# Patient Record
Sex: MALE | Race: WHITE | NOT HISPANIC OR LATINO | Employment: OTHER | URBAN - METROPOLITAN AREA
[De-identification: names, ages, dates, MRNs, and addresses within clinical notes are randomized per-mention and may not be internally consistent; named-entity substitution may affect disease eponyms.]

---

## 2017-05-16 ENCOUNTER — TELEPHONE (OUTPATIENT)
Dept: OPHTHALMOLOGY | Facility: CLINIC | Age: 67
End: 2017-05-16

## 2017-05-16 NOTE — TELEPHONE ENCOUNTER
----- Message from Sánchez Sotelo sent at 5/15/2017  3:09 PM CDT -----  Contact: Sánchez   Pt scheduled transp/Phaco on 7/13/17 with Dr. Van.  He will need to be seen since last visit was 7/29/16.  Thanks,   Sánchez     [phone]

## 2017-06-06 ENCOUNTER — TELEPHONE (OUTPATIENT)
Dept: OPHTHALMOLOGY | Facility: CLINIC | Age: 67
End: 2017-06-06

## 2017-06-06 ENCOUNTER — OFFICE VISIT (OUTPATIENT)
Dept: OPHTHALMOLOGY | Facility: CLINIC | Age: 67
End: 2017-06-06
Attending: OPHTHALMOLOGY
Payer: COMMERCIAL

## 2017-06-06 DIAGNOSIS — H25.13 NUCLEAR SCLEROSIS, BILATERAL: ICD-10-CM

## 2017-06-06 DIAGNOSIS — H18.519 FUCHS' ENDOTHELIAL DYSTROPHY: Primary | ICD-10-CM

## 2017-06-06 PROCEDURE — 99999 PR PBB SHADOW E&M-EST. PATIENT-LVL II: CPT | Mod: PBBFAC,,, | Performed by: OPHTHALMOLOGY

## 2017-06-06 PROCEDURE — 92014 COMPRE OPH EXAM EST PT 1/>: CPT | Mod: S$GLB,,, | Performed by: OPHTHALMOLOGY

## 2017-06-06 RX ORDER — LIDOCAINE HYDROCHLORIDE 10 MG/ML
1 INJECTION, SOLUTION EPIDURAL; INFILTRATION; INTRACAUDAL; PERINEURAL ONCE
Status: CANCELLED | OUTPATIENT
Start: 2017-06-06 | End: 2017-06-06

## 2017-06-06 RX ORDER — TETRACAINE HYDROCHLORIDE 5 MG/ML
1 SOLUTION OPHTHALMIC
Status: CANCELLED | OUTPATIENT
Start: 2017-06-06

## 2017-06-06 RX ORDER — MOXIFLOXACIN 5 MG/ML
1 SOLUTION/ DROPS OPHTHALMIC
Status: CANCELLED | OUTPATIENT
Start: 2017-06-06

## 2017-06-06 RX ORDER — PHENYLEPHRINE HYDROCHLORIDE 25 MG/ML
1 SOLUTION/ DROPS OPHTHALMIC
Status: CANCELLED | OUTPATIENT
Start: 2017-06-06

## 2017-06-06 RX ORDER — TROPICAMIDE 10 MG/ML
1 SOLUTION/ DROPS OPHTHALMIC
Status: CANCELLED | OUTPATIENT
Start: 2017-06-06

## 2017-06-06 RX ORDER — HYDROCORTISONE 25 MG/G
CREAM TOPICAL
Refills: 1 | COMMUNITY
Start: 2017-05-08 | End: 2018-06-13

## 2017-06-06 RX ORDER — TIZANIDINE 4 MG/1
TABLET ORAL
Refills: 0 | COMMUNITY
Start: 2017-03-02 | End: 2017-08-01

## 2017-06-06 RX ORDER — PITAVASTATIN CALCIUM 2.09 MG/1
TABLET, FILM COATED ORAL
Refills: 5 | COMMUNITY
Start: 2017-04-14 | End: 2017-08-01
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
## 2017-06-08 ENCOUNTER — TELEPHONE (OUTPATIENT)
Dept: OPHTHALMOLOGY | Facility: CLINIC | Age: 67
End: 2017-06-08

## 2017-06-08 DIAGNOSIS — H18.519 FUCHS' ENDOTHELIAL DYSTROPHY: ICD-10-CM

## 2017-06-08 DIAGNOSIS — H25.11 NUCLEAR SCLEROTIC CATARACT OF RIGHT EYE: Primary | ICD-10-CM

## 2017-07-03 ENCOUNTER — TELEPHONE (OUTPATIENT)
Dept: OPHTHALMOLOGY | Facility: CLINIC | Age: 67
End: 2017-07-03

## 2017-07-03 NOTE — TELEPHONE ENCOUNTER
----- Message from Josy Baeza sent at 6/23/2017  4:51 PM CDT -----  Pt calling for recommendations on a neuro ophthalmologist from Dr. Van.  I informed him that we have Dr. Persaud but he wants him to recommend someone maybe outside of [facility].  He can be reached at [phone]

## 2017-07-11 ENCOUNTER — TELEPHONE (OUTPATIENT)
Dept: OPHTHALMOLOGY | Facility: CLINIC | Age: 67
End: 2017-07-11

## 2017-07-12 ENCOUNTER — TELEPHONE (OUTPATIENT)
Dept: OPHTHALMOLOGY | Facility: CLINIC | Age: 67
End: 2017-07-12
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
## 2017-07-13 ENCOUNTER — NURSE TRIAGE (OUTPATIENT)
Dept: ADMINISTRATIVE | Facility: CLINIC | Age: 67
End: 2017-07-13

## 2017-07-13 ENCOUNTER — ANESTHESIA EVENT (OUTPATIENT)
Dept: SURGERY | Facility: OTHER | Age: 67
End: 2017-07-13
Payer: COMMERCIAL

## 2017-07-13 ENCOUNTER — SURGERY (OUTPATIENT)
Age: 67
End: 2017-07-13

## 2017-07-13 ENCOUNTER — ANESTHESIA (OUTPATIENT)
Dept: SURGERY | Facility: OTHER | Age: 67
End: 2017-07-13
Payer: COMMERCIAL

## 2017-07-13 ENCOUNTER — HOSPITAL ENCOUNTER (OUTPATIENT)
Facility: OTHER | Age: 67
Discharge: HOME | End: 2017-07-13
Attending: OPHTHALMOLOGY | Admitting: OPHTHALMOLOGY
Payer: COMMERCIAL

## 2017-07-13 VITALS
DIASTOLIC BLOOD PRESSURE: 82 MMHG | HEART RATE: 63 BPM | HEIGHT: 70 IN | OXYGEN SATURATION: 95 % | TEMPERATURE: 98 F | SYSTOLIC BLOOD PRESSURE: 131 MMHG | BODY MASS INDEX: 27.2 KG/M2 | RESPIRATION RATE: 16 BRPM | WEIGHT: 190 LBS

## 2017-07-13 DIAGNOSIS — H18.519 FUCHS' ENDOTHELIAL DYSTROPHY: Primary | ICD-10-CM

## 2017-07-13 DIAGNOSIS — H25.13 NUCLEAR SCLEROSIS, BILATERAL: ICD-10-CM

## 2017-07-13 DIAGNOSIS — H25.11 NUCLEAR SCLEROTIC CATARACT OF RIGHT EYE: ICD-10-CM

## 2017-07-13 LAB — POCT GLUCOSE: 139 MG/DL (ref 70–110)

## 2017-07-13 PROCEDURE — 71000015 HC POSTOP RECOV 1ST HR: Performed by: OPHTHALMOLOGY

## 2017-07-13 PROCEDURE — 25000003 PHARM REV CODE 250: Performed by: SPECIALIST

## 2017-07-13 PROCEDURE — V2632 POST CHMBR INTRAOCULAR LENS: HCPCS | Performed by: OPHTHALMOLOGY

## 2017-07-13 PROCEDURE — 25000003 PHARM REV CODE 250: Performed by: OPHTHALMOLOGY

## 2017-07-13 PROCEDURE — 71000016 HC POSTOP RECOV ADDL HR: Performed by: OPHTHALMOLOGY

## 2017-07-13 PROCEDURE — 65757 PREP CORNEAL ENDO ALLOGRAFT: CPT | Mod: ,,, | Performed by: OPHTHALMOLOGY

## 2017-07-13 PROCEDURE — 82962 GLUCOSE BLOOD TEST: CPT | Performed by: OPHTHALMOLOGY

## 2017-07-13 PROCEDURE — 63600175 PHARM REV CODE 636 W HCPCS: Performed by: OPHTHALMOLOGY

## 2017-07-13 PROCEDURE — 27201423 OPTIME MED/SURG SUP & DEVICES STERILE SUPPLY: Performed by: OPHTHALMOLOGY

## 2017-07-13 PROCEDURE — S0020 INJECTION, BUPIVICAINE HYDRO: HCPCS | Performed by: OPHTHALMOLOGY

## 2017-07-13 PROCEDURE — 37000009 HC ANESTHESIA EA ADD 15 MINS: Performed by: OPHTHALMOLOGY

## 2017-07-13 PROCEDURE — 37000008 HC ANESTHESIA 1ST 15 MINUTES: Performed by: OPHTHALMOLOGY

## 2017-07-13 PROCEDURE — 36000707: Performed by: OPHTHALMOLOGY

## 2017-07-13 PROCEDURE — V2785 CORNEAL TISSUE PROCESSING: HCPCS | Performed by: OPHTHALMOLOGY

## 2017-07-13 PROCEDURE — 65756 CORNEAL TRNSPL ENDOTHELIAL: CPT | Mod: RT,,, | Performed by: OPHTHALMOLOGY

## 2017-07-13 PROCEDURE — 66984 XCAPSL CTRC RMVL W/O ECP: CPT | Mod: 51,RT,, | Performed by: OPHTHALMOLOGY

## 2017-07-13 PROCEDURE — 36000706: Performed by: OPHTHALMOLOGY

## 2017-07-13 PROCEDURE — 63600175 PHARM REV CODE 636 W HCPCS: Performed by: NURSE ANESTHETIST, CERTIFIED REGISTERED

## 2017-07-13 RX ORDER — PHENYLEPHRINE HYDROCHLORIDE 25 MG/ML
1 SOLUTION/ DROPS OPHTHALMIC
Status: COMPLETED | OUTPATIENT
Start: 2017-07-13 | End: 2017-07-13

## 2017-07-13 RX ORDER — MOXIFLOXACIN 5 MG/ML
1 SOLUTION/ DROPS OPHTHALMIC
Status: COMPLETED | OUTPATIENT
Start: 2017-07-13 | End: 2017-07-13

## 2017-07-13 RX ORDER — HYDROCODONE BITARTRATE AND ACETAMINOPHEN 5; 325 MG/1; MG/1
1 TABLET ORAL EVERY 4 HOURS PRN
Status: DISCONTINUED | OUTPATIENT
Start: 2017-07-13 | End: 2017-07-13 | Stop reason: HOSPADM

## 2017-07-13 RX ORDER — FENTANYL CITRATE 50 UG/ML
INJECTION, SOLUTION INTRAMUSCULAR; INTRAVENOUS
Status: DISCONTINUED | OUTPATIENT
Start: 2017-07-13 | End: 2017-07-13

## 2017-07-13 RX ORDER — DEXAMETHASONE SODIUM PHOSPHATE 4 MG/ML
INJECTION, SOLUTION INTRA-ARTICULAR; INTRALESIONAL; INTRAMUSCULAR; INTRAVENOUS; SOFT TISSUE
Status: DISCONTINUED | OUTPATIENT
Start: 2017-07-13 | End: 2017-07-13 | Stop reason: HOSPADM

## 2017-07-13 RX ORDER — LIDOCAINE HYDROCHLORIDE 20 MG/ML
INJECTION, SOLUTION INFILTRATION; PERINEURAL
Status: DISCONTINUED | OUTPATIENT
Start: 2017-07-13 | End: 2017-07-13 | Stop reason: HOSPADM

## 2017-07-13 RX ORDER — ACETAMINOPHEN 325 MG/1
650 TABLET ORAL EVERY 4 HOURS PRN
Status: DISCONTINUED | OUTPATIENT
Start: 2017-07-13 | End: 2017-07-13 | Stop reason: HOSPADM

## 2017-07-13 RX ORDER — MIDAZOLAM HYDROCHLORIDE 1 MG/ML
INJECTION INTRAMUSCULAR; INTRAVENOUS
Status: DISCONTINUED | OUTPATIENT
Start: 2017-07-13 | End: 2017-07-13

## 2017-07-13 RX ORDER — SODIUM CHLORIDE, SODIUM LACTATE, POTASSIUM CHLORIDE, CALCIUM CHLORIDE 600; 310; 30; 20 MG/100ML; MG/100ML; MG/100ML; MG/100ML
INJECTION, SOLUTION INTRAVENOUS CONTINUOUS PRN
Status: DISCONTINUED | OUTPATIENT
Start: 2017-07-13 | End: 2017-07-13

## 2017-07-13 RX ORDER — LIDOCAINE HYDROCHLORIDE 10 MG/ML
1 INJECTION, SOLUTION EPIDURAL; INFILTRATION; INTRACAUDAL; PERINEURAL ONCE
Status: DISCONTINUED | OUTPATIENT
Start: 2017-07-13 | End: 2017-07-13 | Stop reason: HOSPADM

## 2017-07-13 RX ORDER — TETRACAINE HYDROCHLORIDE 5 MG/ML
1 SOLUTION OPHTHALMIC
Status: COMPLETED | OUTPATIENT
Start: 2017-07-13 | End: 2017-07-13

## 2017-07-13 RX ORDER — BUPIVACAINE HYDROCHLORIDE 7.5 MG/ML
INJECTION, SOLUTION EPIDURAL; RETROBULBAR
Status: DISCONTINUED | OUTPATIENT
Start: 2017-07-13 | End: 2017-07-13 | Stop reason: HOSPADM

## 2017-07-13 RX ORDER — CEFAZOLIN SODIUM 1 G/3ML
INJECTION, POWDER, FOR SOLUTION INTRAMUSCULAR; INTRAVENOUS
Status: DISCONTINUED | OUTPATIENT
Start: 2017-07-13 | End: 2017-07-13 | Stop reason: HOSPADM

## 2017-07-13 RX ORDER — TROPICAMIDE 10 MG/ML
1 SOLUTION/ DROPS OPHTHALMIC
Status: COMPLETED | OUTPATIENT
Start: 2017-07-13 | End: 2017-07-13

## 2017-07-13 RX ADMIN — TETRACAINE HYDROCHLORIDE 1 DROP: 5 SOLUTION OPHTHALMIC at 09:07

## 2017-07-13 RX ADMIN — PHENYLEPHRINE HYDROCHLORIDE 1 DROP: 25 SOLUTION/ DROPS OPHTHALMIC at 09:07

## 2017-07-13 RX ADMIN — TROPICAMIDE 1 DROP: 10 SOLUTION/ DROPS OPHTHALMIC at 09:07

## 2017-07-13 RX ADMIN — MOXIFLOXACIN HYDROCHLORIDE 1 DROP: 5 SOLUTION/ DROPS OPHTHALMIC at 09:07

## 2017-07-13 RX ADMIN — MIDAZOLAM HYDROCHLORIDE 2 MG: 1 INJECTION, SOLUTION INTRAMUSCULAR; INTRAVENOUS at 10:07

## 2017-07-13 RX ADMIN — FENTANYL CITRATE 100 MCG: 50 INJECTION, SOLUTION INTRAMUSCULAR; INTRAVENOUS at 10:07

## 2017-07-13 RX ADMIN — CEFAZOLIN 25 MG: 330 INJECTION, POWDER, FOR SOLUTION INTRAMUSCULAR; INTRAVENOUS at 11:07

## 2017-07-13 RX ADMIN — DEXAMETHASONE SODIUM PHOSPHATE 2 MG: 4 INJECTION, SOLUTION INTRAMUSCULAR; INTRAVENOUS at 11:07

## 2017-07-13 RX ADMIN — ACETYLCHOLINE CHLORIDE 1 ML: KIT at 11:07

## 2017-07-13 RX ADMIN — SODIUM CHONDROITIN SULFATE / SODIUM HYALURONATE 2 ML: 0.55-0.5 INJECTION INTRAOCULAR at 11:07

## 2017-07-13 RX ADMIN — BALANCED SALT SOLUTION ENRICHED WITH BICARBONATE, DEXTROSE, AND GLUTATHIONE 515 ML: KIT at 11:07

## 2017-07-13 RX ADMIN — BUPIVACAINE HYDROCHLORIDE 3 ML: 7.5 INJECTION, SOLUTION EPIDURAL; RETROBULBAR at 11:07

## 2017-07-13 RX ADMIN — SODIUM CHLORIDE, SODIUM LACTATE, POTASSIUM CHLORIDE, AND CALCIUM CHLORIDE: 600; 310; 30; 20 INJECTION, SOLUTION INTRAVENOUS at 10:07

## 2017-07-13 RX ADMIN — LIDOCAINE HYDROCHLORIDE 3 ML: 20 INJECTION, SOLUTION INFILTRATION; PERINEURAL at 11:07

## 2017-07-13 NOTE — ANESTHESIA POSTPROCEDURE EVALUATION
"Anesthesia Post Evaluation    Patient: Sadiq Dhillon    Procedure(s) Performed: Procedure(s) (LRB):  PHACOEMULSIFICATION-ASPIRATION-CATARACT (Right)  INSERTION-INTRAOCULAR LENS (IOL) (Right)  TRANSPLANT-CORNEA/DMEK (Right)    [facility] Anesthesia Post Op Evaluation    Visit Vitals  /79 (BP Location: Left arm, Patient Position: Lying, BP Method: Automatic)   Pulse 71   Temp 36.7 °C (98.1 °F) (Oral)   Resp 16   Ht 5' 10" (1.778 m)   Wt 86.2 kg (190 lb)   SpO2 (!) 94%   BMI 27.26 kg/m²       Pain/Sylvie Score: Pain Assessment Performed: Yes (7/13/2017  9:00 AM)  Presence of Pain: denies (7/13/2017  9:00 AM)      "

## 2017-07-13 NOTE — TRANSFER OF CARE
"Anesthesia Transfer of Care Note    Patient: Sadiq Dhillon    Procedure(s) Performed: Procedure(s) (LRB):  PHACOEMULSIFICATION-ASPIRATION-CATARACT (Right)  INSERTION-INTRAOCULAR LENS (IOL) (Right)  TRANSPLANT-CORNEA/DMEK (Right)    Patient location: [facility]    Anesthesia Type: MAC    Transport from OR: Transported from OR on room air with adequate spontaneous ventilation    Post pain: adequate analgesia    Post assessment: no apparent anesthetic complications and tolerated procedure well    Post vital signs: stable    Level of consciousness: awake, alert and oriented    Nausea/Vomiting: no nausea/vomiting    Complications: none    Transfer of care protocol was followed      Last vitals:   Visit Vitals  /79 (BP Location: Left arm, Patient Position: Lying, BP Method: Automatic)   Pulse 71   Temp 36.7 °C (98.1 °F) (Oral)   Resp 16   Ht 5' 10" (1.778 m)   Wt 86.2 kg (190 lb)   SpO2 (!) 94%   BMI 27.26 kg/m²     "

## 2017-07-13 NOTE — ANESTHESIA POSTPROCEDURE EVALUATION
"Anesthesia Post Evaluation    Patient: Sadiq Dhillon    Procedure(s) Performed: Procedure(s) (LRB):  PHACOEMULSIFICATION-ASPIRATION-CATARACT (Right)  INSERTION-INTRAOCULAR LENS (IOL) (Right)  TRANSPLANT-CORNEA/DMEK (Right)    Final Anesthesia Type: MAC  Patient location during evaluation: [facility]  Patient participation: Yes- Able to Participate  Level of consciousness: awake and alert and oriented  Post-procedure vital signs: reviewed and stable  Pain management: adequate  Airway patency: patent  PONV status at discharge: No PONV  Anesthetic complications: no      Cardiovascular status: hemodynamically stable  Respiratory status: unassisted, spontaneous ventilation and room air  Hydration status: euvolemic  Follow-up not needed.        Visit Vitals  /79 (BP Location: Left arm, Patient Position: Lying, BP Method: Automatic)   Pulse 71   Temp 36.7 °C (98.1 °F) (Oral)   Resp 16   Ht 5' 10" (1.778 m)   Wt 86.2 kg (190 lb)   SpO2 (!) 94%   BMI 27.26 kg/m²       Pain/Sylvie Score: Pain Assessment Performed: Yes (7/13/2017  9:00 AM)  Presence of Pain: denies (7/13/2017  9:00 AM)      "

## 2017-07-13 NOTE — PLAN OF CARE
Sadiq Dhillon has met all discharge criteria from Phase II. Vital Signs are stable, ambulating  without difficulty. Discharge instructions given, patient verbalized understanding. Discharged from facility via wheelchair in stable condition.

## 2017-07-13 NOTE — ANESTHESIA POSTPROCEDURE EVALUATION
"Anesthesia Post Evaluation    Patient: Sadiq Dhillon    Procedure(s) Performed: Procedure(s) (LRB):  PHACOEMULSIFICATION-ASPIRATION-CATARACT (Right)  INSERTION-INTRAOCULAR LENS (IOL) (Right)  TRANSPLANT-CORNEA/DMEK (Right)    Final Anesthesia Type: MAC  Patient location during evaluation: [facility]  Patient participation: Yes- Able to Participate  Level of consciousness: awake and alert  Post-procedure vital signs: reviewed and stable  Pain management: adequate  Airway patency: patent  PONV status at discharge: No PONV  Anesthetic complications: no      Cardiovascular status: hemodynamically stable  Respiratory status: unassisted  Hydration status: euvolemic  Follow-up not needed.        Visit Vitals  /79 (BP Location: Left arm, Patient Position: Lying, BP Method: Automatic)   Pulse 71   Temp 36.7 °C (98.1 °F) (Oral)   Resp 16   Ht 5' 10" (1.778 m)   Wt 86.2 kg (190 lb)   SpO2 (!) 94%   BMI 27.26 kg/m²       Pain/Sylvie Score: Pain Assessment Performed: Yes (7/13/2017  9:00 AM)  Presence of Pain: denies (7/13/2017  9:00 AM)      "

## 2017-07-13 NOTE — PLAN OF CARE
Patient prefers to have Nadir friend present for discharge teaching. Please contact them @[phone]

## 2017-07-13 NOTE — DISCHARGE SUMMARY
Outcome: Successful outpatient ophthalmic surgical procedure  Preprinted Instructions given to patient.  Regular diet.  Activity: No restrictions  Meds: see Med Rec  Condition: stable  Follow up: 1 day with Dr Van  Disposition: Home  Diagnosis: s/p eye surgery

## 2017-07-13 NOTE — OP NOTE
SURGEON:  Ra Van M.D.    PREOPERATIVE DIAGNOSES:  Fuchs endothelial corneal dystrophy  Nuclear Sclerotic Cataract    POSTOPERATIVE DIAGNOSES:    Fuchs endothelial corneal dystrophy  Nuclear Sclerotic Cataract    PROCEDURES PERFORMED:  1) Descement's Membrane Endothelial Keratoplasty  right eye (DMEK) (68801)  2) Phaco/IOL right eye (38247)    Date of Procedure 07/13/2017    ANESTHESIA:  MAC with retrobulbar block.    GRAFT SIZE:  8.0 mm    IMPLANT: pcboo 17.0    COMPLICATIONS:  None.    INDICATIONS:    The patient has a history poor vision secondary to corneal edema.  After a thorough discussion of the risks, benefits and alternatives to corneal transplantation using the DMEK technique, the patient voices understanding of the risks and benefits and wishes to proceed with surgery.    PROCEDURE IN DETAIL:    The patient was brought to the operating room in the supine position, where the eye was prepped and draped in standard sterile fashion, after having received a retrobulbar block consisting of a 50/50 mixture of lidocaine and bupivacaine under conscious sedation.  The procedure was begun by the creation of a paracentesis incision, through which viscoelastic was used to fill the anterior chamber. Next, a 3mm temporal limbal tunnel incision was constructed and a cystotome and Utrata forceps used to fashion a continuous curvilinear capsulorrhexis.  Hydrodissection was carried out using the Garrison hydrodissection cannula and the nucleus was found to be mobile.  Phacoemulsification of the nucleus was carried out using a quick chop technique, and all remaining epinuclear and cortical material was removed.  The eye was then reformed with Viscoelastic and the  intraocular lens was implanted into the capsular bag.       An 8 mm maria del carmen was made in the center of the cornea and then reverse Krzysztof used to score and remove Descemet's membrane.  An inferior surgical PI was created with 30G needle and all remaining  viscoelastics were removed from the eye.    Attention was then directed to the side table, where the previously stripped donor tissue was trephined with a Hessburg-Bee trephine, and stained with trypan blue. A modified injector system was used to implant this descemet's membrane tissue into the anterior chamber, and a 10-0 nylon suture placed in the wound. The graft was unfurled with a tapping technique, and 20% SF6 gas was used to fill the AC to the diameter of graft. The anterior chamber was reformed with BSS to a normal pressure.  The patient will remain supine in the postoperative recovery area for one hour to allow better adhesion of the graft to the posterior aspect of the cornea.  The patient  will be seen tomorrow in the eye clinic.

## 2017-07-13 NOTE — ANESTHESIA PREPROCEDURE EVALUATION
07/13/2017  Sadiq Dhillon is a 67 y.o., male.    Anesthesia Evaluation    I have reviewed the Patient Summary Reports.    I have reviewed the Nursing Notes.   I have reviewed the Medications.     Review of Systems  Anesthesia Hx:  No problems with previous Anesthesia    Social:  Non-Smoker, Alcohol Use    Hematology/Oncology:  Hematology Normal   Oncology Normal     EENT/Dental:EENT/Dental Normal   Cardiovascular:   Exercise tolerance: good Hypertension, well controlled    Pulmonary:  Pulmonary Normal    Renal/:  Renal/ Normal     Hepatic/GI:  Hepatic/GI Normal    Musculoskeletal:  Musculoskeletal Normal    Neurological:  Neurology Normal    Endocrine:   Diabetes, well controlled, type 2    Dermatological:  Skin Normal    Psych:  Psychiatric Normal           Physical Exam  General:  Well nourished    Airway/Jaw/Neck:  Airway Findings: Mouth Opening: Normal Tongue: Normal  General Airway Assessment: Adult, Good  Mallampati: I  TM Distance: Normal, at least 6 cm  Jaw/Neck Findings:  Neck ROM: Normal ROM      Dental:  Dental Findings: In tact, molar caps        Mental Status:  Mental Status Findings:  Cooperative, Alert and Oriented         Anesthesia Plan  Type of Anesthesia, risks & benefits discussed:  Anesthesia Type:  MAC  Patient's Preference:   Intra-op Monitoring Plan:   Intra-op Monitoring Plan Comments:   Post Op Pain Control Plan:   Post Op Pain Control Plan Comments:   Induction:    Beta Blocker:         Informed Consent: Patient understands risks and agrees with Anesthesia plan.  Questions answered. Anesthesia consent signed with patient.  ASA Score: 2     Day of Surgery Review of History & Physical:    H&P update referred to the surgeon.         Ready For Surgery From Anesthesia Perspective.

## 2017-07-14 ENCOUNTER — OFFICE VISIT (OUTPATIENT)
Dept: OPHTHALMOLOGY | Facility: CLINIC | Age: 67
End: 2017-07-14
Payer: COMMERCIAL

## 2017-07-14 DIAGNOSIS — H25.11 NUCLEAR SCLEROTIC CATARACT OF RIGHT EYE: ICD-10-CM

## 2017-07-14 DIAGNOSIS — H18.519 FUCHS' ENDOTHELIAL DYSTROPHY: ICD-10-CM

## 2017-07-14 DIAGNOSIS — Z98.890 POST-OPERATIVE STATE: Primary | ICD-10-CM

## 2017-07-14 PROCEDURE — 99024 POSTOP FOLLOW-UP VISIT: CPT | Mod: S$GLB,,, | Performed by: OPHTHALMOLOGY

## 2017-07-14 PROCEDURE — 99999 PR PBB SHADOW E&M-EST. PATIENT-LVL II: CPT | Mod: PBBFAC,,, | Performed by: OPHTHALMOLOGY

## 2017-07-14 RX ORDER — OFLOXACIN 3 MG/ML
SOLUTION/ DROPS OPHTHALMIC
Qty: 5 ML | Refills: 0 | Status: SHIPPED | OUTPATIENT
Start: 2017-07-14 | End: 2017-08-01

## 2017-07-14 NOTE — PROGRESS NOTES
HPI     Post-op Evaluation    Additional comments: 1 day            Comments   POD 1 Phaco/DMEK OD    Pt states doing well.  No complaints.   Patch Removed.  Has Pred/VM at home ready to start qid regimen.          Last edited by Josy Baeza on 7/14/2017  8:29 AM. (History)            Assessment /Plan     For exam results, see Encounter Report.    Post-operative state    Nuclear sclerotic cataract of right eye    Fuchs' endothelial dystrophy      DMEK POD1: DMEK Graft attached. 2+ MCE. Wound stable. 75% bubble with good PI.  Supine 4-5hrs daily till Monday.

## 2017-07-18 ENCOUNTER — TELEPHONE (OUTPATIENT)
Dept: OPHTHALMOLOGY | Facility: CLINIC | Age: 67
End: 2017-07-18

## 2017-07-18 NOTE — TELEPHONE ENCOUNTER
----- Message from Nasima Ibanez sent at 7/18/2017  9:40 AM CDT -----  Contact: Sadiq Dhillon   Pt would like speak with Dr.Shan velazquez please pt has a question about the right eye ,pt can be reached at [phone] please thanks.

## 2017-07-18 NOTE — TELEPHONE ENCOUNTER
Patient had many questions that were answered to patient's satisfaction. Advised patient to call if they have any questions.

## 2017-07-27 ENCOUNTER — TELEPHONE (OUTPATIENT)
Dept: OPHTHALMOLOGY | Facility: CLINIC | Age: 67
End: 2017-07-27

## 2017-07-27 NOTE — TELEPHONE ENCOUNTER
----- Message from Lorrie Monet sent at 7/27/2017  2:26 PM CDT -----  Contact: Pt  Pt would like to speak with Dr. Van's nurse about the recent heart attack he experience since he's had his procedure with Dr. Van. He can be reached at [phone]

## 2017-07-27 NOTE — TELEPHONE ENCOUNTER
I spoke to patient.  He is currently inpatient post heart attack.  Using Pred qid OD.  Just wanted to let us know. Patient reassured to continue using drops and follow-up as planned 08/01/2017 at 10 am.

## 2017-08-01 ENCOUNTER — OFFICE VISIT (OUTPATIENT)
Dept: OPHTHALMOLOGY | Facility: CLINIC | Age: 67
End: 2017-08-01
Attending: OPHTHALMOLOGY
Payer: COMMERCIAL

## 2017-08-01 DIAGNOSIS — H18.519 FUCHS' ENDOTHELIAL DYSTROPHY: ICD-10-CM

## 2017-08-01 DIAGNOSIS — Z98.890 POST-OPERATIVE STATE: Primary | ICD-10-CM

## 2017-08-01 DIAGNOSIS — H25.11 NUCLEAR SCLEROTIC CATARACT OF RIGHT EYE: ICD-10-CM

## 2017-08-01 PROCEDURE — 99024 POSTOP FOLLOW-UP VISIT: CPT | Mod: S$GLB,,, | Performed by: OPHTHALMOLOGY

## 2017-08-01 PROCEDURE — 99999 PR PBB SHADOW E&M-EST. PATIENT-LVL II: CPT | Mod: PBBFAC,,, | Performed by: OPHTHALMOLOGY

## 2017-08-01 RX ORDER — LISINOPRIL 2.5 MG/1
2.5 TABLET ORAL EVERY MORNING
COMMUNITY

## 2017-08-01 RX ORDER — ROSUVASTATIN CALCIUM 40 MG/1
10 TABLET, COATED ORAL NIGHTLY
COMMUNITY
End: 2021-10-18

## 2017-08-01 RX ORDER — ASPIRIN 81 MG/1
81 TABLET ORAL EVERY MORNING
COMMUNITY

## 2017-08-01 RX ORDER — METOPROLOL SUCCINATE 25 MG/1
25 TABLET, EXTENDED RELEASE ORAL NIGHTLY
COMMUNITY
End: 2023-02-08 | Stop reason: SDUPTHER

## 2017-08-01 RX ORDER — PREDNISOLONE ACETATE 10 MG/ML
1 SUSPENSION/ DROPS OPHTHALMIC 2 TIMES DAILY
COMMUNITY
End: 2018-09-26 | Stop reason: SDUPTHER

## 2017-08-01 NOTE — PROGRESS NOTES
HPI     Post-op Evaluation    Additional comments: 3 wk check.            Comments   POW 3 DMEK/Phaco OD    Pt states doing well, vision is much improved OD.  Had a heart attack last   week.  Resulting stint placement.  No pain or discomfort OU.     Pred qid OD        Last edited by Josy Baeza on 8/1/2017  9:59 AM. (History)            Assessment /Plan     For exam results, see Encounter Report.    Post-operative state    Nuclear sclerotic cataract of right eye    Fuchs' endothelial dystrophy      DSEK graft attached and clear. Signs and symptoms of graft rejection reviewed.  2 weeks, po looks great.

## 2017-09-05 ENCOUNTER — OFFICE VISIT (OUTPATIENT)
Dept: OPHTHALMOLOGY | Facility: CLINIC | Age: 67
End: 2017-09-05
Attending: OPHTHALMOLOGY
Payer: COMMERCIAL

## 2017-09-05 DIAGNOSIS — Z94.7 STATUS POST CORNEAL TRANSPLANT: ICD-10-CM

## 2017-09-05 DIAGNOSIS — Z98.890 POST-OPERATIVE STATE: Primary | ICD-10-CM

## 2017-09-05 PROCEDURE — 99024 POSTOP FOLLOW-UP VISIT: CPT | Mod: S$GLB,,, | Performed by: OPHTHALMOLOGY

## 2017-09-05 PROCEDURE — 99999 PR PBB SHADOW E&M-EST. PATIENT-LVL II: CPT | Mod: PBBFAC,,, | Performed by: OPHTHALMOLOGY

## 2017-09-05 NOTE — PROGRESS NOTES
HPI     S/P Phaco/DMEK OD 07/13/2017    Patient states he is doing well. Denies pain. Patient states his near   vision is now compromised since the procedure.     Eye meds:  PF QID OD    Last edited by Dotty Guerrero on 9/5/2017 10:48 AM. (History)            Assessment /Plan     For exam results, see Encounter Report.    Post-operative state    Status post corneal transplant      DMEK OD graft attached and clear. Signs and symptoms of graft rejection reviewed.  2mos. Needs Mrx

## 2017-11-21 ENCOUNTER — TELEPHONE (OUTPATIENT)
Dept: OPHTHALMOLOGY | Facility: CLINIC | Age: 67
End: 2017-11-21

## 2017-11-21 NOTE — TELEPHONE ENCOUNTER
Patient advised to continue Prednisolone bid OD until follow-up appointment in March.  I then called in refills to his [facility] on file.

## 2017-11-21 NOTE — TELEPHONE ENCOUNTER
----- Message from Erick Bhatia sent at 11/21/2017  9:32 AM CST -----  Contact: Sadiq Dhillon  _X  1st Request  _  2nd Request  _  3rd Request        Who: Sadiq Dhillon    Why: Patient had surgery in July and wants to know if he should refill and continue use of eye drops    What Number to Call Back: [phone]    When to Expect a call back: (Within 24 hours)    Please return the call at earliest convenience. Thanks!

## 2017-12-04 ENCOUNTER — TELEPHONE (OUTPATIENT)
Dept: OPHTHALMOLOGY | Facility: CLINIC | Age: 67
End: 2017-12-04

## 2017-12-04 NOTE — TELEPHONE ENCOUNTER
----- Message from Katelynn Choi sent at 12/4/2017 11:09 AM CST -----  Contact: Sadiq Garcias had a Cornea transplant this summer and is concerned about his vision. Pt has been having more trouble seeing for the past couple of days. Pt can be reached [phone].

## 2017-12-04 NOTE — TELEPHONE ENCOUNTER
I spoke to patient. He has noticed the Right eye had a feeling of something in it.  Today that is resolved but today is not as clear as it was before. Advised that this may be normal.  Will moniter and if still bothered by it tomorrow will call return to clinic.

## 2018-03-18 ENCOUNTER — OFFICE VISIT (OUTPATIENT)
Dept: URGENT CARE | Facility: CLINIC | Age: 68
End: 2018-03-18
Payer: COMMERCIAL

## 2018-03-18 VITALS
DIASTOLIC BLOOD PRESSURE: 80 MMHG | TEMPERATURE: 98 F | WEIGHT: 198 LBS | HEART RATE: 89 BPM | OXYGEN SATURATION: 97 % | SYSTOLIC BLOOD PRESSURE: 130 MMHG | HEIGHT: 70 IN | RESPIRATION RATE: 16 BRPM | BODY MASS INDEX: 28.35 KG/M2

## 2018-03-18 DIAGNOSIS — R05.9 COUGH: ICD-10-CM

## 2018-03-18 DIAGNOSIS — J32.0 MAXILLARY SINUSITIS, UNSPECIFIED CHRONICITY: Primary | ICD-10-CM

## 2018-03-18 DIAGNOSIS — E11.8 TYPE 2 DIABETES MELLITUS WITH COMPLICATION, WITHOUT LONG-TERM CURRENT USE OF INSULIN: ICD-10-CM

## 2018-03-18 LAB
CTP QC/QA: YES
FLUAV AG NPH QL: NEGATIVE
FLUBV AG NPH QL: NEGATIVE
GLUCOSE SERPL-MCNC: 128 MG/DL (ref 70–110)

## 2018-03-18 PROCEDURE — 3079F DIAST BP 80-89 MM HG: CPT | Mod: CPTII,S$GLB,, | Performed by: NURSE PRACTITIONER

## 2018-03-18 PROCEDURE — 3075F SYST BP GE 130 - 139MM HG: CPT | Mod: CPTII,S$GLB,, | Performed by: NURSE PRACTITIONER

## 2018-03-18 PROCEDURE — 99203 OFFICE O/P NEW LOW 30 MIN: CPT | Mod: S$GLB,,, | Performed by: NURSE PRACTITIONER

## 2018-03-18 PROCEDURE — 87804 INFLUENZA ASSAY W/OPTIC: CPT | Mod: QW,S$GLB,, | Performed by: NURSE PRACTITIONER

## 2018-03-18 PROCEDURE — 82948 REAGENT STRIP/BLOOD GLUCOSE: CPT | Mod: S$GLB,,, | Performed by: NURSE PRACTITIONER

## 2018-03-18 RX ORDER — BENZONATATE 100 MG/1
100 CAPSULE ORAL EVERY 6 HOURS PRN
Qty: 30 CAPSULE | Refills: 1 | Status: SHIPPED | OUTPATIENT
Start: 2018-03-18 | End: 2018-06-13

## 2018-03-18 RX ORDER — AMOXICILLIN AND CLAVULANATE POTASSIUM 875; 125 MG/1; MG/1
1 TABLET, FILM COATED ORAL 2 TIMES DAILY
Qty: 20 TABLET | Refills: 0 | Status: SHIPPED | OUTPATIENT
Start: 2018-03-18 | End: 2018-03-28

## 2018-03-18 NOTE — PATIENT INSTRUCTIONS
"                                                          Sinusitis   If your condition worsens or fails to improve we recommend that you receive another evaluation at the ER immediately or contact your PCP to discuss your concerns or return here. You must understand that you've received an urgent care treatment only and that you may be released before all your medical problems are known or treated. You the patient will arrange for followup care as instructed.   If we discussed that I think your illness is viral it will not respond to antibiotics and it will last 10-14 days. However, if over the next few days the symptoms worsen start the antibiotics I have given you.   -  If we discussed that you require antibiotics start them now and take them to completion.   -  If you are female and on BCP and do take the antibiotics, use additional methods to prevent pregnancy while on the antibiotics and for one cycle after.   -  Flonase (fluticasone) is a nasal spray which is available over the counter and may help with your symptoms   -  Zyrtec D, Claritin D or allegra D can also help with symptoms of congestion and drainage.   -  If you have hypertension avoid using the "D" which is the decongestant. Instead, you can use Coricidin HBP for your cold and cough symptoms.     -  If you just have drainage you can take plain Zyrtec, Claritin or Allegra   -  If you just have a congested feeling you can take pseudoephedrine (unless you have high blood pressure) which you have to sign for behind the counter. Do not buy the phenylephrine which is on the shelf as it is not effective   -  Rest and fluids are also important.   -  Tylenol or ibuprofen can also be used as directed for pain unless you have an allergy to them or medical condition such as stomach ulcers, kidney or liver disease or blood thinners etc for which you should not be taking these type of medications.   -  If you are flying in the next few days Afrin nose drops for " the airplane flight upon take off and landing may help. Other than at those times refrain from using afrin.   -  If you were prescribed a narcotic do not drive or operate heavy machinery while taking these medications.     Sinusitis (Antibiotic Treatment)    The sinuses are air-filled spaces within the bones of the face. They connect to the inside of the nose. Sinusitis is an inflammation of the tissue lining the sinus cavity. Sinus inflammation can occur during a cold. It can also be due to allergies to pollens and other particles in the air. Sinusitis can cause symptoms of sinus congestion and fullness. A sinus infection causes fever, headache and facial pain. There is often green or yellow drainage from the nose or into the back of the throat (post-nasal drip). You have been given antibiotics to treat this condition.  Home care:  · Take the full course of antibiotics as instructed. Do not stop taking them, even if you feel better.  · Drink plenty of water, hot tea, and other liquids. This may help thin mucus. It also may promote sinus drainage.  · Heat may help soothe painful areas of the face. Use a towel soaked in hot water. Or,  the shower and direct the hot spray onto your face. Using a vaporizer along with a menthol rub at night may also help.   · An expectorant containing guaifenesin may help thin the mucus and promote drainage from the sinuses.  · Over-the-counter decongestants may be used unless a similar medicine was prescribed. Nasal sprays work the fastest. Use one that contains phenylephrine or oxymetazoline. First blow the nose gently. Then use the spray. Do not use these medicines more often than directed on the label or symptoms may get worse. You may also use tablets containing pseudoephedrine. Avoid products that combine ingredients, because side effects may be increased. Read labels. You can also ask the pharmacist for help. (NOTE: Persons with high blood pressure should not use  decongestants. They can raise blood pressure.)  · Over-the-counter antihistamines may help if allergies contributed to your sinusitis.    · Do not use nasal rinses or irrigation during an acute sinus infection, unless told to by your health care provider. Rinsing may spread the infection to other sinuses.  · Use acetaminophen or ibuprofen to control pain, unless another pain medicine was prescribed. (If you have chronic liver or kidney disease or ever had a stomach ulcer, talk with your doctor before using these medicines. Aspirin should never be used in anyone under 18 years of age who is ill with a fever. It may cause severe liver damage.)  · Don't smoke. This can worsen symptoms.  Follow-up care  Follow up with your healthcare provider or our staff if you are not improving within the next week.  When to seek medical advice  Call your healthcare provider if any of these occur:  · Facial pain or headache becoming more severe  · Stiff neck  · Unusual drowsiness or confusion  · Swelling of the forehead or eyelids  · Vision problems, including blurred or double vision  · Fever of 100.4ºF (38ºC) or higher, or as directed by your healthcare provider  · Seizure  · Breathing problems  · Symptoms not resolving within 10 days  Date Last Reviewed: 4/13/2015  © 1659-1752 [employer]. [address] [place] [address], [place] [address]. All rights reserved. This information is not intended as a substitute for professional medical care. Always follow your healthcare professional's instructions.

## 2018-03-18 NOTE — PROGRESS NOTES
"Subjective:       Patient ID: Sadiq Dhillon is a 68 y.o. male.    Vitals:  height is 5' 10" (1.778 m) and weight is 89.8 kg (198 lb). His temperature is 98.1 °F (36.7 °C). His blood pressure is 130/80 and his pulse is 89. His respiration is 16 and oxygen saturation is 97%.     Chief Complaint: Nasal Congestion    Pt states he has had cold like symtoms since Monday pt states he has chest and back soreness and severe congestion.      Review of Systems   Constitution: Negative for chills, fever and malaise/fatigue.   HENT: Positive for congestion. Negative for ear pain, hoarse voice and sore throat.    Eyes: Negative for discharge and redness.   Cardiovascular: Negative for chest pain, dyspnea on exertion and leg swelling.   Respiratory: Positive for cough and sputum production. Negative for shortness of breath and wheezing.    Musculoskeletal: Positive for back pain. Negative for myalgias.   Gastrointestinal: Negative for abdominal pain and nausea.   Neurological: Negative for headaches.       Objective:      Physical Exam   Constitutional: He is oriented to person, place, and time. Vital signs are normal. He appears well-developed and well-nourished. He is cooperative.  Non-toxic appearance. He does not appear ill. No distress.   HENT:   Head: Normocephalic and atraumatic.   Right Ear: Hearing, tympanic membrane, external ear and ear canal normal.   Left Ear: Hearing, tympanic membrane, external ear and ear canal normal.   Nose: Mucosal edema and rhinorrhea present. No nasal deformity. No epistaxis. Right sinus exhibits maxillary sinus tenderness. Right sinus exhibits no frontal sinus tenderness. Left sinus exhibits maxillary sinus tenderness. Left sinus exhibits no frontal sinus tenderness.   Mouth/Throat: Uvula is midline and mucous membranes are normal. No trismus in the jaw. Normal dentition. No uvula swelling. Posterior oropharyngeal erythema present. Tonsils are 1+ on the right. Tonsils are 1+ on the left. " No tonsillar exudate.   Eyes: Conjunctivae, EOM and lids are normal. Pupils are equal, round, and reactive to light. No scleral icterus.   Sclera clear bilat   Neck: Trachea normal, normal range of motion, full passive range of motion without pain and phonation normal. Neck supple.   Cardiovascular: Normal rate, regular rhythm, normal heart sounds, intact distal pulses and normal pulses.    Pulmonary/Chest: Effort normal and breath sounds normal. No respiratory distress.   Abdominal: Soft. Normal appearance and bowel sounds are normal. He exhibits no distension. There is no tenderness.   Musculoskeletal: Normal range of motion. He exhibits no edema or deformity.   Neurological: He is alert and oriented to person, place, and time. He exhibits normal muscle tone. Coordination normal.   Skin: Skin is warm, dry and intact. He is not diaphoretic. No pallor.   Psychiatric: He has a normal mood and affect. His speech is normal and behavior is normal. Judgment and thought content normal. Cognition and memory are normal.   Nursing note and vitals reviewed.      Office Visit on 03/18/2018   Component Date Value Ref Range Status    POC Glucose 03/18/2018 128* 70 - 110 mg/dL Final    Rapid Influenza A Ag 03/18/2018 Negative  Negative Final    Rapid Influenza B Ag 03/18/2018 Negative  Negative Final     Acceptable 03/18/2018 Yes   Final     Type of Interpretation: Radiology Verbal Report.  Radiology Procedure Done: AP & Lat CXR.  Interpretation: External Result Report  Narrative       EXAMINATION:  XR CHEST PA AND LATERAL  CLINICAL HISTORY:  Cough  TECHNIQUE:  PA and lateral views of the chest were performed.  COMPARISON:  None  FINDINGS:  Elevation LEFT hemidiaphragm.The lungs are clear, with normal appearance of pulmonary vasculature and no pleural effusion or pneumothorax.  The cardiac silhouette is normal in size. The hilar and mediastinal contours are unremarkable.  Bones are intact.  Impression       No  acute cardiopulmonary abnormality.  Elevation LEFT hemidiaphragm.            Assessment:       1. Maxillary sinusitis, unspecified chronicity    2. Cough    3. Type 2 diabetes mellitus with complication, without long-term current use of insulin        Plan:         Maxillary sinusitis, unspecified chronicity    Cough  -     POCT INFLUENZA A/B  -     X-Ray Chest PA And Lateral; Future; Expected date: 03/18/2018  -     amoxicillin-clavulanate 875-125mg (AUGMENTIN) 875-125 mg per tablet; Take 1 tablet by mouth 2 (two) times daily.  Dispense: 20 tablet; Refill: 0  -     benzonatate (TESSALON PERLES) 100 MG capsule; Take 1 capsule (100 mg total) by mouth every 6 (six) hours as needed for Cough.  Dispense: 30 capsule; Refill: 1    Type 2 diabetes mellitus with complication, without long-term current use of insulin  -     POCT glucose      Patient Instructions                                                             Sinusitis   If your condition worsens or fails to improve we recommend that you receive another evaluation at the ER immediately or contact your PCP to discuss your concerns or return here. You must understand that you've received an urgent care treatment only and that you may be released before all your medical problems are known or treated. You the patient will arrange for followup care as instructed.   If we discussed that I think your illness is viral it will not respond to antibiotics and it will last 10-14 days. However, if over the next few days the symptoms worsen start the antibiotics I have given you.   -  If we discussed that you require antibiotics start them now and take them to completion.   -  If you are female and on BCP and do take the antibiotics, use additional methods to prevent pregnancy while on the antibiotics and for one cycle after.   -  Flonase (fluticasone) is a nasal spray which is available over the counter and may help with your symptoms   -  Zyrtec D, Claritin D or allegra D  "can also help with symptoms of congestion and drainage.   -  If you have hypertension avoid using the "D" which is the decongestant. Instead, you can use Coricidin HBP for your cold and cough symptoms.     -  If you just have drainage you can take plain Zyrtec, Claritin or Allegra   -  If you just have a congested feeling you can take pseudoephedrine (unless you have high blood pressure) which you have to sign for behind the counter. Do not buy the phenylephrine which is on the shelf as it is not effective   -  Rest and fluids are also important.   -  Tylenol or ibuprofen can also be used as directed for pain unless you have an allergy to them or medical condition such as stomach ulcers, kidney or liver disease or blood thinners etc for which you should not be taking these type of medications.   -  If you are flying in the next few days Afrin nose drops for the airplane flight upon take off and landing may help. Other than at those times refrain from using afrin.   -  If you were prescribed a narcotic do not drive or operate heavy machinery while taking these medications.     Sinusitis (Antibiotic Treatment)    The sinuses are air-filled spaces within the bones of the face. They connect to the inside of the nose. Sinusitis is an inflammation of the tissue lining the sinus cavity. Sinus inflammation can occur during a cold. It can also be due to allergies to pollens and other particles in the air. Sinusitis can cause symptoms of sinus congestion and fullness. A sinus infection causes fever, headache and facial pain. There is often green or yellow drainage from the nose or into the back of the throat (post-nasal drip). You have been given antibiotics to treat this condition.  Home care:  · Take the full course of antibiotics as instructed. Do not stop taking them, even if you feel better.  · Drink plenty of water, hot tea, and other liquids. This may help thin mucus. It also may promote sinus drainage.  · Heat may " help soothe painful areas of the face. Use a towel soaked in hot water. Or,  the shower and direct the hot spray onto your face. Using a vaporizer along with a menthol rub at night may also help.   · An expectorant containing guaifenesin may help thin the mucus and promote drainage from the sinuses.  · Over-the-counter decongestants may be used unless a similar medicine was prescribed. Nasal sprays work the fastest. Use one that contains phenylephrine or oxymetazoline. First blow the nose gently. Then use the spray. Do not use these medicines more often than directed on the label or symptoms may get worse. You may also use tablets containing pseudoephedrine. Avoid products that combine ingredients, because side effects may be increased. Read labels. You can also ask the pharmacist for help. (NOTE: Persons with high blood pressure should not use decongestants. They can raise blood pressure.)  · Over-the-counter antihistamines may help if allergies contributed to your sinusitis.    · Do not use nasal rinses or irrigation during an acute sinus infection, unless told to by your health care provider. Rinsing may spread the infection to other sinuses.  · Use acetaminophen or ibuprofen to control pain, unless another pain medicine was prescribed. (If you have chronic liver or kidney disease or ever had a stomach ulcer, talk with your doctor before using these medicines. Aspirin should never be used in anyone under 18 years of age who is ill with a fever. It may cause severe liver damage.)  · Don't smoke. This can worsen symptoms.  Follow-up care  Follow up with your healthcare provider or our staff if you are not improving within the next week.  When to seek medical advice  Call your healthcare provider if any of these occur:  · Facial pain or headache becoming more severe  · Stiff neck  · Unusual drowsiness or confusion  · Swelling of the forehead or eyelids  · Vision problems, including blurred or double  vision  · Fever of 100.4ºF (38ºC) or higher, or as directed by your healthcare provider  · Seizure  · Breathing problems  · Symptoms not resolving within 10 days  Date Last Reviewed: 4/13/2015  © 4824-7095 [employer]. [address] [place] [address], [place] [address]. All rights reserved. This information is not intended as a substitute for professional medical care. Always follow your healthcare professional's instructions.

## 2018-03-18 NOTE — PROGRESS NOTES
Denies acute CP SOB, Palpitations, Visual Disturbances or HA  Reports his back is sore and noticed with coughing.

## 2018-04-26 ENCOUNTER — TELEPHONE (OUTPATIENT)
Dept: OPHTHALMOLOGY | Facility: CLINIC | Age: 68
End: 2018-04-26

## 2018-04-26 NOTE — TELEPHONE ENCOUNTER
----- Message from Katelynn Choi sent at 4/26/2018  1:07 PM CDT -----  Contact: Sadiq Garcias called to f/u on his Rx for prednisoLONE acetate (PRED FORTE) 1 % DrpS. Pt can be reached at [phone].

## 2018-04-26 NOTE — TELEPHONE ENCOUNTER
Patient wanted to know if should d/c his pred. Informed patient to continue his drops until otherwise told by Dr. Van. Made patient a follow up appointment with Dr. Van

## 2018-06-13 ENCOUNTER — TELEPHONE (OUTPATIENT)
Dept: OPHTHALMOLOGY | Facility: CLINIC | Age: 68
End: 2018-06-13

## 2018-06-13 ENCOUNTER — OFFICE VISIT (OUTPATIENT)
Dept: OPHTHALMOLOGY | Facility: CLINIC | Age: 68
End: 2018-06-13
Payer: COMMERCIAL

## 2018-06-13 DIAGNOSIS — H53.15 DISTORTION OF VISUAL IMAGE: ICD-10-CM

## 2018-06-13 DIAGNOSIS — H35.343 MACULAR HOLE OF BOTH EYES: ICD-10-CM

## 2018-06-13 DIAGNOSIS — H18.519 FUCHS' ENDOTHELIAL DYSTROPHY: Primary | ICD-10-CM

## 2018-06-13 PROCEDURE — 99999 PR PBB SHADOW E&M-EST. PATIENT-LVL II: CPT | Mod: PBBFAC,,, | Performed by: OPHTHALMOLOGY

## 2018-06-13 PROCEDURE — 92134 CPTRZ OPH DX IMG PST SGM RTA: CPT | Mod: S$GLB,,, | Performed by: OPHTHALMOLOGY

## 2018-06-13 PROCEDURE — 92014 COMPRE OPH EXAM EST PT 1/>: CPT | Mod: S$GLB,,, | Performed by: OPHTHALMOLOGY

## 2018-06-13 RX ORDER — METFORMIN HYDROCHLORIDE 500 MG/1
TABLET, EXTENDED RELEASE ORAL
Refills: 6 | COMMUNITY
Start: 2018-06-08 | End: 2021-01-28

## 2018-06-13 RX ORDER — VANCOMYCIN HYDROCHLORIDE 125 MG/1
CAPSULE ORAL
Refills: 0 | COMMUNITY
Start: 2018-06-12 | End: 2018-06-26

## 2018-06-13 RX ORDER — CABERGOLINE 0.5 MG/1
TABLET ORAL
COMMUNITY
Start: 2017-03-10 | End: 2022-02-01

## 2018-06-13 RX ORDER — GABAPENTIN 100 MG/1
CAPSULE ORAL
Refills: 3 | COMMUNITY
Start: 2018-05-25

## 2018-06-13 NOTE — PROGRESS NOTES
HPI     S/P Phaco/DMEK OD 07/13/2017    Pt states that he noticed the central vision OD seems to be obstructed,   and not clear. He was in the hospital about 2 weeks ago for pancreatitis   and his blood sugar levels were high until he went home and they are now   leveled out and he is not sure if that is the problem. There was about 5   days when he was in the hospital when he forgot to use his drops as well.     PF BID OD    Last edited by Janice Montague, PCT on 6/13/2018  2:22 PM. (History)            Assessment /Plan     For exam results, see Encounter Report.    Fuchs' endothelial dystrophy    Distortion of visual image  -     Posterior Segment OCT Retina-Both eyes    Macular hole of both eyes      New onset macular hole symptoms OD for one day. OCT show stage 2 mac hole OU.  Retina referral.      OD DMEK graft attached and clear. Signs and symptoms of graft rejection reviewed.  10 mos post op    When ready: Clinically significant corneal edema is present, which affects vision and activities of daily living. Concurrent visually significant nuclear sclerosis also present. Risks, benefits, and alternatives to surgery were discussed and patient voices understanding.    Phaco/DSEK left eye, IOL: 17.0 (s/p PPV/MP)

## 2018-06-13 NOTE — TELEPHONE ENCOUNTER
----- Message from Lorrie Santana sent at 6/13/2018 10:08 AM CDT -----  Contact: Pt  Patient Requesting Sooner Appointment.     Reason for sooner appt.: Decrease in vision    When is the first available appointment? 07/10    Communication Preference: Mr. Dhillon can be reached at [phone]    Additional Information: Mr. Dhillon would like to be seen as soon as possible at either one of the locations

## 2018-06-13 NOTE — TELEPHONE ENCOUNTER
called and spoke with patient. Pt com planing of decrease vision while reading and is concerned. Gave pt 6/20/18 11:15 am appt. SDF

## 2018-06-20 ENCOUNTER — CLINICAL SUPPORT (OUTPATIENT)
Dept: OPHTHALMOLOGY | Facility: CLINIC | Age: 68
End: 2018-06-20
Payer: COMMERCIAL

## 2018-06-20 ENCOUNTER — INITIAL CONSULT (OUTPATIENT)
Dept: OPHTHALMOLOGY | Facility: CLINIC | Age: 68
End: 2018-06-20
Payer: COMMERCIAL

## 2018-06-20 DIAGNOSIS — H18.519 FUCHS' ENDOTHELIAL DYSTROPHY: ICD-10-CM

## 2018-06-20 DIAGNOSIS — D35.2 PITUITARY ADENOMA: Primary | ICD-10-CM

## 2018-06-20 PROCEDURE — 99999 PR PBB SHADOW E&M-EST. PATIENT-LVL II: CPT | Mod: PBBFAC,,, | Performed by: OPHTHALMOLOGY

## 2018-06-20 PROCEDURE — 92014 COMPRE OPH EXAM EST PT 1/>: CPT | Mod: S$GLB,,, | Performed by: OPHTHALMOLOGY

## 2018-06-20 PROCEDURE — 92083 EXTENDED VISUAL FIELD XM: CPT | Mod: S$GLB,,, | Performed by: OPHTHALMOLOGY

## 2018-06-20 NOTE — PROGRESS NOTES
HPI     Concerns About Ocular Health    Additional comments: Pituitary tumor           Comments   Referred by   Hx of pituitary tumor since 2003 which was treated w/Disonex(drug at   [facility]).  S/P Phaco/DMEK OD 07/13/2017   Patient states recent macula hole OU.  No eye pain.  Pt of .    Eye drops:PF BID OD   Notes bought in by patient    I have personally interviewed the patient, reviewed the history and   examined the patient and agree with the technician's exam.         Last edited by Demetrius Persaud MD on 6/20/2018  2:17 PM. (History)            Assessment /Plan     For exam results, see Encounter Report.    Pituitary adenoma  -     Kwong Visual Field - OU - Extended - Both Eyes    Fuchs' endothelial dystrophy      I found no evidence of chiasmal compression or damage to cranial nerves involved in extraocular motility related to the pituitary adenoma. He will be seeing Dr. Patel regarding the possibility of macular hole formation in both eyes. I will repeat his exam and visual field testing in one year.

## 2018-06-26 ENCOUNTER — TELEPHONE (OUTPATIENT)
Dept: OPHTHALMOLOGY | Facility: CLINIC | Age: 68
End: 2018-06-26

## 2018-06-26 ENCOUNTER — INITIAL CONSULT (OUTPATIENT)
Dept: OPHTHALMOLOGY | Facility: CLINIC | Age: 68
End: 2018-06-26
Payer: COMMERCIAL

## 2018-06-26 DIAGNOSIS — H35.343 FULL THICKNESS MACULAR HOLE, BILATERAL: Primary | ICD-10-CM

## 2018-06-26 DIAGNOSIS — H35.343 LAMELLAR MACULAR HOLE OF BOTH EYES: Primary | ICD-10-CM

## 2018-06-26 DIAGNOSIS — H43.823 VITREOMACULAR ADHESION, BILATERAL: ICD-10-CM

## 2018-06-26 PROCEDURE — 92014 COMPRE OPH EXAM EST PT 1/>: CPT | Mod: S$GLB,,, | Performed by: OPHTHALMOLOGY

## 2018-06-26 PROCEDURE — 92225 PR SPECIAL EYE EXAM, INITIAL: CPT | Mod: LT,S$GLB,, | Performed by: OPHTHALMOLOGY

## 2018-06-26 PROCEDURE — 99999 PR PBB SHADOW E&M-EST. PATIENT-LVL III: CPT | Mod: PBBFAC,,, | Performed by: OPHTHALMOLOGY

## 2018-06-26 NOTE — PROGRESS NOTES
HPI     Referred by Dr Van  Hx of pituitary tumor since 2003 which was treated w/Disonex(drug at   [facility]).   S/P Phaco/DMEK OD 07/13/2017     PF BID OD    Pt referred by Dr Van for eval bilateral mac hole.  Pt states norton has poor   vision centrally OD.  Pt denies eye pain, flashes or floaters.     Last edited by Shania Muñoz MA on 6/26/2018  1:06 PM.   (History)        OCT - VMT with FTMH OU    A/P    1. FTMH with VMT OU  OS>OD, likely more longstanding OS  Should do well with PPV OU - OD first - OS 3 weeks later  With 2-3 days face down OD, 4-5 OS    PLan 25g PPV/ILM peel/SF6 OD for FTMH    Local MAC  LOC 40 min    Risks, benefits, and alternatives to treatment discussed in detail with the patient.  The patient voiced understanding and wished to proceed with the procedure    2. NS OS  CE after PPV    3. Fuch's  S/p DSEK OD  Will have DSEK OS following PPV    4. Pituitary adenoma as above      To OR

## 2018-06-26 NOTE — LETTER
June 26, 2018      Ra Van MD  [address]  [address]  [facility] [address]           [facility] - Ophthalmology  [address]  [place] [address]  Phone: [phone]  Fax: [phone]          Patient: Sadiq Dhillon   MR Number: [id number]   YOB: 1950   Date of Visit: 6/26/2018       Dear Dr. Ra Van:    Thank you for referring Sadiq Dhillon to me for evaluation. Attached you will find relevant portions of my assessment and plan of care.    If you have questions, please do not hesitate to call me. I look forward to following Sadiq Dhillon along with you.    Sincerely,    SHUBHAM Patel MD    Enclosure  CC:  No Recipients    If you would like to receive this communication electronically, please contact [email or web address] or [phone] to request more information on [employer] access.    For providers and/or their staff who would like to refer a patient to [facility], please contact us through our one-stop-shop provider referral line, [facility], at [phone].    If you feel you have received this communication in error or would no longer like to receive these types of communications, please e-mail [email or web address]

## 2018-07-17 ENCOUNTER — TELEPHONE (OUTPATIENT)
Dept: OPHTHALMOLOGY | Facility: CLINIC | Age: 68
End: 2018-07-17

## 2018-07-17 NOTE — H&P
Pre-Operative History & Physical  Ophthalmology      SUBJECTIVE:     History of Present Illness:  Patient is a 68 y.o. 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    MEDICATIONS:   No prescriptions prior to admission.       ALLERGIES:   Review of patient's allergies indicates:   Allergen Reactions    No known drug allergies        PAST MEDICAL HISTORY:   Past Medical History:   Diagnosis Date    Diabetes mellitus     Fuchs' corneal dystrophy     Heart attack     Hypertension     Tumor cells, benign      PAST SURGICAL HISTORY:   Past Surgical History:   Procedure Laterality Date    CATARACT EXTRACTION W/  INTRAOCULAR LENS IMPLANT Right 0    Dr Van     CORNEAL TRANSPLANT Right     Dr Van     RHINOPLASTY TIP  1975    THYROIDECTOMY  2007     PAST FAMILY HISTORY:   Family History   Problem Relation Age of Onset    Hypertension Mother     Heart disease Mother     Heart disease Father     Cataracts Father     Stroke Father     Diabetes Father     Diabetes Paternal Uncle     Stroke Maternal Grandmother     Blindness Neg Hx     Glaucoma Neg Hx     Macular degeneration Neg Hx     Retinal detachment Neg Hx     Strabismus Neg Hx     Thyroid disease Neg Hx     Cancer Neg Hx      SOCIAL HISTORY:   Social History   Substance Use Topics    Smoking status: Never Smoker    Smokeless tobacco: Never Used    Alcohol use Yes      Comment: social        MENTAL STATUS: Alert    REVIEW OF SYSTEMS: Negative    OBJECTIVE:     Vital Signs (Most Recent)       Physical Exam:  General: NAD  HEENT: Atraumatic  Lungs: Adequate respirations, LCTAB  Heart: RRR, No murmur  Abdomen: Soft NT    ASSESSMENT/PLAN:     Patient is a 68 y.o. 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     - Plan for surgical correction PLan 25g PPV/ILM peel/SF6 OD for FTMH     Local MAC  LOC 40 min   - Risks/benefits/alternatives of the procedure including, but not limited to scarring, bleeding, infection, loss or  decreased vision, and/or need for possible repeat surgery discussed with the patient and family.   - Informed consent obtained prior to surgery and the patient/family voiced good understanding.    Gurmeet Jose  7/17/2018  9:37 AM

## 2018-07-18 ENCOUNTER — ANESTHESIA EVENT (OUTPATIENT)
Dept: SURGERY | Facility: HOSPITAL | Age: 68
End: 2018-07-18
Payer: COMMERCIAL

## 2018-07-18 ENCOUNTER — SURGERY (OUTPATIENT)
Age: 68
End: 2018-07-18

## 2018-07-18 ENCOUNTER — HOSPITAL ENCOUNTER (OUTPATIENT)
Facility: HOSPITAL | Age: 68
Discharge: HOME | End: 2018-07-18
Attending: OPHTHALMOLOGY | Admitting: OPHTHALMOLOGY
Payer: COMMERCIAL

## 2018-07-18 ENCOUNTER — ANESTHESIA (OUTPATIENT)
Dept: SURGERY | Facility: HOSPITAL | Age: 68
End: 2018-07-18
Payer: COMMERCIAL

## 2018-07-18 VITALS
OXYGEN SATURATION: 95 % | HEIGHT: 70 IN | TEMPERATURE: 98 F | BODY MASS INDEX: 25.34 KG/M2 | DIASTOLIC BLOOD PRESSURE: 58 MMHG | WEIGHT: 177 LBS | HEART RATE: 73 BPM | RESPIRATION RATE: 16 BRPM | SYSTOLIC BLOOD PRESSURE: 112 MMHG

## 2018-07-18 DIAGNOSIS — H35.343 FULL THICKNESS MACULAR HOLE, BILATERAL: ICD-10-CM

## 2018-07-18 DIAGNOSIS — H35.341 FULL THICKNESS MACULAR HOLE, RIGHT: Primary | ICD-10-CM

## 2018-07-18 LAB
POCT GLUCOSE: 106 MG/DL (ref 70–110)
POCT GLUCOSE: 135 MG/DL (ref 70–110)

## 2018-07-18 PROCEDURE — 71000044 HC DOSC ROUTINE RECOVERY FIRST HOUR: Performed by: OPHTHALMOLOGY

## 2018-07-18 PROCEDURE — 82962 GLUCOSE BLOOD TEST: CPT | Performed by: OPHTHALMOLOGY

## 2018-07-18 PROCEDURE — D9220A PRA ANESTHESIA: Mod: CRNA,,, | Performed by: NURSE ANESTHETIST, CERTIFIED REGISTERED

## 2018-07-18 PROCEDURE — 36000707: Performed by: OPHTHALMOLOGY

## 2018-07-18 PROCEDURE — 67042 VIT FOR MACULAR HOLE: CPT | Mod: LT,,, | Performed by: OPHTHALMOLOGY

## 2018-07-18 PROCEDURE — 25000003 PHARM REV CODE 250: Performed by: OPHTHALMOLOGY

## 2018-07-18 PROCEDURE — 37000008 HC ANESTHESIA 1ST 15 MINUTES: Performed by: OPHTHALMOLOGY

## 2018-07-18 PROCEDURE — 71000015 HC POSTOP RECOV 1ST HR: Performed by: OPHTHALMOLOGY

## 2018-07-18 PROCEDURE — 37000009 HC ANESTHESIA EA ADD 15 MINS: Performed by: OPHTHALMOLOGY

## 2018-07-18 PROCEDURE — 27201423 OPTIME MED/SURG SUP & DEVICES STERILE SUPPLY: Performed by: OPHTHALMOLOGY

## 2018-07-18 PROCEDURE — 63600175 PHARM REV CODE 636 W HCPCS: Performed by: NURSE ANESTHETIST, CERTIFIED REGISTERED

## 2018-07-18 PROCEDURE — S0020 INJECTION, BUPIVICAINE HYDRO: HCPCS | Performed by: OPHTHALMOLOGY

## 2018-07-18 PROCEDURE — 36000706: Performed by: OPHTHALMOLOGY

## 2018-07-18 PROCEDURE — 27600004 OPTIME MED/SURG SUP & DEVICES INTRAOCULAR LENS: Performed by: OPHTHALMOLOGY

## 2018-07-18 PROCEDURE — D9220A PRA ANESTHESIA: Mod: ANES,,, | Performed by: ANESTHESIOLOGY

## 2018-07-18 PROCEDURE — C1784 OCULAR DEV, INTRAOP, DET RET: HCPCS | Performed by: OPHTHALMOLOGY

## 2018-07-18 PROCEDURE — 63600175 PHARM REV CODE 636 W HCPCS: Performed by: OPHTHALMOLOGY

## 2018-07-18 PROCEDURE — 25000003 PHARM REV CODE 250

## 2018-07-18 RX ORDER — LIDOCAINE HYDROCHLORIDE 10 MG/ML
1 INJECTION, SOLUTION EPIDURAL; INFILTRATION; INTRACAUDAL; PERINEURAL ONCE
Status: COMPLETED | OUTPATIENT
Start: 2018-07-18 | End: 2018-07-18

## 2018-07-18 RX ORDER — PREDNISOLONE ACETATE 10 MG/ML
1 SUSPENSION/ DROPS OPHTHALMIC
Status: DISCONTINUED | OUTPATIENT
Start: 2018-07-18 | End: 2018-07-18 | Stop reason: HOSPADM

## 2018-07-18 RX ORDER — LIDOCAINE HCL/PF 100 MG/5ML
SYRINGE (ML) INTRAVENOUS
Status: DISCONTINUED | OUTPATIENT
Start: 2018-07-18 | End: 2018-07-18

## 2018-07-18 RX ORDER — VANCOMYCIN HYDROCHLORIDE 500 MG/10ML
INJECTION, POWDER, LYOPHILIZED, FOR SOLUTION INTRAVENOUS
Status: DISCONTINUED | OUTPATIENT
Start: 2018-07-18 | End: 2018-07-18 | Stop reason: HOSPADM

## 2018-07-18 RX ORDER — FENTANYL CITRATE 50 UG/ML
INJECTION, SOLUTION INTRAMUSCULAR; INTRAVENOUS
Status: DISCONTINUED | OUTPATIENT
Start: 2018-07-18 | End: 2018-07-18

## 2018-07-18 RX ORDER — SODIUM CHLORIDE 9 MG/ML
INJECTION, SOLUTION INTRAVENOUS CONTINUOUS
Status: DISCONTINUED | OUTPATIENT
Start: 2018-07-18 | End: 2018-07-18 | Stop reason: HOSPADM

## 2018-07-18 RX ORDER — NEOMYCIN SULFATE, POLYMYXIN B SULFATE, AND DEXAMETHASONE 3.5; 10000; 1 MG/G; [USP'U]/G; MG/G
OINTMENT OPHTHALMIC
Status: DISCONTINUED
Start: 2018-07-18 | End: 2018-07-18 | Stop reason: HOSPADM

## 2018-07-18 RX ORDER — OXYCODONE AND ACETAMINOPHEN 5; 325 MG/1; MG/1
1 TABLET ORAL EVERY 6 HOURS PRN
Qty: 12 TABLET | Refills: 0 | Status: SHIPPED | OUTPATIENT
Start: 2018-07-18 | End: 2018-07-24

## 2018-07-18 RX ORDER — DEXAMETHASONE SODIUM PHOSPHATE 4 MG/ML
INJECTION, SOLUTION INTRA-ARTICULAR; INTRALESIONAL; INTRAMUSCULAR; INTRAVENOUS; SOFT TISSUE
Status: DISCONTINUED
Start: 2018-07-18 | End: 2018-07-18 | Stop reason: HOSPADM

## 2018-07-18 RX ORDER — MOXIFLOXACIN 5 MG/ML
1 SOLUTION/ DROPS OPHTHALMIC
Status: DISCONTINUED | OUTPATIENT
Start: 2018-07-18 | End: 2018-07-18 | Stop reason: HOSPADM

## 2018-07-18 RX ORDER — FENTANYL CITRATE 50 UG/ML
25 INJECTION, SOLUTION INTRAMUSCULAR; INTRAVENOUS EVERY 5 MIN PRN
Status: DISCONTINUED | OUTPATIENT
Start: 2018-07-18 | End: 2018-07-18 | Stop reason: HOSPADM

## 2018-07-18 RX ORDER — TROPICAMIDE 10 MG/ML
1 SOLUTION/ DROPS OPHTHALMIC
Status: DISCONTINUED | OUTPATIENT
Start: 2018-07-18 | End: 2018-07-18 | Stop reason: HOSPADM

## 2018-07-18 RX ORDER — LIDOCAINE HYDROCHLORIDE 20 MG/ML
INJECTION, SOLUTION EPIDURAL; INFILTRATION; INTRACAUDAL; PERINEURAL
Status: DISCONTINUED
Start: 2018-07-18 | End: 2018-07-18 | Stop reason: HOSPADM

## 2018-07-18 RX ORDER — DEXAMETHASONE SODIUM PHOSPHATE 4 MG/ML
INJECTION, SOLUTION INTRA-ARTICULAR; INTRALESIONAL; INTRAMUSCULAR; INTRAVENOUS; SOFT TISSUE
Status: DISCONTINUED | OUTPATIENT
Start: 2018-07-18 | End: 2018-07-18 | Stop reason: HOSPADM

## 2018-07-18 RX ORDER — ONDANSETRON 4 MG/1
4 TABLET, FILM COATED ORAL EVERY 8 HOURS PRN
Qty: 12 TABLET | Refills: 0 | Status: SHIPPED | OUTPATIENT
Start: 2018-07-18 | End: 2018-07-24

## 2018-07-18 RX ORDER — MOXIFLOXACIN 5 MG/ML
SOLUTION/ DROPS OPHTHALMIC
Status: COMPLETED
Start: 2018-07-18 | End: 2018-07-18

## 2018-07-18 RX ORDER — TETRACAINE HYDROCHLORIDE 5 MG/ML
SOLUTION OPHTHALMIC
Status: DISCONTINUED
Start: 2018-07-18 | End: 2018-07-18 | Stop reason: HOSPADM

## 2018-07-18 RX ORDER — ONDANSETRON 2 MG/ML
4 INJECTION INTRAMUSCULAR; INTRAVENOUS DAILY PRN
Status: DISCONTINUED | OUTPATIENT
Start: 2018-07-18 | End: 2018-07-18 | Stop reason: HOSPADM

## 2018-07-18 RX ORDER — HYDROCODONE BITARTRATE AND ACETAMINOPHEN 5; 325 MG/1; MG/1
1 TABLET ORAL EVERY 4 HOURS PRN
Status: DISCONTINUED | OUTPATIENT
Start: 2018-07-18 | End: 2018-07-18 | Stop reason: HOSPADM

## 2018-07-18 RX ORDER — INDOCYANINE GREEN AND WATER 25 MG
KIT INJECTION
Status: DISCONTINUED | OUTPATIENT
Start: 2018-07-18 | End: 2018-07-18 | Stop reason: HOSPADM

## 2018-07-18 RX ORDER — CYCLOPENTOLATE HYDROCHLORIDE 10 MG/ML
SOLUTION/ DROPS OPHTHALMIC
Status: COMPLETED
Start: 2018-07-18 | End: 2018-07-18

## 2018-07-18 RX ORDER — ONDANSETRON 8 MG/1
8 TABLET, ORALLY DISINTEGRATING ORAL EVERY 8 HOURS PRN
Status: DISCONTINUED | OUTPATIENT
Start: 2018-07-18 | End: 2018-07-18 | Stop reason: HOSPADM

## 2018-07-18 RX ORDER — SODIUM CHLORIDE 0.9 % (FLUSH) 0.9 %
3 SYRINGE (ML) INJECTION
Status: DISCONTINUED | OUTPATIENT
Start: 2018-07-18 | End: 2018-07-18 | Stop reason: HOSPADM

## 2018-07-18 RX ORDER — BUPIVACAINE HYDROCHLORIDE 7.5 MG/ML
INJECTION, SOLUTION EPIDURAL; RETROBULBAR
Status: DISCONTINUED | OUTPATIENT
Start: 2018-07-18 | End: 2018-07-18 | Stop reason: HOSPADM

## 2018-07-18 RX ORDER — TETRACAINE HYDROCHLORIDE 5 MG/ML
1 SOLUTION OPHTHALMIC
Status: DISCONTINUED | OUTPATIENT
Start: 2018-07-18 | End: 2018-07-18 | Stop reason: HOSPADM

## 2018-07-18 RX ORDER — EPINEPHRINE 1 MG/ML
INJECTION, SOLUTION INTRACARDIAC; INTRAMUSCULAR; INTRAVENOUS; SUBCUTANEOUS
Status: DISCONTINUED
Start: 2018-07-18 | End: 2018-07-18 | Stop reason: HOSPADM

## 2018-07-18 RX ORDER — PREDNISOLONE ACETATE 10 MG/ML
SUSPENSION/ DROPS OPHTHALMIC
Status: COMPLETED
Start: 2018-07-18 | End: 2018-07-18

## 2018-07-18 RX ORDER — LIDOCAINE HYDROCHLORIDE 10 MG/ML
1 INJECTION, SOLUTION EPIDURAL; INFILTRATION; INTRACAUDAL; PERINEURAL ONCE
Status: DISCONTINUED | OUTPATIENT
Start: 2018-07-18 | End: 2018-07-18 | Stop reason: HOSPADM

## 2018-07-18 RX ORDER — PHENYLEPHRINE HYDROCHLORIDE 25 MG/ML
1 SOLUTION/ DROPS OPHTHALMIC
Status: DISCONTINUED | OUTPATIENT
Start: 2018-07-18 | End: 2018-07-18 | Stop reason: HOSPADM

## 2018-07-18 RX ORDER — PROPOFOL 10 MG/ML
VIAL (ML) INTRAVENOUS
Status: DISCONTINUED | OUTPATIENT
Start: 2018-07-18 | End: 2018-07-18

## 2018-07-18 RX ORDER — VANCOMYCIN HYDROCHLORIDE 500 MG/10ML
INJECTION, POWDER, LYOPHILIZED, FOR SOLUTION INTRAVENOUS
Status: DISCONTINUED
Start: 2018-07-18 | End: 2018-07-18 | Stop reason: HOSPADM

## 2018-07-18 RX ORDER — PHENYLEPHRINE HYDROCHLORIDE 25 MG/ML
SOLUTION/ DROPS OPHTHALMIC
Status: COMPLETED
Start: 2018-07-18 | End: 2018-07-18

## 2018-07-18 RX ORDER — LIDOCAINE HYDROCHLORIDE 20 MG/ML
INJECTION, SOLUTION EPIDURAL; INFILTRATION; INTRACAUDAL; PERINEURAL
Status: DISCONTINUED | OUTPATIENT
Start: 2018-07-18 | End: 2018-07-18 | Stop reason: HOSPADM

## 2018-07-18 RX ORDER — ACETAMINOPHEN 325 MG/1
650 TABLET ORAL EVERY 4 HOURS PRN
Status: DISCONTINUED | OUTPATIENT
Start: 2018-07-18 | End: 2018-07-18 | Stop reason: HOSPADM

## 2018-07-18 RX ORDER — NEOMYCIN SULFATE, POLYMYXIN B SULFATE, AND DEXAMETHASONE 3.5; 10000; 1 MG/G; [USP'U]/G; MG/G
OINTMENT OPHTHALMIC
Status: DISCONTINUED | OUTPATIENT
Start: 2018-07-18 | End: 2018-07-18 | Stop reason: HOSPADM

## 2018-07-18 RX ORDER — TROPICAMIDE 10 MG/ML
SOLUTION/ DROPS OPHTHALMIC
Status: COMPLETED
Start: 2018-07-18 | End: 2018-07-18

## 2018-07-18 RX ORDER — MIDAZOLAM HYDROCHLORIDE 1 MG/ML
INJECTION, SOLUTION INTRAMUSCULAR; INTRAVENOUS
Status: DISCONTINUED | OUTPATIENT
Start: 2018-07-18 | End: 2018-07-18

## 2018-07-18 RX ORDER — CYCLOPENTOLATE HYDROCHLORIDE 10 MG/ML
1 SOLUTION/ DROPS OPHTHALMIC
Status: DISCONTINUED | OUTPATIENT
Start: 2018-07-18 | End: 2018-07-18 | Stop reason: HOSPADM

## 2018-07-18 RX ORDER — BUPIVACAINE HYDROCHLORIDE 7.5 MG/ML
INJECTION, SOLUTION EPIDURAL; RETROBULBAR
Status: DISCONTINUED
Start: 2018-07-18 | End: 2018-07-18 | Stop reason: HOSPADM

## 2018-07-18 RX ORDER — EPINEPHRINE 1 MG/ML
INJECTION, SOLUTION INTRACARDIAC; INTRAMUSCULAR; INTRAVENOUS; SUBCUTANEOUS
Status: DISCONTINUED | OUTPATIENT
Start: 2018-07-18 | End: 2018-07-18 | Stop reason: HOSPADM

## 2018-07-18 RX ADMIN — HOMATROPINE HYDROBROMIDE 1 DROP: 50 SOLUTION OPHTHALMIC at 01:07

## 2018-07-18 RX ADMIN — CYCLOPENTOLATE HYDROCHLORIDE 1 DROP: 10 SOLUTION/ DROPS OPHTHALMIC at 01:07

## 2018-07-18 RX ADMIN — MOXIFLOXACIN HYDROCHLORIDE 1 DROP: 5 SOLUTION/ DROPS OPHTHALMIC at 01:07

## 2018-07-18 RX ADMIN — DEXAMETHASONE SODIUM PHOSPHATE 2 MG: 4 INJECTION, SOLUTION INTRAMUSCULAR; INTRAVENOUS at 02:07

## 2018-07-18 RX ADMIN — SODIUM CHLORIDE: 0.9 INJECTION, SOLUTION INTRAVENOUS at 01:07

## 2018-07-18 RX ADMIN — PHENYLEPHRINE HYDROCHLORIDE 1 DROP: 25 SOLUTION/ DROPS OPHTHALMIC at 01:07

## 2018-07-18 RX ADMIN — TROPICAMIDE 1 DROP: 10 SOLUTION/ DROPS OPHTHALMIC at 01:07

## 2018-07-18 RX ADMIN — INDOCYANINE GREEN 5 MG: KIT INTRAVENOUS at 02:07

## 2018-07-18 RX ADMIN — LIDOCAINE HYDROCHLORIDE 5 ML: 20 INJECTION, SOLUTION EPIDURAL; INFILTRATION; INTRACAUDAL; PERINEURAL at 02:07

## 2018-07-18 RX ADMIN — PREDNISOLONE ACETATE 1 DROP: 10 SUSPENSION/ DROPS OPHTHALMIC at 01:07

## 2018-07-18 RX ADMIN — HYDROCODONE BITARTRATE AND ACETAMINOPHEN 1 TABLET: 5; 325 TABLET ORAL at 03:07

## 2018-07-18 RX ADMIN — LIDOCAINE HYDROCHLORIDE 40 MG: 20 INJECTION, SOLUTION INTRAVENOUS at 02:07

## 2018-07-18 RX ADMIN — PROPOFOL 60 MG: 10 INJECTION, EMULSION INTRAVENOUS at 02:07

## 2018-07-18 RX ADMIN — BUPIVACAINE HYDROCHLORIDE 5 ML: 7.5 INJECTION, SOLUTION EPIDURAL; RETROBULBAR at 02:07

## 2018-07-18 RX ADMIN — EPINEPHRINE 0.3 MG: 1 INJECTION, SOLUTION INTRAMUSCULAR; SUBCUTANEOUS at 02:07

## 2018-07-18 RX ADMIN — TETRACAINE HYDROCHLORIDE 1 DROP: 5 SOLUTION OPHTHALMIC at 01:07

## 2018-07-18 RX ADMIN — VANCOMYCIN HYDROCHLORIDE 25 MG: 500 INJECTION, POWDER, LYOPHILIZED, FOR SOLUTION INTRAVENOUS at 02:07

## 2018-07-18 RX ADMIN — NEOMYCIN SULFATE, POLYMYXIN B SULFATE, AND DEXAMETHASONE 1 APPLICATION: 3.5; 10000; 1 OINTMENT OPHTHALMIC at 02:07

## 2018-07-18 RX ADMIN — LIDOCAINE HYDROCHLORIDE 10 MG: 10 INJECTION, SOLUTION EPIDURAL; INFILTRATION; INTRACAUDAL; PERINEURAL at 01:07

## 2018-07-18 RX ADMIN — FENTANYL CITRATE 50 MCG: 50 INJECTION, SOLUTION INTRAMUSCULAR; INTRAVENOUS at 02:07

## 2018-07-18 RX ADMIN — MIDAZOLAM HYDROCHLORIDE 2 MG: 1 INJECTION, SOLUTION INTRAMUSCULAR; INTRAVENOUS at 01:07

## 2018-07-18 NOTE — BRIEF OP NOTE
Pre-Op Dx: FTMH OD    Post Op Dx: same    Procedure Performed: 25g PPV/ILM peel/AFx/SF6 20%    Attending Surgeon: Amanda    Assistant Surgeon: Damon    Anesthesia: Local/Mac, retrobulbar injection of 4.0cc mixture 0.75%Marcaine, 2% Xylocaine    Estimated blood loss: Minimal    Complication: None    Specimen: None    Disposition: Stable to recovery    Findings/Outcome: FTMH with taught ILM    Date of Discharge: 7/18/18    Discharge Disposition: stable to recovery then home    F/U: tomorrow

## 2018-07-18 NOTE — ANESTHESIA PREPROCEDURE EVALUATION
07/18/2018  Sadiq Dhillon is a 68 y.o., male.    Anesthesia Evaluation         Review of Systems  Anesthesia Hx:  No problems with previous Anesthesia   Social:  Social Alcohol Use    Cardiovascular:   Exercise tolerance: good Hypertension, well controlled Past MI CABG/stent   Denies Angina.                Pulmonary:  Pulmonary Normal    Endocrine:   Diabetes, well controlled, type 2        Physical Exam  General:  Well nourished    Airway/Jaw/Neck:  Airway Findings: Mouth Opening: Normal Tongue: Normal  General Airway Assessment: Adult  Mallampati: II  TM Distance: Normal, at least 6 cm  Jaw/Neck Findings:     Neck ROM: Normal ROM      Dental:  Dental Findings: In tact   Chest/Lungs:  Chest/Lungs Findings: Clear to auscultation, Normal Respiratory Rate     Heart/Vascular:  Heart Findings: Rate: Normal  Rhythm: Regular Rhythm  Sounds: Normal        Mental Status:  Mental Status Findings:  Cooperative, Alert and Oriented         Anesthesia Plan  Type of Anesthesia, risks & benefits discussed:  Anesthesia Type:  general, MAC  Patient's Preference:   Intra-op Monitoring Plan: standard ASA monitors  Intra-op Monitoring Plan Comments:   Post Op Pain Control Plan: IV/PO Opioids PRN, per primary service following discharge from PACU and multimodal analgesia  Post Op Pain Control Plan Comments:   Induction:   IV  Beta Blocker:         Informed Consent: Patient understands risks and agrees with Anesthesia plan.  Questions answered. Anesthesia consent signed with patient.  ASA Score: 3     Day of Surgery Review of History & Physical:            Ready For Surgery From Anesthesia Perspective.

## 2018-07-18 NOTE — OP NOTE
DATE OF PROCEDURE:  07/18/2018    PREOPERATIVE DIAGNOSIS:  Full-thickness macular hole to the right eye.    POSTOPERATIVE DIAGNOSIS:  Full-thickness macular hole to the right eye.    PROCEDURES PERFORMED:  A 25-gauge pars plana vitrectomy with internal limiting   membrane peel, air-fluid exchange and injection of SF6 gas 20% to the right eye.    ATTENDING SURGEON:  SHUBHAM Patel M.D.    ASSISTANT SURGEON:  Fellow, Nick Jose.    ANESTHESIA:  Monitored anesthesia care with a retrobulbar injection of 4 mL   mixture of 0.75% Marcaine and 2% Xylocaine.    ESTIMATED BLOOD LOSS:  Minimal.    COMPLICATIONS:  None.    DISPOSITION:  Stable to Recovery.    INDICATIONS FOR SURGERY:  This is a 68-year-old male with a history of Fuchs'   dystrophy, status post cataract extraction and ____ in the right eye, was   referred to me by Dr. Van for a central blind spot.  The patient was found to   have a full thickness macular hole on that side as well as a slightly more   chronic hole on the left side.  Decision was made to take the patient back to   surgery, right eye first, to repair the macular hole, improve vision and improve   quality of life.  Risks, benefits and alternatives of surgery were discussed in   detail with risks including loss of vision, loss of eye, retinal detachment,   infection, hemorrhage, lens dislocation, glaucoma, hypotony, ptosis and   diplopia.  The patient voiced understanding and wished to proceed with the   procedure.    DESCRIPTION OF PROCEDURE:  After proper informed consent was obtained, the   patient was brought back to the Operating Suite at [facility] where   MAC anesthesia was induced.  A retrobulbar injection was provided as above   without complication.  The patient was prepped and draped in a normal sterile   fashion for ophthalmic surgery.  A lid speculum was placed in the right eye.  A   standard 3-port 25-gauge pars plana vitrectomy was set up with the infusion    cannula inserted 4 mm posterior to the limbus.  The infusion cannula was turned   on only after observed to be free and clear of all underlying retinal tissue.    Supranasal and supratemporal trocars were also placed 4 mm posterior to the   limbus.  The vitrector and light pipe were introduced in the vitreous cavity and   a core vitrectomy was performed.  Posterior hyaloid was elevated and propagated   out to the level of the vitreous base.  ICG was used to stain the internal   limiting membrane.  There was a mild epiretinal membrane process as well.  Both   were peeled off under direct contact lens visualization with the ILM forceps.    Scleral depression was performed 360 degrees to help with removal of the   cortical vitreous and no identifiable retinal breaks were found.  Air-fluid   exchange was then performed.  A 20% SF6 gas mixture was created and   approximately 40 mL were cycled through the eye.  Then, the supratemporal and   infusion trocars were removed not leaking after gentle massage.  The eye was   normal pressure via palpation.  Subconjunctival injections of vancomycin and   Decadron were given to the patient.  The drapes were removed from the patient.    He was washed free of Betadine prep solution.  Maxitrol ointment was placed in   the right eye.  The eye was patch shielded.  MAC anesthesia was reversed.  He   was brought to Recovery Room in stable condition tolerating the procedure well.    Dr. Patel was present for the entire case.      DAM/IN  dd: 07/18/2018 15:03:05 (CDT)  td: 07/18/2018 15:40:06 (CDT)  Doc ID   #[id number]  Job ID #[id number]    CC:

## 2018-07-18 NOTE — TRANSFER OF CARE
"Anesthesia Transfer of Care Note    Patient: Sadiq Dhillon    Procedure(s) Performed: Procedure(s) (LRB):  REPAIR, RETINAL DETACHMENT, WITH VITRECTOMY (Right)    Patient location: PACU    Anesthesia Type: general    Transport from OR: Transported from OR on room air with adequate spontaneous ventilation. Transported from OR on 6-10 L/min O2 by face mask with adequate spontaneous ventilation    Post pain: adequate analgesia    Post assessment: no apparent anesthetic complications and tolerated procedure well    Post vital signs: stable    Level of consciousness: alert and awake    Nausea/Vomiting: no nausea/vomiting    Complications: none    Transfer of care protocol was followed      Last vitals:   Visit Vitals  /79 (BP Location: Left arm, Patient Position: Lying)   Pulse 74   Temp 36.7 °C (98 °F) (Oral)   Resp 20   Ht 5' 10" (1.778 m)   Wt 80.3 kg (177 lb)   SpO2 96%   BMI 25.40 kg/m²     "

## 2018-07-18 NOTE — ANESTHESIA POSTPROCEDURE EVALUATION
"Anesthesia Post Evaluation    Patient: Sadiq Dhillon    Procedure(s) Performed: Procedure(s) (LRB):  REPAIR, RETINAL DETACHMENT, WITH VITRECTOMY (Right)    Final Anesthesia Type: general  Patient location during evaluation: PACU  Patient participation: Yes- Able to Participate  Level of consciousness: awake and alert  Post-procedure vital signs: reviewed and stable  Pain management: adequate  Airway patency: patent  PONV status at discharge: No PONV  Anesthetic complications: no      Cardiovascular status: hemodynamically stable and blood pressure returned to baseline  Respiratory status: unassisted and spontaneous ventilation  Hydration status: euvolemic  Follow-up not needed.        Visit Vitals  /79 (BP Location: Left arm, Patient Position: Lying)   Pulse 74   Temp 36.7 °C (98 °F) (Oral)   Resp 20   Ht 5' 10" (1.778 m)   Wt 80.3 kg (177 lb)   SpO2 96%   BMI 25.40 kg/m²       Pain/Sylvie Score: Pain Assessment Performed: Yes (7/18/2018  1:01 PM)  Presence of Pain: denies (7/18/2018  1:01 PM)  Sylvie Score: 10 (7/18/2018  3:00 PM)      "

## 2018-07-18 NOTE — INTERVAL H&P NOTE
Patient seen and examined today, H&P reviewed.  There are no changes to the patient's H&P.  There is still an ongoing indication for the procedure.  Will proceed with Plan 25g PPV/ILM peel/SF6 OD for FTMH. All questions answered.    ZAMZAM Strickland MD  PGY2, Ophthalmology Resident  07/18/2018  1:36 PM

## 2018-07-19 ENCOUNTER — OFFICE VISIT (OUTPATIENT)
Dept: OPHTHALMOLOGY | Facility: CLINIC | Age: 68
End: 2018-07-19
Payer: COMMERCIAL

## 2018-07-19 VITALS — DIASTOLIC BLOOD PRESSURE: 65 MMHG | SYSTOLIC BLOOD PRESSURE: 108 MMHG | HEART RATE: 78 BPM

## 2018-07-19 DIAGNOSIS — H35.341 FULL THICKNESS MACULAR HOLE, RIGHT: Primary | ICD-10-CM

## 2018-07-19 PROCEDURE — 99024 POSTOP FOLLOW-UP VISIT: CPT | Mod: S$GLB,,, | Performed by: OPHTHALMOLOGY

## 2018-07-19 PROCEDURE — 99999 PR PBB SHADOW E&M-EST. PATIENT-LVL II: CPT | Mod: PBBFAC,,, | Performed by: OPHTHALMOLOGY

## 2018-07-19 NOTE — PROGRESS NOTES
HPI     Post-op Evaluation    Additional comments: 1 day check           Comments   1 day post op- Early this morning he became very nauseated and is still   nauseated at present time    HPI     Referred by Dr Van  Hx of pituitary tumor since 2003 which was treated w/Disonex(drug at   [facility]).   S/P Phaco/DMEK OD 07/13/2017     PF BID OD    Pt referred by Dr Van for eval bilateral mac hole.  Pt states norton has poor   vision centrally OD.  Pt denies eye pain, flashes or floaters.     Last edited by Shania Muñoz MA on 6/26/2018  1:06 PM.   (History)        OCT - VMT with FTMH OU    A/P    1. FTMH with VMT OU  OS>OD, likely more longstanding OS  Should do well with PPV OU - OD first - OS 3 weeks later  With 2-3 days face down OD, 4-5 OS    S/p 25g PPV/ILM peel/SF6 OD for FTMH OD    Start gtts QID, ointment/shield QHD  FACE x 2 more days    2. NS OS  CE after PPV    3. Fuch's  S/p DSEK OD  Will have DSEK OS following PPV    4. Pituitary adenoma as above

## 2018-07-24 ENCOUNTER — OFFICE VISIT (OUTPATIENT)
Dept: OPHTHALMOLOGY | Facility: CLINIC | Age: 68
End: 2018-07-24
Payer: COMMERCIAL

## 2018-07-24 DIAGNOSIS — H35.343 FULL THICKNESS MACULAR HOLE, BILATERAL: Primary | ICD-10-CM

## 2018-07-24 PROCEDURE — 99024 POSTOP FOLLOW-UP VISIT: CPT | Mod: S$GLB,,, | Performed by: OPHTHALMOLOGY

## 2018-07-24 PROCEDURE — 99999 PR PBB SHADOW E&M-EST. PATIENT-LVL III: CPT | Mod: PBBFAC,,, | Performed by: OPHTHALMOLOGY

## 2018-07-24 NOTE — PROGRESS NOTES
HPI     5 day post op  DLS- 07/19/2018 Dr. Patel    A couple dark spots appeared in vision in OD yesterday morning has been in same spot since yesterday, not moving like floaters and not opaque. Still soreness in OD       gtts QID  Oint QHS     HPI     Post-op Evaluation    Additional comments: 1 day check           Comments   1 day post op- Early this morning he became very nauseated and is still   nauseated at present time    HPI     Referred by Dr Van  Hx of pituitary tumor since 2003 which was treated w/Disonex(drug at   [place]).   S/P Phaco/DMEK OD 07/13/2017     PF BID OD    Pt referred by Dr Van for eval bilateral mac hole.  Pt states norton has poor   vision centrally OD.  Pt denies eye pain, flashes or floaters.     Last edited by Shania Muñoz MA on 6/26/2018  1:06 PM.   (History)        OCT - VMT with FTMH OU    A/P    1. FTMH with VMT OU  OS>OD, likely more longstanding OS  Should do well with PPV OU - OD first - OS 3 weeks later  With 2-3 days face down OD, 4-5 OS    S/p 25g PPV/ILM peel/SF6 OD for FTMH OD    Keep gtts QID, ointment/shield QHD x 2 days  Then HA BID until bubble gone  Taper PF 3/2/1/0    Plan 25g PPV/ILM peel/SF6 OS for FTMH OS in 2 weeks    Local MAC  LOC 40 min    4 days face down because more long standing      2. NS OS  CE after PPV    3. Fuch's  S/p DSEK OD  Will have DSEK OS following PPV    4. Pituitary adenoma as above

## 2018-08-07 RX ORDER — LOPERAMIDE HYDROCHLORIDE 2 MG/1
2 CAPSULE ORAL 4 TIMES DAILY PRN
COMMUNITY

## 2018-08-07 RX ORDER — NEOMYCIN SULFATE, POLYMYXIN B SULFATE, AND DEXAMETHASONE 3.5; 10000; 1 MG/G; [USP'U]/G; MG/G
OINTMENT OPHTHALMIC NIGHTLY
COMMUNITY
End: 2018-10-23

## 2018-08-07 NOTE — PRE-PROCEDURE INSTRUCTIONS
"Spoke with Patient.  NPO, medication, and pre-op instructions reviewed.  Stated that he had PONV the day after his last eye surgery 7-18-18.  Has a history of motion sickness.  Has been having "night sweats."  Stated that his morning glucose readings have been "excellent."  Denies symptoms of Hypoglycemia.   Verbalized understanding of instructions.    "

## 2018-08-08 ENCOUNTER — ANESTHESIA (OUTPATIENT)
Dept: SURGERY | Facility: HOSPITAL | Age: 68
End: 2018-08-08
Payer: COMMERCIAL

## 2018-08-08 ENCOUNTER — ANESTHESIA EVENT (OUTPATIENT)
Dept: SURGERY | Facility: HOSPITAL | Age: 68
End: 2018-08-08
Payer: COMMERCIAL

## 2018-08-08 ENCOUNTER — SURGERY (OUTPATIENT)
Age: 68
End: 2018-08-08

## 2018-08-08 ENCOUNTER — HOSPITAL ENCOUNTER (OUTPATIENT)
Facility: HOSPITAL | Age: 68
Discharge: HOME | End: 2018-08-08
Attending: OPHTHALMOLOGY | Admitting: OPHTHALMOLOGY
Payer: COMMERCIAL

## 2018-08-08 VITALS
BODY MASS INDEX: 26.22 KG/M2 | OXYGEN SATURATION: 99 % | TEMPERATURE: 98 F | SYSTOLIC BLOOD PRESSURE: 110 MMHG | WEIGHT: 177 LBS | HEIGHT: 69 IN | HEART RATE: 67 BPM | DIASTOLIC BLOOD PRESSURE: 75 MMHG | RESPIRATION RATE: 18 BRPM

## 2018-08-08 DIAGNOSIS — H35.342 MACULAR HOLE, LEFT: Primary | ICD-10-CM

## 2018-08-08 DIAGNOSIS — H35.341 FULL THICKNESS MACULAR HOLE, RIGHT: ICD-10-CM

## 2018-08-08 LAB — POCT GLUCOSE: 146 MG/DL (ref 70–110)

## 2018-08-08 PROCEDURE — 27600004 OPTIME MED/SURG SUP & DEVICES INTRAOCULAR LENS: Performed by: OPHTHALMOLOGY

## 2018-08-08 PROCEDURE — 63600175 PHARM REV CODE 636 W HCPCS: Performed by: OPHTHALMOLOGY

## 2018-08-08 PROCEDURE — C1784 OCULAR DEV, INTRAOP, DET RET: HCPCS | Performed by: OPHTHALMOLOGY

## 2018-08-08 PROCEDURE — 63600175 PHARM REV CODE 636 W HCPCS: Performed by: NURSE ANESTHETIST, CERTIFIED REGISTERED

## 2018-08-08 PROCEDURE — 67042 VIT FOR MACULAR HOLE: CPT | Mod: 79,LT,, | Performed by: OPHTHALMOLOGY

## 2018-08-08 PROCEDURE — 25000003 PHARM REV CODE 250: Performed by: OPHTHALMOLOGY

## 2018-08-08 PROCEDURE — 71000015 HC POSTOP RECOV 1ST HR: Performed by: OPHTHALMOLOGY

## 2018-08-08 PROCEDURE — S0020 INJECTION, BUPIVICAINE HYDRO: HCPCS | Performed by: OPHTHALMOLOGY

## 2018-08-08 PROCEDURE — D9220A PRA ANESTHESIA: Mod: ANES,,, | Performed by: ANESTHESIOLOGY

## 2018-08-08 PROCEDURE — 37000008 HC ANESTHESIA 1ST 15 MINUTES: Performed by: OPHTHALMOLOGY

## 2018-08-08 PROCEDURE — 25000003 PHARM REV CODE 250: Performed by: ANESTHESIOLOGY

## 2018-08-08 PROCEDURE — D9220A PRA ANESTHESIA: Mod: CRNA,,, | Performed by: NURSE ANESTHETIST, CERTIFIED REGISTERED

## 2018-08-08 PROCEDURE — 36000706: Performed by: OPHTHALMOLOGY

## 2018-08-08 PROCEDURE — 36000707: Performed by: OPHTHALMOLOGY

## 2018-08-08 PROCEDURE — 82962 GLUCOSE BLOOD TEST: CPT | Performed by: OPHTHALMOLOGY

## 2018-08-08 PROCEDURE — 27201423 OPTIME MED/SURG SUP & DEVICES STERILE SUPPLY: Performed by: OPHTHALMOLOGY

## 2018-08-08 PROCEDURE — 37000009 HC ANESTHESIA EA ADD 15 MINS: Performed by: OPHTHALMOLOGY

## 2018-08-08 RX ORDER — LIDOCAINE HYDROCHLORIDE 20 MG/ML
INJECTION, SOLUTION EPIDURAL; INFILTRATION; INTRACAUDAL; PERINEURAL
Status: DISCONTINUED | OUTPATIENT
Start: 2018-08-08 | End: 2018-08-08 | Stop reason: HOSPADM

## 2018-08-08 RX ORDER — VANCOMYCIN HYDROCHLORIDE 500 MG/10ML
INJECTION, POWDER, LYOPHILIZED, FOR SOLUTION INTRAVENOUS
Status: DISCONTINUED
Start: 2018-08-08 | End: 2018-08-08 | Stop reason: HOSPADM

## 2018-08-08 RX ORDER — LIDOCAINE HYDROCHLORIDE 20 MG/ML
INJECTION, SOLUTION EPIDURAL; INFILTRATION; INTRACAUDAL; PERINEURAL
Status: DISCONTINUED
Start: 2018-08-08 | End: 2018-08-08 | Stop reason: HOSPADM

## 2018-08-08 RX ORDER — MOXIFLOXACIN 5 MG/ML
1 SOLUTION/ DROPS OPHTHALMIC
Status: DISCONTINUED | OUTPATIENT
Start: 2018-08-08 | End: 2018-08-08 | Stop reason: HOSPADM

## 2018-08-08 RX ORDER — NEOMYCIN SULFATE, POLYMYXIN B SULFATE, AND DEXAMETHASONE 3.5; 10000; 1 MG/G; [USP'U]/G; MG/G
OINTMENT OPHTHALMIC
Status: DISCONTINUED | OUTPATIENT
Start: 2018-08-08 | End: 2018-08-08 | Stop reason: HOSPADM

## 2018-08-08 RX ORDER — NEOMYCIN SULFATE, POLYMYXIN B SULFATE, AND DEXAMETHASONE 3.5; 10000; 1 MG/G; [USP'U]/G; MG/G
OINTMENT OPHTHALMIC
Status: DISCONTINUED
Start: 2018-08-08 | End: 2018-08-08 | Stop reason: HOSPADM

## 2018-08-08 RX ORDER — ONDANSETRON 2 MG/ML
INJECTION INTRAMUSCULAR; INTRAVENOUS
Status: DISCONTINUED | OUTPATIENT
Start: 2018-08-08 | End: 2018-08-08

## 2018-08-08 RX ORDER — PREDNISOLONE ACETATE 10 MG/ML
1 SUSPENSION/ DROPS OPHTHALMIC
Status: DISCONTINUED | OUTPATIENT
Start: 2018-08-08 | End: 2018-08-08 | Stop reason: HOSPADM

## 2018-08-08 RX ORDER — TROPICAMIDE 10 MG/ML
1 SOLUTION/ DROPS OPHTHALMIC
Status: DISCONTINUED | OUTPATIENT
Start: 2018-08-08 | End: 2018-08-08 | Stop reason: HOSPADM

## 2018-08-08 RX ORDER — INDOCYANINE GREEN AND WATER 25 MG
KIT INJECTION
Status: DISCONTINUED
Start: 2018-08-08 | End: 2018-08-08 | Stop reason: HOSPADM

## 2018-08-08 RX ORDER — LIDOCAINE HYDROCHLORIDE 10 MG/ML
1 INJECTION, SOLUTION EPIDURAL; INFILTRATION; INTRACAUDAL; PERINEURAL ONCE
Status: CANCELLED | OUTPATIENT
Start: 2018-08-08 | End: 2018-08-08

## 2018-08-08 RX ORDER — EPINEPHRINE 1 MG/ML
INJECTION, SOLUTION INTRACARDIAC; INTRAMUSCULAR; INTRAVENOUS; SUBCUTANEOUS
Status: DISCONTINUED
Start: 2018-08-08 | End: 2018-08-08 | Stop reason: HOSPADM

## 2018-08-08 RX ORDER — DEXAMETHASONE SODIUM PHOSPHATE 4 MG/ML
INJECTION, SOLUTION INTRA-ARTICULAR; INTRALESIONAL; INTRAMUSCULAR; INTRAVENOUS; SOFT TISSUE
Status: DISCONTINUED | OUTPATIENT
Start: 2018-08-08 | End: 2018-08-08 | Stop reason: HOSPADM

## 2018-08-08 RX ORDER — LIDOCAINE HCL/PF 100 MG/5ML
SYRINGE (ML) INTRAVENOUS
Status: DISCONTINUED | OUTPATIENT
Start: 2018-08-08 | End: 2018-08-08

## 2018-08-08 RX ORDER — PROPOFOL 10 MG/ML
VIAL (ML) INTRAVENOUS
Status: DISCONTINUED | OUTPATIENT
Start: 2018-08-08 | End: 2018-08-08

## 2018-08-08 RX ORDER — SODIUM CHLORIDE 9 MG/ML
INJECTION, SOLUTION INTRAVENOUS CONTINUOUS
Status: CANCELLED | OUTPATIENT
Start: 2018-08-08

## 2018-08-08 RX ORDER — HYDROCODONE BITARTRATE AND ACETAMINOPHEN 5; 325 MG/1; MG/1
1 TABLET ORAL EVERY 4 HOURS PRN
Status: DISCONTINUED | OUTPATIENT
Start: 2018-08-08 | End: 2018-08-08 | Stop reason: HOSPADM

## 2018-08-08 RX ORDER — ONDANSETRON 4 MG/1
4 TABLET, FILM COATED ORAL EVERY 8 HOURS PRN
Qty: 12 TABLET | Refills: 0 | Status: SHIPPED | OUTPATIENT
Start: 2018-08-08 | End: 2019-01-29

## 2018-08-08 RX ORDER — ONDANSETRON 8 MG/1
8 TABLET, ORALLY DISINTEGRATING ORAL EVERY 8 HOURS PRN
Status: DISCONTINUED | OUTPATIENT
Start: 2018-08-08 | End: 2018-08-08 | Stop reason: HOSPADM

## 2018-08-08 RX ORDER — VANCOMYCIN HYDROCHLORIDE 500 MG/10ML
INJECTION, POWDER, LYOPHILIZED, FOR SOLUTION INTRAVENOUS
Status: DISCONTINUED | OUTPATIENT
Start: 2018-08-08 | End: 2018-08-08 | Stop reason: HOSPADM

## 2018-08-08 RX ORDER — TETRACAINE HYDROCHLORIDE 5 MG/ML
1 SOLUTION OPHTHALMIC
Status: DISCONTINUED | OUTPATIENT
Start: 2018-08-08 | End: 2018-08-08 | Stop reason: HOSPADM

## 2018-08-08 RX ORDER — FENTANYL CITRATE 50 UG/ML
25 INJECTION, SOLUTION INTRAMUSCULAR; INTRAVENOUS EVERY 5 MIN PRN
Status: DISCONTINUED | OUTPATIENT
Start: 2018-08-08 | End: 2018-08-08 | Stop reason: HOSPADM

## 2018-08-08 RX ORDER — BUPIVACAINE HYDROCHLORIDE 7.5 MG/ML
INJECTION, SOLUTION EPIDURAL; RETROBULBAR
Status: DISCONTINUED | OUTPATIENT
Start: 2018-08-08 | End: 2018-08-08 | Stop reason: HOSPADM

## 2018-08-08 RX ORDER — SODIUM CHLORIDE 9 MG/ML
INJECTION, SOLUTION INTRAVENOUS CONTINUOUS
Status: DISCONTINUED | OUTPATIENT
Start: 2018-08-08 | End: 2018-08-08 | Stop reason: HOSPADM

## 2018-08-08 RX ORDER — FENTANYL CITRATE 50 UG/ML
INJECTION, SOLUTION INTRAMUSCULAR; INTRAVENOUS
Status: DISCONTINUED | OUTPATIENT
Start: 2018-08-08 | End: 2018-08-08

## 2018-08-08 RX ORDER — ACETAMINOPHEN 325 MG/1
650 TABLET ORAL EVERY 4 HOURS PRN
Status: DISCONTINUED | OUTPATIENT
Start: 2018-08-08 | End: 2018-08-08 | Stop reason: HOSPADM

## 2018-08-08 RX ORDER — MIDAZOLAM HYDROCHLORIDE 1 MG/ML
INJECTION, SOLUTION INTRAMUSCULAR; INTRAVENOUS
Status: DISCONTINUED | OUTPATIENT
Start: 2018-08-08 | End: 2018-08-08

## 2018-08-08 RX ORDER — CYCLOPENTOLATE HYDROCHLORIDE 10 MG/ML
1 SOLUTION/ DROPS OPHTHALMIC
Status: DISCONTINUED | OUTPATIENT
Start: 2018-08-08 | End: 2018-08-08 | Stop reason: HOSPADM

## 2018-08-08 RX ORDER — PHENYLEPHRINE HYDROCHLORIDE 25 MG/ML
1 SOLUTION/ DROPS OPHTHALMIC
Status: DISCONTINUED | OUTPATIENT
Start: 2018-08-08 | End: 2018-08-08 | Stop reason: HOSPADM

## 2018-08-08 RX ORDER — OXYCODONE AND ACETAMINOPHEN 5; 325 MG/1; MG/1
1 TABLET ORAL EVERY 6 HOURS PRN
Qty: 12 TABLET | Refills: 0 | Status: SHIPPED | OUTPATIENT
Start: 2018-08-08 | End: 2019-03-20 | Stop reason: CLARIF

## 2018-08-08 RX ORDER — MEPERIDINE HYDROCHLORIDE 25 MG/ML
12.5 INJECTION INTRAMUSCULAR; INTRAVENOUS; SUBCUTANEOUS ONCE AS NEEDED
Status: DISCONTINUED | OUTPATIENT
Start: 2018-08-08 | End: 2018-08-08 | Stop reason: HOSPADM

## 2018-08-08 RX ORDER — LIDOCAINE HYDROCHLORIDE 10 MG/ML
1 INJECTION, SOLUTION EPIDURAL; INFILTRATION; INTRACAUDAL; PERINEURAL ONCE
Status: COMPLETED | OUTPATIENT
Start: 2018-08-08 | End: 2018-08-08

## 2018-08-08 RX ORDER — EPINEPHRINE 1 MG/ML
INJECTION, SOLUTION INTRACARDIAC; INTRAMUSCULAR; INTRAVENOUS; SUBCUTANEOUS
Status: DISCONTINUED | OUTPATIENT
Start: 2018-08-08 | End: 2018-08-08 | Stop reason: HOSPADM

## 2018-08-08 RX ORDER — INDOCYANINE GREEN AND WATER 25 MG
KIT INJECTION
Status: DISCONTINUED | OUTPATIENT
Start: 2018-08-08 | End: 2018-08-08 | Stop reason: HOSPADM

## 2018-08-08 RX ORDER — DEXAMETHASONE SODIUM PHOSPHATE 4 MG/ML
INJECTION, SOLUTION INTRA-ARTICULAR; INTRALESIONAL; INTRAMUSCULAR; INTRAVENOUS; SOFT TISSUE
Status: DISCONTINUED
Start: 2018-08-08 | End: 2018-08-08 | Stop reason: HOSPADM

## 2018-08-08 RX ORDER — BUPIVACAINE HYDROCHLORIDE 7.5 MG/ML
INJECTION, SOLUTION EPIDURAL; RETROBULBAR
Status: DISCONTINUED
Start: 2018-08-08 | End: 2018-08-08 | Stop reason: HOSPADM

## 2018-08-08 RX ADMIN — MOXIFLOXACIN HYDROCHLORIDE 1 DROP: 5 SOLUTION/ DROPS OPHTHALMIC at 08:08

## 2018-08-08 RX ADMIN — EPINEPHRINE 0.3 MG: 1 INJECTION, SOLUTION INTRAMUSCULAR; SUBCUTANEOUS at 09:08

## 2018-08-08 RX ADMIN — ONDANSETRON 4 MG: 2 INJECTION INTRAMUSCULAR; INTRAVENOUS at 09:08

## 2018-08-08 RX ADMIN — INDOCYANINE GREEN 5 MG: KIT INTRAVENOUS at 09:08

## 2018-08-08 RX ADMIN — LIDOCAINE HYDROCHLORIDE 2 ML: 20 INJECTION, SOLUTION EPIDURAL; INFILTRATION; INTRACAUDAL; PERINEURAL at 09:08

## 2018-08-08 RX ADMIN — LIDOCAINE HYDROCHLORIDE 40 MG: 20 INJECTION, SOLUTION INTRAVENOUS at 09:08

## 2018-08-08 RX ADMIN — PREDNISOLONE ACETATE 1 DROP: 10 SUSPENSION/ DROPS OPHTHALMIC at 07:08

## 2018-08-08 RX ADMIN — LIDOCAINE HYDROCHLORIDE 0.1 MG: 10 INJECTION, SOLUTION EPIDURAL; INFILTRATION; INTRACAUDAL; PERINEURAL at 07:08

## 2018-08-08 RX ADMIN — VANCOMYCIN HYDROCHLORIDE 25 MG: 500 INJECTION, POWDER, LYOPHILIZED, FOR SOLUTION INTRAVENOUS at 09:08

## 2018-08-08 RX ADMIN — TROPICAMIDE 1 DROP: 10 SOLUTION/ DROPS OPHTHALMIC at 08:08

## 2018-08-08 RX ADMIN — PHENYLEPHRINE HYDROCHLORIDE 1 DROP: 25 SOLUTION/ DROPS OPHTHALMIC at 07:08

## 2018-08-08 RX ADMIN — TETRACAINE HYDROCHLORIDE 1 DROP: 5 SOLUTION OPHTHALMIC at 07:08

## 2018-08-08 RX ADMIN — MIDAZOLAM HYDROCHLORIDE 2 MG: 1 INJECTION, SOLUTION INTRAMUSCULAR; INTRAVENOUS at 09:08

## 2018-08-08 RX ADMIN — PREDNISOLONE ACETATE 1 DROP: 10 SUSPENSION/ DROPS OPHTHALMIC at 08:08

## 2018-08-08 RX ADMIN — NEOMYCIN SULFATE, POLYMYXIN B SULFATE, AND DEXAMETHASONE 1 APPLICATION: 3.5; 10000; 1 OINTMENT OPHTHALMIC at 09:08

## 2018-08-08 RX ADMIN — FENTANYL CITRATE 50 MCG: 50 INJECTION, SOLUTION INTRAMUSCULAR; INTRAVENOUS at 09:08

## 2018-08-08 RX ADMIN — DEXAMETHASONE SODIUM PHOSPHATE 2 MG: 4 INJECTION, SOLUTION INTRAMUSCULAR; INTRAVENOUS at 09:08

## 2018-08-08 RX ADMIN — BUPIVACAINE HYDROCHLORIDE 2 ML: 7.5 INJECTION, SOLUTION EPIDURAL; RETROBULBAR at 09:08

## 2018-08-08 RX ADMIN — MOXIFLOXACIN HYDROCHLORIDE 1 DROP: 5 SOLUTION/ DROPS OPHTHALMIC at 07:08

## 2018-08-08 RX ADMIN — TROPICAMIDE 1 DROP: 10 SOLUTION/ DROPS OPHTHALMIC at 07:08

## 2018-08-08 RX ADMIN — PHENYLEPHRINE HYDROCHLORIDE 1 DROP: 25 SOLUTION/ DROPS OPHTHALMIC at 08:08

## 2018-08-08 RX ADMIN — SODIUM CHLORIDE: 0.9 INJECTION, SOLUTION INTRAVENOUS at 07:08

## 2018-08-08 RX ADMIN — FENTANYL CITRATE 50 MCG: 50 INJECTION, SOLUTION INTRAMUSCULAR; INTRAVENOUS at 10:08

## 2018-08-08 RX ADMIN — PROPOFOL 50 MG: 10 INJECTION, EMULSION INTRAVENOUS at 09:08

## 2018-08-08 NOTE — TRANSFER OF CARE
"Anesthesia Transfer of Care Note    Patient: Sadiq Dhillon    Procedure(s) Performed: Procedure(s) (LRB):  REPAIR, RETINAL DETACHMENT, WITH VITRECTOMY (Left)  REMOVAL,EPIRETINAL MEMBRANE (Left)    Patient location: PACU    Anesthesia Type: general    Transport from OR: Transported from OR on room air with adequate spontaneous ventilation    Post pain: adequate analgesia    Post assessment: no apparent anesthetic complications and tolerated procedure well    Post vital signs: stable    Level of consciousness: awake, alert and oriented    Nausea/Vomiting: no nausea/vomiting    Complications: none    Transfer of care protocol was followed      Last vitals:   Visit Vitals  /63 (BP Location: Left arm, Patient Position: Lying)   Pulse 68   Temp 37 °C (98.6 °F) (Temporal)   Resp 16   Ht 5' 9" (1.753 m)   Wt 80.3 kg (177 lb)   SpO2 96%   BMI 26.14 kg/m²     "

## 2018-08-08 NOTE — INTERVAL H&P NOTE
Patient seen and examined today, H&P reviewed.  There are no changes to the patient's H&P.  There is still an ongoing indication for the procedure.  Will proceed with Plan 25g PPV/ILM peel/SF6 OS for FTMH OS. All questions answered.    ZAMZAM Strickland MD  PGY2, Ophthalmology Resident  08/08/2018  8:17 AM

## 2018-08-08 NOTE — BRIEF OP NOTE
Pre-Op Dx: FT OS    Post Op Dx: same    Procedure Performed: 25g PPV/ILM peel/AFx/SF6 20% OS    Attending Surgeon: Amanda    Assistant Surgeon: Damon    Anesthesia: Local/Mac, retrobulbar injection of 4.0cc mixture 0.75%Marcaine, 2% Xylocaine    Estimated blood loss: Minimal    Complication: None    Specimen: None    Disposition: Stable to recovery    Findings/Outcome: FT OS    Date of Discharge: 8/18/18    Discharge Disposition: stable to recovery then home    F/U: tomorrow

## 2018-08-08 NOTE — OP NOTE
DATE OF PROCEDURE:  08/08/2018.    PREOPERATIVE DIAGNOSIS:  Full-thickness macular hole to the left eye.    POSTOPERATIVE DIAGNOSIS:  Full-thickness macular hole to the left eye.    PROCEDURES PERFORMED:  A 25-gauge pars plana vitrectomy with internal-limiting   membrane peel and air-fluid exchange with SF6 gas 20% to the left eye.    ATTENDING SURGEON:  SHUBHAM Patel M.D.    ASSISTANT SURGEON:  Fellow, Nick Jose.    ANESTHESIA:  Local monitored anesthesia care with a retrobulbar injection of 4.0   mL mixture of 0.75% Marcaine and 2% Xylocaine.    ESTIMATED BLOOD LOSS:  Minimal.    COMPLICATIONS:  None.    DISPOSITION:  Stable to Recovery.    INDICATIONS FOR SURGERY:  This is a 68-year-old male who presented with   bilateral macular holes.  Right eye was repaired three weeks prior to surgery.    Decision was made to take the patient and prepare the left eye to improve vision   and improve quality of life.  Risks, benefits, and alternatives of surgery were   discussed in detail with risks including loss of vision, loss of eye, retinal   detachment, infection, hemorrhage, cataract formation, lens dislocation,   glaucoma, hypotony, ptosis, and diplopia.  The patient voiced understanding and   wished to proceed with the procedure.    DESCRIPTION OF PROCEDURE:  After proper informed consent was obtained, the   patient was brought back to be Operating Suite at [facility] where   MAC anesthesia was induced.  Retrobulbar injection was provided as above without   complication.  The patient was then prepped and draped in the normal sterile   fashion for ophthalmic surgery and lid speculum was placed in the left eye.  A   standard three-port 25-gauge pars plana vitrectomy set up with the infusion   cannula was inserted 4 mm posterior to the limbus.  The infusion cannula was   turned on only after it was observed to be free and clear of all underlying   retinal tissue.  Supranasal and supratemporal trocars  were also placed 4 mm   posterior to the limbus.  The vitrector and light pipe were introduced in the   vitreous cavity and a core vitrectomy was performed.  Posterior hyaloid was   already elevated and it was propagated out to the level of the vitreous base.    ICG was used to stain the internal-limiting membrane, which was peeled off under   direct contact lens visualization with the ILM forceps.  Scleral depression was   performed 360 degrees to help with the removal of the cortical vitreous.  No   identifiable retinal breaks were found.  An air-fluid exchange was performed.    The supranasal trocar was removed and not leaking after gentle massage.  A 20%   SF6 gas mixture was created and approximately 40 mL were cycled through the eye,   and the superotemporal and infusion trocars were removed not leaking after   gentle massage and the eye was normal pressure via palpation.  Subconjunctival   injection of vancomycin and Decadron were given to the patient.  The drapes were   removed from the patient.  He was washed free of Betadine prep solution and   Maxitrol ointment was placed in the left eye.  The eye was patch shielded.  MAC   anesthesia was reversed.  He was brought to Recovery Room in stable condition,   tolerating the procedure well.  Dr. Patel was present for entire case.      HALIE  dd: 08/08/2018 10:25:19 (CDT)  td: 08/08/2018 10:39:44 (CDT)  Doc ID   #[id number]  Job ID #[id number]    CC:

## 2018-08-08 NOTE — PLAN OF CARE
PT is AAOx4. VSS. NAD. Denies pain or nausea. IV discontinued. Discharge instructions given. Verbalized understanding. DIscharged home with family.

## 2018-08-08 NOTE — ANESTHESIA POSTPROCEDURE EVALUATION
"Anesthesia Post Evaluation    Patient: Sadiq Dhillon    Procedure(s) Performed: Procedure(s) (LRB):  REPAIR, RETINAL DETACHMENT, WITH VITRECTOMY (Left)  REMOVAL,EPIRETINAL MEMBRANE (Left)    Final Anesthesia Type: general  Patient location during evaluation: PACU  Patient participation: Yes- Able to Participate  Level of consciousness: awake and alert  Post-procedure vital signs: reviewed and stable  Pain management: adequate  Airway patency: patent  PONV status at discharge: No PONV  Anesthetic complications: no      Cardiovascular status: blood pressure returned to baseline  Respiratory status: unassisted  Hydration status: euvolemic  Follow-up not needed.        Visit Vitals  /75   Pulse 67   Temp 36.6 °C (97.9 °F) (Temporal)   Resp 18   Ht 5' 9" (1.753 m)   Wt 80.3 kg (177 lb)   SpO2 99%   BMI 26.14 kg/m²       Pain/Sylvie Score: Pain Assessment Performed: Yes (8/8/2018 10:55 AM)  Presence of Pain: denies (8/8/2018 10:55 AM)  Sylvie Score: 10 (8/8/2018 10:55 AM)  Modified Sylvie Score: 19 (8/8/2018 10:55 AM)      "

## 2018-08-08 NOTE — DISCHARGE INSTRUCTIONS
Post Op Instructions:  Patient should Maintain Eye shield & Dressing until seen tomorrow in eye clinic  Tylenol as needed for general discomfort  Use Prescription for pain medication if pain is severe  Use Prescription for Nausea (Zofran) if nausea or vomiting  No excessive exercise   No Bending, Lifting or Straining  Call MD if significant pain or nausea / vomiting uncontrolled by medications  Call MD if temperature in excess of 101' F  Maintain Head in a Face-Down Position  Return to eye clinic for Post Op Examination tomorrow Morning.  Bring Medicine bag to tomorrow's appointment.      PATIENT INSTRUCTIONS  POST-ANESTHESIA    IMMEDIATELY FOLLOWING SURGERY:  Do not drive or operate machinery for the first twenty four hours after surgery.  Do not make any important decisions for twenty four hours after surgery or while taking narcotic pain medications or sedatives.  If you develop intractable nausea and vomiting or a severe headache please notify your doctor immediately.    FOLLOW-UP:  Please make an appointment with your surgeon as instructed. You do not need to follow up with anesthesia unless specifically instructed to do so.    WOUND CARE INSTRUCTIONS (if applicable):  Keep a dry clean dressing on the anesthesia/puncture wound site if there is drainage.  Once the wound has quit draining you may leave it open to air.  Generally you should leave the bandage intact for twenty four hours unless there is drainage.  If the epidural site drains for more than 36-48 hours please call the anesthesia department.    QUESTIONS?:  Please feel free to call your physician or the hospital  if you have any questions, and they will be happy to assist you.       [facility] Anesthesia Department  [address] [facility]  [phone]

## 2018-08-08 NOTE — ANESTHESIA PREPROCEDURE EVALUATION
08/08/2018  Sadiq Dhillon is a 68 y.o., male   Pre-operative evaluation for Procedure(s) (LRB):  REPAIR, RETINAL DETACHMENT, WITH VITRECTOMY (Left)    Sadiq Dhillon is a 68 y.o. male       Active problems:  Patient Active Problem List    Diagnosis Date Noted    Full thickness macular hole, right 07/18/2018    Full thickness macular hole, bilateral 06/26/2018    Vitreomacular adhesion, bilateral 06/26/2018    Fuchs' endothelial dystrophy 07/13/2017    Nuclear sclerotic cataract of right eye 07/13/2017    Fuch's endothelial dystrophy 02/28/2014    Pituitary adenoma 02/28/2014    Unspecified essential hypertension 09/10/2012    Type II or unspecified type diabetes mellitus without mention of complication, not stated as uncontrolled 09/10/2012    Chest pain, unspecified 09/10/2012    Reflux esophagitis 09/10/2012         Prev airway:       Review of patient's allergies indicates:   Allergen Reactions    No known drug allergies         No current facility-administered medications on file prior to encounter.      Current Outpatient Prescriptions on File Prior to Encounter   Medication Sig Dispense Refill    aspirin (ECOTRIN) 81 MG EC tablet Take 81 mg by mouth every morning.       cabergoline (DOSTINEX) 0.5 mg tablet TAKE 1 TABLET BY MOUTH Twice a week, Takes on Mondays and Fridays      fexofenadine (ALLEGRA) 180 MG tablet Take 180 mg by mouth daily as needed. PRN      gabapentin (NEURONTIN) 100 MG capsule Take 1 capsule three times a day,  Takes one capsule every morning  3    lisinopril (PRINIVIL,ZESTRIL) 2.5 MG tablet Take 2.5 mg by mouth every morning.       metFORMIN (GLUCOPHAGE-XR) 500 MG 24 hr tablet TK 2 TS (1000 mg) PO BID  6    metoprolol succinate (TOPROL-XL) 25 MG 24 hr tablet Take 25 mg by mouth nightly.       prednisoLONE acetate (PRED FORTE) 1 % DrpS Place 1 drop into  the right eye 2 (two) times daily.       rosuvastatin (CRESTOR) 40 MG Tab Take 10 mg by mouth nightly.       tamsulosin (FLOMAX) 0.4 mg Cp24 TK 1 C PO QHS  3    testosterone 50 mg/5 gram (1 %) Gel RONALD 1 PACKET Topical ONCE D, morning  5    ticagrelor (BRILINTA) 90 mg tablet Take 90 mg by mouth 2 (two) times daily.         Past Surgical History:   Procedure Laterality Date    CATARACT EXTRACTION W/  INTRAOCULAR LENS IMPLANT Right 0    Dr Van     CORNEAL TRANSPLANT Right     Dr Van     REPAIR OF RETINAL DETACHMENT WITH VITRECTOMY Right 7/18/2018    Procedure: REPAIR, RETINAL DETACHMENT, WITH VITRECTOMY;  Surgeon: SHUBHAM Patel MD;  Location: [facility] OR [address] [place] [address];  Service: Ophthalmology;  Laterality: Right;  40 min    RHINOPLASTY TIP  1975    THYROIDECTOMY  2007    UPPER ENDOSCOPIC ULTRASOUND W/ FNA      with resultant pancreatitis       Social History     Social History    Marital status: Single     Spouse name: N/A    Number of children: N/A    Years of education: N/A     Occupational History    Not on file.     Social History Main Topics    Smoking status: Never Smoker    Smokeless tobacco: Never Used    Alcohol use Yes      Comment: social    Drug use: No    Sexual activity: Not on file     Other Topics Concern    Not on file     Social History Narrative    No narrative on file         Vital Signs Range (Last 24H):  Wt Readings from Last 3 Encounters:   07/18/18 80.3 kg (177 lb)   03/18/18 89.8 kg (198 lb)   07/11/17 86.2 kg (190 lb)     Temp Readings from Last 3 Encounters:   07/18/18 36.8 °C (98.2 °F) (Oral)   03/18/18 36.7 °C (98.1 °F)   07/13/17 36.5 °C (97.7 °F) (Oral)     BP Readings from Last 3 Encounters:   07/19/18 108/65   07/18/18 (!) 112/58   03/18/18 130/80     Pulse Readings from Last 3 Encounters:   07/19/18 78   07/18/18 73   03/18/18 89         CBC:   Lab Results   Component Value Date    WBC 7.22 02/07/2013    HGB 14.8 02/07/2013    HCT 43.6 02/07/2013    MCV 87.7  02/07/2013     02/07/2013       CMP: CMP  Sodium   Date Value Ref Range Status   02/07/2013 142 136 - 145 mmol/L Final     Potassium   Date Value Ref Range Status   02/07/2013 3.7 3.5 - 5.1 mmol/L Final     Chloride   Date Value Ref Range Status   02/07/2013 106 95 - 110 mmol/L Final     CO2   Date Value Ref Range Status   02/07/2013 25 23 - 29 mmol/L Final     Glucose   Date Value Ref Range Status   02/07/2013 102 70 - 110 mg/dl Final     BUN, Bld   Date Value Ref Range Status   02/07/2013 17 8 - 23 mg/dl Final     Creatinine   Date Value Ref Range Status   02/07/2013 0.9 0.5 - 1.4 mg/dl Final     Calcium   Date Value Ref Range Status   02/07/2013 9.8 8.7 - 10.5 mg/dl Final     Total Protein   Date Value Ref Range Status   06/04/2014 7.2 6.0 - 8.4 g/dL Final     Albumin   Date Value Ref Range Status   06/04/2014 4.3 3.5 - 5.2 g/dL Final     Total Bilirubin   Date Value Ref Range Status   06/04/2014 0.4 0.1 - 1.0 mg/dL Final     Comment:     For infants and newborns, interpretation of results should be based  on gestational age, weight and in agreement with clinical  observations.  Premature Infant recommended reference ranges:  Up to 24 hours.............<8.0 mg/dL  Up to 48 hours............<12.0 mg/dL  3-5 days..................<15.0 mg/dL  6-29 days.................<15.0 mg/dL       Alkaline Phosphatase   Date Value Ref Range Status   06/04/2014 72 55 - 135 U/L Final     AST   Date Value Ref Range Status   06/04/2014 22 10 - 40 U/L Final     ALT   Date Value Ref Range Status   06/04/2014 26 10 - 44 U/L Final     Anion Gap   Date Value Ref Range Status   02/07/2013 11.0 8 - 16 mmol/L Final     eGFR if    Date Value Ref Range Status   02/07/2013 >60 >60 mL/min Final     Comment:     Estimated glomerular filtration rate (eGFR) is normalized to an  average body surface area of 1.73 square meters.  The calculation  used to obtain the eGFR is the adjusted MDRD equation, which factors  patient  sex, age, race, and creatinine result.  Since race is unknown  in our information system, the eGFR values for -American  and Non--American patients are given for each creatinine  result.     eGFR if non    Date Value Ref Range Status   2013 >60 >60 mL/min Final       INR  Lab Results   Component Value Date    INR 1.1 2012           Diagnostic Studies:      EKD Echo:        .    Anesthesia Evaluation         Review of Systems  Anesthesia Hx:  No problems with previous Anesthesia History of prior surgery of interest to airway management or planning: Denies Family Hx of Anesthesia complications.   Denies Personal Hx of Anesthesia complications.   Cardiovascular:   Hypertension, well controlled Past MI CABG/stent   Denies Angina.             MI one year ago; followed at [facility] ; received a stent ; denies any cardiac sx presently; went hiking in [place] about a month ago   Pulmonary:  Pulmonary Normal    Renal/:  Renal/ Normal     Musculoskeletal:  Musculoskeletal Normal    Neurological:   Hx pituitary adenoma   Endocrine:   Diabetes, well controlled, type 2        Physical Exam  General:  Well nourished    Airway/Jaw/Neck:  Airway Findings: Mouth Opening: Normal Tongue: Normal  General Airway Assessment: Adult  Mallampati: I  Jaw/Neck Findings:  Neck ROM: Normal ROM      Dental:  Dental Findings: In tact   Chest/Lungs:  Chest/Lungs Findings: Clear to auscultation     Heart/Vascular:  Heart Findings: Rate: Normal  Rhythm: Regular Rhythm  Sounds: Normal        Mental Status:  Mental Status Findings:  Cooperative         Anesthesia Plan  Type of Anesthesia, risks & benefits discussed:  Anesthesia Type:  general, MAC  Patient's Preference:   Intra-op Monitoring Plan:   Intra-op Monitoring Plan Comments:   Post Op Pain Control Plan:   Post Op Pain Control Plan Comments:   Induction:   IV  Beta Blocker:  Patient is on a Beta-Blocker and has received one dose within the  past 24 hours (No further documentation required).       Informed Consent: Patient understands risks and agrees with Anesthesia plan.  Questions answered. Anesthesia consent signed with patient.  ASA Score: 3     Day of Surgery Review of History & Physical:    H&P update referred to the surgeon.         Ready For Surgery From Anesthesia Perspective.

## 2018-08-08 NOTE — H&P
Pre-Operative History & Physical  Ophthalmology      SUBJECTIVE:     History of Present Illness:  Patient is a 68 y.o. 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    MEDICATIONS:   No prescriptions prior to admission.       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, treated with dostenex(cabergoline)/ resolved    Reflux esophagitis     Tumor cells, benign      PAST SURGICAL HISTORY:   Past Surgical History:   Procedure Laterality Date    CATARACT EXTRACTION W/  INTRAOCULAR LENS IMPLANT Right 0    Dr Van     CORNEAL TRANSPLANT Right     Dr Van     REPAIR OF RETINAL DETACHMENT WITH VITRECTOMY Right 7/18/2018    Procedure: REPAIR, RETINAL DETACHMENT, WITH VITRECTOMY;  Surgeon: SHUBHAM Patel MD;  Location: [facility] OR [address] [place] [address];  Service: Ophthalmology;  Laterality: Right;  40 min    RHINOPLASTY TIP  1975    THYROIDECTOMY  2007    UPPER ENDOSCOPIC ULTRASOUND W/ FNA      with resultant pancreatitis     PAST FAMILY HISTORY:   Family History   Problem Relation Age of Onset    Hypertension Mother     Heart disease Mother     Heart disease Father     Cataracts Father     Stroke Father     Diabetes Father     Diabetes Paternal Uncle     Stroke Maternal Grandmother     Blindness Neg Hx     Glaucoma Neg Hx     Macular degeneration Neg Hx     Retinal detachment Neg Hx     Strabismus Neg Hx     Thyroid disease Neg Hx     Cancer Neg Hx      SOCIAL HISTORY:   Social History   Substance Use Topics    Smoking status: Never Smoker    Smokeless tobacco: Never Used    Alcohol  use Yes      Comment: social        MENTAL STATUS: Alert    REVIEW OF SYSTEMS: Negative    OBJECTIVE:     Vital Signs (Most Recent)       Physical Exam:  General: NAD  HEENT: Atraumatic  Lungs: Adequate respirations, LCTAB  Heart: RRR, No murmur  Abdomen: Soft NT    ASSESSMENT/PLAN:     Patient is a 68 y.o. 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     - Plan for surgical correction Plan 25g PPV/ILM peel/SF6 OS for FTMH OS     Local MAC  LOC 40 min   - Risks/benefits/alternatives of the procedure including, but not limited to scarring, bleeding, infection, loss or decreased vision, and/or need for possible repeat surgery discussed with the patient and family.   - Informed consent obtained prior to surgery and the patient/family voiced good understanding.    Gurmeet Jose  8/7/2018  8:35 PM

## 2018-08-08 NOTE — PROGRESS NOTES
Lidocaine injected at site on left wrist for unsuccesful IV start. Patient c/o of numbness on that side of wrist into hand toward thumb.

## 2018-08-09 ENCOUNTER — OFFICE VISIT (OUTPATIENT)
Dept: OPHTHALMOLOGY | Facility: CLINIC | Age: 68
End: 2018-08-09
Payer: COMMERCIAL

## 2018-08-09 ENCOUNTER — TELEPHONE (OUTPATIENT)
Dept: OPTOMETRY | Facility: CLINIC | Age: 68
End: 2018-08-09

## 2018-08-09 VITALS — SYSTOLIC BLOOD PRESSURE: 98 MMHG | DIASTOLIC BLOOD PRESSURE: 60 MMHG | HEART RATE: 70 BPM

## 2018-08-09 DIAGNOSIS — H35.343 FULL THICKNESS MACULAR HOLE, BILATERAL: Primary | ICD-10-CM

## 2018-08-09 PROCEDURE — 99999 PR PBB SHADOW E&M-EST. PATIENT-LVL III: CPT | Mod: PBBFAC,,, | Performed by: OPHTHALMOLOGY

## 2018-08-09 PROCEDURE — 99024 POSTOP FOLLOW-UP VISIT: CPT | Mod: S$GLB,,, | Performed by: OPHTHALMOLOGY

## 2018-08-09 NOTE — PROGRESS NOTES
HPI     1 day post op      A 25-G PPV with internal-limiting   membrane peel and air-fluid exchange with SF6 gas 20% OS      Pt sts no pain doing okay OD vision seems to be worse than before sx.  Has a trip to go out of town on the 1st would like to know If he can fly   or what to do for traveling.      PF BID OD per Dr. Van       HPI     5 day post op  DLS- 07/19/2018 Dr. Patel    A couple dark spots appeared in vision in OD yesterday morning has been in same spot since yesterday, not moving like floaters and not opaque. Still soreness in OD       gtts QID  Oint QHS     HPI     Post-op Evaluation    Additional comments: 1 day check           Comments   1 day post op- Early this morning he became very nauseated and is still   nauseated at present time    HPI     Referred by Dr Van  Hx of pituitary tumor since 2003 which was treated w/Disonex(drug at   [place]).   S/P Phaco/DMEK OD 07/13/2017     PF BID OD    Pt referred by Dr Van for eval bilateral mac hole.  Pt states norton has poor   vision centrally OD.  Pt denies eye pain, flashes or floaters.     Last edited by Shania Muñoz MA on 6/26/2018  1:06 PM.   (History)        OCT - VMT with FTMH OU    A/P    1. FTMH with VMT OU  OS>OD, likely more longstanding OS  Should do well with PPV OU - OD first - OS 3 weeks later  With 2-3 days face down OD, 4-5 OS    S/p 25g PPV/ILM peel/SF6 OD for FTMH OD  PF BID - post DSEK    Plan 25g PPV/ILM peel/SF6 OS for FTMH OS in 2 weeks    Start gtts QID, ointment/shield QHS    4 days face down because more long standing      2. NS OS  CE after PPV    3. Fuch's  S/p DSEK OD  Will have DSEK OS following PPV    4. Pituitary adenoma as above      F/U 1 week

## 2018-08-09 NOTE — TELEPHONE ENCOUNTER
----- Message from Nasima Ibanez sent at 8/9/2018  3:02 PM CDT -----  Contact: Sadiq Ortega   Pt would like to speak with  nurse about the med ,pt can be reached at [phone] please thank you.

## 2018-08-14 ENCOUNTER — OFFICE VISIT (OUTPATIENT)
Dept: OPHTHALMOLOGY | Facility: CLINIC | Age: 68
End: 2018-08-14
Payer: COMMERCIAL

## 2018-08-14 DIAGNOSIS — H35.343 FULL THICKNESS MACULAR HOLE, BILATERAL: Primary | ICD-10-CM

## 2018-08-14 PROCEDURE — 99999 PR PBB SHADOW E&M-EST. PATIENT-LVL II: CPT | Mod: PBBFAC,,, | Performed by: OPHTHALMOLOGY

## 2018-08-14 PROCEDURE — 99024 POSTOP FOLLOW-UP VISIT: CPT | Mod: S$GLB,,, | Performed by: OPHTHALMOLOGY

## 2018-08-14 NOTE — PROGRESS NOTES
HPI     5 day post op    A 25-G PPV with internal-limiting   membrane peel and air-fluid exchange with SF6 gas 20% OS      Pt sts doing well no pain slight improvement bubble slightly smaller     PF HA VIG QID        HPI     1 day post op      A 25-G PPV with internal-limiting   membrane peel and air-fluid exchange with SF6 gas 20% OS      Pt sts no pain doing okay OD vision seems to be worse than before sx.  Has a trip to go out of town on the 1st would like to know If he can fly   or what to do for traveling.      PF BID OD per Dr. Van       HPI     5 day post op  DLS- 07/19/2018 Dr. Patel    A couple dark spots appeared in vision in OD yesterday morning has been in same spot since yesterday, not moving like floaters and not opaque. Still soreness in OD       gtts QID  Oint QHS     HPI     Post-op Evaluation    Additional comments: 1 day check           Comments   1 day post op- Early this morning he became very nauseated and is still   nauseated at present time    HPI     Referred by Dr Van  Hx of pituitary tumor since 2003 which was treated w/Disonex(drug at   [place]).   S/P Phaco/DMEK OD 07/13/2017     PF BID OD    Pt referred by Dr Van for eval bilateral mac hole.  Pt states farias has poor   vision centrally OD.  Pt denies eye pain, flashes or floaters.     Last edited by Shania Muñoz MA on 6/26/2018  1:06 PM.   (History)        OCT - VMT with FTMH OU    A/P    1. FTMH with VMT OU  OS>OD, likely more longstanding OS  Should do well with PPV OU - OD first - OS 3 weeks later  With 2-3 days face down OD, 4-5 OS    S/p 25g PPV/ILM peel/SF6 OD for FTMH OD  PF BID - post DSEK    S/p  25g PPV/ILM peel/SF6 OS for FTMH OS in 2 weeks 8/8/18    continue gtts QID, ointment/shield QHS until Thursday  Then FARIAS BID until bubble and Taper PF 3/2/1/0        2. NS OS  CE after PPV    3. Fuch's  S/p DSEK OD  Will have DSEK OS following PPV    4. Pituitary adenoma as above      F/U 1 month OCT

## 2018-08-16 ENCOUNTER — TELEPHONE (OUTPATIENT)
Dept: OPHTHALMOLOGY | Facility: CLINIC | Age: 68
End: 2018-08-16

## 2018-08-16 NOTE — TELEPHONE ENCOUNTER
I spoke to patient.  He is looking to have surgery November 2018.  Put note in book to call him once November surgery book is released.

## 2018-08-16 NOTE — TELEPHONE ENCOUNTER
----- Message from Dotty Guerrero sent at 8/16/2018 11:02 AM CDT -----  Contact: Sadiq Dhillon   This patient states he is trying to make some long term travel plans with work and wants to know about possible sx dates for the month of November. He states Dr. Patel may be releasing him pretty soon to have a transplant with Dr. Van. Can you call the patient about this. Thank you.   ----- Message -----  From: Nasima Ibanez  Sent: 8/16/2018  10:07 AM  To: Carlota ALMAGUER Staff    Pt would like to speak with  nurse ,pt has questions,pt can be reached at [phone] please thank you.

## 2018-09-25 ENCOUNTER — OFFICE VISIT (OUTPATIENT)
Dept: OPHTHALMOLOGY | Facility: CLINIC | Age: 68
End: 2018-09-25
Payer: COMMERCIAL

## 2018-09-25 ENCOUNTER — RX ONLY (OUTPATIENT)
Age: 68
Setting detail: RX ONLY
End: 2018-09-25

## 2018-09-25 DIAGNOSIS — H35.343 FULL THICKNESS MACULAR HOLE, BILATERAL: Primary | ICD-10-CM

## 2018-09-25 DIAGNOSIS — H35.341 FULL THICKNESS MACULAR HOLE, RIGHT: ICD-10-CM

## 2018-09-25 PROCEDURE — 99024 POSTOP FOLLOW-UP VISIT: CPT | Mod: S$GLB,,, | Performed by: OPHTHALMOLOGY

## 2018-09-25 PROCEDURE — 99999 PR PBB SHADOW E&M-EST. PATIENT-LVL III: CPT | Mod: PBBFAC,,, | Performed by: OPHTHALMOLOGY

## 2018-09-25 PROCEDURE — 92134 CPTRZ OPH DX IMG PST SGM RTA: CPT | Mod: S$GLB,,, | Performed by: OPHTHALMOLOGY

## 2018-09-25 RX ORDER — KETOCONAZOLE 20 MG/G
CREAM TOPICAL
Qty: 1 | Refills: 3 | Status: ERX

## 2018-09-25 NOTE — PROGRESS NOTES
HPI     Post-op Evaluation      Additional comments: 1 month check              Comments     DLS 8/14/18- He is still seeing a black spot in vision OD      HPI     5 day post op    A 25-G PPV with internal-limiting   membrane peel and air-fluid exchange with SF6 gas 20% OS      Pt sts doing well no pain slight improvement bubble slightly smaller     PF HA VIG QID        HPI     1 day post op      A 25-G PPV with internal-limiting   membrane peel and air-fluid exchange with SF6 gas 20% OS      Pt sts no pain doing okay OD vision seems to be worse than before sx.  Has a trip to go out of town on the 1st would like to know If he can fly   or what to do for traveling.      PF BID OD per Dr. Van       HPI     5 day post op  DLS- 07/19/2018 Dr. Patel    A couple dark spots appeared in vision in OD yesterday morning has been in same spot since yesterday, not moving like floaters and not opaque. Still soreness in OD       gtts QID  Oint QHS     HPI     Post-op Evaluation    Additional comments: 1 day check           Comments   1 day post op- Early this morning he became very nauseated and is still   nauseated at present time    HPI     Referred by Dr Van  Hx of pituitary tumor since 2003 which was treated w/Disonex(drug at   [place]).   S/P Phaco/DMEK OD 07/13/2017     PF BID OD    Pt referred by Dr Van for eval bilateral mac hole.  Pt states norton has poor   vision centrally OD.  Pt denies eye pain, flashes or floaters.     Last edited by Shania Muñoz MA on 6/26/2018  1:06 PM.   (History)        OCT - [facility] closed OU    A/P    1. FTMH with VMT OU  OS>OD, likely more longstanding OS  Should do well with PPV OU - OD first - OS 3 weeks later  With 2-3 days face down OD, 4-5 OS    S/p 25g PPV/ILM peel/SF6 OD for FTMH OD  PF BID - post DSEK    S/p  25g PPV/ILM peel/SF6 OS for FTMH OS in 2 weeks 8/8/18    [facility] closed OU, should do well    2. NS OS  CE after PPV  Ok for CE    3. Fuch's  S/p DSEK OD  Will  have DSEK OS following PPV    4. Pituitary adenoma as above        Retina PRN

## 2018-10-01 ENCOUNTER — TELEPHONE (OUTPATIENT)
Dept: OPHTHALMOLOGY | Facility: CLINIC | Age: 68
End: 2018-10-01

## 2018-10-19 ENCOUNTER — IMMUNIZATION (OUTPATIENT)
Dept: URGENT CARE | Facility: CLINIC | Age: 68
End: 2018-10-19
Payer: COMMERCIAL

## 2018-10-19 ENCOUNTER — HOSPITAL ENCOUNTER (OUTPATIENT)
Dept: RADIOLOGY | Facility: OTHER | Age: 68
Discharge: HOME | End: 2018-10-19
Attending: NURSE PRACTITIONER
Payer: COMMERCIAL

## 2018-10-19 DIAGNOSIS — M54.6 PAIN IN THORACIC SPINE: ICD-10-CM

## 2018-10-19 DIAGNOSIS — M40.295 OTHER KYPHOSIS OF THORACOLUMBAR REGION: ICD-10-CM

## 2018-10-19 DIAGNOSIS — Z23 IMMUNIZATION DUE: Primary | ICD-10-CM

## 2018-10-19 DIAGNOSIS — M51.34 ANNULAR TEAR OF THORACIC DISC: ICD-10-CM

## 2018-10-19 PROCEDURE — 90662 IIV NO PRSV INCREASED AG IM: CPT | Mod: S$GLB,,, | Performed by: EMERGENCY MEDICINE

## 2018-10-19 PROCEDURE — 90471 IMMUNIZATION ADMIN: CPT | Mod: S$GLB,,, | Performed by: EMERGENCY MEDICINE

## 2018-10-20 ENCOUNTER — HOSPITAL ENCOUNTER (OUTPATIENT)
Dept: RADIOLOGY | Facility: HOSPITAL | Age: 68
Discharge: HOME | End: 2018-10-20
Attending: NURSE PRACTITIONER
Payer: COMMERCIAL

## 2018-10-20 PROCEDURE — 72146 MRI CHEST SPINE W/O DYE: CPT | Mod: 26,,, | Performed by: RADIOLOGY

## 2018-10-20 PROCEDURE — 72146 MRI CHEST SPINE W/O DYE: CPT | Mod: TC

## 2018-10-22 RX ORDER — PREDNISOLONE ACETATE 10 MG/ML
SUSPENSION/ DROPS OPHTHALMIC
Qty: 10 ML | Refills: 0 | Status: SHIPPED | OUTPATIENT
Start: 2018-10-22 | End: 2019-01-29

## 2018-10-23 DIAGNOSIS — H18.519 FUCHS' CORNEAL DYSTROPHY: Primary | ICD-10-CM

## 2018-10-23 DIAGNOSIS — H25.12 NUCLEAR SCLEROTIC CATARACT OF LEFT EYE: ICD-10-CM

## 2018-10-23 RX ORDER — PREDNISOLONE ACETATE-GATIFLOXACIN 5; 10 MG/ML; MG/ML
1 SUSPENSION/ DROPS OPHTHALMIC 4 TIMES DAILY
Qty: 3.5 ML | Refills: 1 | Status: SHIPPED | OUTPATIENT
Start: 2018-10-23 | End: 2019-04-02

## 2018-10-23 NOTE — TELEPHONE ENCOUNTER
----- Message from Teodora Kline sent at 10/23/2018  9:21 AM CDT -----  Contact: Sadiq Garcias calling to schedule for corneal surgery with Dr. Van.  He can be reached at [phone].

## 2018-10-23 NOTE — TELEPHONE ENCOUNTER
Phaco/DMEK Scheduled for 01/10 at patient's request.  Paperwork placed in the mail to address on file.

## 2018-12-28 ENCOUNTER — TELEPHONE (OUTPATIENT)
Dept: OPHTHALMOLOGY | Facility: CLINIC | Age: 68
End: 2018-12-28

## 2019-01-02 ENCOUNTER — TELEPHONE (OUTPATIENT)
Dept: OPHTHALMOLOGY | Facility: CLINIC | Age: 69
End: 2019-01-02

## 2019-01-02 NOTE — TELEPHONE ENCOUNTER
----- Message from Abbe Paredes sent at 1/2/2019  1:40 PM CST -----  Contact: Sadiq  Mr. Dhillon would like to know when is he suppose to start using his surgery drops. He can be reached at [phone]

## 2019-01-03 ENCOUNTER — TELEPHONE (OUTPATIENT)
Dept: OPHTHALMOLOGY | Facility: CLINIC | Age: 69
End: 2019-01-03

## 2019-01-03 NOTE — TELEPHONE ENCOUNTER
----- Message from Katelynn Choi sent at 1/3/2019 12:47 PM CST -----  Contact: Sadiq  Needs Advice    Reason for call:Pt called to reschedule surgery. Transportation conflict.        Communication Preference:[phone]    Additional Information:

## 2019-01-04 ENCOUNTER — TELEPHONE (OUTPATIENT)
Dept: OPHTHALMOLOGY | Facility: CLINIC | Age: 69
End: 2019-01-04

## 2019-01-04 DIAGNOSIS — H18.519 FUCHS' CORNEAL DYSTROPHY: Primary | ICD-10-CM

## 2019-01-04 DIAGNOSIS — H25.12 NUCLEAR SCLEROTIC CATARACT OF LEFT EYE: ICD-10-CM

## 2019-01-29 ENCOUNTER — OFFICE VISIT (OUTPATIENT)
Dept: URGENT CARE | Facility: CLINIC | Age: 69
End: 2019-01-29
Payer: COMMERCIAL

## 2019-01-29 VITALS
TEMPERATURE: 98 F | SYSTOLIC BLOOD PRESSURE: 97 MMHG | WEIGHT: 175 LBS | HEIGHT: 70 IN | BODY MASS INDEX: 25.05 KG/M2 | DIASTOLIC BLOOD PRESSURE: 60 MMHG | RESPIRATION RATE: 12 BRPM | HEART RATE: 79 BPM | OXYGEN SATURATION: 97 %

## 2019-01-29 DIAGNOSIS — J02.9 SORE THROAT: ICD-10-CM

## 2019-01-29 DIAGNOSIS — J06.9 VIRAL URI: Primary | ICD-10-CM

## 2019-01-29 LAB
CTP QC/QA: YES
S PYO RRNA THROAT QL PROBE: NEGATIVE

## 2019-01-29 PROCEDURE — 1101F PT FALLS ASSESS-DOCD LE1/YR: CPT | Mod: CPTII,S$GLB,, | Performed by: NURSE PRACTITIONER

## 2019-01-29 PROCEDURE — 3074F SYST BP LT 130 MM HG: CPT | Mod: CPTII,S$GLB,, | Performed by: NURSE PRACTITIONER

## 2019-01-29 PROCEDURE — 1101F PR PT FALLS ASSESS DOC 0-1 FALLS W/OUT INJ PAST YR: ICD-10-PCS | Mod: CPTII,S$GLB,, | Performed by: NURSE PRACTITIONER

## 2019-01-29 PROCEDURE — 3074F PR MOST RECENT SYSTOLIC BLOOD PRESSURE < 130 MM HG: ICD-10-PCS | Mod: CPTII,S$GLB,, | Performed by: NURSE PRACTITIONER

## 2019-01-29 PROCEDURE — 99214 PR OFFICE/OUTPT VISIT, EST, LEVL IV, 30-39 MIN: ICD-10-PCS | Mod: S$GLB,,, | Performed by: NURSE PRACTITIONER

## 2019-01-29 PROCEDURE — 3078F PR MOST RECENT DIASTOLIC BLOOD PRESSURE < 80 MM HG: ICD-10-PCS | Mod: CPTII,S$GLB,, | Performed by: NURSE PRACTITIONER

## 2019-01-29 PROCEDURE — 99214 OFFICE O/P EST MOD 30 MIN: CPT | Mod: S$GLB,,, | Performed by: NURSE PRACTITIONER

## 2019-01-29 PROCEDURE — 87880 POCT RAPID STREP A: ICD-10-PCS | Mod: QW,S$GLB,, | Performed by: NURSE PRACTITIONER

## 2019-01-29 PROCEDURE — 3078F DIAST BP <80 MM HG: CPT | Mod: CPTII,S$GLB,, | Performed by: NURSE PRACTITIONER

## 2019-01-29 PROCEDURE — 87880 STREP A ASSAY W/OPTIC: CPT | Mod: QW,S$GLB,, | Performed by: NURSE PRACTITIONER

## 2019-01-29 RX ORDER — FLUTICASONE PROPIONATE 50 MCG
1 SPRAY, SUSPENSION (ML) NASAL 2 TIMES DAILY
Qty: 1 BOTTLE | Refills: 0 | Status: SHIPPED | OUTPATIENT
Start: 2019-01-29 | End: 2019-02-12

## 2019-01-29 RX ORDER — TRAMADOL HYDROCHLORIDE 50 MG/1
TABLET ORAL
Refills: 0 | COMMUNITY
Start: 2018-11-12 | End: 2019-03-20 | Stop reason: CLARIF

## 2019-01-29 NOTE — PATIENT INSTRUCTIONS
You may continue taking Tylenol (acetaminophen) or ibuprofen for pain and fever.      Viral Upper Respiratory Illness (Adult)   You have a viral upper respiratory illness (URI), which is another term for the common cold. This illness is contagious during the first few days. It is spread through the air by coughing and sneezing. It may also be spread by direct contact (touching the sick person and then touching your own eyes, nose, or mouth). Frequent handwashing will decrease risk of spread. Most viral illnesses go away within 7 to 10 days with rest and simple home remedies. Sometimes the illness may last for several weeks. Antibiotics will not kill a virus, and they are generally not prescribed for this condition.    Home care  · If symptoms are severe, rest at home for the first 2 to 3 days. When you resume activity, don't let yourself get too tired.  · Avoid being exposed to cigarette smoke (yours or others).  · You may use acetaminophen or ibuprofen to control pain and fever, unless another medicine was prescribed. (Note: If you have chronic liver or kidney disease, have ever had a stomach ulcer or gastrointestinal bleeding, or are taking blood-thinning medicines, talk with your healthcare provider before using these medicines.) Aspirin should never be given to anyone under 18 years of age who is ill with a viral infection or fever. It may cause severe liver or brain damage.  · Your appetite may be poor, so a light diet is fine. Avoid dehydration by drinking 6 to 8 glasses of fluids per day (water, soft drinks, juices, tea, or soup). Extra fluids will help loosen secretions in the nose and lungs.  · Over-the-counter cold medicines will not shorten the length of time youre sick, but they may be helpful for the following symptoms: cough, sore throat, and nasal and sinus congestion. (Note: Do not use decongestants if you have high blood pressure.)  Follow-up care  Follow up with your healthcare provider, or as  advised.  When to seek medical advice  Call your healthcare provider right away if any of these occur:  · Cough with lots of colored sputum (mucus)  · Severe headache; face, neck, or ear pain  · Difficulty swallowing due to throat pain  · Fever of 100.4°F (38°C)  Call 911, or get immediate medical care  Call emergency services right away if any of these occur:  · Chest pain, shortness of breath, wheezing, or difficulty breathing  · Coughing up blood  · Inability to swallow due to throat pain  Date Last Reviewed: 9/13/2015  © 6425-3358 [employer]. [address] [place] [address]. All rights reserved. This information is not intended as a substitute for professional medical care. Always follow your healthcare professional's instructions.

## 2019-01-29 NOTE — PROGRESS NOTES
"Subjective:       Patient ID: Sadiq Dhillon is a 69 y.o. male.    Vitals:  height is 5' 10" (1.778 m) and weight is 79.4 kg (175 lb). His oral temperature is 97.5 °F (36.4 °C). His blood pressure is 97/60 and his pulse is 79. His respiration is 12 and oxygen saturation is 97%.     Chief Complaint: Sore Throat    Symptoms started yesterday with sore throat, sinus drip, and headache.  Has tried salt gargle and states mild relief.    No fever or chills. Was at a wedding with sick contacts. No N/V/D.  Nasal congestion for 1 week prior, and sore throat just started as well as headache.      Sore Throat    This is a new problem. The current episode started in the past 7 days. The problem has been gradually worsening. Neither side of throat is experiencing more pain than the other. There has been no fever. The pain is at a severity of 3/10. The pain is mild. Associated symptoms include congestion, headaches, a hoarse voice, swollen glands and trouble swallowing. Pertinent negatives include no abdominal pain, coughing, diarrhea, drooling, ear discharge, ear pain, plugged ear sensation, neck pain, shortness of breath, stridor or vomiting. He has had no exposure to strep or mono. He has tried gargles for the symptoms. The treatment provided mild relief.       Constitution: Negative for chills, sweating, fatigue and fever.   HENT: Positive for congestion, postnasal drip, sore throat and trouble swallowing. Negative for ear pain, ear discharge, drooling, sinus pain, sinus pressure and voice change.    Neck: Negative for neck pain and painful lymph nodes.   Eyes: Negative for eye redness.   Respiratory: Positive for sputum production. Negative for chest tightness, cough, bloody sputum, COPD, shortness of breath, stridor, wheezing and asthma.    Gastrointestinal: Negative for abdominal pain, nausea, vomiting and diarrhea.   Musculoskeletal: Negative for muscle ache.   Skin: Negative for rash.   Allergic/Immunologic: Negative " for seasonal allergies and asthma.   Neurological: Positive for headaches.   Hematologic/Lymphatic: Negative for swollen lymph nodes.       Objective:      Physical Exam   Constitutional: He is oriented to person, place, and time. He appears well-developed and well-nourished. He is cooperative.  Non-toxic appearance. He does not appear ill. No distress.   HENT:   Head: Normocephalic and atraumatic.   Right Ear: Hearing, tympanic membrane, external ear and ear canal normal.   Left Ear: Hearing, tympanic membrane, external ear and ear canal normal.   Nose: Rhinorrhea present. No mucosal edema or nasal deformity. No epistaxis. Right sinus exhibits no maxillary sinus tenderness and no frontal sinus tenderness. Left sinus exhibits no maxillary sinus tenderness and no frontal sinus tenderness.   Mouth/Throat: Uvula is midline and mucous membranes are normal. No trismus in the jaw. Normal dentition. No uvula swelling. Posterior oropharyngeal edema and posterior oropharyngeal erythema present.   Eyes: Conjunctivae and lids are normal. No scleral icterus.   Sclera clear bilat   Neck: Trachea normal, full passive range of motion without pain and phonation normal. Neck supple.   Cardiovascular: Normal rate, regular rhythm, normal heart sounds, intact distal pulses and normal pulses.   Pulmonary/Chest: Effort normal and breath sounds normal. No stridor. No respiratory distress. He has no decreased breath sounds. He has no wheezes.   Abdominal: Soft. Normal appearance and bowel sounds are normal. He exhibits no distension. There is no tenderness.   Musculoskeletal: Normal range of motion. He exhibits no edema or deformity.   Neurological: He is alert and oriented to person, place, and time. He exhibits normal muscle tone. Coordination normal.   Skin: Skin is warm, dry and intact. He is not diaphoretic. No pallor.   Psychiatric: He has a normal mood and affect. His speech is normal and behavior is normal. Judgment and thought  content normal. Cognition and memory are normal.   Nursing note and vitals reviewed.      POCT rapid strep A   Order: [id number]   Status:  Final result   Visible to patient:  No (Not Released)   Next appt:  None   Dx:  Sore throat    Ref Range & Units 14:29 10mo ago   Rapid Strep A Screen Negative Negative      Acceptable  Yes  Yes    Resulting Agency  [facility]             Assessment:       1. Viral URI    2. Sore throat        Plan:         Viral URI  -     fluticasone (FLONASE) 50 mcg/actuation nasal spray; 1 spray (50 mcg total) by Each Nare route 2 (two) times daily. for 14 days  Dispense: 1 Bottle; Refill: 0    Sore throat  -     POCT rapid strep A  -     (Magic mouthwash) 1:1:1 Benadryl 12.5mg/5ml liq, aluminum & magnesium hydroxide-simehticone (Maalox), lidocaine viscous 2%; Swish and spit 5 mLs every 4 (four) hours as needed. for mouth sores  Dispense: 360 mL; Refill: 0      Patient Instructions     You may continue taking Tylenol (acetaminophen) or ibuprofen for pain and fever.      Viral Upper Respiratory Illness (Adult)   You have a viral upper respiratory illness (URI), which is another term for the common cold. This illness is contagious during the first few days. It is spread through the air by coughing and sneezing. It may also be spread by direct contact (touching the sick person and then touching your own eyes, nose, or mouth). Frequent handwashing will decrease risk of spread. Most viral illnesses go away within 7 to 10 days with rest and simple home remedies. Sometimes the illness may last for several weeks. Antibiotics will not kill a virus, and they are generally not prescribed for this condition.    Home care  · If symptoms are severe, rest at home for the first 2 to 3 days. When you resume activity, don't let yourself get too tired.  · Avoid being exposed to cigarette smoke (yours or others).  · You may use acetaminophen or ibuprofen to control pain and fever, unless another  medicine was prescribed. (Note: If you have chronic liver or kidney disease, have ever had a stomach ulcer or gastrointestinal bleeding, or are taking blood-thinning medicines, talk with your healthcare provider before using these medicines.) Aspirin should never be given to anyone under 18 years of age who is ill with a viral infection or fever. It may cause severe liver or brain damage.  · Your appetite may be poor, so a light diet is fine. Avoid dehydration by drinking 6 to 8 glasses of fluids per day (water, soft drinks, juices, tea, or soup). Extra fluids will help loosen secretions in the nose and lungs.  · Over-the-counter cold medicines will not shorten the length of time youre sick, but they may be helpful for the following symptoms: cough, sore throat, and nasal and sinus congestion. (Note: Do not use decongestants if you have high blood pressure.)  Follow-up care  Follow up with your healthcare provider, or as advised.  When to seek medical advice  Call your healthcare provider right away if any of these occur:  · Cough with lots of colored sputum (mucus)  · Severe headache; face, neck, or ear pain  · Difficulty swallowing due to throat pain  · Fever of 100.4°F (38°C)  Call 911, or get immediate medical care  Call emergency services right away if any of these occur:  · Chest pain, shortness of breath, wheezing, or difficulty breathing  · Coughing up blood  · Inability to swallow due to throat pain  Date Last Reviewed: 9/13/2015  © 7577-2542 [employer]. [address] [place] [address], [place] [address]. All rights reserved. This information is not intended as a substitute for professional medical care. Always follow your healthcare professional's instructions.

## 2019-02-01 ENCOUNTER — TELEPHONE (OUTPATIENT)
Dept: OPHTHALMOLOGY | Facility: CLINIC | Age: 69
End: 2019-02-01

## 2019-02-01 ENCOUNTER — TELEPHONE (OUTPATIENT)
Dept: URGENT CARE | Facility: CLINIC | Age: 69
End: 2019-02-01

## 2019-02-01 NOTE — TELEPHONE ENCOUNTER
----- Message from Teodora Kline sent at 2/1/2019 10:35 AM CST -----  Contact: Sadiq Garcias calling to find out when he should start his drops for his corneal surgery.  He can be reached at [phone]

## 2019-02-01 NOTE — TELEPHONE ENCOUNTER
Called to follow up with patient. Was feeling better but having drainage. Wanted to know if he could take mucinex. Spoke to provider and informed him he could and to also use flonase.

## 2019-02-01 NOTE — TELEPHONE ENCOUNTER
Spoke with patient and told him to start gtts on Tuesday 2/5/19. Pt was concerned about having a sinus issue and dripping. Told patient that if he could not lay flat by Tuesday/Wednesday we would have to reschedule. SDF

## 2019-02-04 ENCOUNTER — TELEPHONE (OUTPATIENT)
Dept: OPHTHALMOLOGY | Facility: CLINIC | Age: 69
End: 2019-02-04

## 2019-02-04 ENCOUNTER — APPOINTMENT (RX ONLY)
Dept: URBAN - METROPOLITAN AREA CLINIC 98 | Facility: CLINIC | Age: 69
Setting detail: DERMATOLOGY
End: 2019-02-04

## 2019-02-04 DIAGNOSIS — H25.12 NUCLEAR SCLEROTIC CATARACT OF LEFT EYE: ICD-10-CM

## 2019-02-04 DIAGNOSIS — H18.519 FUCHS' CORNEAL DYSTROPHY: Primary | ICD-10-CM

## 2019-02-04 DIAGNOSIS — L81.4 OTHER MELANIN HYPERPIGMENTATION: ICD-10-CM

## 2019-02-04 DIAGNOSIS — B35.6 TINEA CRURIS: ICD-10-CM

## 2019-02-04 DIAGNOSIS — L57.8 OTHER SKIN CHANGES DUE TO CHRONIC EXPOSURE TO NONIONIZING RADIATION: ICD-10-CM

## 2019-02-04 DIAGNOSIS — D18.0 HEMANGIOMA: ICD-10-CM

## 2019-02-04 DIAGNOSIS — L57.0 ACTINIC KERATOSIS: ICD-10-CM | Status: WELL CONTROLLED

## 2019-02-04 DIAGNOSIS — D485 NEOPLASM OF UNCERTAIN BEHAVIOR OF SKIN: ICD-10-CM

## 2019-02-04 PROBLEM — D18.01 HEMANGIOMA OF SKIN AND SUBCUTANEOUS TISSUE: Status: ACTIVE | Noted: 2019-02-04

## 2019-02-04 PROBLEM — D48.5 NEOPLASM OF UNCERTAIN BEHAVIOR OF SKIN: Status: ACTIVE | Noted: 2019-02-04

## 2019-02-04 PROBLEM — E78.5 HYPERLIPIDEMIA, UNSPECIFIED: Status: ACTIVE | Noted: 2019-02-04

## 2019-02-04 PROBLEM — E13.9 OTHER SPECIFIED DIABETES MELLITUS WITHOUT COMPLICATIONS: Status: ACTIVE | Noted: 2019-02-04

## 2019-02-04 PROCEDURE — ? SUNSCREEN RECOMMENDATIONS

## 2019-02-04 PROCEDURE — ? KOH PREP

## 2019-02-04 PROCEDURE — ? TREATMENT REGIMEN

## 2019-02-04 PROCEDURE — ? COUNSELING

## 2019-02-04 PROCEDURE — 11102 TANGNTL BX SKIN SINGLE LES: CPT

## 2019-02-04 PROCEDURE — 87220 TISSUE EXAM FOR FUNGI: CPT

## 2019-02-04 PROCEDURE — 99214 OFFICE O/P EST MOD 30 MIN: CPT | Mod: 25

## 2019-02-04 PROCEDURE — ? BIOPSY BY SHAVE METHOD

## 2019-02-04 ASSESSMENT — LOCATION SIMPLE DESCRIPTION DERM
LOCATION SIMPLE: RIGHT ANTERIOR NECK
LOCATION SIMPLE: LEFT ANTERIOR NECK
LOCATION SIMPLE: CHEST
LOCATION SIMPLE: LEFT HAND
LOCATION SIMPLE: ABDOMEN
LOCATION SIMPLE: LEFT CLAVICULAR SKIN
LOCATION SIMPLE: RIGHT HAND
LOCATION SIMPLE: LEFT THIGH
LOCATION SIMPLE: LEFT CHEEK
LOCATION SIMPLE: RIGHT CHEEK

## 2019-02-04 ASSESSMENT — LOCATION ZONE DERM
LOCATION ZONE: HAND
LOCATION ZONE: LEG
LOCATION ZONE: NECK
LOCATION ZONE: FACE
LOCATION ZONE: TRUNK

## 2019-02-04 ASSESSMENT — LOCATION DETAILED DESCRIPTION DERM
LOCATION DETAILED: RIGHT SUPERIOR ANTERIOR NECK
LOCATION DETAILED: LEFT INFERIOR LATERAL NECK
LOCATION DETAILED: LEFT ANTERIOR DISTAL THIGH
LOCATION DETAILED: RIGHT INFERIOR LATERAL MALAR CHEEK
LOCATION DETAILED: EPIGASTRIC SKIN
LOCATION DETAILED: LEFT RADIAL DORSAL HAND
LOCATION DETAILED: LEFT LATERAL ABDOMEN
LOCATION DETAILED: RIGHT LATERAL ABDOMEN
LOCATION DETAILED: RIGHT CLAVICULAR NECK
LOCATION DETAILED: RIGHT MEDIAL SUPERIOR CHEST
LOCATION DETAILED: LEFT MEDIAL INFERIOR CHEST
LOCATION DETAILED: RIGHT RADIAL DORSAL HAND
LOCATION DETAILED: LEFT INFERIOR CENTRAL MALAR CHEEK
LOCATION DETAILED: LEFT CLAVICULAR SKIN

## 2019-02-04 NOTE — PROCEDURE: TREATMENT REGIMEN
Detail Level: Zone
Otc Regimen: Lamisil AT crm
Discontinue Regimen: Ketoconazole 2% cream
Initiate Treatment: Apply Lamisil AT cream BID for 2 weeks

## 2019-02-04 NOTE — PROCEDURE: BIOPSY BY SHAVE METHOD
Destruction After The Procedure: No
Lab: [id number]
Consent: Verbal consent was obtained and risks were reviewed including but not limited to scarring, infection, bleeding, scabbing, incomplete removal, nerve damage and allergy to anesthesia.
Silver Nitrate Text: The wound bed was treated with silver nitrate after the biopsy was performed.
Biopsy Method: Dermablade
Detail Level: Detailed
Hemostasis: Wendi's
Curettage Text: The wound bed was treated with curettage after the biopsy was performed.
Notification Instructions: Patient will be notified of biopsy results. However, patient instructed to call the office if not contacted within 2 weeks.
Depth Of Biopsy: dermis
Wound Care: Petrolatum
Lab Facility: [id number]
Additional Anesthesia Volume In Cc (Will Not Render If 0): 0
Render Post-Care Instructions In Note?: yes
Cryotherapy Text: The wound bed was treated with cryotherapy after the biopsy was performed.
Anesthesia Type: 1% lidocaine with epinephrine
Type Of Destruction Used: Curettage
Billing Type: Third-Party Bill
Electrodesiccation Text: The wound bed was treated with electrodesiccation after the biopsy was performed.
Biopsy Type: H and E
Electrodesiccation And Curettage Text: The wound bed was treated with electrodesiccation and curettage after the biopsy was performed.
Anesthesia Volume In Cc (Will Not Render If 0): 1.5
Dressing: Band-Aid
Post-Care Instructions: 1) Gently wash the biopsy site with regular soap and water daily. If a scab develops, you can dilute hydrogen peroxide 1:1 with tap water and apply it to dissolve the crust.\\n2) Keep a small amount of white petrolatum (Aquaphor) and a non­stick bandage on the dressing at all times. Antibiotic ointments (Neosporin, etc) have not shown any superiority to simple petrolatum, cost more, and run the risk of a contact allergy. Keeping the wound covered has been shown to be superior to \"airing it out.\" If the bandage is irritating you, let us know and we can find a less­irritating alternative dressing together.\\n3) Notify the office if significant, persistent bleeding occurs from the biopsy site.\\n4) Do not expose the wound to fresh, salty, or brackish water until it has completely healed (after about 2 weeks). Tap or chlorinated water should be fine.\\n5) A small rim of redness and a small amount of yellow debris on the wound bed are normal. If you encounter increasing warmth, redness, pain, or liquid pus after the first day, these might indicate a localized infection and you should notify our office via phone or email.\\n6) If regular bandaids are uncomfortable or irritating, then oval hydrocolloid dressings (often sold as \"blister pads\") can be cut to fit and placed on the wound after the first 3­4 days, and changed out every 3­4 days. It is ok to wear these dressings in the shower or pool.\\n7) If stitches have been placed, you will need to return to the clinic in 1 or 2 weeks to have them professionally removed. Cutting the knots yourself may leave irritating portions of suture material under the skin.\\n8) Do not assume that \"no news is good news.\" If you have not received a call from our office within 7 days, please let us know so that we can investigate what might be holding up the biopsy report.\\n9) Wound care should continue until the biopsy site is pink, smooth, and dry with new skin, usually by 2 weeks.

## 2019-02-05 ENCOUNTER — TELEPHONE (OUTPATIENT)
Dept: OPHTHALMOLOGY | Facility: CLINIC | Age: 69
End: 2019-02-05

## 2019-02-05 NOTE — TELEPHONE ENCOUNTER
Patient sick.  Unable to lay flay. Due to nasal congestion, drip.  Surgery rescheduled to 03/21 at his request.

## 2019-02-17 ENCOUNTER — OFFICE VISIT (OUTPATIENT)
Dept: URGENT CARE | Facility: CLINIC | Age: 69
End: 2019-02-17
Payer: COMMERCIAL

## 2019-02-17 VITALS
SYSTOLIC BLOOD PRESSURE: 129 MMHG | HEIGHT: 70 IN | DIASTOLIC BLOOD PRESSURE: 69 MMHG | HEART RATE: 108 BPM | TEMPERATURE: 97 F | OXYGEN SATURATION: 97 % | BODY MASS INDEX: 25.05 KG/M2 | WEIGHT: 175 LBS | RESPIRATION RATE: 18 BRPM

## 2019-02-17 DIAGNOSIS — R39.12 WEAK URINARY STREAM: ICD-10-CM

## 2019-02-17 DIAGNOSIS — R39.15 URGENCY OF MICTURITION: ICD-10-CM

## 2019-02-17 DIAGNOSIS — R30.0 DYSURIA: Primary | ICD-10-CM

## 2019-02-17 LAB
BILIRUB UR QL STRIP: NEGATIVE
GLUCOSE UR QL STRIP: NEGATIVE
KETONES UR QL STRIP: NEGATIVE
LEUKOCYTE ESTERASE UR QL STRIP: NEGATIVE
PH, POC UA: 7 (ref 5–8)
POC BLOOD, URINE: NEGATIVE
POC NITRATES, URINE: NEGATIVE
PROT UR QL STRIP: NEGATIVE
SP GR UR STRIP: 1.01 (ref 1–1.03)
UROBILINOGEN UR STRIP-ACNC: NORMAL (ref 0.3–2.2)

## 2019-02-17 PROCEDURE — 99000 PR SPECIMEN HANDLING,DR OFF->LAB: ICD-10-PCS | Mod: S$GLB,,, | Performed by: EMERGENCY MEDICINE

## 2019-02-17 PROCEDURE — 1101F PT FALLS ASSESS-DOCD LE1/YR: CPT | Mod: CPTII,S$GLB,, | Performed by: NURSE PRACTITIONER

## 2019-02-17 PROCEDURE — 81003 URINALYSIS AUTO W/O SCOPE: CPT | Mod: QW,S$GLB,, | Performed by: NURSE PRACTITIONER

## 2019-02-17 PROCEDURE — 87077 CULTURE AEROBIC IDENTIFY: CPT

## 2019-02-17 PROCEDURE — 3074F PR MOST RECENT SYSTOLIC BLOOD PRESSURE < 130 MM HG: ICD-10-PCS | Mod: CPTII,S$GLB,, | Performed by: NURSE PRACTITIONER

## 2019-02-17 PROCEDURE — 99214 PR OFFICE/OUTPT VISIT, EST, LEVL IV, 30-39 MIN: ICD-10-PCS | Mod: 25,S$GLB,, | Performed by: NURSE PRACTITIONER

## 2019-02-17 PROCEDURE — 3078F DIAST BP <80 MM HG: CPT | Mod: CPTII,S$GLB,, | Performed by: NURSE PRACTITIONER

## 2019-02-17 PROCEDURE — 87186 SC STD MICRODIL/AGAR DIL: CPT

## 2019-02-17 PROCEDURE — 99000 SPECIMEN HANDLING OFFICE-LAB: CPT | Mod: S$GLB,,, | Performed by: EMERGENCY MEDICINE

## 2019-02-17 PROCEDURE — 99214 OFFICE O/P EST MOD 30 MIN: CPT | Mod: 25,S$GLB,, | Performed by: NURSE PRACTITIONER

## 2019-02-17 PROCEDURE — 81003 POCT URINALYSIS, DIPSTICK, AUTOMATED, W/O SCOPE: ICD-10-PCS | Mod: QW,S$GLB,, | Performed by: NURSE PRACTITIONER

## 2019-02-17 PROCEDURE — 1101F PR PT FALLS ASSESS DOC 0-1 FALLS W/OUT INJ PAST YR: ICD-10-PCS | Mod: CPTII,S$GLB,, | Performed by: NURSE PRACTITIONER

## 2019-02-17 PROCEDURE — 3078F PR MOST RECENT DIASTOLIC BLOOD PRESSURE < 80 MM HG: ICD-10-PCS | Mod: CPTII,S$GLB,, | Performed by: NURSE PRACTITIONER

## 2019-02-17 PROCEDURE — 87086 URINE CULTURE/COLONY COUNT: CPT

## 2019-02-17 PROCEDURE — 87088 URINE BACTERIA CULTURE: CPT

## 2019-02-17 PROCEDURE — 3074F SYST BP LT 130 MM HG: CPT | Mod: CPTII,S$GLB,, | Performed by: NURSE PRACTITIONER

## 2019-02-17 NOTE — PATIENT INSTRUCTIONS
We will call you with results of your urinary culture as soon as they have resulted.    Please contact your Urologist as soon as possible to discuss our current symptoms and to let them known about the upcoming culture results.    Prostate Anatomy   The prostate gland is part of the male reproductive system. The prostate is located below the bladder. It surrounds the urethra, the tube that carries urine and semen out of the body. The function of the prostate is to produce fluid. This fluid mixes with fluid from the seminal vesicles and sperm from the testicles to form semen. During ejaculation, semen travels through the urethra and out of the penis. Prostate health is closely linked to hormones, chemicals that carry messages throughout the body. Normal levels of hormones, such as testosterone, keep the prostate working correctly.    Date Last Reviewed: 2/1/2017  © 0248-3040 [employer]. [address] [place] [address], [place] [address]. All rights reserved. This information is not intended as a substitute for professional medical care. Always follow your healthcare professional's instructions.

## 2019-02-17 NOTE — PROGRESS NOTES
"Subjective:       Patient ID: Sadiq Dhillon is a 69 y.o. male.    Vitals:  height is 5' 10" (1.778 m) and weight is 79.4 kg (175 lb). His temperature is 97.4 °F (36.3 °C). His blood pressure is 129/69 and his pulse is 108. His respiration is 18 and oxygen saturation is 97%.     Chief Complaint: Urinary Tract Infection    Patient lives local, he began the week with some burning while urinating. Since then the burning feeling has cleared up but now has lower back pain and lower right side abdomen pain. Patient has taken Tylenol for this problem, with minimal relief.    No recent hospitalization or catheterization. No painful defecation. Felt febrile 2 days ago, but not before or since.  Has Hx of BPH and on Flomax and is followed by Urology.      Urinary Tract Infection    This is a new problem. The current episode started in the past 7 days. The quality of the pain is described as burning. There has been no fever. Associated symptoms include frequency. Pertinent negatives include no chills, nausea, urgency, vomiting or rash. He has tried nothing for the symptoms.       Constitution: Negative for chills, fatigue and fever.   HENT: Negative for congestion and sore throat.    Neck: Negative for painful lymph nodes.   Cardiovascular: Negative for chest pain and leg swelling.   Eyes: Negative for double vision and blurred vision.   Respiratory: Negative for cough and shortness of breath.    Gastrointestinal: Negative for nausea, vomiting and diarrhea.   Genitourinary: Positive for frequency. Negative for dysuria and urgency.   Musculoskeletal: Positive for back pain. Negative for pain, joint pain, joint swelling, muscle cramps and muscle ache.   Skin: Negative for color change, pale and rash.   Allergic/Immunologic: Negative for seasonal allergies.   Neurological: Negative for dizziness, history of vertigo, light-headedness, passing out and headaches.   Hematologic/Lymphatic: Negative for swollen lymph nodes, easy " bruising/bleeding and history of blood clots. Does not bruise/bleed easily.   Psychiatric/Behavioral: Negative for nervous/anxious, sleep disturbance and depression. The patient is not nervous/anxious.        Objective:      Physical Exam   Constitutional: He is oriented to person, place, and time. He appears well-developed and well-nourished. No distress.   HENT:   Head: Normocephalic and atraumatic.   Right Ear: External ear normal.   Left Ear: External ear normal.   Nose: Nose normal. No nasal deformity. No epistaxis.   Mouth/Throat: Oropharynx is clear and moist and mucous membranes are normal.   Eyes: Conjunctivae and lids are normal.   Neck: Trachea normal, normal range of motion and phonation normal. Neck supple.   Cardiovascular: Normal rate, regular rhythm, normal heart sounds and intact distal pulses.   Pulmonary/Chest: Effort normal and breath sounds normal.   Abdominal: Soft. Normal appearance and bowel sounds are normal. He exhibits no distension. There is no tenderness. There is no CVA tenderness.   Genitourinary:   Genitourinary Comments:  deferred   Musculoskeletal: Normal range of motion.   Neurological: He is alert and oriented to person, place, and time. He has normal reflexes.   Skin: Skin is warm, dry and intact. He is not diaphoretic.   Psychiatric: He has a normal mood and affect. His speech is normal and behavior is normal. Judgment and thought content normal. Cognition and memory are normal.   Nursing note and vitals reviewed.        POCT Urinalysis, Dipstick, Automated, W/O Scope   Order: [id number]   Status:  Final result   Visible to patient:  No (Not Released) Next appt:  None Dx:  Dysuria    Ref Range & Units 15:08   POC Blood, Urine Negative Negative    POC Bilirubin, Urine Negative Negative    POC Urobilinogen, Urine 0.3 - 2.2 Normal    POC Ketones, Urine Negative Negative    POC Protein, Urine Negative Negative    POC Nitrates, Urine Negative Negative    POC Glucose, Urine Negative  Negative    pH, UA 5 - 8 7.0    POC Specific Gravity, Urine 1.003 - 1.029 1.015    POC Leukocytes, Urine Negative Negative    [place]  [facility] [place]essment:       1. Dysuria    2. Urgency of micturition    3. Weak urinary stream        Plan:         Dysuria  -     POCT Urinalysis, Dipstick, Automated, W/O Scope  -     Culture, Urine    Urgency of micturition  -     Culture, Urine    Weak urinary stream  -     Culture, Urine      Patient Instructions     We will call you with results of your urinary culture as soon as they have resulted.    Please contact your Urologist as soon as possible to discuss our current symptoms and to let them known about the upcoming culture results.    Prostate Anatomy   The prostate gland is part of the male reproductive system. The prostate is located below the bladder. It surrounds the urethra, the tube that carries urine and semen out of the body. The function of the prostate is to produce fluid. This fluid mixes with fluid from the seminal vesicles and sperm from the testicles to form semen. During ejaculation, semen travels through the urethra and out of the penis. Prostate health is closely linked to hormones, chemicals that carry messages throughout the body. Normal levels of hormones, such as testosterone, keep the prostate working correctly.    Date Last Reviewed: 2/1/2017  © 6228-3556 [employer]. [address] [place] [address], [place] [address]. All rights reserved. This information is not intended as a substitute for professional medical care. Always follow your healthcare professional's instructions.

## 2019-02-21 LAB — BACTERIA UR CULT: NORMAL

## 2019-02-25 ENCOUNTER — TELEPHONE (OUTPATIENT)
Dept: URGENT CARE | Facility: CLINIC | Age: 69
End: 2019-02-25

## 2019-02-25 DIAGNOSIS — N39.0 URINARY TRACT INFECTION WITHOUT HEMATURIA, SITE UNSPECIFIED: Primary | ICD-10-CM

## 2019-02-25 RX ORDER — NITROFURANTOIN 25; 75 MG/1; MG/1
100 CAPSULE ORAL 2 TIMES DAILY
Qty: 6 CAPSULE | Refills: 0 | Status: SHIPPED | OUTPATIENT
Start: 2019-02-25 | End: 2019-02-28

## 2019-02-26 NOTE — TELEPHONE ENCOUNTER
Contacted Mr. Babcock in reference to urine culture.  He was notified and verbally understood that his results were positive of E-FAECALIS.  He states that his symptoms have improved.  Spoke to Donaldo & still would like to treat.  Would like medication sent to [facility] on [address].

## 2019-03-19 ENCOUNTER — TELEPHONE (OUTPATIENT)
Dept: OPHTHALMOLOGY | Facility: CLINIC | Age: 69
End: 2019-03-19

## 2019-03-19 NOTE — TELEPHONE ENCOUNTER
Patient given arrival time for surgery as 9 am .  Nothing to eat or drink after 9 pm night before.  May have water/gatorade/powerade from 9 pm until leaves house morning of surgery.

## 2019-03-21 ENCOUNTER — ANESTHESIA (OUTPATIENT)
Dept: SURGERY | Facility: OTHER | Age: 69
End: 2019-03-21
Payer: COMMERCIAL

## 2019-03-21 ENCOUNTER — HOSPITAL ENCOUNTER (OUTPATIENT)
Facility: OTHER | Age: 69
Discharge: HOME | End: 2019-03-21
Attending: OPHTHALMOLOGY | Admitting: OPHTHALMOLOGY
Payer: COMMERCIAL

## 2019-03-21 ENCOUNTER — ANESTHESIA EVENT (OUTPATIENT)
Dept: SURGERY | Facility: OTHER | Age: 69
End: 2019-03-21
Payer: COMMERCIAL

## 2019-03-21 VITALS
OXYGEN SATURATION: 94 % | BODY MASS INDEX: 25.77 KG/M2 | SYSTOLIC BLOOD PRESSURE: 93 MMHG | RESPIRATION RATE: 18 BRPM | HEART RATE: 72 BPM | DIASTOLIC BLOOD PRESSURE: 59 MMHG | WEIGHT: 180 LBS | HEIGHT: 70 IN

## 2019-03-21 DIAGNOSIS — H25.12 NUCLEAR SCLEROTIC CATARACT OF LEFT EYE: Primary | ICD-10-CM

## 2019-03-21 DIAGNOSIS — H18.519 FUCHS' CORNEAL DYSTROPHY: ICD-10-CM

## 2019-03-21 LAB — POCT GLUCOSE: 122 MG/DL (ref 70–110)

## 2019-03-21 PROCEDURE — 65756 PR CORNEAL TRANSPLANT,ENDOTHELIAL: ICD-10-PCS | Mod: LT,,, | Performed by: OPHTHALMOLOGY

## 2019-03-21 PROCEDURE — 65756 CORNEAL TRNSPL ENDOTHELIAL: CPT | Mod: LT,,, | Performed by: OPHTHALMOLOGY

## 2019-03-21 PROCEDURE — 36000707: Performed by: OPHTHALMOLOGY

## 2019-03-21 PROCEDURE — 65757 PREP CORNEAL ENDO ALLOGRAFT: CPT | Mod: ,,, | Performed by: OPHTHALMOLOGY

## 2019-03-21 PROCEDURE — 71000015 HC POSTOP RECOV 1ST HR: Performed by: OPHTHALMOLOGY

## 2019-03-21 PROCEDURE — S0020 INJECTION, BUPIVICAINE HYDRO: HCPCS | Performed by: OPHTHALMOLOGY

## 2019-03-21 PROCEDURE — V2632 POST CHMBR INTRAOCULAR LENS: HCPCS | Performed by: OPHTHALMOLOGY

## 2019-03-21 PROCEDURE — 66984 XCAPSL CTRC RMVL W/O ECP: CPT | Mod: 51,LT,, | Performed by: OPHTHALMOLOGY

## 2019-03-21 PROCEDURE — 37000009 HC ANESTHESIA EA ADD 15 MINS: Performed by: OPHTHALMOLOGY

## 2019-03-21 PROCEDURE — 36000706: Performed by: OPHTHALMOLOGY

## 2019-03-21 PROCEDURE — 71000016 HC POSTOP RECOV ADDL HR: Performed by: OPHTHALMOLOGY

## 2019-03-21 PROCEDURE — V2785 CORNEAL TISSUE PROCESSING: HCPCS | Performed by: OPHTHALMOLOGY

## 2019-03-21 PROCEDURE — 65757 PR PREP CORNEAL ENDOTHEL ALLOGRAFT: ICD-10-PCS | Mod: ,,, | Performed by: OPHTHALMOLOGY

## 2019-03-21 PROCEDURE — 63600175 PHARM REV CODE 636 W HCPCS: Performed by: OPHTHALMOLOGY

## 2019-03-21 PROCEDURE — 63600175 PHARM REV CODE 636 W HCPCS: Performed by: NURSE ANESTHETIST, CERTIFIED REGISTERED

## 2019-03-21 PROCEDURE — 27201423 OPTIME MED/SURG SUP & DEVICES STERILE SUPPLY: Performed by: OPHTHALMOLOGY

## 2019-03-21 PROCEDURE — 66984 PR REMOVAL, CATARACT, W/INSRT INTRAOC LENS, W/O ENDO CYCLO: ICD-10-PCS | Mod: 51,LT,, | Performed by: OPHTHALMOLOGY

## 2019-03-21 PROCEDURE — 37000008 HC ANESTHESIA 1ST 15 MINUTES: Performed by: OPHTHALMOLOGY

## 2019-03-21 PROCEDURE — 25000003 PHARM REV CODE 250: Performed by: OPHTHALMOLOGY

## 2019-03-21 DEVICE — LENS IOL ITEC PRELOAD 17.0D: Type: IMPLANTABLE DEVICE | Site: EYE | Status: FUNCTIONAL

## 2019-03-21 RX ORDER — ACETAMINOPHEN 325 MG/1
650 TABLET ORAL EVERY 4 HOURS PRN
Status: DISCONTINUED | OUTPATIENT
Start: 2019-03-21 | End: 2019-03-21 | Stop reason: HOSPADM

## 2019-03-21 RX ORDER — FENTANYL CITRATE 50 UG/ML
INJECTION, SOLUTION INTRAMUSCULAR; INTRAVENOUS
Status: DISCONTINUED | OUTPATIENT
Start: 2019-03-21 | End: 2019-03-21

## 2019-03-21 RX ORDER — MOXIFLOXACIN 5 MG/ML
1 SOLUTION/ DROPS OPHTHALMIC
Status: COMPLETED | OUTPATIENT
Start: 2019-03-21 | End: 2019-03-21

## 2019-03-21 RX ORDER — DEXAMETHASONE SODIUM PHOSPHATE 4 MG/ML
INJECTION, SOLUTION INTRA-ARTICULAR; INTRALESIONAL; INTRAMUSCULAR; INTRAVENOUS; SOFT TISSUE
Status: DISCONTINUED | OUTPATIENT
Start: 2019-03-21 | End: 2019-03-21 | Stop reason: HOSPADM

## 2019-03-21 RX ORDER — TROPICAMIDE 10 MG/ML
1 SOLUTION/ DROPS OPHTHALMIC
Status: COMPLETED | OUTPATIENT
Start: 2019-03-21 | End: 2019-03-21

## 2019-03-21 RX ORDER — BUPIVACAINE HYDROCHLORIDE 7.5 MG/ML
INJECTION, SOLUTION EPIDURAL; RETROBULBAR
Status: DISCONTINUED | OUTPATIENT
Start: 2019-03-21 | End: 2019-03-21 | Stop reason: HOSPADM

## 2019-03-21 RX ORDER — MIDAZOLAM HYDROCHLORIDE 1 MG/ML
INJECTION INTRAMUSCULAR; INTRAVENOUS
Status: DISCONTINUED | OUTPATIENT
Start: 2019-03-21 | End: 2019-03-21

## 2019-03-21 RX ORDER — TETRACAINE HYDROCHLORIDE 5 MG/ML
1 SOLUTION OPHTHALMIC
Status: COMPLETED | OUTPATIENT
Start: 2019-03-21 | End: 2019-03-21

## 2019-03-21 RX ORDER — HYDROCODONE BITARTRATE AND ACETAMINOPHEN 5; 325 MG/1; MG/1
1 TABLET ORAL EVERY 4 HOURS PRN
Status: DISCONTINUED | OUTPATIENT
Start: 2019-03-21 | End: 2019-03-21 | Stop reason: HOSPADM

## 2019-03-21 RX ORDER — CEFAZOLIN SODIUM 1 G/3ML
INJECTION, POWDER, FOR SOLUTION INTRAMUSCULAR; INTRAVENOUS
Status: DISCONTINUED | OUTPATIENT
Start: 2019-03-21 | End: 2019-03-21 | Stop reason: HOSPADM

## 2019-03-21 RX ORDER — LIDOCAINE HYDROCHLORIDE 20 MG/ML
INJECTION, SOLUTION INFILTRATION; PERINEURAL
Status: DISCONTINUED | OUTPATIENT
Start: 2019-03-21 | End: 2019-03-21 | Stop reason: HOSPADM

## 2019-03-21 RX ORDER — ATROPINE SULFATE 10 MG/ML
SOLUTION/ DROPS OPHTHALMIC
Status: DISCONTINUED | OUTPATIENT
Start: 2019-03-21 | End: 2019-03-21 | Stop reason: HOSPADM

## 2019-03-21 RX ORDER — PHENYLEPHRINE HYDROCHLORIDE 25 MG/ML
1 SOLUTION/ DROPS OPHTHALMIC
Status: COMPLETED | OUTPATIENT
Start: 2019-03-21 | End: 2019-03-21

## 2019-03-21 RX ADMIN — TROPICAMIDE 1 DROP: 10 SOLUTION/ DROPS OPHTHALMIC at 09:03

## 2019-03-21 RX ADMIN — TETRACAINE HYDROCHLORIDE 1 DROP: 5 SOLUTION OPHTHALMIC at 09:03

## 2019-03-21 RX ADMIN — PHENYLEPHRINE HYDROCHLORIDE 1 DROP: 25 SOLUTION/ DROPS OPHTHALMIC at 09:03

## 2019-03-21 RX ADMIN — MIDAZOLAM HYDROCHLORIDE 2 MG: 1 INJECTION, SOLUTION INTRAMUSCULAR; INTRAVENOUS at 10:03

## 2019-03-21 RX ADMIN — MOXIFLOXACIN 1 DROP: 5 SOLUTION/ DROPS OPHTHALMIC at 09:03

## 2019-03-21 RX ADMIN — FENTANYL CITRATE 100 MCG: 50 INJECTION, SOLUTION INTRAMUSCULAR; INTRAVENOUS at 10:03

## 2019-03-21 NOTE — ANESTHESIA POSTPROCEDURE EVALUATION
"Anesthesia Post Evaluation    Patient: Sadiq Dhillon    Procedure(s) Performed: Procedure(s) (LRB):  TRANSPLANT, PARTIAL-THICKNESS, CORNEA, USING DSAEK TECHNIQUE (Left)  EXTRACTION, CATARACT, WITH IOL INSERTION (Left)    Final Anesthesia Type: MAC  Patient location during evaluation: [facility]  Patient participation: Yes- Able to Participate  Level of consciousness: awake and alert and oriented  Post-procedure vital signs: reviewed and stable  Pain management: adequate  Airway patency: patent  PONV status at discharge: No PONV  Anesthetic complications: no      Cardiovascular status: hemodynamically stable  Respiratory status: unassisted, spontaneous ventilation and room air  Hydration status: euvolemic  Follow-up not needed.        Visit Vitals  Ht 5' 10" (1.778 m)   Wt 81.6 kg (180 lb)   BMI 25.83 kg/m²       Pain/Sylvie Score: No Data Recorded      "

## 2019-03-21 NOTE — H&P
.Pre-op Eye Surgery H&P     CC: Painless, progressive loss of vision  Present Illness :Corneal edema/opacity  Allergies/Current Meds: see meds  Mental Status: A&O x3  Pertinent Medical History: n/a     Physical Exam  General: NAD  HEENT: Eye white/quiet  Lungs: Adequate respirations  Heart: + pulses  Abdomen: soft  Rectal/GI/: deferred     Impression: Corneal Edema/opacity  Plan:Corneal Transplant

## 2019-03-21 NOTE — OP NOTE
SURGEON:  Ra Van M.D.    PREOPERATIVE DIAGNOSES:  Visually Significant Cataract  Fuch's Corneal Dystrophy  Corneal edema    POSTOPERATIVE DIAGNOSES:    Visually Significant Cataract  Fuch's Corneal Dystrophy  Corneal edema    PROCEDURES PERFORMED:  1) Descement's Stripping Endothelial Keratoplasty (DSEK) (25927)  2) and Phacoemulsification with Intraocular Lens Implant (47351),   both procedures in the  left eye     DATE OF SURGERY: 03/21/2019    ANESTHESIA:  MAC with retrobulbar block.    GRAFT SIZE:  8.0 mm    LENS IMPLANT: pcboo 17.0    COMPLICATIONS:  None.    INDICATIONS:  The patient has a history of Fuchs' endothelial dystrophy with corneal edema.  There is also  a history of a visually significant cataract, both of which have diminished the patients vision.  After a thorough discussion of the risks, benefits and alternatives to proceeding with a combined cataract surgery, as well as a corneal transplantation, the patient understands the risks and benefits and agrees to proceed with surgery.    PROCEDURE IN DETAIL:  The patient was brought to the operating room in the supine position, where the eye was prepped and draped in standard sterile fashion, after having received a retrobulbar block consisting of a 50/50 mixture of lidocaine and bupivacaine under conscious sedation.  The procedure was begun by the creation of a paracentesisincision, through which viscoelastic was used to fill the anterior chamber.    Next, a 4mm temporal limbal tunnel incision was constructed and an internal opening of 2.4 mm was created with the keratome.  Continuous curvilinear capsulorrhexis was fashioned and hydrodissection was done with BSS.  The cataract was removed by phacoemulsification without  complication and the lens implanted into the capsular bag.  With the eye still maintained with viscoelastic, an 8 mm maria del carmen was made in the center of the cornea and then reverse Ana Luisaey used to score Descemet's membrane.  A scraper  was then used to remove Descemet's in its entirety and all remaining viscoelastics were removed from the eye.    Attention was then directed to the side table, where the previously microkeratome cut donor tissue was trephined with an 8.25 mm [place]-Bee trephine.  Attention was then redirected to the patient's eye and this corneal transplant tissue was   inserted into the anterior chamber using an injector, after enlarging incision to 4 mm.  The anterior chamber was reformed with BSS and 10-0 nylon sutures were placed in a wound.  The DSEK button was repositioned centrally and air used to fill the anterior chamber.  Ten minutes were allowed to elapse for adherence, and then the air was replaced with BSS except for a small air bubble.  The patient will remain supine in the postoperative recovery area for one hour to allow better adhesion of the graft to  the posterior aspect of the cornea.  The patient will be seen tomorrow in the eye clinic.

## 2019-03-21 NOTE — DISCHARGE INSTRUCTIONS
Anesthesia: Monitored Anesthesia Care (MAC)    Anesthesia Safety  · Have an adult family member or friend drive you home after the procedure.  · For the first 24 hours after your surgery:  ¨ Do not drive or use heavy equipment.  ¨ Do not make important decisions or sign documents.  ¨ Avoid alcohol.  ¨ Have someone stay with you, if possible. They can watch for problems and help keep you safe.      Ra Van MD  [facility]  Department of Ophthalmology    DSEK CORNEAL TRANSPLANT Post-Operative Instructions:  Remain lying on your back , facing the ceiling for the first 48 hours after surgery, except for necessary breaks such as eating meals and restroom breaks.  AVOID watching TV or any sudden jerking movements of your head  If you experience pain or severe headache behind the eye with nausea, call Dr. Van or the on-call doctor IMMEDIATELY @ [phone].    Plan to see Dr. Van tomorrow at the eye clinic:   [address] [place] [address],10th floor     STARTING Tomorrow following your post-operative appointment with Dr. Van:   Place 1 (one) drop of each of the medications into the operative eye as directed, wait 2 (two) minutes between drops.   Remove the shield covering from your eye temporarily to instill the drops.     SHAKE BOTTLES WELL BEFORE USE:    OCUFLOX/ VIGAMOX:       4 (four) times a day  FOR 1 (one) WEEK.                PRED ACETATE:        4 (four) times a day for 1 (one)  month                  Precautions:  DO NOT rub your eye.  Remain on your back for the next 24 hours.   Do NOT exert yourself ( no heavy lifting, running, or swimming)  for 1 (One) Month.  You may shower, but do not allow water into your eye for 2 (two) weeks.  Wear protective sunglasses during the day and a shield at night for 1(one) week.

## 2019-03-21 NOTE — ANESTHESIA PREPROCEDURE EVALUATION
03/21/2019  Sadiq Dhillon is a 69 y.o., male.    Pre-op Assessment    I have reviewed the Patient Summary Reports.     I have reviewed the Nursing Notes.   I have reviewed the Medications.     Review of Systems  Anesthesia Hx:  No problems with previous Anesthesia    Social:  Non-Smoker, Alcohol Use    Hematology/Oncology:  Hematology Normal   Oncology Normal     EENT/Dental:EENT/Dental Normal   Cardiovascular:   Exercise tolerance: good Hypertension, well controlled    Pulmonary:  Pulmonary Normal    Renal/:  Renal/ Normal     Hepatic/GI:  Hepatic/GI Normal    Musculoskeletal:  Musculoskeletal Normal    Neurological:  Neurology Normal    Endocrine:   Diabetes, well controlled, type 2    Dermatological:  Skin Normal    Psych:  Psychiatric Normal           Physical Exam  General:  Well nourished    Airway/Jaw/Neck:  Airway Findings: Mouth Opening: Normal Tongue: Normal  General Airway Assessment: Adult, Good  Mallampati: I  TM Distance: Normal, at least 6 cm  Jaw/Neck Findings:  Neck ROM: Normal ROM      Dental:  Dental Findings: In tact, molar caps        Mental Status:  Mental Status Findings:  Cooperative, Alert and Oriented         Anesthesia Plan  Type of Anesthesia, risks & benefits discussed:  Anesthesia Type:  MAC  Patient's Preference:   Intra-op Monitoring Plan:   Intra-op Monitoring Plan Comments:   Post Op Pain Control Plan:   Post Op Pain Control Plan Comments:   Induction:    Beta Blocker:         Informed Consent: Patient understands risks and agrees with Anesthesia plan.  Questions answered. Anesthesia consent signed with patient.  ASA Score: 2     Day of Surgery Review of History & Physical:    H&P update referred to the surgeon.         Ready For Surgery From Anesthesia Perspective.

## 2019-03-22 ENCOUNTER — OFFICE VISIT (OUTPATIENT)
Dept: OPHTHALMOLOGY | Facility: CLINIC | Age: 69
End: 2019-03-22
Payer: COMMERCIAL

## 2019-03-22 DIAGNOSIS — Z98.890 POST-OPERATIVE STATE: Primary | ICD-10-CM

## 2019-03-22 DIAGNOSIS — H18.519 FUCHS' CORNEAL DYSTROPHY: ICD-10-CM

## 2019-03-22 PROCEDURE — 99999 PR PBB SHADOW E&M-EST. PATIENT-LVL III: CPT | Mod: PBBFAC,,, | Performed by: OPHTHALMOLOGY

## 2019-03-22 PROCEDURE — 99999 PR PBB SHADOW E&M-EST. PATIENT-LVL III: ICD-10-PCS | Mod: PBBFAC,,, | Performed by: OPHTHALMOLOGY

## 2019-03-22 PROCEDURE — 99024 POSTOP FOLLOW-UP VISIT: CPT | Mod: S$GLB,,, | Performed by: OPHTHALMOLOGY

## 2019-03-22 PROCEDURE — 99024 PR POST-OP FOLLOW-UP VISIT: ICD-10-PCS | Mod: S$GLB,,, | Performed by: OPHTHALMOLOGY

## 2019-03-22 RX ORDER — ATORVASTATIN CALCIUM 20 MG/1
20 TABLET, FILM COATED ORAL
COMMUNITY
End: 2022-06-27

## 2019-03-22 RX ORDER — FAMOTIDINE 20 MG/1
TABLET, FILM COATED ORAL
COMMUNITY
Start: 2013-02-18

## 2019-03-22 RX ORDER — TESTOSTERONE GEL, 1% 10 MG/G
GEL TRANSDERMAL
COMMUNITY
Start: 2018-11-30 | End: 2022-06-06 | Stop reason: SDUPTHER

## 2019-03-22 RX ORDER — FEXOFENADINE HCL AND PSEUDOEPHEDRINE HCI 60; 120 MG/1; MG/1
1 TABLET, EXTENDED RELEASE ORAL
COMMUNITY

## 2019-03-22 NOTE — PROGRESS NOTES
HPI     1 day PO Phaco/ DSEK - OS 03/21/19    Pt states that on last night had a little discomfort but other than that   ok.   Using drops as instructed.    Eye Med(s)- Pred/Gati QID - OS     Last edited by Deepti Shin MA on 3/22/2019  8:35 AM. (History)            Assessment /Plan     For exam results, see Encounter Report.    Post-operative state    Fuchs' corneal dystrophy      DSEK POD1: DSEK Graft attached. 2+ MCE. Wound stable.

## 2019-03-29 ENCOUNTER — OFFICE VISIT (OUTPATIENT)
Dept: OPHTHALMOLOGY | Facility: CLINIC | Age: 69
End: 2019-03-29
Payer: COMMERCIAL

## 2019-03-29 DIAGNOSIS — Z94.7 STATUS POST CORNEAL TRANSPLANT: Primary | ICD-10-CM

## 2019-03-29 PROCEDURE — 99999 PR PBB SHADOW E&M-EST. PATIENT-LVL II: ICD-10-PCS | Mod: PBBFAC,,, | Performed by: OPHTHALMOLOGY

## 2019-03-29 PROCEDURE — 99999 PR PBB SHADOW E&M-EST. PATIENT-LVL II: CPT | Mod: PBBFAC,,, | Performed by: OPHTHALMOLOGY

## 2019-03-29 PROCEDURE — 99024 PR POST-OP FOLLOW-UP VISIT: ICD-10-PCS | Mod: S$GLB,,, | Performed by: OPHTHALMOLOGY

## 2019-03-29 PROCEDURE — 99024 POSTOP FOLLOW-UP VISIT: CPT | Mod: S$GLB,,, | Performed by: OPHTHALMOLOGY

## 2019-03-29 NOTE — PROGRESS NOTES
HPI     1 week PO Phaco/ DSEK - OS 03/21/19    Pt states has some fbs OS    Eye Med(s)- Pred/Gati QID - OS     Last edited by Dotty Guerrero on 3/29/2019  9:00 AM. (History)            Assessment /Plan     For exam results, see Encounter Report.    Status post corneal transplant      DSEK graft attached and clear. Signs and symptoms of graft rejection reviewed.  1 week po

## 2019-04-02 RX ORDER — PREDNISOLONE ACETATE 10 MG/ML
1 SUSPENSION/ DROPS OPHTHALMIC 4 TIMES DAILY
Qty: 10 ML | Refills: 3 | Status: SHIPPED | OUTPATIENT
Start: 2019-04-02 | End: 2020-04-01

## 2019-04-02 NOTE — TELEPHONE ENCOUNTER
Patient advised to continue Pred/Gati until gone.  Then change over to Prednisolone Acetate 1% qid OS.  New Rx sent to [facility] on file.

## 2019-04-03 ENCOUNTER — TELEPHONE (OUTPATIENT)
Dept: ENDOSCOPY | Facility: HOSPITAL | Age: 69
End: 2019-04-03

## 2019-04-18 ENCOUNTER — OFFICE VISIT (OUTPATIENT)
Dept: GASTROENTEROLOGY | Facility: CLINIC | Age: 69
End: 2019-04-18
Payer: COMMERCIAL

## 2019-04-18 VITALS
HEART RATE: 76 BPM | WEIGHT: 177.94 LBS | RESPIRATION RATE: 16 BRPM | DIASTOLIC BLOOD PRESSURE: 76 MMHG | BODY MASS INDEX: 25.47 KG/M2 | HEIGHT: 70 IN | SYSTOLIC BLOOD PRESSURE: 120 MMHG | TEMPERATURE: 98 F

## 2019-04-18 DIAGNOSIS — K85.90 ACUTE PANCREATITIS WITHOUT INFECTION OR NECROSIS, UNSPECIFIED PANCREATITIS TYPE: ICD-10-CM

## 2019-04-18 DIAGNOSIS — K86.89 PANCREATIC MASS: ICD-10-CM

## 2019-04-18 PROCEDURE — 1101F PT FALLS ASSESS-DOCD LE1/YR: CPT | Mod: CPTII,S$GLB,, | Performed by: INTERNAL MEDICINE

## 2019-04-18 PROCEDURE — 99999 PR PBB SHADOW E&M-EST. PATIENT-LVL III: CPT | Mod: PBBFAC,,, | Performed by: INTERNAL MEDICINE

## 2019-04-18 PROCEDURE — 3074F SYST BP LT 130 MM HG: CPT | Mod: CPTII,S$GLB,, | Performed by: INTERNAL MEDICINE

## 2019-04-18 PROCEDURE — 99204 PR OFFICE/OUTPT VISIT, NEW, LEVL IV, 45-59 MIN: ICD-10-PCS | Mod: S$GLB,,, | Performed by: INTERNAL MEDICINE

## 2019-04-18 PROCEDURE — 99999 PR PBB SHADOW E&M-EST. PATIENT-LVL III: ICD-10-PCS | Mod: PBBFAC,,, | Performed by: INTERNAL MEDICINE

## 2019-04-18 PROCEDURE — 99204 OFFICE O/P NEW MOD 45 MIN: CPT | Mod: S$GLB,,, | Performed by: INTERNAL MEDICINE

## 2019-04-18 PROCEDURE — 1101F PR PT FALLS ASSESS DOC 0-1 FALLS W/OUT INJ PAST YR: ICD-10-PCS | Mod: CPTII,S$GLB,, | Performed by: INTERNAL MEDICINE

## 2019-04-18 PROCEDURE — 3078F DIAST BP <80 MM HG: CPT | Mod: CPTII,S$GLB,, | Performed by: INTERNAL MEDICINE

## 2019-04-18 PROCEDURE — 3078F PR MOST RECENT DIASTOLIC BLOOD PRESSURE < 80 MM HG: ICD-10-PCS | Mod: CPTII,S$GLB,, | Performed by: INTERNAL MEDICINE

## 2019-04-18 PROCEDURE — 3074F PR MOST RECENT SYSTOLIC BLOOD PRESSURE < 130 MM HG: ICD-10-PCS | Mod: CPTII,S$GLB,, | Performed by: INTERNAL MEDICINE

## 2019-05-01 ENCOUNTER — OFFICE VISIT (OUTPATIENT)
Dept: OPHTHALMOLOGY | Facility: CLINIC | Age: 69
End: 2019-05-01
Payer: COMMERCIAL

## 2019-05-01 DIAGNOSIS — Z94.7 STATUS POST CORNEAL TRANSPLANT: Primary | ICD-10-CM

## 2019-05-01 PROCEDURE — 99024 PR POST-OP FOLLOW-UP VISIT: ICD-10-PCS | Mod: S$GLB,,, | Performed by: OPHTHALMOLOGY

## 2019-05-01 PROCEDURE — 99024 POSTOP FOLLOW-UP VISIT: CPT | Mod: S$GLB,,, | Performed by: OPHTHALMOLOGY

## 2019-05-01 PROCEDURE — 99999 PR PBB SHADOW E&M-EST. PATIENT-LVL II: ICD-10-PCS | Mod: PBBFAC,,, | Performed by: OPHTHALMOLOGY

## 2019-05-01 PROCEDURE — 99999 PR PBB SHADOW E&M-EST. PATIENT-LVL II: CPT | Mod: PBBFAC,,, | Performed by: OPHTHALMOLOGY

## 2019-05-01 RX ORDER — BLOOD SUGAR DIAGNOSTIC
STRIP MISCELLANEOUS
Refills: 3 | COMMUNITY
Start: 2019-03-22

## 2019-05-01 RX ORDER — PREDNISOLONE ACETATE 10 MG/ML
1 SUSPENSION/ DROPS OPHTHALMIC 4 TIMES DAILY
Qty: 10 ML | Refills: 4 | Status: SHIPPED | OUTPATIENT
Start: 2019-05-01 | End: 2019-12-30

## 2019-05-01 NOTE — PROGRESS NOTES
HPI      PO Phaco/ DSEK - OS 03/21/19    Eye Med(s)- Pred/Gati QID - OS     Pt states VA cloudy OS since sx. No other complaints.    Last edited by Abbey Degroot on 5/1/2019  2:00 PM. (History)            Assessment /Plan     For exam results, see Encounter Report.    Status post corneal transplant    Other orders  -     prednisoLONE acetate (PRED FORTE) 1 % DrpS; Place 1 drop into the left eye 4 (four) times daily.  Dispense: 10 mL; Refill: 4      DMEK OD looks perfect  DSEK OS looks great also  Hx mac hole surgery OS.  Visually significant posterior capsular opacity present.  Discussed risks, benefits, and alternatives to laser surgery.  YAG OS soon

## 2019-05-24 ENCOUNTER — OFFICE VISIT (OUTPATIENT)
Dept: URGENT CARE | Facility: CLINIC | Age: 69
End: 2019-05-24
Payer: COMMERCIAL

## 2019-05-24 VITALS
HEART RATE: 85 BPM | TEMPERATURE: 98 F | WEIGHT: 177 LBS | SYSTOLIC BLOOD PRESSURE: 123 MMHG | DIASTOLIC BLOOD PRESSURE: 73 MMHG | RESPIRATION RATE: 16 BRPM | BODY MASS INDEX: 25.34 KG/M2 | OXYGEN SATURATION: 97 % | HEIGHT: 70 IN

## 2019-05-24 DIAGNOSIS — R10.9 ABDOMINAL PAIN, UNSPECIFIED ABDOMINAL LOCATION: Primary | ICD-10-CM

## 2019-05-24 DIAGNOSIS — K59.00 CONSTIPATION, UNSPECIFIED CONSTIPATION TYPE: ICD-10-CM

## 2019-05-24 DIAGNOSIS — Z76.89 ENCOUNTER TO ESTABLISH CARE: ICD-10-CM

## 2019-05-24 LAB
BILIRUB UR QL STRIP: POSITIVE
GLUCOSE SERPL-MCNC: 181 MG/DL (ref 70–110)
GLUCOSE UR QL STRIP: NEGATIVE
KETONES UR QL STRIP: NEGATIVE
LEUKOCYTE ESTERASE UR QL STRIP: NEGATIVE
PH, POC UA: 6 (ref 5–8)
POC ANION GAP: 16 MMOL/L (ref 10–20)
POC BLOOD, URINE: POSITIVE
POC BUN: 30 MMOL/L (ref 8–26)
POC CHLORIDE: 98 MMOL/L (ref 98–109)
POC CREATININE: 1.2 MG/DL (ref 0.6–1.3)
POC HEMATOCRIT: 44 %PCV (ref 42–52)
POC HEMOGLOBIN: 15 G/DL (ref 13.5–18)
POC ICA: 1.14 MMOL/L (ref 1.12–1.32)
POC NITRATES, URINE: NEGATIVE
POC POTASSIUM: 5.7 MMOL/L (ref 3.5–4.9)
POC SODIUM: 135 MMOL/L (ref 138–146)
POC TCO2: 27 MMOL/L (ref 24–29)
PROT UR QL STRIP: NEGATIVE
SP GR UR STRIP: 1.02 (ref 1–1.03)
UROBILINOGEN UR STRIP-ACNC: ABNORMAL (ref 0.3–2.2)

## 2019-05-24 PROCEDURE — 3074F SYST BP LT 130 MM HG: CPT | Mod: CPTII,S$GLB,, | Performed by: FAMILY MEDICINE

## 2019-05-24 PROCEDURE — 96372 PR INJECTION,THERAP/PROPH/DIAG2ST, IM OR SUBCUT: ICD-10-PCS | Mod: S$GLB,,, | Performed by: FAMILY MEDICINE

## 2019-05-24 PROCEDURE — 3078F PR MOST RECENT DIASTOLIC BLOOD PRESSURE < 80 MM HG: ICD-10-PCS | Mod: CPTII,S$GLB,, | Performed by: FAMILY MEDICINE

## 2019-05-24 PROCEDURE — 96372 THER/PROPH/DIAG INJ SC/IM: CPT | Mod: S$GLB,,, | Performed by: FAMILY MEDICINE

## 2019-05-24 PROCEDURE — 1101F PT FALLS ASSESS-DOCD LE1/YR: CPT | Mod: CPTII,S$GLB,, | Performed by: FAMILY MEDICINE

## 2019-05-24 PROCEDURE — 99214 PR OFFICE/OUTPT VISIT, EST, LEVL IV, 30-39 MIN: ICD-10-PCS | Mod: 25,S$GLB,, | Performed by: FAMILY MEDICINE

## 2019-05-24 PROCEDURE — 81003 URINALYSIS AUTO W/O SCOPE: CPT | Mod: QW,S$GLB,, | Performed by: FAMILY MEDICINE

## 2019-05-24 PROCEDURE — 74018 XR KUB: ICD-10-PCS | Mod: S$GLB,,, | Performed by: RADIOLOGY

## 2019-05-24 PROCEDURE — 99214 OFFICE O/P EST MOD 30 MIN: CPT | Mod: 25,S$GLB,, | Performed by: FAMILY MEDICINE

## 2019-05-24 PROCEDURE — 74018 RADEX ABDOMEN 1 VIEW: CPT | Mod: S$GLB,,, | Performed by: RADIOLOGY

## 2019-05-24 PROCEDURE — 81003 POCT URINALYSIS, DIPSTICK, AUTOMATED, W/O SCOPE: ICD-10-PCS | Mod: QW,S$GLB,, | Performed by: FAMILY MEDICINE

## 2019-05-24 PROCEDURE — 80047 POCT CHEMISTRY PANEL: ICD-10-PCS | Mod: QW,S$GLB,, | Performed by: FAMILY MEDICINE

## 2019-05-24 PROCEDURE — 3074F PR MOST RECENT SYSTOLIC BLOOD PRESSURE < 130 MM HG: ICD-10-PCS | Mod: CPTII,S$GLB,, | Performed by: FAMILY MEDICINE

## 2019-05-24 PROCEDURE — 1101F PR PT FALLS ASSESS DOC 0-1 FALLS W/OUT INJ PAST YR: ICD-10-PCS | Mod: CPTII,S$GLB,, | Performed by: FAMILY MEDICINE

## 2019-05-24 PROCEDURE — 80047 BASIC METABLC PNL IONIZED CA: CPT | Mod: QW,S$GLB,, | Performed by: FAMILY MEDICINE

## 2019-05-24 PROCEDURE — 3078F DIAST BP <80 MM HG: CPT | Mod: CPTII,S$GLB,, | Performed by: FAMILY MEDICINE

## 2019-05-24 RX ORDER — ONDANSETRON 4 MG/1
4 TABLET, ORALLY DISINTEGRATING ORAL EVERY 8 HOURS PRN
Qty: 15 TABLET | Refills: 0 | OUTPATIENT
Start: 2019-05-24 | End: 2020-11-18

## 2019-05-24 RX ORDER — KETOROLAC TROMETHAMINE 30 MG/ML
30 INJECTION, SOLUTION INTRAMUSCULAR; INTRAVENOUS
Status: COMPLETED | OUTPATIENT
Start: 2019-05-24 | End: 2019-05-24

## 2019-05-24 RX ADMIN — KETOROLAC TROMETHAMINE 30 MG: 30 INJECTION, SOLUTION INTRAMUSCULAR; INTRAVENOUS at 10:05

## 2019-05-24 NOTE — PATIENT INSTRUCTIONS
PLEASE READ YOUR DISCHARGE INSTRUCTIONS ENTIRELY AS IT CONTAINS IMPORTANT INFORMATION.    PLENTY OF FLUIDS    Try an over the counter laxative like miralax and glycerin suppositories.     High fiber meals    DO NOT TAKE ANY MORE NAPROXEN OR IBUPROFEN TODAY AS YOU RECEIVED A LARGE DOSE OF IT VIA INJECTION    Strain your urine and if you catch a stone put it in the urine cup.     A referral to primary care has been placed for you.  Please call [phone] to make an appt    Please go to the ER if you experience worsening of symptoms, inability to pass gas or stool, blood in your vomit or stool or dark black color, inability to urinate, passing out, severely worsening pain.     Please see your primary care doctor if symptoms do not improve    You must understand that you have received an Urgent Care treatment only and that you may be released before all of your medical problems are known or treated.  WE CANNOT RULE OUT ALL POSSIBLE CAUSES OF YOUR SYMPTOMS IN THE URGENT CARE SETTING PLEASE GO TO THE ER IF YOU FEELS YOUR CONDITION IS WORSENING OR YOU WOULD LIKE EMERGENT EVALUATION.        Constipation (Adult)  Constipation means that you have bowel movements that are less frequent than usual. Stools often become very hard and difficult to pass.  Constipation is very common. At some point in life it affects almost everyone. Since everyone's bowel habits are different, what is constipation to one person may not be to another. Your healthcare provider may do tests to diagnose constipation. It depends on what he or she finds when evaluating you.    Symptoms of constipation include:  · Abdominal pain  · Bloating  · Vomiting  · Painful bowel movements  · Itching, swelling, bleeding, or pain around the anus  Causes  Constipation can have many causes. These include:  · Diet low in fiber  · Too much dairy  · Not drinking enough liquids  · Lack of exercise or physical activity. This is especially true for older  adults.  · Changes in lifestyle or daily routine, including pregnancy, aging, work, and travel  · Frequent use or misuse of laxatives  · Ignoring the urge to have a bowel movement or delaying it until later  · Medicines, such as certain prescription pain medicines, iron supplements, antacids, certain antidepressants, and calcium supplements  · Diseases like irritable bowel syndrome, bowel obstructions, stroke, diabetes, thyroid disease, Parkinson disease, hemorrhoids, and colon cancer  Complications  Potential complications of constipation can include:  · Hemorrhoids  · Rectal bleeding from hemorrhoids or anal fissures (skin tears)  · Hernias  · Dependency on laxatives  · Chronic constipation  · Fecal impaction  · Bowel obstruction or perforation  Home care  All treatment should be done after talking with your healthcare provider. This is especially true if you have another medical problems, are taking prescription medicines, or are an older adult. Treatment most often involves lifestyle changes. You may also need medicines. Your healthcare provider will tell you which will work best for you. Follow the advice below to help avoid this problem in the future.  Lifestyle changes  These lifestyle changes can help prevent constipation:  · Diet. Eat a high-fiber diet, with fresh fruit and vegetables, and reduce dairy intake, meats, and processed foods  · Fluids. It's important to get enough fluids each day. Drink plenty of water when you eat more fiber. If you are on diet that limits the amount of fluid you can have, talk about this with your healthcare provider.  · Regular exercise. Check with your healthcare provider first.  Medications  Take any medicines as directed. Some laxatives are safe to use only every now and then. Others can be taken on a regular basis. Talk with your doctor or pharmacist if you have questions.  Prescription pain medicines can cause constipation. If you are taking this kind of medicine, ask  your healthcare provider if you should also take a stool softener.  Medicines you may take to treat constipation include:  · Fiber supplements  · Stool softeners  · Laxatives  · Enemas  · Rectal suppositories  Follow-up care  Follow up with your healthcare provider if symptoms don't get better in the next few days. You may need to have more tests or see a specialist.  Call 911  Call 911 if any of these occur:  · Trouble breathing  · Stiff, rigid abdomen that is severely painful to touch  · Confusion  · Fainting or loss of consciousness  · Rapid heart rate  · Chest pain  When to seek medical advice  Call your healthcare provider right away if any of these occur:  · Fever over 100.4°F (38°C)  · Failure to resume normal bowel movements  · Pain in your abdomen or back gets worse  · Nausea or vomiting  · Swelling in your abdomen  · Blood in the stool  · Black, tarry stool  · Involuntary weight loss  · Weakness  Date Last Reviewed: 12/30/2015  © 5117-1406 [employer]. [address] [place] [address], [place] [address]. All rights reserved. This information is not intended as a substitute for professional medical care. Always follow your healthcare professional's instructions.        Flank Pain, Uncertain Cause  The flank is the area between your upper abdomen and your back. Pain there is often caused by a problem with your kidneys. It might be a kidney infection or a kidney stone. Other causes of flank pain include spinal arthritis, a pinched nerve from a back injury, or a back muscle strain or spasm.  The cause of your flank pain is not certain. You may need other tests.  Home care  Follow these tips when caring for yourself at home:  · You may use acetaminophen or ibuprofen to control pain, unless your health care provider prescribed another medicine. If you have chronic liver or kidney disease, talk with your provider before taking these medicines. Also talk with your provider first if youve ever had a stomach  ulcer or GI bleeding.  · If the pain is coming from your muscles, you may get relief with ice or heat. During the first 2 days after the injury, put an ice pack on the painful area for 20 minutes every 2 to 4 hours. This will reduce swelling and pain. A hot shower, hot bath, or heating pad works well for a muscle spasm. You can start with ice, then switch to heat after 2 days. You might find that alternating ice and heat works well. Use the method that feels the best to you.  Follow-up care  Follow up with your healthcare provider if your symptoms dont get better over the next few days.  When to seek medical advice  Call your healthcare provider right away if any of these happen:  · Repeated vomiting  · Fever of 100.4ºF (38ºC) or higher, or as directed by your health care provider  · Flank pain that gets worse  · Pain that spreads to the front of your belly (abdomen)  · Dizziness, weakness, or fainting  · Blood in your urine  · Burning feeling when you urinate or the need to urinate often  · Pain in one of your legs that gets worse  · Numbness or weakness in a leg  Date Last Reviewed: 10/1/2016  © 3791-0952 [employer]. [address] [place] [address], [place] [address]. All rights reserved. This information is not intended as a substitute for professional medical care. Always follow your healthcare professional's instructions.

## 2019-05-24 NOTE — PROGRESS NOTES
"Subjective:       Patient ID: Sadiq Dhillon is a 69 y.o. male.    Vitals:  height is 5' 10" (1.778 m) and weight is 80.3 kg (177 lb). His temperature is 98.2 °F (36.8 °C). His blood pressure is 123/73 and his pulse is 85. His respiration is 16 and oxygen saturation is 97%.     Chief Complaint: Abdominal Pain (possible uti)    Patient states he began to feel like he was getting a UTI on Monday (dysuria). He states he had a CT scan with contrast on Thursday checking for pancreatic lesion - he was asx at this time, done by FREDIS ANDRADE. He states he did not drink much water after the scan. Last night right flank pain, now moved to lower abdomen feels like cramping. Flank pain better, but still cramping. Intermittent nausea still. No fever. No gross hematuria. Has hx of kidney stones and lithotripsy. He is urinating more frequently but less.      Abdominal Pain   This is a new problem. The current episode started in the past 7 days. The onset quality is gradual. The problem occurs constantly. The problem has been unchanged. The pain is located in the RLQ and right flank. The pain is at a severity of 8/10 (Last night was an 8, today a 5). The pain is severe. The quality of the pain is cramping. The abdominal pain does not radiate. Associated symptoms include dysuria, frequency and nausea. Pertinent negatives include no fever, hematuria or vomiting. Nothing aggravates the pain. The pain is relieved by being still (oxycodone helped last night. ). Treatments tried: pt had oxycodone at home and took 2 yesterday. The treatment provided moderate relief. Patient's medical history includes kidney stones.       Constitution: Positive for chills. Negative for fever.   Neck: Negative for painful lymph nodes.   Gastrointestinal: Positive for abdominal pain and nausea. Negative for vomiting.        Flank pain, hx of kidney stones.    Genitourinary: Positive for dysuria, frequency, urgency, urine decreased, flank pain and history of " kidney stones. Negative for hematuria, genital trauma, painful intercourse, genital sore, penile discharge, painful ejaculation, penile pain, penile swelling, scrotal swelling and testicular pain.   Musculoskeletal: Negative for back pain.   Skin: Negative for rash and lesion.   Hematologic/Lymphatic: Negative for swollen lymph nodes.       Objective:      Physical Exam   Constitutional: He is oriented to person, place, and time. He appears well-developed and well-nourished. No distress.   HENT:   Head: Normocephalic and atraumatic.   Right Ear: External ear normal.   Left Ear: External ear normal.   Nose: Nose normal. No nasal deformity. No epistaxis.   Mouth/Throat: Oropharynx is clear and moist and mucous membranes are normal.   Eyes: Conjunctivae and lids are normal.   Neck: Trachea normal, normal range of motion, full passive range of motion without pain and phonation normal. Neck supple.   Cardiovascular: Normal rate, regular rhythm, normal heart sounds and intact distal pulses.   Pulmonary/Chest: Effort normal and breath sounds normal. No respiratory distress.   Abdominal: Soft. Normal appearance. He exhibits no distension, no abdominal bruit, no pulsatile midline mass and no mass. Bowel sounds are increased. There is no tenderness. There is no rigidity, no rebound, no guarding, no CVA tenderness, no tenderness at McBurney's point and negative Navarro's sign.       Abdomen soft, no rebound tenderness   Musculoskeletal: Normal range of motion. He exhibits no edema.   Neurological: He is alert and oriented to person, place, and time. He has normal strength and normal reflexes.   Skin: Skin is warm, dry and intact. He is not diaphoretic. No pallor.   Psychiatric: He has a normal mood and affect. His speech is normal and behavior is normal. Judgment and thought content normal. Cognition and memory are normal.   Nursing note and vitals reviewed.      Results for orders placed or performed in visit on 05/24/19    POCT Urinalysis, Dipstick, Automated, W/O Scope   Result Value Ref Range    POC Blood, Urine Positive (A) Negative    POC Bilirubin, Urine Positive (A) Negative    POC Urobilinogen, Urine norm 0.3 - 2.2    POC Ketones, Urine Negative Negative    POC Protein, Urine Negative Negative    POC Nitrates, Urine Negative Negative    POC Glucose, Urine Negative Negative    pH, UA 6.0 5 - 8    POC Specific Gravity, Urine 1.025 1.003 - 1.029    POC Leukocytes, Urine Negative Negative   POCT Chemistry Panel   Result Value Ref Range    POC Sodium 135 (A) 138 - 146 MMOL/L    POC Potassium 5.7 (A) 3.5 - 4.9 MMOL/L    POC Chloride 98 98 - 109 MMOL/L    POC BUN 30 (A) 8 - 26 MMOL/L    POC Glucose 181 (A) 70 - 110 MG/DL    POC Creatinine 1.2 0.6 - 1.3 mg/dL    POC iCA 1.14 1.12 - 1.32 MMOL/L    POC TCO2 27 24 - 29 MMOL/L    POC Hematocrit 44 42 - 52 %PCV    POC Hemoglobin 15.0 13.5 - 18 g/dL    POC Anion Gap 16 10.0 - 20 MMOL/L     Xr Kub    Result Date: 5/24/2019  EXAMINATION: XR KUB CLINICAL HISTORY: right flank pain, stone suspected;Unspecified abdominal pain TECHNIQUE: Single AP supine view of the abdomen (KUB) was performed COMPARISON: None FINDINGS: Slightly dilated right colon and retained bowel content.  No significant the localized small bowel dilatation.  Small pelvic calcification probably phleboliths.  Evaluation of the kidney is difficult due to the overlying bowel content and bowel gas.  CT renal stone study would be helpful for a definite evaluation     See above Electronically signed by: Nomi Romano MD Date:    05/24/2019 Time:    10:02    Assessment:       1. Abdominal pain, unspecified abdominal location    2. Constipation, unspecified constipation type    3. Encounter to establish care        Plan:         Abdominal pain, unspecified abdominal location  Comments:  right flank with hematuria on UA, hx of kidney stone  Orders:  -     POCT Urinalysis, Dipstick, Automated, W/O Scope  -     POCT Chemistry Panel  -      XR KUB; Future; Expected date: 05/24/2019  -     ketorolac injection 30 mg  -     ondansetron (ZOFRAN-ODT) 4 MG TbDL; Take 1 tablet (4 mg total) by mouth every 8 (eight) hours as needed.  Dispense: 15 tablet; Refill: 0    Constipation, unspecified constipation type    Encounter to establish care  -     Ambulatory referral to Internal Medicine    xray showing moderate constipation on the right side irene, this may be exacerbating sx. I am still suspicious for nephrolithiasis given urinary sx, hx, blood on UA, flank pain, nausea. KUB difficult to see past stool and gas. Pt just had a ct scan a week ago hesitant to do another. Will treat presumptively for stone and constipation. Advised miralax, gave toradol in clinic. BUN elevation likely related to dehydration as he has not been drinking much water, discussed K with dr logan who advised likely hemolyzed. Gave pt strainer and urine cup if he catches a stone, will not do narcotic meds at this time that will likely worsen constipation. He has enough flomax at home. Discussed ER precautions. Advised f/u with pcp next week ([facility]) he would like referral for [facility] also.    Patient Instructions   PLEASE READ YOUR DISCHARGE INSTRUCTIONS ENTIRELY AS IT CONTAINS IMPORTANT INFORMATION.    PLENTY OF FLUIDS    Try an over the counter laxative like miralax and glycerin suppositories.     High fiber meals    DO NOT TAKE ANY MORE NAPROXEN OR IBUPROFEN TODAY AS YOU RECEIVED A LARGE DOSE OF IT VIA INJECTION    Strain your urine and if you catch a stone put it in the urine cup.     A referral to primary care has been placed for you.  Please call [phone] to make an appt    Please go to the ER if you experience worsening of symptoms, inability to pass gas or stool, blood in your vomit or stool or dark black color, inability to urinate, passing out, severely worsening pain.     Please see your primary care doctor if symptoms do not improve    You must understand that you  have received an Urgent Care treatment only and that you may be released before all of your medical problems are known or treated.  WE CANNOT RULE OUT ALL POSSIBLE CAUSES OF YOUR SYMPTOMS IN THE URGENT CARE SETTING PLEASE GO TO THE ER IF YOU FEELS YOUR CONDITION IS WORSENING OR YOU WOULD LIKE EMERGENT EVALUATION.        Constipation (Adult)  Constipation means that you have bowel movements that are less frequent than usual. Stools often become very hard and difficult to pass.  Constipation is very common. At some point in life it affects almost everyone. Since everyone's bowel habits are different, what is constipation to one person may not be to another. Your healthcare provider may do tests to diagnose constipation. It depends on what he or she finds when evaluating you.    Symptoms of constipation include:  · Abdominal pain  · Bloating  · Vomiting  · Painful bowel movements  · Itching, swelling, bleeding, or pain around the anus  Causes  Constipation can have many causes. These include:  · Diet low in fiber  · Too much dairy  · Not drinking enough liquids  · Lack of exercise or physical activity. This is especially true for older adults.  · Changes in lifestyle or daily routine, including pregnancy, aging, work, and travel  · Frequent use or misuse of laxatives  · Ignoring the urge to have a bowel movement or delaying it until later  · Medicines, such as certain prescription pain medicines, iron supplements, antacids, certain antidepressants, and calcium supplements  · Diseases like irritable bowel syndrome, bowel obstructions, stroke, diabetes, thyroid disease, Parkinson disease, hemorrhoids, and colon cancer  Complications  Potential complications of constipation can include:  · Hemorrhoids  · Rectal bleeding from hemorrhoids or anal fissures (skin tears)  · Hernias  · Dependency on laxatives  · Chronic constipation  · Fecal impaction  · Bowel obstruction or perforation  Home care  All treatment should be done  after talking with your healthcare provider. This is especially true if you have another medical problems, are taking prescription medicines, or are an older adult. Treatment most often involves lifestyle changes. You may also need medicines. Your healthcare provider will tell you which will work best for you. Follow the advice below to help avoid this problem in the future.  Lifestyle changes  These lifestyle changes can help prevent constipation:  · Diet. Eat a high-fiber diet, with fresh fruit and vegetables, and reduce dairy intake, meats, and processed foods  · Fluids. It's important to get enough fluids each day. Drink plenty of water when you eat more fiber. If you are on diet that limits the amount of fluid you can have, talk about this with your healthcare provider.  · Regular exercise. Check with your healthcare provider first.  Medications  Take any medicines as directed. Some laxatives are safe to use only every now and then. Others can be taken on a regular basis. Talk with your doctor or pharmacist if you have questions.  Prescription pain medicines can cause constipation. If you are taking this kind of medicine, ask your healthcare provider if you should also take a stool softener.  Medicines you may take to treat constipation include:  · Fiber supplements  · Stool softeners  · Laxatives  · Enemas  · Rectal suppositories  Follow-up care  Follow up with your healthcare provider if symptoms don't get better in the next few days. You may need to have more tests or see a specialist.  Call 911  Call 911 if any of these occur:  · Trouble breathing  · Stiff, rigid abdomen that is severely painful to touch  · Confusion  · Fainting or loss of consciousness  · Rapid heart rate  · Chest pain  When to seek medical advice  Call your healthcare provider right away if any of these occur:  · Fever over 100.4°F (38°C)  · Failure to resume normal bowel movements  · Pain in your abdomen or back gets worse  · Nausea or  vomiting  · Swelling in your abdomen  · Blood in the stool  · Black, tarry stool  · Involuntary weight loss  · Weakness  Date Last Reviewed: 12/30/2015 © 2000-2017 [employer]. [address] [place] [address], [place] [address]. All rights reserved. This information is not intended as a substitute for professional medical care. Always follow your healthcare professional's instructions.        Flank Pain, Uncertain Cause  The flank is the area between your upper abdomen and your back. Pain there is often caused by a problem with your kidneys. It might be a kidney infection or a kidney stone. Other causes of flank pain include spinal arthritis, a pinched nerve from a back injury, or a back muscle strain or spasm.  The cause of your flank pain is not certain. You may need other tests.  Home care  Follow these tips when caring for yourself at home:  · You may use acetaminophen or ibuprofen to control pain, unless your health care provider prescribed another medicine. If you have chronic liver or kidney disease, talk with your provider before taking these medicines. Also talk with your provider first if youve ever had a stomach ulcer or GI bleeding.  · If the pain is coming from your muscles, you may get relief with ice or heat. During the first 2 days after the injury, put an ice pack on the painful area for 20 minutes every 2 to 4 hours. This will reduce swelling and pain. A hot shower, hot bath, or heating pad works well for a muscle spasm. You can start with ice, then switch to heat after 2 days. You might find that alternating ice and heat works well. Use the method that feels the best to you.  Follow-up care  Follow up with your healthcare provider if your symptoms dont get better over the next few days.  When to seek medical advice  Call your healthcare provider right away if any of these happen:  · Repeated vomiting  · Fever of 100.4ºF (38ºC) or higher, or as directed by your health care provider  · Flank  pain that gets worse  · Pain that spreads to the front of your belly (abdomen)  · Dizziness, weakness, or fainting  · Blood in your urine  · Burning feeling when you urinate or the need to urinate often  · Pain in one of your legs that gets worse  · Numbness or weakness in a leg  Date Last Reviewed: 10/1/2016  © 5820-0607 [employer]. [address] [place] [address], [place] [address]. All rights reserved. This information is not intended as a substitute for professional medical care. Always follow your healthcare professional's instructions.

## 2019-06-03 ENCOUNTER — OFFICE VISIT (OUTPATIENT)
Dept: URGENT CARE | Facility: CLINIC | Age: 69
End: 2019-06-03
Payer: COMMERCIAL

## 2019-06-03 VITALS
DIASTOLIC BLOOD PRESSURE: 65 MMHG | SYSTOLIC BLOOD PRESSURE: 110 MMHG | WEIGHT: 175 LBS | HEIGHT: 69 IN | HEART RATE: 71 BPM | TEMPERATURE: 97 F | RESPIRATION RATE: 14 BRPM | OXYGEN SATURATION: 98 % | BODY MASS INDEX: 25.92 KG/M2

## 2019-06-03 DIAGNOSIS — S90.511A ABRASION OF RIGHT ANKLE, INITIAL ENCOUNTER: ICD-10-CM

## 2019-06-03 DIAGNOSIS — L03.317 CELLULITIS OF RIGHT BUTTOCK: Primary | ICD-10-CM

## 2019-06-03 DIAGNOSIS — S90.01XA CONTUSION OF RIGHT ANKLE, INITIAL ENCOUNTER: ICD-10-CM

## 2019-06-03 PROCEDURE — 3074F PR MOST RECENT SYSTOLIC BLOOD PRESSURE < 130 MM HG: ICD-10-PCS | Mod: CPTII,S$GLB,, | Performed by: EMERGENCY MEDICINE

## 2019-06-03 PROCEDURE — 73610 X-RAY EXAM OF ANKLE: CPT | Mod: FY,RT,S$GLB, | Performed by: RADIOLOGY

## 2019-06-03 PROCEDURE — 3078F DIAST BP <80 MM HG: CPT | Mod: CPTII,S$GLB,, | Performed by: EMERGENCY MEDICINE

## 2019-06-03 PROCEDURE — 3074F SYST BP LT 130 MM HG: CPT | Mod: CPTII,S$GLB,, | Performed by: EMERGENCY MEDICINE

## 2019-06-03 PROCEDURE — 99214 PR OFFICE/OUTPT VISIT, EST, LEVL IV, 30-39 MIN: ICD-10-PCS | Mod: S$GLB,,, | Performed by: EMERGENCY MEDICINE

## 2019-06-03 PROCEDURE — 3078F PR MOST RECENT DIASTOLIC BLOOD PRESSURE < 80 MM HG: ICD-10-PCS | Mod: CPTII,S$GLB,, | Performed by: EMERGENCY MEDICINE

## 2019-06-03 PROCEDURE — 1101F PT FALLS ASSESS-DOCD LE1/YR: CPT | Mod: CPTII,S$GLB,, | Performed by: EMERGENCY MEDICINE

## 2019-06-03 PROCEDURE — 99214 OFFICE O/P EST MOD 30 MIN: CPT | Mod: S$GLB,,, | Performed by: EMERGENCY MEDICINE

## 2019-06-03 PROCEDURE — 73610 XR ANKLE COMPLETE 3 VIEW RIGHT: ICD-10-PCS | Mod: FY,RT,S$GLB, | Performed by: RADIOLOGY

## 2019-06-03 PROCEDURE — 1101F PR PT FALLS ASSESS DOC 0-1 FALLS W/OUT INJ PAST YR: ICD-10-PCS | Mod: CPTII,S$GLB,, | Performed by: EMERGENCY MEDICINE

## 2019-06-03 RX ORDER — MUPIROCIN 20 MG/G
OINTMENT TOPICAL
Qty: 22 G | Refills: 1 | Status: SHIPPED | OUTPATIENT
Start: 2019-06-03 | End: 2021-03-11

## 2019-06-03 NOTE — PROGRESS NOTES
"Subjective:       Patient ID: Sadiq Dhillon is a 69 y.o. male.    Vitals:  height is 5' 9" (1.753 m) and weight is 79.4 kg (175 lb). His tympanic temperature is 97.4 °F (36.3 °C). His blood pressure is 110/65 and his pulse is 71. His respiration is 14 and oxygen saturation is 98%.     Chief Complaint: Rash and Ankle Pain    Patient presents rash located on right buttocks that he noticed a few d ago, did yard work prior, no pain/drainage/fever.  The rash occurred 4 days ago.  Pt. Also states hit front of right ankle 6 d ago,  with a small abrasion, no drainage, NOC.    Rash   This is a new problem. The current episode started in the past 7 days. The problem has been gradually worsening since onset. The affected locations include the right buttock. The rash is characterized by scaling, redness, swelling and pain. It is unknown if there was an exposure to a precipitant. Pertinent negatives include no anorexia, congestion, cough, diarrhea, eye pain, facial edema, fatigue, fever, joint pain, nail changes, rhinorrhea, shortness of breath, sore throat or vomiting. Past treatments include nothing. There is no history of allergies, asthma, eczema or varicella.   Ankle Pain    The incident occurred 5 to 7 days ago. The incident occurred at home. The injury mechanism was a direct blow. The pain is present in the right ankle. The pain is at a severity of 4/10. The pain is mild. The pain has been worsening since onset. Pertinent negatives include no inability to bear weight, loss of motion, loss of sensation, muscle weakness, numbness or tingling. He reports no foreign bodies present. The symptoms are aggravated by movement, palpation and weight bearing. He has tried nothing for the symptoms.       Constitution: Negative for chills, fatigue and fever.   HENT: Negative for facial swelling, facial trauma, congestion and sore throat.    Neck: Negative for neck stiffness and painful lymph nodes.   Cardiovascular: " Negative for chest trauma.   Eyes: Negative for eye trauma, eye itching, eye pain, double vision, blurred vision and eyelid swelling.   Respiratory: Negative for cough and shortness of breath.    Gastrointestinal: Negative for abdominal trauma, abdominal pain, vomiting, diarrhea and rectal bleeding.   Genitourinary: Negative for hematuria, genital trauma and pelvic pain.   Musculoskeletal: Positive for pain, trauma, joint pain and joint swelling. Negative for abnormal ROM of joint and pain with walking.   Skin: Positive for rash and abrasion. Negative for color change, pale, wound, laceration, lesion, skin thickening/induration, puncture wound, erythema, abscess, avulsion and hives.   Allergic/Immunologic: Negative for environmental allergies, immunocompromised state and hives.   Neurological: Negative for dizziness, history of vertigo, light-headedness, coordination disturbances, altered mental status, loss of consciousness and numbness.   Hematologic/Lymphatic: Negative for swollen lymph nodes and history of bleeding disorder.   Psychiatric/Behavioral: Negative for altered mental status.       Objective:      Physical Exam   Constitutional: He is oriented to person, place, and time. He appears well-developed and well-nourished.   HENT:   Head: Normocephalic and atraumatic.   Neck: Normal range of motion. Neck supple.   Cardiovascular: Normal rate, regular rhythm, normal heart sounds and intact distal pulses.   Pulmonary/Chest: Breath sounds normal.   Musculoskeletal:   Lateral right ankle swelling/tender to palp and FROM, neg laxity, small oval abrasion anterior right ankle, no drainage, remainder RLE non tender.   Neurological: He is alert and oriented to person, place, and time.   Skin: No erythema.        Right buttock area red erythema with a few brown macular lesions present, no vesicles/pustules/drainage/pain.   Psychiatric: He has a normal mood and affect. His behavior is normal.       Assessment:       1.  Cellulitis of right buttock    2. Abrasion of right ankle, initial encounter    3. Contusion of right ankle, initial encounter        Plan:         Cellulitis of right buttock  -     mupirocin (BACTROBAN) 2 % ointment; Apply to affected area 2 times daily until healed.  Dispense: 22 g; Refill: 1  -     Ambulatory referral to Dermatology    Abrasion of right ankle, initial encounter  -     mupirocin (BACTROBAN) 2 % ointment; Apply to affected area 2 times daily until healed.  Dispense: 22 g; Refill: 1  -     Ambulatory referral to Dermatology    Contusion of right ankle, initial encounter  -     X-Ray Ankle Complete Right; Future; Expected date: 06/03/2019        Laurent Guzmán MD  Go to the Emergency Department for any problems  Call your PCP for follow up next available.

## 2019-06-03 NOTE — PATIENT INSTRUCTIONS
Laurent Guzmán MD  Go to the Emergency Department for any problems  Call your PCP for follow up next available.    Abrasions  Abrasions are skin scrapes. Their treatment depends on how large and deep the abrasion is.  Home care  You may be prescribed an antibiotic cream or ointment to apply to the wound. This helps prevent infection. Follow instructions when using this medicine.  General care  · To care for the abrasion, do the following each day for as long as directed by your healthcare provider.  ¨ If you were given a bandage, change it once a day. If your bandage sticks to the wound, soak it in warm water until it loosens.  ¨ Wash the area with soap and warm water. You may do this in a sink or under a tub faucet or shower. Rinse off the soap. Then pat the area dry with a clean towel.  ¨ If antibiotic ointment or cream was prescribed, reapply it to the wound as directed. Cover the wound with a fresh nonstick bandage. If the bandage becomes wet or dirty, change it as soon as possible.  ¨ Some antibiotic ointments or cream can cause an allergic reaction or dermatitis. This may cause redness, itching and or hives. If this occurs, stop using the ointment immediately and wash off any remaining ointment. You may need to take some allergy medicine to relieve symptoms.  · You may use acetaminophen or ibuprofen to control pain unless another pain medicine was prescribed. Talk with your healthcare provider before using these medicines if you have chronic liver or kidney disease or ever had a stomach ulcer or GI bleeding. Dont use ibuprofen in children younger than six months old.  · Most skin wounds heal within 10 days. But an infection may occur even with treatment. So its important to watch the wound for signs of infection as listed below.  Follow-up care  Follow up with your healthcare provider, or as advised.  When to seek medical advice  Call your healthcare provider right away if any of these occur:  · Fever of  100.4ºF (38ºC) or higher, or as directed by your healthcare provider  · Increasing pain, redness, swelling, or drainage from the wound  · Bleeding from the wound that does not stop after a few minutes of steady, firm pressure  · Decreased ability to move any body part near the wound  Date Last Reviewed: 3/3/2017  © 3035-4210 [employer]. [address] [place] [address], [place] [address]. All rights reserved. This information is not intended as a substitute for professional medical care. Always follow your healthcare professional's instructions.        Lower Extremity Contusion  You have a contusion (bruise) of a lower extremity (leg, knee, ankle, foot, or toe). Symptoms include pain, swelling, and skin discoloration. No bones are broken. This injury may take from a few days to a few weeks to heal.  During that time, the bruise may change from reddish in color, to purple-blue, to green-yellow, to yellow-brown.  Home care  · Unless another medicine was prescribed, you can take acetaminophen, ibuprofen, or naproxen to control pain. (If you have chronic liver or kidney disease or ever had a stomach ulcer or gastrointestinal bleeding, talk with your doctor before using these medicines.)  · Elevate the injured area to reduce pain and swelling. As much as possible, sit or lie down with the injured area raised about the level of your heart. This is especially important during the first 48 hours.  · Ice the injured area to help reduce pain and swelling. Wrap a cold source (ice pack or ice cubes in a plastic bag) in a thin towel. Apply to the bruised area for 20 minutes every 1 to 2 hours the first day. Continue this 3 to 4 times a day until the pain and swelling goes away.  · If crutches have been advised, do not bear full weight on the injured leg until you can do so without pain. You may return to sports when you are able to put full weight and impact on the injured leg without pain.  Follow up  Follow up with your  healthcare provider or our staff as advised. Call if you are not improving within the next 1 to 2 weeks.  When to seek medical advice   Call your healthcare provider right away if any of these occur:  · Increased pain or swelling  · Foot or toes become cold, blue, numb or tingly  · Signs of infection: Warmth, drainage, or increased redness or pain around the injury  · Inability to move the injured area   · Frequent bruising for unknown reasons  Date Last Reviewed: 2/1/2017 © 2000-2017 [employer]. [address] [place] [address]. All rights reserved. This information is not intended as a substitute for professional medical care. Always follow your healthcare professional's instructions.

## 2019-06-06 ENCOUNTER — TELEPHONE (OUTPATIENT)
Dept: URGENT CARE | Facility: CLINIC | Age: 69
End: 2019-06-06

## 2019-06-06 ENCOUNTER — PATIENT MESSAGE (OUTPATIENT)
Dept: GASTROENTEROLOGY | Facility: CLINIC | Age: 69
End: 2019-06-06

## 2019-06-18 ENCOUNTER — TELEPHONE (OUTPATIENT)
Dept: ENDOSCOPY | Facility: HOSPITAL | Age: 69
End: 2019-06-18

## 2019-06-18 ENCOUNTER — PATIENT MESSAGE (OUTPATIENT)
Dept: ENDOSCOPY | Facility: HOSPITAL | Age: 69
End: 2019-06-18

## 2019-06-18 DIAGNOSIS — K86.2 PANCREAS CYST: Primary | ICD-10-CM

## 2019-06-18 NOTE — TELEPHONE ENCOUNTER
----- Message from Nomi Post MD sent at 6/18/2019  9:48 AM CDT -----  Lets schedule an EUS in 3 months

## 2019-07-08 ENCOUNTER — PATIENT MESSAGE (OUTPATIENT)
Dept: ENDOSCOPY | Facility: HOSPITAL | Age: 69
End: 2019-07-08

## 2019-07-08 ENCOUNTER — TELEPHONE (OUTPATIENT)
Dept: ENDOSCOPY | Facility: HOSPITAL | Age: 69
End: 2019-07-08

## 2019-07-08 ENCOUNTER — PATIENT MESSAGE (OUTPATIENT)
Dept: GASTROENTEROLOGY | Facility: CLINIC | Age: 69
End: 2019-07-08

## 2019-07-08 NOTE — TELEPHONE ENCOUNTER
----- Message from Natalie Thao sent at 7/8/2019  9:33 AM CDT -----  Contact: self  Pt is calling in regards to scheduling a f/u appt. Books trevor't currently open to schedule this appt. He would like a call back to do so.    He can be reached at [phone].    Thank you

## 2019-07-08 NOTE — TELEPHONE ENCOUNTER
----- Message from Natalie Thao sent at 7/8/2019  9:33 AM CDT -----  Contact: self  Pt is calling in regards to scheduling a f/u appt. Books trevor't currently open to schedule this appt. He would like a call back to do so.    He can be reached at [phone].    Thank you

## 2019-07-26 ENCOUNTER — OFFICE VISIT (OUTPATIENT)
Dept: OPHTHALMOLOGY | Facility: CLINIC | Age: 69
End: 2019-07-26
Payer: COMMERCIAL

## 2019-07-26 DIAGNOSIS — H26.492 PCO (POSTERIOR CAPSULAR OPACIFICATION), LEFT: Primary | ICD-10-CM

## 2019-07-26 PROCEDURE — 99999 PR PBB SHADOW E&M-EST. PATIENT-LVL II: CPT | Mod: PBBFAC,,, | Performed by: OPHTHALMOLOGY

## 2019-07-26 PROCEDURE — 66821 PR DISCISSION,2ND CATARACT,LASER: ICD-10-PCS | Mod: LT,S$GLB,, | Performed by: OPHTHALMOLOGY

## 2019-07-26 PROCEDURE — 92012 PR EYE EXAM, EST PATIENT,INTERMED: ICD-10-PCS | Mod: 57,S$GLB,, | Performed by: OPHTHALMOLOGY

## 2019-07-26 PROCEDURE — 92012 INTRM OPH EXAM EST PATIENT: CPT | Mod: 57,S$GLB,, | Performed by: OPHTHALMOLOGY

## 2019-07-26 PROCEDURE — 99999 PR PBB SHADOW E&M-EST. PATIENT-LVL II: ICD-10-PCS | Mod: PBBFAC,,, | Performed by: OPHTHALMOLOGY

## 2019-07-26 PROCEDURE — 66821 AFTER CATARACT LASER SURGERY: CPT | Mod: LT,S$GLB,, | Performed by: OPHTHALMOLOGY

## 2019-07-26 RX ORDER — NAPROXEN 250 MG/1
TABLET ORAL
Refills: 0 | COMMUNITY
Start: 2019-06-05 | End: 2021-03-11

## 2019-07-26 RX ORDER — VALACYCLOVIR HYDROCHLORIDE 1 G/1
TABLET, FILM COATED ORAL
Refills: 0 | COMMUNITY
Start: 2019-06-05 | End: 2021-05-02

## 2019-07-26 NOTE — PROGRESS NOTES
HPI     Blurred Vision      Additional comments: Here for Yag Cap OS              Comments      PO Phaco/ DSEK - OS 03/21/19    Eye Med(s)- Pred QID - OS QD-OD              Last edited by Nomi Howard on 7/26/2019  3:17 PM. (History)            Assessment /Plan     For exam results, see Encounter Report.    PCO (posterior capsular opacification), left      Visually significant posterior capsular opacity present.  Discussed risks, benefits, and alternatives to laser surgery.  YAG laser capsulotomy Procedure Note:   Informed consent obtained and correct eye(s) verified with patient.  1 drop of topical Proparacaine and Iopidine instilled, and eye(s) dilated with 1% Tropicamide 2.5% Phenylephrine.  YAG laser applied to posterior capsule in cruciate pattern OS  Patient tolerated procedure well. No complications. Follow up in 1 month/PRN.

## 2019-08-15 ENCOUNTER — LAB VISIT (OUTPATIENT)
Dept: LAB | Facility: HOSPITAL | Age: 69
End: 2019-08-15
Attending: INTERNAL MEDICINE
Payer: COMMERCIAL

## 2019-08-15 ENCOUNTER — OFFICE VISIT (OUTPATIENT)
Dept: GASTROENTEROLOGY | Facility: CLINIC | Age: 69
End: 2019-08-15
Payer: COMMERCIAL

## 2019-08-15 VITALS
RESPIRATION RATE: 16 BRPM | DIASTOLIC BLOOD PRESSURE: 71 MMHG | HEIGHT: 69 IN | BODY MASS INDEX: 27.33 KG/M2 | SYSTOLIC BLOOD PRESSURE: 110 MMHG | WEIGHT: 184.5 LBS | HEART RATE: 72 BPM

## 2019-08-15 DIAGNOSIS — K86.2 PANCREAS CYST: ICD-10-CM

## 2019-08-15 PROBLEM — K85.90 PANCREATITIS: Status: RESOLVED | Noted: 2019-04-18 | Resolved: 2019-08-15

## 2019-08-15 PROBLEM — K86.89 PANCREATIC MASS: Status: RESOLVED | Noted: 2019-04-18 | Resolved: 2019-08-15

## 2019-08-15 LAB
CREAT SERPL-MCNC: 0.9 MG/DL (ref 0.5–1.4)
EST. GFR  (AFRICAN AMERICAN): >60 ML/MIN/1.73 M^2
EST. GFR  (NON AFRICAN AMERICAN): >60 ML/MIN/1.73 M^2

## 2019-08-15 PROCEDURE — 99214 PR OFFICE/OUTPT VISIT, EST, LEVL IV, 30-39 MIN: ICD-10-PCS | Mod: S$GLB,,, | Performed by: INTERNAL MEDICINE

## 2019-08-15 PROCEDURE — 36415 COLL VENOUS BLD VENIPUNCTURE: CPT

## 2019-08-15 PROCEDURE — 3074F SYST BP LT 130 MM HG: CPT | Mod: CPTII,S$GLB,, | Performed by: INTERNAL MEDICINE

## 2019-08-15 PROCEDURE — 99999 PR PBB SHADOW E&M-EST. PATIENT-LVL III: ICD-10-PCS | Mod: PBBFAC,,, | Performed by: INTERNAL MEDICINE

## 2019-08-15 PROCEDURE — 3288F PR FALLS RISK ASSESSMENT DOCUMENTED: ICD-10-PCS | Mod: CPTII,S$GLB,, | Performed by: INTERNAL MEDICINE

## 2019-08-15 PROCEDURE — 99214 OFFICE O/P EST MOD 30 MIN: CPT | Mod: S$GLB,,, | Performed by: INTERNAL MEDICINE

## 2019-08-15 PROCEDURE — 3074F PR MOST RECENT SYSTOLIC BLOOD PRESSURE < 130 MM HG: ICD-10-PCS | Mod: CPTII,S$GLB,, | Performed by: INTERNAL MEDICINE

## 2019-08-15 PROCEDURE — 99999 PR PBB SHADOW E&M-EST. PATIENT-LVL III: CPT | Mod: PBBFAC,,, | Performed by: INTERNAL MEDICINE

## 2019-08-15 PROCEDURE — 3288F FALL RISK ASSESSMENT DOCD: CPT | Mod: CPTII,S$GLB,, | Performed by: INTERNAL MEDICINE

## 2019-08-15 PROCEDURE — 3078F DIAST BP <80 MM HG: CPT | Mod: CPTII,S$GLB,, | Performed by: INTERNAL MEDICINE

## 2019-08-15 PROCEDURE — 1100F PR PT FALLS ASSESS DOC 2+ FALLS/FALL W/INJURY/YR: ICD-10-PCS | Mod: CPTII,S$GLB,, | Performed by: INTERNAL MEDICINE

## 2019-08-15 PROCEDURE — 1100F PTFALLS ASSESS-DOCD GE2>/YR: CPT | Mod: CPTII,S$GLB,, | Performed by: INTERNAL MEDICINE

## 2019-08-15 PROCEDURE — 82565 ASSAY OF CREATININE: CPT

## 2019-08-15 PROCEDURE — 3078F PR MOST RECENT DIASTOLIC BLOOD PRESSURE < 80 MM HG: ICD-10-PCS | Mod: CPTII,S$GLB,, | Performed by: INTERNAL MEDICINE

## 2019-08-15 NOTE — PROGRESS NOTES
Gastroenterology: [facility]      SUBJECTIVE:         Chief Complaint: Here for evaluation of a pancreatic cyst     History of Present Illness:  Patient is a 69 y.o. male presents with a pancreatic cyst. Please see the prior not for full details.  He had a repeat CT to see what's happened to his pancreas  After the FNA induced pancreatitis.  I personally reviewed the images and see a long cyst that expands the length of his pancreas.  I suggested an EUS but he is adament about not having one given the concern for pancreatitis again.   been done    He's been well with no abdominal pain or weight loss.  No steatorhea or early satiety      (Not in a hospital admission)    Review of patient's allergies indicates:   Allergen Reactions    No known drug allergies         Past Medical History:   Diagnosis Date    Diabetes mellitus     Fuchs' corneal dystrophy     Heart attack     Hypertension     Pancreatic mass     Pancreatitis     after EUS . 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, treated with dostenex(cabergoline)/ resolved    Reflux esophagitis     Tumor cells, benign      Past Surgical History:   Procedure Laterality Date    CATARACT EXTRACTION W/  INTRAOCULAR LENS IMPLANT Right 0    Dr Van     CORNEAL TRANSPLANT Right     Dr Van     EXTRACTION, CATARACT, WITH IOL INSERTION Left 3/21/2019    [facility]    INSERTION-INTRAOCULAR LENS (IOL) Right 7/13/2017    [facility] at [facility]    PHACOEMULSIFICATION-ASPIRATION-CATARACT Right 7/13/2017    [facility] at [facility]    REMOVAL,EPIRETINAL MEMBRANE Left 8/8/2018    [facility]    REPAIR, RETINAL DETACHMENT, WITH VITRECTOMY Left 8/8/2018    Per[facility] by SHUBHAM Strange  [facility] F[facility]    REPAIR, RETINAL DETACHMENT, WITH VITRECTOMY Right 7/18/2018    [facility] at [facility]ST [facility]    RHINOPLASTY TIP  1975    THYROIDECTOMY  2007    TRANSPLANT, PARTIAL-THICKNESS, CORNEA, USING DSAEK TECHNIQUE Left 3/21/2019    [facility]    TRANSPLANT-CORNEA/DMEK Right 7/13/2017    [facility]    UPPER ENDOSCOPIC ULTRASOUND W/ FNA      with resultant pancreatitis     Family History   Problem Relation Age of Onset    Hypertension Mother     Heart disease Mother     Heart disease Father     Cataracts Father     Stroke Father     Diabetes Father     Diabetes Paternal Uncle     Stroke Maternal Grandmother     Blindness Neg Hx     Glaucoma Neg Hx     Macular degeneration Neg Hx     Retinal detachment Neg Hx     Strabismus Neg Hx     Thyroid disease Neg Hx     Cancer Neg Hx      Social History     Tobacco Use    Smoking status: Never Smoker    Smokeless tobacco: Never Used   Substance Use Topics    Alcohol use: Yes     Frequency: 2-4 times a month     Drinks per session: 1 or 2     Comment: social    Drug use: No       Review of Systems:  A complete review of systems was performed and other than described in the HPI and below is negative       OBJECTIVE:     Vital Signs (Most Recent)  Pulse: 72 (08/15/19 1012)  Resp: 16 (08/15/19 1012)  BP: 110/71 (08/15/19 1012)    Abd soft Nontenfer no rebound or guarding    Diagnostic Results:  CT: Reviewed  EUS    ASSESSMENT/PLAN:     Pancreatic cyst in the the body.    Most likely etiology at this point is a pseudocyst related to the FNA of his pancreas.       Plan:   We negotiated a CT scan of the abdomen to be performed to see if the cyst is getting better and improving in size.  It's encouraging that he remains asyptomatic    We spent 25 minute in today's clinic with greater than 50% of the time dedicated to counseling and arranging care.

## 2019-09-19 ENCOUNTER — PATIENT MESSAGE (OUTPATIENT)
Dept: GASTROENTEROLOGY | Facility: CLINIC | Age: 69
End: 2019-09-19

## 2019-09-25 ENCOUNTER — HOSPITAL ENCOUNTER (OUTPATIENT)
Dept: RADIOLOGY | Facility: HOSPITAL | Age: 69
Discharge: HOME | End: 2019-09-25
Attending: INTERNAL MEDICINE
Payer: COMMERCIAL

## 2019-09-25 DIAGNOSIS — K86.2 PANCREAS CYST: ICD-10-CM

## 2019-09-25 PROCEDURE — 74177 CT ABDOMEN PELVIS WITH CONTRAST: ICD-10-PCS | Mod: 26,,, | Performed by: RADIOLOGY

## 2019-09-25 PROCEDURE — 25500020 PHARM REV CODE 255: Performed by: INTERNAL MEDICINE

## 2019-09-25 PROCEDURE — 74177 CT ABD & PELVIS W/CONTRAST: CPT | Mod: 26,,, | Performed by: RADIOLOGY

## 2019-09-25 PROCEDURE — 74177 CT ABD & PELVIS W/CONTRAST: CPT | Mod: TC

## 2019-09-25 RX ADMIN — IOHEXOL 75 ML: 350 INJECTION, SOLUTION INTRAVENOUS at 02:09

## 2019-09-26 LAB
CREAT SERPL-MCNC: 0.7 MG/DL (ref 0.5–1.4)
SAMPLE: NORMAL

## 2019-09-27 ENCOUNTER — PATIENT MESSAGE (OUTPATIENT)
Dept: GASTROENTEROLOGY | Facility: CLINIC | Age: 69
End: 2019-09-27

## 2019-11-10 ENCOUNTER — CLINICAL SUPPORT (OUTPATIENT)
Dept: URGENT CARE | Facility: CLINIC | Age: 69
End: 2019-11-10
Payer: COMMERCIAL

## 2019-11-10 VITALS — TEMPERATURE: 98 F

## 2019-11-10 PROCEDURE — 90471 IMMUNIZATION ADMIN: CPT | Mod: S$GLB,,, | Performed by: EMERGENCY MEDICINE

## 2019-11-10 PROCEDURE — 90686 FLU VACCINE (QUAD) GREATER THAN OR EQUAL TO 3YO PRESERVATIVE FREE IM: ICD-10-PCS | Mod: S$GLB,,, | Performed by: EMERGENCY MEDICINE

## 2019-11-10 PROCEDURE — 90471 FLU VACCINE (QUAD) GREATER THAN OR EQUAL TO 3YO PRESERVATIVE FREE IM: ICD-10-PCS | Mod: S$GLB,,, | Performed by: EMERGENCY MEDICINE

## 2019-11-10 PROCEDURE — 90686 IIV4 VACC NO PRSV 0.5 ML IM: CPT | Mod: S$GLB,,, | Performed by: EMERGENCY MEDICINE

## 2019-11-30 ENCOUNTER — OFFICE VISIT (OUTPATIENT)
Dept: URGENT CARE | Facility: CLINIC | Age: 69
End: 2019-11-30
Payer: COMMERCIAL

## 2019-11-30 VITALS
SYSTOLIC BLOOD PRESSURE: 92 MMHG | HEART RATE: 73 BPM | HEIGHT: 70 IN | DIASTOLIC BLOOD PRESSURE: 55 MMHG | WEIGHT: 188 LBS | OXYGEN SATURATION: 97 % | RESPIRATION RATE: 15 BRPM | BODY MASS INDEX: 26.92 KG/M2 | TEMPERATURE: 99 F

## 2019-11-30 DIAGNOSIS — J06.9 VIRAL URI WITH COUGH: Primary | ICD-10-CM

## 2019-11-30 PROCEDURE — 99214 OFFICE O/P EST MOD 30 MIN: CPT | Mod: S$GLB,,, | Performed by: NURSE PRACTITIONER

## 2019-11-30 PROCEDURE — 99214 PR OFFICE/OUTPT VISIT, EST, LEVL IV, 30-39 MIN: ICD-10-PCS | Mod: S$GLB,,, | Performed by: NURSE PRACTITIONER

## 2019-11-30 RX ORDER — BENZONATATE 100 MG/1
100 CAPSULE ORAL EVERY 6 HOURS PRN
Qty: 30 CAPSULE | Refills: 0 | Status: SHIPPED | OUTPATIENT
Start: 2019-11-30 | End: 2020-02-12

## 2019-11-30 RX ORDER — IPRATROPIUM BROMIDE 21 UG/1
2 SPRAY, METERED NASAL 2 TIMES DAILY
Qty: 30 ML | Refills: 0 | Status: SHIPPED | OUTPATIENT
Start: 2019-11-30 | End: 2019-12-07

## 2019-11-30 NOTE — PATIENT INSTRUCTIONS
Viral Upper Respiratory Illness (Adult)  You have a viral upper respiratory illness (URI), which is another term for the common cold. This illness is contagious during the first few days. It is spread through the air by coughing and sneezing. It may also be spread by direct contact (touching the sick person and then touching your own eyes, nose, or mouth). Frequent handwashing will decrease risk of spread. Most viral illnesses go away within 7 to 14 days with rest and simple home remedies. Sometimes the illness may last for several weeks. Antibiotics will not kill a virus, and they are generally not prescribed for this condition.    Home care  · If symptoms are severe, rest at home for the first 2 to 3 days. When you resume activity, don't let yourself get too tired.  · Avoid being exposed to cigarette smoke (yours or others).  · You may use acetaminophen or ibuprofen to control pain and fever, unless another medicine was prescribed. (Note: If you have chronic liver or kidney disease, have ever had a stomach ulcer or gastrointestinal bleeding, or are taking blood-thinning medicines, talk with your healthcare provider before using these medicines.) Aspirin should never be given to anyone under 18 years of age who is ill with a viral infection or fever. It may cause severe liver or brain damage.  · Your appetite may be poor, so a light diet is fine. Avoid dehydration by drinking 6 to 8 glasses of fluids per day (water, soft drinks, juices, tea, or soup). Extra fluids will help loosen secretions in the nose and lungs.  · Over-the-counter cold medicines will not shorten the length of time youre sick, but they may be helpful for the following symptoms: cough, sore throat, and nasal and sinus congestion. (Note: Do not use decongestants if you have high blood pressure.)  Follow-up care  Follow up with your healthcare provider, or as advised.  When to seek medical advice  Call your healthcare provider right away if any  of these occur:  · Cough with lots of colored sputum (mucus)  · Severe headache; face, neck, or ear pain  · Difficulty swallowing due to throat pain  · Fever of 100.4°F (38°C)  Call 911, or get immediate medical care  Call emergency services right away if any of these occur:  · Chest pain, shortness of breath, wheezing, or difficulty breathing  · Coughing up blood  · Inability to swallow due to throat pain  Date Last Reviewed: 9/13/2015  © 2030-8711 [employer]. [address] [place] [address]. All rights reserved. This information is not intended as a substitute for professional medical care. Always follow your healthcare professional's instructions.

## 2019-12-03 ENCOUNTER — TELEPHONE (OUTPATIENT)
Dept: OPHTHALMOLOGY | Facility: CLINIC | Age: 69
End: 2019-12-03

## 2019-12-03 ENCOUNTER — TELEPHONE (OUTPATIENT)
Dept: URGENT CARE | Facility: CLINIC | Age: 69
End: 2019-12-03

## 2019-12-03 NOTE — TELEPHONE ENCOUNTER
"----- Message from Shikha Power sent at 12/3/2019  8:50 AM CST -----  Contact: ASHWINI Dhillon   Consult/Advisory:    Name Of Caller: Sadiq   Provider Name: Ra Van MD  Does patient feel the need to be seen today? No   Relationship to the Pt?: self   Contact Preference?: [phone]  What is the nature of the call?:    -- pt called to get clarity about rather or not he should have a eye exam and prescription for glasses. Also, he is still using prescribed eye drops, would like to know if could stop.     Additional Notes:  "Thank you for all that you do for our patients'"      "

## 2019-12-29 DIAGNOSIS — J06.9 VIRAL URI WITH COUGH: ICD-10-CM

## 2019-12-29 RX ORDER — IPRATROPIUM BROMIDE 21 UG/1
SPRAY, METERED NASAL
Qty: 30 ML | Refills: 0 | OUTPATIENT
Start: 2019-12-29

## 2019-12-30 RX ORDER — PREDNISOLONE ACETATE 10 MG/ML
SUSPENSION/ DROPS OPHTHALMIC
Qty: 10 ML | Refills: 4 | Status: SHIPPED | OUTPATIENT
Start: 2019-12-30 | End: 2021-05-02

## 2020-01-03 ENCOUNTER — OFFICE VISIT (OUTPATIENT)
Dept: OPHTHALMOLOGY | Facility: CLINIC | Age: 70
End: 2020-01-03
Payer: COMMERCIAL

## 2020-01-03 DIAGNOSIS — S00.12XA TRAUMATIC ECCHYMOSIS OF LEFT EYELID, INITIAL ENCOUNTER: Primary | ICD-10-CM

## 2020-01-03 PROCEDURE — 99999 PR PBB SHADOW E&M-EST. PATIENT-LVL II: CPT | Mod: PBBFAC,,, | Performed by: OPHTHALMOLOGY

## 2020-01-03 PROCEDURE — 92014 PR EYE EXAM, EST PATIENT,COMPREHESV: ICD-10-PCS | Mod: S$GLB,,, | Performed by: OPHTHALMOLOGY

## 2020-01-03 PROCEDURE — 99999 PR PBB SHADOW E&M-EST. PATIENT-LVL II: ICD-10-PCS | Mod: PBBFAC,,, | Performed by: OPHTHALMOLOGY

## 2020-01-03 PROCEDURE — 92014 COMPRE OPH EXAM EST PT 1/>: CPT | Mod: S$GLB,,, | Performed by: OPHTHALMOLOGY

## 2020-01-03 NOTE — PROGRESS NOTES
HPI     Triage pt  --s/p 25g PPV/ILM peel/SF6 OD (7/18/18)   --s/p 25g PPV/ILM peel/SF6 OS (8/8/18)    PO Phaco/ DSEK - OS 03/21/19   Patient states hit OS x last pm on the corner of the dresser.  OS vision seem to be a little blurry.  1 on pain scale(eyebrow area)    Eye drops:Pred forte OS- Qid                                     OD-QD    I have personally interviewed the patient, reviewed the history and   examined the patient and agree with the technician's exam.    Last edited by Demetrius Persaud MD on 1/3/2020  9:54 AM. (History)            Assessment /Plan     For exam results, see Encounter Report.    Traumatic ecchymosis of left eyelid, initial encounter      I found no evidence of damage to his corneal graft or retina. He will return as needed.

## 2020-02-07 ENCOUNTER — PATIENT MESSAGE (OUTPATIENT)
Dept: GASTROENTEROLOGY | Facility: CLINIC | Age: 70
End: 2020-02-07

## 2020-02-10 ENCOUNTER — TELEPHONE (OUTPATIENT)
Dept: ENDOSCOPY | Facility: HOSPITAL | Age: 70
End: 2020-02-10

## 2020-02-10 DIAGNOSIS — K86.2 PANCREATIC CYST: ICD-10-CM

## 2020-02-12 ENCOUNTER — OFFICE VISIT (OUTPATIENT)
Dept: OPHTHALMOLOGY | Facility: CLINIC | Age: 70
End: 2020-02-12
Payer: COMMERCIAL

## 2020-02-12 DIAGNOSIS — Z94.7 STATUS POST CORNEAL TRANSPLANT: ICD-10-CM

## 2020-02-12 DIAGNOSIS — H18.519 FUCHS' CORNEAL DYSTROPHY: Primary | ICD-10-CM

## 2020-02-12 DIAGNOSIS — H35.343 FULL THICKNESS MACULAR HOLE, BILATERAL: ICD-10-CM

## 2020-02-12 PROCEDURE — 92014 COMPRE OPH EXAM EST PT 1/>: CPT | Mod: S$GLB,,, | Performed by: OPHTHALMOLOGY

## 2020-02-12 PROCEDURE — 99999 PR PBB SHADOW E&M-EST. PATIENT-LVL III: ICD-10-PCS | Mod: PBBFAC,,, | Performed by: OPHTHALMOLOGY

## 2020-02-12 PROCEDURE — 92014 PR EYE EXAM, EST PATIENT,COMPREHESV: ICD-10-PCS | Mod: S$GLB,,, | Performed by: OPHTHALMOLOGY

## 2020-02-12 PROCEDURE — 99999 PR PBB SHADOW E&M-EST. PATIENT-LVL III: CPT | Mod: PBBFAC,,, | Performed by: OPHTHALMOLOGY

## 2020-02-12 NOTE — Clinical Note
Can you take a quick peek at this patient's OCT post Mac hole surgery OU by you in 2018. He had a small head trauma and notes a dark spot centrally OS, but I dont' see anything remarkable. FRITZ

## 2020-02-12 NOTE — PROGRESS NOTES
HPI     69 YO male presents today for a DSEK/DMEK F/U. He notes that his vision   seems to be diminishing OS>OD.     Pred OD QD  Pred OS QID    Last edited by Janice Montague, PCT on 2/12/2020  1:10 PM. (History)            Assessment /Plan     For exam results, see Encounter Report.    Fuchs' corneal dystrophy    Full thickness macular hole, bilateral    Status post corneal transplant        DMEK OD looks perfect 7/2017  DSEK OS looks great also 3/2019 PF bid  Hx mac hole surgery OU, recheck OCT OU  Post op changes on OCT, but nothing striking.

## 2020-02-13 ENCOUNTER — TELEPHONE (OUTPATIENT)
Dept: ENDOSCOPY | Facility: HOSPITAL | Age: 70
End: 2020-02-13

## 2020-04-02 ENCOUNTER — PATIENT MESSAGE (OUTPATIENT)
Dept: GASTROENTEROLOGY | Facility: CLINIC | Age: 70
End: 2020-04-02

## 2020-04-04 ENCOUNTER — TELEPHONE (OUTPATIENT)
Dept: ENDOSCOPY | Facility: HOSPITAL | Age: 70
End: 2020-04-04

## 2020-04-04 DIAGNOSIS — E23.7 PITUITARY ABNORMALITY: Primary | ICD-10-CM

## 2020-09-20 ENCOUNTER — CLINICAL SUPPORT (OUTPATIENT)
Dept: URGENT CARE | Facility: CLINIC | Age: 70
End: 2020-09-20
Payer: COMMERCIAL

## 2020-09-20 VITALS — TEMPERATURE: 97 F

## 2020-09-20 DIAGNOSIS — Z23 NEED FOR INFLUENZA VACCINATION: Primary | ICD-10-CM

## 2020-09-20 PROCEDURE — 90471 IMMUNIZATION ADMIN: CPT | Mod: S$GLB,,, | Performed by: NURSE PRACTITIONER

## 2020-09-20 PROCEDURE — 90471 PR IMMUNIZ ADMIN,1 SINGLE/COMB VAC/TOXOID: ICD-10-PCS | Mod: S$GLB,,, | Performed by: NURSE PRACTITIONER

## 2020-09-20 PROCEDURE — 90694 FLU VACCINE - QUADRIVALENT - ADJUVANTED: ICD-10-PCS | Mod: S$GLB,,, | Performed by: NURSE PRACTITIONER

## 2020-09-20 PROCEDURE — 90694 VACC AIIV4 NO PRSRV 0.5ML IM: CPT | Mod: S$GLB,,, | Performed by: NURSE PRACTITIONER

## 2020-10-19 ENCOUNTER — OFFICE VISIT (OUTPATIENT)
Dept: ENDOCRINOLOGY | Facility: CLINIC | Age: 70
End: 2020-10-19
Payer: COMMERCIAL

## 2020-10-19 ENCOUNTER — LAB VISIT (OUTPATIENT)
Dept: LAB | Facility: HOSPITAL | Age: 70
End: 2020-10-19
Attending: INTERNAL MEDICINE
Payer: COMMERCIAL

## 2020-10-19 VITALS
DIASTOLIC BLOOD PRESSURE: 78 MMHG | HEIGHT: 70 IN | WEIGHT: 181.88 LBS | TEMPERATURE: 98 F | HEART RATE: 74 BPM | SYSTOLIC BLOOD PRESSURE: 126 MMHG | BODY MASS INDEX: 26.04 KG/M2

## 2020-10-19 DIAGNOSIS — D35.2 PROLACTINOMA: ICD-10-CM

## 2020-10-19 DIAGNOSIS — I10 ESSENTIAL HYPERTENSION: ICD-10-CM

## 2020-10-19 DIAGNOSIS — K86.2 PANCREATIC CYST: ICD-10-CM

## 2020-10-19 DIAGNOSIS — N40.0 BENIGN PROSTATIC HYPERPLASIA, UNSPECIFIED WHETHER LOWER URINARY TRACT SYMPTOMS PRESENT: ICD-10-CM

## 2020-10-19 DIAGNOSIS — E78.5 HYPERLIPIDEMIA, UNSPECIFIED HYPERLIPIDEMIA TYPE: ICD-10-CM

## 2020-10-19 DIAGNOSIS — D35.2 PITUITARY ADENOMA: ICD-10-CM

## 2020-10-19 DIAGNOSIS — K86.89 PANCREATIC MASS: ICD-10-CM

## 2020-10-19 DIAGNOSIS — Z87.19 HISTORY OF PANCREATITIS: ICD-10-CM

## 2020-10-19 DIAGNOSIS — E21.0 PRIMARY HYPERPARATHYROIDISM: ICD-10-CM

## 2020-10-19 DIAGNOSIS — E55.9 HYPOVITAMINOSIS D: ICD-10-CM

## 2020-10-19 DIAGNOSIS — D35.2 PITUITARY ADENOMA: Primary | ICD-10-CM

## 2020-10-19 DIAGNOSIS — E78.00 HYPERCHOLESTEROLEMIA: ICD-10-CM

## 2020-10-19 DIAGNOSIS — E29.1 HYPOGONADISM IN MALE: ICD-10-CM

## 2020-10-19 DIAGNOSIS — E11.65 TYPE 2 DIABETES MELLITUS WITH HYPERGLYCEMIA, WITHOUT LONG-TERM CURRENT USE OF INSULIN: ICD-10-CM

## 2020-10-19 DIAGNOSIS — H18.519 FUCHS' CORNEAL DYSTROPHY: ICD-10-CM

## 2020-10-19 DIAGNOSIS — D35.2 PITUITARY MACROADENOMA: ICD-10-CM

## 2020-10-19 DIAGNOSIS — N20.0 NEPHROLITHIASIS: ICD-10-CM

## 2020-10-19 LAB
CA-I BLDV-SCNC: 1.3 MMOL/L (ref 1.06–1.42)
OSMOLALITY SERPL: 295 MOSM/KG (ref 280–300)
PTH-INTACT SERPL-MCNC: 38 PG/ML (ref 9–77)

## 2020-10-19 PROCEDURE — 99205 OFFICE O/P NEW HI 60 MIN: CPT | Mod: S$GLB,,, | Performed by: INTERNAL MEDICINE

## 2020-10-19 PROCEDURE — 3078F DIAST BP <80 MM HG: CPT | Mod: CPTII,S$GLB,, | Performed by: INTERNAL MEDICINE

## 2020-10-19 PROCEDURE — 82384 ASSAY THREE CATECHOLAMINES: CPT

## 2020-10-19 PROCEDURE — 81404 MOPATH PROCEDURE LEVEL 5: CPT

## 2020-10-19 PROCEDURE — 86301 IMMUNOASSAY TUMOR CA 19-9: CPT

## 2020-10-19 PROCEDURE — 82672 ASSAY OF ESTROGEN: CPT

## 2020-10-19 PROCEDURE — 3008F BODY MASS INDEX DOCD: CPT | Mod: CPTII,S$GLB,, | Performed by: INTERNAL MEDICINE

## 2020-10-19 PROCEDURE — 84588 ASSAY OF VASOPRESSIN: CPT

## 2020-10-19 PROCEDURE — 84154 ASSAY OF PSA FREE: CPT

## 2020-10-19 PROCEDURE — 83003 ASSAY GROWTH HORMONE (HGH): CPT

## 2020-10-19 PROCEDURE — 99999 PR PBB SHADOW E&M-EST. PATIENT-LVL V: ICD-10-PCS | Mod: PBBFAC,,, | Performed by: INTERNAL MEDICINE

## 2020-10-19 PROCEDURE — 3074F PR MOST RECENT SYSTOLIC BLOOD PRESSURE < 130 MM HG: ICD-10-PCS | Mod: CPTII,S$GLB,, | Performed by: INTERNAL MEDICINE

## 2020-10-19 PROCEDURE — 86316 IMMUNOASSAY TUMOR OTHER: CPT | Mod: 59

## 2020-10-19 PROCEDURE — 99999 PR PBB SHADOW E&M-EST. PATIENT-LVL V: CPT | Mod: PBBFAC,,, | Performed by: INTERNAL MEDICINE

## 2020-10-19 PROCEDURE — 86304 IMMUNOASSAY TUMOR CA 125: CPT

## 2020-10-19 PROCEDURE — 86300 IMMUNOASSAY TUMOR CA 15-3: CPT

## 2020-10-19 PROCEDURE — 86300 IMMUNOASSAY TUMOR CA 15-3: CPT | Mod: 91

## 2020-10-19 PROCEDURE — 3008F PR BODY MASS INDEX (BMI) DOCUMENTED: ICD-10-PCS | Mod: CPTII,S$GLB,, | Performed by: INTERNAL MEDICINE

## 2020-10-19 PROCEDURE — 83520 IMMUNOASSAY QUANT NOS NONAB: CPT

## 2020-10-19 PROCEDURE — 3074F SYST BP LT 130 MM HG: CPT | Mod: CPTII,S$GLB,, | Performed by: INTERNAL MEDICINE

## 2020-10-19 PROCEDURE — 1101F PR PT FALLS ASSESS DOC 0-1 FALLS W/OUT INJ PAST YR: ICD-10-PCS | Mod: CPTII,S$GLB,, | Performed by: INTERNAL MEDICINE

## 2020-10-19 PROCEDURE — 83002 ASSAY OF GONADOTROPIN (LH): CPT

## 2020-10-19 PROCEDURE — 1159F MED LIST DOCD IN RCRD: CPT | Mod: S$GLB,,, | Performed by: INTERNAL MEDICINE

## 2020-10-19 PROCEDURE — 83930 ASSAY OF BLOOD OSMOLALITY: CPT

## 2020-10-19 PROCEDURE — 82330 ASSAY OF CALCIUM: CPT

## 2020-10-19 PROCEDURE — 99205 PR OFFICE/OUTPT VISIT, NEW, LEVL V, 60-74 MIN: ICD-10-PCS | Mod: S$GLB,,, | Performed by: INTERNAL MEDICINE

## 2020-10-19 PROCEDURE — 82670 ASSAY OF TOTAL ESTRADIOL: CPT

## 2020-10-19 PROCEDURE — 82378 CARCINOEMBRYONIC ANTIGEN: CPT

## 2020-10-19 PROCEDURE — 82040 ASSAY OF SERUM ALBUMIN: CPT

## 2020-10-19 PROCEDURE — 1126F PR PAIN SEVERITY QUANTIFIED, NO PAIN PRESENT: ICD-10-PCS | Mod: S$GLB,,, | Performed by: INTERNAL MEDICINE

## 2020-10-19 PROCEDURE — 1126F AMNT PAIN NOTED NONE PRSNT: CPT | Mod: S$GLB,,, | Performed by: INTERNAL MEDICINE

## 2020-10-19 PROCEDURE — 83970 ASSAY OF PARATHORMONE: CPT

## 2020-10-19 PROCEDURE — 36415 COLL VENOUS BLD VENIPUNCTURE: CPT | Mod: PO

## 2020-10-19 PROCEDURE — 1159F PR MEDICATION LIST DOCUMENTED IN MEDICAL RECORD: ICD-10-PCS | Mod: S$GLB,,, | Performed by: INTERNAL MEDICINE

## 2020-10-19 PROCEDURE — 3078F PR MOST RECENT DIASTOLIC BLOOD PRESSURE < 80 MM HG: ICD-10-PCS | Mod: CPTII,S$GLB,, | Performed by: INTERNAL MEDICINE

## 2020-10-19 PROCEDURE — 82308 ASSAY OF CALCITONIN: CPT

## 2020-10-19 PROCEDURE — 1101F PT FALLS ASSESS-DOCD LE1/YR: CPT | Mod: CPTII,S$GLB,, | Performed by: INTERNAL MEDICINE

## 2020-10-19 PROCEDURE — 83001 ASSAY OF GONADOTROPIN (FSH): CPT

## 2020-10-19 PROCEDURE — 83880 ASSAY OF NATRIURETIC PEPTIDE: CPT

## 2020-10-19 NOTE — PROGRESS NOTES
Subjective:      Patient ID: Sadiq Dhillon is a 70 y.o. male.    Chief Complaint:  pituitary and pancrease cyst    Patient is a 70 ur old gentleman with prior history of prolactinoma seen for initial care visit.    History of Present Illness  The patient is a 70 yr old gentleman seen for initial care visit on account of prolactinoma and presumed pancreatic cyst.  He had previously been seen at the [facility] neuroendocrine center. 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 He also had a background history of primary parathyroidectomy for which he had a prior parathyroidectomy and a pancreatic neoplasm. He in addition has secondary hypogonadism, type 2 diabetes, hyperlipidemia, CAD and Fuch's corneal dystrophy. There is a lingering concern that he may have MEN-1 but this has not been diagnostically confirmed.  He had apparently been previously managed and ffed here in [place] with Dr Dr Bree Camacho prior to her move to [place].  He remains on Dostinex for the prolactinoma;  0.5mg 2 x weekly.  His background comorbidities are detailed below;    1. Pituitary adenoma     2. Prolactinoma     3. Primary hyperparathyroidism     4. Fuchs' corneal dystrophy     5. Type 2 diabetes mellitus with hyperglycemia, without long-term current use of insulin     6. Hypogonadism in male     7. Nephrolithiasis     8. Unspecified essential hypertension     9. Pituitary macroadenoma     10. Hyperlipidemia, unspecified hyperlipidemia type     11. Hypercholesterolemia       Patients baseline [place] score is 7.  Patient is being seen once every 2 years. Patients is being ffed by a gastroenterologist; Dr Post.  There is no family history of pituitary disease.  Patient sister does have nephrolithiasis.   There is no family history of any adrenal, pancreatic or renal masses.  No family history of pancreatic cancer.  There is no family or personal history of severe dyspepsia.  Patient does not have persistent headaches. He does not  And visual  "blurring not lateral vision deficits.    Patient does not smoke.  Patient drinks ~ 7  Weekly; mainly wine and cock tails.    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          Review of Systems   Constitutional: Negative for activity change, appetite change, chills, diaphoresis, fatigue, fever and unexpected weight change.   HENT: Negative for facial swelling, hearing loss, mouth sores, sore throat, tinnitus, trouble swallowing and voice change.    Eyes: Negative for photophobia and visual disturbance.   Respiratory: Negative for cough and shortness of breath.    Cardiovascular: Negative for chest pain, palpitations and leg swelling.   Gastrointestinal: Positive for diarrhea (occasional; apparently temporally related to metformin use.). Negative for abdominal distention, abdominal pain, constipation, nausea and vomiting.   Endocrine: Negative for cold intolerance, heat intolerance, polydipsia, polyphagia and polyuria.   Genitourinary: Negative for dysuria, flank pain, frequency, hematuria and urgency.   Musculoskeletal: Negative for arthralgias, back pain, gait problem, joint swelling, myalgias and neck pain.   Skin: Negative for color change, pallor and rash.   Neurological: Negative for dizziness, tremors, seizures, syncope, light-headedness, numbness and headaches.   Hematological: Does not bruise/bleed easily.   Psychiatric/Behavioral: Negative for agitation, confusion, dysphoric mood and sleep disturbance. The patient is not nervous/anxious and is not hyperactive.        Objective: /78 (BP Location: Right arm, Patient Position: Sitting, BP Method: Medium (Manual))   Pulse 74   Temp 98.3 °F (36.8 °C) (Temporal)   Ht 5' 10" (1.778 m)   Wt 82.5 kg (181 lb 14.1 oz)   BMI 26.10 kg/m²        Physical Exam  Vitals signs reviewed.   Constitutional:       General: He is not in acute distress.     Appearance: Normal appearance. He is well-developed. He is not toxic-appearing or diaphoretic. "      Comments: Pleasant elderly gentleman who looks substantively younger than chronologic age. Not pale, anicteric, afebrile. Well hydrated. Clinically comfortable. Not in any acute or chronic distress.   HENT:      Head: Normocephalic and atraumatic. No right periorbital erythema or left periorbital erythema. Hair is normal.      Comments: Nil nuchal AN. Mallampati grade 2 fauces.     Mouth/Throat:      Mouth: Mucous membranes are moist.      Pharynx: No oropharyngeal exudate.   Eyes:      General: Lids are normal. No scleral icterus.     Extraocular Movements: Extraocular movements intact.      Conjunctiva/sclera: Conjunctivae normal.      Pupils: Pupils are equal, round, and reactive to light.   Neck:      Musculoskeletal: Normal range of motion and neck supple.      Thyroid: Thyromegaly (Mild diffuse thyromegaly. no nodules felt.) present. No thyroid mass.      Vascular: No carotid bruit or JVD.      Trachea: Trachea normal. No tracheal tenderness or tracheal deviation.   Cardiovascular:      Rate and Rhythm: Normal rate and regular rhythm.      Pulses: Normal pulses.      Heart sounds: Normal heart sounds. No murmur. No gallop.    Pulmonary:      Effort: Pulmonary effort is normal. No respiratory distress.      Breath sounds: Normal breath sounds. No decreased breath sounds, wheezing or rales.   Abdominal:      General: Bowel sounds are normal. There is no distension.      Palpations: Abdomen is soft. There is no mass.      Tenderness: There is no abdominal tenderness.   Musculoskeletal: Normal range of motion.         General: No swelling or tenderness.      Comments: No pedal edema nor calf swelling or tenderness.   Lymphadenopathy:      Cervical: No cervical adenopathy.   Skin:     General: Skin is warm and dry.      Findings: No bruising, ecchymosis, erythema, petechiae or rash.      Nails: There is no clubbing.     Neurological:      General: No focal deficit present.      Mental Status: He is alert and  oriented to person, place, and time.      Cranial Nerves: Cranial nerves are intact. No cranial nerve deficit.      Motor: Motor function is intact. No tremor or abnormal muscle tone.      Coordination: Coordination is intact.      Gait: Gait is intact. Gait normal.      Deep Tendon Reflexes: Reflexes are normal and symmetric. Reflexes normal.   Psychiatric:         Attention and Perception: Attention normal.         Mood and Affect: Mood normal. Mood is not anxious or depressed.         Speech: Speech normal.         Behavior: Behavior normal. Behavior is cooperative.         Thought Content: Thought content normal.         Cognition and Memory: Cognition normal.         Judgment: Judgment normal.         Lab Review:     Results for TOMA RG (MRN [id number]) as of 10/19/2020 13:28   Ref. Range 8/15/2019 10:47 9/25/2019 14:04   Creatinine Latest Ref Range: 0.5 - 1.4 mg/dL 0.9    eGFR if non African American Latest Ref Range: >60 mL/min/1.73 m^2 >60.0    eGFR if African American Latest Ref Range: >60 mL/min/1.73 m^2 >60.0    Sample Unknown  VENOUS   POC Creatinine Latest Ref Range: 0.5 - 1.4 mg/dL  0.7       Assessment:     1. Pituitary adenoma  Prolactin    Macroprolactin, Serum    Alpha Sub Unit    Cortisol    ACTH    Insulin-like growth factor    Luteinizing Hormone    IGF Binding Protein 3    Chromogranin A    Growth Hormone    Osmolality, Serum    Osmolality, Urine    Arginine vasopressin hormone    BNP    Catecholamines, Fractionated, Plasma   2. Prolactinoma  Prolactin    Macroprolactin, Serum    Alpha Sub Unit    Cortisol    ACTH    Insulin-like growth factor    Luteinizing Hormone    IGF Binding Protein 3    Chromogranin A    Growth Hormone    Osmolality, Serum    Osmolality, Urine    Arginine vasopressin hormone    BNP    Catecholamines, Fractionated, Plasma   3. Primary hyperparathyroidism  DXA Bone Density Spine And Hip    DXA Bone Density Appendicular Skeleton    Calcium, Ionized    PTH, Intact    4. Fuchs' corneal dystrophy     5. Type 2 diabetes mellitus with hyperglycemia, without long-term current use of insulin  Hemoglobin A1C    GlycoMark (TM)    Fructosamine    Comprehensive Metabolic Panel    CBC auto differential    Lipid Panel    Uric Acid    Microalbumin/Creatinine Ratio, Urine    Urinalysis    Calcitonin   6. Hypogonadism in male  CBC auto differential    Testosterone Panel    Estradiol    Estrogens, Total    Follicle Stimulating Hormone   7. Nephrolithiasis  DXA Bone Density Spine And Hip    DXA Bone Density Appendicular Skeleton   8. Unspecified essential hypertension  Lipid Panel    Uric Acid    Microalbumin/Creatinine Ratio, Urine    Urinalysis    Catecholamines, Fractionated, Plasma   9. Pituitary macroadenoma     10. Hyperlipidemia, unspecified hyperlipidemia type  Comprehensive Metabolic Panel    Lipid Panel   11. Hypercholesterolemia  Comprehensive Metabolic Panel    Lipid Panel   12. Pancreatic cyst  Misc Sendout Test, Blood Serum Trypsin    Amylase    Lipase   13. History of pancreatitis  Misc Sendout Test, Blood Serum Trypsin    Amylase    Lipase   14. Hypovitaminosis D  Vitamin D    Calcium, Ionized    PTH, Intact   15. Pancreatic mass      Cancer Antigen 19-9    Cancer Antigen 15-3    Cancer Antigen 27-29    CEA   16. Benign prostatic hyperplasia, unspecified whether lower urinary tract symptoms present  PSA, Total and Free      Patient with pituitary macroadenoma; to check prolactin levels currently and obtain the rest of the lab testing for current functional status as detailed above.  Regarding hyperparathyroidism; this required prior parathyroidectomy the details of which are unclear. To obtain  mineral panel screening as detailed above and obtain ffup DEXA. In view of his history of pituitary and parathyroid disease as well as pancreatic cystic mass disease to obtain MEN-1 genetic testing.  Regarding hypogonadism; to continue topical androgen repletion and will check  current androgen and other relevant repro hormone profile. To also check current PSA and HCT.  Regarding pancreatic  Cystic mass; to obtain ffup abdomen CT, obtain basic tumor marker screening which will be ffed serially as required.  Regarding type 2 diabetes; to continue low dose metformin as before and check current HBA1c. For now no management changes.  Regarding essential hypertension; BP presently well controlled. To continue present antihypertesnive regimen and to continue serial ambulatory BP and pulse rate tracking.  Regarding hyperlipidemia; to continue present antilipidemic therapy and to check current lipid profile as well as low dose asa for primary ASCVD prophylaxis.  Regarding possible hypovitaminosis D; to check 25 OH Vit D levels and replete as needed.  I spent >45 minutes in review and direct face to face discussion with the patient regarding the intended investigative and management plan.      Plan:       FFup in ~ 4mths.

## 2020-10-20 LAB
BNP SERPL-MCNC: 41 PG/ML (ref 0–99)
CANCER AG125 SERPL-ACNC: 10 U/ML (ref 0–30)
CANCER AG19-9 SERPL-ACNC: 5 U/ML (ref 2–40)
CEA SERPL-MCNC: <1 NG/ML (ref 0–5)
ESTRADIOL SERPL-MCNC: 23 PG/ML (ref 11–44)
FSH SERPL-ACNC: 0.1 MIU/ML (ref 0.95–11.95)
LH SERPL-ACNC: <0.2 MIU/ML (ref 0.6–12.1)
PROSTATE SPECIFIC ANTIGEN, TOTAL: 1.3 NG/ML (ref 0–4)
PSA FREE MFR SERPL: 40 %
PSA FREE SERPL-MCNC: 0.52 NG/ML (ref 0.01–1.5)

## 2020-10-21 ENCOUNTER — PATIENT MESSAGE (OUTPATIENT)
Dept: ENDOCRINOLOGY | Facility: CLINIC | Age: 70
End: 2020-10-21

## 2020-10-21 ENCOUNTER — LAB VISIT (OUTPATIENT)
Dept: LAB | Facility: HOSPITAL | Age: 70
End: 2020-10-21
Attending: INTERNAL MEDICINE
Payer: COMMERCIAL

## 2020-10-21 DIAGNOSIS — E78.00 HYPERCHOLESTEROLEMIA: ICD-10-CM

## 2020-10-21 DIAGNOSIS — Z87.19 HISTORY OF PANCREATITIS: ICD-10-CM

## 2020-10-21 DIAGNOSIS — D35.2 PITUITARY ADENOMA: ICD-10-CM

## 2020-10-21 DIAGNOSIS — D35.2 PROLACTINOMA: ICD-10-CM

## 2020-10-21 DIAGNOSIS — I10 ESSENTIAL HYPERTENSION: ICD-10-CM

## 2020-10-21 DIAGNOSIS — E55.9 HYPOVITAMINOSIS D: ICD-10-CM

## 2020-10-21 DIAGNOSIS — K86.2 PANCREATIC CYST: ICD-10-CM

## 2020-10-21 DIAGNOSIS — E11.65 TYPE 2 DIABETES MELLITUS WITH HYPERGLYCEMIA, WITHOUT LONG-TERM CURRENT USE OF INSULIN: ICD-10-CM

## 2020-10-21 DIAGNOSIS — E78.5 HYPERLIPIDEMIA, UNSPECIFIED HYPERLIPIDEMIA TYPE: ICD-10-CM

## 2020-10-21 DIAGNOSIS — E29.1 HYPOGONADISM IN MALE: ICD-10-CM

## 2020-10-21 LAB
25(OH)D3+25(OH)D2 SERPL-MCNC: 34 NG/ML (ref 30–96)
ALBUMIN SERPL BCP-MCNC: 4.5 G/DL (ref 3.5–5.2)
ALP SERPL-CCNC: 82 U/L (ref 55–135)
ALT SERPL W/O P-5'-P-CCNC: 21 U/L (ref 10–44)
AMYLASE SERPL-CCNC: 123 U/L (ref 20–110)
ANION GAP SERPL CALC-SCNC: 8 MMOL/L (ref 8–16)
AST SERPL-CCNC: 20 U/L (ref 10–40)
BASOPHILS # BLD AUTO: 0.03 K/UL (ref 0–0.2)
BASOPHILS NFR BLD: 0.5 % (ref 0–1.9)
BILIRUB SERPL-MCNC: 0.5 MG/DL (ref 0.1–1)
BUN SERPL-MCNC: 12 MG/DL (ref 8–23)
CALCIUM SERPL-MCNC: 9.7 MG/DL (ref 8.7–10.5)
CHLORIDE SERPL-SCNC: 104 MMOL/L (ref 95–110)
CHOLEST SERPL-MCNC: 93 MG/DL (ref 120–199)
CHOLEST/HDLC SERPL: 2.3 {RATIO} (ref 2–5)
CO2 SERPL-SCNC: 28 MMOL/L (ref 23–29)
CORTIS SERPL-MCNC: 10.7 UG/DL
CREAT SERPL-MCNC: 0.9 MG/DL (ref 0.5–1.4)
DIFFERENTIAL METHOD: ABNORMAL
EOSINOPHIL # BLD AUTO: 0.2 K/UL (ref 0–0.5)
EOSINOPHIL NFR BLD: 3.7 % (ref 0–8)
ERYTHROCYTE [DISTWIDTH] IN BLOOD BY AUTOMATED COUNT: 12.9 % (ref 11.5–14.5)
EST. GFR  (AFRICAN AMERICAN): >60 ML/MIN/1.73 M^2
EST. GFR  (NON AFRICAN AMERICAN): >60 ML/MIN/1.73 M^2
ESTIMATED AVG GLUCOSE: 151 MG/DL (ref 68–131)
GLUCOSE SERPL-MCNC: 106 MG/DL (ref 70–110)
HBA1C MFR BLD HPLC: 6.9 % (ref 4–5.6)
HCT VFR BLD AUTO: 43.7 % (ref 40–54)
HDLC SERPL-MCNC: 40 MG/DL (ref 40–75)
HDLC SERPL: 43 % (ref 20–50)
HGB BLD-MCNC: 14.4 G/DL (ref 14–18)
IMM GRANULOCYTES # BLD AUTO: 0.01 K/UL (ref 0–0.04)
IMM GRANULOCYTES NFR BLD AUTO: 0.2 % (ref 0–0.5)
LDLC SERPL CALC-MCNC: 17.2 MG/DL (ref 63–159)
LIPASE SERPL-CCNC: 78 U/L (ref 4–60)
LYMPHOCYTES # BLD AUTO: 2.3 K/UL (ref 1–4.8)
LYMPHOCYTES NFR BLD: 35.1 % (ref 18–48)
MCH RBC QN AUTO: 28.7 PG (ref 27–31)
MCHC RBC AUTO-ENTMCNC: 33 G/DL (ref 32–36)
MCV RBC AUTO: 87 FL (ref 82–98)
MONOCYTES # BLD AUTO: 0.7 K/UL (ref 0.3–1)
MONOCYTES NFR BLD: 10 % (ref 4–15)
NEUTROPHILS # BLD AUTO: 3.3 K/UL (ref 1.8–7.7)
NEUTROPHILS NFR BLD: 50.5 % (ref 38–73)
NONHDLC SERPL-MCNC: 53 MG/DL
NRBC BLD-RTO: 0 /100 WBC
PLATELET # BLD AUTO: 205 K/UL (ref 150–350)
PMV BLD AUTO: 8.7 FL (ref 9.2–12.9)
POTASSIUM SERPL-SCNC: 4.3 MMOL/L (ref 3.5–5.1)
PROLACTIN SERPL IA-MCNC: 4.2 NG/ML (ref 3.5–19.4)
PROT SERPL-MCNC: 7.1 G/DL (ref 6–8.4)
RBC # BLD AUTO: 5.02 M/UL (ref 4.6–6.2)
SODIUM SERPL-SCNC: 140 MMOL/L (ref 136–145)
TRIGL SERPL-MCNC: 179 MG/DL (ref 30–150)
URATE SERPL-MCNC: 5 MG/DL (ref 3.4–7)
WBC # BLD AUTO: 6.52 K/UL (ref 3.9–12.7)

## 2020-10-21 PROCEDURE — 84550 ASSAY OF BLOOD/URIC ACID: CPT

## 2020-10-21 PROCEDURE — 30000890 ARUP MISCELLANEOUS TEST 1

## 2020-10-21 PROCEDURE — 85025 COMPLETE CBC W/AUTO DIFF WBC: CPT

## 2020-10-21 PROCEDURE — 84378 SUGARS SINGLE QUANT: CPT

## 2020-10-21 PROCEDURE — 82985 ASSAY OF GLYCATED PROTEIN: CPT

## 2020-10-21 PROCEDURE — 84305 ASSAY OF SOMATOMEDIN: CPT

## 2020-10-21 PROCEDURE — 82150 ASSAY OF AMYLASE: CPT

## 2020-10-21 PROCEDURE — 82533 TOTAL CORTISOL: CPT

## 2020-10-21 PROCEDURE — 80053 COMPREHEN METABOLIC PANEL: CPT

## 2020-10-21 PROCEDURE — 83519 RIA NONANTIBODY: CPT

## 2020-10-21 PROCEDURE — 84146 ASSAY OF PROLACTIN: CPT | Mod: 91

## 2020-10-21 PROCEDURE — 83690 ASSAY OF LIPASE: CPT

## 2020-10-21 PROCEDURE — 82397 CHEMILUMINESCENT ASSAY: CPT

## 2020-10-21 PROCEDURE — 82024 ASSAY OF ACTH: CPT

## 2020-10-21 PROCEDURE — 80061 LIPID PANEL: CPT

## 2020-10-21 PROCEDURE — 84146 ASSAY OF PROLACTIN: CPT

## 2020-10-21 PROCEDURE — 83036 HEMOGLOBIN GLYCOSYLATED A1C: CPT

## 2020-10-21 PROCEDURE — 82306 VITAMIN D 25 HYDROXY: CPT

## 2020-10-22 LAB
ANNOTATION COMMENT IMP: NORMAL
CALCIT SERPL-MCNC: <5 PG/ML
CANCER AG15-3 SERPL-ACNC: 22 U/ML (ref 0–31)
IGF BP3 SERPL-MCNC: 2.4 MCG/ML (ref 3–6.2)
MACROPROLACTIN SERPL-MCNC: 4.6 NG/ML (ref 2.7–13.1)
MACROPROLACTIN/PROLACTIN MFR SERPL: 2 %
PROLACTIN SERPL IA-MCNC: 4.7 NG/ML (ref 4–15.2)

## 2020-10-23 LAB
ACTH PLAS-MCNC: 69 PG/ML (ref 0–46)
CANCER AG27-29 SERPL-ACNC: 24.7 U/ML
CGA SERPL-MCNC: 65 NG/ML (ref 0–103)
GH SERPL-MCNC: <0.1 NG/ML (ref 0–3)

## 2020-10-24 LAB — GLYCOMARK (TM): 11.3 UG/ML

## 2020-10-26 LAB
ALBUMIN SERPL-MCNC: 4.6 G/DL (ref 3.6–5.1)
ESTROGEN SERPL-MCNC: 146 PG/ML
FRUCTOSAMINE SERPL-SCNC: 314 UMOL /L
IGF-I SERPL-MCNC: 63 NG/ML (ref 32–209)
IGF-I Z-SCORE SERPL: -1.28 SD
SHBG SERPL-SCNC: 22 NMOL/L (ref 22–77)
TESTOST FREE SERPL-MCNC: 171.8 PG/ML (ref 46–224)
TESTOST SERPL-MCNC: 835 NG/DL (ref 250–1100)
TESTOSTERONE.FREE+WB SERPL-MCNC: 360.8 NG/DL (ref 110–575)

## 2020-10-27 LAB — ARUP MISCELLANEOUS TEST 1: NORMAL

## 2020-10-28 LAB
A-PGH SER-MCNC: <0.1 NG/ML
VASOPRESSIN SERPL-MCNC: <0.5 PG/ML (ref 0–6.9)

## 2020-10-30 ENCOUNTER — OFFICE VISIT (OUTPATIENT)
Dept: URGENT CARE | Facility: CLINIC | Age: 70
End: 2020-10-30
Payer: COMMERCIAL

## 2020-10-30 VITALS
HEIGHT: 69 IN | DIASTOLIC BLOOD PRESSURE: 63 MMHG | HEART RATE: 64 BPM | BODY MASS INDEX: 26.66 KG/M2 | RESPIRATION RATE: 17 BRPM | OXYGEN SATURATION: 98 % | TEMPERATURE: 98 F | WEIGHT: 180 LBS | SYSTOLIC BLOOD PRESSURE: 99 MMHG

## 2020-10-30 DIAGNOSIS — S91.332A PUNCTURE WOUND OF LEFT FOOT, INITIAL ENCOUNTER: Primary | ICD-10-CM

## 2020-10-30 PROCEDURE — 99214 PR OFFICE/OUTPT VISIT, EST, LEVL IV, 30-39 MIN: ICD-10-PCS | Mod: 25,S$GLB,, | Performed by: PHYSICIAN ASSISTANT

## 2020-10-30 PROCEDURE — 90471 IMMUNIZATION ADMIN: CPT | Mod: S$GLB,,, | Performed by: PHYSICIAN ASSISTANT

## 2020-10-30 PROCEDURE — 90715 TDAP VACCINE 7 YRS/> IM: CPT | Mod: S$GLB,,, | Performed by: PHYSICIAN ASSISTANT

## 2020-10-30 PROCEDURE — 90471 TDAP VACCINE GREATER THAN OR EQUAL TO 7YO IM: ICD-10-PCS | Mod: S$GLB,,, | Performed by: PHYSICIAN ASSISTANT

## 2020-10-30 PROCEDURE — 99214 OFFICE O/P EST MOD 30 MIN: CPT | Mod: 25,S$GLB,, | Performed by: PHYSICIAN ASSISTANT

## 2020-10-30 PROCEDURE — 90715 TDAP VACCINE GREATER THAN OR EQUAL TO 7YO IM: ICD-10-PCS | Mod: S$GLB,,, | Performed by: PHYSICIAN ASSISTANT

## 2020-10-30 RX ORDER — CIPROFLOXACIN 500 MG/1
500 TABLET ORAL 2 TIMES DAILY
Qty: 14 TABLET | Refills: 0 | Status: SHIPPED | OUTPATIENT
Start: 2020-10-30 | End: 2020-11-06

## 2020-10-30 NOTE — PROGRESS NOTES
"Subjective:       Patient ID: Sadiq Dhillon is a 70 y.o. male.    Vitals:  height is 5' 9" (1.753 m) and weight is 81.6 kg (180 lb). His temporal temperature is 98 °F (36.7 °C). His blood pressure is 99/63 and his pulse is 64. His respiration is 17 and oxygen saturation is 98%.     Chief Complaint: Foot Pain    Patient stepped on something left foot when walking on carpet a few days ago. Now red and swollen with mild pain. Concerned because he is diabetic.     Foot Pain  This is a new problem. The current episode started in the past 7 days. The problem occurs constantly. The problem has been unchanged. Pertinent negatives include no abdominal pain, anorexia, arthralgias, change in bowel habit, chest pain, chills, congestion, coughing, diaphoresis, fatigue, fever, headaches, joint swelling, myalgias, nausea, neck pain, numbness, rash, sore throat, swollen glands, urinary symptoms, vertigo, visual change, vomiting or weakness. The symptoms are aggravated by walking. He has tried acetaminophen for the symptoms. The treatment provided mild relief.       Constitution: Negative for chills, sweating, fatigue and fever.   HENT: Negative for facial swelling, facial trauma, congestion and sore throat.    Neck: Negative for neck pain and neck stiffness.   Cardiovascular: Negative for chest trauma and chest pain.   Eyes: Negative for eye trauma, double vision and blurred vision.   Respiratory: Negative for cough.    Gastrointestinal: Negative for abdominal trauma, abdominal pain, nausea, vomiting and rectal bleeding.   Genitourinary: Negative for hematuria, genital trauma and pelvic pain.   Musculoskeletal: Negative for pain, trauma, joint pain, joint swelling, abnormal ROM of joint, pain with walking and muscle ache.   Skin: Negative for color change, rash, wound, abrasion, laceration and erythema.   Neurological: Negative for dizziness, history of vertigo, light-headedness, coordination disturbances, headaches, altered " mental status, loss of consciousness and numbness.   Hematologic/Lymphatic: Negative for history of bleeding disorder.   Psychiatric/Behavioral: Negative for altered mental status.       Objective:      Physical Exam   Constitutional: He is oriented to person, place, and time. He appears well-developed.   HENT:   Head: Normocephalic and atraumatic. Head is without abrasion, without contusion and without laceration.   Ears:   Right Ear: External ear normal.   Left Ear: External ear normal.   Nose: Nose normal.   Mouth/Throat: Oropharynx is clear and moist and mucous membranes are normal.   Eyes: Pupils are equal, round, and reactive to light. Conjunctivae, EOM and lids are normal.   Neck: Trachea normal, full passive range of motion without pain and phonation normal. Neck supple.   Cardiovascular: Normal rate, regular rhythm and normal heart sounds.   Pulmonary/Chest: Effort normal and breath sounds normal. No stridor. No respiratory distress.   Musculoskeletal: Normal range of motion.      Left ankle: He exhibits normal range of motion, no swelling, no ecchymosis, no deformity, no laceration and normal pulse. No tenderness. Achilles tendon normal.      Left foot: Normal range of motion and normal capillary refill. Tenderness and swelling present. No bony tenderness, crepitus, deformity or laceration.        Feet:    Neurological: He is alert and oriented to person, place, and time.   Skin: Skin is warm, dry, intact and no rash. Capillary refill takes less than 2 seconds. not left foot and not left ankleabrasion, burn, bruising, erythema and ecchymosisPsychiatric: His speech is normal and behavior is normal. Judgment and thought content normal.   Nursing note and vitals reviewed.        Assessment:       1. Puncture wound of left foot, initial encounter        Plan:         Puncture wound of left foot, initial encounter  -     ciprofloxacin HCl (CIPRO) 500 MG tablet; Take 1 tablet (500 mg total) by mouth 2 (two)  times daily. for 7 days  Dispense: 14 tablet; Refill: 0  -     (In Office Administered) Tdap Vaccine    Discussed diagnosis with patient as well as treatment and home care. Discussed return to clinic precautions vs ER precautions. All patients questions answered. Patient verbalized understanding. Patient agreed with plan of care.         Puncture Wound: Foot       A puncture wound occurs when a pointed object (such as a nail) pushes into the skin. It may go into the tissues below the skin of the foot, including fat and muscle. This type of wound is narrow and deep. They can be hard to clean. Puncture wounds are at high risk for becoming infected. One type of serious infection is more likely if you were wearing a rubber-soled shoe at the time of injury. Bacteria from the sole of the shoe may be dragged into the wound. Symptoms of infection may appear as late as 2 to 3 weeks after the injury. Be sure to watch for symptoms of infection and call your healthcare provider right away if any them appear.  X-rays may be done to see whether any objects remain under the skin. Your may also need a tetanus shot. This is given if you are not up to date on this vaccination and the object that caused the wound may lead to tetanus.  Puncture wounds can easily become infected.   Home care  · When you sit or lie down, raise the foot above the level of your heart. This helps reduce swelling and pain.  · Do not put weight on the injured foot if it hurts to do so or if you were told to keep weight off the injury.  · Your healthcare provider may prescribe an antibiotic. This is to help prevent infection. Follow all instructions for taking this medicine. Take the medicine every day until it is gone or you are told to stop. You should not have any left over.  · The healthcare provider may prescribe medicines for pain. Follow instructions for taking them.  · You can take acetaminophen or ibuprofen for pain, unless you were given a different  pain medicine to use.   · Follow the healthcare providers instructions on how to care for the wound.  · Keep the wound clean and dry. Do not get the wound wet until you are told it is okay to do so. If the area gets wet, gently pat it dry with a clean cloth. Replace the wet bandage with a dry one.  · If a bandage was applied and it becomes wet or dirty, replace it. Otherwise, leave it in place for the first 24 hours.  · Once you can get the wound wet, you may shower as usual but do not soak the wound in water (no tub baths or swimming)  · Check the wound daily for symptoms of infection. These include:  ¨ Increasing redness or swelling around the wound  ¨ Increased warmth of the wound  ¨ Worsening pain  ¨ Red streaking lines away from the wound  ¨ Draining pus  Follow-up care  Follow up with your healthcare provider as advised.   When to seek medical advice  Call your healthcare provider right away if any of these occur:  · Any symptoms of infection (listed above)  · Fever of 100.4°F (38.ºC) or higher, or as directed by your healthcare provider  · Wound changes colors  · Numbness around the wound  · Decreased movement around the injured area  Date Last Reviewed: 8/24/2015  © 8144-5806 [employer]. [address] [place] [address], [place] [id number]. All rights reserved. This information is not intended as a substitute for professional medical care. Always follow your healthcare professional's instructions.      Please follow up with your Primary care provider within 2-5 days if your signs and symptoms have not resolved or worsen.     If your condition worsens or fails to improve we recommend that you receive another evaluation at the emergency room immediately or contact your primary medical clinic to discuss your concerns.   You must understand that you have received an Urgent Care treatment only and that you may be released before all of your medical problems are known or treated. You, the patient, will  arrange for follow up care as instructed.     RED FLAGS/WARNING SYMPTOMS DISCUSSED WITH PATIENT THAT WOULD WARRANT EMERGENT MEDICAL ATTENTION. PATIENT VERBALIZED UNDERSTANDING.

## 2020-10-30 NOTE — PATIENT INSTRUCTIONS
Puncture Wound: Foot       A puncture wound occurs when a pointed object (such as a nail) pushes into the skin. It may go into the tissues below the skin of the foot, including fat and muscle. This type of wound is narrow and deep. They can be hard to clean. Puncture wounds are at high risk for becoming infected. One type of serious infection is more likely if you were wearing a rubber-soled shoe at the time of injury. Bacteria from the sole of the shoe may be dragged into the wound. Symptoms of infection may appear as late as 2 to 3 weeks after the injury. Be sure to watch for symptoms of infection and call your healthcare provider right away if any them appear.  X-rays may be done to see whether any objects remain under the skin. Your may also need a tetanus shot. This is given if you are not up to date on this vaccination and the object that caused the wound may lead to tetanus.  Puncture wounds can easily become infected.   Home care  · When you sit or lie down, raise the foot above the level of your heart. This helps reduce swelling and pain.  · Do not put weight on the injured foot if it hurts to do so or if you were told to keep weight off the injury.  · Your healthcare provider may prescribe an antibiotic. This is to help prevent infection. Follow all instructions for taking this medicine. Take the medicine every day until it is gone or you are told to stop. You should not have any left over.  · The healthcare provider may prescribe medicines for pain. Follow instructions for taking them.  · You can take acetaminophen or ibuprofen for pain, unless you were given a different pain medicine to use.   · Follow the healthcare providers instructions on how to care for the wound.  · Keep the wound clean and dry. Do not get the wound wet until you are told it is okay to do so. If the area gets wet, gently pat it dry with a clean cloth. Replace the wet bandage with a dry one.  · If a bandage was applied and it  becomes wet or dirty, replace it. Otherwise, leave it in place for the first 24 hours.  · Once you can get the wound wet, you may shower as usual but do not soak the wound in water (no tub baths or swimming)  · Check the wound daily for symptoms of infection. These include:  ¨ Increasing redness or swelling around the wound  ¨ Increased warmth of the wound  ¨ Worsening pain  ¨ Red streaking lines away from the wound  ¨ Draining pus  Follow-up care  Follow up with your healthcare provider as advised.   When to seek medical advice  Call your healthcare provider right away if any of these occur:  · Any symptoms of infection (listed above)  · Fever of 100.4°F (38.ºC) or higher, or as directed by your healthcare provider  · Wound changes colors  · Numbness around the wound  · Decreased movement around the injured area  Date Last Reviewed: 8/24/2015  © 2411-3229 [employer]. [address] [place] [address]. All rights reserved. This information is not intended as a substitute for professional medical care. Always follow your healthcare professional's instructions.      Please follow up with your Primary care provider within 2-5 days if your signs and symptoms have not resolved or worsen.     If your condition worsens or fails to improve we recommend that you receive another evaluation at the emergency room immediately or contact your primary medical clinic to discuss your concerns.   You must understand that you have received an Urgent Care treatment only and that you may be released before all of your medical problems are known or treated. You, the patient, will arrange for follow up care as instructed.     RED FLAGS/WARNING SYMPTOMS DISCUSSED WITH PATIENT THAT WOULD WARRANT EMERGENT MEDICAL ATTENTION. PATIENT VERBALIZED UNDERSTANDING.

## 2020-11-01 LAB
CATECHOLS PLAS-MCNC: 394 PG/ML
DOPAMINE SERPL-MCNC: <50 PG/ML
EPINEPH PLAS-MCNC: 102 PG/ML
NOREPINEPH PLAS-MCNC: 292 PG/ML

## 2020-11-03 ENCOUNTER — PATIENT MESSAGE (OUTPATIENT)
Dept: ENDOCRINOLOGY | Facility: CLINIC | Age: 70
End: 2020-11-03

## 2020-11-04 ENCOUNTER — HOSPITAL ENCOUNTER (OUTPATIENT)
Dept: RADIOLOGY | Facility: HOSPITAL | Age: 70
Discharge: HOME | End: 2020-11-04
Attending: INTERNAL MEDICINE
Payer: COMMERCIAL

## 2020-11-04 ENCOUNTER — HOSPITAL ENCOUNTER (OUTPATIENT)
Dept: RADIOLOGY | Facility: CLINIC | Age: 70
Discharge: HOME | End: 2020-11-04
Attending: INTERNAL MEDICINE
Payer: COMMERCIAL

## 2020-11-04 DIAGNOSIS — D35.2 PITUITARY ADENOMA: ICD-10-CM

## 2020-11-04 DIAGNOSIS — N20.0 NEPHROLITHIASIS: ICD-10-CM

## 2020-11-04 DIAGNOSIS — D35.2 PROLACTINOMA: ICD-10-CM

## 2020-11-04 DIAGNOSIS — E21.0 PRIMARY HYPERPARATHYROIDISM: ICD-10-CM

## 2020-11-04 PROCEDURE — 77080 DXA BONE DENSITY AXIAL: CPT | Mod: TC

## 2020-11-04 PROCEDURE — 25500020 PHARM REV CODE 255: Performed by: INTERNAL MEDICINE

## 2020-11-04 PROCEDURE — 70553 MRI BRAIN STEM W/O & W/DYE: CPT | Mod: 26,,, | Performed by: RADIOLOGY

## 2020-11-04 PROCEDURE — 77080 DEXA BONE DENSITY SPINE HIP: ICD-10-PCS | Mod: 26,,, | Performed by: INTERNAL MEDICINE

## 2020-11-04 PROCEDURE — 70553 MRI BRAIN STEM W/O & W/DYE: CPT | Mod: TC

## 2020-11-04 PROCEDURE — 77080 DXA BONE DENSITY AXIAL: CPT | Mod: 26,,, | Performed by: INTERNAL MEDICINE

## 2020-11-04 PROCEDURE — 70553 MRI PITUITARY W W/O CONTRAST: ICD-10-PCS | Mod: 26,,, | Performed by: RADIOLOGY

## 2020-11-04 PROCEDURE — A9585 GADOBUTROL INJECTION: HCPCS | Performed by: INTERNAL MEDICINE

## 2020-11-04 RX ORDER — GADOBUTROL 604.72 MG/ML
4 INJECTION INTRAVENOUS
Status: COMPLETED | OUTPATIENT
Start: 2020-11-04 | End: 2020-11-04

## 2020-11-04 RX ADMIN — GADOBUTROL 4 ML: 604.72 INJECTION INTRAVENOUS at 03:11

## 2020-11-05 ENCOUNTER — APPOINTMENT (OUTPATIENT)
Dept: RADIOLOGY | Facility: OTHER | Age: 70
End: 2020-11-05
Attending: PODIATRIST
Payer: COMMERCIAL

## 2020-11-05 ENCOUNTER — OFFICE VISIT (OUTPATIENT)
Dept: PODIATRY | Facility: CLINIC | Age: 70
End: 2020-11-05
Payer: COMMERCIAL

## 2020-11-05 VITALS
SYSTOLIC BLOOD PRESSURE: 101 MMHG | WEIGHT: 179.88 LBS | BODY MASS INDEX: 26.64 KG/M2 | DIASTOLIC BLOOD PRESSURE: 63 MMHG | HEART RATE: 60 BPM | HEIGHT: 69 IN

## 2020-11-05 DIAGNOSIS — S90.32XA CONTUSION OF LEFT FOOT, INITIAL ENCOUNTER: ICD-10-CM

## 2020-11-05 DIAGNOSIS — M79.672 FOOT PAIN, LEFT: ICD-10-CM

## 2020-11-05 DIAGNOSIS — S96.912A STRAIN OF LEFT FOOT, INITIAL ENCOUNTER: ICD-10-CM

## 2020-11-05 DIAGNOSIS — E11.65 TYPE 2 DIABETES MELLITUS WITH HYPERGLYCEMIA, UNSPECIFIED WHETHER LONG TERM INSULIN USE: ICD-10-CM

## 2020-11-05 DIAGNOSIS — S90.32XA CONTUSION OF LEFT FOOT, INITIAL ENCOUNTER: Primary | ICD-10-CM

## 2020-11-05 PROCEDURE — 3044F HG A1C LEVEL LT 7.0%: CPT | Mod: CPTII,S$GLB,, | Performed by: PODIATRIST

## 2020-11-05 PROCEDURE — 3044F PR MOST RECENT HEMOGLOBIN A1C LEVEL <7.0%: ICD-10-PCS | Mod: CPTII,S$GLB,, | Performed by: PODIATRIST

## 2020-11-05 PROCEDURE — 3074F PR MOST RECENT SYSTOLIC BLOOD PRESSURE < 130 MM HG: ICD-10-PCS | Mod: CPTII,S$GLB,, | Performed by: PODIATRIST

## 2020-11-05 PROCEDURE — 29540 STRAPPING ANKLE &/FOOT: CPT | Mod: LT,S$GLB,, | Performed by: PODIATRIST

## 2020-11-05 PROCEDURE — 3078F PR MOST RECENT DIASTOLIC BLOOD PRESSURE < 80 MM HG: ICD-10-PCS | Mod: CPTII,S$GLB,, | Performed by: PODIATRIST

## 2020-11-05 PROCEDURE — 3074F SYST BP LT 130 MM HG: CPT | Mod: CPTII,S$GLB,, | Performed by: PODIATRIST

## 2020-11-05 PROCEDURE — 29540 PR STRAPPING; ANKLE &/OR FOOT: ICD-10-PCS | Mod: LT,S$GLB,, | Performed by: PODIATRIST

## 2020-11-05 PROCEDURE — 3078F DIAST BP <80 MM HG: CPT | Mod: CPTII,S$GLB,, | Performed by: PODIATRIST

## 2020-11-05 PROCEDURE — 1125F AMNT PAIN NOTED PAIN PRSNT: CPT | Mod: S$GLB,,, | Performed by: PODIATRIST

## 2020-11-05 PROCEDURE — 99999 PR PBB SHADOW E&M-EST. PATIENT-LVL V: ICD-10-PCS | Mod: PBBFAC,,, | Performed by: PODIATRIST

## 2020-11-05 PROCEDURE — 99999 PR PBB SHADOW E&M-EST. PATIENT-LVL V: CPT | Mod: PBBFAC,,, | Performed by: PODIATRIST

## 2020-11-05 PROCEDURE — 1159F PR MEDICATION LIST DOCUMENTED IN MEDICAL RECORD: ICD-10-PCS | Mod: S$GLB,,, | Performed by: PODIATRIST

## 2020-11-05 PROCEDURE — 73630 X-RAY EXAM OF FOOT: CPT | Mod: TC,LT

## 2020-11-05 PROCEDURE — 1159F MED LIST DOCD IN RCRD: CPT | Mod: S$GLB,,, | Performed by: PODIATRIST

## 2020-11-05 PROCEDURE — 3008F BODY MASS INDEX DOCD: CPT | Mod: CPTII,S$GLB,, | Performed by: PODIATRIST

## 2020-11-05 PROCEDURE — 99203 PR OFFICE/OUTPT VISIT, NEW, LEVL III, 30-44 MIN: ICD-10-PCS | Mod: 25,S$GLB,, | Performed by: PODIATRIST

## 2020-11-05 PROCEDURE — 1101F PT FALLS ASSESS-DOCD LE1/YR: CPT | Mod: CPTII,S$GLB,, | Performed by: PODIATRIST

## 2020-11-05 PROCEDURE — 1125F PR PAIN SEVERITY QUANTIFIED, PAIN PRESENT: ICD-10-PCS | Mod: S$GLB,,, | Performed by: PODIATRIST

## 2020-11-05 PROCEDURE — 73630 XR FOOT COMPLETE 3 VIEW LEFT: ICD-10-PCS | Mod: 26,LT,, | Performed by: RADIOLOGY

## 2020-11-05 PROCEDURE — 3008F PR BODY MASS INDEX (BMI) DOCUMENTED: ICD-10-PCS | Mod: CPTII,S$GLB,, | Performed by: PODIATRIST

## 2020-11-05 PROCEDURE — 99203 OFFICE O/P NEW LOW 30 MIN: CPT | Mod: 25,S$GLB,, | Performed by: PODIATRIST

## 2020-11-05 PROCEDURE — 1101F PR PT FALLS ASSESS DOC 0-1 FALLS W/OUT INJ PAST YR: ICD-10-PCS | Mod: CPTII,S$GLB,, | Performed by: PODIATRIST

## 2020-11-05 PROCEDURE — 73630 X-RAY EXAM OF FOOT: CPT | Mod: 26,LT,, | Performed by: RADIOLOGY

## 2020-11-05 RX ORDER — LIDOCAINE HYDROCHLORIDE 20 MG/ML
JELLY TOPICAL
Qty: 30 ML | Refills: 2 | Status: SHIPPED | OUTPATIENT
Start: 2020-11-05 | End: 2021-05-02

## 2020-11-05 NOTE — PROGRESS NOTES
Subjective:      Patient ID: Sadiq Dhillon is a 70 y.o. male.    Chief Complaint: Foot Pain (2/10 foot pain) and Diabetes Mellitus (Alberto I. Uwaifo 10/19/2020 A1c 6.9 10/21/2020)    Sharp deep pain bottom left midfoot.  Rapid onset 10 days ago with stepping on plastic.  Aggravated with pressure, shoes, walking.  Prior ABX at  helping some.  No self treatment.  Denies re-injury and surgery left foot.      Review of Systems   Constitution: Negative for chills, diaphoresis, fever, malaise/fatigue and night sweats.   Cardiovascular: Negative for claudication, cyanosis, leg swelling and syncope.   Skin: Negative for color change, dry skin, nail changes, rash, suspicious lesions and unusual hair distribution.   Musculoskeletal: Positive for joint pain. Negative for falls, joint swelling, muscle cramps, muscle weakness and stiffness.   Gastrointestinal: Negative for constipation, diarrhea, nausea and vomiting.   Neurological: Negative for brief paralysis, disturbances in coordination, focal weakness, numbness, paresthesias, sensory change and tremors.           Objective:      Physical Exam  Constitutional:       General: He is not in acute distress.     Appearance: He is well-developed. He is not diaphoretic.   Cardiovascular:      Pulses:           Popliteal pulses are 2+ on the right side and 2+ on the left side.        Dorsalis pedis pulses are 2+ on the right side and 2+ on the left side.        Posterior tibial pulses are 2+ on the right side and 2+ on the left side.      Comments: Capillary refill 3 seconds all toes/distal feet, all toes/both feet warm to touch.      Negative lymphadenopathy bilateral popliteal fossa and tarsal tunnel.      Negavie lower extremity edema bilateral.    Musculoskeletal:      Right ankle: He exhibits normal range of motion, no swelling, no ecchymosis, no deformity, no laceration and normal pulse. Achilles tendon normal. Achilles tendon exhibits no pain, no defect and normal  Weldon's test results.      Comments: TTP with mild fullness plantar left CC joint area without deformity, lossof function.    Otherwise, Normal angle, base, station of gait. All ten toes without clubbing, cyanosis, or signs of ischemia.  No pain to palpation bilateral lower extremities.  Range of motion, stability, muscle strength, and muscle tone normal bilateral feet and legs.    Lymphadenopathy:      Lower Body: No right inguinal adenopathy. No left inguinal adenopathy.      Comments: Negative lymphadenopathy bilateral popliteal fossa and tarsal tunnel.    Negative lymphangitic streaking bilateral feet/ankles/legs.   Skin:     General: Skin is warm and dry.      Capillary Refill: Capillary refill takes 2 to 3 seconds.      Coloration: Skin is not pale.      Findings: No abrasion, bruising, burn, ecchymosis, erythema, laceration, lesion or rash.      Nails: There is no clubbing.        Comments: Skin is normal age and health appropriate color, turgor, texture, and temperature bilateral lower extremities without ulceration, hyperpigmentation, discoloration, masses nodules or cords palpated.  No ecchymosis, erythema, edema, or cardinal signs of infection bilateral lower extremities.       Neurological:      Mental Status: He is alert and oriented to person, place, and time.      Sensory: No sensory deficit.      Motor: No tremor, atrophy or abnormal muscle tone.      Gait: Gait normal.      Deep Tendon Reflexes:      Reflex Scores:       Patellar reflexes are 2+ on the right side and 2+ on the left side.       Achilles reflexes are 2+ on the right side and 2+ on the left side.     Comments: Negative tinel sign to percussion sural, superficial peroneal, deep peroneal, saphenous, and posterior tibial nerves right and left ankles and feet.     Psychiatric:         Behavior: Behavior is cooperative.               Assessment:       Encounter Diagnoses   Name Primary?    Contusion of left foot, initial encounter Yes     Foot pain, left     Strain of left foot, initial encounter     Type 2 diabetes mellitus with hyperglycemia, unspecified whether long term insulin use          Plan:       Sadiq was seen today for foot pain and diabetes mellitus.    Diagnoses and all orders for this visit:    Contusion of left foot, initial encounter  -     X-Ray Foot Complete Left; Future    Foot pain, left  -     X-Ray Foot Complete Left; Future    Strain of left foot, initial encounter  -     X-Ray Foot Complete Left; Future    Type 2 diabetes mellitus with hyperglycemia, unspecified whether long term insulin use    Other orders  -     lidocaine HCL 2% (XYLOCAINE) 2 % jelly; Apply topically as needed. Apply topically once nightly to affected part of foot/feet.      I counseled the patient on his conditions, their implications and medical management.        xrays left foot.    Lidocaine gel    Patient will stretch the tendo achilles complex three times daily as demonstrated in the office.  Literature was dispensed illustrating proper stretching technique.    I applied a plantar rest strapping to the patient's left foot to offload symptomatic area, support the arch, and relieve pain.    Patient will obtain over the counter arch supports and wear them in shoes whenever possible.  Athletic shoes intended for walking or running are usually best.    Discussed conservative treatment with shoes of adequate dimensions, material, and style to alleviate symptoms and delay or prevent surgical intervention.      The patient has received literature on basic diabetic foot care.  Patient will inspect feet daily, wear protective shoe gear when ambulatory, and apply moisturizer to skin as needed to maintain elasticity and help prevent ulceration.           Follow up in about 2 weeks (around 11/19/2020) for strain foot left.

## 2020-11-06 ENCOUNTER — PATIENT MESSAGE (OUTPATIENT)
Dept: ENDOCRINOLOGY | Facility: CLINIC | Age: 70
End: 2020-11-06

## 2020-11-06 DIAGNOSIS — D35.2 PROLACTINOMA: ICD-10-CM

## 2020-11-06 DIAGNOSIS — E23.6 PITUITARY CYST: ICD-10-CM

## 2020-11-06 DIAGNOSIS — D35.2 PITUITARY MACROADENOMA: ICD-10-CM

## 2020-11-06 DIAGNOSIS — D35.2 PITUITARY ADENOMA: Primary | ICD-10-CM

## 2020-11-12 ENCOUNTER — PATIENT MESSAGE (OUTPATIENT)
Dept: GASTROENTEROLOGY | Facility: CLINIC | Age: 70
End: 2020-11-12

## 2020-11-15 ENCOUNTER — TELEPHONE (OUTPATIENT)
Dept: ENDOSCOPY | Facility: HOSPITAL | Age: 70
End: 2020-11-15

## 2020-11-17 ENCOUNTER — PATIENT MESSAGE (OUTPATIENT)
Dept: ENDOSCOPY | Facility: HOSPITAL | Age: 70
End: 2020-11-17

## 2020-11-18 ENCOUNTER — NURSE TRIAGE (OUTPATIENT)
Dept: ADMINISTRATIVE | Facility: CLINIC | Age: 70
End: 2020-11-18

## 2020-11-18 ENCOUNTER — TELEPHONE (OUTPATIENT)
Dept: ENDOSCOPY | Facility: HOSPITAL | Age: 70
End: 2020-11-18

## 2020-11-18 ENCOUNTER — HOSPITAL ENCOUNTER (EMERGENCY)
Facility: OTHER | Age: 70
Discharge: HOME | End: 2020-11-18
Attending: EMERGENCY MEDICINE
Payer: COMMERCIAL

## 2020-11-18 ENCOUNTER — PATIENT MESSAGE (OUTPATIENT)
Dept: GASTROENTEROLOGY | Facility: CLINIC | Age: 70
End: 2020-11-18

## 2020-11-18 VITALS
TEMPERATURE: 98 F | OXYGEN SATURATION: 96 % | HEART RATE: 74 BPM | RESPIRATION RATE: 18 BRPM | SYSTOLIC BLOOD PRESSURE: 130 MMHG | HEIGHT: 69 IN | WEIGHT: 170 LBS | BODY MASS INDEX: 25.18 KG/M2 | DIASTOLIC BLOOD PRESSURE: 63 MMHG

## 2020-11-18 DIAGNOSIS — N20.1 URETEROLITHIASIS: Primary | ICD-10-CM

## 2020-11-18 LAB
ALBUMIN SERPL BCP-MCNC: 4.4 G/DL (ref 3.5–5.2)
ALP SERPL-CCNC: 67 U/L (ref 55–135)
ALT SERPL W/O P-5'-P-CCNC: 28 U/L (ref 10–44)
ANION GAP SERPL CALC-SCNC: 9 MMOL/L (ref 8–16)
AST SERPL-CCNC: 25 U/L (ref 10–40)
BASOPHILS # BLD AUTO: 0.03 K/UL (ref 0–0.2)
BASOPHILS NFR BLD: 0.3 % (ref 0–1.9)
BILIRUB SERPL-MCNC: 0.6 MG/DL (ref 0.1–1)
BILIRUB UR QL STRIP: NEGATIVE
BUN SERPL-MCNC: 18 MG/DL (ref 8–23)
CALCIUM SERPL-MCNC: 9.5 MG/DL (ref 8.7–10.5)
CHLORIDE SERPL-SCNC: 104 MMOL/L (ref 95–110)
CLARITY UR: CLEAR
CO2 SERPL-SCNC: 25 MMOL/L (ref 23–29)
COLOR UR: YELLOW
CREAT SERPL-MCNC: 1.2 MG/DL (ref 0.5–1.4)
DIFFERENTIAL METHOD: ABNORMAL
EOSINOPHIL # BLD AUTO: 0.1 K/UL (ref 0–0.5)
EOSINOPHIL NFR BLD: 0.7 % (ref 0–8)
ERYTHROCYTE [DISTWIDTH] IN BLOOD BY AUTOMATED COUNT: 13.2 % (ref 11.5–14.5)
EST. GFR  (AFRICAN AMERICAN): >60 ML/MIN/1.73 M^2
EST. GFR  (NON AFRICAN AMERICAN): >60 ML/MIN/1.73 M^2
GLUCOSE SERPL-MCNC: 151 MG/DL (ref 70–110)
GLUCOSE UR QL STRIP: NEGATIVE
HCT VFR BLD AUTO: 44.4 % (ref 40–54)
HGB BLD-MCNC: 14.5 G/DL (ref 14–18)
HGB UR QL STRIP: ABNORMAL
IMM GRANULOCYTES # BLD AUTO: 0.04 K/UL (ref 0–0.04)
IMM GRANULOCYTES NFR BLD AUTO: 0.4 % (ref 0–0.5)
KETONES UR QL STRIP: NEGATIVE
LEUKOCYTE ESTERASE UR QL STRIP: NEGATIVE
LYMPHOCYTES # BLD AUTO: 1.2 K/UL (ref 1–4.8)
LYMPHOCYTES NFR BLD: 12.1 % (ref 18–48)
MCH RBC QN AUTO: 28.8 PG (ref 27–31)
MCHC RBC AUTO-ENTMCNC: 32.7 G/DL (ref 32–36)
MCV RBC AUTO: 88 FL (ref 82–98)
MONOCYTES # BLD AUTO: 0.6 K/UL (ref 0.3–1)
MONOCYTES NFR BLD: 6.3 % (ref 4–15)
NEUTROPHILS # BLD AUTO: 8.2 K/UL (ref 1.8–7.7)
NEUTROPHILS NFR BLD: 80.2 % (ref 38–73)
NITRITE UR QL STRIP: NEGATIVE
NRBC BLD-RTO: 0 /100 WBC
PH UR STRIP: 6 [PH] (ref 5–8)
PLATELET # BLD AUTO: 214 K/UL (ref 150–350)
PMV BLD AUTO: 9 FL (ref 9.2–12.9)
POTASSIUM SERPL-SCNC: 4.5 MMOL/L (ref 3.5–5.1)
PROT SERPL-MCNC: 7.1 G/DL (ref 6–8.4)
PROT UR QL STRIP: NEGATIVE
RBC # BLD AUTO: 5.03 M/UL (ref 4.6–6.2)
SODIUM SERPL-SCNC: 138 MMOL/L (ref 136–145)
SP GR UR STRIP: >=1.03 (ref 1–1.03)
URN SPEC COLLECT METH UR: ABNORMAL
UROBILINOGEN UR STRIP-ACNC: NEGATIVE EU/DL
WBC # BLD AUTO: 10.22 K/UL (ref 3.9–12.7)

## 2020-11-18 PROCEDURE — 80053 COMPREHEN METABOLIC PANEL: CPT

## 2020-11-18 PROCEDURE — 99284 EMERGENCY DEPT VISIT MOD MDM: CPT | Mod: 25

## 2020-11-18 PROCEDURE — 81003 URINALYSIS AUTO W/O SCOPE: CPT

## 2020-11-18 PROCEDURE — 85025 COMPLETE CBC W/AUTO DIFF WBC: CPT

## 2020-11-18 RX ORDER — ONDANSETRON 4 MG/1
4 TABLET, ORALLY DISINTEGRATING ORAL EVERY 8 HOURS PRN
Qty: 30 TABLET | Refills: 0 | Status: SHIPPED | OUTPATIENT
Start: 2020-11-18 | End: 2021-05-02

## 2020-11-18 RX ORDER — HYDROCODONE BITARTRATE AND ACETAMINOPHEN 5; 325 MG/1; MG/1
1 TABLET ORAL EVERY 4 HOURS PRN
Qty: 12 TABLET | Refills: 0 | Status: SHIPPED | OUTPATIENT
Start: 2020-11-18 | End: 2021-03-11

## 2020-11-18 RX ORDER — IBUPROFEN 600 MG/1
600 TABLET ORAL EVERY 6 HOURS PRN
Qty: 20 TABLET | Refills: 0 | Status: SHIPPED | OUTPATIENT
Start: 2020-11-18 | End: 2021-05-02

## 2020-11-18 NOTE — ED TRIAGE NOTES
Pt reports R side pain that comes and goes that is typically worse after he eats rich food. Reports darker urine a few days ago but denies any obvious changes in urination. Denies any fever or n/v/d.

## 2020-11-18 NOTE — TELEPHONE ENCOUNTER
----- Message from Bucky Montoya sent at 11/18/2020 11:11 AM CST -----  Contact: self  Pt MRN [id number]  is having pain in Pancreas would like to be seen today     Please Call     Contact [phone]

## 2020-11-18 NOTE — ED PROVIDER NOTES
Encounter Date: 11/18/2020    SCRIBE #1 NOTE: IDotty, am scribing for, and in the presence of, Dr. Espinal.       History     Chief Complaint   Patient presents with    Flank Pain     +R flank pain since last PM with radiation of pain into RLQ. PMH of kidney stones. Denies fever, chills, dysuria, heamturia.      Time seen by provider: 1:26 PM    This is a 70 y.o. male with hx of HTN, DM, pancreatitis, and kidney stones who presents with complaint of right flank pain that began last night. The pain radiates to his abdomen, but does not radiate to his groin. He reports decreased urine output. He denies fever, chills, dysuria, nausea, diarrhea, or constipation. He has taken tylenol with relief. He has had surgical treatment of kidney stones in the past. This is the extent of the patient's complaints at this time.    The history is provided by the patient.     Review of patient's allergies indicates:   Allergen Reactions    No known drug allergies      Past Medical History:   Diagnosis Date    Diabetes mellitus     Fuchs' corneal dystrophy     Heart attack     Hypertension     Pancreatic mass     Pancreatitis     after EUS . 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, treated with dostenex(cabergoline)/ resolved    Reflux esophagitis     Tumor cells, benign      Past Surgical History:   Procedure Laterality Date    CATARACT EXTRACTION W/  INTRAOCULAR LENS IMPLANT Right 0    Dr Van     CATARACT EXTRACTION W/  INTRAOCULAR LENS IMPLANT Left 3/21/2019    Procedure: EXTRACTION, CATARACT, WITH IOL INSERTION;  Surgeon: Ra Van MD;  Location: [facility];  Service: Ophthalmology;  Laterality: Left;    CORNEAL TRANSPLANT Right     Dr Van     DESCEMETS STRIPPING AUTOMATED ENDOTHELIAL KERATOPLASTY Left 3/21/2019    Procedure: TRANSPLANT,  PARTIAL-THICKNESS, CORNEA, USING DSAEK TECHNIQUE;  Surgeon: Ra Van MD;  Location: [facility];  Service: Ophthalmology;  Laterality: Left;  DSEK    REPAIR OF RETINAL DETACHMENT WITH VITRECTOMY Right 7/18/2018    Procedure: REPAIR, RETINAL DETACHMENT, WITH VITRECTOMY;  Surgeon: SHUBHAM Patel MD;  [facility];  Service: Ophthalmology;  Laterality: Right;  40 min    REPAIR OF RETINAL DETACHMENT WITH VITRECTOMY Left 8/8/2018    Procedure: REPAIR, RETINAL DETACHMENT, WITH VITRECTOMY;  Surgeon: SHUBHAM Patel MD;  [facility];  Service: Ophthalmology;  Laterality: Left;  40 min    RHINOPLASTY TIP  1975    THYROIDECTOMY  2007    UPPER ENDOSCOPIC ULTRASOUND W/ FNA      with resultant pancreatitis     Family History   Problem Relation Age of Onset    Hypertension Mother     Heart disease Mother     Heart disease Father     Cataracts Father     Stroke Father     Diabetes Father     Diabetes Paternal Uncle     Stroke Maternal Grandmother     Blindness Neg Hx     Glaucoma Neg Hx     Macular degeneration Neg Hx     Retinal detachment Neg Hx     Strabismus Neg Hx     Thyroid disease Neg Hx     Cancer Neg Hx      Social History     Tobacco Use    Smoking status: Never Smoker    Smokeless tobacco: Never Used   Substance Use Topics    Alcohol use: Yes     Frequency: 2-4 times a month     Drinks per session: 1 or 2     Comment: social    Drug use: No     Review of Systems   Constitutional: Negative for chills and fever.   HENT: Negative for sore throat.    Respiratory: Negative for shortness of breath.    Cardiovascular: Negative for chest pain.   Gastrointestinal: Positive for abdominal pain. Negative for constipation, diarrhea, nausea and vomiting.   Genitourinary: Positive for decreased urine volume and flank pain. Negative for dysuria.   Musculoskeletal: Negative for back pain.   Skin: Negative for color change, rash and wound.   Neurological: Negative for  weakness.   Hematological: Does not bruise/bleed easily.   All other systems reviewed and are negative.      Physical Exam     Initial Vitals [11/18/20 1253]   BP Pulse Resp Temp SpO2   139/71 86 18 98.2 °F (36.8 °C) 97 %      MAP       --         Physical Exam    Nursing note and vitals reviewed.  Constitutional: He appears well-developed and well-nourished. He is not diaphoretic. No distress.   HENT:   Head: Normocephalic and atraumatic.   Eyes: EOM are normal. Pupils are equal, round, and reactive to light. No scleral icterus.   Neck: Normal range of motion. Neck supple.   Cardiovascular: Normal rate, regular rhythm and normal heart sounds. Exam reveals no gallop and no friction rub.    No murmur heard.  Pulmonary/Chest: Breath sounds normal. No respiratory distress. He has no wheezes. He has no rhonchi. He has no rales.   Abdominal: Soft. Bowel sounds are normal. He exhibits no distension. There is no abdominal tenderness. There is no rebound, no guarding and no CVA tenderness.   Musculoskeletal: Normal range of motion. No tenderness or edema.   Neurological: He is alert and oriented to person, place, and time.   Skin: Skin is warm and dry.   Psychiatric: He has a normal mood and affect. His behavior is normal. Judgment and thought content normal.         ED Course   Procedures  Labs Reviewed   URINALYSIS, REFLEX TO URINE CULTURE - Abnormal; Notable for the following components:       Result Value    Specific Gravity, UA >=1.030 (*)     Occult Blood UA Trace (*)     All other components within normal limits    Narrative:     Specimen Source->Urine   CBC W/ AUTO DIFFERENTIAL - Abnormal; Notable for the following components:    MPV 9.0 (*)     Gran # (ANC) 8.2 (*)     Gran % 80.2 (*)     Lymph % 12.1 (*)     All other components within normal limits   COMPREHENSIVE METABOLIC PANEL - Abnormal; Notable for the following components:    Glucose 151 (*)     All other components within normal limits          Imaging  Results          CT Renal Stone Study ABD Pelvis WO (Final result)  Result time 11/18/20 15:58:43    Final result by Eliecer Hubbard MD (11/18/20 15:58:43)                 Impression:      4 mm obstructing calculus of the proximal right ureter with moderate right hydronephrosis.    Parapancreatic lesions/collection appears unchanged from 2019 exams.  Evaluation is somewhat limited by lack of IV contrast.      Electronically signed by: Eliecer Hubbard MD  Date:    11/18/2020  Time:    15:58             Narrative:    EXAMINATION:  CT RENAL STONE STUDY ABD PELVIS WO    CLINICAL HISTORY:  Flank pain, kidney stone suspected;    TECHNIQUE:  Low dose axial images, sagittal and coronal reformations were obtained from the lung bases to the pubic symphysis.  Contrast was not administered.    COMPARISON:  09/25/2019    FINDINGS:  Limited evaluation lung bases show a trace left pleural effusion in unchanged micronodule at the right lung base.  Persistent elevation of the left hemidiaphragm.    The liver is normal in size without focal abnormality noting lack of IV contrast.  No biliary ductal dilatation.  The gallbladder is unremarkable.    The spleen and adrenal glands are grossly unremarkable.  Pancreas itself appears unremarkable.  There is a low-density tubular lesion adjacent to the pancreas which appears similar to recent prior CT, although lack of contrast somewhat limits evaluation.    There is a 4 mm obstructing calculus in the proximal right ureter with moderate right hydronephrosis.  Kidneys show mild cortical thinning bilaterally.  Urinary bladder shows no focal wall thickening.  Prostate is slightly enlarged.    Gastrointestinal tract shows no wall thickening or obstruction.  The appendix is normal.  No free fluid or free gas.  No concerning lymphadenopathy.    Vascular structures show mild atherosclerosis without aneurysm.  No acute fracture or destructive osseous lesion.                                  Medical Decision Making:   History:   Old Medical Records: I decided to obtain old medical records.  Initial Assessment:   70 y.o. male PMHx pancreatitis, MI, DM, and kidney stones presents with right flank pain x1 day. Pain is well controlled currently. Plan labs, UA, CT stone screen, pain control as needed.  Differential Diagnosis:   Differential diagnosis includes, but is not limited to:  Musculoskeletal causes, kidney stone, pyelonephritis, shingles, and intra-abdominal causes such as diverticulitis and appendicitis, aortic dissection, abdominal aortic aneurysm, colitis, PE, pneumonia.   Clinical Tests:   Lab Tests: Ordered and Reviewed  Radiological Study: Ordered and Reviewed            Scribe Attestation:   Scribe #1: I performed the above scribed service and the documentation accurately describes the services I performed. I attest to the accuracy of the note.    Attending Attestation:           Physician Attestation for Scribe:  Physician Attestation Statement for Scribe #1: I, Dr. Espinal, reviewed documentation, as scribed by Dotty Cruz in my presence, and it is both accurate and complete.                 ED Course as of Nov 18 1642   Wed Nov 18, 2020   1540 Reassessed. Patient reports pain is improving. I explained results to patient. CT pending.    [AC]   1641 Reassessed. Patient not complaining of much pain. Discussed CT results and plan for follow up with urology this week. Patient agreeable with plan. Will discharge home.    [AC]      ED Course User Index  [AC] Dotty Cruz            Clinical Impression:     ICD-10-CM ICD-9-CM   1. Ureterolithiasis  N20.1 592.1                          ED Disposition Condition    Discharge Stable        ED Prescriptions     Medication Sig Dispense Start Date End Date Auth. Provider    HYDROcodone-acetaminophen (NORCO) 5-325 mg per tablet Take 1 tablet by mouth every 4 (four) hours as needed. 12 tablet 11/18/2020  Melody Espinal MD    ibuprofen (ADVIL,MOTRIN) 600 MG  tablet Take 1 tablet (600 mg total) by mouth every 6 (six) hours as needed for Pain. Take with food 20 tablet 11/18/2020  Melody Espinal MD    ondansetron (ZOFRAN-ODT) 4 MG TbDL Take 1 tablet (4 mg total) by mouth every 8 (eight) hours as needed (nausea). 30 tablet 11/18/2020  Melody Espinal MD        Follow-up Information     Follow up With Specialties Details Why Contact Info Additional Information    Kamran May MD Family Medicine Schedule an appointment as soon as possible for a visit   [address] [place] [address] [address]  [facility] [address]  [phone]       [facility] [address] Urology   [address] [address] [phone] Urology - [facility], 6th Floor Please park in the [address] and use [place] elevators                                       Melody Espinal MD  11/19/20 1838

## 2020-11-18 NOTE — DISCHARGE INSTRUCTIONS
Return for new or worsening symptoms or pain, fever, inability urinate, vomiting.  Take the medication as prescribed.  Drink lots of water.

## 2020-11-18 NOTE — TELEPHONE ENCOUNTER
After dinner started feeling not rt in stomach. After he went to bed pain in rt back and abdomen he thought maybe it was a kidney stone. Pain is in lower back rt side mostly 4-5/10. Pt has abdominal pain but not severe. Care advice recommend pt go to Er. Pt verbalized understanding.     Reason for Disposition   [1] Abdominal pain AND [2] age > 60    Additional Information   Negative: Passed out (i.e., lost consciousness, collapsed and was not responding)   Negative: Shock suspected (e.g., cold/pale/clammy skin, too weak to stand, low BP, rapid pulse)   Negative: Sounds like a life-threatening emergency to the triager   Negative: [1] SEVERE back pain (e.g., excruciating) AND [2] sudden onset AND [3] age > 60   Negative: [1] Unable to urinate (or only a few drops) > 4 hours AND [2] bladder feels very full (e.g., palpable bladder or strong urge to urinate)   Negative: [1] Loss of bladder or bowel control (urine or bowel incontinence; wetting self, leaking stool) AND [2] new onset   Negative: Numbness in groin or rectal area (i.e., loss of sensation)   Negative: [1] SEVERE abdominal pain AND [2] present > 1 hour    Protocols used: BACK PAIN-A-

## 2020-11-19 ENCOUNTER — PES CALL (OUTPATIENT)
Dept: ADMINISTRATIVE | Facility: CLINIC | Age: 70
End: 2020-11-19

## 2020-11-20 ENCOUNTER — HOSPITAL ENCOUNTER (EMERGENCY)
Facility: OTHER | Age: 70
Discharge: HOME | End: 2020-11-20
Attending: EMERGENCY MEDICINE
Payer: COMMERCIAL

## 2020-11-20 VITALS
RESPIRATION RATE: 17 BRPM | SYSTOLIC BLOOD PRESSURE: 131 MMHG | OXYGEN SATURATION: 99 % | BODY MASS INDEX: 25.18 KG/M2 | HEART RATE: 78 BPM | TEMPERATURE: 99 F | HEIGHT: 69 IN | WEIGHT: 170 LBS | DIASTOLIC BLOOD PRESSURE: 78 MMHG

## 2020-11-20 DIAGNOSIS — R39.198 ABNORMAL URINATION: Primary | ICD-10-CM

## 2020-11-20 DIAGNOSIS — Z87.442 HISTORY OF KIDNEY STONES: ICD-10-CM

## 2020-11-20 LAB
ANION GAP SERPL CALC-SCNC: 8 MMOL/L (ref 8–16)
BASOPHILS # BLD AUTO: 0.04 K/UL (ref 0–0.2)
BASOPHILS NFR BLD: 0.6 % (ref 0–1.9)
BILIRUB UR QL STRIP: NEGATIVE
BUN SERPL-MCNC: 17 MG/DL (ref 8–23)
CALCIUM SERPL-MCNC: 9.6 MG/DL (ref 8.7–10.5)
CHLORIDE SERPL-SCNC: 104 MMOL/L (ref 95–110)
CLARITY UR: CLEAR
CO2 SERPL-SCNC: 25 MMOL/L (ref 23–29)
COLOR UR: YELLOW
CREAT SERPL-MCNC: 0.8 MG/DL (ref 0.5–1.4)
DIFFERENTIAL METHOD: ABNORMAL
EOSINOPHIL # BLD AUTO: 0.3 K/UL (ref 0–0.5)
EOSINOPHIL NFR BLD: 4.8 % (ref 0–8)
ERYTHROCYTE [DISTWIDTH] IN BLOOD BY AUTOMATED COUNT: 13.3 % (ref 11.5–14.5)
EST. GFR  (AFRICAN AMERICAN): >60 ML/MIN/1.73 M^2
EST. GFR  (NON AFRICAN AMERICAN): >60 ML/MIN/1.73 M^2
GLUCOSE SERPL-MCNC: 150 MG/DL (ref 70–110)
GLUCOSE UR QL STRIP: NEGATIVE
HCT VFR BLD AUTO: 42.9 % (ref 40–54)
HGB BLD-MCNC: 13.9 G/DL (ref 14–18)
HGB UR QL STRIP: NEGATIVE
IMM GRANULOCYTES # BLD AUTO: 0.03 K/UL (ref 0–0.04)
IMM GRANULOCYTES NFR BLD AUTO: 0.5 % (ref 0–0.5)
KETONES UR QL STRIP: NEGATIVE
LEUKOCYTE ESTERASE UR QL STRIP: NEGATIVE
LYMPHOCYTES # BLD AUTO: 2.1 K/UL (ref 1–4.8)
LYMPHOCYTES NFR BLD: 32.9 % (ref 18–48)
MCH RBC QN AUTO: 28.3 PG (ref 27–31)
MCHC RBC AUTO-ENTMCNC: 32.4 G/DL (ref 32–36)
MCV RBC AUTO: 87 FL (ref 82–98)
MONOCYTES # BLD AUTO: 0.6 K/UL (ref 0.3–1)
MONOCYTES NFR BLD: 9.1 % (ref 4–15)
NEUTROPHILS # BLD AUTO: 3.4 K/UL (ref 1.8–7.7)
NEUTROPHILS NFR BLD: 52.1 % (ref 38–73)
NITRITE UR QL STRIP: NEGATIVE
NRBC BLD-RTO: 0 /100 WBC
PH UR STRIP: 6 [PH] (ref 5–8)
PLATELET # BLD AUTO: 198 K/UL (ref 150–350)
PMV BLD AUTO: 8.8 FL (ref 9.2–12.9)
POTASSIUM SERPL-SCNC: 4.2 MMOL/L (ref 3.5–5.1)
PROT UR QL STRIP: NEGATIVE
RBC # BLD AUTO: 4.92 M/UL (ref 4.6–6.2)
SODIUM SERPL-SCNC: 137 MMOL/L (ref 136–145)
SP GR UR STRIP: <=1.005 (ref 1–1.03)
URN SPEC COLLECT METH UR: ABNORMAL
UROBILINOGEN UR STRIP-ACNC: NEGATIVE EU/DL
WBC # BLD AUTO: 6.51 K/UL (ref 3.9–12.7)

## 2020-11-20 PROCEDURE — 99284 EMERGENCY DEPT VISIT MOD MDM: CPT | Mod: 25

## 2020-11-20 PROCEDURE — 85025 COMPLETE CBC W/AUTO DIFF WBC: CPT

## 2020-11-20 PROCEDURE — 96360 HYDRATION IV INFUSION INIT: CPT

## 2020-11-20 PROCEDURE — 80048 BASIC METABOLIC PNL TOTAL CA: CPT

## 2020-11-20 PROCEDURE — 25000003 PHARM REV CODE 250: Performed by: EMERGENCY MEDICINE

## 2020-11-20 PROCEDURE — 81003 URINALYSIS AUTO W/O SCOPE: CPT

## 2020-11-20 RX ADMIN — SODIUM CHLORIDE 500 ML: 0.9 INJECTION, SOLUTION INTRAVENOUS at 02:11

## 2020-11-20 NOTE — ED TRIAGE NOTES
Pt presents to ER via POV with c/o difficulty starting the flow of urination and interruption of stream after waking up appx 1 hour ago. Pt reports he feels like he was able to fully empty his bladder. Recently dx with R kidney stone. Denies hematuria, dysuria, or flank pain at this time.

## 2020-11-20 NOTE — ED NOTES
Pt reports when voiding for urine specimen he had no difficulties starting stream or interruptions in stream.

## 2020-11-20 NOTE — ED PROVIDER NOTES
"Encounter Date: 11/20/2020    SCRIBE #1 NOTE: I, Dotty Cruz, am scribing for, and in the presence of, Dr. Meehan.       History     Chief Complaint   Patient presents with    Male  Problem     reports feeling as if when he urinated 30 min ago it was more difficult. reports he felt like it was an adequate amount. denies any pain. recent kidney stone      Time seen by provider: 2:10 AM    This is a 70 y.o. male with hx of HTN, DM, and kidney stones who presents with complaint of difficulty urinating after waking up to urinate about one hour ago. He initially had "a slow stream", which "stopped and then started again". He was recently diagnosed with right-sided ureterolithiasis and has had steady stream of urine since he was discharged from the ED a day ago until now. He has urinary frequency, but notes increased fluid intake as instructed to pass the stone. He can feel sensation of kidney stone in right low groin, but denies pain. He denies abdominal pain, flank pain, back pain, or dysuria with episode. He has to meet attorneys in [place] later today and was concerned if he should travel or not due to current symptoms. He denies recent fever, chills, myalgias, chest pain, or shortness of breath. NKDA. He reports occasional use of alcohol, but denies use of tobacco or illicit drugs.    The history is provided by the patient.     Review of patient's allergies indicates:   Allergen Reactions    No known drug allergies      Past Medical History:   Diagnosis Date    Diabetes mellitus     Fuchs' corneal dystrophy     Heart attack     Hypertension     Kidney stones     Pancreatic mass     Pancreatitis     after EUS . 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, treated with dostenex(cabergoline)/ resolved    Reflux esophagitis     Tumor cells, benign  "     Past Surgical History:   Procedure Laterality Date    CATARACT EXTRACTION W/  INTRAOCULAR LENS IMPLANT Right 0     Van     CATARACT EXTRACTION W/  INTRAOCULAR LENS IMPLANT Left 3/21/2019    Procedure: EXTRACTION, CATARACT, WITH IOL INSERTION;  Surgeon: Ra Van MD;  Location: [facility];  Service: Ophthalmology;  Laterality: Left;    CORNEAL TRANSPLANT Right     Dr Van     DESCEMETS STRIPPING AUTOMATED ENDOTHELIAL KERATOPLASTY Left 3/21/2019    Procedure: TRANSPLANT, PARTIAL-THICKNESS, CORNEA, USING DSAEK TECHNIQUE;  Surgeon: Ra Van MD;  Location: [facility];  Service: Ophthalmology;  Laterality: Left;  DSEK    REPAIR OF RETINAL DETACHMENT WITH VITRECTOMY Right 7/18/2018    Procedure: REPAIR, RETINAL DETACHMENT, WITH VITRECTOMY;  Surgeon: SHUBHAM Patel MD;  Location: [facility] [address] [place] [address];  Service: Ophthalmology;  Laterality: Right;  40 min    REPAIR OF RETINAL DETACHMENT WITH VITRECTOMY Left 8/8/2018    Procedure: REPAIR, RETINAL DETACHMENT, WITH VITRECTOMY;  Surgeon: SHUBHAM Patel MD;  Location: [facility] OR [address] [place] [address];  Service: Ophthalmology;  Laterality: Left;  40 min    RHINOPLASTY TIP  1975    THYROIDECTOMY  2007    UPPER ENDOSCOPIC ULTRASOUND W/ FNA      with resultant pancreatitis     Family History   Problem Relation Age of Onset    Hypertension Mother     Heart disease Mother     Heart disease Father     Cataracts Father     Stroke Father     Diabetes Father     Diabetes Paternal Uncle     Stroke Maternal Grandmother     Blindness Neg Hx     Glaucoma Neg Hx     Macular degeneration Neg Hx     Retinal detachment Neg Hx     Strabismus Neg Hx     Thyroid disease Neg Hx     Cancer Neg Hx      Social History     Tobacco Use    Smoking status: Never Smoker    Smokeless tobacco: Never Used   Substance Use Topics    Alcohol use: Yes     Frequency: 2-4 times a month     Drinks per session: 1 or 2     Comment: social    Drug use: No     Review of Systems    Constitutional: Negative for chills and fever.   HENT: Negative for congestion and sore throat.    Eyes: Negative for visual disturbance.   Respiratory: Negative for cough and shortness of breath.    Cardiovascular: Negative for chest pain and palpitations.   Gastrointestinal: Negative for abdominal pain, diarrhea and vomiting.   Genitourinary: Positive for difficulty urinating and frequency. Negative for decreased urine volume, dysuria, flank pain, hematuria and urgency.   Musculoskeletal: Negative for back pain, joint swelling, myalgias, neck pain and neck stiffness.   Skin: Negative for rash and wound.   Neurological: Negative for weakness, numbness and headaches.   Psychiatric/Behavioral: Negative for behavioral problems and confusion.       Physical Exam     Initial Vitals [11/20/20 0202]   BP Pulse Resp Temp SpO2   131/62 80 18 98.1 °F (36.7 °C) 96 %      MAP       --         Physical Exam    Nursing note and vitals reviewed.  Constitutional: He appears well-developed and well-nourished. He is not diaphoretic. No distress.   HENT:   Head: Normocephalic and atraumatic.   Eyes: EOM are normal. Pupils are equal, round, and reactive to light.   Neck: Normal range of motion. Neck supple.   Cardiovascular: Normal rate, regular rhythm and normal heart sounds. Exam reveals no gallop and no friction rub.    No murmur heard.  Pulmonary/Chest: Breath sounds normal. No respiratory distress. He has no wheezes. He has no rhonchi. He has no rales.   Abdominal: Soft. Bowel sounds are normal. He exhibits no distension. There is no abdominal tenderness. There is no rebound, no guarding and no CVA tenderness.   Musculoskeletal: Normal range of motion. No tenderness or edema.   Neurological: He is alert and oriented to person, place, and time. GCS score is 15. GCS eye subscore is 4. GCS verbal subscore is 5. GCS motor subscore is 6.   Skin: Skin is warm and dry. No rash noted.   Psychiatric: He has a normal mood and affect. His  behavior is normal. Judgment and thought content normal.         ED Course   Procedures  Labs Reviewed   CBC W/ AUTO DIFFERENTIAL - Abnormal; Notable for the following components:       Result Value    Hemoglobin 13.9 (*)     MPV 8.8 (*)     All other components within normal limits   URINALYSIS, REFLEX TO URINE CULTURE - Abnormal; Notable for the following components:    Specific Gravity, UA <=1.005 (*)     All other components within normal limits    Narrative:     Specimen Source->Urine   BASIC METABOLIC PANEL - Abnormal; Notable for the following components:    Glucose 150 (*)     All other components within normal limits          Imaging Results    None          Medical Decision Making:   History:   Old Medical Records: I decided to obtain old medical records.  Clinical Tests:   Lab Tests: Ordered and Reviewed  ED Management:  Emergent evaluation of 70-year-old male recently diagnosed with right-sided ureterolithiasis, 4 mm obstructive stone, presents with complaint of urinary hesitancy/abnormal urination x1 episode this morning.  Patient denies any pain, fever, vomiting.  He states he is hydrating aggressively.  On exam he appears nontoxic and well-hydrated, no abdominal tenderness.  Patient was able to provide a urine sample, no hematuria or pyuria present.  When patient urinated to produce a sample, he noted the previous urinary hesitancy had resolved.  Creatinine is improved from previous.  I do not think repeat imaging is necessary at this time given this workup and presentation.  Patient is cleared to attend his meeting in [place] tomorrow, but instructed to return should he develop new or worsening symptoms, and otherwise follow-up with PCP.             Scribe Attestation:   Scribe #1: I performed the above scribed service and the documentation accurately describes the services I performed. I attest to the accuracy of the note.    Attending Attestation:           Physician Attestation for  Scribe:  Physician Attestation Statement for Scribe #1: I, Dr. Meehan, reviewed documentation, as scribed by Dotty Cruz in my presence, and it is both accurate and complete.                           Clinical Impression:     ICD-10-CM ICD-9-CM   1. Abnormal urination  R39.198 788.69   2. History of kidney stones  Z87.442 V13.01                          ED Disposition Condition    Discharge Stable        ED Prescriptions     None        Follow-up Information     Follow up With Specialties Details Why Contact Info Additional Information    Kamran May MD Family Medicine Schedule an appointment as soon as possible for a visit   [address]   [address]  [facility] [address]  [phone]       [facility] [address] Urology   [address]  [facility] [address] [phone] Urology - [facility], 6th Floor Please park in the [address] and use [address] elevators    [facility]  As needed, If symptoms worsen [address] [facility] [address] [phone]                                        Evita Meehan MD  11/20/20 1797

## 2020-11-23 ENCOUNTER — PATIENT MESSAGE (OUTPATIENT)
Dept: ENDOCRINOLOGY | Facility: CLINIC | Age: 70
End: 2020-11-23

## 2020-12-03 ENCOUNTER — OFFICE VISIT (OUTPATIENT)
Dept: UROLOGY | Facility: CLINIC | Age: 70
End: 2020-12-03
Attending: UROLOGY
Payer: COMMERCIAL

## 2020-12-03 VITALS
HEART RATE: 73 BPM | BODY MASS INDEX: 25.18 KG/M2 | HEIGHT: 69 IN | SYSTOLIC BLOOD PRESSURE: 122 MMHG | WEIGHT: 170 LBS | DIASTOLIC BLOOD PRESSURE: 72 MMHG

## 2020-12-03 DIAGNOSIS — N20.0 NEPHROLITHIASIS: Primary | ICD-10-CM

## 2020-12-03 PROCEDURE — 1157F ADVNC CARE PLAN IN RCRD: CPT | Mod: S$GLB,,, | Performed by: UROLOGY

## 2020-12-03 PROCEDURE — 99203 PR OFFICE/OUTPT VISIT, NEW, LEVL III, 30-44 MIN: ICD-10-PCS | Mod: 25,S$GLB,, | Performed by: UROLOGY

## 2020-12-03 PROCEDURE — 1159F PR MEDICATION LIST DOCUMENTED IN MEDICAL RECORD: ICD-10-PCS | Mod: S$GLB,,, | Performed by: UROLOGY

## 2020-12-03 PROCEDURE — 1101F PT FALLS ASSESS-DOCD LE1/YR: CPT | Mod: CPTII,S$GLB,, | Performed by: UROLOGY

## 2020-12-03 PROCEDURE — 3008F PR BODY MASS INDEX (BMI) DOCUMENTED: ICD-10-PCS | Mod: CPTII,S$GLB,, | Performed by: UROLOGY

## 2020-12-03 PROCEDURE — 3074F SYST BP LT 130 MM HG: CPT | Mod: CPTII,S$GLB,, | Performed by: UROLOGY

## 2020-12-03 PROCEDURE — 1126F AMNT PAIN NOTED NONE PRSNT: CPT | Mod: S$GLB,,, | Performed by: UROLOGY

## 2020-12-03 PROCEDURE — 3078F DIAST BP <80 MM HG: CPT | Mod: CPTII,S$GLB,, | Performed by: UROLOGY

## 2020-12-03 PROCEDURE — 3074F PR MOST RECENT SYSTOLIC BLOOD PRESSURE < 130 MM HG: ICD-10-PCS | Mod: CPTII,S$GLB,, | Performed by: UROLOGY

## 2020-12-03 PROCEDURE — 3288F PR FALLS RISK ASSESSMENT DOCUMENTED: ICD-10-PCS | Mod: CPTII,S$GLB,, | Performed by: UROLOGY

## 2020-12-03 PROCEDURE — 3288F FALL RISK ASSESSMENT DOCD: CPT | Mod: CPTII,S$GLB,, | Performed by: UROLOGY

## 2020-12-03 PROCEDURE — 81002 URINALYSIS NONAUTO W/O SCOPE: CPT | Mod: S$GLB,,, | Performed by: UROLOGY

## 2020-12-03 PROCEDURE — 1101F PR PT FALLS ASSESS DOC 0-1 FALLS W/OUT INJ PAST YR: ICD-10-PCS | Mod: CPTII,S$GLB,, | Performed by: UROLOGY

## 2020-12-03 PROCEDURE — 81002 POCT URINE DIPSTICK WITHOUT MICROSCOPE: ICD-10-PCS | Mod: S$GLB,,, | Performed by: UROLOGY

## 2020-12-03 PROCEDURE — 99203 OFFICE O/P NEW LOW 30 MIN: CPT | Mod: 25,S$GLB,, | Performed by: UROLOGY

## 2020-12-03 PROCEDURE — 1126F PR PAIN SEVERITY QUANTIFIED, NO PAIN PRESENT: ICD-10-PCS | Mod: S$GLB,,, | Performed by: UROLOGY

## 2020-12-03 PROCEDURE — 1157F PR ADVANCE CARE PLAN OR EQUIV PRESENT IN MEDICAL RECORD: ICD-10-PCS | Mod: S$GLB,,, | Performed by: UROLOGY

## 2020-12-03 PROCEDURE — 1159F MED LIST DOCD IN RCRD: CPT | Mod: S$GLB,,, | Performed by: UROLOGY

## 2020-12-03 PROCEDURE — 3008F BODY MASS INDEX DOCD: CPT | Mod: CPTII,S$GLB,, | Performed by: UROLOGY

## 2020-12-03 PROCEDURE — 3078F PR MOST RECENT DIASTOLIC BLOOD PRESSURE < 80 MM HG: ICD-10-PCS | Mod: CPTII,S$GLB,, | Performed by: UROLOGY

## 2020-12-03 NOTE — PROGRESS NOTES
"Subjective:      Sadiq Dhillon is a 70 y.o. male who was self-referred for evaluation of nephrolithiasis.      Urolithiasis  Patient complains of right flank pain with radiation to the abdomen. Onset of symptoms was abrupt starting 10 days ago with gradually improving course since that time. Patient describes the pain as dull, intermittent and rated as moderate. The patient has had no nausea/no vomiting and no diaphoresis. There has been no fever or chills. The patient is not complaining of dysuria or frequency. Risk factors for urolithiasis: history of stone disease.    He has required URS in the past, about 5 years ago, for a ureteral stone.    The following portions of the patient's history were reviewed and updated as appropriate: allergies, current medications, past family history, past medical history, past social history, past surgical history and problem list.    Review of Systems  Constitutional: no fever or chills  ENT: no nasal congestion or sore throat  Respiratory: no cough or shortness of breath  Cardiovascular: no chest pain or palpitations  Gastrointestinal: no nausea or vomiting, tolerating diet  Genitourinary: as per HPI  Hematologic/Lymphatic: no easy bruising or lymphadenopathy  Musculoskeletal: no arthralgias or myalgias  Neurological: no seizures or tremors  Behavioral/Psych: no auditory or visual hallucinations     Objective:   Vitals: /72 (BP Location: Right arm, Patient Position: Sitting, BP Method: Large (Automatic))   Pulse 73   Ht 5' 9" (1.753 m)   Wt 77.1 kg (170 lb)   BMI 25.10 kg/m²     Physical Exam   General: alert and oriented, no acute distress  Head: normocephalic, atraumatic  Neck: supple, no lymphadenopathy, normal ROM, no masses  Respiratory: Symmetric expansion, non-labored breathing  Cardiovascular: regular rate and rhythm, nomal pulses, no peripheral edema  Skin: normal coloration and turgor, no rashes, no suspicious skin lesions noted  Neuro: alert and " oriented x3, no gross deficits  Psych: normal judgment and insight, normal mood/affect and non-anxious    Lab Review   Urinalysis demonstrates negative for all components  Lab Results   Component Value Date    WBC 6.51 11/20/2020    HGB 13.9 (L) 11/20/2020    HCT 42.9 11/20/2020    MCV 87 11/20/2020     11/20/2020     Lab Results   Component Value Date    CREATININE 0.8 11/20/2020    BUN 17 11/20/2020     Lab Results   Component Value Date    PSA 0.93 02/07/2013     Imaging (all images personally reviewed; agree with report below)  Results for orders placed during the hospital encounter of 11/18/20   CT Renal Stone Study ABD Pelvis WO    Narrative EXAMINATION:  CT RENAL STONE STUDY ABD PELVIS WO    CLINICAL HISTORY:  Flank pain, kidney stone suspected;    TECHNIQUE:  Low dose axial images, sagittal and coronal reformations were obtained from the lung bases to the pubic symphysis.  Contrast was not administered.    COMPARISON:  09/25/2019    FINDINGS:  Limited evaluation lung bases show a trace left pleural effusion in unchanged micronodule at the right lung base.  Persistent elevation of the left hemidiaphragm.    The liver is normal in size without focal abnormality noting lack of IV contrast.  No biliary ductal dilatation.  The gallbladder is unremarkable.    The spleen and adrenal glands are grossly unremarkable.  Pancreas itself appears unremarkable.  There is a low-density tubular lesion adjacent to the pancreas which appears similar to recent prior CT, although lack of contrast somewhat limits evaluation.    There is a 4 mm obstructing calculus in the proximal right ureter with moderate right hydronephrosis.  Kidneys show mild cortical thinning bilaterally.  Urinary bladder shows no focal wall thickening.  Prostate is slightly enlarged.    Gastrointestinal tract shows no wall thickening or obstruction.  The appendix is normal.  No free fluid or free gas.  No concerning lymphadenopathy.    Vascular  structures show mild atherosclerosis without aneurysm.  No acute fracture or destructive osseous lesion.      Impression 4 mm obstructing calculus of the proximal right ureter with moderate right hydronephrosis.    Parapancreatic lesions/collection appears unchanged from 2019 exams.  Evaluation is somewhat limited by lack of IV contrast.      Electronically signed by: Eliecer Hubbard MD  Date:    11/18/2020  Time:    15:58          Assessment:     1. Nephrolithiasis        Plan:   1. Suspect stone has passed - US and KUB to confirm  2. FU pending above

## 2020-12-04 LAB
BILIRUB SERPL-MCNC: NEGATIVE MG/DL
BLOOD URINE, POC: NEGATIVE
CLARITY, POC UA: CLEAR
COLOR, POC UA: YELLOW
GLUCOSE UR QL STRIP: NORMAL
KETONES UR QL STRIP: NEGATIVE
LEUKOCYTE ESTERASE URINE, POC: NEGATIVE
NITRITE, POC UA: NEGATIVE
PH, POC UA: 7
PROTEIN, POC: NEGATIVE
SPECIFIC GRAVITY, POC UA: 1.01
UROBILINOGEN, POC UA: NORMAL

## 2020-12-07 ENCOUNTER — PATIENT MESSAGE (OUTPATIENT)
Dept: ENDOCRINOLOGY | Facility: CLINIC | Age: 70
End: 2020-12-07

## 2020-12-07 LAB — MEN 1 GENE SEQUENCING AND DEL/DUP: NORMAL

## 2020-12-09 ENCOUNTER — HOSPITAL ENCOUNTER (OUTPATIENT)
Dept: RADIOLOGY | Facility: OTHER | Age: 70
Discharge: HOME | End: 2020-12-09
Attending: UROLOGY
Payer: COMMERCIAL

## 2020-12-09 DIAGNOSIS — N20.0 NEPHROLITHIASIS: ICD-10-CM

## 2020-12-09 PROCEDURE — 76770 US EXAM ABDO BACK WALL COMP: CPT | Mod: 26,,, | Performed by: RADIOLOGY

## 2020-12-09 PROCEDURE — 74018 XR KUB: ICD-10-PCS | Mod: 26,,, | Performed by: RADIOLOGY

## 2020-12-09 PROCEDURE — 74018 RADEX ABDOMEN 1 VIEW: CPT | Mod: TC,FY

## 2020-12-09 PROCEDURE — 74018 RADEX ABDOMEN 1 VIEW: CPT | Mod: 26,,, | Performed by: RADIOLOGY

## 2020-12-09 PROCEDURE — 76770 US EXAM ABDO BACK WALL COMP: CPT | Mod: TC

## 2020-12-09 PROCEDURE — 76770 US RETROPERITONEAL COMPLETE: ICD-10-PCS | Mod: 26,,, | Performed by: RADIOLOGY

## 2020-12-11 ENCOUNTER — PATIENT MESSAGE (OUTPATIENT)
Dept: UROLOGY | Facility: CLINIC | Age: 70
End: 2020-12-11

## 2020-12-11 ENCOUNTER — HOSPITAL ENCOUNTER (OUTPATIENT)
Dept: RADIOLOGY | Facility: HOSPITAL | Age: 70
Discharge: HOME | End: 2020-12-11
Attending: INTERNAL MEDICINE
Payer: COMMERCIAL

## 2020-12-11 ENCOUNTER — PATIENT MESSAGE (OUTPATIENT)
Dept: ENDOCRINOLOGY | Facility: CLINIC | Age: 70
End: 2020-12-11

## 2020-12-11 DIAGNOSIS — K86.2 PANCREATIC CYST: ICD-10-CM

## 2020-12-11 PROCEDURE — 25500020 PHARM REV CODE 255: Performed by: INTERNAL MEDICINE

## 2020-12-11 PROCEDURE — 74160 CT ABDOMEN W/CONTRAST: CPT | Mod: 26,,, | Performed by: RADIOLOGY

## 2020-12-11 PROCEDURE — 74160 CT ABDOMEN W/CONTRAST: CPT | Mod: TC

## 2020-12-11 PROCEDURE — 74160 CT ABDOMEN WITH CONTRAST: ICD-10-PCS | Mod: 26,,, | Performed by: RADIOLOGY

## 2020-12-11 RX ADMIN — IOHEXOL 75 ML: 350 INJECTION, SOLUTION INTRAVENOUS at 01:12

## 2020-12-14 ENCOUNTER — PATIENT MESSAGE (OUTPATIENT)
Dept: ENDOCRINOLOGY | Facility: CLINIC | Age: 70
End: 2020-12-14

## 2020-12-16 ENCOUNTER — PATIENT MESSAGE (OUTPATIENT)
Dept: ENDOCRINOLOGY | Facility: CLINIC | Age: 70
End: 2020-12-16

## 2021-01-14 ENCOUNTER — TELEPHONE (OUTPATIENT)
Dept: ENDOCRINOLOGY | Facility: CLINIC | Age: 71
End: 2021-01-14

## 2021-01-21 ENCOUNTER — PATIENT MESSAGE (OUTPATIENT)
Dept: OPHTHALMOLOGY | Facility: CLINIC | Age: 71
End: 2021-01-21

## 2021-02-19 ENCOUNTER — PATIENT MESSAGE (OUTPATIENT)
Dept: OPHTHALMOLOGY | Facility: CLINIC | Age: 71
End: 2021-02-19

## 2021-02-23 ENCOUNTER — OFFICE VISIT (OUTPATIENT)
Dept: NEUROSURGERY | Facility: CLINIC | Age: 71
End: 2021-02-23
Payer: COMMERCIAL

## 2021-02-23 DIAGNOSIS — D35.2 PROLACTINOMA: ICD-10-CM

## 2021-02-23 DIAGNOSIS — D35.2 PITUITARY MACROADENOMA: Primary | ICD-10-CM

## 2021-02-23 PROCEDURE — 99205 OFFICE O/P NEW HI 60 MIN: CPT | Mod: S$GLB,,, | Performed by: NEUROLOGICAL SURGERY

## 2021-02-23 PROCEDURE — 1159F MED LIST DOCD IN RCRD: CPT | Mod: S$GLB,,, | Performed by: NEUROLOGICAL SURGERY

## 2021-02-23 PROCEDURE — 1157F PR ADVANCE CARE PLAN OR EQUIV PRESENT IN MEDICAL RECORD: ICD-10-PCS | Mod: S$GLB,,, | Performed by: NEUROLOGICAL SURGERY

## 2021-02-23 PROCEDURE — 99999 PR PBB SHADOW E&M-EST. PATIENT-LVL II: CPT | Mod: PBBFAC,,, | Performed by: NEUROLOGICAL SURGERY

## 2021-02-23 PROCEDURE — 99205 PR OFFICE/OUTPT VISIT, NEW, LEVL V, 60-74 MIN: ICD-10-PCS | Mod: S$GLB,,, | Performed by: NEUROLOGICAL SURGERY

## 2021-02-23 PROCEDURE — 1157F ADVNC CARE PLAN IN RCRD: CPT | Mod: S$GLB,,, | Performed by: NEUROLOGICAL SURGERY

## 2021-02-23 PROCEDURE — 1159F PR MEDICATION LIST DOCUMENTED IN MEDICAL RECORD: ICD-10-PCS | Mod: S$GLB,,, | Performed by: NEUROLOGICAL SURGERY

## 2021-02-23 PROCEDURE — 99999 PR PBB SHADOW E&M-EST. PATIENT-LVL II: ICD-10-PCS | Mod: PBBFAC,,, | Performed by: NEUROLOGICAL SURGERY

## 2021-02-25 ENCOUNTER — PATIENT MESSAGE (OUTPATIENT)
Dept: ENDOCRINOLOGY | Facility: CLINIC | Age: 71
End: 2021-02-25

## 2021-03-09 ENCOUNTER — OFFICE VISIT (OUTPATIENT)
Dept: OPHTHALMOLOGY | Facility: CLINIC | Age: 71
End: 2021-03-09
Attending: OPHTHALMOLOGY
Payer: COMMERCIAL

## 2021-03-09 DIAGNOSIS — Z94.7 STATUS POST CORNEAL TRANSPLANT: ICD-10-CM

## 2021-03-09 DIAGNOSIS — H35.343 FULL THICKNESS MACULAR HOLE, BILATERAL: ICD-10-CM

## 2021-03-09 DIAGNOSIS — H18.519 FUCHS' CORNEAL DYSTROPHY: Primary | ICD-10-CM

## 2021-03-09 PROCEDURE — 92014 COMPRE OPH EXAM EST PT 1/>: CPT | Mod: S$GLB,,, | Performed by: OPHTHALMOLOGY

## 2021-03-09 PROCEDURE — 1126F PR PAIN SEVERITY QUANTIFIED, NO PAIN PRESENT: ICD-10-PCS | Mod: S$GLB,,, | Performed by: OPHTHALMOLOGY

## 2021-03-09 PROCEDURE — 1101F PR PT FALLS ASSESS DOC 0-1 FALLS W/OUT INJ PAST YR: ICD-10-PCS | Mod: CPTII,S$GLB,, | Performed by: OPHTHALMOLOGY

## 2021-03-09 PROCEDURE — 99999 PR PBB SHADOW E&M-EST. PATIENT-LVL II: ICD-10-PCS | Mod: PBBFAC,,, | Performed by: OPHTHALMOLOGY

## 2021-03-09 PROCEDURE — 99999 PR PBB SHADOW E&M-EST. PATIENT-LVL II: CPT | Mod: PBBFAC,,, | Performed by: OPHTHALMOLOGY

## 2021-03-09 PROCEDURE — 1101F PT FALLS ASSESS-DOCD LE1/YR: CPT | Mod: CPTII,S$GLB,, | Performed by: OPHTHALMOLOGY

## 2021-03-09 PROCEDURE — 3288F FALL RISK ASSESSMENT DOCD: CPT | Mod: CPTII,S$GLB,, | Performed by: OPHTHALMOLOGY

## 2021-03-09 PROCEDURE — 1157F ADVNC CARE PLAN IN RCRD: CPT | Mod: S$GLB,,, | Performed by: OPHTHALMOLOGY

## 2021-03-09 PROCEDURE — 1126F AMNT PAIN NOTED NONE PRSNT: CPT | Mod: S$GLB,,, | Performed by: OPHTHALMOLOGY

## 2021-03-09 PROCEDURE — 3288F PR FALLS RISK ASSESSMENT DOCUMENTED: ICD-10-PCS | Mod: CPTII,S$GLB,, | Performed by: OPHTHALMOLOGY

## 2021-03-09 PROCEDURE — 92014 PR EYE EXAM, EST PATIENT,COMPREHESV: ICD-10-PCS | Mod: S$GLB,,, | Performed by: OPHTHALMOLOGY

## 2021-03-09 PROCEDURE — 1157F PR ADVANCE CARE PLAN OR EQUIV PRESENT IN MEDICAL RECORD: ICD-10-PCS | Mod: S$GLB,,, | Performed by: OPHTHALMOLOGY

## 2021-03-10 ENCOUNTER — PATIENT MESSAGE (OUTPATIENT)
Dept: ENDOCRINOLOGY | Facility: CLINIC | Age: 71
End: 2021-03-10

## 2021-03-11 ENCOUNTER — HOSPITAL ENCOUNTER (OUTPATIENT)
Dept: RADIOLOGY | Facility: OTHER | Age: 71
Discharge: HOME | End: 2021-03-11
Attending: STUDENT IN AN ORGANIZED HEALTH CARE EDUCATION/TRAINING PROGRAM
Payer: COMMERCIAL

## 2021-03-11 ENCOUNTER — TELEPHONE (OUTPATIENT)
Dept: URGENT CARE | Facility: CLINIC | Age: 71
End: 2021-03-11

## 2021-03-11 ENCOUNTER — PATIENT MESSAGE (OUTPATIENT)
Dept: GASTROENTEROLOGY | Facility: CLINIC | Age: 71
End: 2021-03-11

## 2021-03-11 ENCOUNTER — OFFICE VISIT (OUTPATIENT)
Dept: URGENT CARE | Facility: CLINIC | Age: 71
End: 2021-03-11
Payer: COMMERCIAL

## 2021-03-11 VITALS
OXYGEN SATURATION: 98 % | DIASTOLIC BLOOD PRESSURE: 69 MMHG | WEIGHT: 170 LBS | SYSTOLIC BLOOD PRESSURE: 111 MMHG | RESPIRATION RATE: 18 BRPM | TEMPERATURE: 98 F | HEIGHT: 69 IN | HEART RATE: 77 BPM | BODY MASS INDEX: 25.18 KG/M2

## 2021-03-11 DIAGNOSIS — S22.41XA CLOSED FRACTURE OF MULTIPLE RIBS OF RIGHT SIDE, INITIAL ENCOUNTER: Primary | ICD-10-CM
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
## 2021-03-11 DIAGNOSIS — S30.1XXA CONTUSION OF ABDOMINAL WALL, INITIAL ENCOUNTER: ICD-10-CM

## 2021-03-11 DIAGNOSIS — R10.11 RIGHT UPPER QUADRANT PAIN: ICD-10-CM

## 2021-03-11 DIAGNOSIS — S22.31XA CLOSED FRACTURE OF ONE RIB OF RIGHT SIDE, INITIAL ENCOUNTER: ICD-10-CM
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
## 2021-03-11 LAB
BILIRUB UR QL STRIP: NEGATIVE
GLUCOSE UR QL STRIP: NEGATIVE
KETONES UR QL STRIP: NEGATIVE
LEUKOCYTE ESTERASE UR QL STRIP: NEGATIVE
PH, POC UA: 5 (ref 5–8)
POC BLOOD, URINE: NEGATIVE
POC NITRATES, URINE: NEGATIVE
PROT UR QL STRIP: NEGATIVE
SP GR UR STRIP: 1.02 (ref 1–1.03)
UROBILINOGEN UR STRIP-ACNC: NORMAL (ref 0.3–2.2)

## 2021-03-11 PROCEDURE — 99214 PR OFFICE/OUTPT VISIT, EST, LEVL IV, 30-39 MIN: ICD-10-PCS | Mod: 25,S$GLB,, | Performed by: STUDENT IN AN ORGANIZED HEALTH CARE EDUCATION/TRAINING PROGRAM

## 2021-03-11 PROCEDURE — 3008F PR BODY MASS INDEX (BMI) DOCUMENTED: ICD-10-PCS | Mod: CPTII,S$GLB,, | Performed by: STUDENT IN AN ORGANIZED HEALTH CARE EDUCATION/TRAINING PROGRAM

## 2021-03-11 PROCEDURE — 74160 CT ABDOMEN WITH CONTRAST: ICD-10-PCS | Mod: 26,,, | Performed by: RADIOLOGY

## 2021-03-11 PROCEDURE — 71101 X-RAY EXAM UNILAT RIBS/CHEST: CPT | Mod: RT,S$GLB,, | Performed by: RADIOLOGY

## 2021-03-11 PROCEDURE — 81003 URINALYSIS AUTO W/O SCOPE: CPT | Mod: QW,S$GLB,, | Performed by: STUDENT IN AN ORGANIZED HEALTH CARE EDUCATION/TRAINING PROGRAM

## 2021-03-11 PROCEDURE — 1157F PR ADVANCE CARE PLAN OR EQUIV PRESENT IN MEDICAL RECORD: ICD-10-PCS | Mod: S$GLB,,, | Performed by: STUDENT IN AN ORGANIZED HEALTH CARE EDUCATION/TRAINING PROGRAM

## 2021-03-11 PROCEDURE — 74160 CT ABDOMEN W/CONTRAST: CPT | Mod: TC

## 2021-03-11 PROCEDURE — 1157F ADVNC CARE PLAN IN RCRD: CPT | Mod: S$GLB,,, | Performed by: STUDENT IN AN ORGANIZED HEALTH CARE EDUCATION/TRAINING PROGRAM

## 2021-03-11 PROCEDURE — 99214 OFFICE O/P EST MOD 30 MIN: CPT | Mod: 25,S$GLB,, | Performed by: STUDENT IN AN ORGANIZED HEALTH CARE EDUCATION/TRAINING PROGRAM

## 2021-03-11 PROCEDURE — 71101 XR RIBS MIN 3 VIEWS W/ PA CHEST RIGHT: ICD-10-PCS | Mod: RT,S$GLB,, | Performed by: RADIOLOGY

## 2021-03-11 PROCEDURE — 25500020 PHARM REV CODE 255: Performed by: STUDENT IN AN ORGANIZED HEALTH CARE EDUCATION/TRAINING PROGRAM

## 2021-03-11 PROCEDURE — 3008F BODY MASS INDEX DOCD: CPT | Mod: CPTII,S$GLB,, | Performed by: STUDENT IN AN ORGANIZED HEALTH CARE EDUCATION/TRAINING PROGRAM

## 2021-03-11 PROCEDURE — 74160 CT ABDOMEN W/CONTRAST: CPT | Mod: 26,,, | Performed by: RADIOLOGY

## 2021-03-11 PROCEDURE — 81003 POCT URINALYSIS, DIPSTICK, AUTOMATED, W/O SCOPE: ICD-10-PCS | Mod: QW,S$GLB,, | Performed by: STUDENT IN AN ORGANIZED HEALTH CARE EDUCATION/TRAINING PROGRAM

## 2021-03-11 RX ORDER — HYDROCODONE BITARTRATE AND ACETAMINOPHEN 5; 325 MG/1; MG/1
1 TABLET ORAL EVERY 8 HOURS PRN
Qty: 5 TABLET | Refills: 0 | Status: SHIPPED | OUTPATIENT
Start: 2021-03-11 | End: 2021-03-14

## 2021-03-11 RX ADMIN — IOHEXOL 75 ML: 350 INJECTION, SOLUTION INTRAVENOUS at 03:03

## 2021-03-30 ENCOUNTER — OFFICE VISIT (OUTPATIENT)
Dept: ENDOCRINOLOGY | Facility: CLINIC | Age: 71
End: 2021-03-30
Payer: COMMERCIAL

## 2021-03-30 ENCOUNTER — LAB VISIT (OUTPATIENT)
Dept: LAB | Facility: HOSPITAL | Age: 71
End: 2021-03-30
Attending: INTERNAL MEDICINE
Payer: COMMERCIAL

## 2021-03-30 VITALS
DIASTOLIC BLOOD PRESSURE: 66 MMHG | TEMPERATURE: 98 F | BODY MASS INDEX: 27.41 KG/M2 | HEIGHT: 69 IN | SYSTOLIC BLOOD PRESSURE: 110 MMHG | WEIGHT: 185.06 LBS | OXYGEN SATURATION: 97 % | HEART RATE: 72 BPM

## 2021-03-30 DIAGNOSIS — E29.1 HYPOGONADISM IN MALE: ICD-10-CM

## 2021-03-30 DIAGNOSIS — H18.519 FUCHS' CORNEAL DYSTROPHY: ICD-10-CM

## 2021-03-30 DIAGNOSIS — D35.2 PROLACTINOMA: ICD-10-CM

## 2021-03-30 DIAGNOSIS — D35.2 PITUITARY MACROADENOMA: ICD-10-CM

## 2021-03-30 DIAGNOSIS — E78.5 HYPERLIPIDEMIA, UNSPECIFIED HYPERLIPIDEMIA TYPE: ICD-10-CM

## 2021-03-30 DIAGNOSIS — E11.65 TYPE 2 DIABETES MELLITUS WITH HYPERGLYCEMIA, WITHOUT LONG-TERM CURRENT USE OF INSULIN: ICD-10-CM

## 2021-03-30 DIAGNOSIS — Z87.19 HISTORY OF PANCREATITIS: ICD-10-CM

## 2021-03-30 DIAGNOSIS — N20.0 NEPHROLITHIASIS: ICD-10-CM

## 2021-03-30 DIAGNOSIS — K86.2 PANCREATIC CYST: ICD-10-CM

## 2021-03-30 DIAGNOSIS — E55.9 HYPOVITAMINOSIS D: ICD-10-CM

## 2021-03-30 DIAGNOSIS — I10 ESSENTIAL HYPERTENSION: ICD-10-CM

## 2021-03-30 DIAGNOSIS — E21.0 PRIMARY HYPERPARATHYROIDISM: ICD-10-CM

## 2021-03-30 DIAGNOSIS — E78.00 HYPERCHOLESTEROLEMIA: ICD-10-CM

## 2021-03-30 DIAGNOSIS — E11.65 TYPE 2 DIABETES MELLITUS WITH HYPERGLYCEMIA, WITHOUT LONG-TERM CURRENT USE OF INSULIN: Primary | ICD-10-CM

## 2021-03-30 LAB
BASOPHILS # BLD AUTO: 0.05 K/UL (ref 0–0.2)
BASOPHILS NFR BLD: 0.8 % (ref 0–1.9)
DIFFERENTIAL METHOD: ABNORMAL
EOSINOPHIL # BLD AUTO: 0.2 K/UL (ref 0–0.5)
EOSINOPHIL NFR BLD: 3.9 % (ref 0–8)
ERYTHROCYTE [DISTWIDTH] IN BLOOD BY AUTOMATED COUNT: 13.5 % (ref 11.5–14.5)
HCT VFR BLD AUTO: 41.9 % (ref 40–54)
HGB BLD-MCNC: 14 G/DL (ref 14–18)
IMM GRANULOCYTES # BLD AUTO: 0.02 K/UL (ref 0–0.04)
IMM GRANULOCYTES NFR BLD AUTO: 0.3 % (ref 0–0.5)
LYMPHOCYTES # BLD AUTO: 1.8 K/UL (ref 1–4.8)
LYMPHOCYTES NFR BLD: 29.4 % (ref 18–48)
MCH RBC QN AUTO: 29.3 PG (ref 27–31)
MCHC RBC AUTO-ENTMCNC: 33.4 G/DL (ref 32–36)
MCV RBC AUTO: 88 FL (ref 82–98)
MONOCYTES # BLD AUTO: 0.6 K/UL (ref 0.3–1)
MONOCYTES NFR BLD: 10.3 % (ref 4–15)
NEUTROPHILS # BLD AUTO: 3.4 K/UL (ref 1.8–7.7)
NEUTROPHILS NFR BLD: 55.3 % (ref 38–73)
NRBC BLD-RTO: 0 /100 WBC
PLATELET # BLD AUTO: 206 K/UL (ref 150–450)
PMV BLD AUTO: 9 FL (ref 9.2–12.9)
RBC # BLD AUTO: 4.78 M/UL (ref 4.6–6.2)
WBC # BLD AUTO: 6.19 K/UL (ref 3.9–12.7)

## 2021-03-30 PROCEDURE — 36415 COLL VENOUS BLD VENIPUNCTURE: CPT | Mod: PO | Performed by: INTERNAL MEDICINE

## 2021-03-30 PROCEDURE — 3008F PR BODY MASS INDEX (BMI) DOCUMENTED: ICD-10-PCS | Mod: CPTII,S$GLB,, | Performed by: INTERNAL MEDICINE

## 2021-03-30 PROCEDURE — 99999 PR PBB SHADOW E&M-EST. PATIENT-LVL V: CPT | Mod: PBBFAC,,, | Performed by: INTERNAL MEDICINE

## 2021-03-30 PROCEDURE — 3044F HG A1C LEVEL LT 7.0%: CPT | Mod: CPTII,S$GLB,, | Performed by: INTERNAL MEDICINE

## 2021-03-30 PROCEDURE — 1126F PR PAIN SEVERITY QUANTIFIED, NO PAIN PRESENT: ICD-10-PCS | Mod: S$GLB,,, | Performed by: INTERNAL MEDICINE

## 2021-03-30 PROCEDURE — 1101F PR PT FALLS ASSESS DOC 0-1 FALLS W/OUT INJ PAST YR: ICD-10-PCS | Mod: CPTII,S$GLB,, | Performed by: INTERNAL MEDICINE

## 2021-03-30 PROCEDURE — 82985 ASSAY OF GLYCATED PROTEIN: CPT | Performed by: INTERNAL MEDICINE

## 2021-03-30 PROCEDURE — 1101F PT FALLS ASSESS-DOCD LE1/YR: CPT | Mod: CPTII,S$GLB,, | Performed by: INTERNAL MEDICINE

## 2021-03-30 PROCEDURE — 3078F PR MOST RECENT DIASTOLIC BLOOD PRESSURE < 80 MM HG: ICD-10-PCS | Mod: CPTII,S$GLB,, | Performed by: INTERNAL MEDICINE

## 2021-03-30 PROCEDURE — 99999 PR PBB SHADOW E&M-EST. PATIENT-LVL V: ICD-10-PCS | Mod: PBBFAC,,, | Performed by: INTERNAL MEDICINE

## 2021-03-30 PROCEDURE — 82306 VITAMIN D 25 HYDROXY: CPT | Performed by: INTERNAL MEDICINE

## 2021-03-30 PROCEDURE — 82330 ASSAY OF CALCIUM: CPT | Performed by: INTERNAL MEDICINE

## 2021-03-30 PROCEDURE — 3288F FALL RISK ASSESSMENT DOCD: CPT | Mod: CPTII,S$GLB,, | Performed by: INTERNAL MEDICINE

## 2021-03-30 PROCEDURE — 1159F MED LIST DOCD IN RCRD: CPT | Mod: S$GLB,,, | Performed by: INTERNAL MEDICINE

## 2021-03-30 PROCEDURE — 80061 LIPID PANEL: CPT | Performed by: INTERNAL MEDICINE

## 2021-03-30 PROCEDURE — 84550 ASSAY OF BLOOD/URIC ACID: CPT | Performed by: INTERNAL MEDICINE

## 2021-03-30 PROCEDURE — 84378 SUGARS SINGLE QUANT: CPT | Performed by: INTERNAL MEDICINE

## 2021-03-30 PROCEDURE — 99214 PR OFFICE/OUTPT VISIT, EST, LEVL IV, 30-39 MIN: ICD-10-PCS | Mod: S$GLB,,, | Performed by: INTERNAL MEDICINE

## 2021-03-30 PROCEDURE — 83690 ASSAY OF LIPASE: CPT | Performed by: INTERNAL MEDICINE

## 2021-03-30 PROCEDURE — 85025 COMPLETE CBC W/AUTO DIFF WBC: CPT | Mod: PO | Performed by: INTERNAL MEDICINE

## 2021-03-30 PROCEDURE — 83036 HEMOGLOBIN GLYCOSYLATED A1C: CPT | Performed by: INTERNAL MEDICINE

## 2021-03-30 PROCEDURE — 84702 CHORIONIC GONADOTROPIN TEST: CPT | Performed by: INTERNAL MEDICINE

## 2021-03-30 PROCEDURE — 3078F DIAST BP <80 MM HG: CPT | Mod: CPTII,S$GLB,, | Performed by: INTERNAL MEDICINE

## 2021-03-30 PROCEDURE — 1126F AMNT PAIN NOTED NONE PRSNT: CPT | Mod: S$GLB,,, | Performed by: INTERNAL MEDICINE

## 2021-03-30 PROCEDURE — 3044F PR MOST RECENT HEMOGLOBIN A1C LEVEL <7.0%: ICD-10-PCS | Mod: CPTII,S$GLB,, | Performed by: INTERNAL MEDICINE

## 2021-03-30 PROCEDURE — 1157F PR ADVANCE CARE PLAN OR EQUIV PRESENT IN MEDICAL RECORD: ICD-10-PCS | Mod: S$GLB,,, | Performed by: INTERNAL MEDICINE

## 2021-03-30 PROCEDURE — 3074F SYST BP LT 130 MM HG: CPT | Mod: CPTII,S$GLB,, | Performed by: INTERNAL MEDICINE

## 2021-03-30 PROCEDURE — 1159F PR MEDICATION LIST DOCUMENTED IN MEDICAL RECORD: ICD-10-PCS | Mod: S$GLB,,, | Performed by: INTERNAL MEDICINE

## 2021-03-30 PROCEDURE — 3074F PR MOST RECENT SYSTOLIC BLOOD PRESSURE < 130 MM HG: ICD-10-PCS | Mod: CPTII,S$GLB,, | Performed by: INTERNAL MEDICINE

## 2021-03-30 PROCEDURE — 3008F BODY MASS INDEX DOCD: CPT | Mod: CPTII,S$GLB,, | Performed by: INTERNAL MEDICINE

## 2021-03-30 PROCEDURE — 80053 COMPREHEN METABOLIC PANEL: CPT | Performed by: INTERNAL MEDICINE

## 2021-03-30 PROCEDURE — 83970 ASSAY OF PARATHORMONE: CPT | Performed by: INTERNAL MEDICINE

## 2021-03-30 PROCEDURE — 99214 OFFICE O/P EST MOD 30 MIN: CPT | Mod: S$GLB,,, | Performed by: INTERNAL MEDICINE

## 2021-03-30 PROCEDURE — 1157F ADVNC CARE PLAN IN RCRD: CPT | Mod: S$GLB,,, | Performed by: INTERNAL MEDICINE

## 2021-03-30 PROCEDURE — 3288F PR FALLS RISK ASSESSMENT DOCUMENTED: ICD-10-PCS | Mod: CPTII,S$GLB,, | Performed by: INTERNAL MEDICINE

## 2021-03-31 ENCOUNTER — TELEPHONE (OUTPATIENT)
Dept: ENDOCRINOLOGY | Facility: CLINIC | Age: 71
End: 2021-03-31

## 2021-03-31 ENCOUNTER — PATIENT MESSAGE (OUTPATIENT)
Dept: ENDOCRINOLOGY | Facility: CLINIC | Age: 71
End: 2021-03-31

## 2021-03-31 PROBLEM — E55.9 HYPOVITAMINOSIS D: Status: ACTIVE | Noted: 2021-03-31

## 2021-03-31 LAB
25(OH)D3+25(OH)D2 SERPL-MCNC: 29 NG/ML (ref 30–96)
ALBUMIN SERPL BCP-MCNC: 4.3 G/DL (ref 3.5–5.2)
ALBUMIN SERPL BCP-MCNC: 4.3 G/DL (ref 3.5–5.2)
ALP SERPL-CCNC: 95 U/L (ref 55–135)
ALP SERPL-CCNC: 95 U/L (ref 55–135)
ALT SERPL W/O P-5'-P-CCNC: 14 U/L (ref 10–44)
ALT SERPL W/O P-5'-P-CCNC: 14 U/L (ref 10–44)
ANION GAP SERPL CALC-SCNC: 10 MMOL/L (ref 8–16)
ANION GAP SERPL CALC-SCNC: 10 MMOL/L (ref 8–16)
AST SERPL-CCNC: 18 U/L (ref 10–40)
AST SERPL-CCNC: 18 U/L (ref 10–40)
BILIRUB SERPL-MCNC: 0.4 MG/DL (ref 0.1–1)
BILIRUB SERPL-MCNC: 0.4 MG/DL (ref 0.1–1)
BUN SERPL-MCNC: 22 MG/DL (ref 8–23)
BUN SERPL-MCNC: 22 MG/DL (ref 8–23)
CA-I BLDV-SCNC: 1.33 MMOL/L (ref 1.06–1.42)
CALCIUM SERPL-MCNC: 9.6 MG/DL (ref 8.7–10.5)
CALCIUM SERPL-MCNC: 9.6 MG/DL (ref 8.7–10.5)
CHLORIDE SERPL-SCNC: 104 MMOL/L (ref 95–110)
CHLORIDE SERPL-SCNC: 104 MMOL/L (ref 95–110)
CHOLEST SERPL-MCNC: 206 MG/DL (ref 120–199)
CHOLEST/HDLC SERPL: 5.4 {RATIO} (ref 2–5)
CO2 SERPL-SCNC: 24 MMOL/L (ref 23–29)
CO2 SERPL-SCNC: 24 MMOL/L (ref 23–29)
CREAT SERPL-MCNC: 0.9 MG/DL (ref 0.5–1.4)
CREAT SERPL-MCNC: 0.9 MG/DL (ref 0.5–1.4)
EST. GFR  (AFRICAN AMERICAN): >60 ML/MIN/1.73 M^2
EST. GFR  (AFRICAN AMERICAN): >60 ML/MIN/1.73 M^2
EST. GFR  (NON AFRICAN AMERICAN): >60 ML/MIN/1.73 M^2
EST. GFR  (NON AFRICAN AMERICAN): >60 ML/MIN/1.73 M^2
ESTIMATED AVG GLUCOSE: 143 MG/DL (ref 68–131)
GLUCOSE SERPL-MCNC: 98 MG/DL (ref 70–110)
GLUCOSE SERPL-MCNC: 98 MG/DL (ref 70–110)
HBA1C MFR BLD: 6.6 % (ref 4–5.6)
HDLC SERPL-MCNC: 38 MG/DL (ref 40–75)
HDLC SERPL: 18.4 % (ref 20–50)
LDLC SERPL CALC-MCNC: 119 MG/DL (ref 63–159)
LIPASE SERPL-CCNC: 81 U/L (ref 4–60)
NONHDLC SERPL-MCNC: 168 MG/DL
POTASSIUM SERPL-SCNC: 4.3 MMOL/L (ref 3.5–5.1)
POTASSIUM SERPL-SCNC: 4.3 MMOL/L (ref 3.5–5.1)
PROT SERPL-MCNC: 7.2 G/DL (ref 6–8.4)
PROT SERPL-MCNC: 7.2 G/DL (ref 6–8.4)
PTH-INTACT SERPL-MCNC: 29 PG/ML (ref 9–77)
SODIUM SERPL-SCNC: 138 MMOL/L (ref 136–145)
SODIUM SERPL-SCNC: 138 MMOL/L (ref 136–145)
TRIGL SERPL-MCNC: 245 MG/DL (ref 30–150)
URATE SERPL-MCNC: 6.5 MG/DL (ref 3.4–7)

## 2021-04-01 LAB — B-HCG SERPL-ACNC: <0.6 IU/L

## 2021-04-02 LAB — FRUCTOSAMINE SERPL-SCNC: 327 UMOL /L

## 2021-04-06 LAB — GLYCOMARK (TM): 13 UG/ML

## 2021-04-08 DIAGNOSIS — E11.65 TYPE 2 DIABETES MELLITUS WITH HYPERGLYCEMIA, WITHOUT LONG-TERM CURRENT USE OF INSULIN: Primary | ICD-10-CM

## 2021-04-08 DIAGNOSIS — E78.00 HYPERCHOLESTEROLEMIA: ICD-10-CM

## 2021-04-08 DIAGNOSIS — E78.5 HYPERLIPIDEMIA, UNSPECIFIED HYPERLIPIDEMIA TYPE: ICD-10-CM

## 2021-04-08 DIAGNOSIS — E21.0 PRIMARY HYPERPARATHYROIDISM: ICD-10-CM

## 2021-04-08 DIAGNOSIS — D35.2 PROLACTINOMA: ICD-10-CM

## 2021-04-09 ENCOUNTER — DOCUMENTATION ONLY (OUTPATIENT)
Dept: ENDOCRINOLOGY | Facility: CLINIC | Age: 71
End: 2021-04-09

## 2021-04-09 ENCOUNTER — LAB VISIT (OUTPATIENT)
Dept: LAB | Facility: HOSPITAL | Age: 71
End: 2021-04-09
Payer: COMMERCIAL

## 2021-04-09 DIAGNOSIS — D35.2 PROLACTINOMA: ICD-10-CM

## 2021-04-09 DIAGNOSIS — E78.5 HYPERLIPIDEMIA, UNSPECIFIED HYPERLIPIDEMIA TYPE: ICD-10-CM

## 2021-04-09 DIAGNOSIS — E21.0 PRIMARY HYPERPARATHYROIDISM: ICD-10-CM

## 2021-04-09 DIAGNOSIS — E11.65 TYPE 2 DIABETES MELLITUS WITH HYPERGLYCEMIA, WITHOUT LONG-TERM CURRENT USE OF INSULIN: ICD-10-CM

## 2021-04-09 DIAGNOSIS — E78.00 HYPERCHOLESTEROLEMIA: ICD-10-CM

## 2021-04-09 LAB
AMYLASE SERPL-CCNC: 167 U/L (ref 20–110)
CORTIS SERPL-MCNC: 9 UG/DL
FSH SERPL-ACNC: 2.5 MIU/ML (ref 0.95–11.95)
LH SERPL-ACNC: 1.2 MIU/ML (ref 0.6–12.1)
OSMOLALITY SERPL: 297 MOSM/KG (ref 280–300)
PROGEST SERPL-MCNC: 0.1 NG/ML
PROLACTIN SERPL IA-MCNC: 2.7 NG/ML (ref 3.5–19.4)

## 2021-04-09 PROCEDURE — 83003 ASSAY GROWTH HORMONE (HGH): CPT | Performed by: INTERNAL MEDICINE

## 2021-04-09 PROCEDURE — 84305 ASSAY OF SOMATOMEDIN: CPT | Performed by: INTERNAL MEDICINE

## 2021-04-09 PROCEDURE — 84588 ASSAY OF VASOPRESSIN: CPT | Performed by: INTERNAL MEDICINE

## 2021-04-09 PROCEDURE — 83930 ASSAY OF BLOOD OSMOLALITY: CPT | Performed by: INTERNAL MEDICINE

## 2021-04-09 PROCEDURE — 84146 ASSAY OF PROLACTIN: CPT | Mod: 91 | Performed by: INTERNAL MEDICINE

## 2021-04-09 PROCEDURE — 82384 ASSAY THREE CATECHOLAMINES: CPT | Performed by: INTERNAL MEDICINE

## 2021-04-09 PROCEDURE — 83520 IMMUNOASSAY QUANT NOS NONAB: CPT | Performed by: INTERNAL MEDICINE

## 2021-04-09 PROCEDURE — 82533 TOTAL CORTISOL: CPT | Performed by: INTERNAL MEDICINE

## 2021-04-09 PROCEDURE — 86255 FLUORESCENT ANTIBODY SCREEN: CPT | Performed by: INTERNAL MEDICINE

## 2021-04-09 PROCEDURE — 82024 ASSAY OF ACTH: CPT | Performed by: INTERNAL MEDICINE

## 2021-04-09 PROCEDURE — 86316 IMMUNOASSAY TUMOR OTHER: CPT | Performed by: INTERNAL MEDICINE

## 2021-04-09 PROCEDURE — 83001 ASSAY OF GONADOTROPIN (FSH): CPT | Performed by: INTERNAL MEDICINE

## 2021-04-09 PROCEDURE — 84307 ASSAY OF SOMATOSTATIN: CPT | Performed by: INTERNAL MEDICINE

## 2021-04-09 PROCEDURE — 84403 ASSAY OF TOTAL TESTOSTERONE: CPT | Performed by: INTERNAL MEDICINE

## 2021-04-09 PROCEDURE — 83520 IMMUNOASSAY QUANT NOS NONAB: CPT | Mod: 59 | Performed by: INTERNAL MEDICINE

## 2021-04-09 PROCEDURE — 82150 ASSAY OF AMYLASE: CPT | Performed by: INTERNAL MEDICINE

## 2021-04-09 PROCEDURE — 82397 CHEMILUMINESCENT ASSAY: CPT | Performed by: INTERNAL MEDICINE

## 2021-04-09 PROCEDURE — 36415 COLL VENOUS BLD VENIPUNCTURE: CPT | Performed by: INTERNAL MEDICINE

## 2021-04-09 PROCEDURE — 84146 ASSAY OF PROLACTIN: CPT | Performed by: INTERNAL MEDICINE

## 2021-04-09 PROCEDURE — 83002 ASSAY OF GONADOTROPIN (LH): CPT | Performed by: INTERNAL MEDICINE

## 2021-04-09 PROCEDURE — 83880 ASSAY OF NATRIURETIC PEPTIDE: CPT | Performed by: INTERNAL MEDICINE

## 2021-04-09 PROCEDURE — 84144 ASSAY OF PROGESTERONE: CPT | Performed by: INTERNAL MEDICINE

## 2021-04-10 DIAGNOSIS — R31.21 ASYMPTOMATIC MICROSCOPIC HEMATURIA: Primary | ICD-10-CM

## 2021-04-12 ENCOUNTER — PATIENT MESSAGE (OUTPATIENT)
Dept: ENDOCRINOLOGY | Facility: CLINIC | Age: 71
End: 2021-04-12

## 2021-04-13 ENCOUNTER — PATIENT MESSAGE (OUTPATIENT)
Dept: ENDOCRINOLOGY | Facility: CLINIC | Age: 71
End: 2021-04-13

## 2021-04-13 ENCOUNTER — OFFICE VISIT (OUTPATIENT)
Dept: OPHTHALMOLOGY | Facility: CLINIC | Age: 71
End: 2021-04-13
Payer: COMMERCIAL

## 2021-04-13 DIAGNOSIS — H35.341 FULL THICKNESS MACULAR HOLE, RIGHT: ICD-10-CM

## 2021-04-13 DIAGNOSIS — H35.343 FULL THICKNESS MACULAR HOLE, BILATERAL: Primary | ICD-10-CM

## 2021-04-13 DIAGNOSIS — H43.823 VITREOMACULAR ADHESION, BILATERAL: ICD-10-CM

## 2021-04-13 LAB
ACTH PLAS-MCNC: 50 PG/ML (ref 0–46)
ANNOTATION COMMENT IMP: ABNORMAL
CGA SERPL-MCNC: 61 NG/ML (ref 0–103)
GH SERPL-MCNC: <0.1 NG/ML (ref 0–3)
IGF BP3 SERPL-MCNC: 2.8 MCG/ML (ref 3–6.2)
MACROPROLACTIN SERPL-MCNC: 3 NG/ML (ref 2.7–13.1)
MACROPROLACTIN/PROLACTIN MFR SERPL: 9 %
NT-PROBNP SERPL-MCNC: 62 PG/ML
PROLACTIN SERPL IA-MCNC: 3.3 NG/ML (ref 4–15.2)

## 2021-04-13 PROCEDURE — 1101F PT FALLS ASSESS-DOCD LE1/YR: CPT | Mod: CPTII,S$GLB,, | Performed by: OPHTHALMOLOGY

## 2021-04-13 PROCEDURE — 1126F AMNT PAIN NOTED NONE PRSNT: CPT | Mod: S$GLB,,, | Performed by: OPHTHALMOLOGY

## 2021-04-13 PROCEDURE — 92014 PR EYE EXAM, EST PATIENT,COMPREHESV: ICD-10-PCS | Mod: S$GLB,,, | Performed by: OPHTHALMOLOGY

## 2021-04-13 PROCEDURE — 1157F ADVNC CARE PLAN IN RCRD: CPT | Mod: S$GLB,,, | Performed by: OPHTHALMOLOGY

## 2021-04-13 PROCEDURE — 3288F PR FALLS RISK ASSESSMENT DOCUMENTED: ICD-10-PCS | Mod: CPTII,S$GLB,, | Performed by: OPHTHALMOLOGY

## 2021-04-13 PROCEDURE — 92014 COMPRE OPH EXAM EST PT 1/>: CPT | Mod: S$GLB,,, | Performed by: OPHTHALMOLOGY

## 2021-04-13 PROCEDURE — 99999 PR PBB SHADOW E&M-EST. PATIENT-LVL IV: CPT | Mod: PBBFAC,,, | Performed by: OPHTHALMOLOGY

## 2021-04-13 PROCEDURE — 3288F FALL RISK ASSESSMENT DOCD: CPT | Mod: CPTII,S$GLB,, | Performed by: OPHTHALMOLOGY

## 2021-04-13 PROCEDURE — 92134 CPTRZ OPH DX IMG PST SGM RTA: CPT | Mod: S$GLB,,, | Performed by: OPHTHALMOLOGY

## 2021-04-13 PROCEDURE — 92201 PR OPHTHALMOSCOPY, EXT, W/RET DRAW/SCLERAL DEPR, I&R, UNI/BI: ICD-10-PCS | Mod: S$GLB,,, | Performed by: OPHTHALMOLOGY

## 2021-04-13 PROCEDURE — 92201 OPSCPY EXTND RTA DRAW UNI/BI: CPT | Mod: S$GLB,,, | Performed by: OPHTHALMOLOGY

## 2021-04-13 PROCEDURE — 1126F PR PAIN SEVERITY QUANTIFIED, NO PAIN PRESENT: ICD-10-PCS | Mod: S$GLB,,, | Performed by: OPHTHALMOLOGY

## 2021-04-13 PROCEDURE — 1101F PR PT FALLS ASSESS DOC 0-1 FALLS W/OUT INJ PAST YR: ICD-10-PCS | Mod: CPTII,S$GLB,, | Performed by: OPHTHALMOLOGY

## 2021-04-13 PROCEDURE — 99999 PR PBB SHADOW E&M-EST. PATIENT-LVL IV: ICD-10-PCS | Mod: PBBFAC,,, | Performed by: OPHTHALMOLOGY

## 2021-04-13 PROCEDURE — 1157F PR ADVANCE CARE PLAN OR EQUIV PRESENT IN MEDICAL RECORD: ICD-10-PCS | Mod: S$GLB,,, | Performed by: OPHTHALMOLOGY

## 2021-04-13 PROCEDURE — 92134 POSTERIOR SEGMENT OCT RETINA (OCULAR COHERENCE TOMOGRAPHY)-BOTH EYES: ICD-10-PCS | Mod: S$GLB,,, | Performed by: OPHTHALMOLOGY

## 2021-04-14 LAB — A-PGH SER-MCNC: <0.1 NG/ML

## 2021-04-15 LAB
ALBUMIN SERPL-MCNC: 4.6 G/DL (ref 3.6–5.1)
IGF-I SERPL-MCNC: 69 NG/ML (ref 32–209)
IGF-I Z-SCORE SERPL: -1.04 SD
SHBG SERPL-SCNC: 22 NMOL/L (ref 22–77)
TESTOST FREE SERPL-MCNC: 52.3 PG/ML (ref 46–224)
TESTOST SERPL-MCNC: 304 NG/DL (ref 250–1100)
TESTOSTERONE.FREE+WB SERPL-MCNC: 109.8 NG/DL (ref 110–575)

## 2021-04-21 LAB — GHRELIN, TOTAL (PLASMA): 397 PG/ML

## 2021-04-22 ENCOUNTER — PATIENT MESSAGE (OUTPATIENT)
Dept: ENDOCRINOLOGY | Facility: CLINIC | Age: 71
End: 2021-04-22

## 2021-04-23 ENCOUNTER — PATIENT MESSAGE (OUTPATIENT)
Dept: ENDOCRINOLOGY | Facility: CLINIC | Age: 71
End: 2021-04-23

## 2021-04-23 LAB — VASOPRESSIN SERPL-MCNC: <0.5 PG/ML (ref 0–6.9)

## 2021-04-24 LAB — SOMATOSTAT PLAS-MCNC: <21 PG/ML

## 2021-04-25 LAB
CATECHOLS PLAS-MCNC: 409 PG/ML
DOPAMINE SERPL-MCNC: <10 PG/ML
EPINEPH PLAS-MCNC: 67 PG/ML
NOREPINEPH PLAS-MCNC: 342 PG/ML

## 2021-04-27 ENCOUNTER — OFFICE VISIT (OUTPATIENT)
Dept: ENDOCRINOLOGY | Facility: CLINIC | Age: 71
End: 2021-04-27
Payer: COMMERCIAL

## 2021-04-27 VITALS
WEIGHT: 184.75 LBS | DIASTOLIC BLOOD PRESSURE: 78 MMHG | HEART RATE: 74 BPM | BODY MASS INDEX: 27.28 KG/M2 | RESPIRATION RATE: 16 BRPM | SYSTOLIC BLOOD PRESSURE: 124 MMHG | OXYGEN SATURATION: 99 %

## 2021-04-27 DIAGNOSIS — D35.2 PROLACTINOMA: ICD-10-CM

## 2021-04-27 DIAGNOSIS — D35.2 PITUITARY MACROADENOMA: ICD-10-CM

## 2021-04-27 DIAGNOSIS — K86.2 PANCREAS CYST: ICD-10-CM

## 2021-04-27 DIAGNOSIS — E29.1 HYPOGONADISM IN MALE: ICD-10-CM

## 2021-04-27 LAB — NMO/AQP4 FACS,S: NEGATIVE

## 2021-04-27 PROCEDURE — 1157F PR ADVANCE CARE PLAN OR EQUIV PRESENT IN MEDICAL RECORD: ICD-10-PCS | Mod: S$GLB,,, | Performed by: INTERNAL MEDICINE

## 2021-04-27 PROCEDURE — 1126F PR PAIN SEVERITY QUANTIFIED, NO PAIN PRESENT: ICD-10-PCS | Mod: S$GLB,,, | Performed by: INTERNAL MEDICINE

## 2021-04-27 PROCEDURE — 99999 PR PBB SHADOW E&M-EST. PATIENT-LVL V: ICD-10-PCS | Mod: PBBFAC,,, | Performed by: INTERNAL MEDICINE

## 2021-04-27 PROCEDURE — 1126F AMNT PAIN NOTED NONE PRSNT: CPT | Mod: S$GLB,,, | Performed by: INTERNAL MEDICINE

## 2021-04-27 PROCEDURE — 99214 OFFICE O/P EST MOD 30 MIN: CPT | Mod: S$GLB,,, | Performed by: INTERNAL MEDICINE

## 2021-04-27 PROCEDURE — 1157F ADVNC CARE PLAN IN RCRD: CPT | Mod: S$GLB,,, | Performed by: INTERNAL MEDICINE

## 2021-04-27 PROCEDURE — 1159F MED LIST DOCD IN RCRD: CPT | Mod: S$GLB,,, | Performed by: INTERNAL MEDICINE

## 2021-04-27 PROCEDURE — 99214 PR OFFICE/OUTPT VISIT, EST, LEVL IV, 30-39 MIN: ICD-10-PCS | Mod: S$GLB,,, | Performed by: INTERNAL MEDICINE

## 2021-04-27 PROCEDURE — 99999 PR PBB SHADOW E&M-EST. PATIENT-LVL V: CPT | Mod: PBBFAC,,, | Performed by: INTERNAL MEDICINE

## 2021-04-27 PROCEDURE — 1159F PR MEDICATION LIST DOCUMENTED IN MEDICAL RECORD: ICD-10-PCS | Mod: S$GLB,,, | Performed by: INTERNAL MEDICINE

## 2021-04-27 PROCEDURE — 3008F BODY MASS INDEX DOCD: CPT | Mod: CPTII,S$GLB,, | Performed by: INTERNAL MEDICINE

## 2021-04-27 PROCEDURE — 3008F PR BODY MASS INDEX (BMI) DOCUMENTED: ICD-10-PCS | Mod: CPTII,S$GLB,, | Performed by: INTERNAL MEDICINE

## 2021-05-04 ENCOUNTER — PATIENT MESSAGE (OUTPATIENT)
Dept: OPHTHALMOLOGY | Facility: CLINIC | Age: 71
End: 2021-05-04

## 2021-05-14 ENCOUNTER — PATIENT MESSAGE (OUTPATIENT)
Dept: ENDOCRINOLOGY | Facility: CLINIC | Age: 71
End: 2021-05-14

## 2021-06-23 ENCOUNTER — OFFICE VISIT (OUTPATIENT)
Dept: OPHTHALMOLOGY | Facility: CLINIC | Age: 71
End: 2021-06-23
Payer: COMMERCIAL

## 2021-06-23 DIAGNOSIS — Q07.8 ANOMALOUS OPTIC NERVE: ICD-10-CM

## 2021-06-23 DIAGNOSIS — H53.40 VISUAL FIELD DEFECT: ICD-10-CM

## 2021-06-23 DIAGNOSIS — D35.2 PITUITARY MACROADENOMA: Primary | ICD-10-CM

## 2021-06-23 PROCEDURE — 92083 HUMPHREY VISUAL FIELD - OU - BOTH EYES: ICD-10-PCS | Mod: S$GLB,,, | Performed by: STUDENT IN AN ORGANIZED HEALTH CARE EDUCATION/TRAINING PROGRAM

## 2021-06-23 PROCEDURE — 1101F PR PT FALLS ASSESS DOC 0-1 FALLS W/OUT INJ PAST YR: ICD-10-PCS | Mod: CPTII,S$GLB,, | Performed by: STUDENT IN AN ORGANIZED HEALTH CARE EDUCATION/TRAINING PROGRAM

## 2021-06-23 PROCEDURE — 92083 EXTENDED VISUAL FIELD XM: CPT | Mod: S$GLB,,, | Performed by: STUDENT IN AN ORGANIZED HEALTH CARE EDUCATION/TRAINING PROGRAM

## 2021-06-23 PROCEDURE — 1157F PR ADVANCE CARE PLAN OR EQUIV PRESENT IN MEDICAL RECORD: ICD-10-PCS | Mod: S$GLB,,, | Performed by: STUDENT IN AN ORGANIZED HEALTH CARE EDUCATION/TRAINING PROGRAM

## 2021-06-23 PROCEDURE — 1159F PR MEDICATION LIST DOCUMENTED IN MEDICAL RECORD: ICD-10-PCS | Mod: S$GLB,,, | Performed by: STUDENT IN AN ORGANIZED HEALTH CARE EDUCATION/TRAINING PROGRAM

## 2021-06-23 PROCEDURE — 1126F PR PAIN SEVERITY QUANTIFIED, NO PAIN PRESENT: ICD-10-PCS | Mod: S$GLB,,, | Performed by: STUDENT IN AN ORGANIZED HEALTH CARE EDUCATION/TRAINING PROGRAM

## 2021-06-23 PROCEDURE — 1126F AMNT PAIN NOTED NONE PRSNT: CPT | Mod: S$GLB,,, | Performed by: STUDENT IN AN ORGANIZED HEALTH CARE EDUCATION/TRAINING PROGRAM

## 2021-06-23 PROCEDURE — 99215 OFFICE O/P EST HI 40 MIN: CPT | Mod: S$GLB,,, | Performed by: STUDENT IN AN ORGANIZED HEALTH CARE EDUCATION/TRAINING PROGRAM

## 2021-06-23 PROCEDURE — 99215 PR OFFICE/OUTPT VISIT, EST, LEVL V, 40-54 MIN: ICD-10-PCS | Mod: S$GLB,,, | Performed by: STUDENT IN AN ORGANIZED HEALTH CARE EDUCATION/TRAINING PROGRAM

## 2021-06-23 PROCEDURE — 99999 PR PBB SHADOW E&M-EST. PATIENT-LVL III: CPT | Mod: PBBFAC,,, | Performed by: STUDENT IN AN ORGANIZED HEALTH CARE EDUCATION/TRAINING PROGRAM

## 2021-06-23 PROCEDURE — 3288F FALL RISK ASSESSMENT DOCD: CPT | Mod: CPTII,S$GLB,, | Performed by: STUDENT IN AN ORGANIZED HEALTH CARE EDUCATION/TRAINING PROGRAM

## 2021-06-23 PROCEDURE — 3288F PR FALLS RISK ASSESSMENT DOCUMENTED: ICD-10-PCS | Mod: CPTII,S$GLB,, | Performed by: STUDENT IN AN ORGANIZED HEALTH CARE EDUCATION/TRAINING PROGRAM

## 2021-06-23 PROCEDURE — 1159F MED LIST DOCD IN RCRD: CPT | Mod: S$GLB,,, | Performed by: STUDENT IN AN ORGANIZED HEALTH CARE EDUCATION/TRAINING PROGRAM

## 2021-06-23 PROCEDURE — 92133 OCT, OPTIC NERVE - OU - BOTH EYES: ICD-10-PCS | Mod: S$GLB,,, | Performed by: STUDENT IN AN ORGANIZED HEALTH CARE EDUCATION/TRAINING PROGRAM

## 2021-06-23 PROCEDURE — 1101F PT FALLS ASSESS-DOCD LE1/YR: CPT | Mod: CPTII,S$GLB,, | Performed by: STUDENT IN AN ORGANIZED HEALTH CARE EDUCATION/TRAINING PROGRAM

## 2021-06-23 PROCEDURE — 92133 CPTRZD OPH DX IMG PST SGM ON: CPT | Mod: S$GLB,,, | Performed by: STUDENT IN AN ORGANIZED HEALTH CARE EDUCATION/TRAINING PROGRAM

## 2021-06-23 PROCEDURE — 99999 PR PBB SHADOW E&M-EST. PATIENT-LVL III: ICD-10-PCS | Mod: PBBFAC,,, | Performed by: STUDENT IN AN ORGANIZED HEALTH CARE EDUCATION/TRAINING PROGRAM

## 2021-06-23 PROCEDURE — 1157F ADVNC CARE PLAN IN RCRD: CPT | Mod: S$GLB,,, | Performed by: STUDENT IN AN ORGANIZED HEALTH CARE EDUCATION/TRAINING PROGRAM

## 2021-08-17 ENCOUNTER — OFFICE VISIT (OUTPATIENT)
Dept: URGENT CARE | Facility: CLINIC | Age: 71
End: 2021-08-17
Payer: COMMERCIAL

## 2021-08-17 VITALS
RESPIRATION RATE: 18 BRPM | WEIGHT: 184 LBS | BODY MASS INDEX: 27.25 KG/M2 | HEIGHT: 69 IN | OXYGEN SATURATION: 96 % | TEMPERATURE: 98 F | SYSTOLIC BLOOD PRESSURE: 112 MMHG | DIASTOLIC BLOOD PRESSURE: 67 MMHG | HEART RATE: 63 BPM

## 2021-08-17 DIAGNOSIS — Z20.822 ENCOUNTER FOR LABORATORY TESTING FOR COVID-19 VIRUS: ICD-10-CM

## 2021-08-17 DIAGNOSIS — B34.9 VIRAL SYNDROME: Primary | ICD-10-CM

## 2021-08-17 LAB
CTP QC/QA: YES
SARS-COV-2 RDRP RESP QL NAA+PROBE: NEGATIVE

## 2021-08-17 PROCEDURE — 3078F PR MOST RECENT DIASTOLIC BLOOD PRESSURE < 80 MM HG: ICD-10-PCS | Mod: CPTII,S$GLB,, | Performed by: STUDENT IN AN ORGANIZED HEALTH CARE EDUCATION/TRAINING PROGRAM

## 2021-08-17 PROCEDURE — 3074F PR MOST RECENT SYSTOLIC BLOOD PRESSURE < 130 MM HG: ICD-10-PCS | Mod: CPTII,S$GLB,, | Performed by: STUDENT IN AN ORGANIZED HEALTH CARE EDUCATION/TRAINING PROGRAM

## 2021-08-17 PROCEDURE — U0002: ICD-10-PCS | Mod: QW,S$GLB,, | Performed by: STUDENT IN AN ORGANIZED HEALTH CARE EDUCATION/TRAINING PROGRAM

## 2021-08-17 PROCEDURE — 99213 OFFICE O/P EST LOW 20 MIN: CPT | Mod: S$GLB,,, | Performed by: STUDENT IN AN ORGANIZED HEALTH CARE EDUCATION/TRAINING PROGRAM

## 2021-08-17 PROCEDURE — 3008F BODY MASS INDEX DOCD: CPT | Mod: CPTII,S$GLB,, | Performed by: STUDENT IN AN ORGANIZED HEALTH CARE EDUCATION/TRAINING PROGRAM

## 2021-08-17 PROCEDURE — 3008F PR BODY MASS INDEX (BMI) DOCUMENTED: ICD-10-PCS | Mod: CPTII,S$GLB,, | Performed by: STUDENT IN AN ORGANIZED HEALTH CARE EDUCATION/TRAINING PROGRAM

## 2021-08-17 PROCEDURE — 1160F PR REVIEW ALL MEDS BY PRESCRIBER/CLIN PHARMACIST DOCUMENTED: ICD-10-PCS | Mod: CPTII,S$GLB,, | Performed by: STUDENT IN AN ORGANIZED HEALTH CARE EDUCATION/TRAINING PROGRAM

## 2021-08-17 PROCEDURE — U0002 COVID-19 LAB TEST NON-CDC: HCPCS | Mod: QW,S$GLB,, | Performed by: STUDENT IN AN ORGANIZED HEALTH CARE EDUCATION/TRAINING PROGRAM

## 2021-08-17 PROCEDURE — 1159F MED LIST DOCD IN RCRD: CPT | Mod: CPTII,S$GLB,, | Performed by: STUDENT IN AN ORGANIZED HEALTH CARE EDUCATION/TRAINING PROGRAM

## 2021-08-17 PROCEDURE — 1159F PR MEDICATION LIST DOCUMENTED IN MEDICAL RECORD: ICD-10-PCS | Mod: CPTII,S$GLB,, | Performed by: STUDENT IN AN ORGANIZED HEALTH CARE EDUCATION/TRAINING PROGRAM

## 2021-08-17 PROCEDURE — 3074F SYST BP LT 130 MM HG: CPT | Mod: CPTII,S$GLB,, | Performed by: STUDENT IN AN ORGANIZED HEALTH CARE EDUCATION/TRAINING PROGRAM

## 2021-08-17 PROCEDURE — 1157F ADVNC CARE PLAN IN RCRD: CPT | Mod: CPTII,S$GLB,, | Performed by: STUDENT IN AN ORGANIZED HEALTH CARE EDUCATION/TRAINING PROGRAM

## 2021-08-17 PROCEDURE — 99213 PR OFFICE/OUTPT VISIT, EST, LEVL III, 20-29 MIN: ICD-10-PCS | Mod: S$GLB,,, | Performed by: STUDENT IN AN ORGANIZED HEALTH CARE EDUCATION/TRAINING PROGRAM

## 2021-08-17 PROCEDURE — 3044F PR MOST RECENT HEMOGLOBIN A1C LEVEL <7.0%: ICD-10-PCS | Mod: CPTII,S$GLB,, | Performed by: STUDENT IN AN ORGANIZED HEALTH CARE EDUCATION/TRAINING PROGRAM

## 2021-08-17 PROCEDURE — 1160F RVW MEDS BY RX/DR IN RCRD: CPT | Mod: CPTII,S$GLB,, | Performed by: STUDENT IN AN ORGANIZED HEALTH CARE EDUCATION/TRAINING PROGRAM

## 2021-08-17 PROCEDURE — 3078F DIAST BP <80 MM HG: CPT | Mod: CPTII,S$GLB,, | Performed by: STUDENT IN AN ORGANIZED HEALTH CARE EDUCATION/TRAINING PROGRAM

## 2021-08-17 PROCEDURE — 1157F PR ADVANCE CARE PLAN OR EQUIV PRESENT IN MEDICAL RECORD: ICD-10-PCS | Mod: CPTII,S$GLB,, | Performed by: STUDENT IN AN ORGANIZED HEALTH CARE EDUCATION/TRAINING PROGRAM

## 2021-08-17 PROCEDURE — 3044F HG A1C LEVEL LT 7.0%: CPT | Mod: CPTII,S$GLB,, | Performed by: STUDENT IN AN ORGANIZED HEALTH CARE EDUCATION/TRAINING PROGRAM

## 2021-09-03 ENCOUNTER — PATIENT MESSAGE (OUTPATIENT)
Dept: NEUROSURGERY | Facility: CLINIC | Age: 71
End: 2021-09-03

## 2021-09-04 ENCOUNTER — PATIENT MESSAGE (OUTPATIENT)
Dept: UROLOGY | Facility: CLINIC | Age: 71
End: 2021-09-04

## 2021-10-06 ENCOUNTER — PATIENT MESSAGE (OUTPATIENT)
Dept: UROLOGY | Facility: CLINIC | Age: 71
End: 2021-10-06

## 2021-10-08 ENCOUNTER — OFFICE VISIT (OUTPATIENT)
Dept: URGENT CARE | Facility: CLINIC | Age: 71
End: 2021-10-08
Payer: COMMERCIAL

## 2021-10-08 VITALS
TEMPERATURE: 98 F | SYSTOLIC BLOOD PRESSURE: 138 MMHG | HEART RATE: 87 BPM | BODY MASS INDEX: 27.25 KG/M2 | HEIGHT: 69 IN | RESPIRATION RATE: 18 BRPM | DIASTOLIC BLOOD PRESSURE: 80 MMHG | WEIGHT: 184 LBS | OXYGEN SATURATION: 98 %

## 2021-10-08 DIAGNOSIS — R30.0 DYSURIA: Primary | ICD-10-CM

## 2021-10-08 DIAGNOSIS — Z23 NEED FOR INFLUENZA VACCINATION: ICD-10-CM

## 2021-10-08 LAB
BILIRUB UR QL STRIP: NEGATIVE
GLUCOSE UR QL STRIP: NEGATIVE
KETONES UR QL STRIP: NEGATIVE
LEUKOCYTE ESTERASE UR QL STRIP: NEGATIVE
PH, POC UA: 5.5
POC BLOOD, URINE: POSITIVE
POC NITRATES, URINE: NEGATIVE
PROT UR QL STRIP: NEGATIVE
SP GR UR STRIP: 1.02 (ref 1–1.03)
UROBILINOGEN UR STRIP-ACNC: NORMAL (ref 0.3–2.2)

## 2021-10-08 PROCEDURE — 4010F ACE/ARB THERAPY RXD/TAKEN: CPT | Mod: CPTII,S$GLB,, | Performed by: NURSE PRACTITIONER

## 2021-10-08 PROCEDURE — 3044F PR MOST RECENT HEMOGLOBIN A1C LEVEL <7.0%: ICD-10-PCS | Mod: CPTII,S$GLB,, | Performed by: NURSE PRACTITIONER

## 2021-10-08 PROCEDURE — 3066F PR DOCUMENTATION OF TREATMENT FOR NEPHROPATHY: ICD-10-PCS | Mod: CPTII,S$GLB,, | Performed by: NURSE PRACTITIONER

## 2021-10-08 PROCEDURE — 3079F DIAST BP 80-89 MM HG: CPT | Mod: CPTII,S$GLB,, | Performed by: NURSE PRACTITIONER

## 2021-10-08 PROCEDURE — 3044F HG A1C LEVEL LT 7.0%: CPT | Mod: CPTII,S$GLB,, | Performed by: NURSE PRACTITIONER

## 2021-10-08 PROCEDURE — 3061F NEG MICROALBUMINURIA REV: CPT | Mod: CPTII,S$GLB,, | Performed by: NURSE PRACTITIONER

## 2021-10-08 PROCEDURE — 99214 OFFICE O/P EST MOD 30 MIN: CPT | Mod: 25,S$GLB,, | Performed by: NURSE PRACTITIONER

## 2021-10-08 PROCEDURE — 4010F PR ACE/ARB THEARPY RXD/TAKEN: ICD-10-PCS | Mod: CPTII,S$GLB,, | Performed by: NURSE PRACTITIONER

## 2021-10-08 PROCEDURE — 3061F PR NEG MICROALBUMINURIA RESULT DOCUMENTED/REVIEW: ICD-10-PCS | Mod: CPTII,S$GLB,, | Performed by: NURSE PRACTITIONER

## 2021-10-08 PROCEDURE — 3075F PR MOST RECENT SYSTOLIC BLOOD PRESS GE 130-139MM HG: ICD-10-PCS | Mod: CPTII,S$GLB,, | Performed by: NURSE PRACTITIONER

## 2021-10-08 PROCEDURE — 90471 FLU VACCINE - QUADRIVALENT - ADJUVANTED: ICD-10-PCS | Mod: S$GLB,,, | Performed by: NURSE PRACTITIONER

## 2021-10-08 PROCEDURE — 3008F BODY MASS INDEX DOCD: CPT | Mod: CPTII,S$GLB,, | Performed by: NURSE PRACTITIONER

## 2021-10-08 PROCEDURE — 1160F RVW MEDS BY RX/DR IN RCRD: CPT | Mod: CPTII,S$GLB,, | Performed by: NURSE PRACTITIONER

## 2021-10-08 PROCEDURE — 3079F PR MOST RECENT DIASTOLIC BLOOD PRESSURE 80-89 MM HG: ICD-10-PCS | Mod: CPTII,S$GLB,, | Performed by: NURSE PRACTITIONER

## 2021-10-08 PROCEDURE — 3075F SYST BP GE 130 - 139MM HG: CPT | Mod: CPTII,S$GLB,, | Performed by: NURSE PRACTITIONER

## 2021-10-08 PROCEDURE — 99214 PR OFFICE/OUTPT VISIT, EST, LEVL IV, 30-39 MIN: ICD-10-PCS | Mod: 25,S$GLB,, | Performed by: NURSE PRACTITIONER

## 2021-10-08 PROCEDURE — 1159F PR MEDICATION LIST DOCUMENTED IN MEDICAL RECORD: ICD-10-PCS | Mod: CPTII,S$GLB,, | Performed by: NURSE PRACTITIONER

## 2021-10-08 PROCEDURE — 1160F PR REVIEW ALL MEDS BY PRESCRIBER/CLIN PHARMACIST DOCUMENTED: ICD-10-PCS | Mod: CPTII,S$GLB,, | Performed by: NURSE PRACTITIONER

## 2021-10-08 PROCEDURE — 87086 URINE CULTURE/COLONY COUNT: CPT | Performed by: NURSE PRACTITIONER

## 2021-10-08 PROCEDURE — 81003 URINALYSIS AUTO W/O SCOPE: CPT | Mod: QW,S$GLB,, | Performed by: NURSE PRACTITIONER

## 2021-10-08 PROCEDURE — 1157F ADVNC CARE PLAN IN RCRD: CPT | Mod: CPTII,S$GLB,, | Performed by: NURSE PRACTITIONER

## 2021-10-08 PROCEDURE — 81003 POCT URINALYSIS, DIPSTICK, AUTOMATED, W/O SCOPE: ICD-10-PCS | Mod: QW,S$GLB,, | Performed by: NURSE PRACTITIONER

## 2021-10-08 PROCEDURE — 3008F PR BODY MASS INDEX (BMI) DOCUMENTED: ICD-10-PCS | Mod: CPTII,S$GLB,, | Performed by: NURSE PRACTITIONER

## 2021-10-08 PROCEDURE — 90471 IMMUNIZATION ADMIN: CPT | Mod: S$GLB,,, | Performed by: NURSE PRACTITIONER

## 2021-10-08 PROCEDURE — 3066F NEPHROPATHY DOC TX: CPT | Mod: CPTII,S$GLB,, | Performed by: NURSE PRACTITIONER

## 2021-10-08 PROCEDURE — 90694 VACC AIIV4 NO PRSRV 0.5ML IM: CPT | Mod: S$GLB,,, | Performed by: NURSE PRACTITIONER

## 2021-10-08 PROCEDURE — 90694 FLU VACCINE - QUADRIVALENT - ADJUVANTED: ICD-10-PCS | Mod: S$GLB,,, | Performed by: NURSE PRACTITIONER

## 2021-10-08 PROCEDURE — 1157F PR ADVANCE CARE PLAN OR EQUIV PRESENT IN MEDICAL RECORD: ICD-10-PCS | Mod: CPTII,S$GLB,, | Performed by: NURSE PRACTITIONER

## 2021-10-08 PROCEDURE — 1159F MED LIST DOCD IN RCRD: CPT | Mod: CPTII,S$GLB,, | Performed by: NURSE PRACTITIONER

## 2021-10-10 ENCOUNTER — TELEPHONE (OUTPATIENT)
Dept: URGENT CARE | Facility: CLINIC | Age: 71
End: 2021-10-10

## 2021-10-10 LAB — BACTERIA UR CULT: NO GROWTH

## 2021-10-18 ENCOUNTER — OFFICE VISIT (OUTPATIENT)
Dept: UROLOGY | Facility: CLINIC | Age: 71
End: 2021-10-18
Attending: UROLOGY
Payer: COMMERCIAL

## 2021-10-18 VITALS
HEART RATE: 74 BPM | DIASTOLIC BLOOD PRESSURE: 74 MMHG | WEIGHT: 184.06 LBS | HEIGHT: 69 IN | SYSTOLIC BLOOD PRESSURE: 128 MMHG | BODY MASS INDEX: 27.26 KG/M2

## 2021-10-18 DIAGNOSIS — R10.31 RIGHT LOWER QUADRANT ABDOMINAL PAIN: ICD-10-CM

## 2021-10-18 PROCEDURE — 3288F FALL RISK ASSESSMENT DOCD: CPT | Mod: CPTII,S$GLB,, | Performed by: UROLOGY

## 2021-10-18 PROCEDURE — 3288F PR FALLS RISK ASSESSMENT DOCUMENTED: ICD-10-PCS | Mod: CPTII,S$GLB,, | Performed by: UROLOGY

## 2021-10-18 PROCEDURE — 1160F RVW MEDS BY RX/DR IN RCRD: CPT | Mod: CPTII,S$GLB,, | Performed by: UROLOGY

## 2021-10-18 PROCEDURE — 4010F ACE/ARB THERAPY RXD/TAKEN: CPT | Mod: CPTII,S$GLB,, | Performed by: UROLOGY

## 2021-10-18 PROCEDURE — 3061F PR NEG MICROALBUMINURIA RESULT DOCUMENTED/REVIEW: ICD-10-PCS | Mod: CPTII,S$GLB,, | Performed by: UROLOGY

## 2021-10-18 PROCEDURE — 4010F PR ACE/ARB THEARPY RXD/TAKEN: ICD-10-PCS | Mod: CPTII,S$GLB,, | Performed by: UROLOGY

## 2021-10-18 PROCEDURE — 3078F PR MOST RECENT DIASTOLIC BLOOD PRESSURE < 80 MM HG: ICD-10-PCS | Mod: CPTII,S$GLB,, | Performed by: UROLOGY

## 2021-10-18 PROCEDURE — 99213 PR OFFICE/OUTPT VISIT, EST, LEVL III, 20-29 MIN: ICD-10-PCS | Mod: S$GLB,,, | Performed by: UROLOGY

## 2021-10-18 PROCEDURE — 3061F NEG MICROALBUMINURIA REV: CPT | Mod: CPTII,S$GLB,, | Performed by: UROLOGY

## 2021-10-18 PROCEDURE — 1160F PR REVIEW ALL MEDS BY PRESCRIBER/CLIN PHARMACIST DOCUMENTED: ICD-10-PCS | Mod: CPTII,S$GLB,, | Performed by: UROLOGY

## 2021-10-18 PROCEDURE — 1101F PR PT FALLS ASSESS DOC 0-1 FALLS W/OUT INJ PAST YR: ICD-10-PCS | Mod: CPTII,S$GLB,, | Performed by: UROLOGY

## 2021-10-18 PROCEDURE — 1126F PR PAIN SEVERITY QUANTIFIED, NO PAIN PRESENT: ICD-10-PCS | Mod: CPTII,S$GLB,, | Performed by: UROLOGY

## 2021-10-18 PROCEDURE — 1157F ADVNC CARE PLAN IN RCRD: CPT | Mod: CPTII,S$GLB,, | Performed by: UROLOGY

## 2021-10-18 PROCEDURE — 3078F DIAST BP <80 MM HG: CPT | Mod: CPTII,S$GLB,, | Performed by: UROLOGY

## 2021-10-18 PROCEDURE — 1126F AMNT PAIN NOTED NONE PRSNT: CPT | Mod: CPTII,S$GLB,, | Performed by: UROLOGY

## 2021-10-18 PROCEDURE — 1101F PT FALLS ASSESS-DOCD LE1/YR: CPT | Mod: CPTII,S$GLB,, | Performed by: UROLOGY

## 2021-10-18 PROCEDURE — 3044F HG A1C LEVEL LT 7.0%: CPT | Mod: CPTII,S$GLB,, | Performed by: UROLOGY

## 2021-10-18 PROCEDURE — 1157F PR ADVANCE CARE PLAN OR EQUIV PRESENT IN MEDICAL RECORD: ICD-10-PCS | Mod: CPTII,S$GLB,, | Performed by: UROLOGY

## 2021-10-18 PROCEDURE — 3008F PR BODY MASS INDEX (BMI) DOCUMENTED: ICD-10-PCS | Mod: CPTII,S$GLB,, | Performed by: UROLOGY

## 2021-10-18 PROCEDURE — 3074F PR MOST RECENT SYSTOLIC BLOOD PRESSURE < 130 MM HG: ICD-10-PCS | Mod: CPTII,S$GLB,, | Performed by: UROLOGY

## 2021-10-18 PROCEDURE — 99213 OFFICE O/P EST LOW 20 MIN: CPT | Mod: S$GLB,,, | Performed by: UROLOGY

## 2021-10-18 PROCEDURE — 3044F PR MOST RECENT HEMOGLOBIN A1C LEVEL <7.0%: ICD-10-PCS | Mod: CPTII,S$GLB,, | Performed by: UROLOGY

## 2021-10-18 PROCEDURE — 3066F NEPHROPATHY DOC TX: CPT | Mod: CPTII,S$GLB,, | Performed by: UROLOGY

## 2021-10-18 PROCEDURE — 1159F MED LIST DOCD IN RCRD: CPT | Mod: CPTII,S$GLB,, | Performed by: UROLOGY

## 2021-10-18 PROCEDURE — 3074F SYST BP LT 130 MM HG: CPT | Mod: CPTII,S$GLB,, | Performed by: UROLOGY

## 2021-10-18 PROCEDURE — 3066F PR DOCUMENTATION OF TREATMENT FOR NEPHROPATHY: ICD-10-PCS | Mod: CPTII,S$GLB,, | Performed by: UROLOGY

## 2021-10-18 PROCEDURE — 3008F BODY MASS INDEX DOCD: CPT | Mod: CPTII,S$GLB,, | Performed by: UROLOGY

## 2021-10-18 PROCEDURE — 1159F PR MEDICATION LIST DOCUMENTED IN MEDICAL RECORD: ICD-10-PCS | Mod: CPTII,S$GLB,, | Performed by: UROLOGY

## 2021-10-18 RX ORDER — ROSUVASTATIN CALCIUM 20 MG/1
20 TABLET, COATED ORAL DAILY
COMMUNITY
Start: 2021-09-13 | End: 2022-06-27 | Stop reason: ALTCHOICE

## 2021-10-22 ENCOUNTER — TELEPHONE (OUTPATIENT)
Dept: UROLOGY | Facility: CLINIC | Age: 71
End: 2021-10-22

## 2021-10-22 ENCOUNTER — PATIENT MESSAGE (OUTPATIENT)
Dept: UROLOGY | Facility: CLINIC | Age: 71
End: 2021-10-22

## 2021-10-22 ENCOUNTER — PATIENT MESSAGE (OUTPATIENT)
Dept: UROLOGY | Facility: CLINIC | Age: 71
End: 2021-10-22
Payer: COMMERCIAL

## 2021-10-22 ENCOUNTER — PATIENT MESSAGE (OUTPATIENT)
Dept: GASTROENTEROLOGY | Facility: CLINIC | Age: 71
End: 2021-10-22
Payer: COMMERCIAL

## 2021-10-27 ENCOUNTER — NURSE TRIAGE (OUTPATIENT)
Dept: ADMINISTRATIVE | Facility: CLINIC | Age: 71
End: 2021-10-27
Payer: COMMERCIAL

## 2021-10-27 ENCOUNTER — HOSPITAL ENCOUNTER (EMERGENCY)
Facility: OTHER | Age: 71
Discharge: HOME | End: 2021-10-27
Attending: EMERGENCY MEDICINE
Payer: COMMERCIAL

## 2021-10-27 VITALS
RESPIRATION RATE: 14 BRPM | TEMPERATURE: 98 F | BODY MASS INDEX: 26.91 KG/M2 | OXYGEN SATURATION: 97 % | WEIGHT: 181.69 LBS | DIASTOLIC BLOOD PRESSURE: 80 MMHG | SYSTOLIC BLOOD PRESSURE: 142 MMHG | HEIGHT: 69 IN | HEART RATE: 73 BPM

## 2021-10-27 DIAGNOSIS — N13.30 HYDRONEPHROSIS, UNSPECIFIED HYDRONEPHROSIS TYPE: Primary | ICD-10-CM

## 2021-10-27 DIAGNOSIS — R10.9 FLANK PAIN: ICD-10-CM

## 2021-10-27 LAB
BACTERIA #/AREA URNS HPF: ABNORMAL /HPF
BILIRUB UR QL STRIP: NEGATIVE
CLARITY UR: CLEAR
COLOR UR: YELLOW
CREAT SERPL-MCNC: 0.9 MG/DL (ref 0.5–1.4)
EXTRA GREEN TOP RAINBOW: NORMAL
EXTRA LAVENDER TOP RAINBOW: NORMAL
GLUCOSE UR QL STRIP: NEGATIVE
HCV AB SERPL QL IA: NEGATIVE
HGB UR QL STRIP: ABNORMAL
HIV 1+2 AB+HIV1 P24 AG SERPL QL IA: NEGATIVE
HYALINE CASTS #/AREA URNS LPF: 1 /LPF
KETONES UR QL STRIP: NEGATIVE
LEUKOCYTE ESTERASE UR QL STRIP: NEGATIVE
MICROSCOPIC COMMENT: ABNORMAL
NITRITE UR QL STRIP: NEGATIVE
PH UR STRIP: 6 [PH] (ref 5–8)
PROT UR QL STRIP: NEGATIVE
RBC #/AREA URNS HPF: 10 /HPF (ref 0–4)
SAMPLE: NORMAL
SP GR UR STRIP: 1.02 (ref 1–1.03)
URN SPEC COLLECT METH UR: ABNORMAL
UROBILINOGEN UR STRIP-ACNC: NEGATIVE EU/DL

## 2021-10-27 PROCEDURE — 82565 ASSAY OF CREATININE: CPT

## 2021-10-27 PROCEDURE — 82803 BLOOD GASES ANY COMBINATION: CPT

## 2021-10-27 PROCEDURE — 87389 HIV-1 AG W/HIV-1&-2 AB AG IA: CPT | Performed by: EMERGENCY MEDICINE

## 2021-10-27 PROCEDURE — 81000 URINALYSIS NONAUTO W/SCOPE: CPT | Performed by: EMERGENCY MEDICINE

## 2021-10-27 PROCEDURE — 86803 HEPATITIS C AB TEST: CPT | Performed by: EMERGENCY MEDICINE

## 2021-10-27 PROCEDURE — 99284 EMERGENCY DEPT VISIT MOD MDM: CPT | Mod: 25

## 2021-11-04 ENCOUNTER — PATIENT MESSAGE (OUTPATIENT)
Dept: UROLOGY | Facility: CLINIC | Age: 71
End: 2021-11-04
Payer: COMMERCIAL

## 2022-01-14 ENCOUNTER — PATIENT MESSAGE (OUTPATIENT)
Dept: UROLOGY | Facility: CLINIC | Age: 72
End: 2022-01-14
Payer: COMMERCIAL

## 2022-03-02 ENCOUNTER — OFFICE VISIT (OUTPATIENT)
Dept: URGENT CARE | Facility: CLINIC | Age: 72
End: 2022-03-02
Payer: COMMERCIAL

## 2022-03-02 VITALS
OXYGEN SATURATION: 98 % | DIASTOLIC BLOOD PRESSURE: 65 MMHG | SYSTOLIC BLOOD PRESSURE: 104 MMHG | BODY MASS INDEX: 26.81 KG/M2 | HEART RATE: 71 BPM | TEMPERATURE: 99 F | HEIGHT: 69 IN | WEIGHT: 181 LBS | RESPIRATION RATE: 18 BRPM

## 2022-03-02 DIAGNOSIS — J30.9 ALLERGIC SINUSITIS: Primary | ICD-10-CM

## 2022-03-02 DIAGNOSIS — R05.9 COUGH: ICD-10-CM

## 2022-03-02 LAB
CTP QC/QA: YES
SARS-COV-2 RDRP RESP QL NAA+PROBE: NEGATIVE

## 2022-03-02 PROCEDURE — 1159F PR MEDICATION LIST DOCUMENTED IN MEDICAL RECORD: ICD-10-PCS | Mod: CPTII,S$GLB,, | Performed by: NURSE PRACTITIONER

## 2022-03-02 PROCEDURE — 3008F PR BODY MASS INDEX (BMI) DOCUMENTED: ICD-10-PCS | Mod: CPTII,S$GLB,, | Performed by: NURSE PRACTITIONER

## 2022-03-02 PROCEDURE — 99213 PR OFFICE/OUTPT VISIT, EST, LEVL III, 20-29 MIN: ICD-10-PCS | Mod: S$GLB,,, | Performed by: NURSE PRACTITIONER

## 2022-03-02 PROCEDURE — U0002: ICD-10-PCS | Mod: QW,S$GLB,, | Performed by: NURSE PRACTITIONER

## 2022-03-02 PROCEDURE — 3074F PR MOST RECENT SYSTOLIC BLOOD PRESSURE < 130 MM HG: ICD-10-PCS | Mod: CPTII,S$GLB,, | Performed by: NURSE PRACTITIONER

## 2022-03-02 PROCEDURE — 1160F PR REVIEW ALL MEDS BY PRESCRIBER/CLIN PHARMACIST DOCUMENTED: ICD-10-PCS | Mod: CPTII,S$GLB,, | Performed by: NURSE PRACTITIONER

## 2022-03-02 PROCEDURE — 1157F ADVNC CARE PLAN IN RCRD: CPT | Mod: CPTII,S$GLB,, | Performed by: NURSE PRACTITIONER

## 2022-03-02 PROCEDURE — 3008F BODY MASS INDEX DOCD: CPT | Mod: CPTII,S$GLB,, | Performed by: NURSE PRACTITIONER

## 2022-03-02 PROCEDURE — 99213 OFFICE O/P EST LOW 20 MIN: CPT | Mod: S$GLB,,, | Performed by: NURSE PRACTITIONER

## 2022-03-02 PROCEDURE — 4010F PR ACE/ARB THEARPY RXD/TAKEN: ICD-10-PCS | Mod: CPTII,S$GLB,, | Performed by: NURSE PRACTITIONER

## 2022-03-02 PROCEDURE — U0002 COVID-19 LAB TEST NON-CDC: HCPCS | Mod: QW,S$GLB,, | Performed by: NURSE PRACTITIONER

## 2022-03-02 PROCEDURE — 3074F SYST BP LT 130 MM HG: CPT | Mod: CPTII,S$GLB,, | Performed by: NURSE PRACTITIONER

## 2022-03-02 PROCEDURE — 3078F PR MOST RECENT DIASTOLIC BLOOD PRESSURE < 80 MM HG: ICD-10-PCS | Mod: CPTII,S$GLB,, | Performed by: NURSE PRACTITIONER

## 2022-03-02 PROCEDURE — 1157F PR ADVANCE CARE PLAN OR EQUIV PRESENT IN MEDICAL RECORD: ICD-10-PCS | Mod: CPTII,S$GLB,, | Performed by: NURSE PRACTITIONER

## 2022-03-02 PROCEDURE — 1159F MED LIST DOCD IN RCRD: CPT | Mod: CPTII,S$GLB,, | Performed by: NURSE PRACTITIONER

## 2022-03-02 PROCEDURE — 3078F DIAST BP <80 MM HG: CPT | Mod: CPTII,S$GLB,, | Performed by: NURSE PRACTITIONER

## 2022-03-02 PROCEDURE — 1160F RVW MEDS BY RX/DR IN RCRD: CPT | Mod: CPTII,S$GLB,, | Performed by: NURSE PRACTITIONER

## 2022-03-02 PROCEDURE — 4010F ACE/ARB THERAPY RXD/TAKEN: CPT | Mod: CPTII,S$GLB,, | Performed by: NURSE PRACTITIONER

## 2022-03-02 RX ORDER — FLUTICASONE PROPIONATE 50 MCG
1-2 SPRAY, SUSPENSION (ML) NASAL DAILY
Qty: 18.2 ML | Refills: 0 | Status: SHIPPED | OUTPATIENT
Start: 2022-03-02 | End: 2022-03-02

## 2022-03-02 RX ORDER — BENZONATATE 100 MG/1
200 CAPSULE ORAL 3 TIMES DAILY PRN
Qty: 30 CAPSULE | Refills: 0 | Status: SHIPPED | OUTPATIENT
Start: 2022-03-02 | End: 2022-04-01

## 2022-03-02 NOTE — PROGRESS NOTES
"Subjective:       Patient ID: Sadiq Dhillon is a 72 y.o. male.    Vitals:  height is 5' 9" (1.753 m) and weight is 82.1 kg (181 lb). His temperature is 98.7 °F (37.1 °C). His blood pressure is 104/65 and his pulse is 71. His respiration is 18 and oxygen saturation is 98%.     Chief Complaint: Cough and Sinus Problem    Cough  This is a new problem. The current episode started in the past 7 days. The problem has been unchanged. The problem occurs every few minutes. The cough is productive of sputum. Associated symptoms include postnasal drip and a sore throat. Pertinent negatives include no ear congestion, fever or weight loss. Nothing aggravates the symptoms. Treatments tried: otc allergy meds. The treatment provided mild relief. There is no history of asthma, bronchitis, COPD or pneumonia.   Sinus Problem  Associated symptoms include coughing and a sore throat.       Constitution: Negative for fever.   HENT: Positive for postnasal drip and sore throat.    Respiratory: Positive for cough.        Objective:      Physical Exam   Constitutional: He is oriented to person, place, and time. He appears well-developed. He is cooperative.  Non-toxic appearance. He does not appear ill. No distress.      Comments:Appears well   afebrile   HENT:   Head: Normocephalic and atraumatic.   Ears:   Right Ear: Hearing and external ear normal.   Left Ear: Hearing and external ear normal.   Nose: Nose normal. No mucosal edema, rhinorrhea or nasal deformity. No epistaxis. Right sinus exhibits no maxillary sinus tenderness and no frontal sinus tenderness. Left sinus exhibits no maxillary sinus tenderness and no frontal sinus tenderness.   Mouth/Throat: Uvula is midline, oropharynx is clear and moist and mucous membranes are normal. No trismus in the jaw. Normal dentition. No uvula swelling. No posterior oropharyngeal erythema.   PND      Comments: PND  Eyes: Conjunctivae and lids are normal. Right eye exhibits no discharge. Left eye " exhibits no discharge. No scleral icterus.   Neck: Trachea normal and phonation normal. Neck supple.   Cardiovascular: Normal rate, regular rhythm, normal heart sounds and normal pulses.   Pulmonary/Chest: Effort normal and breath sounds normal. No respiratory distress.   Lungs clear bilaterally  O2 98%    Comments: Lungs clear bilaterally  O2 98%    Abdominal: Normal appearance and bowel sounds are normal. He exhibits no distension and no mass. Soft. There is no abdominal tenderness.   Musculoskeletal: Normal range of motion.         General: No deformity. Normal range of motion.   Neurological: He is alert and oriented to person, place, and time. He exhibits normal muscle tone. Coordination normal.   Skin: Skin is warm, dry, intact, not diaphoretic and not pale.   Psychiatric: His speech is normal and behavior is normal. Judgment and thought content normal.   Nursing note and vitals reviewed.        Results for orders placed or performed in visit on 03/02/22   POCT COVID-19 Rapid Screening   Result Value Ref Range    POC Rapid COVID Negative Negative     Acceptable Yes      Assessment:       1. Allergic sinusitis    2. Cough          Plan:         Allergic sinusitis  -     fluticasone propionate (FLONASE) 50 mcg/actuation nasal spray; 1-2 sprays ( mcg total) by Each Nostril route once daily.  Dispense: 18.2 mL; Refill: 0  -     benzonatate (TESSALON PERLES) 100 MG capsule; Take 2 capsules (200 mg total) by mouth 3 (three) times daily as needed for Cough.  Dispense: 30 capsule; Refill: 0    Cough  -     POCT COVID-19 Rapid Screening      Patient Instructions   Use an antihistamine such as Claritin, Zyrtec or Allegra to dry you out.     Use mucinex (guaifenisin) to break up mucous up to 2400mg/day to loosen any mucous.     Use Flonase 1-2 sprays/nostril per day. It is a local acting steroid nasal spray, if you develop a bloody nose, stop using the medication immediately.    Use warm salt water  gargles to ease your throat pain. Hot tea with honey.     Take Tessalon Perles every 8 hours as needed for cough suppression     A cool mist humidifier in bedroom may help with cough and relieve stuffy nose.     Over the counter you can use Tylenol (acetominophen) or Ibuprofen for your minor aches and pains as long as you have no contraindications.    Good nutrition. Lots of rest. Plenty of fluids     You must understand that you've received an Urgent Care treatment only and that you may be released before all your medical problems are known or treated. You, the patient, will arrange for follow up care as instructed.  Follow up with your PCP or specialty clinic as directed in the next 1-2 weeks if not improved or as needed.  You can call [phone] to schedule an appointment with the appropriate provider.  If your condition worsens we recommend that you receive another evaluation at the emergency room immediately or contact your primary medical clinics after hours call service to discuss your concerns.  Please return here or go to the Emergency Department for any concerns or worsening of condition.

## 2022-03-02 NOTE — PATIENT INSTRUCTIONS
Use an antihistamine such as Claritin, Zyrtec or Allegra to dry you out.     Use mucinex (guaifenisin) to break up mucous up to 2400mg/day to loosen any mucous.     Use Flonase 1-2 sprays/nostril per day. It is a local acting steroid nasal spray, if you develop a bloody nose, stop using the medication immediately.    Use warm salt water gargles to ease your throat pain. Hot tea with honey.     Take Tessalon Perles every 8 hours as needed for cough suppression     A cool mist humidifier in bedroom may help with cough and relieve stuffy nose.     Over the counter you can use Tylenol (acetominophen) or Ibuprofen for your minor aches and pains as long as you have no contraindications.    Good nutrition. Lots of rest. Plenty of fluids     You must understand that you've received an Urgent Care treatment only and that you may be released before all your medical problems are known or treated. You, the patient, will arrange for follow up care as instructed.  Follow up with your PCP or specialty clinic as directed in the next 1-2 weeks if not improved or as needed.  You can call [phone] to schedule an appointment with the appropriate provider.  If your condition worsens we recommend that you receive another evaluation at the emergency room immediately or contact your primary medical clinics after hours call service to discuss your concerns.  Please return here or go to the Emergency Department for any concerns or worsening of condition.

## 2022-03-08 ENCOUNTER — LAB VISIT (OUTPATIENT)
Dept: LAB | Facility: HOSPITAL | Age: 72
End: 2022-03-08
Attending: INTERNAL MEDICINE
Payer: COMMERCIAL

## 2022-03-08 ENCOUNTER — OFFICE VISIT (OUTPATIENT)
Dept: ENDOCRINOLOGY | Facility: CLINIC | Age: 72
End: 2022-03-08
Payer: COMMERCIAL

## 2022-03-08 VITALS
WEIGHT: 177.25 LBS | SYSTOLIC BLOOD PRESSURE: 100 MMHG | OXYGEN SATURATION: 95 % | HEART RATE: 75 BPM | BODY MASS INDEX: 26.25 KG/M2 | HEIGHT: 69 IN | TEMPERATURE: 98 F | DIASTOLIC BLOOD PRESSURE: 64 MMHG

## 2022-03-08 DIAGNOSIS — E21.0 PRIMARY HYPERPARATHYROIDISM: ICD-10-CM

## 2022-03-08 DIAGNOSIS — D53.9 NUTRITIONAL ANEMIA: ICD-10-CM

## 2022-03-08 DIAGNOSIS — N40.0 BENIGN PROSTATIC HYPERPLASIA, UNSPECIFIED WHETHER LOWER URINARY TRACT SYMPTOMS PRESENT: ICD-10-CM

## 2022-03-08 DIAGNOSIS — D35.2 PROLACTINOMA: ICD-10-CM

## 2022-03-08 DIAGNOSIS — H18.519 FUCHS' CORNEAL DYSTROPHY, UNSPECIFIED LATERALITY: ICD-10-CM

## 2022-03-08 DIAGNOSIS — E78.00 HYPERCHOLESTEROLEMIA: ICD-10-CM

## 2022-03-08 DIAGNOSIS — K21.00 GASTROESOPHAGEAL REFLUX DISEASE WITH ESOPHAGITIS WITHOUT HEMORRHAGE: ICD-10-CM

## 2022-03-08 DIAGNOSIS — E78.5 HYPERLIPIDEMIA, UNSPECIFIED HYPERLIPIDEMIA TYPE: ICD-10-CM

## 2022-03-08 DIAGNOSIS — Z87.19 HISTORY OF PANCREATITIS: ICD-10-CM

## 2022-03-08 DIAGNOSIS — E29.1 HYPOGONADISM IN MALE: ICD-10-CM

## 2022-03-08 DIAGNOSIS — N20.0 NEPHROLITHIASIS: ICD-10-CM

## 2022-03-08 DIAGNOSIS — E11.65 TYPE 2 DIABETES MELLITUS WITH HYPERGLYCEMIA, WITHOUT LONG-TERM CURRENT USE OF INSULIN: ICD-10-CM

## 2022-03-08 DIAGNOSIS — D35.2 PITUITARY MACROADENOMA: Primary | ICD-10-CM

## 2022-03-08 DIAGNOSIS — E55.9 HYPOVITAMINOSIS D: ICD-10-CM

## 2022-03-08 DIAGNOSIS — D35.2 PITUITARY MACROADENOMA: ICD-10-CM

## 2022-03-08 LAB
ALBUMIN SERPL BCP-MCNC: 4.4 G/DL (ref 3.5–5.2)
ALP SERPL-CCNC: 68 U/L (ref 55–135)
ALT SERPL W/O P-5'-P-CCNC: 17 U/L (ref 10–44)
AMYLASE SERPL-CCNC: 243 U/L (ref 20–110)
ANION GAP SERPL CALC-SCNC: 14 MMOL/L (ref 8–16)
AST SERPL-CCNC: 18 U/L (ref 10–40)
BILIRUB SERPL-MCNC: 0.9 MG/DL (ref 0.1–1)
BUN SERPL-MCNC: 19 MG/DL (ref 8–23)
CALCIUM SERPL-MCNC: 10.2 MG/DL (ref 8.7–10.5)
CHLORIDE SERPL-SCNC: 100 MMOL/L (ref 95–110)
CO2 SERPL-SCNC: 24 MMOL/L (ref 23–29)
CREAT SERPL-MCNC: 0.8 MG/DL (ref 0.5–1.4)
EST. GFR  (AFRICAN AMERICAN): >60 ML/MIN/1.73 M^2
EST. GFR  (NON AFRICAN AMERICAN): >60 ML/MIN/1.73 M^2
ESTIMATED AVG GLUCOSE: 143 MG/DL (ref 68–131)
ESTRADIOL SERPL-MCNC: 14 PG/ML (ref 11–44)
GLUCOSE SERPL-MCNC: 94 MG/DL (ref 70–110)
HBA1C MFR BLD: 6.6 % (ref 4–5.6)
LIPASE SERPL-CCNC: 223 U/L (ref 4–60)
POTASSIUM SERPL-SCNC: 4.6 MMOL/L (ref 3.5–5.1)
PROLACTIN SERPL IA-MCNC: 3.2 NG/ML (ref 3.5–19.4)
PROT SERPL-MCNC: 7.3 G/DL (ref 6–8.4)
SODIUM SERPL-SCNC: 138 MMOL/L (ref 136–145)
TSH SERPL DL<=0.005 MIU/L-ACNC: 2.28 UIU/ML (ref 0.4–4)
URATE SERPL-MCNC: 6.4 MG/DL (ref 3.4–7)

## 2022-03-08 PROCEDURE — 4010F PR ACE/ARB THEARPY RXD/TAKEN: ICD-10-PCS | Mod: CPTII,S$GLB,, | Performed by: INTERNAL MEDICINE

## 2022-03-08 PROCEDURE — 84146 ASSAY OF PROLACTIN: CPT | Mod: 91 | Performed by: INTERNAL MEDICINE

## 2022-03-08 PROCEDURE — 1101F PR PT FALLS ASSESS DOC 0-1 FALLS W/OUT INJ PAST YR: ICD-10-PCS | Mod: CPTII,S$GLB,, | Performed by: INTERNAL MEDICINE

## 2022-03-08 PROCEDURE — 1160F PR REVIEW ALL MEDS BY PRESCRIBER/CLIN PHARMACIST DOCUMENTED: ICD-10-PCS | Mod: CPTII,S$GLB,, | Performed by: INTERNAL MEDICINE

## 2022-03-08 PROCEDURE — 3008F PR BODY MASS INDEX (BMI) DOCUMENTED: ICD-10-PCS | Mod: CPTII,S$GLB,, | Performed by: INTERNAL MEDICINE

## 2022-03-08 PROCEDURE — 3078F DIAST BP <80 MM HG: CPT | Mod: CPTII,S$GLB,, | Performed by: INTERNAL MEDICINE

## 2022-03-08 PROCEDURE — 1157F ADVNC CARE PLAN IN RCRD: CPT | Mod: CPTII,S$GLB,, | Performed by: INTERNAL MEDICINE

## 2022-03-08 PROCEDURE — 4010F ACE/ARB THERAPY RXD/TAKEN: CPT | Mod: CPTII,S$GLB,, | Performed by: INTERNAL MEDICINE

## 2022-03-08 PROCEDURE — 1126F PR PAIN SEVERITY QUANTIFIED, NO PAIN PRESENT: ICD-10-PCS | Mod: CPTII,S$GLB,, | Performed by: INTERNAL MEDICINE

## 2022-03-08 PROCEDURE — 82040 ASSAY OF SERUM ALBUMIN: CPT | Performed by: INTERNAL MEDICINE

## 2022-03-08 PROCEDURE — 1126F AMNT PAIN NOTED NONE PRSNT: CPT | Mod: CPTII,S$GLB,, | Performed by: INTERNAL MEDICINE

## 2022-03-08 PROCEDURE — 99214 OFFICE O/P EST MOD 30 MIN: CPT | Mod: S$GLB,,, | Performed by: INTERNAL MEDICINE

## 2022-03-08 PROCEDURE — 99999 PR PBB SHADOW E&M-EST. PATIENT-LVL V: ICD-10-PCS | Mod: PBBFAC,,, | Performed by: INTERNAL MEDICINE

## 2022-03-08 PROCEDURE — 83036 HEMOGLOBIN GLYCOSYLATED A1C: CPT | Performed by: INTERNAL MEDICINE

## 2022-03-08 PROCEDURE — 1101F PT FALLS ASSESS-DOCD LE1/YR: CPT | Mod: CPTII,S$GLB,, | Performed by: INTERNAL MEDICINE

## 2022-03-08 PROCEDURE — 99999 PR PBB SHADOW E&M-EST. PATIENT-LVL V: CPT | Mod: PBBFAC,,, | Performed by: INTERNAL MEDICINE

## 2022-03-08 PROCEDURE — 1159F MED LIST DOCD IN RCRD: CPT | Mod: CPTII,S$GLB,, | Performed by: INTERNAL MEDICINE

## 2022-03-08 PROCEDURE — 3078F PR MOST RECENT DIASTOLIC BLOOD PRESSURE < 80 MM HG: ICD-10-PCS | Mod: CPTII,S$GLB,, | Performed by: INTERNAL MEDICINE

## 2022-03-08 PROCEDURE — 84153 ASSAY OF PSA TOTAL: CPT | Performed by: INTERNAL MEDICINE

## 2022-03-08 PROCEDURE — 82306 VITAMIN D 25 HYDROXY: CPT | Performed by: INTERNAL MEDICINE

## 2022-03-08 PROCEDURE — 82330 ASSAY OF CALCIUM: CPT | Performed by: INTERNAL MEDICINE

## 2022-03-08 PROCEDURE — 83921 ORGANIC ACID SINGLE QUANT: CPT | Performed by: INTERNAL MEDICINE

## 2022-03-08 PROCEDURE — 83690 ASSAY OF LIPASE: CPT | Performed by: INTERNAL MEDICINE

## 2022-03-08 PROCEDURE — 3008F BODY MASS INDEX DOCD: CPT | Mod: CPTII,S$GLB,, | Performed by: INTERNAL MEDICINE

## 2022-03-08 PROCEDURE — 82607 VITAMIN B-12: CPT | Performed by: INTERNAL MEDICINE

## 2022-03-08 PROCEDURE — 82088 ASSAY OF ALDOSTERONE: CPT | Performed by: INTERNAL MEDICINE

## 2022-03-08 PROCEDURE — 1160F RVW MEDS BY RX/DR IN RCRD: CPT | Mod: CPTII,S$GLB,, | Performed by: INTERNAL MEDICINE

## 2022-03-08 PROCEDURE — 85025 COMPLETE CBC W/AUTO DIFF WBC: CPT | Performed by: INTERNAL MEDICINE

## 2022-03-08 PROCEDURE — 82670 ASSAY OF TOTAL ESTRADIOL: CPT | Performed by: INTERNAL MEDICINE

## 2022-03-08 PROCEDURE — 82150 ASSAY OF AMYLASE: CPT | Performed by: INTERNAL MEDICINE

## 2022-03-08 PROCEDURE — 86316 IMMUNOASSAY TUMOR OTHER: CPT | Performed by: INTERNAL MEDICINE

## 2022-03-08 PROCEDURE — 84146 ASSAY OF PROLACTIN: CPT | Performed by: INTERNAL MEDICINE

## 2022-03-08 PROCEDURE — 82024 ASSAY OF ACTH: CPT | Performed by: INTERNAL MEDICINE

## 2022-03-08 PROCEDURE — 36415 COLL VENOUS BLD VENIPUNCTURE: CPT | Mod: PO | Performed by: INTERNAL MEDICINE

## 2022-03-08 PROCEDURE — 82746 ASSAY OF FOLIC ACID SERUM: CPT | Performed by: INTERNAL MEDICINE

## 2022-03-08 PROCEDURE — 1159F PR MEDICATION LIST DOCUMENTED IN MEDICAL RECORD: ICD-10-PCS | Mod: CPTII,S$GLB,, | Performed by: INTERNAL MEDICINE

## 2022-03-08 PROCEDURE — 1157F PR ADVANCE CARE PLAN OR EQUIV PRESENT IN MEDICAL RECORD: ICD-10-PCS | Mod: CPTII,S$GLB,, | Performed by: INTERNAL MEDICINE

## 2022-03-08 PROCEDURE — 3074F SYST BP LT 130 MM HG: CPT | Mod: CPTII,S$GLB,, | Performed by: INTERNAL MEDICINE

## 2022-03-08 PROCEDURE — 3288F PR FALLS RISK ASSESSMENT DOCUMENTED: ICD-10-PCS | Mod: CPTII,S$GLB,, | Performed by: INTERNAL MEDICINE

## 2022-03-08 PROCEDURE — 83970 ASSAY OF PARATHORMONE: CPT | Performed by: INTERNAL MEDICINE

## 2022-03-08 PROCEDURE — 84244 ASSAY OF RENIN: CPT | Performed by: INTERNAL MEDICINE

## 2022-03-08 PROCEDURE — 82533 TOTAL CORTISOL: CPT | Performed by: INTERNAL MEDICINE

## 2022-03-08 PROCEDURE — 80053 COMPREHEN METABOLIC PANEL: CPT | Performed by: INTERNAL MEDICINE

## 2022-03-08 PROCEDURE — 82607 VITAMIN B-12: CPT | Mod: 91 | Performed by: INTERNAL MEDICINE

## 2022-03-08 PROCEDURE — 84443 ASSAY THYROID STIM HORMONE: CPT | Performed by: INTERNAL MEDICINE

## 2022-03-08 PROCEDURE — 84550 ASSAY OF BLOOD/URIC ACID: CPT | Performed by: INTERNAL MEDICINE

## 2022-03-08 PROCEDURE — 84154 ASSAY OF PSA FREE: CPT | Performed by: INTERNAL MEDICINE

## 2022-03-08 PROCEDURE — 3074F PR MOST RECENT SYSTOLIC BLOOD PRESSURE < 130 MM HG: ICD-10-PCS | Mod: CPTII,S$GLB,, | Performed by: INTERNAL MEDICINE

## 2022-03-08 PROCEDURE — 3288F FALL RISK ASSESSMENT DOCD: CPT | Mod: CPTII,S$GLB,, | Performed by: INTERNAL MEDICINE

## 2022-03-08 PROCEDURE — 99214 PR OFFICE/OUTPT VISIT, EST, LEVL IV, 30-39 MIN: ICD-10-PCS | Mod: S$GLB,,, | Performed by: INTERNAL MEDICINE

## 2022-03-08 NOTE — PROGRESS NOTES
Subjective:      Patient ID: Sadiq Dhillon is a 72 y.o. male.    Chief Complaint:  Diabetes       Patient is a 72 yr old gentleman with prior history of prolactinoma seen in [facility] today.    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  He had previously been seen at the [facility] neuroendocrine center. 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 He also had a background history of primary parathyroidectomy for which he had a prior parathyroidectomy and a pancreatic neoplasm. He in addition has secondary hypogonadism, type 2 diabetes, hyperlipidemia, CAD and Fuch's corneal dystrophy. There was a lingering concern that he may have MEN-1 but the screening tests obtained for that were -ve.  He had apparently been previously managed and ffed here in [place] with Dr Dr Bree Camacho prior to her move to [place].  He remains on Dostinex for the prolactinoma;  0.5mg 2 x weekly.  His background comorbidities are detailed below;     1. Pituitary adenoma      2. Prolactinoma      3. Primary hyperparathyroidism      4. Fuchs' corneal dystrophy      5. Type 2 diabetes mellitus with hyperglycemia, without long-term current use of insulin      6. Hypogonadism in male      7. Nephrolithiasis      8. Unspecified essential hypertension      9. Pituitary macroadenoma      10. Hyperlipidemia, unspecified hyperlipidemia type      11. Hypercholesterolemia         Patients baseline Sparta score is 7.  Patient is being seen once every 2 years. Patients is being ffed by a gastroenterologist; Dr Post.  There is no family history of pituitary disease.  Patient sister does have nephrolithiasis.   There is no family history of any adrenal, pancreatic or renal masses.  No family history of pancreatic cancer.  There is no family or personal history of severe dyspepsia.  Patient does not have persistent headaches. He does not  And visual blurring not  lateral vision deficits.     Patient does not smoke.  Patient drinks ~ 7  Weekly; mainly wine and cock tails.     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  Patients screening DEXA from 10/20 was normal.  Patient had a recent fall ~ 3 weeks ago and had right side fractures.   Patients most recent pituitary MRI from 11/2020 showed macroadenoma with extension into suprasellar cistern, posteriorly into the retroclival location and with infindibulin deviation and optic chiasm tethering.  He is being ffed by ophthalmology on this account. His MEN-1 genetic testing was unremarkable.  Patient denies any headaches, no polyuria and no  Seizures.  He has no fresh complaints today.         Review of Systems   Constitutional: Negative for activity change, appetite change, diaphoresis, fatigue and unexpected weight change.   HENT: Negative for facial swelling, hearing loss, sore throat, trouble swallowing and voice change.    Eyes: Negative for photophobia and visual disturbance.   Respiratory: Negative for cough and shortness of breath.    Cardiovascular: Negative for chest pain, palpitations and leg swelling.   Gastrointestinal: Positive for diarrhea (occasional; apparently temporally related to metformin use.). Negative for abdominal distention, abdominal pain, constipation, nausea and vomiting.   Endocrine: Negative for polyphagia and polyuria.   Genitourinary: Negative for dysuria, flank pain, frequency, hematuria and urgency.   Musculoskeletal: Negative for arthralgias, back pain, gait problem, joint swelling, myalgias and neck pain.   Skin: Negative for color change, pallor and rash.   Neurological: Negative for dizziness, tremors, seizures, syncope, light-headedness, numbness and headaches.   Hematological: Does not bruise/bleed easily.   Psychiatric/Behavioral: Negative for agitation, confusion, dysphoric mood and sleep disturbance. The patient is not nervous/anxious and is not hyperactive.   "      Objective: /64 (BP Location: Left arm, Patient Position: Sitting, BP Method: Medium (Manual))   Pulse 75   Temp 97.8 °F (36.6 °C) (Oral)   Ht 5' 9" (1.753 m)   Wt 80.4 kg (177 lb 4 oz)   SpO2 95%   BMI 26.18 kg/m²  Body surface area is 1.98 meters squared. /64 (BP Location: Left arm, Patient Position: Sitting, BP Method: Medium (Manual))   Pulse 75   Temp 97.8 °F (36.6 °C) (Oral)   Ht 5' 9" (1.753 m)   Wt 80.4 kg (177 lb 4 oz)   SpO2 95%   BMI 26.18 kg/m²  BP; 110/66             Physical Exam  Vitals reviewed.   Constitutional:       Appearance: Normal appearance.      Comments: Pleasant gentleman who looks significantly younger than his stated chronologic age. Not pale, anicteric and afebrile. Well hydrated.   HENT:      Head: Normocephalic and atraumatic.   Eyes:      General: No scleral icterus.     Conjunctiva/sclera: Conjunctivae normal.      Pupils: Pupils are equal, round, and reactive to light.   Neck:      Vascular: No carotid bruit.   Cardiovascular:      Rate and Rhythm: Normal rate and regular rhythm.      Pulses: Normal pulses.      Heart sounds: No murmur heard.  Pulmonary:      Effort: No respiratory distress.      Breath sounds: Normal breath sounds. No stridor. No wheezing or rhonchi.   Abdominal:      General: Abdomen is flat. There is no distension.      Tenderness: There is no abdominal tenderness.   Musculoskeletal:         General: No swelling. Normal range of motion.      Cervical back: Neck supple. No rigidity.   Lymphadenopathy:      Cervical: No cervical adenopathy.   Skin:     General: Skin is warm and dry.      Coloration: Skin is not jaundiced or pale.      Findings: No bruising.   Neurological:      General: No focal deficit present.      Mental Status: He is alert.      Cranial Nerves: No cranial nerve deficit.      Sensory: No sensory deficit.      Gait: Gait normal.   Psychiatric:         Mood and Affect: Mood normal.         Behavior: Behavior normal.   "       Thought Content: Thought content normal.         Judgment: Judgment normal.         Lab Review:     Results for TOMA RG (MRN [id number]) as of 3/30/2021 15:02   Ref. Range 3/11/2021 13:13 3/11/2021 14:15 3/11/2021 14:15   Creatinine Latest Ref Range: 0.5 - 1.4 mg/dL  0.9 0.9   eGFR if non African American Latest Ref Range: >60 mL/min/1.73 m^2  >60 >60   eGFR if  Latest Ref Range: >60 mL/min/1.73 m^2  >60 >60   pH, UA Latest Ref Range: 5 - 8  5     POC Blood, Urine Latest Ref Range: Negative  Negative     POC Bilirubin, Urine Latest Ref Range: Negative  Negative     POC Urobilinogen, Urine Latest Ref Range: 0.3 - 2.2  Normal     POC Ketones, Urine Latest Ref Range: Negative  Negative     POC Protein, Urine Latest Ref Range: Negative  Negative     POC Nitrates, Urine Latest Ref Range: Negative  Negative     POC Glucose, Urine Latest Ref Range: Negative  Negative     POC Specific Gravity, Urine Latest Ref Range: 1.003 - 1.029  1.025     POC Leukocytes, Urine Latest Ref Range: Negative  Negative         Assessment:     1. Pituitary macroadenoma  Prolactin    Chromogranin A    Macroprolactin, Serum    TSH    ACTH    Cortisol   2. Prolactinoma  Prolactin    Chromogranin A    Macroprolactin, Serum    TSH    ACTH   3. Primary hyperparathyroidism  Comprehensive Metabolic Panel    Vitamin D    Calcium, Ionized    PTH, Intact    Aldosterone    Renin   4. Type 2 diabetes mellitus with hyperglycemia, without long-term current use of insulin  Hemoglobin A1C    Comprehensive Metabolic Panel    CBC Auto Differential    Urinalysis    Uric Acid    Microalbumin/Creatinine Ratio, Urine    Amylase    Lipase   5. Hypogonadism in male  Testosterone Panel    Estradiol   6. Hypovitaminosis D  Vitamin D    Calcium, Ionized    PTH, Intact   7. History of pancreatitis     8. Gastroesophageal reflux disease with esophagitis without hemorrhage  CBC Auto Differential   9. Hyperlipidemia, unspecified hyperlipidemia  type  Comprehensive Metabolic Panel   10. Hypercholesterolemia  Comprehensive Metabolic Panel   11. Nephrolithiasis     12. Fuchs' corneal dystrophy, unspecified laterality     13. Benign prostatic hyperplasia, unspecified whether lower urinary tract symptoms present  PSA, Total and Free   14. Nutritional anemia  Vitamin B12    Vitamin B12 Deficiency Panel    Folate        Patient with pituitary macroadenoma; to check prolactin levels currently and obtain ffup adeno and neurohypophyseal screening labs testing for current functional status as detailed above. For ffup  Pituitary MRI.  Regarding prolactinoma; to continue dostinex as before.  Regarding hyperparathyroidism; this required prior parathyroidectomy the details of which are unclear. . In view of his history of pituitary and parathyroid disease as well as pancreatic cystic mass disease we obtained MEN-1 genetic testing which was however unrevealing.  Regarding hypogonadism; to continue topical androgen repletion and will check current androgen and other relevant repro hormone profile. To also check current PSA and HCT.  Regarding pancreatic  Cystic mass; to obtain ffup abdomen CT, obtain basic tumor marker screening which will be ffed serially as required.  Regarding type 2 diabetes; to continue low dose metformin as before and check current HBA1c. For now no management changes.  Regarding essential hypertension; BP presently well controlled. To continue present antihypertesnive regimen and to continue serial ambulatory BP and pulse rate tracking.  Regarding hyperlipidemia; to continue present antilipidemic therapy and to check current lipid profile as well as low dose asa for primary ASCVD prophylaxis.  Regarding possible hypovitaminosis D; to check 25 OH Vit D levels and replete as needed.    Due to the current nation wide acute severe supply chain deficit in blood, urine and other lab sample tubes and collection containers, typical labs will not be obtained  in the usual profile and  frequency they were obtained in time past for clinical surveillance, investigation, monitoring and/or management.      Plan:       FFup in ~ 6mths.

## 2022-03-09 ENCOUNTER — LAB VISIT (OUTPATIENT)
Dept: LAB | Facility: HOSPITAL | Age: 72
End: 2022-03-09
Attending: INTERNAL MEDICINE
Payer: COMMERCIAL

## 2022-03-09 ENCOUNTER — PATIENT MESSAGE (OUTPATIENT)
Dept: ENDOCRINOLOGY | Facility: CLINIC | Age: 72
End: 2022-03-09
Payer: COMMERCIAL

## 2022-03-09 DIAGNOSIS — R74.8 ELEVATED AMYLASE AND LIPASE: ICD-10-CM

## 2022-03-09 DIAGNOSIS — K86.2 PANCREAS CYST: Primary | ICD-10-CM

## 2022-03-09 DIAGNOSIS — Z87.19 HISTORY OF PANCREATITIS: ICD-10-CM

## 2022-03-09 DIAGNOSIS — E11.65 TYPE 2 DIABETES MELLITUS WITH HYPERGLYCEMIA, WITHOUT LONG-TERM CURRENT USE OF INSULIN: ICD-10-CM

## 2022-03-09 DIAGNOSIS — R74.8 HYPERAMYLASEMIA: ICD-10-CM

## 2022-03-09 LAB
25(OH)D3+25(OH)D2 SERPL-MCNC: 52 NG/ML (ref 30–96)
ALBUMIN/CREAT UR: 15.7 UG/MG (ref 0–30)
BACTERIA #/AREA URNS AUTO: ABNORMAL /HPF
BASOPHILS # BLD AUTO: 0.02 K/UL (ref 0–0.2)
BASOPHILS NFR BLD: 0.3 % (ref 0–1.9)
BILIRUB UR QL STRIP: NEGATIVE
CA-I BLDV-SCNC: 1.29 MMOL/L (ref 1.06–1.42)
CLARITY UR REFRACT.AUTO: CLEAR
COLOR UR AUTO: YELLOW
CORTIS SERPL-MCNC: 5.9 UG/DL
CREAT UR-MCNC: 140 MG/DL (ref 23–375)
DIFFERENTIAL METHOD: ABNORMAL
EOSINOPHIL # BLD AUTO: 0.2 K/UL (ref 0–0.5)
EOSINOPHIL NFR BLD: 2.3 % (ref 0–8)
ERYTHROCYTE [DISTWIDTH] IN BLOOD BY AUTOMATED COUNT: 13 % (ref 11.5–14.5)
FOLATE SERPL-MCNC: 9.8 NG/ML (ref 4–24)
GLUCOSE UR QL STRIP: NEGATIVE
HCT VFR BLD AUTO: 46.5 % (ref 40–54)
HGB BLD-MCNC: 15 G/DL (ref 14–18)
HGB UR QL STRIP: ABNORMAL
HYALINE CASTS UR QL AUTO: 1 /LPF
IMM GRANULOCYTES # BLD AUTO: 0.04 K/UL (ref 0–0.04)
IMM GRANULOCYTES NFR BLD AUTO: 0.6 % (ref 0–0.5)
KETONES UR QL STRIP: NEGATIVE
LEUKOCYTE ESTERASE UR QL STRIP: NEGATIVE
LYMPHOCYTES # BLD AUTO: 2 K/UL (ref 1–4.8)
LYMPHOCYTES NFR BLD: 28.4 % (ref 18–48)
MCH RBC QN AUTO: 29.1 PG (ref 27–31)
MCHC RBC AUTO-ENTMCNC: 32.3 G/DL (ref 32–36)
MCV RBC AUTO: 90 FL (ref 82–98)
MICROALBUMIN UR DL<=1MG/L-MCNC: 22 UG/ML
MICROSCOPIC COMMENT: ABNORMAL
MONOCYTES # BLD AUTO: 0.6 K/UL (ref 0.3–1)
MONOCYTES NFR BLD: 8.2 % (ref 4–15)
NEUTROPHILS # BLD AUTO: 4.3 K/UL (ref 1.8–7.7)
NEUTROPHILS NFR BLD: 60.2 % (ref 38–73)
NITRITE UR QL STRIP: NEGATIVE
NRBC BLD-RTO: 0 /100 WBC
PH UR STRIP: 6 [PH] (ref 5–8)
PLATELET # BLD AUTO: 192 K/UL (ref 150–450)
PMV BLD AUTO: 9.1 FL (ref 9.2–12.9)
PROSTATE SPECIFIC ANTIGEN, TOTAL: 1.9 NG/ML (ref 0–4)
PROT UR QL STRIP: NEGATIVE
PSA FREE MFR SERPL: 28.42 %
PSA FREE SERPL-MCNC: 0.54 NG/ML (ref 0–1.5)
PTH-INTACT SERPL-MCNC: 36.3 PG/ML (ref 9–77)
RBC # BLD AUTO: 5.15 M/UL (ref 4.6–6.2)
RBC #/AREA URNS AUTO: 16 /HPF (ref 0–4)
SP GR UR STRIP: 1.02 (ref 1–1.03)
SQUAMOUS #/AREA URNS AUTO: 6 /HPF
URN SPEC COLLECT METH UR: ABNORMAL
VIT B12 SERPL-MCNC: 544 PG/ML (ref 210–950)
WBC # BLD AUTO: 7.08 K/UL (ref 3.9–12.7)
WBC #/AREA URNS AUTO: 1 /HPF (ref 0–5)

## 2022-03-09 PROCEDURE — 81001 URINALYSIS AUTO W/SCOPE: CPT | Performed by: INTERNAL MEDICINE

## 2022-03-09 PROCEDURE — 82570 ASSAY OF URINE CREATININE: CPT | Performed by: INTERNAL MEDICINE

## 2022-03-09 PROCEDURE — 82043 UR ALBUMIN QUANTITATIVE: CPT | Performed by: INTERNAL MEDICINE

## 2022-03-10 LAB
CGA SERPL-MCNC: 66 NG/ML
VIT B12 SERPL-MCNC: 364 NG/L (ref 180–914)

## 2022-03-11 LAB
ACTH PLAS-MCNC: 47 PG/ML (ref 0–46)
ALDOST SERPL-MCNC: 7.7 NG/DL
ANNOTATION COMMENT IMP: ABNORMAL
MACROPROLACTIN SERPL-MCNC: 3.6 NG/ML (ref 2.7–13.1)
MACROPROLACTIN/PROLACTIN MFR SERPL: 8 %
PROLACTIN SERPL IA-MCNC: 3.9 NG/ML (ref 4–15.2)

## 2022-03-13 LAB
ALBUMIN SERPL-MCNC: 4.8 G/DL (ref 3.6–5.1)
RENIN PLAS-CCNC: 13 NG/ML/H
SHBG SERPL-SCNC: 18 NMOL/L (ref 22–77)
TESTOST FREE SERPL-MCNC: 47.4 PG/ML (ref 6–73)
TESTOST SERPL-MCNC: 250 NG/DL (ref 250–1100)
TESTOSTERONE.FREE+WB SERPL-MCNC: 103.6 NG/DL (ref 15–150)

## 2022-03-14 LAB — METHYLMALONATE SERPL-SCNC: 0.14 NMOL/ML

## 2022-03-30 ENCOUNTER — OFFICE VISIT (OUTPATIENT)
Dept: URGENT CARE | Facility: CLINIC | Age: 72
End: 2022-03-30
Payer: COMMERCIAL

## 2022-03-30 VITALS
SYSTOLIC BLOOD PRESSURE: 133 MMHG | DIASTOLIC BLOOD PRESSURE: 70 MMHG | WEIGHT: 177 LBS | OXYGEN SATURATION: 97 % | RESPIRATION RATE: 18 BRPM | HEART RATE: 82 BPM | BODY MASS INDEX: 26.22 KG/M2 | TEMPERATURE: 98 F | HEIGHT: 69 IN

## 2022-03-30 DIAGNOSIS — R30.0 DYSURIA: Primary | ICD-10-CM

## 2022-03-30 LAB
BILIRUB UR QL STRIP: NEGATIVE
GLUCOSE UR QL STRIP: NEGATIVE
KETONES UR QL STRIP: NEGATIVE
LEUKOCYTE ESTERASE UR QL STRIP: NEGATIVE
PH, POC UA: 6 (ref 5–8)
POC BLOOD, URINE: POSITIVE
POC NITRATES, URINE: NEGATIVE
PROT UR QL STRIP: NEGATIVE
SP GR UR STRIP: 1.02 (ref 1–1.03)
UROBILINOGEN UR STRIP-ACNC: ABNORMAL (ref 0.3–2.2)

## 2022-03-30 PROCEDURE — 3008F PR BODY MASS INDEX (BMI) DOCUMENTED: ICD-10-PCS | Mod: CPTII,S$GLB,, | Performed by: NURSE PRACTITIONER

## 2022-03-30 PROCEDURE — 81003 POCT URINALYSIS, DIPSTICK, AUTOMATED, W/O SCOPE: ICD-10-PCS | Mod: QW,S$GLB,, | Performed by: NURSE PRACTITIONER

## 2022-03-30 PROCEDURE — 1125F AMNT PAIN NOTED PAIN PRSNT: CPT | Mod: CPTII,S$GLB,, | Performed by: NURSE PRACTITIONER

## 2022-03-30 PROCEDURE — 3008F BODY MASS INDEX DOCD: CPT | Mod: CPTII,S$GLB,, | Performed by: NURSE PRACTITIONER

## 2022-03-30 PROCEDURE — 1159F MED LIST DOCD IN RCRD: CPT | Mod: CPTII,S$GLB,, | Performed by: NURSE PRACTITIONER

## 2022-03-30 PROCEDURE — 4010F PR ACE/ARB THEARPY RXD/TAKEN: ICD-10-PCS | Mod: CPTII,S$GLB,, | Performed by: NURSE PRACTITIONER

## 2022-03-30 PROCEDURE — 1159F PR MEDICATION LIST DOCUMENTED IN MEDICAL RECORD: ICD-10-PCS | Mod: CPTII,S$GLB,, | Performed by: NURSE PRACTITIONER

## 2022-03-30 PROCEDURE — 3044F HG A1C LEVEL LT 7.0%: CPT | Mod: CPTII,S$GLB,, | Performed by: NURSE PRACTITIONER

## 2022-03-30 PROCEDURE — 1157F ADVNC CARE PLAN IN RCRD: CPT | Mod: CPTII,S$GLB,, | Performed by: NURSE PRACTITIONER

## 2022-03-30 PROCEDURE — 3075F PR MOST RECENT SYSTOLIC BLOOD PRESS GE 130-139MM HG: ICD-10-PCS | Mod: CPTII,S$GLB,, | Performed by: NURSE PRACTITIONER

## 2022-03-30 PROCEDURE — 3061F PR NEG MICROALBUMINURIA RESULT DOCUMENTED/REVIEW: ICD-10-PCS | Mod: CPTII,S$GLB,, | Performed by: NURSE PRACTITIONER

## 2022-03-30 PROCEDURE — 3075F SYST BP GE 130 - 139MM HG: CPT | Mod: CPTII,S$GLB,, | Performed by: NURSE PRACTITIONER

## 2022-03-30 PROCEDURE — 3078F DIAST BP <80 MM HG: CPT | Mod: CPTII,S$GLB,, | Performed by: NURSE PRACTITIONER

## 2022-03-30 PROCEDURE — 81003 URINALYSIS AUTO W/O SCOPE: CPT | Mod: QW,S$GLB,, | Performed by: NURSE PRACTITIONER

## 2022-03-30 PROCEDURE — 4010F ACE/ARB THERAPY RXD/TAKEN: CPT | Mod: CPTII,S$GLB,, | Performed by: NURSE PRACTITIONER

## 2022-03-30 PROCEDURE — 1125F PR PAIN SEVERITY QUANTIFIED, PAIN PRESENT: ICD-10-PCS | Mod: CPTII,S$GLB,, | Performed by: NURSE PRACTITIONER

## 2022-03-30 PROCEDURE — 99213 OFFICE O/P EST LOW 20 MIN: CPT | Mod: S$GLB,,, | Performed by: NURSE PRACTITIONER

## 2022-03-30 PROCEDURE — 99213 PR OFFICE/OUTPT VISIT, EST, LEVL III, 20-29 MIN: ICD-10-PCS | Mod: S$GLB,,, | Performed by: NURSE PRACTITIONER

## 2022-03-30 PROCEDURE — 3078F PR MOST RECENT DIASTOLIC BLOOD PRESSURE < 80 MM HG: ICD-10-PCS | Mod: CPTII,S$GLB,, | Performed by: NURSE PRACTITIONER

## 2022-03-30 PROCEDURE — 3061F NEG MICROALBUMINURIA REV: CPT | Mod: CPTII,S$GLB,, | Performed by: NURSE PRACTITIONER

## 2022-03-30 PROCEDURE — 87086 URINE CULTURE/COLONY COUNT: CPT | Performed by: NURSE PRACTITIONER

## 2022-03-30 PROCEDURE — 3044F PR MOST RECENT HEMOGLOBIN A1C LEVEL <7.0%: ICD-10-PCS | Mod: CPTII,S$GLB,, | Performed by: NURSE PRACTITIONER

## 2022-03-30 PROCEDURE — 3066F PR DOCUMENTATION OF TREATMENT FOR NEPHROPATHY: ICD-10-PCS | Mod: CPTII,S$GLB,, | Performed by: NURSE PRACTITIONER

## 2022-03-30 PROCEDURE — 1160F PR REVIEW ALL MEDS BY PRESCRIBER/CLIN PHARMACIST DOCUMENTED: ICD-10-PCS | Mod: CPTII,S$GLB,, | Performed by: NURSE PRACTITIONER

## 2022-03-30 PROCEDURE — 1160F RVW MEDS BY RX/DR IN RCRD: CPT | Mod: CPTII,S$GLB,, | Performed by: NURSE PRACTITIONER

## 2022-03-30 PROCEDURE — 1157F PR ADVANCE CARE PLAN OR EQUIV PRESENT IN MEDICAL RECORD: ICD-10-PCS | Mod: CPTII,S$GLB,, | Performed by: NURSE PRACTITIONER

## 2022-03-30 PROCEDURE — 3066F NEPHROPATHY DOC TX: CPT | Mod: CPTII,S$GLB,, | Performed by: NURSE PRACTITIONER

## 2022-03-30 RX ORDER — SULFAMETHOXAZOLE AND TRIMETHOPRIM 800; 160 MG/1; MG/1
1 TABLET ORAL 2 TIMES DAILY
Qty: 14 TABLET | Refills: 0 | Status: SHIPPED | OUTPATIENT
Start: 2022-03-30 | End: 2022-04-06

## 2022-03-30 NOTE — PROGRESS NOTES
"Subjective:       Patient ID: Sadiq Dhillon is a 72 y.o. male.    Vitals:  height is 5' 9" (1.753 m) and weight is 80.3 kg (177 lb). His temperature is 97.9 °F (36.6 °C). His blood pressure is 133/70 and his pulse is 82. His respiration is 18 and oxygen saturation is 97%.     Chief Complaint: Dysuria    C/o urinary burning starting last week , now resolved but subsequent  c/o pelvic pressure. Denies fever or chills  No report of abdominal pain, N/V/D, hematemesis, hematochezia, melena, frequency, urgency, hematuria, flank paindischarge, foul odor, lesions, rash      Dysuria   This is a new problem. The current episode started in the past 7 days. The problem occurs every urination. The problem has been unchanged. The quality of the pain is described as burning. The pain is at a severity of 2/10. The pain is mild. There has been no fever. He is not sexually active. There is no history of pyelonephritis. Pertinent negatives include no chills, flank pain, frequency, hematuria, nausea, urgency, vomiting, constipation or rash. He has tried nothing for the symptoms. The treatment provided no relief.       Constitution: Negative for appetite change, chills, fatigue, fever and unexpected weight change.   Neck: Negative for painful lymph nodes.   Cardiovascular: Negative for chest pain, palpitations and sob on exertion.   Respiratory: Negative for chest tightness, cough and shortness of breath.    Gastrointestinal: Negative for abdominal pain, history of abdominal surgery, nausea, vomiting, constipation, diarrhea, bright red blood in stool, dark colored stools, rectal bleeding, rectal pain and hemorrhoids.   Genitourinary: Positive for pelvic pain. Negative for dysuria, frequency, urgency, flank pain, hematuria, genital sore, penile discharge, penile pain, penile swelling and scrotal swelling.   Musculoskeletal: Negative for back pain.   Skin: Negative for color change, pale and rash.   Hematologic/Lymphatic: Negative " for swollen lymph nodes.       Objective:      Physical Exam   Constitutional: He is oriented to person, place, and time. He appears well-developed.  Non-toxic appearance. He does not appear ill. No distress.   HENT:   Head: Normocephalic and atraumatic.   Nose: Nose normal.   Mouth/Throat: Mucous membranes are normal.   Eyes: Conjunctivae and lids are normal.   Neck: Trachea normal. Neck supple.   Cardiovascular: Normal rate.   Pulmonary/Chest: Effort normal. No respiratory distress.   Abdominal: Normal appearance. He exhibits no distension, no abdominal bruit, no pulsatile midline mass and no mass. Soft. flat abdomenThere is no abdominal tenderness. There is no rebound, no guarding, no left CVA tenderness and no right CVA tenderness.   Musculoskeletal: Normal range of motion.         General: Normal range of motion.   Neurological: He is alert and oriented to person, place, and time. He has normal strength.   Skin: Skin is warm, dry, intact, not diaphoretic and not pale.   Psychiatric: His speech is normal and behavior is normal. Judgment and thought content normal.   Nursing note and vitals reviewed.        Results for orders placed or performed in visit on 03/30/22   POCT Urinalysis, Dipstick, Automated, W/O Scope   Result Value Ref Range    POC Blood, Urine Positive (A) Negative    POC Bilirubin, Urine Negative Negative    POC Urobilinogen, Urine norm 0.3 - 2.2    POC Ketones, Urine Negative Negative    POC Protein, Urine Negative Negative    POC Nitrates, Urine Negative Negative    POC Glucose, Urine Negative Negative    pH, UA 6.0 5 - 8    POC Specific Gravity, Urine 1.020 1.003 - 1.029    POC Leukocytes, Urine Negative Negative       Assessment:       1. Dysuria          Plan:         Dysuria - possible differentials UTI, nephrolithiasis, STI  -instructed to start Ax, f/u with PCP for no improvement in 24-48 hours, sooner for worsening especially if develop fever/chills.     -     POCT Urinalysis, Dipstick,  Automated, W/O Scope  -     Culture, Urine  -     sulfamethoxazole-trimethoprim 800-160mg (BACTRIM DS) 800-160 mg Tab; Take 1 tablet by mouth 2 (two) times daily. for 7 days  Dispense: 14 tablet; Refill: 0                 See additional patient Instructions provided    Advised on return/follow-up precautions. Advised on ER precautions. Answered all patient questions. Patient verbalized understanding and voiced agreement with current treatment plan.

## 2022-03-31 LAB — BACTERIA UR CULT: NO GROWTH

## 2022-04-01 ENCOUNTER — TELEPHONE (OUTPATIENT)
Dept: URGENT CARE | Facility: CLINIC | Age: 72
End: 2022-04-01
Payer: COMMERCIAL

## 2022-04-01 NOTE — TELEPHONE ENCOUNTER
Gave patient negative urine culture results.  Patient states that his symptoms have improved with antibiotics.  Informed patient that he should follow up with primary care for the blood found in his UA.  Patient had no further questions or concerns.

## 2022-04-04 ENCOUNTER — TELEPHONE (OUTPATIENT)
Dept: ENDOCRINOLOGY | Facility: CLINIC | Age: 72
End: 2022-04-04
Payer: COMMERCIAL

## 2022-04-04 NOTE — TELEPHONE ENCOUNTER
----- Message from Mae Sargentgham sent at 4/4/2022  9:32 AM CDT -----  Regarding: Need Medical Advice  Type:  Needs Medical Advice    Who Called: Patient need to speak with nurse   Patient states have questions regarding Cat Scan with contrast   Patient states had Cat scan in the past experience problems with stomach .   Patient states takes Metformin drug for Diabetes.   Patient want to know if he can have Cat Scan without contrast.      Symptoms (please be specific):   How long has patient had these symptoms:      Would the patient rather a call back or a response via [facility]? call  Best Call Back Number: [phone]  Additional Information:

## 2022-04-04 NOTE — TELEPHONE ENCOUNTER
Report she has exp[erienced GI distress with CT contrast to include gas & diarrhea & wants to know can he have the CT Abdominal Scan done without contrast? If so, please place a new order.

## 2022-04-05 ENCOUNTER — TELEPHONE (OUTPATIENT)
Dept: ENDOCRINOLOGY | Facility: CLINIC | Age: 72
End: 2022-04-05
Payer: COMMERCIAL

## 2022-04-05 ENCOUNTER — PATIENT MESSAGE (OUTPATIENT)
Dept: ENDOCRINOLOGY | Facility: CLINIC | Age: 72
End: 2022-04-05
Payer: COMMERCIAL

## 2022-04-05 NOTE — TELEPHONE ENCOUNTER
Please see pt request below:  ----- Message from Jeanine Kee sent at 4/5/2022 10:46 AM CDT -----  Who Called: Patient    What is the reqeust in detail: Requesting update on his earlier inquiry about completing a CT with out having to take the contrast solution due to expected gastro difficulties. Please advise.     Can the clinic reply by [facility]? No    Best Call Back Number: [phone]    Additional Information: Please advise.

## 2022-04-06 ENCOUNTER — PATIENT MESSAGE (OUTPATIENT)
Dept: ENDOCRINOLOGY | Facility: CLINIC | Age: 72
End: 2022-04-06
Payer: COMMERCIAL

## 2022-04-06 DIAGNOSIS — Z87.19 HISTORY OF PANCREATITIS: Primary | ICD-10-CM

## 2022-04-06 DIAGNOSIS — K86.2 PANCREATIC CYST: ICD-10-CM

## 2022-04-06 DIAGNOSIS — R74.8 ELEVATED AMYLASE AND LIPASE: ICD-10-CM

## 2022-04-06 NOTE — TELEPHONE ENCOUNTER
Informed pt. of message from Dr. Leon & rescheduled his CT scan WITHOUT contrast on 4/8/22 @ 4:00 PM. Pt verbalized an understanding.

## 2022-04-09 ENCOUNTER — HOSPITAL ENCOUNTER (OUTPATIENT)
Dept: RADIOLOGY | Facility: OTHER | Age: 72
Discharge: HOME | End: 2022-04-09
Attending: INTERNAL MEDICINE
Payer: COMMERCIAL

## 2022-04-09 DIAGNOSIS — R74.8 ELEVATED AMYLASE AND LIPASE: ICD-10-CM

## 2022-04-09 DIAGNOSIS — Z87.19 HISTORY OF PANCREATITIS: ICD-10-CM

## 2022-04-09 DIAGNOSIS — K86.2 PANCREATIC CYST: ICD-10-CM

## 2022-04-09 PROCEDURE — 74150 CT ABDOMEN W/O CONTRAST: CPT | Mod: TC

## 2022-04-09 PROCEDURE — 74150 CT ABDOMEN WITHOUT CONTRAST: ICD-10-PCS | Mod: 26,,, | Performed by: RADIOLOGY

## 2022-04-09 PROCEDURE — 74150 CT ABDOMEN W/O CONTRAST: CPT | Mod: 26,,, | Performed by: RADIOLOGY

## 2022-04-11 ENCOUNTER — PATIENT MESSAGE (OUTPATIENT)
Dept: ENDOCRINOLOGY | Facility: CLINIC | Age: 72
End: 2022-04-11
Payer: COMMERCIAL

## 2022-04-12 ENCOUNTER — PATIENT MESSAGE (OUTPATIENT)
Dept: UROLOGY | Facility: CLINIC | Age: 72
End: 2022-04-12
Payer: COMMERCIAL

## 2022-04-13 ENCOUNTER — TELEPHONE (OUTPATIENT)
Dept: UROLOGY | Facility: CLINIC | Age: 72
End: 2022-04-13
Payer: COMMERCIAL

## 2022-04-13 NOTE — TELEPHONE ENCOUNTER
----- Message from Andra Castilloe sent at 4/13/2022  9:21 AM CDT -----  Regarding: Kidney Stones  Contact: TOMA RG [[id number]]  Name of Who is Calling:TOMA RG [[id number]]          What is the request in detail:Patient called in regards to being scheduled due to kidney stones being found. Patient was offered 4/25 appointment with  and 4/14 appointment with NP, but patient refused. Please advise           Can the clinic reply by [facility]:No          What Number to Call Back if not [facility]:[phone]

## 2022-04-13 NOTE — TELEPHONE ENCOUNTER
I spoke to the patient and N.P had no openings, I schedule the patient with the doctor 04/25/2022, informed the patient to drink a lot of fluids and if it starts to pass go to the ed . Message forward to the doctor.

## 2022-04-25 ENCOUNTER — OFFICE VISIT (OUTPATIENT)
Dept: UROLOGY | Facility: CLINIC | Age: 72
End: 2022-04-25
Attending: UROLOGY
Payer: COMMERCIAL

## 2022-04-25 VITALS
HEART RATE: 83 BPM | WEIGHT: 172.63 LBS | BODY MASS INDEX: 25.49 KG/M2 | SYSTOLIC BLOOD PRESSURE: 141 MMHG | DIASTOLIC BLOOD PRESSURE: 65 MMHG

## 2022-04-25 DIAGNOSIS — N20.0 NEPHROLITHIASIS: Primary | ICD-10-CM

## 2022-04-25 LAB
BILIRUB SERPL-MCNC: ABNORMAL MG/DL
BLOOD URINE, POC: ABNORMAL
CLARITY, POC UA: CLEAR
COLOR, POC UA: YELLOW
GLUCOSE UR QL STRIP: ABNORMAL
KETONES UR QL STRIP: ABNORMAL
LEUKOCYTE ESTERASE URINE, POC: ABNORMAL
NITRITE, POC UA: ABNORMAL
PH, POC UA: 5
PROTEIN, POC: ABNORMAL
SPECIFIC GRAVITY, POC UA: 1.02
UROBILINOGEN, POC UA: ABNORMAL

## 2022-04-25 PROCEDURE — 1101F PT FALLS ASSESS-DOCD LE1/YR: CPT | Mod: CPTII,S$GLB,, | Performed by: UROLOGY

## 2022-04-25 PROCEDURE — 99214 OFFICE O/P EST MOD 30 MIN: CPT | Mod: S$GLB,,, | Performed by: UROLOGY

## 2022-04-25 PROCEDURE — 3288F PR FALLS RISK ASSESSMENT DOCUMENTED: ICD-10-PCS | Mod: CPTII,S$GLB,, | Performed by: UROLOGY

## 2022-04-25 PROCEDURE — 3061F PR NEG MICROALBUMINURIA RESULT DOCUMENTED/REVIEW: ICD-10-PCS | Mod: CPTII,S$GLB,, | Performed by: UROLOGY

## 2022-04-25 PROCEDURE — 3066F PR DOCUMENTATION OF TREATMENT FOR NEPHROPATHY: ICD-10-PCS | Mod: CPTII,S$GLB,, | Performed by: UROLOGY

## 2022-04-25 PROCEDURE — 3078F PR MOST RECENT DIASTOLIC BLOOD PRESSURE < 80 MM HG: ICD-10-PCS | Mod: CPTII,S$GLB,, | Performed by: UROLOGY

## 2022-04-25 PROCEDURE — 81002 URINALYSIS NONAUTO W/O SCOPE: CPT | Mod: S$GLB,,, | Performed by: UROLOGY

## 2022-04-25 PROCEDURE — 1160F PR REVIEW ALL MEDS BY PRESCRIBER/CLIN PHARMACIST DOCUMENTED: ICD-10-PCS | Mod: CPTII,S$GLB,, | Performed by: UROLOGY

## 2022-04-25 PROCEDURE — 1157F PR ADVANCE CARE PLAN OR EQUIV PRESENT IN MEDICAL RECORD: ICD-10-PCS | Mod: CPTII,S$GLB,, | Performed by: UROLOGY

## 2022-04-25 PROCEDURE — 1157F ADVNC CARE PLAN IN RCRD: CPT | Mod: CPTII,S$GLB,, | Performed by: UROLOGY

## 2022-04-25 PROCEDURE — 1159F MED LIST DOCD IN RCRD: CPT | Mod: CPTII,S$GLB,, | Performed by: UROLOGY

## 2022-04-25 PROCEDURE — 81002 POCT URINE DIPSTICK WITHOUT MICROSCOPE: ICD-10-PCS | Mod: S$GLB,,, | Performed by: UROLOGY

## 2022-04-25 PROCEDURE — 4010F PR ACE/ARB THEARPY RXD/TAKEN: ICD-10-PCS | Mod: CPTII,S$GLB,, | Performed by: UROLOGY

## 2022-04-25 PROCEDURE — 3044F HG A1C LEVEL LT 7.0%: CPT | Mod: CPTII,S$GLB,, | Performed by: UROLOGY

## 2022-04-25 PROCEDURE — 1126F PR PAIN SEVERITY QUANTIFIED, NO PAIN PRESENT: ICD-10-PCS | Mod: CPTII,S$GLB,, | Performed by: UROLOGY

## 2022-04-25 PROCEDURE — 1126F AMNT PAIN NOTED NONE PRSNT: CPT | Mod: CPTII,S$GLB,, | Performed by: UROLOGY

## 2022-04-25 PROCEDURE — 3288F FALL RISK ASSESSMENT DOCD: CPT | Mod: CPTII,S$GLB,, | Performed by: UROLOGY

## 2022-04-25 PROCEDURE — 1159F PR MEDICATION LIST DOCUMENTED IN MEDICAL RECORD: ICD-10-PCS | Mod: CPTII,S$GLB,, | Performed by: UROLOGY

## 2022-04-25 PROCEDURE — 4010F ACE/ARB THERAPY RXD/TAKEN: CPT | Mod: CPTII,S$GLB,, | Performed by: UROLOGY

## 2022-04-25 PROCEDURE — 3061F NEG MICROALBUMINURIA REV: CPT | Mod: CPTII,S$GLB,, | Performed by: UROLOGY

## 2022-04-25 PROCEDURE — 3077F PR MOST RECENT SYSTOLIC BLOOD PRESSURE >= 140 MM HG: ICD-10-PCS | Mod: CPTII,S$GLB,, | Performed by: UROLOGY

## 2022-04-25 PROCEDURE — 3008F PR BODY MASS INDEX (BMI) DOCUMENTED: ICD-10-PCS | Mod: CPTII,S$GLB,, | Performed by: UROLOGY

## 2022-04-25 PROCEDURE — 3066F NEPHROPATHY DOC TX: CPT | Mod: CPTII,S$GLB,, | Performed by: UROLOGY

## 2022-04-25 PROCEDURE — 99214 PR OFFICE/OUTPT VISIT, EST, LEVL IV, 30-39 MIN: ICD-10-PCS | Mod: S$GLB,,, | Performed by: UROLOGY

## 2022-04-25 PROCEDURE — 3008F BODY MASS INDEX DOCD: CPT | Mod: CPTII,S$GLB,, | Performed by: UROLOGY

## 2022-04-25 PROCEDURE — 3077F SYST BP >= 140 MM HG: CPT | Mod: CPTII,S$GLB,, | Performed by: UROLOGY

## 2022-04-25 PROCEDURE — 3044F PR MOST RECENT HEMOGLOBIN A1C LEVEL <7.0%: ICD-10-PCS | Mod: CPTII,S$GLB,, | Performed by: UROLOGY

## 2022-04-25 PROCEDURE — 1101F PR PT FALLS ASSESS DOC 0-1 FALLS W/OUT INJ PAST YR: ICD-10-PCS | Mod: CPTII,S$GLB,, | Performed by: UROLOGY

## 2022-04-25 PROCEDURE — 3078F DIAST BP <80 MM HG: CPT | Mod: CPTII,S$GLB,, | Performed by: UROLOGY

## 2022-04-25 PROCEDURE — 1160F RVW MEDS BY RX/DR IN RCRD: CPT | Mod: CPTII,S$GLB,, | Performed by: UROLOGY

## 2022-04-25 RX ORDER — TAMSULOSIN HYDROCHLORIDE 0.4 MG/1
CAPSULE ORAL
Qty: 90 CAPSULE | Refills: 3 | Status: SHIPPED | OUTPATIENT
Start: 2022-04-25 | End: 2023-03-08

## 2022-04-25 NOTE — PROGRESS NOTES
Subjective:      Sadiq Dhillon is a 72 y.o. male who returns today regarding his nephrolithiasis.    Patient with a prior history of nephrolithiasis requiring URS. Incidental finding of right ureteral stone on recent CT scan. Denies any fevers, chills, nausea, vomiting. Denies any dysuria, hematuria. No flank pain whatsoever. Not straining urine.     The following portions of the patient's history were reviewed and updated as appropriate: allergies, current medications, past family history, past medical history, past social history, past surgical history and problem list.    Review of Systems  A comprehensive multipoint review of systems was negative except as otherwise stated in the HPI.     Objective:   Vitals: BP (!) 141/65 (BP Location: Right arm, Patient Position: Sitting, BP Method: Large (Automatic))   Pulse 83   Wt 78.3 kg (172 lb 9.9 oz)   BMI 25.49 kg/m²     Physical Exam   General: alert and oriented, no acute distress  Respiratory: Symmetric expansion, non-labored breathing  Cardiovascular: regular rate and rhythm, no peripheral edema  Abdomen: soft, non distended  Genitourinary: Patient declined  Rectal: Patient declined  Skin: normal coloration and turgor, no rashes, no suspicious skin lesions noted  Neuro: no gross deficits  Psych: normal judgment and insight, normal mood/affect and non-anxious    Lab Review   Urinalysis demonstrates positive for blood. Negative for all other tested components.  Lab Results   Component Value Date    WBC 7.08 03/08/2022    HGB 15.0 03/08/2022    HCT 46.5 03/08/2022    MCV 90 03/08/2022     03/08/2022     Lab Results   Component Value Date    CREATININE 0.8 03/08/2022    BUN 19 03/08/2022     Lab Results   Component Value Date    PSA 0.93 02/07/2013       Imaging   EXAMINATION:  CT ABDOMEN WITHOUT CONTRAST     CLINICAL HISTORY:  Patient with multiple endocrine neoplasms and history of pancreatic cysts now presentiong with asymptomatic hyperamylasemia and  hyper lipasemia.;  Personal history of other diseases of the digestive system     TECHNIQUE:  Noncontrast CT of the abdomen was obtained.     COMPARISON:  Prior dated 10/27/2021 and prior CT with contrast dated 03/11/2021     FINDINGS:  The lung bases are clear.  There is elevation of the left hemidiaphragm.     The liver is normal in size and contour.  Gallbladder is nondistended.  The pancreas is not optimally evaluated without contrast.  Ill-defined fullness and hypodensity about the pancreatic body appears similar to the 03/11/2021 exam, with possible small peripancreatic fluid collection, stable.  No definite pancreatic ductal dilatation is seen.     Spleen is not enlarged.  The adrenal glands have a normal appearance.     Kidneys demonstrate mild bilateral renal cortical thinning.  There is an obstructing calculus at the right ureteropelvic junction measuring approximately 4 mm with mild hydronephrosis, which is actually improved when compared with the 10/27/2021 exam.  No left-sided hydronephrosis is seen.     Visualized bowel loops demonstrate no evidence of obstruction.     Impression:     1. 4 mm calculus at the right ureteropelvic junction associated with mild hydronephrosis, noting that hydronephrosis is actually improved when compared with the 10/27/2021 exam, but the calculus is new and is likely resulting in some degree of obstruction.  2. Ill-defined fullness and low-attenuation about the pancreatic body likely reflecting peripancreatic fluid is not well visualized the contrast but appears similar to prior exams dating back to at least March 2021.  3. Stable elevation of left hemidiaphragm.  This report was flagged in Epic as abnormal.    Assessment and Plan:   1. Nephrolithiasis  - PSA reviewed, up to date  - will plan for MET, continue flomax, push fluids. Given strict ED return precautions  - will obtain renal u/s, KUB in two weeks

## 2022-04-26 ENCOUNTER — PATIENT MESSAGE (OUTPATIENT)
Dept: UROLOGY | Facility: CLINIC | Age: 72
End: 2022-04-26
Payer: COMMERCIAL

## 2022-05-06 ENCOUNTER — HOSPITAL ENCOUNTER (OUTPATIENT)
Dept: RADIOLOGY | Facility: HOSPITAL | Age: 72
Discharge: HOME | End: 2022-05-06
Attending: INTERNAL MEDICINE
Payer: COMMERCIAL

## 2022-05-06 DIAGNOSIS — D35.2 PITUITARY MACROADENOMA: ICD-10-CM

## 2022-05-06 DIAGNOSIS — E29.1 HYPOGONADISM IN MALE: ICD-10-CM

## 2022-05-06 PROCEDURE — 70553 MRI BRAIN STEM W/O & W/DYE: CPT | Mod: 26,,, | Performed by: RADIOLOGY

## 2022-05-06 PROCEDURE — 25500020 PHARM REV CODE 255: Performed by: INTERNAL MEDICINE

## 2022-05-06 PROCEDURE — 70553 MRI PITUITARY W W/O CONTRAST: ICD-10-PCS | Mod: 26,,, | Performed by: RADIOLOGY

## 2022-05-06 PROCEDURE — A9585 GADOBUTROL INJECTION: HCPCS | Performed by: INTERNAL MEDICINE

## 2022-05-06 PROCEDURE — 70553 MRI BRAIN STEM W/O & W/DYE: CPT | Mod: TC

## 2022-05-06 RX ORDER — GADOBUTROL 604.72 MG/ML
4 INJECTION INTRAVENOUS
Status: COMPLETED | OUTPATIENT
Start: 2022-05-06 | End: 2022-05-06

## 2022-05-06 RX ADMIN — GADOBUTROL 4 ML: 604.72 INJECTION INTRAVENOUS at 01:05

## 2022-05-07 ENCOUNTER — LAB VISIT (OUTPATIENT)
Dept: LAB | Facility: HOSPITAL | Age: 72
End: 2022-05-07
Attending: INTERNAL MEDICINE
Payer: COMMERCIAL

## 2022-05-07 DIAGNOSIS — Z87.19 HISTORY OF PANCREATITIS: ICD-10-CM

## 2022-05-07 PROCEDURE — 82274 ASSAY TEST FOR BLOOD FECAL: CPT | Performed by: INTERNAL MEDICINE

## 2022-05-09 ENCOUNTER — HOSPITAL ENCOUNTER (OUTPATIENT)
Dept: RADIOLOGY | Facility: OTHER | Age: 72
Discharge: HOME | End: 2022-05-09
Attending: UROLOGY
Payer: COMMERCIAL

## 2022-05-09 DIAGNOSIS — N20.0 NEPHROLITHIASIS: ICD-10-CM

## 2022-05-09 PROCEDURE — 74018 RADEX ABDOMEN 1 VIEW: CPT | Mod: TC,FY

## 2022-05-09 PROCEDURE — 76770 US EXAM ABDO BACK WALL COMP: CPT | Mod: 26,,, | Performed by: RADIOLOGY

## 2022-05-09 PROCEDURE — 76770 US KIDNEY: ICD-10-PCS | Mod: 26,,, | Performed by: RADIOLOGY

## 2022-05-09 PROCEDURE — 74018 XR KUB: ICD-10-PCS | Mod: 26,,, | Performed by: INTERNAL MEDICINE

## 2022-05-09 PROCEDURE — 74018 RADEX ABDOMEN 1 VIEW: CPT | Mod: 26,,, | Performed by: INTERNAL MEDICINE

## 2022-05-09 PROCEDURE — 76770 US EXAM ABDO BACK WALL COMP: CPT | Mod: TC

## 2022-05-10 ENCOUNTER — PATIENT MESSAGE (OUTPATIENT)
Dept: INTERNAL MEDICINE | Facility: CLINIC | Age: 72
End: 2022-05-10
Payer: COMMERCIAL

## 2022-05-10 LAB — HEMOCCULT STL QL IA: NEGATIVE

## 2022-05-11 ENCOUNTER — OFFICE VISIT (OUTPATIENT)
Dept: UROLOGY | Facility: CLINIC | Age: 72
End: 2022-05-11
Payer: COMMERCIAL

## 2022-05-11 VITALS
DIASTOLIC BLOOD PRESSURE: 65 MMHG | BODY MASS INDEX: 25.67 KG/M2 | SYSTOLIC BLOOD PRESSURE: 125 MMHG | WEIGHT: 173.31 LBS | HEART RATE: 82 BPM | HEIGHT: 69 IN

## 2022-05-11 DIAGNOSIS — R10.9 ABDOMINAL PAIN, UNSPECIFIED ABDOMINAL LOCATION: ICD-10-CM

## 2022-05-11 DIAGNOSIS — N20.1 URETERAL CALCULUS: Primary | ICD-10-CM

## 2022-05-11 PROCEDURE — 3074F SYST BP LT 130 MM HG: CPT | Mod: CPTII,S$GLB,, | Performed by: UROLOGY

## 2022-05-11 PROCEDURE — 99999 PR PBB SHADOW E&M-EST. PATIENT-LVL IV: CPT | Mod: PBBFAC,,, | Performed by: UROLOGY

## 2022-05-11 PROCEDURE — 3074F PR MOST RECENT SYSTOLIC BLOOD PRESSURE < 130 MM HG: ICD-10-PCS | Mod: CPTII,S$GLB,, | Performed by: UROLOGY

## 2022-05-11 PROCEDURE — 3044F PR MOST RECENT HEMOGLOBIN A1C LEVEL <7.0%: ICD-10-PCS | Mod: CPTII,S$GLB,, | Performed by: UROLOGY

## 2022-05-11 PROCEDURE — 3008F PR BODY MASS INDEX (BMI) DOCUMENTED: ICD-10-PCS | Mod: CPTII,S$GLB,, | Performed by: UROLOGY

## 2022-05-11 PROCEDURE — 3288F PR FALLS RISK ASSESSMENT DOCUMENTED: ICD-10-PCS | Mod: CPTII,S$GLB,, | Performed by: UROLOGY

## 2022-05-11 PROCEDURE — 99999 PR PBB SHADOW E&M-EST. PATIENT-LVL IV: ICD-10-PCS | Mod: PBBFAC,,, | Performed by: UROLOGY

## 2022-05-11 PROCEDURE — 1101F PR PT FALLS ASSESS DOC 0-1 FALLS W/OUT INJ PAST YR: ICD-10-PCS | Mod: CPTII,S$GLB,, | Performed by: UROLOGY

## 2022-05-11 PROCEDURE — 1157F ADVNC CARE PLAN IN RCRD: CPT | Mod: CPTII,S$GLB,, | Performed by: UROLOGY

## 2022-05-11 PROCEDURE — 3078F DIAST BP <80 MM HG: CPT | Mod: CPTII,S$GLB,, | Performed by: UROLOGY

## 2022-05-11 PROCEDURE — 1157F PR ADVANCE CARE PLAN OR EQUIV PRESENT IN MEDICAL RECORD: ICD-10-PCS | Mod: CPTII,S$GLB,, | Performed by: UROLOGY

## 2022-05-11 PROCEDURE — 3008F BODY MASS INDEX DOCD: CPT | Mod: CPTII,S$GLB,, | Performed by: UROLOGY

## 2022-05-11 PROCEDURE — 3061F NEG MICROALBUMINURIA REV: CPT | Mod: CPTII,S$GLB,, | Performed by: UROLOGY

## 2022-05-11 PROCEDURE — 1126F AMNT PAIN NOTED NONE PRSNT: CPT | Mod: CPTII,S$GLB,, | Performed by: UROLOGY

## 2022-05-11 PROCEDURE — 1159F PR MEDICATION LIST DOCUMENTED IN MEDICAL RECORD: ICD-10-PCS | Mod: CPTII,S$GLB,, | Performed by: UROLOGY

## 2022-05-11 PROCEDURE — 1159F MED LIST DOCD IN RCRD: CPT | Mod: CPTII,S$GLB,, | Performed by: UROLOGY

## 2022-05-11 PROCEDURE — 1126F PR PAIN SEVERITY QUANTIFIED, NO PAIN PRESENT: ICD-10-PCS | Mod: CPTII,S$GLB,, | Performed by: UROLOGY

## 2022-05-11 PROCEDURE — 99214 OFFICE O/P EST MOD 30 MIN: CPT | Mod: S$GLB,,, | Performed by: UROLOGY

## 2022-05-11 PROCEDURE — 3044F HG A1C LEVEL LT 7.0%: CPT | Mod: CPTII,S$GLB,, | Performed by: UROLOGY

## 2022-05-11 PROCEDURE — 3066F PR DOCUMENTATION OF TREATMENT FOR NEPHROPATHY: ICD-10-PCS | Mod: CPTII,S$GLB,, | Performed by: UROLOGY

## 2022-05-11 PROCEDURE — 1101F PT FALLS ASSESS-DOCD LE1/YR: CPT | Mod: CPTII,S$GLB,, | Performed by: UROLOGY

## 2022-05-11 PROCEDURE — 99214 PR OFFICE/OUTPT VISIT, EST, LEVL IV, 30-39 MIN: ICD-10-PCS | Mod: S$GLB,,, | Performed by: UROLOGY

## 2022-05-11 PROCEDURE — 4010F ACE/ARB THERAPY RXD/TAKEN: CPT | Mod: CPTII,S$GLB,, | Performed by: UROLOGY

## 2022-05-11 PROCEDURE — 3066F NEPHROPATHY DOC TX: CPT | Mod: CPTII,S$GLB,, | Performed by: UROLOGY

## 2022-05-11 PROCEDURE — 4010F PR ACE/ARB THEARPY RXD/TAKEN: ICD-10-PCS | Mod: CPTII,S$GLB,, | Performed by: UROLOGY

## 2022-05-11 PROCEDURE — 3288F FALL RISK ASSESSMENT DOCD: CPT | Mod: CPTII,S$GLB,, | Performed by: UROLOGY

## 2022-05-11 PROCEDURE — 3061F PR NEG MICROALBUMINURIA RESULT DOCUMENTED/REVIEW: ICD-10-PCS | Mod: CPTII,S$GLB,, | Performed by: UROLOGY

## 2022-05-11 PROCEDURE — 3078F PR MOST RECENT DIASTOLIC BLOOD PRESSURE < 80 MM HG: ICD-10-PCS | Mod: CPTII,S$GLB,, | Performed by: UROLOGY

## 2022-05-11 NOTE — PROGRESS NOTES
Subjective:       Patient ID: Sadiq Dhillon is a 72 y.o. male.    Chief Complaint: Follow-up (Kidney stones)  Patient is here and originally had a right UPJ stone.  His urine shows an occasional red cell.  He occasionally has some twinges of pain.  I reviewed his renal ultrasound which was negative and on KUB they are calling a mid 3rd ureteral small stone which I find difficult to see.  Patient does have occasional twinges of pain.  He has had stones in the past  Past Medical History:   Diagnosis Date    Diabetes mellitus     Fuchs' corneal dystrophy     Heart attack     Hypertension     Kidney stones     Pancreatic mass     Pancreatitis     after EUS . 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, treated with dostenex(cabergoline)/ resolved    Reflux esophagitis     Tumor cells, benign        Past Surgical History:   Procedure Laterality Date    CATARACT EXTRACTION W/  INTRAOCULAR LENS IMPLANT Right 0    Dr Van     CATARACT EXTRACTION W/  INTRAOCULAR LENS IMPLANT Left 3/21/2019    Procedure: EXTRACTION, CATARACT, WITH IOL INSERTION;  Surgeon: Ra Van MD;  Location: [facility];  Service: Ophthalmology;  Laterality: Left;    CORNEAL TRANSPLANT Right     Dr Van     DESCEMETS STRIPPING AUTOMATED ENDOTHELIAL KERATOPLASTY Left 3/21/2019    Procedure: TRANSPLANT, PARTIAL-THICKNESS, CORNEA, USING DSAEK TECHNIQUE;  Surgeon: Ra Van MD;  Location: [facility];  Service: Ophthalmology;  Laterality: Left;  DSEK    REPAIR OF RETINAL DETACHMENT WITH VITRECTOMY Right 7/18/2018    Procedure: REPAIR, RETINAL DETACHMENT, WITH VITRECTOMY;  Surgeon: SHUBHAM Patel MD;  Location: [address];  Service: Ophthalmology;  Laterality: Right;  40 min    REPAIR OF RETINAL DETACHMENT WITH VITRECTOMY Left 8/8/2018    Procedure: REPAIR, RETINAL  DETACHMENT, WITH VITRECTOMY;  Surgeon: SHUBHAM Patel MD;  Location: [facility] OR [address] [place] [address];  Service: Ophthalmology;  Laterality: Left;  40 min    RHINOPLASTY TIP  1975    THYROIDECTOMY  2007    UPPER ENDOSCOPIC ULTRASOUND W/ FNA      with resultant pancreatitis       Family History   Problem Relation Age of Onset    Hypertension Mother     Heart disease Mother     Heart disease Father     Cataracts Father     Stroke Father     Diabetes Father     Diabetes Paternal Uncle     Stroke Maternal Grandmother     Blindness Neg Hx     Glaucoma Neg Hx     Macular degeneration Neg Hx     Retinal detachment Neg Hx     Strabismus Neg Hx     Thyroid disease Neg Hx     Cancer Neg Hx        Social History     Socioeconomic History    Marital status: Single   Tobacco Use    Smoking status: Never Smoker    Smokeless tobacco: Never Used   Substance and Sexual Activity    Alcohol use: Yes     Comment: social    Drug use: No    Sexual activity: Not Currently       Allergies:  Grass pollen-june grass standard and House dust    Medications:    Current Outpatient Medications:     aspirin (ECOTRIN) 81 MG EC tablet, Take 81 mg by mouth every morning. , Disp: , Rfl:     atorvastatin (LIPITOR) 20 MG tablet, Take 20 mg by mouth., Disp: , Rfl:     cabergoline (DOSTINEX) 0.5 mg tablet, TAKE 1 TABLET(0.5 MG) BY MOUTH 2 TIMES A WEEK, Disp: 24 tablet, Rfl: 3    famotidine (PEPCID) 20 MG tablet, Take by mouth., Disp: , Rfl:     fexofenadine-pseudoephedrine  mg (ALLEGRA-D)  mg per tablet, Take 1 tablet by mouth., Disp: , Rfl:     gabapentin (NEURONTIN) 100 MG capsule, Take 1 capsule three times a day,  Takes one capsule every morning, Disp: , Rfl: 3    lisinopril (PRINIVIL,ZESTRIL) 2.5 MG tablet, Take 2.5 mg by mouth every morning. , Disp: , Rfl:     loperamide (IMODIUM) 2 mg capsule, Take 2 mg by mouth 4 (four) times daily as needed for Diarrhea., Disp: , Rfl:     metFORMIN (GLUCOPHAGE-XR) 500 MG ER 24hr  tablet, TAKE 2 TABLETS BY MOUTH TWICE DAILY, Disp: 360 tablet, Rfl: 3    metoprolol succinate (TOPROL-XL) 25 MG 24 hr tablet, Take 25 mg by mouth nightly. , Disp: , Rfl:     rosuvastatin (CRESTOR) 20 MG tablet, Take 20 mg by mouth once daily., Disp: , Rfl:     tamsulosin (FLOMAX) 0.4 mg Cap, TK 1 C PO QHS, Disp: 90 capsule, Rfl: 3    testosterone (ANDROGEL) 1 % (50 mg/5 gram) GlPk, Apply 2 packet topically per day., Disp: , Rfl:     ticagrelor (BRILINTA) 90 mg tablet, Take 90 mg by mouth 2 (two) times daily., Disp: , Rfl:     CONTOUR NEXT TEST STRIPS Strp, UTD QID IN VITRO, Disp: , Rfl: 3    fluticasone propionate (FLONASE) 50 mcg/actuation nasal spray, SHAKE LIQUID AND USE 1 TO 2 SPRAYS(50  MCG) IN EACH NOSTRIL EVERY DAY (Patient not taking: Reported on 5/11/2022), Disp: 48 g, Rfl: 0    Review of Systems   Constitutional: Negative for activity change, appetite change, chills, diaphoresis, fatigue, fever and unexpected weight change.   HENT: Negative for congestion, dental problem, hearing loss, mouth sores, postnasal drip, rhinorrhea, sinus pressure and trouble swallowing.    Eyes: Negative for pain, discharge and itching.   Respiratory: Negative for apnea, cough, choking, chest tightness, shortness of breath and wheezing.    Cardiovascular: Negative for chest pain, palpitations and leg swelling.   Gastrointestinal: Negative for abdominal distention, abdominal pain, anal bleeding, blood in stool, constipation, diarrhea, nausea, rectal pain and vomiting.   Endocrine: Negative for polydipsia and polyuria.   Genitourinary: Negative for decreased urine volume, difficulty urinating, dysuria, enuresis, flank pain, frequency, genital sores, hematuria, penile discharge, penile pain, penile swelling, scrotal swelling, testicular pain and urgency.   Musculoskeletal: Negative for arthralgias, back pain and myalgias.   Skin: Negative for color change, rash and wound.   Neurological: Negative for dizziness,  syncope, speech difficulty, light-headedness and headaches.   Hematological: Negative for adenopathy. Does not bruise/bleed easily.   Psychiatric/Behavioral: Negative for behavioral problems, confusion, hallucinations and sleep disturbance.       Objective:      Physical Exam  Constitutional:       Appearance: He is well-developed.   HENT:      Head: Normocephalic.   Cardiovascular:      Rate and Rhythm: Normal rate.   Pulmonary:      Effort: Pulmonary effort is normal.   Abdominal:      Palpations: Abdomen is soft.   Skin:     General: Skin is warm.   Neurological:      Mental Status: He is alert.         Assessment:       1. Ureteral calculus    2. Abdominal pain, unspecified abdominal location        Plan:       Sadiq was seen today for follow-up.    Diagnoses and all orders for this visit:    Ureteral calculus    Abdominal pain, unspecified abdominal location        patient is doing well at this time and having minimal pain.  Will schedule a renal stone study in 1 month and unless he has some sort of complication see him back with that.  If he passes the stone he will call me sooner and if he has fever or chills he will call me also.  He will also continue Flomax and pain medicine

## 2022-05-12 ENCOUNTER — PATIENT MESSAGE (OUTPATIENT)
Dept: INTERNAL MEDICINE | Facility: CLINIC | Age: 72
End: 2022-05-12
Payer: COMMERCIAL

## 2022-06-06 ENCOUNTER — PATIENT MESSAGE (OUTPATIENT)
Dept: ENDOCRINOLOGY | Facility: CLINIC | Age: 72
End: 2022-06-06
Payer: COMMERCIAL

## 2022-06-06 DIAGNOSIS — E29.1 HYPOGONADISM IN MALE: Primary | ICD-10-CM

## 2022-06-06 RX ORDER — TESTOSTERONE GEL, 1% 10 MG/G
GEL TRANSDERMAL
Qty: 180 PACKET | Refills: 1 | Status: SHIPPED | OUTPATIENT
Start: 2022-06-06 | End: 2022-06-15 | Stop reason: SDUPTHER

## 2022-06-07 ENCOUNTER — HOSPITAL ENCOUNTER (OUTPATIENT)
Dept: RADIOLOGY | Facility: HOSPITAL | Age: 72
Discharge: HOME | End: 2022-06-07
Attending: UROLOGY
Payer: COMMERCIAL

## 2022-06-07 ENCOUNTER — TELEPHONE (OUTPATIENT)
Dept: ENDOCRINOLOGY | Facility: CLINIC | Age: 72
End: 2022-06-07
Payer: COMMERCIAL

## 2022-06-07 DIAGNOSIS — R10.9 ABDOMINAL PAIN, UNSPECIFIED ABDOMINAL LOCATION: ICD-10-CM

## 2022-06-07 PROCEDURE — 74176 CT ABD & PELVIS W/O CONTRAST: CPT | Mod: 26,,, | Performed by: RADIOLOGY

## 2022-06-07 PROCEDURE — 74176 CT ABD & PELVIS W/O CONTRAST: CPT | Mod: TC

## 2022-06-07 PROCEDURE — 74176 CT RENAL STONE STUDY ABD PELVIS WO: ICD-10-PCS | Mod: 26,,, | Performed by: RADIOLOGY

## 2022-06-07 NOTE — TELEPHONE ENCOUNTER
Testosterone 50 MG/5GM(1%) gel    Approvedtoday  CaseId:[id number];Status:Approved;Review Type:Prior Auth;Coverage Start Date:06/07/2022;Coverage End Date:06/07/2023

## 2022-06-08 ENCOUNTER — HOSPITAL ENCOUNTER (OUTPATIENT)
Dept: RADIOLOGY | Facility: HOSPITAL | Age: 72
Discharge: HOME | End: 2022-06-08
Attending: UROLOGY
Payer: COMMERCIAL

## 2022-06-08 ENCOUNTER — TELEPHONE (OUTPATIENT)
Dept: UROLOGY | Facility: CLINIC | Age: 72
End: 2022-06-08
Payer: COMMERCIAL

## 2022-06-08 DIAGNOSIS — N20.1 OBSTRUCTION OF RIGHT URETEROPELVIC JUNCTION (UPJ) DUE TO STONE: ICD-10-CM

## 2022-06-08 DIAGNOSIS — N20.1 OBSTRUCTION OF RIGHT URETEROPELVIC JUNCTION (UPJ) DUE TO STONE: Primary | ICD-10-CM

## 2022-06-08 PROCEDURE — 74018 RADEX ABDOMEN 1 VIEW: CPT | Mod: TC

## 2022-06-08 PROCEDURE — 74018 RADEX ABDOMEN 1 VIEW: CPT | Mod: 26,,, | Performed by: RADIOLOGY

## 2022-06-08 PROCEDURE — 74018 XR ABDOMEN AP 1 VIEW: ICD-10-PCS | Mod: 26,,, | Performed by: RADIOLOGY

## 2022-06-08 NOTE — TELEPHONE ENCOUNTER
----- Message from Sampson Lawson Jr., MD sent at 6/8/2022  7:37 AM CDT -----  4 mm stone rt upper third ureter  Needs kub and possible urs

## 2022-06-13 ENCOUNTER — OFFICE VISIT (OUTPATIENT)
Dept: UROLOGY | Facility: CLINIC | Age: 72
End: 2022-06-13
Payer: COMMERCIAL

## 2022-06-13 ENCOUNTER — PATIENT MESSAGE (OUTPATIENT)
Dept: UROLOGY | Facility: CLINIC | Age: 72
End: 2022-06-13

## 2022-06-13 VITALS
BODY MASS INDEX: 25.91 KG/M2 | DIASTOLIC BLOOD PRESSURE: 72 MMHG | HEART RATE: 88 BPM | SYSTOLIC BLOOD PRESSURE: 118 MMHG | WEIGHT: 175.5 LBS

## 2022-06-13 DIAGNOSIS — N20.1 URETERAL CALCULUS: Primary | ICD-10-CM

## 2022-06-13 PROCEDURE — 3044F PR MOST RECENT HEMOGLOBIN A1C LEVEL <7.0%: ICD-10-PCS | Mod: CPTII,S$GLB,, | Performed by: UROLOGY

## 2022-06-13 PROCEDURE — 3008F PR BODY MASS INDEX (BMI) DOCUMENTED: ICD-10-PCS | Mod: CPTII,S$GLB,, | Performed by: UROLOGY

## 2022-06-13 PROCEDURE — 1126F AMNT PAIN NOTED NONE PRSNT: CPT | Mod: CPTII,S$GLB,, | Performed by: UROLOGY

## 2022-06-13 PROCEDURE — 3066F NEPHROPATHY DOC TX: CPT | Mod: CPTII,S$GLB,, | Performed by: UROLOGY

## 2022-06-13 PROCEDURE — 3288F PR FALLS RISK ASSESSMENT DOCUMENTED: ICD-10-PCS | Mod: CPTII,S$GLB,, | Performed by: UROLOGY

## 2022-06-13 PROCEDURE — 1160F RVW MEDS BY RX/DR IN RCRD: CPT | Mod: CPTII,S$GLB,, | Performed by: UROLOGY

## 2022-06-13 PROCEDURE — 3044F HG A1C LEVEL LT 7.0%: CPT | Mod: CPTII,S$GLB,, | Performed by: UROLOGY

## 2022-06-13 PROCEDURE — 99214 OFFICE O/P EST MOD 30 MIN: CPT | Mod: S$GLB,,, | Performed by: UROLOGY

## 2022-06-13 PROCEDURE — 4010F ACE/ARB THERAPY RXD/TAKEN: CPT | Mod: CPTII,S$GLB,, | Performed by: UROLOGY

## 2022-06-13 PROCEDURE — 99999 PR PBB SHADOW E&M-EST. PATIENT-LVL IV: ICD-10-PCS | Mod: PBBFAC,,, | Performed by: UROLOGY

## 2022-06-13 PROCEDURE — 3074F SYST BP LT 130 MM HG: CPT | Mod: CPTII,S$GLB,, | Performed by: UROLOGY

## 2022-06-13 PROCEDURE — 3066F PR DOCUMENTATION OF TREATMENT FOR NEPHROPATHY: ICD-10-PCS | Mod: CPTII,S$GLB,, | Performed by: UROLOGY

## 2022-06-13 PROCEDURE — 1126F PR PAIN SEVERITY QUANTIFIED, NO PAIN PRESENT: ICD-10-PCS | Mod: CPTII,S$GLB,, | Performed by: UROLOGY

## 2022-06-13 PROCEDURE — 3061F NEG MICROALBUMINURIA REV: CPT | Mod: CPTII,S$GLB,, | Performed by: UROLOGY

## 2022-06-13 PROCEDURE — 1157F ADVNC CARE PLAN IN RCRD: CPT | Mod: CPTII,S$GLB,, | Performed by: UROLOGY

## 2022-06-13 PROCEDURE — 3078F PR MOST RECENT DIASTOLIC BLOOD PRESSURE < 80 MM HG: ICD-10-PCS | Mod: CPTII,S$GLB,, | Performed by: UROLOGY

## 2022-06-13 PROCEDURE — 1159F MED LIST DOCD IN RCRD: CPT | Mod: CPTII,S$GLB,, | Performed by: UROLOGY

## 2022-06-13 PROCEDURE — 1101F PT FALLS ASSESS-DOCD LE1/YR: CPT | Mod: CPTII,S$GLB,, | Performed by: UROLOGY

## 2022-06-13 PROCEDURE — 3288F FALL RISK ASSESSMENT DOCD: CPT | Mod: CPTII,S$GLB,, | Performed by: UROLOGY

## 2022-06-13 PROCEDURE — 1160F PR REVIEW ALL MEDS BY PRESCRIBER/CLIN PHARMACIST DOCUMENTED: ICD-10-PCS | Mod: CPTII,S$GLB,, | Performed by: UROLOGY

## 2022-06-13 PROCEDURE — 4010F PR ACE/ARB THEARPY RXD/TAKEN: ICD-10-PCS | Mod: CPTII,S$GLB,, | Performed by: UROLOGY

## 2022-06-13 PROCEDURE — 99214 PR OFFICE/OUTPT VISIT, EST, LEVL IV, 30-39 MIN: ICD-10-PCS | Mod: S$GLB,,, | Performed by: UROLOGY

## 2022-06-13 PROCEDURE — 1101F PR PT FALLS ASSESS DOC 0-1 FALLS W/OUT INJ PAST YR: ICD-10-PCS | Mod: CPTII,S$GLB,, | Performed by: UROLOGY

## 2022-06-13 PROCEDURE — 99999 PR PBB SHADOW E&M-EST. PATIENT-LVL IV: CPT | Mod: PBBFAC,,, | Performed by: UROLOGY

## 2022-06-13 PROCEDURE — 3008F BODY MASS INDEX DOCD: CPT | Mod: CPTII,S$GLB,, | Performed by: UROLOGY

## 2022-06-13 PROCEDURE — 3061F PR NEG MICROALBUMINURIA RESULT DOCUMENTED/REVIEW: ICD-10-PCS | Mod: CPTII,S$GLB,, | Performed by: UROLOGY

## 2022-06-13 PROCEDURE — 1159F PR MEDICATION LIST DOCUMENTED IN MEDICAL RECORD: ICD-10-PCS | Mod: CPTII,S$GLB,, | Performed by: UROLOGY

## 2022-06-13 PROCEDURE — 3074F PR MOST RECENT SYSTOLIC BLOOD PRESSURE < 130 MM HG: ICD-10-PCS | Mod: CPTII,S$GLB,, | Performed by: UROLOGY

## 2022-06-13 PROCEDURE — 3078F DIAST BP <80 MM HG: CPT | Mod: CPTII,S$GLB,, | Performed by: UROLOGY

## 2022-06-13 PROCEDURE — 1157F PR ADVANCE CARE PLAN OR EQUIV PRESENT IN MEDICAL RECORD: ICD-10-PCS | Mod: CPTII,S$GLB,, | Performed by: UROLOGY

## 2022-06-13 RX ORDER — NAPROXEN SODIUM 550 MG/1
TABLET ORAL
COMMUNITY
Start: 2022-06-08 | End: 2023-01-13

## 2022-06-13 NOTE — PROGRESS NOTES
Subjective:       Patient ID: Sadiq Dhillon is a 72 y.o. male.    Chief Complaint: Nephrolithiasis (No pain at this time. Follow up post x-ray)    HPI patient has a 4 mm right UPJ are upper 3rd ureteral stone.  There is mild hydronephrosis on the CT scan.  He is not having any pain fever chills etc..  Urinalysis is negative.  On KUB I think I see the stone but I am not certain and I have recommended that he have a repeat KUB after taking some Dulcolax and magnesium citrate.  Patient takes Brilinta and would have to come off of that before having and ESWL.  If we did ureteroscopy he could stay on the Brilinta.  He is looking for a new cardiologist here since his old cardiologist at [facility] retired    Past Medical History:   Diagnosis Date    Diabetes mellitus     Fuchs' corneal dystrophy     Heart attack     Hypertension     Kidney stones     Pancreatic mass     Pancreatitis     after EUS . 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, treated with dostenex(cabergoline)/ resolved    Reflux esophagitis     Tumor cells, benign        Past Surgical History:   Procedure Laterality Date    CATARACT EXTRACTION W/  INTRAOCULAR LENS IMPLANT Right 0    Dr Van     CATARACT EXTRACTION W/  INTRAOCULAR LENS IMPLANT Left 3/21/2019    Procedure: EXTRACTION, CATARACT, WITH IOL INSERTION;  Surgeon: Ra Van MD;  Location: [facility];  Service: Ophthalmology;  Laterality: Left;    CORNEAL TRANSPLANT Right     Dr Van     DESCEMETS STRIPPING AUTOMATED ENDOTHELIAL KERATOPLASTY Left 3/21/2019    Procedure: TRANSPLANT, PARTIAL-THICKNESS, CORNEA, USING DSAEK TECHNIQUE;  Surgeon: Ra aVn MD;  Location: [facility];  Service: Ophthalmology;  Laterality: Left;  DSEK    REPAIR OF RETINAL DETACHMENT WITH VITRECTOMY Right 7/18/2018    Procedure: REPAIR, RETINAL  DETACHMENT, WITH VITRECTOMY;  Surgeon: SHUBHAM Ptael MD;  Location: [facility] OR [address] [place] [address];  Service: Ophthalmology;  Laterality: Right;  40 min    REPAIR OF RETINAL DETACHMENT WITH VITRECTOMY Left 8/8/2018    Procedure: REPAIR, RETINAL DETACHMENT, WITH VITRECTOMY;  Surgeon: SHUBHAM Patel MD;  Location: [facility] OR [address] [place] [address];  Service: Ophthalmology;  Laterality: Left;  40 min    RHINOPLASTY TIP  1975    THYROIDECTOMY  2007    UPPER ENDOSCOPIC ULTRASOUND W/ FNA      with resultant pancreatitis       Family History   Problem Relation Age of Onset    Hypertension Mother     Heart disease Mother     Heart disease Father     Cataracts Father     Stroke Father     Diabetes Father     Diabetes Paternal Uncle     Stroke Maternal Grandmother     Blindness Neg Hx     Glaucoma Neg Hx     Macular degeneration Neg Hx     Retinal detachment Neg Hx     Strabismus Neg Hx     Thyroid disease Neg Hx     Cancer Neg Hx        Social History     Socioeconomic History    Marital status: Single   Tobacco Use    Smoking status: Never Smoker    Smokeless tobacco: Never Used   Substance and Sexual Activity    Alcohol use: Yes     Comment: social    Drug use: No    Sexual activity: Not Currently       Allergies:  Grass pollen-june grass standard and House dust    Medications:    Current Outpatient Medications:     aspirin (ECOTRIN) 81 MG EC tablet, Take 81 mg by mouth every morning. , Disp: , Rfl:     atorvastatin (LIPITOR) 20 MG tablet, Take 20 mg by mouth., Disp: , Rfl:     cabergoline (DOSTINEX) 0.5 mg tablet, TAKE 1 TABLET(0.5 MG) BY MOUTH 2 TIMES A WEEK, Disp: 24 tablet, Rfl: 3    CONTOUR NEXT TEST STRIPS Strp, UTD QID IN VITRO, Disp: , Rfl: 3    famotidine (PEPCID) 20 MG tablet, Take by mouth., Disp: , Rfl:     fexofenadine-pseudoephedrine  mg (ALLEGRA-D)  mg per tablet, Take 1 tablet by mouth., Disp: , Rfl:     gabapentin (NEURONTIN) 100 MG capsule, Take 1 capsule three times a day,  Takes  one capsule every morning, Disp: , Rfl: 3    lisinopril (PRINIVIL,ZESTRIL) 2.5 MG tablet, Take 2.5 mg by mouth every morning. , Disp: , Rfl:     loperamide (IMODIUM) 2 mg capsule, Take 2 mg by mouth 4 (four) times daily as needed for Diarrhea., Disp: , Rfl:     metoprolol succinate (TOPROL-XL) 25 MG 24 hr tablet, Take 25 mg by mouth nightly. , Disp: , Rfl:     rosuvastatin (CRESTOR) 20 MG tablet, Take 20 mg by mouth once daily., Disp: , Rfl:     tamsulosin (FLOMAX) 0.4 mg Cap, TK 1 C PO QHS, Disp: 90 capsule, Rfl: 3    testosterone (ANDROGEL) 1 % (50 mg/5 gram) GlPk, Apply 2 packet topically once daily., Disp: 180 packet, Rfl: 1    ticagrelor (BRILINTA) 90 mg tablet, Take 90 mg by mouth 2 (two) times daily., Disp: , Rfl:     fluticasone propionate (FLONASE) 50 mcg/actuation nasal spray, SHAKE LIQUID AND USE 1 TO 2 SPRAYS(50  MCG) IN EACH NOSTRIL EVERY DAY (Patient not taking: No sig reported), Disp: 48 g, Rfl: 0    metFORMIN (GLUCOPHAGE-XR) 500 MG ER 24hr tablet, TAKE 2 TABLETS BY MOUTH TWICE DAILY, Disp: 360 tablet, Rfl: 3    naproxen sodium (ANAPROX) 550 MG tablet, SMARTSI Tablet(s) By Mouth Every 12 Hours PRN, Disp: , Rfl:     Review of Systems   Constitutional: Negative for activity change, appetite change, chills, diaphoresis, fatigue, fever and unexpected weight change.   HENT: Negative for congestion, dental problem, hearing loss, mouth sores, postnasal drip, rhinorrhea, sinus pressure and trouble swallowing.    Eyes: Negative for pain, discharge and itching.   Respiratory: Negative for apnea, cough, choking, chest tightness, shortness of breath and wheezing.    Cardiovascular: Negative for chest pain, palpitations and leg swelling.   Gastrointestinal: Negative for abdominal distention, abdominal pain, anal bleeding, blood in stool, constipation, diarrhea, nausea, rectal pain and vomiting.   Endocrine: Negative for polydipsia and polyuria.   Genitourinary: Negative for decreased urine  volume, difficulty urinating, dysuria, enuresis, flank pain, frequency, genital sores, hematuria, penile discharge, penile pain, penile swelling, scrotal swelling, testicular pain and urgency.   Musculoskeletal: Negative for arthralgias, back pain and myalgias.   Skin: Negative for color change, rash and wound.   Neurological: Negative for dizziness, syncope, speech difficulty, light-headedness and headaches.   Hematological: Negative for adenopathy. Does not bruise/bleed easily.   Psychiatric/Behavioral: Negative for behavioral problems, confusion, hallucinations and sleep disturbance.       Objective:      Physical Exam  Constitutional:       Appearance: He is well-developed.   HENT:      Head: Normocephalic.   Cardiovascular:      Rate and Rhythm: Normal rate.   Pulmonary:      Effort: Pulmonary effort is normal.   Abdominal:      Palpations: Abdomen is soft.   Genitourinary:     Prostate: Normal.   Skin:     General: Skin is warm.   Neurological:      Mental Status: He is alert.         Assessment:       1. Ureteral calculus        Plan:       Sadiq was seen today for nephrolithiasis.    Diagnoses and all orders for this visit:    Ureteral calculus  -     X-Ray Abdomen AP 1 View; Future  -     X-Ray Abdomen AP 1 View; Future    Take Dulcolax and magnesium citrate and have KUB the next day and I will phone review it.  Future ESWL versus right-sided ureteroscopy depending on KUB.  He will make an appointment with 1 of our cardiologists about his Brilinta.  He has 1 stent in place since 2017

## 2022-06-14 ENCOUNTER — PATIENT MESSAGE (OUTPATIENT)
Dept: UROLOGY | Facility: CLINIC | Age: 72
End: 2022-06-14
Payer: COMMERCIAL

## 2022-06-14 ENCOUNTER — TELEPHONE (OUTPATIENT)
Dept: CARDIOLOGY | Facility: CLINIC | Age: 72
End: 2022-06-14
Payer: COMMERCIAL

## 2022-06-14 DIAGNOSIS — Z01.818 PRE-OP EVALUATION: Primary | ICD-10-CM

## 2022-06-14 NOTE — TELEPHONE ENCOUNTER
----- Message from Melita Jane sent at 6/14/2022  6:06 AM CDT -----  Regarding: Pt reached out via [facility] to schedule a new pt appt for a clearance for surgery  Appointment Request From: Sadiq Dhillon    With Provider: shy brandon    Preferred Date Range: 6/14/2022 - 8/14/2022    Preferred Times: Any Time    Reason for visit: surgery clearance/new patient    Comments:  surgery clearance/new patient

## 2022-06-14 NOTE — TELEPHONE ENCOUNTER
Returned pt's call and gave him an appt to see Dr Taveras for preop clearance (Dr Renny Taveras or Gina) on 6/27 at 2:30 PM.      Melita BYRD Staff  Caller: Unspecified (Today,  6:06 AM)  Appointment Request From: Sadiq Dhillon     With Provider: shy brandon     Preferred Date Range: 6/14/2022 - 8/14/2022     Preferred Times: Any Time     Reason for visit: surgery clearance/new patient     Comments:   surgery clearance/new patient

## 2022-06-15 DIAGNOSIS — E29.1 HYPOGONADISM IN MALE: ICD-10-CM

## 2022-06-15 RX ORDER — TESTOSTERONE GEL, 1% 10 MG/G
GEL TRANSDERMAL
Qty: 180 PACKET | Refills: 1 | Status: CANCELLED | OUTPATIENT
Start: 2022-06-15

## 2022-06-15 RX ORDER — TESTOSTERONE GEL, 1% 10 MG/G
GEL TRANSDERMAL
Qty: 180 PACKET | Refills: 3 | Status: SHIPPED | OUTPATIENT
Start: 2022-06-15 | End: 2023-04-25

## 2022-06-15 NOTE — TELEPHONE ENCOUNTER
PA for testosterone (ANDROGEL) 1 % (50 mg/5 gram) GlPk has been approved.    Sadiq Da Silvaonzo Call: [id number] - PA Case ID: [id number] - Rx #: [id number]     Outcome  Approved on June 7  CaseId:[id number]; Status:Approved; Review Type:Prior Auth;  Coverage Start Date:06/07/2022;Coverage End Date:06/07/2023;    Drug  Testosterone 50 MG/5GM(1%) gel    Form  [employer] Electronic PA Form (2017 NCPDP)

## 2022-06-20 ENCOUNTER — TELEPHONE (OUTPATIENT)
Dept: PHARMACY | Facility: CLINIC | Age: 72
End: 2022-06-20
Payer: COMMERCIAL

## 2022-06-20 NOTE — TELEPHONE ENCOUNTER
DOCUMENTATION ONLY  Testosterone (Androgel) 1% (50mg/5gram) [facility]   Approval date: 6/16/2022 to 6/16/2023   Case ID# [id number]

## 2022-06-27 ENCOUNTER — OFFICE VISIT (OUTPATIENT)
Dept: CARDIOLOGY | Facility: CLINIC | Age: 72
End: 2022-06-27
Payer: COMMERCIAL

## 2022-06-27 ENCOUNTER — LAB VISIT (OUTPATIENT)
Dept: LAB | Facility: HOSPITAL | Age: 72
End: 2022-06-27
Attending: INTERNAL MEDICINE
Payer: COMMERCIAL

## 2022-06-27 VITALS
BODY MASS INDEX: 26.25 KG/M2 | HEIGHT: 69 IN | HEART RATE: 78 BPM | OXYGEN SATURATION: 94 % | SYSTOLIC BLOOD PRESSURE: 103 MMHG | DIASTOLIC BLOOD PRESSURE: 59 MMHG | WEIGHT: 177.25 LBS

## 2022-06-27 DIAGNOSIS — E21.0 PRIMARY HYPERPARATHYROIDISM: ICD-10-CM

## 2022-06-27 DIAGNOSIS — I25.10 CORONARY ARTERY DISEASE INVOLVING NATIVE CORONARY ARTERY OF NATIVE HEART WITHOUT ANGINA PECTORIS: Primary | ICD-10-CM

## 2022-06-27 DIAGNOSIS — E78.00 PURE HYPERCHOLESTEROLEMIA: ICD-10-CM

## 2022-06-27 DIAGNOSIS — N20.0 NEPHROLITHIASIS: ICD-10-CM

## 2022-06-27 DIAGNOSIS — D35.2 PITUITARY MACROADENOMA: ICD-10-CM

## 2022-06-27 DIAGNOSIS — Z98.61 HISTORY OF PTCA: ICD-10-CM

## 2022-06-27 DIAGNOSIS — I10 HYPERTENSION, UNSPECIFIED TYPE: ICD-10-CM

## 2022-06-27 DIAGNOSIS — E11.65 TYPE 2 DIABETES MELLITUS WITH HYPERGLYCEMIA, WITHOUT LONG-TERM CURRENT USE OF INSULIN: ICD-10-CM

## 2022-06-27 DIAGNOSIS — Z01.818 PRE-OP EVALUATION: ICD-10-CM

## 2022-06-27 LAB
ALBUMIN SERPL BCP-MCNC: 4.5 G/DL (ref 3.5–5.2)
ALP SERPL-CCNC: 54 U/L (ref 55–135)
ALT SERPL W/O P-5'-P-CCNC: 15 U/L (ref 10–44)
ANION GAP SERPL CALC-SCNC: 11 MMOL/L (ref 8–16)
AST SERPL-CCNC: 20 U/L (ref 10–40)
BILIRUB SERPL-MCNC: 0.7 MG/DL (ref 0.1–1)
BUN SERPL-MCNC: 14 MG/DL (ref 8–23)
CALCIUM SERPL-MCNC: 10 MG/DL (ref 8.7–10.5)
CHLORIDE SERPL-SCNC: 102 MMOL/L (ref 95–110)
CO2 SERPL-SCNC: 26 MMOL/L (ref 23–29)
CREAT SERPL-MCNC: 0.8 MG/DL (ref 0.5–1.4)
EST. GFR  (AFRICAN AMERICAN): >60 ML/MIN/1.73 M^2
EST. GFR  (NON AFRICAN AMERICAN): >60 ML/MIN/1.73 M^2
GLUCOSE SERPL-MCNC: 95 MG/DL (ref 70–110)
POTASSIUM SERPL-SCNC: 4.4 MMOL/L (ref 3.5–5.1)
PROT SERPL-MCNC: 6.7 G/DL (ref 6–8.4)
SODIUM SERPL-SCNC: 139 MMOL/L (ref 136–145)

## 2022-06-27 PROCEDURE — 93000 ELECTROCARDIOGRAM COMPLETE: CPT | Mod: S$GLB,,, | Performed by: INTERNAL MEDICINE

## 2022-06-27 PROCEDURE — 80053 COMPREHEN METABOLIC PANEL: CPT | Performed by: INTERNAL MEDICINE

## 2022-06-27 PROCEDURE — 36415 COLL VENOUS BLD VENIPUNCTURE: CPT | Performed by: INTERNAL MEDICINE

## 2022-06-27 PROCEDURE — 1101F PR PT FALLS ASSESS DOC 0-1 FALLS W/OUT INJ PAST YR: ICD-10-PCS | Mod: CPTII,S$GLB,, | Performed by: INTERNAL MEDICINE

## 2022-06-27 PROCEDURE — 3078F DIAST BP <80 MM HG: CPT | Mod: CPTII,S$GLB,, | Performed by: INTERNAL MEDICINE

## 2022-06-27 PROCEDURE — 3078F PR MOST RECENT DIASTOLIC BLOOD PRESSURE < 80 MM HG: ICD-10-PCS | Mod: CPTII,S$GLB,, | Performed by: INTERNAL MEDICINE

## 2022-06-27 PROCEDURE — 99204 OFFICE O/P NEW MOD 45 MIN: CPT | Mod: S$GLB,,, | Performed by: INTERNAL MEDICINE

## 2022-06-27 PROCEDURE — 3074F PR MOST RECENT SYSTOLIC BLOOD PRESSURE < 130 MM HG: ICD-10-PCS | Mod: CPTII,S$GLB,, | Performed by: INTERNAL MEDICINE

## 2022-06-27 PROCEDURE — 1157F PR ADVANCE CARE PLAN OR EQUIV PRESENT IN MEDICAL RECORD: ICD-10-PCS | Mod: CPTII,S$GLB,, | Performed by: INTERNAL MEDICINE

## 2022-06-27 PROCEDURE — 3061F NEG MICROALBUMINURIA REV: CPT | Mod: CPTII,S$GLB,, | Performed by: INTERNAL MEDICINE

## 2022-06-27 PROCEDURE — 99999 PR PBB SHADOW E&M-EST. PATIENT-LVL V: CPT | Mod: PBBFAC,,, | Performed by: INTERNAL MEDICINE

## 2022-06-27 PROCEDURE — 4010F PR ACE/ARB THEARPY RXD/TAKEN: ICD-10-PCS | Mod: CPTII,S$GLB,, | Performed by: INTERNAL MEDICINE

## 2022-06-27 PROCEDURE — 3008F PR BODY MASS INDEX (BMI) DOCUMENTED: ICD-10-PCS | Mod: CPTII,S$GLB,, | Performed by: INTERNAL MEDICINE

## 2022-06-27 PROCEDURE — 3066F NEPHROPATHY DOC TX: CPT | Mod: CPTII,S$GLB,, | Performed by: INTERNAL MEDICINE

## 2022-06-27 PROCEDURE — 1157F ADVNC CARE PLAN IN RCRD: CPT | Mod: CPTII,S$GLB,, | Performed by: INTERNAL MEDICINE

## 2022-06-27 PROCEDURE — 3074F SYST BP LT 130 MM HG: CPT | Mod: CPTII,S$GLB,, | Performed by: INTERNAL MEDICINE

## 2022-06-27 PROCEDURE — 93000 EKG 12-LEAD: ICD-10-PCS | Mod: S$GLB,,, | Performed by: INTERNAL MEDICINE

## 2022-06-27 PROCEDURE — 3066F PR DOCUMENTATION OF TREATMENT FOR NEPHROPATHY: ICD-10-PCS | Mod: CPTII,S$GLB,, | Performed by: INTERNAL MEDICINE

## 2022-06-27 PROCEDURE — 1126F PR PAIN SEVERITY QUANTIFIED, NO PAIN PRESENT: ICD-10-PCS | Mod: CPTII,S$GLB,, | Performed by: INTERNAL MEDICINE

## 2022-06-27 PROCEDURE — 99999 PR PBB SHADOW E&M-EST. PATIENT-LVL V: ICD-10-PCS | Mod: PBBFAC,,, | Performed by: INTERNAL MEDICINE

## 2022-06-27 PROCEDURE — 3044F HG A1C LEVEL LT 7.0%: CPT | Mod: CPTII,S$GLB,, | Performed by: INTERNAL MEDICINE

## 2022-06-27 PROCEDURE — 3288F FALL RISK ASSESSMENT DOCD: CPT | Mod: CPTII,S$GLB,, | Performed by: INTERNAL MEDICINE

## 2022-06-27 PROCEDURE — 3008F BODY MASS INDEX DOCD: CPT | Mod: CPTII,S$GLB,, | Performed by: INTERNAL MEDICINE

## 2022-06-27 PROCEDURE — 3044F PR MOST RECENT HEMOGLOBIN A1C LEVEL <7.0%: ICD-10-PCS | Mod: CPTII,S$GLB,, | Performed by: INTERNAL MEDICINE

## 2022-06-27 PROCEDURE — 1101F PT FALLS ASSESS-DOCD LE1/YR: CPT | Mod: CPTII,S$GLB,, | Performed by: INTERNAL MEDICINE

## 2022-06-27 PROCEDURE — 99204 PR OFFICE/OUTPT VISIT, NEW, LEVL IV, 45-59 MIN: ICD-10-PCS | Mod: S$GLB,,, | Performed by: INTERNAL MEDICINE

## 2022-06-27 PROCEDURE — 3288F PR FALLS RISK ASSESSMENT DOCUMENTED: ICD-10-PCS | Mod: CPTII,S$GLB,, | Performed by: INTERNAL MEDICINE

## 2022-06-27 PROCEDURE — 3061F PR NEG MICROALBUMINURIA RESULT DOCUMENTED/REVIEW: ICD-10-PCS | Mod: CPTII,S$GLB,, | Performed by: INTERNAL MEDICINE

## 2022-06-27 PROCEDURE — 1159F PR MEDICATION LIST DOCUMENTED IN MEDICAL RECORD: ICD-10-PCS | Mod: CPTII,S$GLB,, | Performed by: INTERNAL MEDICINE

## 2022-06-27 PROCEDURE — 1126F AMNT PAIN NOTED NONE PRSNT: CPT | Mod: CPTII,S$GLB,, | Performed by: INTERNAL MEDICINE

## 2022-06-27 PROCEDURE — 4010F ACE/ARB THERAPY RXD/TAKEN: CPT | Mod: CPTII,S$GLB,, | Performed by: INTERNAL MEDICINE

## 2022-06-27 PROCEDURE — 1159F MED LIST DOCD IN RCRD: CPT | Mod: CPTII,S$GLB,, | Performed by: INTERNAL MEDICINE

## 2022-06-27 RX ORDER — ATORVASTATIN CALCIUM 80 MG/1
80 TABLET, FILM COATED ORAL DAILY
Qty: 90 TABLET | Refills: 3 | Status: SHIPPED | OUTPATIENT
Start: 2022-06-27 | End: 2023-07-18 | Stop reason: SDUPTHER

## 2022-06-27 NOTE — PROGRESS NOTES
Subjective:    Patient ID:  Sadiq Dhillon is a 72 y.o. male who presents for evaluation of Establish Care, Follow-up, and Pre-op Exam      HPI   The patient is a semi retired male presents for re-op evaluatuon of a lithotripsy per Dr Crane. He was admitted to [facility] foe a STEMI and had a PCI to his mid LAD. He has done well since and reports not chest pain or RAMIREZ. While very active he doesn't  exercise regularly. He has had a nuclear stress about 2 years ago that was reportedly negative. He has hypertension, diabetes and a pituitary adenoma. He is a non smoker ant his father had a CABG in his 60s.and a brother who has a ICD.  Lab Results   Component Value Date     03/08/2022    K 4.6 03/08/2022     03/08/2022    CO2 24 03/08/2022    BUN 19 03/08/2022    CREATININE 0.8 03/08/2022    GLU 94 03/08/2022    HGBA1C 6.6 (H) 03/08/2022    AST 18 03/08/2022    ALT 17 03/08/2022    ALBUMIN 4.4 03/08/2022    ALBUMIN 4.8 03/08/2022    PROT 7.3 03/08/2022    BILITOT 0.9 03/08/2022    WBC 7.08 03/08/2022    HGB 15.0 03/08/2022    HCT 46.5 03/08/2022    MCV 90 03/08/2022     03/08/2022    INR 1.1 09/02/2012    PSA 0.93 02/07/2013    TSH 2.281 03/08/2022         Lab Results   Component Value Date    CHOL 206 (H) 03/30/2021    HDL 38 (L) 03/30/2021    TRIG 245 (H) 03/30/2021       Lab Results   Component Value Date    LDLCALC 119.0 03/30/2021       Past Medical History:   Diagnosis Date    Diabetes mellitus     Fuchs' corneal dystrophy     Heart attack     Hypertension     Kidney stones     Pancreatic mass     Pancreatitis     after EUS . 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 , Disp: , Rfl:     cabergoline (DOSTINEX) 0.5 mg tablet, TAKE 1 TABLET(0.5 MG) BY MOUTH 2 TIMES A WEEK, Disp: 24 tablet, Rfl: 3    CONTOUR NEXT TEST STRIPS Strp, UTD QID IN VITRO, Disp: , Rfl: 3    famotidine (PEPCID) 20 MG tablet, Take by mouth., Disp: , Rfl:     fexofenadine-pseudoephedrine  mg (ALLEGRA-D)  mg per tablet, Take 1 tablet by mouth., Disp: , Rfl:     fluticasone propionate (FLONASE) 50 mcg/actuation nasal spray, SHAKE LIQUID AND USE 1 TO 2 SPRAYS(50  MCG) IN EACH NOSTRIL EVERY DAY, Disp: 48 g, Rfl: 0    gabapentin (NEURONTIN) 100 MG capsule, Take 1 capsule three times a day,  Takes one capsule every morning, Disp: , Rfl: 3    lisinopril (PRINIVIL,ZESTRIL) 2.5 MG tablet, Take 2.5 mg by mouth every morning. , Disp: , Rfl:     loperamide (IMODIUM) 2 mg capsule, Take 2 mg by mouth 4 (four) times daily as needed for Diarrhea., Disp: , Rfl:     metoprolol succinate (TOPROL-XL) 25 MG 24 hr tablet, Take 25 mg by mouth nightly. , Disp: , Rfl:     naproxen sodium (ANAPROX) 550 MG tablet, SMARTSI Tablet(s) By Mouth Every 12 Hours PRN, Disp: , Rfl:     tamsulosin (FLOMAX) 0.4 mg Cap, TK 1 C PO QHS, Disp: 90 capsule, Rfl: 3    testosterone (ANDROGEL) 1 % (50 mg/5 gram) GlPk, Apply 2 packets topically once daily., Disp: 180 packet, Rfl: 3    atorvastatin (LIPITOR) 80 MG tablet, Take 1 tablet (80 mg total) by mouth once daily., Disp: 90 tablet, Rfl: 3    metFORMIN (GLUCOPHAGE-XR) 500 MG ER 24hr tablet, TAKE 2 TABLETS BY MOUTH TWICE DAILY, Disp: 360 tablet, Rfl: 3          Review of Systems   Constitutional: Negative for decreased appetite, diaphoresis, fever, malaise/fatigue, weight gain and weight loss.   HENT: Negative for congestion, ear discharge, ear pain and nosebleeds.    Eyes: Negative for blurred vision, double vision and visual disturbance.   Cardiovascular: Negative for chest pain, claudication, cyanosis, dyspnea on exertion, irregular  "heartbeat, leg swelling, near-syncope, orthopnea, palpitations, paroxysmal nocturnal dyspnea and syncope.   Respiratory: Negative for cough, hemoptysis, shortness of breath, sleep disturbances due to breathing, snoring, sputum production and wheezing.    Endocrine: Negative for polydipsia, polyphagia and polyuria.   Hematologic/Lymphatic: Negative for adenopathy and bleeding problem. Does not bruise/bleed easily.   Skin: Negative for color change, nail changes, poor wound healing and rash.   Musculoskeletal: Positive for joint pain (right shoulder). Negative for muscle cramps and muscle weakness.   Gastrointestinal: Negative for abdominal pain, anorexia, change in bowel habit, hematochezia, nausea and vomiting.   Genitourinary: Negative for dysuria, frequency and hematuria.   Neurological: Negative for brief paralysis, difficulty with concentration, excessive daytime sleepiness, dizziness, focal weakness, headaches, light-headedness, seizures, vertigo and weakness.   Psychiatric/Behavioral: Negative for altered mental status and depression.   Allergic/Immunologic: Negative for persistent infections.        Objective:BP (!) 103/59 (BP Location: Left arm, Patient Position: Sitting, BP Method: Medium (Automatic))   Pulse 78   Ht 5' 9" (1.753 m)   Wt 80.4 kg (177 lb 4 oz)   SpO2 (!) 94%   BMI 26.18 kg/m²             Physical Exam  Constitutional:       Appearance: Normal appearance. He is well-developed.   HENT:      Head: Normocephalic.      Right Ear: External ear normal.      Left Ear: External ear normal.      Nose: Nose normal.   Eyes:      General: No scleral icterus.     Pupils: Pupils are equal, round, and reactive to light.   Neck:      Thyroid: No thyromegaly.      Vascular: No JVD.      Trachea: No tracheal deviation.   Cardiovascular:      Rate and Rhythm: Normal rate and regular rhythm.      Pulses: Intact distal pulses.           Carotid pulses are 2+ on the right side and 2+ on the left side.       " Dorsalis pedis pulses are 2+ on the right side and 2+ on the left side.        Posterior tibial pulses are 2+ on the right side and 2+ on the left side.      Heart sounds: No murmur heard.    No friction rub. No gallop.   Pulmonary:      Effort: Pulmonary effort is normal.      Breath sounds: Normal breath sounds.   Abdominal:      General: Bowel sounds are normal. There is no distension.      Tenderness: There is no abdominal tenderness. There is no guarding.   Musculoskeletal:         General: No tenderness. Normal range of motion.      Cervical back: Normal range of motion and neck supple.   Lymphadenopathy:      Comments: Palpation of neck and groin lymph nodes normal   Skin:     General: Skin is dry.      Comments: Palpation of skin normal   Neurological:      Mental Status: He is alert and oriented to person, place, and time.      Cranial Nerves: No cranial nerve deficit.      Motor: No abnormal muscle tone.      Coordination: Coordination normal.   Psychiatric:         Behavior: Behavior normal.         Thought Content: Thought content normal.         Judgment: Judgment normal.           Assessment:       1. Coronary artery disease involving native coronary artery of native heart without angina pectoris    2. Type 2 diabetes mellitus with hyperglycemia, without long-term current use of insulin    3. Primary hyperparathyroidism    4. Pituitary macroadenoma    5. Nephrolithiasis    6. Pre-op evaluation    7. History of PTCA    8. Pure hypercholesterolemia    9. Hypertension, unspecified type         Plan:       Sadiq was seen today for establish care, follow-up and pre-op exam.    Diagnoses and all orders for this visit:    Coronary artery disease involving native coronary artery of native heart without angina pectoris  -     atorvastatin (LIPITOR) 80 MG tablet; Take 1 tablet (80 mg total) by mouth once daily.    Type 2 diabetes mellitus with hyperglycemia, without long-term current use of insulin  -      Hemoglobin A1C; Future; Expected date: 06/27/2023  -     Comprehensive Metabolic Panel; Future; Expected date: 06/28/2022    Primary hyperparathyroidism    Pituitary macroadenoma    Nephrolithiasis    Pre-op evaluation    History of PTCA    Pure hypercholesterolemia  -     atorvastatin (LIPITOR) 80 MG tablet; Take 1 tablet (80 mg total) by mouth once daily.  -     Lipid Panel; Future; Expected date: 08/08/2022  -     Lipid Panel; Future; Expected date: 06/27/2023    Hypertension, unspecified type    Low CV risk for planned procedure

## 2022-06-29 ENCOUNTER — PATIENT MESSAGE (OUTPATIENT)
Dept: UROLOGY | Facility: CLINIC | Age: 72
End: 2022-06-29
Payer: COMMERCIAL

## 2022-06-29 ENCOUNTER — HOSPITAL ENCOUNTER (OUTPATIENT)
Dept: RADIOLOGY | Facility: OTHER | Age: 72
Discharge: HOME | End: 2022-06-29
Attending: UROLOGY
Payer: COMMERCIAL

## 2022-06-29 DIAGNOSIS — N20.1 URETERAL CALCULUS: ICD-10-CM

## 2022-06-29 PROCEDURE — 74018 RADEX ABDOMEN 1 VIEW: CPT | Mod: TC,FY

## 2022-06-29 PROCEDURE — 74018 RADEX ABDOMEN 1 VIEW: CPT | Mod: 26,,, | Performed by: RADIOLOGY

## 2022-06-29 PROCEDURE — 74018 XR ABDOMEN AP 1 VIEW: ICD-10-PCS | Mod: 26,,, | Performed by: RADIOLOGY

## 2022-06-30 ENCOUNTER — TELEPHONE (OUTPATIENT)
Dept: UROLOGY | Facility: CLINIC | Age: 72
End: 2022-06-30
Payer: COMMERCIAL

## 2022-06-30 DIAGNOSIS — N22 CALCULUS OF URINARY TRACT IN DISEASES CLASSIFIED ELSEWHERE: ICD-10-CM

## 2022-07-01 ENCOUNTER — HOSPITAL ENCOUNTER (OUTPATIENT)
Dept: RADIOLOGY | Facility: HOSPITAL | Age: 72
Discharge: HOME | End: 2022-07-01
Attending: UROLOGY
Payer: COMMERCIAL

## 2022-07-01 DIAGNOSIS — N22 CALCULUS OF URINARY TRACT IN DISEASES CLASSIFIED ELSEWHERE: ICD-10-CM

## 2022-07-01 PROCEDURE — 74176 CT ABD & PELVIS W/O CONTRAST: CPT | Mod: 26,,, | Performed by: RADIOLOGY

## 2022-07-01 PROCEDURE — 74176 CT ABD & PELVIS W/O CONTRAST: CPT | Mod: TC

## 2022-07-01 PROCEDURE — 74176 CT RENAL STONE STUDY ABD PELVIS WO: ICD-10-PCS | Mod: 26,,, | Performed by: RADIOLOGY

## 2022-07-05 ENCOUNTER — TELEPHONE (OUTPATIENT)
Dept: UROLOGY | Facility: CLINIC | Age: 72
End: 2022-07-05
Payer: COMMERCIAL

## 2022-07-08 ENCOUNTER — PATIENT MESSAGE (OUTPATIENT)
Dept: UROLOGY | Facility: CLINIC | Age: 72
End: 2022-07-08
Payer: COMMERCIAL

## 2022-07-08 ENCOUNTER — PATIENT MESSAGE (OUTPATIENT)
Dept: OPHTHALMOLOGY | Facility: CLINIC | Age: 72
End: 2022-07-08
Payer: COMMERCIAL

## 2022-07-11 ENCOUNTER — OFFICE VISIT (OUTPATIENT)
Dept: INTERNAL MEDICINE | Facility: CLINIC | Age: 72
End: 2022-07-11
Payer: COMMERCIAL

## 2022-07-11 VITALS
OXYGEN SATURATION: 96 % | HEART RATE: 96 BPM | TEMPERATURE: 99 F | WEIGHT: 174.25 LBS | DIASTOLIC BLOOD PRESSURE: 60 MMHG | HEIGHT: 69 IN | SYSTOLIC BLOOD PRESSURE: 128 MMHG | BODY MASS INDEX: 25.81 KG/M2

## 2022-07-11 DIAGNOSIS — I11.9 HYPERTENSIVE HEART DISEASE WITHOUT HEART FAILURE: ICD-10-CM

## 2022-07-11 DIAGNOSIS — D35.2 PITUITARY MACROADENOMA: ICD-10-CM

## 2022-07-11 DIAGNOSIS — D35.2 PROLACTINOMA: ICD-10-CM

## 2022-07-11 DIAGNOSIS — K86.2 PANCREATIC CYST: ICD-10-CM

## 2022-07-11 DIAGNOSIS — M75.100 ROTATOR CUFF SYNDROME, UNSPECIFIED LATERALITY: Primary | ICD-10-CM

## 2022-07-11 DIAGNOSIS — E29.1 HYPOGONADISM IN MALE: ICD-10-CM

## 2022-07-11 DIAGNOSIS — E78.2 HYPERLIPIDEMIA, MIXED: ICD-10-CM

## 2022-07-11 DIAGNOSIS — E21.0 PRIMARY HYPERPARATHYROIDISM: ICD-10-CM

## 2022-07-11 DIAGNOSIS — I25.10 CORONARY ARTERY DISEASE INVOLVING NATIVE CORONARY ARTERY OF NATIVE HEART WITHOUT ANGINA PECTORIS: ICD-10-CM

## 2022-07-11 DIAGNOSIS — E11.65 TYPE 2 DIABETES MELLITUS WITH HYPERGLYCEMIA, WITHOUT LONG-TERM CURRENT USE OF INSULIN: ICD-10-CM

## 2022-07-11 DIAGNOSIS — N20.0 NEPHROLITHIASIS: ICD-10-CM

## 2022-07-11 PROBLEM — S90.511A ABRASION OF RIGHT ANKLE: Status: RESOLVED | Noted: 2019-06-03 | Resolved: 2022-07-11

## 2022-07-11 PROBLEM — S90.01XA CONTUSION OF RIGHT ANKLE: Status: RESOLVED | Noted: 2019-06-03 | Resolved: 2022-07-11

## 2022-07-11 PROBLEM — L03.317 CELLULITIS OF RIGHT BUTTOCK: Status: RESOLVED | Noted: 2019-06-03 | Resolved: 2022-07-11

## 2022-07-11 PROCEDURE — 3061F PR NEG MICROALBUMINURIA RESULT DOCUMENTED/REVIEW: ICD-10-PCS | Mod: CPTII,S$GLB,, | Performed by: INTERNAL MEDICINE

## 2022-07-11 PROCEDURE — 99999 PR PBB SHADOW E&M-EST. PATIENT-LVL V: CPT | Mod: PBBFAC,,, | Performed by: INTERNAL MEDICINE

## 2022-07-11 PROCEDURE — 3074F PR MOST RECENT SYSTOLIC BLOOD PRESSURE < 130 MM HG: ICD-10-PCS | Mod: CPTII,S$GLB,, | Performed by: INTERNAL MEDICINE

## 2022-07-11 PROCEDURE — 3008F BODY MASS INDEX DOCD: CPT | Mod: CPTII,S$GLB,, | Performed by: INTERNAL MEDICINE

## 2022-07-11 PROCEDURE — 3061F NEG MICROALBUMINURIA REV: CPT | Mod: CPTII,S$GLB,, | Performed by: INTERNAL MEDICINE

## 2022-07-11 PROCEDURE — 99397 PR PREVENTIVE VISIT,EST,65 & OVER: ICD-10-PCS | Mod: S$GLB,,, | Performed by: INTERNAL MEDICINE

## 2022-07-11 PROCEDURE — 1159F MED LIST DOCD IN RCRD: CPT | Mod: CPTII,S$GLB,, | Performed by: INTERNAL MEDICINE

## 2022-07-11 PROCEDURE — 99397 PER PM REEVAL EST PAT 65+ YR: CPT | Mod: S$GLB,,, | Performed by: INTERNAL MEDICINE

## 2022-07-11 PROCEDURE — 99999 PR PBB SHADOW E&M-EST. PATIENT-LVL V: ICD-10-PCS | Mod: PBBFAC,,, | Performed by: INTERNAL MEDICINE

## 2022-07-11 PROCEDURE — 3074F SYST BP LT 130 MM HG: CPT | Mod: CPTII,S$GLB,, | Performed by: INTERNAL MEDICINE

## 2022-07-11 PROCEDURE — 3008F PR BODY MASS INDEX (BMI) DOCUMENTED: ICD-10-PCS | Mod: CPTII,S$GLB,, | Performed by: INTERNAL MEDICINE

## 2022-07-11 PROCEDURE — 1157F PR ADVANCE CARE PLAN OR EQUIV PRESENT IN MEDICAL RECORD: ICD-10-PCS | Mod: CPTII,S$GLB,, | Performed by: INTERNAL MEDICINE

## 2022-07-11 PROCEDURE — 3078F PR MOST RECENT DIASTOLIC BLOOD PRESSURE < 80 MM HG: ICD-10-PCS | Mod: CPTII,S$GLB,, | Performed by: INTERNAL MEDICINE

## 2022-07-11 PROCEDURE — 3044F HG A1C LEVEL LT 7.0%: CPT | Mod: CPTII,S$GLB,, | Performed by: INTERNAL MEDICINE

## 2022-07-11 PROCEDURE — 3066F PR DOCUMENTATION OF TREATMENT FOR NEPHROPATHY: ICD-10-PCS | Mod: CPTII,S$GLB,, | Performed by: INTERNAL MEDICINE

## 2022-07-11 PROCEDURE — 1126F PR PAIN SEVERITY QUANTIFIED, NO PAIN PRESENT: ICD-10-PCS | Mod: CPTII,S$GLB,, | Performed by: INTERNAL MEDICINE

## 2022-07-11 PROCEDURE — 1157F ADVNC CARE PLAN IN RCRD: CPT | Mod: CPTII,S$GLB,, | Performed by: INTERNAL MEDICINE

## 2022-07-11 PROCEDURE — 4010F PR ACE/ARB THEARPY RXD/TAKEN: ICD-10-PCS | Mod: CPTII,S$GLB,, | Performed by: INTERNAL MEDICINE

## 2022-07-11 PROCEDURE — 3044F PR MOST RECENT HEMOGLOBIN A1C LEVEL <7.0%: ICD-10-PCS | Mod: CPTII,S$GLB,, | Performed by: INTERNAL MEDICINE

## 2022-07-11 PROCEDURE — 3078F DIAST BP <80 MM HG: CPT | Mod: CPTII,S$GLB,, | Performed by: INTERNAL MEDICINE

## 2022-07-11 PROCEDURE — 4010F ACE/ARB THERAPY RXD/TAKEN: CPT | Mod: CPTII,S$GLB,, | Performed by: INTERNAL MEDICINE

## 2022-07-11 PROCEDURE — 1126F AMNT PAIN NOTED NONE PRSNT: CPT | Mod: CPTII,S$GLB,, | Performed by: INTERNAL MEDICINE

## 2022-07-11 PROCEDURE — 1159F PR MEDICATION LIST DOCUMENTED IN MEDICAL RECORD: ICD-10-PCS | Mod: CPTII,S$GLB,, | Performed by: INTERNAL MEDICINE

## 2022-07-11 PROCEDURE — 3066F NEPHROPATHY DOC TX: CPT | Mod: CPTII,S$GLB,, | Performed by: INTERNAL MEDICINE

## 2022-07-11 NOTE — PROGRESS NOTES
[facility] Note    Chief Complaint      Chief Complaint   Patient presents with    Establish Care       History of Present Illness      Sadiq Dhillon is a 72 y.o. male with chronic conditions of DM2, HTN, CAD, HLD, hx of kidney stones, pituitary adenoma with primary hyperparathyroidism, prolactinoma, hypogonadism, pancreatic cyst who presents today for: establish care and review chronic conditions.    Currently undergoing PT for right rotator cuff syndrome.  Saw Dr. May, [facility]. Has appt tomorrow but interested in [facility] as second opinion.   DM2: Sees Dr. Leon, endocrinology.  Controlled on metformin.  A1C 6.6 3/2022.  Eye exam UTD with Dr. Norris.  HTN: BP at goal on metoprolol.  CAD: Recently saw Dr. Taveras, cardiology.  At [facility], s/p PCI to mid LAD following STEMI.  On ASA, metoprolol, atorvastatin. Recently had Brilinta and lisinopril discontinued.  Denies chest pain, shortness of breath.  HLD: Controlled on atorvastatin.  .  Atorvastatin dose recently increased.    Hx of kidney stones: Sees Dr. Bowman, urology. 4 mm right UPJ and upper 3rd ureteral stone with mild right hydronephrosis.  Repeat imaging did not show that stone.  Waiting to hear what to do next.  Currently asymptomatic.    Pituitary adenoma, prolactinoma, primary hyperparathyroidism s/p parathyroidectomy, hypogonadism:  Sees Dr. Leon, endo.  On testosterone.  Last PTH normal.    GERD: Controlled on pepcid as needed.  Pancreas cyst: Initially saw Dr. Munoz.  Followed with Dr. Post in 2019.  Has had CT with contrast to monitor and has been unchanged.  Asking for [facility] second opinion.  Flu shot UTD.  TdAP 2020.  Shingrix UTD.  COVID vaccine UTD.    Cscope UTD.  FOBT 2022.      Past Medical History:  Past Medical History:   Diagnosis Date    Diabetes mellitus     Fuchs' corneal dystrophy     Heart attack     Hypertension     Kidney stones     Pancreatic mass     Pancreatitis     after  EUS . 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, treated with dostenex(cabergoline)/ resolved    Reflux esophagitis     Tumor cells, benign        Past Surgical History:   has a past surgical history that includes Thyroidectomy (2007); Rhinoplasty tip (1975); Cataract extraction w/  intraocular lens implant (Right, 0); Corneal transplant (Right); Upper endoscopic ultrasound w/ FNA; Repair of retinal detachment with vitrectomy (Right, 7/18/2018); Repair of retinal detachment with vitrectomy (Left, 8/8/2018); Descemets stripping automated endothelial keratoplasty (Left, 3/21/2019); and Cataract extraction w/  intraocular lens implant (Left, 3/21/2019).    Family History:  family history includes Cataracts in his father; Diabetes in his father and paternal uncle; Heart disease in his father and mother; Hypertension in his mother; Stroke in his father and maternal grandmother.     Social History:  Social History     Tobacco Use    Smoking status: Never Smoker    Smokeless tobacco: Never Used   Substance Use Topics    Alcohol use: Yes     Comment: social    Drug use: No       I personally reviewed all past medical, surgical, social and family history.    Review of Systems   Constitutional: Negative for chills, fever and malaise/fatigue.   Respiratory: Negative for shortness of breath.    Cardiovascular: Negative for chest pain.   Gastrointestinal: Negative for constipation, diarrhea, nausea and vomiting.   Skin: Negative for rash.   Neurological: Negative for weakness.   All other systems reviewed and are negative.       Medications:  Outpatient Encounter Medications as of 7/11/2022   Medication Sig Note Dispense Refill    aspirin (ECOTRIN) 81 MG EC tablet Take 81 mg by mouth every morning.  8/7/2018: Hold the morning of surgery 8-8-18.      atorvastatin  (LIPITOR) 80 MG tablet Take 1 tablet (80 mg total) by mouth once daily.  90 tablet 3    cabergoline (DOSTINEX) 0.5 mg tablet TAKE 1 TABLET(0.5 MG) BY MOUTH 2 TIMES A WEEK  24 tablet 3    CONTOUR NEXT TEST STRIPS Strp UTD QID IN VITRO   3    famotidine (PEPCID) 20 MG tablet Take by mouth.       fexofenadine-pseudoephedrine  mg (ALLEGRA-D)  mg per tablet Take 1 tablet by mouth.       fluticasone propionate (FLONASE) 50 mcg/actuation nasal spray SHAKE LIQUID AND USE 1 TO 2 SPRAYS(50  MCG) IN EACH NOSTRIL EVERY DAY (Patient not taking: Reported on 2022)  48 g 0    gabapentin (NEURONTIN) 100 MG capsule Take 1 capsule three times a day,  Takes one capsule every morning 2018: Take the morning of surgery 18.  3    lisinopril (PRINIVIL,ZESTRIL) 2.5 MG tablet Take 2.5 mg by mouth every morning.  2018: Hold the morning of surgery 18.      loperamide (IMODIUM) 2 mg capsule Take 2 mg by mouth 4 (four) times daily as needed for Diarrhea. 2018: Take as scheduled.  May Take the morning of surgery 18.      metFORMIN (GLUCOPHAGE-XR) 500 MG ER 24hr tablet TAKE 2 TABLETS BY MOUTH TWICE DAILY  360 tablet 3    metoprolol succinate (TOPROL-XL) 25 MG 24 hr tablet Take 25 mg by mouth nightly.  2018: Take as scheduled.      naproxen sodium (ANAPROX) 550 MG tablet SMARTSI Tablet(s) By Mouth Every 12 Hours PRN       tamsulosin (FLOMAX) 0.4 mg Cap TK 1 C PO QHS  90 capsule 3    testosterone (ANDROGEL) 1 % (50 mg/5 gram) GlPk Apply 2 packets topically once daily.  180 packet 3     No facility-administered encounter medications on file as of 2022.       Allergies:  Review of patient's allergies indicates:   Allergen Reactions    Grass pollen- grass standard     House dust        Health Maintenance:  Immunization History   Administered Date(s) Administered    COVID-19, MRNA, LN-S, PF (Pfizer) (Gray Cap) 2022    COVID-19, MRNA, LN-S, PF (Pfizer) (Purple Cap)  "02/12/2021, 03/03/2021, 11/09/2021    Influenza (FLUAD) - Quadrivalent - Adjuvanted - PF *Preferred* (65+) 09/20/2020, 10/08/2021    Influenza - High Dose - PF (65 years and older) 10/19/2018    Influenza - Quadrivalent - MDCK - PF 02/01/2018    Influenza - Quadrivalent - PF *Preferred* (6 months and older) 11/10/2019    Meningococcal Conjugate (MCV4P) 06/03/2022    Pneumococcal Polysaccharide - 23 Valent 11/09/2020    Tdap 10/30/2020    Zoster Recombinant 11/09/2020, 11/11/2020, 01/12/2021      Health Maintenance   Topic Date Due    Foot Exam  11/05/2021    Lipid Panel  03/30/2022    Eye Exam  04/13/2022    Hemoglobin A1c  09/08/2022    TETANUS VACCINE  10/30/2030    Hepatitis C Screening  Completed    DEXA Scan  Discontinued        Physical Exam      Vital Signs  Temp: 98.5 °F (36.9 °C)  Temp src: Oral  Pulse: 96  SpO2: 96 %  BP: 128/60  BP Location: Left arm  Patient Position: Sitting  Pain Score: 0-No pain  Height and Weight  Height: 5' 9" (175.3 cm)  Weight: 79.1 kg (174 lb 4.4 oz)  BSA (Calculated - sq m): 1.96 sq meters  BMI (Calculated): 25.7  Weight in (lb) to have BMI = 25: 168.9]    Physical Exam  Vitals reviewed.   Constitutional:       Appearance: He is well-developed.   HENT:      Head: Normocephalic and atraumatic.      Right Ear: External ear normal.      Left Ear: External ear normal.   Cardiovascular:      Rate and Rhythm: Normal rate and regular rhythm.      Heart sounds: Normal heart sounds. No murmur heard.  Pulmonary:      Effort: Pulmonary effort is normal.      Breath sounds: Normal breath sounds. No wheezing or rales.   Abdominal:      General: Bowel sounds are normal.      Palpations: Abdomen is soft.          Laboratory:  CBC:  Recent Labs   Lab 11/20/20  0229 03/30/21  1552 03/08/22  1543   WBC 6.51 6.19 7.08   RBC 4.92 4.78 5.15   Hemoglobin 13.9 L 14.0 15.0   Hematocrit 42.9 41.9 46.5   Platelets 198 206 192   MCV 87 88 90   MCH 28.3 29.3 29.1   MCHC 32.4 33.4 32.3 "     CMP:  Recent Labs   Lab 03/30/21  1552 04/09/21  1451 03/08/22  1543 06/27/22  1534   Glucose 98  98  --  94 95   Calcium 9.6  9.6  --  10.2 10.0   Albumin 4.3  4.3   < > 4.4  4.8 4.5   Total Protein 7.2  7.2  --  7.3 6.7   Sodium 138  138  --  138 139   Potassium 4.3  4.3  --  4.6 4.4   CO2 24  24  --  24 26   Chloride 104  104  --  100 102   BUN 22  22  --  19 14   Alkaline Phosphatase 95  95  --  68 54 L   ALT 14  14  --  17 15   AST 18  18  --  18 20   Total Bilirubin 0.4  0.4  --  0.9 0.7    < > = values in this interval not displayed.     URINALYSIS:  Recent Labs   Lab 10/27/21  0938 03/09/22  1200 03/30/22  1441 04/25/22  1431   Color, UA Yellow Yellow  --  Yellow   Clarity, UA  --   --   --  Clear   Specific Gravity, UA 1.025 1.020  --   --    Spec Grav UA  --   --   --  1.025   pH, UA 6.0 6.0   < > 5   Protein, UA Negative Negative  --   --    Bacteria Occasional Rare  --   --    Nitrite, UA Negative Negative  --   --    Leukocytes, UA Negative Negative  --   --    Urobilinogen, UA Negative  --   --   --    Hyaline Casts, UA 1 1  --   --     < > = values in this interval not displayed.      LIPIDS:  Recent Labs   Lab 10/21/20  1406 03/30/21  1552 03/08/22  1543   TSH  --   --  2.281   HDL 40 38 L  --    Cholesterol 93 L 206 H  --    Triglycerides 179 H 245 H  --    LDL Cholesterol 17.2 L 119.0  --    HDL/Cholesterol Ratio 43.0 18.4 L  --    Non-HDL Cholesterol 53 168  --    Total Cholesterol/HDL Ratio 2.3 5.4 H  --      TSH:  Recent Labs   Lab 03/08/22  1543   TSH 2.281     A1C:  Recent Labs   Lab 10/21/20  1406 03/30/21  1552 03/08/22  1543   Hemoglobin A1C 6.9 H 6.6 H 6.6 H       Assessment/Plan     Sadiq Dhillon is a 72 y.o.male with:    1. Rotator cuff syndrome, unspecified laterality  - Ambulatory referral/consult to Orthopedics; Future  Referring to [facility] if pt prefers  2. Nephrolithiasis  F?U with urology regarding next step in management.    3. Pancreatic cyst  -  Ambulatory referral/consult to Gastroenterology; Future  Referring to GI for second opinion  4. Type 2 diabetes mellitus with hyperglycemia, without long-term current use of insulin  - Ambulatory referral/consult to Podiatry; Future  Continue current meds.  F/U with Dr. Leon.  Referring to podiatry for diabetic foot exam.  Eye exam UTD.  5. Hypertensive heart disease without heart failure  Continue current meds.    6. Coronary artery disease involving native coronary artery of native heart without angina pectoris  Continue current meds.  F/U with cardiology  7. Hyperlipidemia, mixed  Continue current meds.    8. Pituitary macroadenoma  9. Prolactinoma  10. Hypogonadism in male  11. Primary hyperparathyroidism  Reviewed labs.  F/U with Dr. Leon.  Continue current meds.       Chronic conditions status updated as per HPI.  Other than changes above, cont current medications and maintain follow up with specialists.  Follow up in about 3 months (around 10/11/2022) for Follow up visit.    Future Appointments   Date Time Provider Department Center   7/14/2022  2:20 PM SHUBHAM Patel MD [facility]   8/8/2022 10:00 AM LAB, SAME DAY [facility]   9/13/2022  2:30 PM Alberto Leon MD [facility] [place]   10/11/2022  2:00 PM Erlin Mcpherson MD [facility]       Erlin Mcpherson MD  [facility]

## 2022-07-12 ENCOUNTER — PATIENT MESSAGE (OUTPATIENT)
Dept: INTERNAL MEDICINE | Facility: CLINIC | Age: 72
End: 2022-07-12
Payer: COMMERCIAL

## 2022-07-13 ENCOUNTER — PATIENT MESSAGE (OUTPATIENT)
Dept: ENDOCRINOLOGY | Facility: CLINIC | Age: 72
End: 2022-07-13
Payer: COMMERCIAL

## 2022-07-13 ENCOUNTER — PATIENT MESSAGE (OUTPATIENT)
Dept: INTERNAL MEDICINE | Facility: CLINIC | Age: 72
End: 2022-07-13
Payer: COMMERCIAL

## 2022-07-14 ENCOUNTER — OFFICE VISIT (OUTPATIENT)
Dept: OPHTHALMOLOGY | Facility: CLINIC | Age: 72
End: 2022-07-14
Payer: COMMERCIAL

## 2022-07-14 ENCOUNTER — PATIENT MESSAGE (OUTPATIENT)
Dept: INTERNAL MEDICINE | Facility: CLINIC | Age: 72
End: 2022-07-14
Payer: COMMERCIAL

## 2022-07-14 DIAGNOSIS — H35.343 FULL THICKNESS MACULAR HOLE, BILATERAL: Primary | ICD-10-CM

## 2022-07-14 DIAGNOSIS — N20.0 RENAL CALCULUS, RIGHT: Primary | ICD-10-CM

## 2022-07-14 DIAGNOSIS — H18.512 FUCHS' CORNEAL DYSTROPHY OF LEFT EYE: ICD-10-CM

## 2022-07-14 PROCEDURE — 1126F AMNT PAIN NOTED NONE PRSNT: CPT | Mod: CPTII,S$GLB,, | Performed by: OPHTHALMOLOGY

## 2022-07-14 PROCEDURE — 3044F HG A1C LEVEL LT 7.0%: CPT | Mod: CPTII,S$GLB,, | Performed by: OPHTHALMOLOGY

## 2022-07-14 PROCEDURE — 3288F PR FALLS RISK ASSESSMENT DOCUMENTED: ICD-10-PCS | Mod: CPTII,S$GLB,, | Performed by: OPHTHALMOLOGY

## 2022-07-14 PROCEDURE — 92201 OPSCPY EXTND RTA DRAW UNI/BI: CPT | Mod: S$GLB,,, | Performed by: OPHTHALMOLOGY

## 2022-07-14 PROCEDURE — 99999 PR PBB SHADOW E&M-EST. PATIENT-LVL III: ICD-10-PCS | Mod: PBBFAC,,, | Performed by: OPHTHALMOLOGY

## 2022-07-14 PROCEDURE — 4010F ACE/ARB THERAPY RXD/TAKEN: CPT | Mod: CPTII,S$GLB,, | Performed by: OPHTHALMOLOGY

## 2022-07-14 PROCEDURE — 1101F PR PT FALLS ASSESS DOC 0-1 FALLS W/OUT INJ PAST YR: ICD-10-PCS | Mod: CPTII,S$GLB,, | Performed by: OPHTHALMOLOGY

## 2022-07-14 PROCEDURE — 99999 PR PBB SHADOW E&M-EST. PATIENT-LVL III: CPT | Mod: PBBFAC,,, | Performed by: OPHTHALMOLOGY

## 2022-07-14 PROCEDURE — 1157F ADVNC CARE PLAN IN RCRD: CPT | Mod: CPTII,S$GLB,, | Performed by: OPHTHALMOLOGY

## 2022-07-14 PROCEDURE — 3066F PR DOCUMENTATION OF TREATMENT FOR NEPHROPATHY: ICD-10-PCS | Mod: CPTII,S$GLB,, | Performed by: OPHTHALMOLOGY

## 2022-07-14 PROCEDURE — 92134 CPTRZ OPH DX IMG PST SGM RTA: CPT | Mod: S$GLB,,, | Performed by: OPHTHALMOLOGY

## 2022-07-14 PROCEDURE — 1160F PR REVIEW ALL MEDS BY PRESCRIBER/CLIN PHARMACIST DOCUMENTED: ICD-10-PCS | Mod: CPTII,S$GLB,, | Performed by: OPHTHALMOLOGY

## 2022-07-14 PROCEDURE — 92134 POSTERIOR SEGMENT OCT RETINA (OCULAR COHERENCE TOMOGRAPHY)-BOTH EYES: ICD-10-PCS | Mod: S$GLB,,, | Performed by: OPHTHALMOLOGY

## 2022-07-14 PROCEDURE — 92014 PR EYE EXAM, EST PATIENT,COMPREHESV: ICD-10-PCS | Mod: S$GLB,,, | Performed by: OPHTHALMOLOGY

## 2022-07-14 PROCEDURE — 4010F PR ACE/ARB THEARPY RXD/TAKEN: ICD-10-PCS | Mod: CPTII,S$GLB,, | Performed by: OPHTHALMOLOGY

## 2022-07-14 PROCEDURE — 3061F PR NEG MICROALBUMINURIA RESULT DOCUMENTED/REVIEW: ICD-10-PCS | Mod: CPTII,S$GLB,, | Performed by: OPHTHALMOLOGY

## 2022-07-14 PROCEDURE — 3044F PR MOST RECENT HEMOGLOBIN A1C LEVEL <7.0%: ICD-10-PCS | Mod: CPTII,S$GLB,, | Performed by: OPHTHALMOLOGY

## 2022-07-14 PROCEDURE — 3061F NEG MICROALBUMINURIA REV: CPT | Mod: CPTII,S$GLB,, | Performed by: OPHTHALMOLOGY

## 2022-07-14 PROCEDURE — 3066F NEPHROPATHY DOC TX: CPT | Mod: CPTII,S$GLB,, | Performed by: OPHTHALMOLOGY

## 2022-07-14 PROCEDURE — 1101F PT FALLS ASSESS-DOCD LE1/YR: CPT | Mod: CPTII,S$GLB,, | Performed by: OPHTHALMOLOGY

## 2022-07-14 PROCEDURE — 3288F FALL RISK ASSESSMENT DOCD: CPT | Mod: CPTII,S$GLB,, | Performed by: OPHTHALMOLOGY

## 2022-07-14 PROCEDURE — 92201 PR OPHTHALMOSCOPY, EXT, W/RET DRAW/SCLERAL DEPR, I&R, UNI/BI: ICD-10-PCS | Mod: S$GLB,,, | Performed by: OPHTHALMOLOGY

## 2022-07-14 PROCEDURE — 1157F PR ADVANCE CARE PLAN OR EQUIV PRESENT IN MEDICAL RECORD: ICD-10-PCS | Mod: CPTII,S$GLB,, | Performed by: OPHTHALMOLOGY

## 2022-07-14 PROCEDURE — 1160F RVW MEDS BY RX/DR IN RCRD: CPT | Mod: CPTII,S$GLB,, | Performed by: OPHTHALMOLOGY

## 2022-07-14 PROCEDURE — 1126F PR PAIN SEVERITY QUANTIFIED, NO PAIN PRESENT: ICD-10-PCS | Mod: CPTII,S$GLB,, | Performed by: OPHTHALMOLOGY

## 2022-07-14 PROCEDURE — 1159F MED LIST DOCD IN RCRD: CPT | Mod: CPTII,S$GLB,, | Performed by: OPHTHALMOLOGY

## 2022-07-14 PROCEDURE — 92014 COMPRE OPH EXAM EST PT 1/>: CPT | Mod: S$GLB,,, | Performed by: OPHTHALMOLOGY

## 2022-07-14 PROCEDURE — 1159F PR MEDICATION LIST DOCUMENTED IN MEDICAL RECORD: ICD-10-PCS | Mod: CPTII,S$GLB,, | Performed by: OPHTHALMOLOGY

## 2022-07-14 RX ORDER — PANTOPRAZOLE SODIUM 40 MG/1
40 TABLET, DELAYED RELEASE ORAL DAILY
COMMUNITY
Start: 2022-07-12 | End: 2023-01-13

## 2022-07-14 NOTE — PROGRESS NOTES
HPI     DLS: 4/13/2021 Dr. Patel     72 y.o. male is here for OCT/DFE. Denies eye pain and flashes. Occasional floaters, bilateral. About 3 weeks ago after being out in the sun notice a large floater. No noticeable VA changes since last visit. No more than normal problems with glare per pt.     Eye Med's: No gtt    Last edited by ELMIRA Orellana on 7/14/2022  2:51 PM. (History)              OCT - MH closed OU    A/P    1. FTMH with VMT OU  OS>OD, likely more longstanding OS  Should do well with PPV OU - OD first - OS 3 weeks later  With 2-3 days face down OD, 4-5 OS    S/p 25g PPV/ILM peel/SF6 OD for FTMH OD 7/18/18    S/p  25g PPV/ILM peel/SF6 OS for FTMH OS in 2 weeks 8/8/18     MH closed OU    Residual distortion OS commensurate with more chronic defect prior to surgery.    Stable no additional tx required    2. PCIOL OU  OD PCO - Return to  For YAG OD  OS post YAG    3. Fuch's  DSAEK 03/21/19 OS  DSAEK 01/10/19 OS  Phaco with IOL and DSAEK OD  07/13/2017    4. Pituitary adenoma as above          Me PRN

## 2022-07-18 ENCOUNTER — OFFICE VISIT (OUTPATIENT)
Dept: PODIATRY | Facility: CLINIC | Age: 72
End: 2022-07-18
Payer: COMMERCIAL

## 2022-07-18 VITALS
BODY MASS INDEX: 25.83 KG/M2 | HEIGHT: 69 IN | HEART RATE: 77 BPM | DIASTOLIC BLOOD PRESSURE: 64 MMHG | SYSTOLIC BLOOD PRESSURE: 102 MMHG | WEIGHT: 174.38 LBS

## 2022-07-18 DIAGNOSIS — E11.65 TYPE 2 DIABETES MELLITUS WITH HYPERGLYCEMIA, WITHOUT LONG-TERM CURRENT USE OF INSULIN: ICD-10-CM

## 2022-07-18 PROCEDURE — 4010F ACE/ARB THERAPY RXD/TAKEN: CPT | Mod: CPTII,S$GLB,, | Performed by: PODIATRIST

## 2022-07-18 PROCEDURE — 99213 PR OFFICE/OUTPT VISIT, EST, LEVL III, 20-29 MIN: ICD-10-PCS | Mod: S$GLB,,, | Performed by: PODIATRIST

## 2022-07-18 PROCEDURE — 1157F PR ADVANCE CARE PLAN OR EQUIV PRESENT IN MEDICAL RECORD: ICD-10-PCS | Mod: CPTII,S$GLB,, | Performed by: PODIATRIST

## 2022-07-18 PROCEDURE — 1126F AMNT PAIN NOTED NONE PRSNT: CPT | Mod: CPTII,S$GLB,, | Performed by: PODIATRIST

## 2022-07-18 PROCEDURE — 3008F PR BODY MASS INDEX (BMI) DOCUMENTED: ICD-10-PCS | Mod: CPTII,S$GLB,, | Performed by: PODIATRIST

## 2022-07-18 PROCEDURE — 4010F PR ACE/ARB THEARPY RXD/TAKEN: ICD-10-PCS | Mod: CPTII,S$GLB,, | Performed by: PODIATRIST

## 2022-07-18 PROCEDURE — 3061F NEG MICROALBUMINURIA REV: CPT | Mod: CPTII,S$GLB,, | Performed by: PODIATRIST

## 2022-07-18 PROCEDURE — 3061F PR NEG MICROALBUMINURIA RESULT DOCUMENTED/REVIEW: ICD-10-PCS | Mod: CPTII,S$GLB,, | Performed by: PODIATRIST

## 2022-07-18 PROCEDURE — 3078F DIAST BP <80 MM HG: CPT | Mod: CPTII,S$GLB,, | Performed by: PODIATRIST

## 2022-07-18 PROCEDURE — 3078F PR MOST RECENT DIASTOLIC BLOOD PRESSURE < 80 MM HG: ICD-10-PCS | Mod: CPTII,S$GLB,, | Performed by: PODIATRIST

## 2022-07-18 PROCEDURE — 1157F ADVNC CARE PLAN IN RCRD: CPT | Mod: CPTII,S$GLB,, | Performed by: PODIATRIST

## 2022-07-18 PROCEDURE — 99999 PR PBB SHADOW E&M-EST. PATIENT-LVL IV: CPT | Mod: PBBFAC,,, | Performed by: PODIATRIST

## 2022-07-18 PROCEDURE — 3074F PR MOST RECENT SYSTOLIC BLOOD PRESSURE < 130 MM HG: ICD-10-PCS | Mod: CPTII,S$GLB,, | Performed by: PODIATRIST

## 2022-07-18 PROCEDURE — 3044F PR MOST RECENT HEMOGLOBIN A1C LEVEL <7.0%: ICD-10-PCS | Mod: CPTII,S$GLB,, | Performed by: PODIATRIST

## 2022-07-18 PROCEDURE — 3044F HG A1C LEVEL LT 7.0%: CPT | Mod: CPTII,S$GLB,, | Performed by: PODIATRIST

## 2022-07-18 PROCEDURE — 3008F BODY MASS INDEX DOCD: CPT | Mod: CPTII,S$GLB,, | Performed by: PODIATRIST

## 2022-07-18 PROCEDURE — 99213 OFFICE O/P EST LOW 20 MIN: CPT | Mod: S$GLB,,, | Performed by: PODIATRIST

## 2022-07-18 PROCEDURE — 3066F PR DOCUMENTATION OF TREATMENT FOR NEPHROPATHY: ICD-10-PCS | Mod: CPTII,S$GLB,, | Performed by: PODIATRIST

## 2022-07-18 PROCEDURE — 99999 PR PBB SHADOW E&M-EST. PATIENT-LVL IV: ICD-10-PCS | Mod: PBBFAC,,, | Performed by: PODIATRIST

## 2022-07-18 PROCEDURE — 3066F NEPHROPATHY DOC TX: CPT | Mod: CPTII,S$GLB,, | Performed by: PODIATRIST

## 2022-07-18 PROCEDURE — 3074F SYST BP LT 130 MM HG: CPT | Mod: CPTII,S$GLB,, | Performed by: PODIATRIST

## 2022-07-18 PROCEDURE — 1126F PR PAIN SEVERITY QUANTIFIED, NO PAIN PRESENT: ICD-10-PCS | Mod: CPTII,S$GLB,, | Performed by: PODIATRIST

## 2022-08-01 ENCOUNTER — HOSPITAL ENCOUNTER (OUTPATIENT)
Dept: RADIOLOGY | Facility: HOSPITAL | Age: 72
Discharge: HOME | End: 2022-08-01
Attending: ORTHOPAEDIC SURGERY
Payer: COMMERCIAL

## 2022-08-01 ENCOUNTER — OFFICE VISIT (OUTPATIENT)
Dept: SPORTS MEDICINE | Facility: CLINIC | Age: 72
End: 2022-08-01
Payer: COMMERCIAL

## 2022-08-01 VITALS
SYSTOLIC BLOOD PRESSURE: 121 MMHG | DIASTOLIC BLOOD PRESSURE: 67 MMHG | HEIGHT: 69 IN | BODY MASS INDEX: 25.77 KG/M2 | HEART RATE: 68 BPM | WEIGHT: 174 LBS

## 2022-08-01 DIAGNOSIS — M75.100 ROTATOR CUFF SYNDROME, UNSPECIFIED LATERALITY: ICD-10-CM

## 2022-08-01 DIAGNOSIS — M75.100 ROTATOR CUFF SYNDROME, UNSPECIFIED LATERALITY: Primary | ICD-10-CM

## 2022-08-01 PROCEDURE — 1126F AMNT PAIN NOTED NONE PRSNT: CPT | Mod: CPTII,S$GLB,, | Performed by: ORTHOPAEDIC SURGERY

## 2022-08-01 PROCEDURE — 3074F SYST BP LT 130 MM HG: CPT | Mod: CPTII,S$GLB,, | Performed by: ORTHOPAEDIC SURGERY

## 2022-08-01 PROCEDURE — 73030 X-RAY EXAM OF SHOULDER: CPT | Mod: TC,RT

## 2022-08-01 PROCEDURE — 3066F PR DOCUMENTATION OF TREATMENT FOR NEPHROPATHY: ICD-10-PCS | Mod: CPTII,S$GLB,, | Performed by: ORTHOPAEDIC SURGERY

## 2022-08-01 PROCEDURE — 1157F ADVNC CARE PLAN IN RCRD: CPT | Mod: CPTII,S$GLB,, | Performed by: ORTHOPAEDIC SURGERY

## 2022-08-01 PROCEDURE — 99214 PR OFFICE/OUTPT VISIT, EST, LEVL IV, 30-39 MIN: ICD-10-PCS | Mod: S$GLB,,, | Performed by: ORTHOPAEDIC SURGERY

## 2022-08-01 PROCEDURE — 3008F BODY MASS INDEX DOCD: CPT | Mod: CPTII,S$GLB,, | Performed by: ORTHOPAEDIC SURGERY

## 2022-08-01 PROCEDURE — 1159F MED LIST DOCD IN RCRD: CPT | Mod: CPTII,S$GLB,, | Performed by: ORTHOPAEDIC SURGERY

## 2022-08-01 PROCEDURE — 3008F PR BODY MASS INDEX (BMI) DOCUMENTED: ICD-10-PCS | Mod: CPTII,S$GLB,, | Performed by: ORTHOPAEDIC SURGERY

## 2022-08-01 PROCEDURE — 1157F PR ADVANCE CARE PLAN OR EQUIV PRESENT IN MEDICAL RECORD: ICD-10-PCS | Mod: CPTII,S$GLB,, | Performed by: ORTHOPAEDIC SURGERY

## 2022-08-01 PROCEDURE — 99999 PR PBB SHADOW E&M-EST. PATIENT-LVL IV: CPT | Mod: PBBFAC,,, | Performed by: ORTHOPAEDIC SURGERY

## 2022-08-01 PROCEDURE — 3288F PR FALLS RISK ASSESSMENT DOCUMENTED: ICD-10-PCS | Mod: CPTII,S$GLB,, | Performed by: ORTHOPAEDIC SURGERY

## 2022-08-01 PROCEDURE — 3288F FALL RISK ASSESSMENT DOCD: CPT | Mod: CPTII,S$GLB,, | Performed by: ORTHOPAEDIC SURGERY

## 2022-08-01 PROCEDURE — 73030 XR SHOULDER COMPLETE 2 OR MORE VIEWS RIGHT: ICD-10-PCS | Mod: 26,RT,, | Performed by: RADIOLOGY

## 2022-08-01 PROCEDURE — 3074F PR MOST RECENT SYSTOLIC BLOOD PRESSURE < 130 MM HG: ICD-10-PCS | Mod: CPTII,S$GLB,, | Performed by: ORTHOPAEDIC SURGERY

## 2022-08-01 PROCEDURE — 1159F PR MEDICATION LIST DOCUMENTED IN MEDICAL RECORD: ICD-10-PCS | Mod: CPTII,S$GLB,, | Performed by: ORTHOPAEDIC SURGERY

## 2022-08-01 PROCEDURE — 1126F PR PAIN SEVERITY QUANTIFIED, NO PAIN PRESENT: ICD-10-PCS | Mod: CPTII,S$GLB,, | Performed by: ORTHOPAEDIC SURGERY

## 2022-08-01 PROCEDURE — 3044F PR MOST RECENT HEMOGLOBIN A1C LEVEL <7.0%: ICD-10-PCS | Mod: CPTII,S$GLB,, | Performed by: ORTHOPAEDIC SURGERY

## 2022-08-01 PROCEDURE — 99214 OFFICE O/P EST MOD 30 MIN: CPT | Mod: S$GLB,,, | Performed by: ORTHOPAEDIC SURGERY

## 2022-08-01 PROCEDURE — 3078F DIAST BP <80 MM HG: CPT | Mod: CPTII,S$GLB,, | Performed by: ORTHOPAEDIC SURGERY

## 2022-08-01 PROCEDURE — 1101F PR PT FALLS ASSESS DOC 0-1 FALLS W/OUT INJ PAST YR: ICD-10-PCS | Mod: CPTII,S$GLB,, | Performed by: ORTHOPAEDIC SURGERY

## 2022-08-01 PROCEDURE — 3078F PR MOST RECENT DIASTOLIC BLOOD PRESSURE < 80 MM HG: ICD-10-PCS | Mod: CPTII,S$GLB,, | Performed by: ORTHOPAEDIC SURGERY

## 2022-08-01 PROCEDURE — 3061F PR NEG MICROALBUMINURIA RESULT DOCUMENTED/REVIEW: ICD-10-PCS | Mod: CPTII,S$GLB,, | Performed by: ORTHOPAEDIC SURGERY

## 2022-08-01 PROCEDURE — 3061F NEG MICROALBUMINURIA REV: CPT | Mod: CPTII,S$GLB,, | Performed by: ORTHOPAEDIC SURGERY

## 2022-08-01 PROCEDURE — 3044F HG A1C LEVEL LT 7.0%: CPT | Mod: CPTII,S$GLB,, | Performed by: ORTHOPAEDIC SURGERY

## 2022-08-01 PROCEDURE — 73030 X-RAY EXAM OF SHOULDER: CPT | Mod: 26,RT,, | Performed by: RADIOLOGY

## 2022-08-01 PROCEDURE — 4010F ACE/ARB THERAPY RXD/TAKEN: CPT | Mod: CPTII,S$GLB,, | Performed by: ORTHOPAEDIC SURGERY

## 2022-08-01 PROCEDURE — 99999 PR PBB SHADOW E&M-EST. PATIENT-LVL IV: ICD-10-PCS | Mod: PBBFAC,,, | Performed by: ORTHOPAEDIC SURGERY

## 2022-08-01 PROCEDURE — 1101F PT FALLS ASSESS-DOCD LE1/YR: CPT | Mod: CPTII,S$GLB,, | Performed by: ORTHOPAEDIC SURGERY

## 2022-08-01 PROCEDURE — 3066F NEPHROPATHY DOC TX: CPT | Mod: CPTII,S$GLB,, | Performed by: ORTHOPAEDIC SURGERY

## 2022-08-01 PROCEDURE — 4010F PR ACE/ARB THEARPY RXD/TAKEN: ICD-10-PCS | Mod: CPTII,S$GLB,, | Performed by: ORTHOPAEDIC SURGERY

## 2022-08-01 NOTE — PROGRESS NOTES
CC: right Shoulder pain referred by Dr. Mcpherson    72 y.o. Male reports that the pain is severe and not responding to any conservative care.      He reports that the pain is worse with overhead activity. It also bothers him at night.    SANE 75    Failed physician directed therapy x 8 weeks  Failed NSAIDs x 6 weeks  Failed RICE x 6 weeks      PAST MEDICAL HISTORY:   Past Medical History:   Diagnosis Date    Diabetes mellitus     Fuchs' corneal dystrophy     Heart attack     Hypertension     Kidney stones     Pancreatic mass     Pancreatitis     after EUS . 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, treated with dostenex(cabergoline)/ resolved    Reflux esophagitis     Tumor cells, benign      PAST SURGICAL HISTORY:   Past Surgical History:   Procedure Laterality Date    CATARACT EXTRACTION W/  INTRAOCULAR LENS IMPLANT Right 0    Dr Van     CATARACT EXTRACTION W/  INTRAOCULAR LENS IMPLANT Left 3/21/2019    Procedure: EXTRACTION, CATARACT, WITH IOL INSERTION;  Surgeon: Ra Van MD;  Location: [facility];  Service: Ophthalmology;  Laterality: Left;    CORNEAL TRANSPLANT Right     Dr Van     DESCEMETS STRIPPING AUTOMATED ENDOTHELIAL KERATOPLASTY Left 3/21/2019    Procedure: TRANSPLANT, PARTIAL-THICKNESS, CORNEA, USING DSAEK TECHNIQUE;  Surgeon: Ra Van MD;  Location: [facility];  Service: Ophthalmology;  Laterality: Left;  DSEK    REPAIR OF RETINAL DETACHMENT WITH VITRECTOMY Right 7/18/2018    Procedure: REPAIR, RETINAL DETACHMENT, WITH VITRECTOMY;  Surgeon: SHUBHAM Patel MD;  Location: [facility] OR [address] [place] [address];  Service: Ophthalmology;  Laterality: Right;  40 min    REPAIR OF RETINAL DETACHMENT WITH VITRECTOMY Left 8/8/2018    Procedure: REPAIR, RETINAL DETACHMENT, WITH VITRECTOMY;  Surgeon: SHUBHAM Patel MD;  Location: [facility] OR [address] [place] [address];   Service: Ophthalmology;  Laterality: Left;  40 min    RHINOPLASTY TIP  1975    THYROIDECTOMY  2007    UPPER ENDOSCOPIC ULTRASOUND W/ FNA      with resultant pancreatitis     FAMILY HISTORY:   Family History   Problem Relation Age of Onset    Hypertension Mother     Heart disease Mother     Heart disease Father     Cataracts Father     Stroke Father     Diabetes Father     Diabetes Paternal Uncle     Stroke Maternal Grandmother     Blindness Neg Hx     Glaucoma Neg Hx     Macular degeneration Neg Hx     Retinal detachment Neg Hx     Strabismus Neg Hx     Thyroid disease Neg Hx     Cancer Neg Hx     Amblyopia Neg Hx      SOCIAL HISTORY:   Social History     Socioeconomic History    Marital status: Single   Tobacco Use    Smoking status: Never Smoker    Smokeless tobacco: Never Used   Substance and Sexual Activity    Alcohol use: Yes     Comment: social    Drug use: No    Sexual activity: Not Currently       MEDICATIONS:   Current Outpatient Medications:     aspirin (ECOTRIN) 81 MG EC tablet, Take 81 mg by mouth every morning. , Disp: , Rfl:     atorvastatin (LIPITOR) 80 MG tablet, Take 1 tablet (80 mg total) by mouth once daily., Disp: 90 tablet, Rfl: 3    cabergoline (DOSTINEX) 0.5 mg tablet, TAKE 1 TABLET(0.5 MG) BY MOUTH 2 TIMES A WEEK, Disp: 24 tablet, Rfl: 3    CONTOUR NEXT TEST STRIPS Strp, UTD QID IN VITRO, Disp: , Rfl: 3    famotidine (PEPCID) 20 MG tablet, Take by mouth., Disp: , Rfl:     fexofenadine-pseudoephedrine  mg (ALLEGRA-D)  mg per tablet, Take 1 tablet by mouth., Disp: , Rfl:     fluticasone propionate (FLONASE) 50 mcg/actuation nasal spray, SHAKE LIQUID AND USE 1 TO 2 SPRAYS(50  MCG) IN EACH NOSTRIL EVERY DAY, Disp: 48 g, Rfl: 0    gabapentin (NEURONTIN) 100 MG capsule, Take 1 capsule three times a day,  Takes one capsule every morning, Disp: , Rfl: 3    lisinopril (PRINIVIL,ZESTRIL) 2.5 MG tablet, Take 2.5 mg by mouth every morning. , Disp: ,  "Rfl:     loperamide (IMODIUM) 2 mg capsule, Take 2 mg by mouth 4 (four) times daily as needed for Diarrhea., Disp: , Rfl:     metoprolol succinate (TOPROL-XL) 25 MG 24 hr tablet, Take 25 mg by mouth nightly. , Disp: , Rfl:     naproxen sodium (ANAPROX) 550 MG tablet, SMARTSI Tablet(s) By Mouth Every 12 Hours PRN, Disp: , Rfl:     pantoprazole (PROTONIX) 40 MG tablet, Take 40 mg by mouth once daily., Disp: , Rfl:     tamsulosin (FLOMAX) 0.4 mg Cap, TK 1 C PO QHS, Disp: 90 capsule, Rfl: 3    testosterone (ANDROGEL) 1 % (50 mg/5 gram) GlPk, Apply 2 packets topically once daily., Disp: 180 packet, Rfl: 3    metFORMIN (GLUCOPHAGE-XR) 500 MG ER 24hr tablet, TAKE 2 TABLETS BY MOUTH TWICE DAILY, Disp: 360 tablet, Rfl: 3  ALLERGIES:   Review of patient's allergies indicates:   Allergen Reactions    Grass pollen- grass standard     House dust        VITAL SIGNS: /67 (BP Location: Right arm, Patient Position: Sitting, BP Method: Small (Automatic))   Pulse 68   Ht 5' 9" (1.753 m)   Wt 78.9 kg (174 lb)   BMI 25.70 kg/m²      Review of Systems   Constitution: Negative. Negative for chills, fever and night sweats.   HENT: Negative for congestion and headaches.    Eyes: Negative for blurred vision, left vision loss and right vision loss.   Cardiovascular: Negative for chest pain and syncope.   Respiratory: Negative for cough and shortness of breath.    Endocrine: Negative for polydipsia, polyphagia and polyuria.   Hematologic/Lymphatic: Negative for bleeding problem. Does not bruise/bleed easily.   Skin: Negative for dry skin, itching and rash.   Musculoskeletal: Negative for falls and muscle weakness.   Gastrointestinal: Negative for abdominal pain and bowel incontinence.   Genitourinary: Negative for bladder incontinence and nocturia.   Neurological: Negative for disturbances in coordination, loss of balance and seizures.   Psychiatric/Behavioral: Negative for depression. The patient does not have " insomnia.    Allergic/Immunologic: Negative for hives and persistent infections.       PHYSICAL EXAMINATION:  General:  The patient is alert and oriented x 3.  Mood is pleasant.  Observation of ears, eyes and nose reveal no gross abnormalities.  HEENT: NCAT, sclera nonicteric  Lungs: Respirations are equal and unlabored.  Gait is coordinated. Patient can toe walk and heel walk without difficulty.  Cardiovascular: There are no swelling or varicosities present.   Lymphatic: Negative for adenopathy       right SHOULDER / UPPER EXTREMITY EXAM    OBSERVATION:     Swelling  none  Deformity  none   Discoloration  none   Scapular winging none   Scars   none  Atrophy  none    TENDERNESS / CREPITUS (T/C):          T/C      T/C   Clavicle   -/-  SUPRAspinatus    -/-   AC Jt.    -/-  INFRAspinatus  -/-   SC Jt.    -/-  Deltoid    -/-   G. Tuberosity  -/-  LH BICEP groove  +/-   Acromion:  -/-  Midline Neck   -/-   Scapular Spine -/-  Trapezium   -/-   SMA Scapula  -/-  GH jt. line - post  -/-   Scapulothoracic  -/-         ROM: (* = with pain)  Right shoulder   Left shoulder        AROM (PROM)   AROM (PROM)   FE    150° (155°)     170° (175°)     ER at 0°    50°  (55°)    50°  (55°)   ER at 90° ABD  90°  (90°)    90°  (90°)   IR at 90°  ABD   NA  (40°)     NA  (40°)      IR (spine level)   T10     T10    STRENGTH: (* = with pain) RIGHT SHOULDER  LEFT SHOULDER   SCAPTION at 0   4+/5    5/5    SCAPTION at 30   5/5    5/5    IR    5/5    5/5   ER    5/5    5/5   BICEPS   5/5    5/5   Deltoid    5/5    5/5     SIGNS:  Painful side       NEER   +    OMARYS  +   DAVIS   +    SPEEDS  +   DROP ARM   neg   BELLY PRESS Neg   Superior escape none    LIFT-OFF  Neg   X-Body ADD    neg    MOVING VALGUS Neg      STABILITY TESTING    RIGHT SHOULDER   LEFT SHOULDER       Translation    Anterior  up face     up face    Posterior  up face    up face    Sulcus   < 10mm    < 10 mm    Signs    Apprehension   neg      Neg    Relocation   no  change     no change    Jerk test  neg     Neg      EXTREMITY NEURO-VASCULAR EXAM    Sensation grossly intact to light touch all dermatomal regions.    DTR 2+ Biceps, Triceps, BR and Negative Andrades sign   Grossly intact motor function at Elbow, Wrist and Hand   Distal pulses radial and ulnar 2+, brisk cap refill, symmetric.      NECK:  Painless FROM and spinous processes non-tender. Negative Spurlings sign.      OTHER FINDINGS:    XRAYS:  Shoulder trauma series right,  were ordered and reviewed by me. No convincing fracture or dislocation is noted. The osseous structures appear well mineralized and well aligned, possible insufficiency fracture greater tuberosity/cystic changes, AC moderate hypertrophy, degenerative changes    right   1. Shoulder pain,possible RTC tear    2.  Possible insufficiency fracture greater tuberosity  3. Limited FE slight    Plan:       ASSESSMENT:  shoulder pain.    I do think that this is likely a rotator cuff tear, possible insufficiency fracture greater tuberosity.    I have recommended we check an MRI of the shoulder to evaluate this.    Depending on the results of the MRI, we may consider a cortisone   injection and physical therapy and/or arthroscopic intervention and treatment   depending on what we find.  I will see him back upon its completion or PHREV and we will consider the above.

## 2022-08-18 ENCOUNTER — TELEPHONE (OUTPATIENT)
Dept: CARDIOLOGY | Facility: CLINIC | Age: 72
End: 2022-08-18
Payer: COMMERCIAL

## 2022-08-18 NOTE — TELEPHONE ENCOUNTER
----- Message from Emiliana Damon sent at 8/18/2022  9:53 AM CDT -----  Regarding: Medication  Pt [phone] says he's a new pt with Dr. Taveras and he had some med changes and he's confused with some of the changes. He stated he may have stopped a med he wasn't suppose to stop.    LOV 6/27/22 Dr. Taveras    Thanks

## 2022-08-18 NOTE — TELEPHONE ENCOUNTER
Pt confusing atorvastatin and lisinopril. Clarification and teaching done. Actually pt is taking meds appropriately. He is taking lisinopril (last prescribed by cards at [facility]), atorvastatin. He stopped rosuvastatin and ticagrelor. Pt verbalized understanding.

## 2022-08-22 ENCOUNTER — PATIENT MESSAGE (OUTPATIENT)
Dept: SPORTS MEDICINE | Facility: CLINIC | Age: 72
End: 2022-08-22
Payer: COMMERCIAL

## 2022-08-23 ENCOUNTER — PATIENT MESSAGE (OUTPATIENT)
Dept: UROLOGY | Facility: CLINIC | Age: 72
End: 2022-08-23
Payer: COMMERCIAL

## 2022-08-23 ENCOUNTER — OFFICE VISIT (OUTPATIENT)
Dept: URGENT CARE | Facility: CLINIC | Age: 72
End: 2022-08-23
Payer: COMMERCIAL

## 2022-08-23 VITALS
BODY MASS INDEX: 25.77 KG/M2 | SYSTOLIC BLOOD PRESSURE: 100 MMHG | HEART RATE: 55 BPM | HEIGHT: 69 IN | WEIGHT: 174 LBS | RESPIRATION RATE: 18 BRPM | OXYGEN SATURATION: 98 % | DIASTOLIC BLOOD PRESSURE: 62 MMHG | TEMPERATURE: 98 F

## 2022-08-23 DIAGNOSIS — R35.0 FREQUENT URINATION: Primary | ICD-10-CM

## 2022-08-23 LAB
BILIRUB UR QL STRIP: NEGATIVE
GLUCOSE UR QL STRIP: NEGATIVE
KETONES UR QL STRIP: NEGATIVE
LEUKOCYTE ESTERASE UR QL STRIP: NEGATIVE
PH, POC UA: 5.5 (ref 5–8)
POC BLOOD, URINE: NEGATIVE
POC NITRATES, URINE: NEGATIVE
PROT UR QL STRIP: NEGATIVE
SP GR UR STRIP: 1.02 (ref 1–1.03)
UROBILINOGEN UR STRIP-ACNC: NORMAL (ref 0.3–2.2)

## 2022-08-23 PROCEDURE — 4010F ACE/ARB THERAPY RXD/TAKEN: CPT | Mod: CPTII,S$GLB,, | Performed by: NURSE PRACTITIONER

## 2022-08-23 PROCEDURE — 3061F NEG MICROALBUMINURIA REV: CPT | Mod: CPTII,S$GLB,, | Performed by: NURSE PRACTITIONER

## 2022-08-23 PROCEDURE — 81003 URINALYSIS AUTO W/O SCOPE: CPT | Mod: QW,S$GLB,, | Performed by: NURSE PRACTITIONER

## 2022-08-23 PROCEDURE — 3074F PR MOST RECENT SYSTOLIC BLOOD PRESSURE < 130 MM HG: ICD-10-PCS | Mod: CPTII,S$GLB,, | Performed by: NURSE PRACTITIONER

## 2022-08-23 PROCEDURE — 99213 PR OFFICE/OUTPT VISIT, EST, LEVL III, 20-29 MIN: ICD-10-PCS | Mod: S$GLB,,, | Performed by: NURSE PRACTITIONER

## 2022-08-23 PROCEDURE — 3061F PR NEG MICROALBUMINURIA RESULT DOCUMENTED/REVIEW: ICD-10-PCS | Mod: CPTII,S$GLB,, | Performed by: NURSE PRACTITIONER

## 2022-08-23 PROCEDURE — 1157F PR ADVANCE CARE PLAN OR EQUIV PRESENT IN MEDICAL RECORD: ICD-10-PCS | Mod: CPTII,S$GLB,, | Performed by: NURSE PRACTITIONER

## 2022-08-23 PROCEDURE — 87086 URINE CULTURE/COLONY COUNT: CPT | Performed by: NURSE PRACTITIONER

## 2022-08-23 PROCEDURE — 3044F HG A1C LEVEL LT 7.0%: CPT | Mod: CPTII,S$GLB,, | Performed by: NURSE PRACTITIONER

## 2022-08-23 PROCEDURE — 1126F PR PAIN SEVERITY QUANTIFIED, NO PAIN PRESENT: ICD-10-PCS | Mod: CPTII,S$GLB,, | Performed by: NURSE PRACTITIONER

## 2022-08-23 PROCEDURE — 3078F PR MOST RECENT DIASTOLIC BLOOD PRESSURE < 80 MM HG: ICD-10-PCS | Mod: CPTII,S$GLB,, | Performed by: NURSE PRACTITIONER

## 2022-08-23 PROCEDURE — 4010F PR ACE/ARB THEARPY RXD/TAKEN: ICD-10-PCS | Mod: CPTII,S$GLB,, | Performed by: NURSE PRACTITIONER

## 2022-08-23 PROCEDURE — 99213 OFFICE O/P EST LOW 20 MIN: CPT | Mod: S$GLB,,, | Performed by: NURSE PRACTITIONER

## 2022-08-23 PROCEDURE — 3066F NEPHROPATHY DOC TX: CPT | Mod: CPTII,S$GLB,, | Performed by: NURSE PRACTITIONER

## 2022-08-23 PROCEDURE — 3044F PR MOST RECENT HEMOGLOBIN A1C LEVEL <7.0%: ICD-10-PCS | Mod: CPTII,S$GLB,, | Performed by: NURSE PRACTITIONER

## 2022-08-23 PROCEDURE — 1157F ADVNC CARE PLAN IN RCRD: CPT | Mod: CPTII,S$GLB,, | Performed by: NURSE PRACTITIONER

## 2022-08-23 PROCEDURE — 3066F PR DOCUMENTATION OF TREATMENT FOR NEPHROPATHY: ICD-10-PCS | Mod: CPTII,S$GLB,, | Performed by: NURSE PRACTITIONER

## 2022-08-23 PROCEDURE — 3008F PR BODY MASS INDEX (BMI) DOCUMENTED: ICD-10-PCS | Mod: CPTII,S$GLB,, | Performed by: NURSE PRACTITIONER

## 2022-08-23 PROCEDURE — 81003 POCT URINALYSIS, DIPSTICK, AUTOMATED, W/O SCOPE: ICD-10-PCS | Mod: QW,S$GLB,, | Performed by: NURSE PRACTITIONER

## 2022-08-23 PROCEDURE — 3074F SYST BP LT 130 MM HG: CPT | Mod: CPTII,S$GLB,, | Performed by: NURSE PRACTITIONER

## 2022-08-23 PROCEDURE — 1126F AMNT PAIN NOTED NONE PRSNT: CPT | Mod: CPTII,S$GLB,, | Performed by: NURSE PRACTITIONER

## 2022-08-23 PROCEDURE — 3008F BODY MASS INDEX DOCD: CPT | Mod: CPTII,S$GLB,, | Performed by: NURSE PRACTITIONER

## 2022-08-23 PROCEDURE — 3078F DIAST BP <80 MM HG: CPT | Mod: CPTII,S$GLB,, | Performed by: NURSE PRACTITIONER

## 2022-08-23 RX ORDER — NEOMYCIN SULFATE, POLYMYXIN B SULFATE AND HYDROCORTISONE 10; 3.5; 1 MG/ML; MG/ML; [USP'U]/ML
SUSPENSION/ DROPS AURICULAR (OTIC)
COMMUNITY
Start: 2021-11-27 | End: 2023-01-13

## 2022-08-23 NOTE — PROGRESS NOTES
"Subjective:       Patient ID: Sadiq Dhillon is a 72 y.o. male.    Vitals:  height is 5' 9" (1.753 m) and weight is 78.9 kg (174 lb). His temperature is 97.8 °F (36.6 °C). His blood pressure is 100/62 and his pulse is 55 (abnormal). His respiration is 18 and oxygen saturation is 98%.     Chief Complaint: Urinary Frequency    This is a 72 y.o. male   who presents today with a chief complaint of frequent urination that began two days ago. He states that the frequency worsened today. He states that he urinated four times since waking up this morning. He hasn't taken any medication to help relieve his symptoms.     Urinary Frequency   This is a new problem. The current episode started today. The problem occurs every urination. The problem has been gradually worsening. The pain is at a severity of 0/10. The patient is experiencing no pain. There has been no fever. He is Not sexually active. There is No history of pyelonephritis. Associated symptoms include frequency. Pertinent negatives include no behavior changes, chills, discharge, flank pain, hematuria, hesitancy, nausea, possible pregnancy, sweats, urgency, vomiting, weight loss, bubble bath use, constipation, rash or withholding. He has tried nothing for the symptoms.     Constitution: Negative for chills.   Gastrointestinal:  Negative for nausea, vomiting and constipation.   Genitourinary:  Positive for frequency. Negative for urgency, flank pain and hematuria.   Skin:  Negative for rash.     Objective:      Physical Exam   Constitutional: normal  HENT:   Head: Normocephalic.   Ears:   Right Ear: External ear normal.   Left Ear: External ear normal.   Pulmonary/Chest: Effort normal. No respiratory distress.   Abdominal: Normal appearance. Soft. There is no left CVA tenderness and no right CVA tenderness.   Neurological: He is alert.   Nursing note and vitals reviewed.      Results for orders placed or performed in visit on 08/23/22   CULTURE, URINE    Specimen: " Urine, Clean Catch   Result Value Ref Range    Urine Culture, Routine No growth    POCT Urinalysis, Dipstick, Automated, W/O Scope   Result Value Ref Range    POC Blood, Urine Negative Negative    POC Bilirubin, Urine Negative Negative    POC Urobilinogen, Urine normal 0.3 - 2.2    POC Ketones, Urine Negative Negative    POC Protein, Urine Negative Negative    POC Nitrates, Urine Negative Negative    POC Glucose, Urine Negative Negative    pH, UA 5.5 5 - 8    POC Specific Gravity, Urine 1.020 1.003 - 1.029    POC Leukocytes, Urine Negative Negative     Assessment:       1. Frequent urination          Plan:       UA WNL   Urinary frequency related to dx of diabetes mellitus vs bacterial infection. I do not suspect bacterial infection. Will send culture to confirm. Pt ok with waiting on culture to determine need for antibiotics.  Frequent urination  -     POCT Urinalysis, Dipstick, Automated, W/O Scope  -     CULTURE, URINE               Patient Instructions   General Discharge Instructions   If you were prescribed a narcotic or controlled medication, do not drive or operate heavy equipment or machinery while taking these medications.  If you were prescribed antibiotics, please take them to completion.  You must understand that you've received an Urgent Care treatment only and that you may be released before all your medical problems are known or treated. You, the patient, will arrange for follow up care as instructed.  Follow up with your PCP or specialty clinic as directed in the next 1-2 weeks if not improved or as needed.  You can call [phone] to schedule an appointment with the appropriate provider.  If your condition worsens we recommend that you receive another evaluation at the emergency room immediately or contact your primary medical clinics after hours call service to discuss your concerns.  Please return here or go to the Emergency Department for any concerns or worsening of condition.      WE  CANNOT RULE OUT ALL POSSIBLE CAUSES OF YOUR SYMPTOMS IN THE URGENT CARE SETTING PLEASE GO TO THE ER IF YOU FEELS YOUR CONDITION IS WORSENING OR YOU WOULD LIKE EMERGENT EVALUATION.

## 2022-08-23 NOTE — PATIENT INSTRUCTIONS
General Discharge Instructions   If you were prescribed a narcotic or controlled medication, do not drive or operate heavy equipment or machinery while taking these medications.  If you were prescribed antibiotics, please take them to completion.  You must understand that you've received an Urgent Care treatment only and that you may be released before all your medical problems are known or treated. You, the patient, will arrange for follow up care as instructed.  Follow up with your PCP or specialty clinic as directed in the next 1-2 weeks if not improved or as needed.  You can call [phone] to schedule an appointment with the appropriate provider.  If your condition worsens we recommend that you receive another evaluation at the emergency room immediately or contact your primary medical clinics after hours call service to discuss your concerns.  Please return here or go to the Emergency Department for any concerns or worsening of condition.      WE CANNOT RULE OUT ALL POSSIBLE CAUSES OF YOUR SYMPTOMS IN THE URGENT CARE SETTING PLEASE GO TO THE ER IF YOU FEELS YOUR CONDITION IS WORSENING OR YOU WOULD LIKE EMERGENT EVALUATION.

## 2022-08-24 ENCOUNTER — PATIENT MESSAGE (OUTPATIENT)
Dept: ADMINISTRATIVE | Facility: HOSPITAL | Age: 72
End: 2022-08-24
Payer: COMMERCIAL

## 2022-08-25 LAB — BACTERIA UR CULT: NO GROWTH

## 2022-08-26 ENCOUNTER — OFFICE VISIT (OUTPATIENT)
Dept: OPHTHALMOLOGY | Facility: CLINIC | Age: 72
End: 2022-08-26
Attending: OPHTHALMOLOGY
Payer: COMMERCIAL

## 2022-08-26 DIAGNOSIS — H26.491 PCO (POSTERIOR CAPSULAR OPACIFICATION), RIGHT: Primary | ICD-10-CM

## 2022-08-26 PROCEDURE — 1101F PT FALLS ASSESS-DOCD LE1/YR: CPT | Mod: CPTII,S$GLB,, | Performed by: OPHTHALMOLOGY

## 2022-08-26 PROCEDURE — 3061F PR NEG MICROALBUMINURIA RESULT DOCUMENTED/REVIEW: ICD-10-PCS | Mod: CPTII,S$GLB,, | Performed by: OPHTHALMOLOGY

## 2022-08-26 PROCEDURE — 3044F PR MOST RECENT HEMOGLOBIN A1C LEVEL <7.0%: ICD-10-PCS | Mod: CPTII,S$GLB,, | Performed by: OPHTHALMOLOGY

## 2022-08-26 PROCEDURE — 1160F RVW MEDS BY RX/DR IN RCRD: CPT | Mod: CPTII,S$GLB,, | Performed by: OPHTHALMOLOGY

## 2022-08-26 PROCEDURE — 1126F PR PAIN SEVERITY QUANTIFIED, NO PAIN PRESENT: ICD-10-PCS | Mod: CPTII,S$GLB,, | Performed by: OPHTHALMOLOGY

## 2022-08-26 PROCEDURE — 3066F NEPHROPATHY DOC TX: CPT | Mod: CPTII,S$GLB,, | Performed by: OPHTHALMOLOGY

## 2022-08-26 PROCEDURE — 92012 PR EYE EXAM, EST PATIENT,INTERMED: ICD-10-PCS | Mod: 57,S$GLB,, | Performed by: OPHTHALMOLOGY

## 2022-08-26 PROCEDURE — 1157F PR ADVANCE CARE PLAN OR EQUIV PRESENT IN MEDICAL RECORD: ICD-10-PCS | Mod: CPTII,S$GLB,, | Performed by: OPHTHALMOLOGY

## 2022-08-26 PROCEDURE — 3066F PR DOCUMENTATION OF TREATMENT FOR NEPHROPATHY: ICD-10-PCS | Mod: CPTII,S$GLB,, | Performed by: OPHTHALMOLOGY

## 2022-08-26 PROCEDURE — 1160F PR REVIEW ALL MEDS BY PRESCRIBER/CLIN PHARMACIST DOCUMENTED: ICD-10-PCS | Mod: CPTII,S$GLB,, | Performed by: OPHTHALMOLOGY

## 2022-08-26 PROCEDURE — 92012 INTRM OPH EXAM EST PATIENT: CPT | Mod: 57,S$GLB,, | Performed by: OPHTHALMOLOGY

## 2022-08-26 PROCEDURE — 3288F FALL RISK ASSESSMENT DOCD: CPT | Mod: CPTII,S$GLB,, | Performed by: OPHTHALMOLOGY

## 2022-08-26 PROCEDURE — 4010F ACE/ARB THERAPY RXD/TAKEN: CPT | Mod: CPTII,S$GLB,, | Performed by: OPHTHALMOLOGY

## 2022-08-26 PROCEDURE — 1126F AMNT PAIN NOTED NONE PRSNT: CPT | Mod: CPTII,S$GLB,, | Performed by: OPHTHALMOLOGY

## 2022-08-26 PROCEDURE — 99999 PR PBB SHADOW E&M-EST. PATIENT-LVL III: ICD-10-PCS | Mod: PBBFAC,,, | Performed by: OPHTHALMOLOGY

## 2022-08-26 PROCEDURE — 1101F PR PT FALLS ASSESS DOC 0-1 FALLS W/OUT INJ PAST YR: ICD-10-PCS | Mod: CPTII,S$GLB,, | Performed by: OPHTHALMOLOGY

## 2022-08-26 PROCEDURE — 1159F MED LIST DOCD IN RCRD: CPT | Mod: CPTII,S$GLB,, | Performed by: OPHTHALMOLOGY

## 2022-08-26 PROCEDURE — 66821 AFTER CATARACT LASER SURGERY: CPT | Mod: RT,S$GLB,, | Performed by: OPHTHALMOLOGY

## 2022-08-26 PROCEDURE — 66821 PR DISCISSION,2ND CATARACT,LASER: ICD-10-PCS | Mod: RT,S$GLB,, | Performed by: OPHTHALMOLOGY

## 2022-08-26 PROCEDURE — 1157F ADVNC CARE PLAN IN RCRD: CPT | Mod: CPTII,S$GLB,, | Performed by: OPHTHALMOLOGY

## 2022-08-26 PROCEDURE — 1159F PR MEDICATION LIST DOCUMENTED IN MEDICAL RECORD: ICD-10-PCS | Mod: CPTII,S$GLB,, | Performed by: OPHTHALMOLOGY

## 2022-08-26 PROCEDURE — 3061F NEG MICROALBUMINURIA REV: CPT | Mod: CPTII,S$GLB,, | Performed by: OPHTHALMOLOGY

## 2022-08-26 PROCEDURE — 99999 PR PBB SHADOW E&M-EST. PATIENT-LVL III: CPT | Mod: PBBFAC,,, | Performed by: OPHTHALMOLOGY

## 2022-08-26 PROCEDURE — 3288F PR FALLS RISK ASSESSMENT DOCUMENTED: ICD-10-PCS | Mod: CPTII,S$GLB,, | Performed by: OPHTHALMOLOGY

## 2022-08-26 PROCEDURE — 3044F HG A1C LEVEL LT 7.0%: CPT | Mod: CPTII,S$GLB,, | Performed by: OPHTHALMOLOGY

## 2022-08-26 PROCEDURE — 4010F PR ACE/ARB THEARPY RXD/TAKEN: ICD-10-PCS | Mod: CPTII,S$GLB,, | Performed by: OPHTHALMOLOGY

## 2022-08-26 NOTE — PROGRESS NOTES
HPI     Patient presents today for a YAG Evaluation OD. Patient had YAG OS in   2019. Patient notes vision as blurry. Patient notes difficulty with glare   and visual distortions not due to retina. Patient notes no eye pain.     Last edited by Reed Huntley on 8/26/2022  3:01 PM. (History)            Assessment /Plan     For exam results, see Encounter Report.    PCO (posterior capsular opacification), right      Visually significant posterior capsular opacity present.  Discussed risks, benefits, and alternatives to laser surgery.  YAG laser capsulotomy Procedure Note:   Informed consent obtained and correct eye(s) verified with patient.  1 drop of topical Proparacaine and Iopidine instilled, and eye(s) dilated with 1% Tropicamide 2.5% Phenylephrine.  YAG laser applied to posterior capsule in cruciate pattern OD  Patient tolerated procedure well. No complications. Follow up in 1 month/PRN.

## 2022-08-28 NOTE — PROGRESS NOTES
Subjective:      Patient ID: Sadiq Dhillon is a 72 y.o. male.    Chief Complaint: Diabetes Mellitus (Pcp - Erlin Mcpherson MD - 7/11/22)    Sadiq is a 72 y.o. male who presents to the clinic upon referral from Dr. Mcpherson  for evaluation and treatment of diabetic feet. Sadiq has a past medical history of Diabetes mellitus, Fuchs' corneal dystrophy, Heart attack, Hypertension, Kidney stones, Pancreatic mass, Pancreatitis, Pituitary adenoma, PONV (postoperative nausea and vomiting), Prolactinoma (2003), Reflux esophagitis, and Tumor cells, benign. Patient relates no major problem with feet. Only complaints today consist of diabetic foot exam.    PCP: Erlin Mcpherson MD    Date Last Seen by PCP:   Chief Complaint   Patient presents with    Diabetes Mellitus     Pcp - Erlin Mcpherson MD - 7/11/22         Current shoe gear: Tennis shoes    Hemoglobin A1C   Date Value Ref Range Status   03/08/2022 6.6 (H) 4.0 - 5.6 % Final     Comment:     ADA Screening Guidelines:  5.7-6.4%  Consistent with prediabetes  >or=6.5%  Consistent with diabetes    High levels of fetal hemoglobin interfere with the HbA1C  assay. Heterozygous hemoglobin variants (HbS, HgC, etc)do  not significantly interfere with this assay.   However, presence of multiple variants may affect accuracy.     03/30/2021 6.6 (H) 4.0 - 5.6 % Final     Comment:     ADA Screening Guidelines:  5.7-6.4%  Consistent with prediabetes  >or=6.5%  Consistent with diabetes    High levels of fetal hemoglobin interfere with the HbA1C  assay. Heterozygous hemoglobin variants (HbS, HgC, etc)do  not significantly interfere with this assay.   However, presence of multiple variants may affect accuracy.     10/21/2020 6.9 (H) 4.0 - 5.6 % Final     Comment:     ADA Screening Guidelines:  5.7-6.4%  Consistent with prediabetes  >or=6.5%  Consistent with diabetes  High levels of fetal hemoglobin interfere with the HbA1C  assay. Heterozygous hemoglobin variants (HbS, HgC,  etc)do  not significantly interfere with this assay.   However, presence of multiple variants may affect accuracy.           Review of Systems   Constitutional: Negative for chills, fever and malaise/fatigue.   HENT:  Negative for hearing loss.    Cardiovascular:  Negative for claudication.   Respiratory:  Negative for shortness of breath.    Skin:  Positive for dry skin. Negative for flushing and rash.   Musculoskeletal:  Negative for joint pain and myalgias.   Neurological:  Negative for loss of balance, numbness, paresthesias and sensory change.   Psychiatric/Behavioral:  Negative for altered mental status.          Objective:      Physical Exam  Cardiovascular:      Pulses:           Dorsalis pedis pulses are 2+ on the right side and 2+ on the left side.        Posterior tibial pulses are 2+ on the right side and 2+ on the left side.      Comments: No edema noted to b/L LEs  Musculoskeletal:      Comments: Adequate joint ROM noted to all lower extremity muscle groups with no pain or crepitation noted. Muscle strength is 5/5 in all groups bilaterally.     Feet:      Right foot:      Protective Sensation: 5 sites tested.  5 sites sensed.      Left foot:      Protective Sensation: 5 sites tested.  5 sites sensed.   Skin:     Comments: Normal skin tugor noted.   No open lesion noted b/L  Skin temp is warm to warm from proximal to distal b/L.  Webspaces clean, dry, and intact  Nails x10 short   Neurological:      Comments: Intact gross sensation noted to b/L LEs           Assessment:       Encounter Diagnosis   Name Primary?    Type 2 diabetes mellitus with hyperglycemia, without long-term current use of insulin          Plan:       Sadiq was seen today for diabetes mellitus.    Diagnoses and all orders for this visit:    Type 2 diabetes mellitus with hyperglycemia, without long-term current use of insulin  -     Ambulatory referral/consult to Podiatry      I counseled the patient on his conditions, their  implications and medical management.        Discussed DM foot care:  Wear comfortable, proper fitting shoes. Wash feet daily. Dry well. After drying, apply moisturizer to feet (no lotion to webspaces). Inspect feet daily for skin breaks, blisters, swelling, or redness. Wear cotton socks (preferably white)  Change socks every day. Do NOT walk barefoot. Do NOT use heating pads or warm/hot water soaks      - Discussed importance of daily moisturizer to the feet such as Gold bonds diabetic foot cream     - Patient is low risk for developing lower extremity issues secondary to diabetes     - Advised the patient that fungus likes warm, dark, and moist environments such as bathrooms, gyms, and pools. Advised to spray shower, shower mat , and any shoes w/ Lysol periodically  RTC in 1 year or sooner if any new pedal problems should arise.  .

## 2022-09-07 ENCOUNTER — PATIENT MESSAGE (OUTPATIENT)
Dept: CARDIOLOGY | Facility: CLINIC | Age: 72
End: 2022-09-07
Payer: COMMERCIAL

## 2022-09-13 ENCOUNTER — OFFICE VISIT (OUTPATIENT)
Dept: ENDOCRINOLOGY | Facility: CLINIC | Age: 72
End: 2022-09-13
Payer: COMMERCIAL

## 2022-09-13 ENCOUNTER — LAB VISIT (OUTPATIENT)
Dept: LAB | Facility: HOSPITAL | Age: 72
End: 2022-09-13
Attending: INTERNAL MEDICINE
Payer: COMMERCIAL

## 2022-09-13 VITALS
DIASTOLIC BLOOD PRESSURE: 60 MMHG | SYSTOLIC BLOOD PRESSURE: 118 MMHG | BODY MASS INDEX: 26.57 KG/M2 | OXYGEN SATURATION: 96 % | TEMPERATURE: 98 F | HEIGHT: 69 IN | HEART RATE: 64 BPM | WEIGHT: 179.38 LBS

## 2022-09-13 DIAGNOSIS — N20.0 NEPHROLITHIASIS: ICD-10-CM

## 2022-09-13 DIAGNOSIS — D35.2 PROLACTINOMA: ICD-10-CM

## 2022-09-13 DIAGNOSIS — I11.9 HYPERTENSIVE HEART DISEASE WITHOUT HEART FAILURE: ICD-10-CM

## 2022-09-13 DIAGNOSIS — E29.1 HYPOGONADISM IN MALE: ICD-10-CM

## 2022-09-13 DIAGNOSIS — I25.10 CORONARY ARTERY DISEASE INVOLVING NATIVE CORONARY ARTERY OF NATIVE HEART WITHOUT ANGINA PECTORIS: ICD-10-CM

## 2022-09-13 DIAGNOSIS — Z87.19 HISTORY OF PANCREATITIS: ICD-10-CM

## 2022-09-13 DIAGNOSIS — E21.0 PRIMARY HYPERPARATHYROIDISM: ICD-10-CM

## 2022-09-13 DIAGNOSIS — K86.2 PANCREAS CYST: ICD-10-CM

## 2022-09-13 DIAGNOSIS — E11.65 TYPE 2 DIABETES MELLITUS WITH HYPERGLYCEMIA, WITHOUT LONG-TERM CURRENT USE OF INSULIN: ICD-10-CM

## 2022-09-13 DIAGNOSIS — E78.2 HYPERLIPIDEMIA, MIXED: ICD-10-CM

## 2022-09-13 DIAGNOSIS — H18.519 FUCHS' CORNEAL DYSTROPHY, UNSPECIFIED LATERALITY: ICD-10-CM

## 2022-09-13 DIAGNOSIS — E11.65 TYPE 2 DIABETES MELLITUS WITH HYPERGLYCEMIA, WITHOUT LONG-TERM CURRENT USE OF INSULIN: Primary | ICD-10-CM

## 2022-09-13 DIAGNOSIS — E55.9 HYPOVITAMINOSIS D: ICD-10-CM

## 2022-09-13 DIAGNOSIS — I10 ESSENTIAL HYPERTENSION: ICD-10-CM

## 2022-09-13 DIAGNOSIS — D35.2 PITUITARY MACROADENOMA: ICD-10-CM

## 2022-09-13 DIAGNOSIS — E78.00 HYPERCHOLESTEROLEMIA: ICD-10-CM

## 2022-09-13 LAB
ESTIMATED AVG GLUCOSE: 128 MG/DL (ref 68–131)
HBA1C MFR BLD: 6.1 % (ref 4–5.6)

## 2022-09-13 PROCEDURE — 3288F PR FALLS RISK ASSESSMENT DOCUMENTED: ICD-10-PCS | Mod: CPTII,S$GLB,, | Performed by: INTERNAL MEDICINE

## 2022-09-13 PROCEDURE — 3066F NEPHROPATHY DOC TX: CPT | Mod: CPTII,S$GLB,, | Performed by: INTERNAL MEDICINE

## 2022-09-13 PROCEDURE — 3074F PR MOST RECENT SYSTOLIC BLOOD PRESSURE < 130 MM HG: ICD-10-PCS | Mod: CPTII,S$GLB,, | Performed by: INTERNAL MEDICINE

## 2022-09-13 PROCEDURE — 99214 OFFICE O/P EST MOD 30 MIN: CPT | Mod: S$GLB,,, | Performed by: INTERNAL MEDICINE

## 2022-09-13 PROCEDURE — 82672 ASSAY OF ESTROGEN: CPT | Performed by: INTERNAL MEDICINE

## 2022-09-13 PROCEDURE — 3074F SYST BP LT 130 MM HG: CPT | Mod: CPTII,S$GLB,, | Performed by: INTERNAL MEDICINE

## 2022-09-13 PROCEDURE — 3061F NEG MICROALBUMINURIA REV: CPT | Mod: CPTII,S$GLB,, | Performed by: INTERNAL MEDICINE

## 2022-09-13 PROCEDURE — 3044F HG A1C LEVEL LT 7.0%: CPT | Mod: CPTII,S$GLB,, | Performed by: INTERNAL MEDICINE

## 2022-09-13 PROCEDURE — 3078F DIAST BP <80 MM HG: CPT | Mod: CPTII,S$GLB,, | Performed by: INTERNAL MEDICINE

## 2022-09-13 PROCEDURE — 1160F RVW MEDS BY RX/DR IN RCRD: CPT | Mod: CPTII,S$GLB,, | Performed by: INTERNAL MEDICINE

## 2022-09-13 PROCEDURE — 84378 SUGARS SINGLE QUANT: CPT | Performed by: INTERNAL MEDICINE

## 2022-09-13 PROCEDURE — 82040 ASSAY OF SERUM ALBUMIN: CPT | Performed by: INTERNAL MEDICINE

## 2022-09-13 PROCEDURE — 1126F PR PAIN SEVERITY QUANTIFIED, NO PAIN PRESENT: ICD-10-PCS | Mod: CPTII,S$GLB,, | Performed by: INTERNAL MEDICINE

## 2022-09-13 PROCEDURE — 1159F PR MEDICATION LIST DOCUMENTED IN MEDICAL RECORD: ICD-10-PCS | Mod: CPTII,S$GLB,, | Performed by: INTERNAL MEDICINE

## 2022-09-13 PROCEDURE — 3066F PR DOCUMENTATION OF TREATMENT FOR NEPHROPATHY: ICD-10-PCS | Mod: CPTII,S$GLB,, | Performed by: INTERNAL MEDICINE

## 2022-09-13 PROCEDURE — 82150 ASSAY OF AMYLASE: CPT | Performed by: INTERNAL MEDICINE

## 2022-09-13 PROCEDURE — 82024 ASSAY OF ACTH: CPT | Performed by: INTERNAL MEDICINE

## 2022-09-13 PROCEDURE — 99214 PR OFFICE/OUTPT VISIT, EST, LEVL IV, 30-39 MIN: ICD-10-PCS | Mod: S$GLB,,, | Performed by: INTERNAL MEDICINE

## 2022-09-13 PROCEDURE — 4010F ACE/ARB THERAPY RXD/TAKEN: CPT | Mod: CPTII,S$GLB,, | Performed by: INTERNAL MEDICINE

## 2022-09-13 PROCEDURE — 3078F PR MOST RECENT DIASTOLIC BLOOD PRESSURE < 80 MM HG: ICD-10-PCS | Mod: CPTII,S$GLB,, | Performed by: INTERNAL MEDICINE

## 2022-09-13 PROCEDURE — 3008F PR BODY MASS INDEX (BMI) DOCUMENTED: ICD-10-PCS | Mod: CPTII,S$GLB,, | Performed by: INTERNAL MEDICINE

## 2022-09-13 PROCEDURE — 82670 ASSAY OF TOTAL ESTRADIOL: CPT | Performed by: INTERNAL MEDICINE

## 2022-09-13 PROCEDURE — 3061F PR NEG MICROALBUMINURIA RESULT DOCUMENTED/REVIEW: ICD-10-PCS | Mod: CPTII,S$GLB,, | Performed by: INTERNAL MEDICINE

## 2022-09-13 PROCEDURE — 99999 PR PBB SHADOW E&M-EST. PATIENT-LVL IV: ICD-10-PCS | Mod: PBBFAC,,, | Performed by: INTERNAL MEDICINE

## 2022-09-13 PROCEDURE — 83036 HEMOGLOBIN GLYCOSYLATED A1C: CPT | Mod: 59 | Performed by: INTERNAL MEDICINE

## 2022-09-13 PROCEDURE — 83690 ASSAY OF LIPASE: CPT | Performed by: INTERNAL MEDICINE

## 2022-09-13 PROCEDURE — 4010F PR ACE/ARB THEARPY RXD/TAKEN: ICD-10-PCS | Mod: CPTII,S$GLB,, | Performed by: INTERNAL MEDICINE

## 2022-09-13 PROCEDURE — 3288F FALL RISK ASSESSMENT DOCD: CPT | Mod: CPTII,S$GLB,, | Performed by: INTERNAL MEDICINE

## 2022-09-13 PROCEDURE — 80061 LIPID PANEL: CPT | Performed by: INTERNAL MEDICINE

## 2022-09-13 PROCEDURE — 82985 ASSAY OF GLYCATED PROTEIN: CPT | Performed by: INTERNAL MEDICINE

## 2022-09-13 PROCEDURE — 1101F PR PT FALLS ASSESS DOC 0-1 FALLS W/OUT INJ PAST YR: ICD-10-PCS | Mod: CPTII,S$GLB,, | Performed by: INTERNAL MEDICINE

## 2022-09-13 PROCEDURE — 1126F AMNT PAIN NOTED NONE PRSNT: CPT | Mod: CPTII,S$GLB,, | Performed by: INTERNAL MEDICINE

## 2022-09-13 PROCEDURE — 99999 PR PBB SHADOW E&M-EST. PATIENT-LVL IV: CPT | Mod: PBBFAC,,, | Performed by: INTERNAL MEDICINE

## 2022-09-13 PROCEDURE — 1157F ADVNC CARE PLAN IN RCRD: CPT | Mod: CPTII,S$GLB,, | Performed by: INTERNAL MEDICINE

## 2022-09-13 PROCEDURE — 82533 TOTAL CORTISOL: CPT | Performed by: INTERNAL MEDICINE

## 2022-09-13 PROCEDURE — 1160F PR REVIEW ALL MEDS BY PRESCRIBER/CLIN PHARMACIST DOCUMENTED: ICD-10-PCS | Mod: CPTII,S$GLB,, | Performed by: INTERNAL MEDICINE

## 2022-09-13 PROCEDURE — 1159F MED LIST DOCD IN RCRD: CPT | Mod: CPTII,S$GLB,, | Performed by: INTERNAL MEDICINE

## 2022-09-13 PROCEDURE — 3008F BODY MASS INDEX DOCD: CPT | Mod: CPTII,S$GLB,, | Performed by: INTERNAL MEDICINE

## 2022-09-13 PROCEDURE — 86316 IMMUNOASSAY TUMOR OTHER: CPT | Performed by: INTERNAL MEDICINE

## 2022-09-13 PROCEDURE — 1157F PR ADVANCE CARE PLAN OR EQUIV PRESENT IN MEDICAL RECORD: ICD-10-PCS | Mod: CPTII,S$GLB,, | Performed by: INTERNAL MEDICINE

## 2022-09-13 PROCEDURE — 1101F PT FALLS ASSESS-DOCD LE1/YR: CPT | Mod: CPTII,S$GLB,, | Performed by: INTERNAL MEDICINE

## 2022-09-13 PROCEDURE — 3044F PR MOST RECENT HEMOGLOBIN A1C LEVEL <7.0%: ICD-10-PCS | Mod: CPTII,S$GLB,, | Performed by: INTERNAL MEDICINE

## 2022-09-13 NOTE — PROGRESS NOTES
Subjective:      Patient ID: Sadiq Dhillon is a 72 y.o. male.    Chief Complaint:  Diabetes       Patient is a 72 yr old gentleman with prior history of prolactinoma seen in [facility] today.    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  He had previously been seen at the [facility] neuroendocrine center. 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 He also had a background history of primary parathyroidectomy for which he had a prior parathyroidectomy and a pancreatic neoplasm. He in addition has secondary hypogonadism, type 2 diabetes, hyperlipidemia, CAD and Fuch's corneal dystrophy. There was a lingering concern that he may have MEN-1 but the screening tests obtained for that were -ve.  He had apparently been previously managed and ffed here in [place] with Dr Dr Bree Camacho prior to her move to [place].  He remains on Dostinex for the prolactinoma;  0.5mg 2 x weekly.  For his diabetes his most recent HBA1c from 01/22 was 6.6 indicating excellent glycemic control at that time. His current antidiabetic regimen consistent of metformin 1 g BID.  His background comorbidities are detailed below;     1. Pituitary adenoma      2. Prolactinoma      3. Primary hyperparathyroidism      4. Fuchs' corneal dystrophy      5. Type 2 diabetes mellitus with hyperglycemia, without long-term current use of insulin      6. Hypogonadism in male      7. Nephrolithiasis      8. Unspecified essential hypertension      9. Pituitary macroadenoma      10. Hyperlipidemia, unspecified hyperlipidemia type      11. Hypercholesterolemia         Patients baseline [place] score is 7.  Patient is being seen once every 2 years. Patients is being ffed by a gastroenterologist; Dr Post.  There is no family history of pituitary disease.  Patient sister does have nephrolithiasis.   There is no family history of any adrenal, pancreatic or renal masses.  No family  history of pancreatic cancer.  Screening for any pancreatic related tumor markers was unremarkable.  There is no family or personal history of severe dyspepsia.  Patient does not have persistent headaches. He does not  And visual blurring not lateral vision deficits.     Patient does not smoke.  Patient drinks ~ 7  Weekly; mainly wine and cock tails.     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  Patients screening DEXA from 10/20 was normal.  Patient had a recent fall ~ 3 weeks ago and had right side fractures.   Patients most recent pituitary MRI from 11/2020 showed macroadenoma with extension into suprasellar cistern, posteriorly into the retroclival location and with infindibulin deviation and optic chiasm tethering.  He is being ffed by ophthalmology on this account. His MEN-1 genetic testing was unremarkable.  Patient denies any headaches, no polyuria and no seizures.  He has no fresh complaints today.         Review of Systems   Constitutional:  Negative for activity change, appetite change, diaphoresis, fatigue and unexpected weight change.   HENT:  Negative for facial swelling, hearing loss, sore throat, trouble swallowing and voice change.    Eyes:  Negative for photophobia and visual disturbance.   Respiratory:  Negative for cough and shortness of breath.    Cardiovascular:  Negative for chest pain, palpitations and leg swelling.   Gastrointestinal:  Positive for diarrhea (occasional; apparently temporally related to metformin use.). Negative for abdominal distention, abdominal pain, constipation, nausea and vomiting.   Endocrine: Negative for polyphagia and polyuria.   Genitourinary:  Negative for dysuria, flank pain, frequency, hematuria and urgency.   Musculoskeletal:  Negative for arthralgias, back pain, gait problem, joint swelling, myalgias and neck pain.   Skin:  Negative for color change, pallor and rash.   Neurological:  Negative for dizziness, tremors, seizures, syncope,  "light-headedness, numbness and headaches.   Hematological:  Does not bruise/bleed easily.   Psychiatric/Behavioral:  Negative for agitation, confusion, dysphoric mood and sleep disturbance. The patient is not nervous/anxious and is not hyperactive.      Objective: /60 (BP Location: Left arm, Patient Position: Sitting, BP Method: Medium (Manual))   Pulse 64   Temp 97.8 °F (36.6 °C) (Oral)   Ht 5' 9" (1.753 m)   Wt 81.4 kg (179 lb 5.5 oz)   SpO2 96%   BMI 26.48 kg/m²  Body surface area is 1.99 meters squared. /60 (BP Location: Left arm, Patient Position: Sitting, BP Method: Medium (Manual))   Pulse 64   Temp 97.8 °F (36.6 °C) (Oral)   Ht 5' 9" (1.753 m)   Wt 81.4 kg (179 lb 5.5 oz)   SpO2 96%   BMI 26.48 kg/m²  BP; 110/66             Physical Exam  Vitals reviewed.   Constitutional:       Appearance: Normal appearance.      Comments: Pleasant gentleman who looks significantly younger than his stated chronologic age. Not pale, anicteric and afebrile. Well hydrated.   HENT:      Head: Normocephalic and atraumatic.   Eyes:      General: No scleral icterus.     Conjunctiva/sclera: Conjunctivae normal.      Pupils: Pupils are equal, round, and reactive to light.   Neck:      Vascular: No carotid bruit.   Cardiovascular:      Rate and Rhythm: Normal rate and regular rhythm.      Pulses: Normal pulses.      Heart sounds: No murmur heard.  Pulmonary:      Effort: No respiratory distress.      Breath sounds: Normal breath sounds. No stridor. No wheezing or rhonchi.   Abdominal:      General: Abdomen is flat. There is no distension.      Tenderness: There is no abdominal tenderness.   Musculoskeletal:         General: No swelling. Normal range of motion.      Cervical back: Neck supple. No rigidity.   Lymphadenopathy:      Cervical: No cervical adenopathy.   Skin:     General: Skin is warm and dry.      Coloration: Skin is not jaundiced or pale.      Findings: No bruising.   Neurological:      General: " No focal deficit present.      Mental Status: He is alert.      Cranial Nerves: No cranial nerve deficit.      Sensory: No sensory deficit.      Gait: Gait normal.   Psychiatric:         Mood and Affect: Mood normal.         Behavior: Behavior normal.         Thought Content: Thought content normal.         Judgment: Judgment normal.       Lab Review:      Latest Reference Range & Units 03/08/22 15:43 03/09/22 12:00 03/30/22 14:41 03/30/22 15:00 04/09/22 15:08 04/25/22 14:31 05/06/22 14:00 05/06/22 14:36 05/07/22 11:40 05/09/22 15:18 05/09/22 15:36   WBC 3.90 - 12.70 K/uL 7.08             RBC 4.60 - 6.20 M/uL 5.15             Hemoglobin 14.0 - 18.0 g/dL 15.0             Hematocrit 40.0 - 54.0 % 46.5             MCV 82 - 98 fL 90             MCH 27.0 - 31.0 pg 29.1             MCHC 32.0 - 36.0 g/dL 32.3             RDW 11.5 - 14.5 % 13.0             Platelets 150 - 450 K/uL 192             MPV 9.2 - 12.9 fL 9.1 (L)             Gran % 38.0 - 73.0 % 60.2             Lymph % 18.0 - 48.0 % 28.4             Mono % 4.0 - 15.0 % 8.2             Eosinophil % 0.0 - 8.0 % 2.3             Basophil % 0.0 - 1.9 % 0.3             Immature Granulocytes 0.0 - 0.5 % 0.6 (H)             Gran # (ANC) 1.8 - 7.7 K/uL 4.3             Lymph # 1.0 - 4.8 K/uL 2.0             Mono # 0.3 - 1.0 K/uL 0.6             Eos # 0.0 - 0.5 K/uL 0.2             Baso # 0.00 - 0.20 K/uL 0.02             Immature Grans (Abs) 0.00 - 0.04 K/uL 0.04             nRBC 0 /100 WBC 0             Differential Method  Automated             Folate 4.0 - 24.0 ng/mL 9.8             Vitamin B-12 180 - 914 ng/L  210 - 950 pg/mL 364  544             Gastrin <100 pg/mL        36      Sodium 136 - 145 mmol/L 138             Potassium 3.5 - 5.1 mmol/L 4.6             Chloride 95 - 110 mmol/L 100             CO2 23 - 29 mmol/L 24             Anion Gap 8 - 16 mmol/L 14             BUN 8 - 23 mg/dL 19             Creatinine 0.5 - 1.4 mg/dL 0.8             eGFR if non African  American >60 mL/min/1.73 m^2 >60.0             eGFR if African American >60 mL/min/1.73 m^2 >60.0             Glucose 70 - 110 mg/dL 94             Calcium 8.7 - 10.5 mg/dL 10.2             Ionized Calcium 1.06 - 1.42 mmol/L 1.29             Alkaline Phosphatase 55 - 135 U/L 68             PROTEIN TOTAL 6.0 - 8.4 g/dL 7.3             Albumin 3.5 - 5.2 g/dL 4.4             Uric Acid 3.4 - 7.0 mg/dL 6.4             BILIRUBIN TOTAL 0.1 - 1.0 mg/dL 0.9             AST 10 - 40 U/L 18             ALT 10 - 44 U/L 17             Amylase 20 - 110 U/L 243 (H)             Lipase 4 - 60 U/L 223 (H)             CEA 0.0 - 5.0 ng/mL        <1.7      Macroprolactin Comment  SEE BELOW             Pancreastatin 10 - 135 pg/mL        75      B12 Def. Methylmalonic Acid <=0.40 nmol/mL 0.14             Vit D, 25-Hydroxy 30 - 96 ng/mL 52             ALDOSTERONE ng/dL 7.7             Cortisol ug/dL 5.90             Hemoglobin A1C External 4.0 - 5.6 % 6.6 (H)             Estimated Avg Glucose 68 - 131 mg/dL 143 (H)             ACTH 0 - 46 pg/mL 47 (H)             MCRPL Percent <=60 % 8             Prolactin, Total 4.0 - 15.2 ng/mL 3.9 (L)             Prolactin, Unprecipitated 2.7 - 13.1 ng/mL 3.6             TSH 0.400 - 4.000 uIU/mL 2.281             PTH 9.0 - 77.0 pg/mL 36.3              0 - 30 U/mL        7      CA 15-3 <30, use not defined U/mL        17      CA 27.29 <=38.0 U/mL        16.5      CA 19-9 0.0 - 40.0 U/mL        3.7      Chromogranin A <93 ng/mL 66             PSA Total 0.00 - 4.00 ng/mL 1.9             PSA, Free 0.00 - 1.50 ng/mL 0.54             PSA, Free % Not established % 28.42             Albumin 3.6 - 5.1 g/dL 4.8             Estradiol 11 - 44 pg/mL 14             Prolactin 3.5 - 19.4 ng/mL 3.2 (L)             Sex Hormone Binding Globulin 22 - 77 nmol/L 18 (L)             Testosterone 250 - 1100 ng/dL 250             Testosterone, Bioavailable 15.0 - 150.0 ng/dL 103.6             Testosterone, Free 6.0 - 73.0  pg/mL 47.4             Specimen UA   Urine, Clean Catch            Color, UA Yellow, Straw, Natali   Yellow            Appearance, UA Clear   Clear            Specific Gravity, UA 1.005 - 1.030   1.020            pH, UA 5.0 - 8.0   6.0            Protein, UA Negative   Negative            Glucose, UA Negative   Negative            Ketones, UA Negative   Negative            Occult Blood UA Negative   2+ !            NITRITE UA Negative   Negative            Bilirubin (UA) Negative   Negative            Leukocytes, UA Negative   Negative            RBC, UA 0 - 4 /hpf  16 (H)            WBC, UA 0 - 5 /hpf  1            Bacteria, UA None-Occ /hpf  Rare            Squam Epithel, UA /hpf  6            Hyaline Casts, UA 0-1/lpf /lpf  1            Microscopic Comment   SEE COMMENT            Creatinine, Urine 23.0 - 375.0 mg/dL  140.0            Microalbumin, Urine ug/mL  22.0            MICROALB/CREAT RATIO 0.0 - 30.0 ug/mg  15.7            Fecal Immunochemical Test (iFOBT) Negative          Negative     CULTURE, URINE     Rpt          [facility] Miscellaneous Test 1         SEE NOTE !      Clarity, UA       Clear        Color, UA       Yellow        pH, UA 5 - 8    6.0   5        Spec Grav UA       1.025        POC Blood, Urine Negative    Positive !           POC Bilirubin, Urine Negative    Negative           POC Urobilinogen, Urine 0.3 - 2.2    norm           POC Ketones, Urine Negative    Negative           POC Protein, Urine Negative    Negative           POC Nitrates, Urine Negative    Negative           POC Glucose, Urine Negative    Negative           POC Specific Gravity, Urine 1.003 - 1.029    1.020           POC Leukocytes, Urine Negative    Negative           CT ABDOMEN WITHOUT CONTRAST      Rpt !         XR KUB           Rpt    US KIDNEY            Rpt   MRI PITUITARY W W/O CONTRAST        Rpt       POCT URINALYSIS, DIPSTICK, AUTOMATED, W/O SCOPE    Rpt !           POCT URINE DIPSTICK WITHOUT MICROSCOPE       Rpt !         Renin Activity ng/mL/h 13             (L): Data is abnormally low  (H): Data is abnormally high  !: Data is abnormal  Rpt: View report in Results Review for more information      Assessment:     1. Type 2 diabetes mellitus with hyperglycemia, without long-term current use of insulin  Fructosamine    GlycoMark (TM)    Hemoglobin A1C    Amylase    Lipid Panel    Lipase      2. Primary hyperparathyroidism        3. Hypogonadism in male  Testosterone Panel    Estradiol    Estrogens, Total      4. Pituitary macroadenoma  ACTH    Cortisol    Chromogranin A    Alpha Sub Unit      5. Hypovitaminosis D        6. Prolactinoma        7. History of pancreatitis  Amylase    Lipase      8. Pancreas cyst  Amylase    Lipase      9. Nephrolithiasis        10. Coronary artery disease involving native coronary artery of native heart without angina pectoris        11. Hypertensive heart disease without heart failure        12. Hyperlipidemia, mixed        13. Hypercholesterolemia        14. Unspecified essential hypertension        15. Fuchs' corneal dystrophy, unspecified laterality               Patient with pituitary macroadenoma; to check prolactin levels currently and obtain ffup adeno and neurohypophyseal screening labs testing for current functional status as detailed above. For ffup  Pituitary MRI.  Regarding prolactinoma; to continue dostinex as before.  Regarding hyperparathyroidism; this required prior parathyroidectomy the details of which are unclear. . In view of his history of pituitary and parathyroid disease as well as pancreatic cystic mass disease we obtained MEN-1 genetic testing which was however unrevealing.  Regarding hypogonadism; to continue topical androgen repletion and will check current androgen and other relevant repro hormone profile. To also check current PSA and HCT.  Regarding pancreatic  Cystic mass; to obtain ffup abdomen CT, obtain basic tumor marker screening which will be ffed serially as  required.  Regarding type 2 diabetes; to continue low dose metformin as before and check current HBA1c. For now no management changes.  Regarding essential hypertension; BP presently well controlled. To continue present antihypertesnive regimen and to continue serial ambulatory BP and pulse rate tracking.  Regarding hyperlipidemia; to continue present antilipidemic therapy and to check current lipid profile as well as low dose asa for primary ASCVD prophylaxis.  Regarding possible hypovitaminosis D; to check 25 OH Vit D levels and replete as needed.        Plan:       FFup in ~ 4mths.

## 2022-09-14 LAB
AMYLASE SERPL-CCNC: 205 U/L (ref 20–110)
CHOLEST SERPL-MCNC: 132 MG/DL (ref 120–199)
CHOLEST/HDLC SERPL: 2.9 {RATIO} (ref 2–5)
CORTIS SERPL-MCNC: 6.1 UG/DL
ESTRADIOL SERPL-MCNC: <10 PG/ML (ref 11–44)
HDLC SERPL-MCNC: 45 MG/DL (ref 40–75)
HDLC SERPL: 34.1 % (ref 20–50)
LDLC SERPL CALC-MCNC: 58.8 MG/DL (ref 63–159)
LIPASE SERPL-CCNC: 233 U/L (ref 4–60)
NONHDLC SERPL-MCNC: 87 MG/DL
TRIGL SERPL-MCNC: 141 MG/DL (ref 30–150)

## 2022-09-16 ENCOUNTER — TELEPHONE (OUTPATIENT)
Dept: ENDOSCOPY | Facility: HOSPITAL | Age: 72
End: 2022-09-16
Payer: COMMERCIAL

## 2022-09-16 LAB
ACTH PLAS-MCNC: 42 PG/ML (ref 0–46)
CGA SERPL-MCNC: 61 NG/ML
ESTROGEN SERPL-MCNC: 82 PG/ML
FRUCTOSAMINE SERPL-SCNC: 319 UMOL /L

## 2022-09-17 ENCOUNTER — HOSPITAL ENCOUNTER (OUTPATIENT)
Dept: RADIOLOGY | Facility: HOSPITAL | Age: 72
Discharge: HOME | End: 2022-09-17
Attending: ORTHOPAEDIC SURGERY
Payer: COMMERCIAL

## 2022-09-17 DIAGNOSIS — M75.100 ROTATOR CUFF SYNDROME, UNSPECIFIED LATERALITY: ICD-10-CM

## 2022-09-17 PROCEDURE — 73221 MRI JOINT UPR EXTREM W/O DYE: CPT | Mod: 26,RT,, | Performed by: RADIOLOGY

## 2022-09-17 PROCEDURE — 73221 MRI JOINT UPR EXTREM W/O DYE: CPT | Mod: TC,RT

## 2022-09-17 PROCEDURE — 73221 MRI SHOULDER WITHOUT CONTRAST RIGHT: ICD-10-PCS | Mod: 26,RT,, | Performed by: RADIOLOGY

## 2022-09-18 ENCOUNTER — TELEPHONE (OUTPATIENT)
Dept: ENDOSCOPY | Facility: HOSPITAL | Age: 72
End: 2022-09-18
Payer: COMMERCIAL

## 2022-09-19 ENCOUNTER — PATIENT MESSAGE (OUTPATIENT)
Dept: ENDOSCOPY | Facility: HOSPITAL | Age: 72
End: 2022-09-19
Payer: COMMERCIAL

## 2022-09-20 ENCOUNTER — PATIENT MESSAGE (OUTPATIENT)
Dept: SPORTS MEDICINE | Facility: CLINIC | Age: 72
End: 2022-09-20
Payer: COMMERCIAL

## 2022-09-20 LAB
ALBUMIN SERPL-MCNC: 4.8 G/DL (ref 3.6–5.1)
GLYCOMARK (TM): 14.2 UG/ML
SHBG SERPL-SCNC: 20 NMOL/L (ref 22–77)
TESTOST FREE SERPL-MCNC: 184.3 PG/ML (ref 6–73)
TESTOST SERPL-MCNC: 853 NG/DL (ref 250–1100)
TESTOSTERONE.FREE+WB SERPL-MCNC: 403.2 NG/DL (ref 15–150)

## 2022-09-23 ENCOUNTER — TELEPHONE (OUTPATIENT)
Dept: SPORTS MEDICINE | Facility: CLINIC | Age: 72
End: 2022-09-23
Payer: COMMERCIAL

## 2022-09-23 NOTE — TELEPHONE ENCOUNTER
I spoke with patient about his MRI results and treatment recommendation.    The patient is undecided on how he would like to proceed. He would like to come in for a revaluation and further surgical discussion as it has been approximately 7 weeks since his last visit and he feels he may have improved.    We will schedule him for a clinic visit.              MRI Right Shoulder:  1. Full-thickness near full width tear of the anterior aspect supraspinatus with associated subcortical cystic change at the level of the greater tuberosity.   2. Mild associated subacromial subdeltoid bursitis. Tendinosis of the supraspinatus and infraspinatus associated.  Intra-articular bicipital tendinosis.        RECOMMENDED PLAN:   Right Shoulder rotator cuff tear     All questions were answered, pt will contact us for questions or concerns in the interim.  Had thorough discussion of non-operative vs operative intervention, and risks and benefits of both.     We have discussed the surgery and recovery of arthroscopic shoulder surgery. he understands that there may be limited mobility up to several weeks after surgery depending on procedures that are performed at the time of surgery.    The spectrum of treatment options were discussed with the patient, including nonoperative and operative options:    right   a. Shoulder arthroscopic rotator cuff repair with Cuffmend   b. Shoulder arthroscopic SAD   c. Shoulder arthroscopic extensive debridement   d. Shoulder arthroscopic biceps tenodesis (vs. subpect tenodesis)   e. Shoulder arthroscopic possible microfracture   f.  Shoulder open reduction internal fixation greater tuberosity

## 2022-09-28 ENCOUNTER — OFFICE VISIT (OUTPATIENT)
Dept: SPORTS MEDICINE | Facility: CLINIC | Age: 72
End: 2022-09-28
Payer: COMMERCIAL

## 2022-09-28 VITALS
WEIGHT: 180 LBS | HEART RATE: 76 BPM | BODY MASS INDEX: 26.66 KG/M2 | SYSTOLIC BLOOD PRESSURE: 109 MMHG | HEIGHT: 69 IN | DIASTOLIC BLOOD PRESSURE: 67 MMHG

## 2022-09-28 DIAGNOSIS — M75.100 ROTATOR CUFF SYNDROME, UNSPECIFIED LATERALITY: Primary | ICD-10-CM

## 2022-09-28 PROCEDURE — 3008F BODY MASS INDEX DOCD: CPT | Mod: CPTII,S$GLB,, | Performed by: ORTHOPAEDIC SURGERY

## 2022-09-28 PROCEDURE — 99214 OFFICE O/P EST MOD 30 MIN: CPT | Mod: S$GLB,,, | Performed by: ORTHOPAEDIC SURGERY

## 2022-09-28 PROCEDURE — 3074F PR MOST RECENT SYSTOLIC BLOOD PRESSURE < 130 MM HG: ICD-10-PCS | Mod: CPTII,S$GLB,, | Performed by: ORTHOPAEDIC SURGERY

## 2022-09-28 PROCEDURE — 99214 PR OFFICE/OUTPT VISIT, EST, LEVL IV, 30-39 MIN: ICD-10-PCS | Mod: S$GLB,,, | Performed by: ORTHOPAEDIC SURGERY

## 2022-09-28 PROCEDURE — 3044F HG A1C LEVEL LT 7.0%: CPT | Mod: CPTII,S$GLB,, | Performed by: ORTHOPAEDIC SURGERY

## 2022-09-28 PROCEDURE — 3061F NEG MICROALBUMINURIA REV: CPT | Mod: CPTII,S$GLB,, | Performed by: ORTHOPAEDIC SURGERY

## 2022-09-28 PROCEDURE — 4010F PR ACE/ARB THEARPY RXD/TAKEN: ICD-10-PCS | Mod: CPTII,S$GLB,, | Performed by: ORTHOPAEDIC SURGERY

## 2022-09-28 PROCEDURE — 3044F PR MOST RECENT HEMOGLOBIN A1C LEVEL <7.0%: ICD-10-PCS | Mod: CPTII,S$GLB,, | Performed by: ORTHOPAEDIC SURGERY

## 2022-09-28 PROCEDURE — 1125F AMNT PAIN NOTED PAIN PRSNT: CPT | Mod: CPTII,S$GLB,, | Performed by: ORTHOPAEDIC SURGERY

## 2022-09-28 PROCEDURE — 3288F FALL RISK ASSESSMENT DOCD: CPT | Mod: CPTII,S$GLB,, | Performed by: ORTHOPAEDIC SURGERY

## 2022-09-28 PROCEDURE — 3078F PR MOST RECENT DIASTOLIC BLOOD PRESSURE < 80 MM HG: ICD-10-PCS | Mod: CPTII,S$GLB,, | Performed by: ORTHOPAEDIC SURGERY

## 2022-09-28 PROCEDURE — 1157F ADVNC CARE PLAN IN RCRD: CPT | Mod: CPTII,S$GLB,, | Performed by: ORTHOPAEDIC SURGERY

## 2022-09-28 PROCEDURE — 4010F ACE/ARB THERAPY RXD/TAKEN: CPT | Mod: CPTII,S$GLB,, | Performed by: ORTHOPAEDIC SURGERY

## 2022-09-28 PROCEDURE — 1157F PR ADVANCE CARE PLAN OR EQUIV PRESENT IN MEDICAL RECORD: ICD-10-PCS | Mod: CPTII,S$GLB,, | Performed by: ORTHOPAEDIC SURGERY

## 2022-09-28 PROCEDURE — 3288F PR FALLS RISK ASSESSMENT DOCUMENTED: ICD-10-PCS | Mod: CPTII,S$GLB,, | Performed by: ORTHOPAEDIC SURGERY

## 2022-09-28 PROCEDURE — 3008F PR BODY MASS INDEX (BMI) DOCUMENTED: ICD-10-PCS | Mod: CPTII,S$GLB,, | Performed by: ORTHOPAEDIC SURGERY

## 2022-09-28 PROCEDURE — 99999 PR PBB SHADOW E&M-EST. PATIENT-LVL III: CPT | Mod: PBBFAC,,, | Performed by: ORTHOPAEDIC SURGERY

## 2022-09-28 PROCEDURE — 1101F PR PT FALLS ASSESS DOC 0-1 FALLS W/OUT INJ PAST YR: ICD-10-PCS | Mod: CPTII,S$GLB,, | Performed by: ORTHOPAEDIC SURGERY

## 2022-09-28 PROCEDURE — 3066F PR DOCUMENTATION OF TREATMENT FOR NEPHROPATHY: ICD-10-PCS | Mod: CPTII,S$GLB,, | Performed by: ORTHOPAEDIC SURGERY

## 2022-09-28 PROCEDURE — 3061F PR NEG MICROALBUMINURIA RESULT DOCUMENTED/REVIEW: ICD-10-PCS | Mod: CPTII,S$GLB,, | Performed by: ORTHOPAEDIC SURGERY

## 2022-09-28 PROCEDURE — 1101F PT FALLS ASSESS-DOCD LE1/YR: CPT | Mod: CPTII,S$GLB,, | Performed by: ORTHOPAEDIC SURGERY

## 2022-09-28 PROCEDURE — 3066F NEPHROPATHY DOC TX: CPT | Mod: CPTII,S$GLB,, | Performed by: ORTHOPAEDIC SURGERY

## 2022-09-28 PROCEDURE — 3074F SYST BP LT 130 MM HG: CPT | Mod: CPTII,S$GLB,, | Performed by: ORTHOPAEDIC SURGERY

## 2022-09-28 PROCEDURE — 3078F DIAST BP <80 MM HG: CPT | Mod: CPTII,S$GLB,, | Performed by: ORTHOPAEDIC SURGERY

## 2022-09-28 PROCEDURE — 99999 PR PBB SHADOW E&M-EST. PATIENT-LVL III: ICD-10-PCS | Mod: PBBFAC,,, | Performed by: ORTHOPAEDIC SURGERY

## 2022-09-28 PROCEDURE — 1125F PR PAIN SEVERITY QUANTIFIED, PAIN PRESENT: ICD-10-PCS | Mod: CPTII,S$GLB,, | Performed by: ORTHOPAEDIC SURGERY

## 2022-09-28 NOTE — PROGRESS NOTES
CC: right Shoulder pain referred by Dr. Mcpherson    72 y.o. Male reports that the pain is severe and not responding to any conservative care.      He reports that the pain is worse with overhead activity. It also bothers him at night.    SANE 75    Failed physician directed therapy x 8 weeks  Failed NSAIDs x 6 weeks  Failed RICE x 6 weeks      PAST MEDICAL HISTORY:   Past Medical History:   Diagnosis Date    Diabetes mellitus     Fuchs' corneal dystrophy     Heart attack     Hypertension     Kidney stones     Pancreatic mass     Pancreatitis     after EUS . 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, treated with dostenex(cabergoline)/ resolved    Reflux esophagitis     Tumor cells, benign      PAST SURGICAL HISTORY:   Past Surgical History:   Procedure Laterality Date    CATARACT EXTRACTION W/  INTRAOCULAR LENS IMPLANT Right 0    Dr Van     CATARACT EXTRACTION W/  INTRAOCULAR LENS IMPLANT Left 3/21/2019    Procedure: EXTRACTION, CATARACT, WITH IOL INSERTION;  Surgeon: Ra Van MD;  Location: [facility];  Service: Ophthalmology;  Laterality: Left;    CORNEAL TRANSPLANT Right     Dr Van     DESCEMETS STRIPPING AUTOMATED ENDOTHELIAL KERATOPLASTY Left 3/21/2019    Procedure: TRANSPLANT, PARTIAL-THICKNESS, CORNEA, USING DSAEK TECHNIQUE;  Surgeon: Ra Van MD;  Location: [facility];  Service: Ophthalmology;  Laterality: Left;  DSEK    REPAIR OF RETINAL DETACHMENT WITH VITRECTOMY Right 7/18/2018    Procedure: REPAIR, RETINAL DETACHMENT, WITH VITRECTOMY;  Surgeon: SHUBHAM Patel MD;  [place] [facility] [place] [address] [place] [address];  Service: Ophthalmology;  Laterality: Right;  40 min    REPAIR OF RETINAL DETACHMENT WITH VITRECTOMY Left 8/8/2018    Procedure: REPAIR, RETINAL DETACHMENT, WITH VITRECTOMY;  Surgeon: SHUBHAM Patel MD;  [place] [facility] [address] [place] [address];  Service:  Ophthalmology;  Laterality: Left;  40 min    RHINOPLASTY TIP  1975    THYROIDECTOMY  2007    UPPER ENDOSCOPIC ULTRASOUND W/ FNA      with resultant pancreatitis     FAMILY HISTORY:   Family History   Problem Relation Age of Onset    Hypertension Mother     Heart disease Mother     Heart disease Father     Cataracts Father     Stroke Father     Diabetes Father     Diabetes Paternal Uncle     Stroke Maternal Grandmother     Blindness Neg Hx     Glaucoma Neg Hx     Macular degeneration Neg Hx     Retinal detachment Neg Hx     Strabismus Neg Hx     Thyroid disease Neg Hx     Cancer Neg Hx     Amblyopia Neg Hx      SOCIAL HISTORY:   Social History     Socioeconomic History    Marital status: Single   Tobacco Use    Smoking status: Never    Smokeless tobacco: Never   Substance and Sexual Activity    Alcohol use: Yes     Comment: social    Drug use: No    Sexual activity: Not Currently       MEDICATIONS:   Current Outpatient Medications:     aspirin (ECOTRIN) 81 MG EC tablet, Take 81 mg by mouth every morning. , Disp: , Rfl:     atorvastatin (LIPITOR) 80 MG tablet, Take 1 tablet (80 mg total) by mouth once daily., Disp: 90 tablet, Rfl: 3    cabergoline (DOSTINEX) 0.5 mg tablet, TAKE 1 TABLET(0.5 MG) BY MOUTH 2 TIMES A WEEK, Disp: 24 tablet, Rfl: 3    CONTOUR NEXT TEST STRIPS Strp, UTD QID IN VITRO, Disp: , Rfl: 3    famotidine (PEPCID) 20 MG tablet, Take by mouth., Disp: , Rfl:     fexofenadine-pseudoephedrine  mg (ALLEGRA-D)  mg per tablet, Take 1 tablet by mouth., Disp: , Rfl:     fluticasone propionate (FLONASE) 50 mcg/actuation nasal spray, SHAKE LIQUID AND USE 1 TO 2 SPRAYS(50  MCG) IN EACH NOSTRIL EVERY DAY, Disp: 48 g, Rfl: 0    gabapentin (NEURONTIN) 100 MG capsule, Take 1 capsule three times a day,  Takes one capsule every morning, Disp: , Rfl: 3    lisinopril (PRINIVIL,ZESTRIL) 2.5 MG tablet, Take 2.5 mg by mouth every morning. , Disp: , Rfl:     loperamide (IMODIUM) 2 mg capsule, Take 2 mg by  "mouth 4 (four) times daily as needed for Diarrhea., Disp: , Rfl:     metFORMIN (GLUCOPHAGE-XR) 500 MG ER 24hr tablet, TAKE 2 TABLETS BY MOUTH TWICE DAILY, Disp: 360 tablet, Rfl: 3    metoprolol succinate (TOPROL-XL) 25 MG 24 hr tablet, Take 25 mg by mouth nightly. , Disp: , Rfl:     naproxen sodium (ANAPROX) 550 MG tablet, SMARTSI Tablet(s) By Mouth Every 12 Hours PRN, Disp: , Rfl:     neomycin-polymyxin-hydrocortisone (CORTISPORIN) 3.5-10,000-1 mg/mL-unit/mL-% otic suspension, neomycin-polymyxin-HC Take 4 drops (otic (ear)) 3 times per day for 7 days 2021 drops,suspension 3 times per day otic (ear) 7 days active 3.5-10,000-1 mg/mL-unit/mL-%, Disp: , Rfl:     pantoprazole (PROTONIX) 40 MG tablet, Take 40 mg by mouth once daily., Disp: , Rfl:     tamsulosin (FLOMAX) 0.4 mg Cap, TK 1 C PO QHS, Disp: 90 capsule, Rfl: 3    testosterone (ANDROGEL) 1 % (50 mg/5 gram) GlPk, Apply 2 packets topically once daily., Disp: 180 packet, Rfl: 3  ALLERGIES:   Review of patient's allergies indicates:   Allergen Reactions    Grass pollen- grass standard     House dust        VITAL SIGNS: /67   Pulse 76   Ht 5' 9" (1.753 m)   Wt 81.6 kg (180 lb)   BMI 26.58 kg/m²      Review of Systems   Constitution: Negative. Negative for chills, fever and night sweats.   HENT: Negative for congestion and headaches.    Eyes: Negative for blurred vision, left vision loss and right vision loss.   Cardiovascular: Negative for chest pain and syncope.   Respiratory: Negative for cough and shortness of breath.    Endocrine: Negative for polydipsia, polyphagia and polyuria.   Hematologic/Lymphatic: Negative for bleeding problem. Does not bruise/bleed easily.   Skin: Negative for dry skin, itching and rash.   Musculoskeletal: Negative for falls and muscle weakness.   Gastrointestinal: Negative for abdominal pain and bowel incontinence.   Genitourinary: Negative for bladder incontinence and nocturia.   Neurological: Negative for " disturbances in coordination, loss of balance and seizures.   Psychiatric/Behavioral: Negative for depression. The patient does not have insomnia.    Allergic/Immunologic: Negative for hives and persistent infections.       PHYSICAL EXAMINATION:  General:  The patient is alert and oriented x 3.  Mood is pleasant.  Observation of ears, eyes and nose reveal no gross abnormalities.  HEENT: NCAT, sclera nonicteric  Lungs: Respirations are equal and unlabored.  Gait is coordinated. Patient can toe walk and heel walk without difficulty.  Cardiovascular: There are no swelling or varicosities present.   Lymphatic: Negative for adenopathy       right SHOULDER / UPPER EXTREMITY EXAM    OBSERVATION:     Swelling  none  Deformity  none   Discoloration  none   Scapular winging none   Scars   none  Atrophy  none    TENDERNESS / CREPITUS (T/C):          T/C      T/C   Clavicle   -/-  SUPRAspinatus    -/-   AC Jt.    -/-  INFRAspinatus  -/-   SC Jt.    -/-  Deltoid    -/-   G. Tuberosity  -/-  LH BICEP groove  +/-   Acromion:  -/-  Midline Neck   -/-   Scapular Spine -/-  Trapezium   -/-   SMA Scapula  -/-  GH jt. line - post  -/-   Scapulothoracic  -/-         ROM: (* = with pain)  Right shoulder   Left shoulder        AROM (PROM)   AROM (PROM)   FE    150° (155°)     170° (175°)     ER at 0°    50°  (55°)    50°  (55°)   ER at 90° ABD  90°  (90°)    90°  (90°)   IR at 90°  ABD   NA  (40°)     NA  (40°)      IR (spine level)   T10     T10    STRENGTH: (* = with pain) RIGHT SHOULDER  LEFT SHOULDER   SCAPTION at 0   4+/5    5/5    SCAPTION at 30   5/5    5/5    IR    5/5    5/5   ER    5/5    5/5   BICEPS   5/5    5/5   Deltoid    5/5    5/5     SIGNS:  Painful side       NEER   +    OMARYS  +   DAVIS   +    SPEEDS  +   DROP ARM   neg   BELLY PRESS Neg   Superior escape none    LIFT-OFF  Neg   X-Body ADD    neg    MOVING VALGUS Neg      STABILITY TESTING    RIGHT SHOULDER   LEFT SHOULDER       Translation    Anterior  up  face     up face    Posterior  up face    up face    Sulcus   < 10mm    < 10 mm    Signs    Apprehension   neg      Neg    Relocation   no change     no change    Jerk test  neg     Neg      EXTREMITY NEURO-VASCULAR EXAM    Sensation grossly intact to light touch all dermatomal regions.    DTR 2+ Biceps, Triceps, BR and Negative Andrades sign   Grossly intact motor function at Elbow, Wrist and Hand   Distal pulses radial and ulnar 2+, brisk cap refill, symmetric.      NECK:  Painless FROM and spinous processes non-tender. Negative Spurlings sign.      OTHER FINDINGS:    XRAYS:  Shoulder trauma series right,  were ordered and reviewed by me. No convincing fracture or dislocation is noted. The osseous structures appear well mineralized and well aligned, possible insufficiency fracture greater tuberosity/cystic changes, AC moderate hypertrophy, degenerative changes    right   1. Shoulder pain,possible RTC tear    2.  Possible insufficiency fracture greater tuberosity  3. Limited FE slight    Plan:       ASSESSMENT:  shoulder pain.    I do think that this is likely a rotator cuff tear, possible insufficiency fracture greater tuberosity.    I have recommended we check an MRI of the shoulder to evaluate this.    Depending on the results of the MRI, we may consider a cortisone   injection and physical therapy and/or arthroscopic intervention and treatment   depending on what we find.  I will see him back upon its completion or PHREV and we will consider the above.        The patient is undecided on how he would like to proceed. He would like to come in for a revaluation and further surgical discussion as it has been approximately 7 weeks since his last visit and he feels he may have improved.    We will schedule him for a clinic visit.              MRI Right Shoulder:  1. Full-thickness near full width tear of the anterior aspect supraspinatus with associated subcortical cystic change at the level of the greater  tuberosity.   2. Mild associated subacromial subdeltoid bursitis. Tendinosis of the supraspinatus and infraspinatus associated.  Intra-articular bicipital tendinosis.        RECOMMENDED PLAN:   Right Shoulder rotator cuff tear     All questions were answered, pt will contact us for questions or concerns in the interim.  Had thorough discussion of non-operative vs operative intervention, and risks and benefits of both.     We have discussed the surgery and recovery of arthroscopic shoulder surgery. he understands that there may be limited mobility up to several weeks after surgery depending on procedures that are performed at the time of surgery.    The spectrum of treatment options were discussed with the patient, including nonoperative and operative options:    right   a. Shoulder arthroscopic rotator cuff repair with Cuffmend   b. Shoulder arthroscopic SAD   c. Shoulder arthroscopic extensive debridement   d. Shoulder arthroscopic biceps tenodesis (vs. subpect tenodesis)   e. Shoulder arthroscopic possible microfracture   f.  Shoulder open reduction internal fixation greater tuberosity         would like to proceed conservatively for now.  Risks and benefits explanied including tear and atrophy progression and need for more extensive surgery    Recheck yearly

## 2022-10-11 ENCOUNTER — OFFICE VISIT (OUTPATIENT)
Dept: INTERNAL MEDICINE | Facility: CLINIC | Age: 72
End: 2022-10-11
Payer: COMMERCIAL

## 2022-10-11 VITALS
HEART RATE: 73 BPM | WEIGHT: 180.88 LBS | DIASTOLIC BLOOD PRESSURE: 80 MMHG | BODY MASS INDEX: 26.79 KG/M2 | TEMPERATURE: 98 F | HEIGHT: 69 IN | SYSTOLIC BLOOD PRESSURE: 122 MMHG | OXYGEN SATURATION: 95 %

## 2022-10-11 DIAGNOSIS — K86.2 PANCREATIC CYST: ICD-10-CM

## 2022-10-11 DIAGNOSIS — D35.2 PROLACTINOMA: ICD-10-CM

## 2022-10-11 DIAGNOSIS — D35.2 PITUITARY MACROADENOMA: ICD-10-CM

## 2022-10-11 DIAGNOSIS — E21.0 PRIMARY HYPERPARATHYROIDISM: ICD-10-CM

## 2022-10-11 DIAGNOSIS — I25.10 CORONARY ARTERY DISEASE INVOLVING NATIVE CORONARY ARTERY OF NATIVE HEART WITHOUT ANGINA PECTORIS: ICD-10-CM

## 2022-10-11 DIAGNOSIS — I11.9 HYPERTENSIVE HEART DISEASE WITHOUT HEART FAILURE: ICD-10-CM

## 2022-10-11 DIAGNOSIS — E29.1 HYPOGONADISM IN MALE: ICD-10-CM

## 2022-10-11 DIAGNOSIS — E11.9 TYPE 2 DIABETES MELLITUS WITHOUT COMPLICATION, WITHOUT LONG-TERM CURRENT USE OF INSULIN: Primary | ICD-10-CM

## 2022-10-11 DIAGNOSIS — E78.2 HYPERLIPIDEMIA, MIXED: ICD-10-CM

## 2022-10-11 PROCEDURE — 3008F BODY MASS INDEX DOCD: CPT | Mod: CPTII,S$GLB,, | Performed by: INTERNAL MEDICINE

## 2022-10-11 PROCEDURE — 1159F PR MEDICATION LIST DOCUMENTED IN MEDICAL RECORD: ICD-10-PCS | Mod: CPTII,S$GLB,, | Performed by: INTERNAL MEDICINE

## 2022-10-11 PROCEDURE — 3066F PR DOCUMENTATION OF TREATMENT FOR NEPHROPATHY: ICD-10-PCS | Mod: CPTII,S$GLB,, | Performed by: INTERNAL MEDICINE

## 2022-10-11 PROCEDURE — 3074F SYST BP LT 130 MM HG: CPT | Mod: CPTII,S$GLB,, | Performed by: INTERNAL MEDICINE

## 2022-10-11 PROCEDURE — 3044F HG A1C LEVEL LT 7.0%: CPT | Mod: CPTII,S$GLB,, | Performed by: INTERNAL MEDICINE

## 2022-10-11 PROCEDURE — 3044F PR MOST RECENT HEMOGLOBIN A1C LEVEL <7.0%: ICD-10-PCS | Mod: CPTII,S$GLB,, | Performed by: INTERNAL MEDICINE

## 2022-10-11 PROCEDURE — 1125F PR PAIN SEVERITY QUANTIFIED, PAIN PRESENT: ICD-10-PCS | Mod: CPTII,S$GLB,, | Performed by: INTERNAL MEDICINE

## 2022-10-11 PROCEDURE — 1160F RVW MEDS BY RX/DR IN RCRD: CPT | Mod: CPTII,S$GLB,, | Performed by: INTERNAL MEDICINE

## 2022-10-11 PROCEDURE — 3066F NEPHROPATHY DOC TX: CPT | Mod: CPTII,S$GLB,, | Performed by: INTERNAL MEDICINE

## 2022-10-11 PROCEDURE — 3061F PR NEG MICROALBUMINURIA RESULT DOCUMENTED/REVIEW: ICD-10-PCS | Mod: CPTII,S$GLB,, | Performed by: INTERNAL MEDICINE

## 2022-10-11 PROCEDURE — 4010F PR ACE/ARB THEARPY RXD/TAKEN: ICD-10-PCS | Mod: CPTII,S$GLB,, | Performed by: INTERNAL MEDICINE

## 2022-10-11 PROCEDURE — 99999 PR PBB SHADOW E&M-EST. PATIENT-LVL V: ICD-10-PCS | Mod: PBBFAC,,, | Performed by: INTERNAL MEDICINE

## 2022-10-11 PROCEDURE — 99214 OFFICE O/P EST MOD 30 MIN: CPT | Mod: S$GLB,,, | Performed by: INTERNAL MEDICINE

## 2022-10-11 PROCEDURE — 4010F ACE/ARB THERAPY RXD/TAKEN: CPT | Mod: CPTII,S$GLB,, | Performed by: INTERNAL MEDICINE

## 2022-10-11 PROCEDURE — 3074F PR MOST RECENT SYSTOLIC BLOOD PRESSURE < 130 MM HG: ICD-10-PCS | Mod: CPTII,S$GLB,, | Performed by: INTERNAL MEDICINE

## 2022-10-11 PROCEDURE — 3061F NEG MICROALBUMINURIA REV: CPT | Mod: CPTII,S$GLB,, | Performed by: INTERNAL MEDICINE

## 2022-10-11 PROCEDURE — 1157F ADVNC CARE PLAN IN RCRD: CPT | Mod: CPTII,S$GLB,, | Performed by: INTERNAL MEDICINE

## 2022-10-11 PROCEDURE — 1125F AMNT PAIN NOTED PAIN PRSNT: CPT | Mod: CPTII,S$GLB,, | Performed by: INTERNAL MEDICINE

## 2022-10-11 PROCEDURE — 1160F PR REVIEW ALL MEDS BY PRESCRIBER/CLIN PHARMACIST DOCUMENTED: ICD-10-PCS | Mod: CPTII,S$GLB,, | Performed by: INTERNAL MEDICINE

## 2022-10-11 PROCEDURE — 3079F DIAST BP 80-89 MM HG: CPT | Mod: CPTII,S$GLB,, | Performed by: INTERNAL MEDICINE

## 2022-10-11 PROCEDURE — 1159F MED LIST DOCD IN RCRD: CPT | Mod: CPTII,S$GLB,, | Performed by: INTERNAL MEDICINE

## 2022-10-11 PROCEDURE — 3008F PR BODY MASS INDEX (BMI) DOCUMENTED: ICD-10-PCS | Mod: CPTII,S$GLB,, | Performed by: INTERNAL MEDICINE

## 2022-10-11 PROCEDURE — 3079F PR MOST RECENT DIASTOLIC BLOOD PRESSURE 80-89 MM HG: ICD-10-PCS | Mod: CPTII,S$GLB,, | Performed by: INTERNAL MEDICINE

## 2022-10-11 PROCEDURE — 1157F PR ADVANCE CARE PLAN OR EQUIV PRESENT IN MEDICAL RECORD: ICD-10-PCS | Mod: CPTII,S$GLB,, | Performed by: INTERNAL MEDICINE

## 2022-10-11 PROCEDURE — 99999 PR PBB SHADOW E&M-EST. PATIENT-LVL V: CPT | Mod: PBBFAC,,, | Performed by: INTERNAL MEDICINE

## 2022-10-11 PROCEDURE — 99214 PR OFFICE/OUTPT VISIT, EST, LEVL IV, 30-39 MIN: ICD-10-PCS | Mod: S$GLB,,, | Performed by: INTERNAL MEDICINE

## 2022-10-11 NOTE — PROGRESS NOTES
[facility] Note    Chief Complaint      Chief Complaint   Patient presents with    Follow-up       History of Present Illness      Sadiq Dhillon is a 72 y.o. male with chronic conditions of DM2, HTN, CAD, HLD, hx of kidney stones, pituitary adenoma with primary hyperparathyroidism, prolactinoma, hypogonadism, pancreatic cyst who presents today for:  DM2: Sees Dr. Leon, endocrinology.  Controlled on metformin.  A1C 6.1 9/2022.  Eye exam UTD with Dr. Norris.  HTN: BP at goal on metoprolol.  CAD: Recently saw Dr. Taveras, cardiology.  At [facility], s/p PCI to mid LAD following STEMI.  On ASA, metoprolol, atorvastatin. Recently had Brilinta and lisinopril discontinued.  Denies chest pain, shortness of breath.  HLD: Controlled on atorvastatin.  LDL 58   Hx of kidney stones: Sees Dr. Bowman, urology. 4 mm right UPJ and upper 3rd ureteral stone with mild right hydronephrosis.  Repeat imaging did not show that stone.  Waiting to hear what to do next.  Currently asymptomatic.    Pituitary adenoma, prolactinoma, primary hyperparathyroidism s/p parathyroidectomy, hypogonadism:  Sees Dr. Leon, endo.  On testosterone.  Last PTH normal.    GERD: Controlled on pepcid as needed.  Pancreas cyst: Initially saw Dr. Munoz.  Followed with Dr. Post in 2019.  Has had CT with contrast to monitor and has been unchanged.  Has appt with Dr. Field for further evaluation.    Flu shot UTD.  TdAP 2020.  Shingrix UTD.  COVID vaccine UTD.    Cscope UTD.  FOBT 2022.      Past Medical History:  Past Medical History:   Diagnosis Date    Diabetes mellitus     Fuchs' corneal dystrophy     Heart attack     Hypertension     Kidney stones     Pancreatic mass     Pancreatitis     after EUS . 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, treated with dostenex(cabergoline)/ resolved     Reflux esophagitis     Tumor cells, benign        Past Surgical History:   has a past surgical history that includes Thyroidectomy (2007); Rhinoplasty tip (1975); Cataract extraction w/  intraocular lens implant (Right, 0); Corneal transplant (Right); Upper endoscopic ultrasound w/ FNA; Repair of retinal detachment with vitrectomy (Right, 7/18/2018); Repair of retinal detachment with vitrectomy (Left, 8/8/2018); Descemets stripping automated endothelial keratoplasty (Left, 3/21/2019); and Cataract extraction w/  intraocular lens implant (Left, 3/21/2019).    Family History:  family history includes Cataracts in his father; Diabetes in his father and paternal uncle; Heart disease in his father and mother; Hypertension in his mother; Stroke in his father and maternal grandmother.     Social History:  Social History     Tobacco Use    Smoking status: Never    Smokeless tobacco: Never   Substance Use Topics    Alcohol use: Yes     Comment: social    Drug use: No       I personally reviewed all past medical, surgical, social and family history.    Review of Systems   Constitutional:  Negative for chills, fever and malaise/fatigue.   Respiratory:  Negative for shortness of breath.    Cardiovascular:  Negative for chest pain.   Gastrointestinal:  Negative for constipation, diarrhea, nausea and vomiting.   Skin:  Negative for rash.   Neurological:  Negative for weakness.   All other systems reviewed and are negative.     Medications:  Outpatient Encounter Medications as of 10/11/2022   Medication Sig Dispense Refill    aspirin (ECOTRIN) 81 MG EC tablet Take 81 mg by mouth every morning.       atorvastatin (LIPITOR) 80 MG tablet Take 1 tablet (80 mg total) by mouth once daily. 90 tablet 3    cabergoline (DOSTINEX) 0.5 mg tablet TAKE 1 TABLET(0.5 MG) BY MOUTH 2 TIMES A WEEK 24 tablet 3    famotidine (PEPCID) 20 MG tablet Take by mouth.      fexofenadine-pseudoephedrine  mg (ALLEGRA-D)  mg per tablet Take 1 tablet  by mouth.      gabapentin (NEURONTIN) 100 MG capsule Take 1 capsule three times a day,  Takes one capsule every morning  3    lisinopril (PRINIVIL,ZESTRIL) 2.5 MG tablet Take 2.5 mg by mouth every morning.       loperamide (IMODIUM) 2 mg capsule Take 2 mg by mouth 4 (four) times daily as needed for Diarrhea.      metFORMIN (GLUCOPHAGE-XR) 500 MG ER 24hr tablet TAKE 2 TABLETS BY MOUTH TWICE DAILY 360 tablet 3    tamsulosin (FLOMAX) 0.4 mg Cap TK 1 C PO QHS 90 capsule 3    testosterone (ANDROGEL) 1 % (50 mg/5 gram) GlPk Apply 2 packets topically once daily. 180 packet 3    CONTOUR NEXT TEST STRIPS Strp UTD QID IN VITRO  3    fluticasone propionate (FLONASE) 50 mcg/actuation nasal spray SHAKE LIQUID AND USE 1 TO 2 SPRAYS(50  MCG) IN EACH NOSTRIL EVERY DAY (Patient not taking: Reported on 10/11/2022) 48 g 0    metoprolol succinate (TOPROL-XL) 25 MG 24 hr tablet Take 25 mg by mouth nightly.       naproxen sodium (ANAPROX) 550 MG tablet SMARTSI Tablet(s) By Mouth Every 12 Hours PRN      neomycin-polymyxin-hydrocortisone (CORTISPORIN) 3.5-10,000-1 mg/mL-unit/mL-% otic suspension neomycin-polymyxin-HC Take 4 drops (otic (ear)) 3 times per day for 7 days 2021 drops,suspension 3 times per day otic (ear) 7 days active 3.5-10,000-1 mg/mL-unit/mL-%      pantoprazole (PROTONIX) 40 MG tablet Take 40 mg by mouth once daily.       No facility-administered encounter medications on file as of 10/11/2022.       Allergies:  Review of patient's allergies indicates:   Allergen Reactions    Grass pollen- grass standard     House dust        Health Maintenance:  Immunization History   Administered Date(s) Administered    COVID-19, MRNA, LN-S, PF (Pfizer) (Gray Cap) 2022    COVID-19, MRNA, LN-S, PF (Pfizer) (Purple Cap) 2021, 2021, 2021    Influenza (FLUAD) - Quadrivalent - Adjuvanted - PF *Preferred* (65+) 2020, 10/08/2021    Influenza - High Dose - PF (65 years and older) 10/19/2018     "Influenza - Quadrivalent - MDCK - PF 02/01/2018    Influenza - Quadrivalent - PF *Preferred* (6 months and older) 11/10/2019    Meningococcal Conjugate (MCV4P) 06/03/2022    Pneumococcal Polysaccharide - 23 Valent 11/09/2020    Tdap 10/30/2020    Zoster Recombinant 11/09/2020, 11/11/2020, 01/12/2021      Health Maintenance   Topic Date Due    Foot Exam  11/05/2021    Hemoglobin A1c  03/13/2023    Eye Exam  08/26/2023    Lipid Panel  09/13/2023    High Dose Statin  10/11/2023    Aspirin/Antiplatelet Therapy  10/11/2023    TETANUS VACCINE  10/30/2030    Hepatitis C Screening  Completed    DEXA Scan  Discontinued        Physical Exam      Vital Signs  Temp: 98.1 °F (36.7 °C)  Pulse: 73  SpO2: 95 %  BP: 122/80  Pain Score:   1  Pain Loc: Shoulder  Height and Weight  Height: 5' 9" (175.3 cm)  Weight: 82.1 kg (180 lb 14.2 oz)  BSA (Calculated - sq m): 2 sq meters  BMI (Calculated): 26.7  Weight in (lb) to have BMI = 25: 168.9]    Physical Exam  Vitals reviewed.   Constitutional:       Appearance: He is well-developed.   HENT:      Head: Normocephalic and atraumatic.      Right Ear: External ear normal.      Left Ear: External ear normal.   Cardiovascular:      Rate and Rhythm: Normal rate and regular rhythm.      Heart sounds: Normal heart sounds. No murmur heard.  Pulmonary:      Effort: Pulmonary effort is normal.      Breath sounds: Normal breath sounds. No wheezing or rales.   Abdominal:      General: Bowel sounds are normal.      Palpations: Abdomen is soft.        Laboratory:  CBC:  Recent Labs   Lab 11/20/20  0229 03/30/21  1552 03/08/22  1543   WBC 6.51 6.19 7.08   RBC 4.92 4.78 5.15   Hemoglobin 13.9 L 14.0 15.0   Hematocrit 42.9 41.9 46.5   Platelets 198 206 192   MCV 87 88 90   MCH 28.3 29.3 29.1   MCHC 32.4 33.4 32.3     CMP:  Recent Labs   Lab 03/30/21  1552 04/09/21  1451 03/08/22  1543 06/27/22  1534 09/13/22  1521   Glucose 98  98  --  94 95  --    Calcium 9.6  9.6  --  10.2 10.0  --    Albumin 4.3  4.3 "   < > 4.4  4.8 4.5 4.8   Total Protein 7.2  7.2  --  7.3 6.7  --    Sodium 138  138  --  138 139  --    Potassium 4.3  4.3  --  4.6 4.4  --    CO2 24  24  --  24 26  --    Chloride 104  104  --  100 102  --    BUN 22  22  --  19 14  --    Alkaline Phosphatase 95  95  --  68 54 L  --    ALT 14  14  --  17 15  --    AST 18  18  --  18 20  --    Total Bilirubin 0.4  0.4  --  0.9 0.7  --     < > = values in this interval not displayed.     URINALYSIS:  Recent Labs   Lab 10/27/21  0938 03/09/22  1200 03/30/22  1441 04/25/22  1431 08/23/22  1331   Color, UA Yellow Yellow  --  Yellow  --    Clarity, UA  --   --   --  Clear  --    Specific Gravity, UA 1.025 1.020  --   --   --    Spec Grav UA  --   --   --  1.025  --    pH, UA 6.0 6.0   < > 5 5.5   Protein, UA Negative Negative  --   --   --    Bacteria Occasional Rare  --   --   --    Nitrite, UA Negative Negative  --   --   --    Leukocytes, UA Negative Negative  --   --   --    Urobilinogen, UA Negative  --   --   --   --    Hyaline Casts, UA 1 1  --   --   --     < > = values in this interval not displayed.      LIPIDS:  Recent Labs   Lab 10/21/20  1406 03/30/21  1552 03/08/22  1543 09/13/22  1521   TSH  --   --  2.281  --    HDL 40 38 L  --  45   Cholesterol 93 L 206 H  --  132   Triglycerides 179 H 245 H  --  141   LDL Cholesterol 17.2 L 119.0  --  58.8 L   HDL/Cholesterol Ratio 43.0 18.4 L  --  34.1   Non-HDL Cholesterol 53 168  --  87   Total Cholesterol/HDL Ratio 2.3 5.4 H  --  2.9     TSH:  Recent Labs   Lab 03/08/22  1543   TSH 2.281     A1C:  Recent Labs   Lab 10/21/20  1406 03/30/21  1552 03/08/22  1543 09/13/22  1521   Hemoglobin A1C 6.9 H 6.6 H 6.6 H 6.1 H       Assessment/Plan     Sadiq Dhillon is a 72 y.o.male with:    1. Type 2 diabetes mellitus without complication, without long-term current use of insulin  Continue current meds.  F/U with Dr. Leon as scheduled.  Eye exam UTD  2. Hypertensive heart disease without heart failure  Continue  current meds.    3. Coronary artery disease involving native coronary artery of native heart without angina pectoris  Continue current meds.  F/u with cardiology as scheduled  4. Hyperlipidemia, mixed  Continue current meds.    5. Pituitary macroadenoma  6. Prolactinoma  7. Hypogonadism in male  8. Primary hyperparathyroidism  F/U with endocrinology  9. Pancreatic cyst   Has appt to establish with GI for surveillance.    Chronic conditions status updated as per HPI.  Other than changes above, cont current medications and maintain follow up with specialists.  Follow up in about 1 year (around 10/11/2023) for Annual preventative visit.    Future Appointments   Date Time Provider Department Center   10/12/2022  1:40 PM Albina Field MD [facility] A[facility]   1/5/2023 10:00 AM LAB, APPOINTMENT [facility] LAB VNP [facility]   1/10/2023  4:00 PM Eliecer Taveras MD [facility] CARDIO [facility]   1/13/2023  4:00 PM ALEX Mock PA-C [facility] [place]   4/10/2023  2:30 PM Alberto Leon MD [facility] [place]   7/12/2023  1:30 PM Erlin Mcpherson MD [facility]       Erlin Mcpherson MD  [facility]

## 2022-10-12 ENCOUNTER — OFFICE VISIT (OUTPATIENT)
Dept: GASTROENTEROLOGY | Facility: CLINIC | Age: 72
End: 2022-10-12
Payer: COMMERCIAL

## 2022-10-12 VITALS
SYSTOLIC BLOOD PRESSURE: 125 MMHG | HEIGHT: 69 IN | WEIGHT: 178 LBS | HEART RATE: 75 BPM | OXYGEN SATURATION: 96 % | BODY MASS INDEX: 26.36 KG/M2 | DIASTOLIC BLOOD PRESSURE: 65 MMHG

## 2022-10-12 DIAGNOSIS — K86.2 PANCREATIC CYST: ICD-10-CM

## 2022-10-12 PROCEDURE — 3074F PR MOST RECENT SYSTOLIC BLOOD PRESSURE < 130 MM HG: ICD-10-PCS | Mod: CPTII,S$GLB,, | Performed by: INTERNAL MEDICINE

## 2022-10-12 PROCEDURE — 1157F PR ADVANCE CARE PLAN OR EQUIV PRESENT IN MEDICAL RECORD: ICD-10-PCS | Mod: CPTII,S$GLB,, | Performed by: INTERNAL MEDICINE

## 2022-10-12 PROCEDURE — 3288F FALL RISK ASSESSMENT DOCD: CPT | Mod: CPTII,S$GLB,, | Performed by: INTERNAL MEDICINE

## 2022-10-12 PROCEDURE — 4010F ACE/ARB THERAPY RXD/TAKEN: CPT | Mod: CPTII,S$GLB,, | Performed by: INTERNAL MEDICINE

## 2022-10-12 PROCEDURE — 3008F PR BODY MASS INDEX (BMI) DOCUMENTED: ICD-10-PCS | Mod: CPTII,S$GLB,, | Performed by: INTERNAL MEDICINE

## 2022-10-12 PROCEDURE — 3044F PR MOST RECENT HEMOGLOBIN A1C LEVEL <7.0%: ICD-10-PCS | Mod: CPTII,S$GLB,, | Performed by: INTERNAL MEDICINE

## 2022-10-12 PROCEDURE — 3008F BODY MASS INDEX DOCD: CPT | Mod: CPTII,S$GLB,, | Performed by: INTERNAL MEDICINE

## 2022-10-12 PROCEDURE — 3078F PR MOST RECENT DIASTOLIC BLOOD PRESSURE < 80 MM HG: ICD-10-PCS | Mod: CPTII,S$GLB,, | Performed by: INTERNAL MEDICINE

## 2022-10-12 PROCEDURE — 3288F PR FALLS RISK ASSESSMENT DOCUMENTED: ICD-10-PCS | Mod: CPTII,S$GLB,, | Performed by: INTERNAL MEDICINE

## 2022-10-12 PROCEDURE — 1101F PR PT FALLS ASSESS DOC 0-1 FALLS W/OUT INJ PAST YR: ICD-10-PCS | Mod: CPTII,S$GLB,, | Performed by: INTERNAL MEDICINE

## 2022-10-12 PROCEDURE — 1101F PT FALLS ASSESS-DOCD LE1/YR: CPT | Mod: CPTII,S$GLB,, | Performed by: INTERNAL MEDICINE

## 2022-10-12 PROCEDURE — 1159F PR MEDICATION LIST DOCUMENTED IN MEDICAL RECORD: ICD-10-PCS | Mod: CPTII,S$GLB,, | Performed by: INTERNAL MEDICINE

## 2022-10-12 PROCEDURE — 3074F SYST BP LT 130 MM HG: CPT | Mod: CPTII,S$GLB,, | Performed by: INTERNAL MEDICINE

## 2022-10-12 PROCEDURE — 99999 PR PBB SHADOW E&M-EST. PATIENT-LVL IV: ICD-10-PCS | Mod: PBBFAC,,, | Performed by: INTERNAL MEDICINE

## 2022-10-12 PROCEDURE — 99215 OFFICE O/P EST HI 40 MIN: CPT | Mod: S$GLB,,, | Performed by: INTERNAL MEDICINE

## 2022-10-12 PROCEDURE — 3066F NEPHROPATHY DOC TX: CPT | Mod: CPTII,S$GLB,, | Performed by: INTERNAL MEDICINE

## 2022-10-12 PROCEDURE — 4010F PR ACE/ARB THEARPY RXD/TAKEN: ICD-10-PCS | Mod: CPTII,S$GLB,, | Performed by: INTERNAL MEDICINE

## 2022-10-12 PROCEDURE — 99999 PR PBB SHADOW E&M-EST. PATIENT-LVL IV: CPT | Mod: PBBFAC,,, | Performed by: INTERNAL MEDICINE

## 2022-10-12 PROCEDURE — 99215 PR OFFICE/OUTPT VISIT, EST, LEVL V, 40-54 MIN: ICD-10-PCS | Mod: S$GLB,,, | Performed by: INTERNAL MEDICINE

## 2022-10-12 PROCEDURE — 1159F MED LIST DOCD IN RCRD: CPT | Mod: CPTII,S$GLB,, | Performed by: INTERNAL MEDICINE

## 2022-10-12 PROCEDURE — 3078F DIAST BP <80 MM HG: CPT | Mod: CPTII,S$GLB,, | Performed by: INTERNAL MEDICINE

## 2022-10-12 PROCEDURE — 3066F PR DOCUMENTATION OF TREATMENT FOR NEPHROPATHY: ICD-10-PCS | Mod: CPTII,S$GLB,, | Performed by: INTERNAL MEDICINE

## 2022-10-12 PROCEDURE — 1157F ADVNC CARE PLAN IN RCRD: CPT | Mod: CPTII,S$GLB,, | Performed by: INTERNAL MEDICINE

## 2022-10-12 PROCEDURE — 3044F HG A1C LEVEL LT 7.0%: CPT | Mod: CPTII,S$GLB,, | Performed by: INTERNAL MEDICINE

## 2022-10-12 PROCEDURE — 3061F NEG MICROALBUMINURIA REV: CPT | Mod: CPTII,S$GLB,, | Performed by: INTERNAL MEDICINE

## 2022-10-12 PROCEDURE — 3061F PR NEG MICROALBUMINURIA RESULT DOCUMENTED/REVIEW: ICD-10-PCS | Mod: CPTII,S$GLB,, | Performed by: INTERNAL MEDICINE

## 2022-10-12 NOTE — PROGRESS NOTES
Gastroenterology: [facility]      SUBJECTIVE:         Chief Complaint: Follow up for known pancreatic cyst     History of Present Illness:  Patient is a 72 y.o. male with a h/o DM2, HTN, CAD, HLD, hx of kidney stones, pituitary adenoma with primary hyperparathyroidism, prolactinoma, and hypogonadism who presents for follow up of a known pancreatic cyst previously evaluated outside of [facility] with an EUS FNA in 2018 c/b severe pancreatitis (cyst thought to be a serous cystadenoma at that time).  His post EUS/FNA pancreatitis episode has reasonably kept him hesitant of attempting another EUS.  He had met with Dr. Post in our pancreas cyst clinic and 2019 where possible EUS was suggested but declined by the patient and future CT was mutually decided upon.  The cyst was first noted 2017. It's located in the the body and had previously measured 19 mm x 17 mm.  It measures 18 mm  in size per most recent noncontrast CT renal stone study.  It is not symptomatic.  Previous studies include MRI elsewhere 2017/2018, CT scan (last contrast enhanced CT was done in March 2021) and endoscopic US (EUS with FNA done in 2018). He has also had image evidence of pancreatic fluid collections such as possible pseudocysts/complex cysts as post pancreatitis sequelae. He has had fullness near the yady hepatis as well. All of his pancreas imaging findings appear to have been stable since 2019.    Denies unexpected weight loss/changes, denies jaundice/pruritis, denies pancreas type abdo pain. Does have T2DM since 2003 (not new onset or worsening).    Previous FNA of the cyst has been done in 2018 outside of [facility] c/b severe pancreatitis; cyst thought to be a serous cystadenoma per patient's report.    There is not a family history of pancreatic cancer     The patient does not smoke and does drink alcohol socially about 7-9 standard drinks weekly.    ECOG status 0 - Asymptomatic    (Not in a hospital  admission)      Review of patient's allergies indicates:   Allergen Reactions    Grass pollen-june grass standard     House dust         Past Medical History:   Diagnosis Date    Diabetes mellitus     Fuchs' corneal dystrophy     Heart attack     Hypertension     Kidney stones     Pancreatic mass     Pancreatitis     after EUS . 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, treated with dostenex(cabergoline)/ resolved    Reflux esophagitis     Tumor cells, benign      Past Surgical History:   Procedure Laterality Date    CATARACT EXTRACTION W/  INTRAOCULAR LENS IMPLANT Right 0    Dr Van     CATARACT EXTRACTION W/  INTRAOCULAR LENS IMPLANT Left 3/21/2019    Procedure: EXTRACTION, CATARACT, WITH IOL INSERTION;  Surgeon: Ra Van MD;  Location: [facility];  Service: Ophthalmology;  Laterality: Left;    CORNEAL TRANSPLANT Right     Dr Van     DESCEMETS STRIPPING AUTOMATED ENDOTHELIAL KERATOPLASTY Left 3/21/2019    Procedure: TRANSPLANT, PARTIAL-THICKNESS, CORNEA, USING DSAEK TECHNIQUE;  Surgeon: Ra Van MD;  Location: [facility] OR;  Service: Ophthalmology;  Laterality: Left;  DSEK    REPAIR OF RETINAL DETACHMENT WITH VITRECTOMY Right 7/18/2018    Procedure: REPAIR, RETINAL DETACHMENT, WITH VITRECTOMY;  Surgeon: SHUBHAM Patel MD;  [facility];  Service: Ophthalmology;  Laterality: Right;  40 min    REPAIR OF RETINAL DETACHMENT WITH VITRECTOMY Left 8/8/2018    Procedure: REPAIR, RETINAL DETACHMENT, WITH VITRECTOMY;  Surgeon: SHUBHAM Patel MD;  Location: [facility] OR [facility];  Service: Ophthalmology;  Laterality: Left;  40 min    RHINOPLASTY TIP  1975    THYROIDECTOMY  2007    UPPER ENDOSCOPIC ULTRASOUND W/ FNA      with resultant pancreatitis     Family History   Problem Relation Age of Onset    Hypertension Mother     Heart disease Mother      Heart disease Father     Cataracts Father     Stroke Father     Diabetes Father     Diabetes Paternal Uncle     Stroke Maternal Grandmother     Blindness Neg Hx     Glaucoma Neg Hx     Macular degeneration Neg Hx     Retinal detachment Neg Hx     Strabismus Neg Hx     Thyroid disease Neg Hx     Cancer Neg Hx     Amblyopia Neg Hx      Social History     Tobacco Use    Smoking status: Never    Smokeless tobacco: Never   Substance Use Topics    Alcohol use: Yes     Comment: social    Drug use: No       Review of Systems:  A complete review of systems was performed and is otherwise negative outside of the aforementioned symptoms described above and below.       OBJECTIVE:     Vital Signs (Most Recent)  Pulse: 75 (10/12/22 1341)  BP: 125/65 (10/12/22 1341)  SpO2: 96 % (10/12/22 1341)    Physical Exam  General: Well-developed, well-appearing, no acute distress  Neuro: alert and oriented to person, place, time; normal appearing gait  Eyes: No scleral icterus  CVS: regular  Abdomen: soft, non-distended, non-tender, no rebound tenderness or guarding    Diagnostic Results:  6/30/22:  CT RENAL STONE STUDY ABD PELVIS WO     CLINICAL HISTORY:  Nephrolithiasis, symptomatic/complicated; Calculus of urinary tract in diseases classified elsewhere     TECHNIQUE:  Low dose axial images, sagittal and coronal reformations were obtained from the lung bases to the pubic symphysis.  Contrast was not administered.     COMPARISON:  CT abdomen pelvis 06/07/2022; CT abdomen pelvis 04/09/2022     FINDINGS:  Heart: No cardiomegaly or significant pericardial fluid.  Suspected LAD stent.     Lung Bases: No focal consolidation or pleural fluid.  Few scattered bilateral pulmonary micro nodules.  Stable right middle lobe 3 mm solid pulmonary nodule (02:24).  Stable left lower lobe 3 mm subpleural nodule (2:9).     Liver: Normal in size and attenuation, with no focal hepatic lesions.     Gallbladder: No calcified gallstones.     Bile Ducts: No  evidence of dilated ducts.     Pancreas: Pancreatic body posterior aspect 1.8 cm stable hypodensity grossly similar dating back to at least 05/16/2019.  Stable soft tissue attenuating lesion unchanged near the yady hepatis and posterior aspect of the pancreas extending toward the spleen, less conspicuous compared to 2019..     Spleen: Unremarkable.     Adrenals: Unremarkable.     Kidneys/ Ureters: Kidneys are normal in location and size with mild perinephric stranding.  3 mm right renal calculus.  Resolution of right ureteral calcification and right-sided hydronephrosis with mild residual prominence of upper calyx.  No dilated ureter.     Bladder: No focal wall thickening..     Reproductive organs: Prostatomegaly.     GI Tract/Mesentery: No evidence of bowel obstruction or inflammation.  Appendix is unremarkable.     Peritoneal Space: No ascites. No free air.     Retroperitoneum: No pathological david enlargement..     Abdominal wall: Tiny fat containing umbilical hernia..     Vasculature: Mild calcific atherosclerosis.  No aneurysm.     Bones: Mild degenerative changes of the visualized spine.  Stable suspected bone island at T11 vertebral body.     Impression:     Resolution of right-sided hydronephrosis with prior right ureteral stone no longer visualized, perhaps now in the right kidney..     Nonobstructive right nephrolithiasis.     Unchanged soft tissue density near the yady hepatis and pancreatic body/spleen.     Stable pancreatic body posterior 1.8 cm hypodensity unchanged dating back to 2019.     Pulmonary micro nodules as above.     Prior to this, last contrast enhanced CT scan was in March 2021:  CT ABDOMEN WITH CONTRAST     CLINICAL HISTORY:  Fall on 03/09 with R abdominal wall ecchymosis and RUQ pain;  Unspecified fall, initial encounter     TECHNIQUE:  Contiguous axial CT images were obtained through the abdomen.  75 cc Omnipaque 300 was administered.  No oral contrast.  Coronal and sagittal  reformats were provided.     COMPARISON:  Multiple prior exams.     FINDINGS:  Limited evaluation lung bases shows no evidence of pneumothorax.  Persistent elevation of the left hemidiaphragm.  Coronary artery stent in the LAD.     The liver is normal in size.  No solid mass or biliary ductal dilatation.  The gallbladder is unremarkable.     Spleen and adrenal glands are unremarkable.  There is a lobular contour of the pancreas.  Low-density lesion in the pancreatic body and head are unchanged.  Low-density collections the posterior aspect of the pancreatic body and tail are again identified and appears similar cross multiple priors.  Small low-density collection in the yady hepatis is also unchanged.     The kidneys are normal in size and position.  No solid mass or hydronephrosis.  Visualized gastrointestinal tract shows no wall thickening or obstruction.  There is a hiatal hernia.  Small amount of fluid tracking through the hiatal hernia is again noted and is unchanged..  No free fluid or free gas.  No drainable fluid collection.     Vascular structures show minimal atherosclerosis.     Minimally displaced fractures of the 8th, 9th, and 10th ribs anterolaterally with subcutaneous stranding.     Impression:     Minimally displaced fractures of the anterolateral 8th, 9th, and 10th ribs.  No acute intra-abdominal findings.     Pancreatic lesions and findings associated with sequelae from prior pancreatitis are unchanged dating back to at least 2019.         ASSESSMENT/PLAN:     Pancreatic cyst in the the body originally diagnosed elsewhere as a possible serous cystadenoma 2017. Measuring up to 18-20mm in size historically,  unchanged.  Most likely etiology previously was initially thought to be a possible serous cystadenoma post FNA 2018, then he had severe pancreatitis after that FNA with development of fluid collections thought to be pseudocysts/complex cysts.  Year 5  We discussed in detail the natural history  of pancreatic cysts, the therapy involved, and the benefits of multimodality imaging including CT, MRI, and EUS with FNA    Plan:     Given his understood reluctance to attempting another EUS at this time given the prior severe pancreatitis post EUS/FNA 2018, he is willing to proceed with noninvasive imaging. Given the last contrast enhanced image with a CT was obtained in March 2021, and I do not believe he has had an MRCP in our system, would recommend:    MRCP with and without contrast nonurgently within next 3 months for pancreas cyst surveillance.    Follow up in Pancreas Cyst clinic in 6 months to review and discuss next steps, or sooner if needed pending MRCP.    If MRCP reveals stability in size of pancreas cyst/fluid collection(s) without worrisome features, can consider expanding surveillance MRCP to every 2 years thereafter.     If MRCP reveals concerning features or significantly increased size, can consider following that finding up with EUS and reserving FNA only for development of worrisome features (given his prior h/o severe pancreatitis post FNA 2018).    We spent 40 minutes in today's clinic with greater than 50% of the time dedicated to counseling and arranging care.

## 2022-12-14 ENCOUNTER — PATIENT MESSAGE (OUTPATIENT)
Dept: INTERNAL MEDICINE | Facility: CLINIC | Age: 72
End: 2022-12-14
Payer: COMMERCIAL

## 2022-12-16 ENCOUNTER — IMMUNIZATION (OUTPATIENT)
Dept: INTERNAL MEDICINE | Facility: CLINIC | Age: 72
End: 2022-12-16
Payer: COMMERCIAL

## 2022-12-16 DIAGNOSIS — Z23 NEED FOR VACCINATION: Primary | ICD-10-CM

## 2022-12-16 PROCEDURE — 0124A COVID-19, MRNA, LNP-S, BIVALENT BOOSTER, PF, 30 MCG/0.3 ML DOSE: CPT | Mod: CV19,PBBFAC | Performed by: INTERNAL MEDICINE

## 2022-12-16 PROCEDURE — 91312 COVID-19, MRNA, LNP-S, BIVALENT BOOSTER, PF, 30 MCG/0.3 ML DOSE: ICD-10-PCS | Mod: S$GLB,,, | Performed by: INTERNAL MEDICINE

## 2022-12-16 PROCEDURE — 91312 COVID-19, MRNA, LNP-S, BIVALENT BOOSTER, PF, 30 MCG/0.3 ML DOSE: CPT | Mod: S$GLB,,, | Performed by: INTERNAL MEDICINE

## 2022-12-27 ENCOUNTER — PATIENT MESSAGE (OUTPATIENT)
Dept: SPORTS MEDICINE | Facility: CLINIC | Age: 72
End: 2022-12-27
Payer: COMMERCIAL

## 2023-01-06 ENCOUNTER — LAB VISIT (OUTPATIENT)
Dept: LAB | Facility: HOSPITAL | Age: 73
End: 2023-01-06
Attending: INTERNAL MEDICINE
Payer: COMMERCIAL

## 2023-01-06 DIAGNOSIS — E78.00 PURE HYPERCHOLESTEROLEMIA: ICD-10-CM

## 2023-01-06 DIAGNOSIS — E11.65 TYPE 2 DIABETES MELLITUS WITH HYPERGLYCEMIA, WITHOUT LONG-TERM CURRENT USE OF INSULIN: ICD-10-CM

## 2023-01-06 LAB
CHOLEST SERPL-MCNC: 105 MG/DL (ref 120–199)
CHOLEST SERPL-MCNC: 105 MG/DL (ref 120–199)
CHOLEST/HDLC SERPL: 3.1 {RATIO} (ref 2–5)
CHOLEST/HDLC SERPL: 3.1 {RATIO} (ref 2–5)
ESTIMATED AVG GLUCOSE: 134 MG/DL (ref 68–131)
HBA1C MFR BLD: 6.3 % (ref 4–5.6)
HDLC SERPL-MCNC: 34 MG/DL (ref 40–75)
HDLC SERPL-MCNC: 34 MG/DL (ref 40–75)
HDLC SERPL: 32.4 % (ref 20–50)
HDLC SERPL: 32.4 % (ref 20–50)
LDLC SERPL CALC-MCNC: 47.2 MG/DL (ref 63–159)
LDLC SERPL CALC-MCNC: 47.2 MG/DL (ref 63–159)
NONHDLC SERPL-MCNC: 71 MG/DL
NONHDLC SERPL-MCNC: 71 MG/DL
TRIGL SERPL-MCNC: 119 MG/DL (ref 30–150)
TRIGL SERPL-MCNC: 119 MG/DL (ref 30–150)

## 2023-01-06 PROCEDURE — 80061 LIPID PANEL: CPT | Performed by: INTERNAL MEDICINE

## 2023-01-06 PROCEDURE — 83036 HEMOGLOBIN GLYCOSYLATED A1C: CPT | Performed by: INTERNAL MEDICINE

## 2023-01-06 PROCEDURE — 36415 COLL VENOUS BLD VENIPUNCTURE: CPT | Performed by: INTERNAL MEDICINE

## 2023-01-10 ENCOUNTER — OFFICE VISIT (OUTPATIENT)
Dept: CARDIOLOGY | Facility: CLINIC | Age: 73
End: 2023-01-10
Payer: COMMERCIAL

## 2023-01-10 VITALS
WEIGHT: 184.31 LBS | HEART RATE: 82 BPM | HEIGHT: 69 IN | SYSTOLIC BLOOD PRESSURE: 116 MMHG | DIASTOLIC BLOOD PRESSURE: 59 MMHG | BODY MASS INDEX: 27.3 KG/M2

## 2023-01-10 DIAGNOSIS — E78.2 HYPERLIPIDEMIA, MIXED: ICD-10-CM

## 2023-01-10 DIAGNOSIS — I10 ESSENTIAL HYPERTENSION: ICD-10-CM

## 2023-01-10 DIAGNOSIS — Z87.19 HISTORY OF PANCREATITIS: ICD-10-CM

## 2023-01-10 DIAGNOSIS — I25.10 CORONARY ARTERIOSCLEROSIS AFTER PERCUTANEOUS TRANSLUMINAL CORONARY ANGIOPLASTY (PTCA): ICD-10-CM

## 2023-01-10 DIAGNOSIS — E11.9 TYPE 2 DIABETES MELLITUS WITHOUT COMPLICATION, WITHOUT LONG-TERM CURRENT USE OF INSULIN: ICD-10-CM

## 2023-01-10 DIAGNOSIS — Z98.61 CORONARY ARTERIOSCLEROSIS AFTER PERCUTANEOUS TRANSLUMINAL CORONARY ANGIOPLASTY (PTCA): ICD-10-CM

## 2023-01-10 DIAGNOSIS — I25.10 CORONARY ARTERY DISEASE INVOLVING NATIVE CORONARY ARTERY OF NATIVE HEART WITHOUT ANGINA PECTORIS: Primary | ICD-10-CM

## 2023-01-10 DIAGNOSIS — K86.2 PANCREAS CYST: ICD-10-CM

## 2023-01-10 PROCEDURE — 3078F DIAST BP <80 MM HG: CPT | Mod: CPTII,S$GLB,, | Performed by: INTERNAL MEDICINE

## 2023-01-10 PROCEDURE — 3044F PR MOST RECENT HEMOGLOBIN A1C LEVEL <7.0%: ICD-10-PCS | Mod: CPTII,S$GLB,, | Performed by: INTERNAL MEDICINE

## 2023-01-10 PROCEDURE — 1157F PR ADVANCE CARE PLAN OR EQUIV PRESENT IN MEDICAL RECORD: ICD-10-PCS | Mod: CPTII,S$GLB,, | Performed by: INTERNAL MEDICINE

## 2023-01-10 PROCEDURE — 3074F PR MOST RECENT SYSTOLIC BLOOD PRESSURE < 130 MM HG: ICD-10-PCS | Mod: CPTII,S$GLB,, | Performed by: INTERNAL MEDICINE

## 2023-01-10 PROCEDURE — 99999 PR PBB SHADOW E&M-EST. PATIENT-LVL III: CPT | Mod: PBBFAC,,, | Performed by: INTERNAL MEDICINE

## 2023-01-10 PROCEDURE — 1101F PT FALLS ASSESS-DOCD LE1/YR: CPT | Mod: CPTII,S$GLB,, | Performed by: INTERNAL MEDICINE

## 2023-01-10 PROCEDURE — 3074F SYST BP LT 130 MM HG: CPT | Mod: CPTII,S$GLB,, | Performed by: INTERNAL MEDICINE

## 2023-01-10 PROCEDURE — 99214 OFFICE O/P EST MOD 30 MIN: CPT | Mod: S$GLB,,, | Performed by: INTERNAL MEDICINE

## 2023-01-10 PROCEDURE — 1101F PR PT FALLS ASSESS DOC 0-1 FALLS W/OUT INJ PAST YR: ICD-10-PCS | Mod: CPTII,S$GLB,, | Performed by: INTERNAL MEDICINE

## 2023-01-10 PROCEDURE — 1126F AMNT PAIN NOTED NONE PRSNT: CPT | Mod: CPTII,S$GLB,, | Performed by: INTERNAL MEDICINE

## 2023-01-10 PROCEDURE — 99214 PR OFFICE/OUTPT VISIT, EST, LEVL IV, 30-39 MIN: ICD-10-PCS | Mod: S$GLB,,, | Performed by: INTERNAL MEDICINE

## 2023-01-10 PROCEDURE — 99999 PR PBB SHADOW E&M-EST. PATIENT-LVL III: ICD-10-PCS | Mod: PBBFAC,,, | Performed by: INTERNAL MEDICINE

## 2023-01-10 PROCEDURE — 3288F PR FALLS RISK ASSESSMENT DOCUMENTED: ICD-10-PCS | Mod: CPTII,S$GLB,, | Performed by: INTERNAL MEDICINE

## 2023-01-10 PROCEDURE — 1126F PR PAIN SEVERITY QUANTIFIED, NO PAIN PRESENT: ICD-10-PCS | Mod: CPTII,S$GLB,, | Performed by: INTERNAL MEDICINE

## 2023-01-10 PROCEDURE — 3008F BODY MASS INDEX DOCD: CPT | Mod: CPTII,S$GLB,, | Performed by: INTERNAL MEDICINE

## 2023-01-10 PROCEDURE — 3044F HG A1C LEVEL LT 7.0%: CPT | Mod: CPTII,S$GLB,, | Performed by: INTERNAL MEDICINE

## 2023-01-10 PROCEDURE — 3008F PR BODY MASS INDEX (BMI) DOCUMENTED: ICD-10-PCS | Mod: CPTII,S$GLB,, | Performed by: INTERNAL MEDICINE

## 2023-01-10 PROCEDURE — 1157F ADVNC CARE PLAN IN RCRD: CPT | Mod: CPTII,S$GLB,, | Performed by: INTERNAL MEDICINE

## 2023-01-10 PROCEDURE — 3288F FALL RISK ASSESSMENT DOCD: CPT | Mod: CPTII,S$GLB,, | Performed by: INTERNAL MEDICINE

## 2023-01-10 PROCEDURE — 3078F PR MOST RECENT DIASTOLIC BLOOD PRESSURE < 80 MM HG: ICD-10-PCS | Mod: CPTII,S$GLB,, | Performed by: INTERNAL MEDICINE

## 2023-01-10 NOTE — PATIENT INSTRUCTIONS
150-300 minutes a week of moderate exercise  Record of angiogram from [facility]  Consider replacing metformin with SGPT2

## 2023-01-10 NOTE — PROGRESS NOTES
Subjective:    Patient ID:  Sadiq Dhillon is a 72 y.o. male who presents for follow-up of Hypertension      HPI  The patient is a 72 year old semi retired male presented 6/27/22  for re-op evaluatuon of a lithotripsy per Dr Crane. He was admitted to [facility] for a STEMI and had a PCI to his mid LAD 2017[ He apparently has VT X2 during [facility]]. A CTA here 2012 revealed 0 calcium score[see below].He continues to do well and has no chest pain or RAMIREZ. He is not exercising. His father had CABG in his 60 and brother has a ICD. Will get records for [facility]        Conclusions:   1.  Normal coronary arteries.   2.  The distal LAD is not clearly visualized.   This document has been electronically    SIGNED BY: PIPER [email or web address] On: 10/04/2012 08:  Lab Results   Component Value Date     06/27/2022    K 4.4 06/27/2022     06/27/2022    CO2 26 06/27/2022    BUN 14 06/27/2022    CREATININE 0.8 06/27/2022    GLU 95 06/27/2022    HGBA1C 6.3 (H) 01/06/2023    AST 20 06/27/2022    ALT 15 06/27/2022    ALBUMIN 4.8 09/13/2022    PROT 6.7 06/27/2022    BILITOT 0.7 06/27/2022    WBC 7.08 03/08/2022    HGB 15.0 03/08/2022    HCT 46.5 03/08/2022    MCV 90 03/08/2022     03/08/2022    INR 1.1 09/02/2012    PSA 0.93 02/07/2013    TSH 2.281 03/08/2022         Lab Results   Component Value Date    CHOL 105 (L) 01/06/2023    CHOL 105 (L) 01/06/2023    HDL 34 (L) 01/06/2023    HDL 34 (L) 01/06/2023    TRIG 119 01/06/2023    TRIG 119 01/06/2023       Lab Results   Component Value Date    LDLCALC 47.2 (L) 01/06/2023    LDLCALC 47.2 (L) 01/06/2023       Past Medical History:   Diagnosis Date    Diabetes mellitus     Fuchs' corneal dystrophy     Heart attack     Hypertension     Kidney stones     Pancreatic mass     Pancreatitis     after EUS . New cyst per pt  is following no tx at this time    Pituitary adenoma     PONV (postoperative nausea and vomiting)     7-18-18    Prolactinoma 2003    in sinuses and wrapped around  optic nerve, treated with dostenex(cabergoline)/ resolved    Reflux esophagitis     Tumor cells, benign        Current Outpatient Medications:     aspirin (ECOTRIN) 81 MG EC tablet, Take 81 mg by mouth every morning. , Disp: , Rfl:     atorvastatin (LIPITOR) 80 MG tablet, Take 1 tablet (80 mg total) by mouth once daily., Disp: 90 tablet, Rfl: 3    cabergoline (DOSTINEX) 0.5 mg tablet, TAKE 1 TABLET(0.5 MG) BY MOUTH 2 TIMES A WEEK, Disp: 24 tablet, Rfl: 3    CONTOUR NEXT TEST STRIPS Strp, UTD QID IN VITRO, Disp: , Rfl: 3    famotidine (PEPCID) 20 MG tablet, Take by mouth., Disp: , Rfl:     fexofenadine-pseudoephedrine  mg (ALLEGRA-D)  mg per tablet, Take 1 tablet by mouth., Disp: , Rfl:     gabapentin (NEURONTIN) 100 MG capsule, Take 1 capsule three times a day,  Takes one capsule every morning, Disp: , Rfl: 3    lisinopril (PRINIVIL,ZESTRIL) 2.5 MG tablet, Take 2.5 mg by mouth every morning. , Disp: , Rfl:     loperamide (IMODIUM) 2 mg capsule, Take 2 mg by mouth 4 (four) times daily as needed for Diarrhea., Disp: , Rfl:     metoprolol succinate (TOPROL-XL) 25 MG 24 hr tablet, Take 25 mg by mouth nightly. , Disp: , Rfl:     naproxen sodium (ANAPROX) 550 MG tablet, SMARTSI Tablet(s) By Mouth Every 12 Hours PRN, Disp: , Rfl:     neomycin-polymyxin-hydrocortisone (CORTISPORIN) 3.5-10,000-1 mg/mL-unit/mL-% otic suspension, neomycin-polymyxin-HC Take 4 drops (otic (ear)) 3 times per day for 7 days 2021 drops,suspension 3 times per day otic (ear) 7 days active 3.5-10,000-1 mg/mL-unit/mL-%, Disp: , Rfl:     pantoprazole (PROTONIX) 40 MG tablet, Take 40 mg by mouth once daily., Disp: , Rfl:     tamsulosin (FLOMAX) 0.4 mg Cap, TK 1 C PO QHS, Disp: 90 capsule, Rfl: 3    testosterone (ANDROGEL) 1 % (50 mg/5 gram) GlPk, Apply 2 packets topically once daily., Disp: 180 packet, Rfl: 3    fluticasone propionate (FLONASE) 50 mcg/actuation nasal spray, SHAKE LIQUID AND USE 1 TO 2 SPRAYS(50  MCG) IN EACH  "NOSTRIL EVERY DAY (Patient not taking: Reported on 10/11/2022), Disp: 48 g, Rfl: 0    metFORMIN (GLUCOPHAGE-XR) 500 MG ER 24hr tablet, TAKE 2 TABLETS BY MOUTH TWICE DAILY, Disp: 360 tablet, Rfl: 3          Review of Systems   Constitutional: Negative for decreased appetite, diaphoresis, fever, malaise/fatigue, weight gain and weight loss.   HENT:  Negative for congestion, ear discharge, ear pain and nosebleeds.    Eyes:  Negative for blurred vision, double vision and visual disturbance.   Cardiovascular:  Negative for chest pain, claudication, cyanosis, dyspnea on exertion, irregular heartbeat, leg swelling, near-syncope, orthopnea, palpitations, paroxysmal nocturnal dyspnea and syncope.   Respiratory:  Negative for cough, hemoptysis, shortness of breath, sleep disturbances due to breathing, snoring, sputum production and wheezing.    Endocrine: Negative for polydipsia, polyphagia and polyuria.   Hematologic/Lymphatic: Negative for adenopathy and bleeding problem. Does not bruise/bleed easily.   Skin:  Negative for color change, nail changes, poor wound healing and rash.   Musculoskeletal:  Negative for muscle cramps and muscle weakness.   Gastrointestinal:  Negative for abdominal pain, anorexia, change in bowel habit, hematochezia, nausea and vomiting.   Genitourinary:  Negative for dysuria, frequency and hematuria.   Neurological:  Negative for brief paralysis, difficulty with concentration, excessive daytime sleepiness, dizziness, focal weakness, headaches, light-headedness, seizures, vertigo and weakness.   Psychiatric/Behavioral:  Negative for altered mental status and depression.    Allergic/Immunologic: Negative for persistent infections.      Objective:BP (!) 116/59 (BP Location: Right arm, Patient Position: Sitting)   Pulse 82   Ht 5' 9" (1.753 m)   Wt 83.6 kg (184 lb 4.9 oz)   BMI 27.22 kg/m²             Physical Exam  Constitutional:       Appearance: Normal appearance. He is well-developed and normal " weight.   HENT:      Head: Normocephalic.      Right Ear: External ear normal.      Left Ear: External ear normal.      Nose: Nose normal.   Eyes:      General: No scleral icterus.     Pupils: Pupils are equal, round, and reactive to light.   Neck:      Thyroid: No thyromegaly.      Vascular: No JVD.      Trachea: No tracheal deviation.   Cardiovascular:      Rate and Rhythm: Normal rate and regular rhythm.      Pulses: Intact distal pulses.           Carotid pulses are 2+ on the right side and 2+ on the left side.       Dorsalis pedis pulses are 2+ on the right side and 2+ on the left side.        Posterior tibial pulses are 2+ on the right side and 2+ on the left side.      Heart sounds: No murmur heard.    No friction rub. No gallop.   Pulmonary:      Effort: Pulmonary effort is normal.      Breath sounds: Normal breath sounds.   Abdominal:      General: Bowel sounds are normal. There is no distension.      Tenderness: There is no abdominal tenderness. There is no guarding.   Musculoskeletal:         General: No tenderness. Normal range of motion.      Cervical back: Normal range of motion and neck supple.   Lymphadenopathy:      Comments: Palpation of neck and groin lymph nodes normal   Skin:     General: Skin is dry.      Comments: Palpation of skin normal   Neurological:      Mental Status: He is alert and oriented to person, place, and time.      Cranial Nerves: No cranial nerve deficit.      Motor: No abnormal muscle tone.      Coordination: Coordination normal.   Psychiatric:         Behavior: Behavior normal.         Thought Content: Thought content normal.         Judgment: Judgment normal.         Assessment:       1. Coronary artery disease involving native coronary artery of native heart without angina pectoris    2. Hyperlipidemia, mixed    3. Unspecified essential hypertension    4. Type 2 diabetes mellitus without complication, without long-term current use of insulin    5. History of pancreatitis     6. Pancreas cyst    7. Coronary arteriosclerosis after percutaneous transluminal coronary angioplasty (PTCA)         Plan:       Sadiq was seen today for hypertension.    Diagnoses and all orders for this visit:    Coronary artery disease involving native coronary artery of native heart without angina pectoris    Hyperlipidemia, mixed  -     Lipid Panel; Future; Expected date: 01/10/2024    Unspecified essential hypertension    Type 2 diabetes mellitus without complication, without long-term current use of insulin  -     CBC Auto Differential; Future; Expected date: 01/10/2024  -     Comprehensive Metabolic Panel; Future; Expected date: 01/10/2024  -     Hemoglobin A1C; Future; Expected date: 01/10/2024    History of pancreatitis    Pancreas cyst    Coronary arteriosclerosis after percutaneous transluminal coronary angioplasty (PTCA)

## 2023-01-13 ENCOUNTER — OFFICE VISIT (OUTPATIENT)
Dept: ENDOCRINOLOGY | Facility: CLINIC | Age: 73
End: 2023-01-13
Payer: COMMERCIAL

## 2023-01-13 DIAGNOSIS — E29.1 HYPOGONADISM IN MALE: ICD-10-CM

## 2023-01-13 DIAGNOSIS — D35.2 PITUITARY MACROADENOMA: ICD-10-CM

## 2023-01-13 DIAGNOSIS — K86.2 PANCREAS CYST: ICD-10-CM

## 2023-01-13 DIAGNOSIS — D35.2 PROLACTINOMA: Primary | ICD-10-CM

## 2023-01-13 DIAGNOSIS — E11.65 TYPE 2 DIABETES MELLITUS WITH HYPERGLYCEMIA, WITHOUT LONG-TERM CURRENT USE OF INSULIN: ICD-10-CM

## 2023-01-13 PROCEDURE — 1159F PR MEDICATION LIST DOCUMENTED IN MEDICAL RECORD: ICD-10-PCS | Mod: CPTII,95,, | Performed by: PHYSICIAN ASSISTANT

## 2023-01-13 PROCEDURE — 3044F PR MOST RECENT HEMOGLOBIN A1C LEVEL <7.0%: ICD-10-PCS | Mod: CPTII,95,, | Performed by: PHYSICIAN ASSISTANT

## 2023-01-13 PROCEDURE — 1157F PR ADVANCE CARE PLAN OR EQUIV PRESENT IN MEDICAL RECORD: ICD-10-PCS | Mod: CPTII,95,, | Performed by: PHYSICIAN ASSISTANT

## 2023-01-13 PROCEDURE — 99213 PR OFFICE/OUTPT VISIT, EST, LEVL III, 20-29 MIN: ICD-10-PCS | Mod: 95,,, | Performed by: PHYSICIAN ASSISTANT

## 2023-01-13 PROCEDURE — 1160F PR REVIEW ALL MEDS BY PRESCRIBER/CLIN PHARMACIST DOCUMENTED: ICD-10-PCS | Mod: CPTII,95,, | Performed by: PHYSICIAN ASSISTANT

## 2023-01-13 PROCEDURE — 1157F ADVNC CARE PLAN IN RCRD: CPT | Mod: CPTII,95,, | Performed by: PHYSICIAN ASSISTANT

## 2023-01-13 PROCEDURE — 3044F HG A1C LEVEL LT 7.0%: CPT | Mod: CPTII,95,, | Performed by: PHYSICIAN ASSISTANT

## 2023-01-13 PROCEDURE — 1159F MED LIST DOCD IN RCRD: CPT | Mod: CPTII,95,, | Performed by: PHYSICIAN ASSISTANT

## 2023-01-13 PROCEDURE — 1160F RVW MEDS BY RX/DR IN RCRD: CPT | Mod: CPTII,95,, | Performed by: PHYSICIAN ASSISTANT

## 2023-01-13 PROCEDURE — 99213 OFFICE O/P EST LOW 20 MIN: CPT | Mod: 95,,, | Performed by: PHYSICIAN ASSISTANT

## 2023-01-13 NOTE — PROGRESS NOTES
Pituitary Clinic Endocrine Note    01/13/2023     The patient location is: Home  The chief complaint leading to consultation is: Prolactinoma    Visit type: audiovisual    Face to Face time with patient: 15 min  25 minutes of total time spent on the encounter, which includes face to face time and non-face to face time preparing to see the patient (eg, review of tests), Obtaining and/or reviewing separately obtained history, Documenting clinical information in the electronic or other health record, Independently interpreting results (not separately reported) and communicating results to the patient/family/caregiver, or Care coordination (not separately reported).     Each patient to whom he or she provides medical services by telemedicine is:  (1) informed of the relationship between the physician and patient and the respective role of any other health care provider with respect to management of the patient; and (2) notified that he or she may decline to receive medical services by telemedicine and may withdraw from such care at any time.    Chief Complaint:  Prolactinoma    History of Present Illness    The patient is a 72 y.o. gentleman seen in [facility] today on account of prolactinoma and presumed pancreatic mass.  He in addition has secondary hypogonadism, type 2 diabetes, hyperlipidemia, CAD and Fuch's corneal dystrophy.     He had previously been seen at the [facility] neuroendocrine center. He  had a giant macroprolactinoma diagnosed in 2003 (6.5 cm adenoma w. initial prl >[id number]) found on evaluation for visual field changes. He also had a background history of primary hyperparathyroidism for which he had a prior parathyroidectomy and a pancreatic mass. Due to clinical concern for MEN-1 he had genetic testing which was negative in 10/2020.      T2DM  Metformin 1000 mg bid  No exercise.   Podiatry exam:   Eye exam: 8/22 Dr. Van    Interval Hx:  Doing well since last visit, no HA or vision change over past 6 mo (reports  initial VF deficits resolved).  Had blx corneal txp in 2017, 2018 and 3/19.      MRI 5/22 shows a cystic pituitary mass which is similar to previous exams.    Regarding hyperprolactinemia:  He remains on Dostinex for the prolactinoma;  0.5 mg 2 x weekly.  Denies gynecomastia and nipple d/c    Lab Results   Component Value Date    PROLACTIN 3.2 (L) 03/08/2022    PROLACTIN 2.7 (L) 04/09/2021    PROLACTIN 4.2 10/21/2020    PROLACTIN 20.0 (H) 12/17/2012    PROLACTIN 31.3 (H) 12/01/2011    PROLACTIN 19 (H) 02/24/2011     Regarding hypogonadotropic hypogonadism:  On androgel 5g packet daily to shoulders.    getting spontaneous erections.  No hx of VTE  Has CAD with stent to LAD 2017 at [facility]    (last test was off 1.5 wks )  Lab Results   Component Value Date    TESTOSTERONE 853 09/13/2022    TESTOSTERONE 184.3 (H) 09/13/2022     Lab Results   Component Value Date    WBC 7.08 03/08/2022    HGB 15.0 03/08/2022    HCT 46.5 03/08/2022    MCV 90 03/08/2022     03/08/2022       PSA, Screen (ng/ml)   Date Value   02/07/2013 0.93     PSA Total (ng/mL)   Date Value   03/08/2022 1.9     PSA, Free (ng/mL)   Date Value   03/08/2022 0.54     PSA, Free % (%)   Date Value   03/08/2022 28.42     Regarding pancreatic mass:  Followed by GI, no change in appearance of mass on imaging  CT on 7/22 unchanged, getting yearly scans to monitor  Had EUS at [facility] (path not available but patient reports benign)    Lab Results   Component Value Date    HGBA1C 6.3 (H) 01/06/2023        ROS: see hpi    Current Outpatient Medications:     aspirin (ECOTRIN) 81 MG EC tablet, Take 81 mg by mouth every morning. , Disp: , Rfl:     atorvastatin (LIPITOR) 80 MG tablet, Take 1 tablet (80 mg total) by mouth once daily., Disp: 90 tablet, Rfl: 3    cabergoline (DOSTINEX) 0.5 mg tablet, TAKE 1 TABLET(0.5 MG) BY MOUTH 2 TIMES A WEEK, Disp: 24 tablet, Rfl: 3    CONTOUR NEXT TEST STRIPS Strp, UTD QID IN VITRO, Disp: , Rfl: 3    famotidine (PEPCID) 20  MG tablet, Take by mouth., Disp: , Rfl:     fexofenadine-pseudoephedrine  mg (ALLEGRA-D)  mg per tablet, Take 1 tablet by mouth., Disp: , Rfl:     fluticasone propionate (FLONASE) 50 mcg/actuation nasal spray, SHAKE LIQUID AND USE 1 TO 2 SPRAYS(50  MCG) IN EACH NOSTRIL EVERY DAY (Patient not taking: Reported on 10/11/2022), Disp: 48 g, Rfl: 0    gabapentin (NEURONTIN) 100 MG capsule, Take 1 capsule three times a day,  Takes one capsule every morning, Disp: , Rfl: 3    lisinopril (PRINIVIL,ZESTRIL) 2.5 MG tablet, Take 2.5 mg by mouth every morning. , Disp: , Rfl:     loperamide (IMODIUM) 2 mg capsule, Take 2 mg by mouth 4 (four) times daily as needed for Diarrhea., Disp: , Rfl:     metFORMIN (GLUCOPHAGE-XR) 500 MG ER 24hr tablet, TAKE 2 TABLETS BY MOUTH TWICE DAILY, Disp: 360 tablet, Rfl: 3    metoprolol succinate (TOPROL-XL) 25 MG 24 hr tablet, Take 25 mg by mouth nightly. , Disp: , Rfl:     naproxen sodium (ANAPROX) 550 MG tablet, SMARTSI Tablet(s) By Mouth Every 12 Hours PRN, Disp: , Rfl:     neomycin-polymyxin-hydrocortisone (CORTISPORIN) 3.5-10,000-1 mg/mL-unit/mL-% otic suspension, neomycin-polymyxin-HC Take 4 drops (otic (ear)) 3 times per day for 7 days 2021 drops,suspension 3 times per day otic (ear) 7 days active 3.5-10,000-1 mg/mL-unit/mL-%, Disp: , Rfl:     pantoprazole (PROTONIX) 40 MG tablet, Take 40 mg by mouth once daily., Disp: , Rfl:     tamsulosin (FLOMAX) 0.4 mg Cap, TK 1 C PO QHS, Disp: 90 capsule, Rfl: 3    testosterone (ANDROGEL) 1 % (50 mg/5 gram) GlPk, Apply 2 packets topically once daily., Disp: 180 packet, Rfl: 3     There were no vitals filed for this visit.    Constitutional:  Pleasant,  in no acute distress.   HENT:   Eyes:     No scleral icterus.   Respiratory:   Effort normal   Neurological:  normal speech  Psych:  Normal mood and affect.      Lab Review:   Lab Results   Component Value Date    PROLACTIN 3.2 (L) 2022    PROLACTIN 2.7 (L) 2021     PROLACTIN 4.2 10/21/2020    PROLACTIN 20.0 (H) 12/17/2012    TSH 2.281 03/08/2022    TSH 2.190 02/07/2013    TSH 1.740 12/01/2011    TSH 2.46 02/24/2011    FREET4 0.89 12/01/2011    FREET4 1.07 05/20/2008    FREET4 1.25 09/05/2007    FREET4 0.99 12/28/2005    ACTH 42 09/13/2022    ACTH 47 (H) 03/08/2022    ACTH 50 (H) 04/09/2021    ACTH 69 (H) 10/21/2020    CORTISOL 6.10 09/13/2022    CORTISOL 5.90 03/08/2022    CORTISOL 9.00 04/09/2021    CORTISOL 10.70 10/21/2020    LABLH 1.2 04/09/2021    LABLH <0.2 (L) 10/19/2020    LABLH 0.10 06/09/2009    LABLH 0.55 09/14/2007    FSH 2.50 04/09/2021    FSH 0.10 (L) 10/19/2020    SOMATMDN 69 04/09/2021    SOMATMDN 63 10/21/2020    SOMATMDN 105 06/09/2009    SOMATMDN 112 09/05/2007     06/27/2022     03/08/2022     03/30/2021     03/30/2021    K 4.4 06/27/2022    K 4.6 03/08/2022    K 4.3 03/30/2021    K 4.3 03/30/2021    CALCIUM 10.0 06/27/2022    CALCIUM 10.2 03/08/2022    CALCIUM 9.6 03/30/2021    CALCIUM 9.6 03/30/2021    ALBUMIN 4.8 09/13/2022    ALBUMIN 4.5 06/27/2022    ALBUMIN 4.4 03/08/2022    ALBUMIN 4.8 03/08/2022    ESTGFRAFRICA >60.0 06/27/2022    ESTGFRAFRICA >60.0 03/08/2022    ESTGFRAFRICA >60.0 03/30/2021    ESTGFRAFRICA >60.0 03/30/2021    EGFRNONAA >60.0 06/27/2022    EGFRNONAA >60.0 03/08/2022    EGFRNONAA >60.0 03/30/2021    EGFRNONAA >60.0 03/30/2021      Assessment/plan     Problem List Items Addressed This Visit    None         Prolactinoma     I have independently reviewed the Last pituitary MRI from May 2022 shows resolution of adenoma with small amount pituitary tissue at the base of the sella and pushed to the right aspect of the sella with pituitary stalk deviated to the right and some tethering of the optic chiasm as it is deviated downward.    Will continue current dose of cabergoline 5 mg twice weekly as levels have been low to normal on this dose.  Will have him get baseline visual field testing done with  Neuro-Ophthalmology.  If no clinical change and prolactin remains normal we do not need to get frequent MRIs.            2     Hypogonadism in male     Likely permanent due to macroadenoma in the past as when he was off testosterone for 1 and a weeks is levels were the low end of normal.  Will continue current testosterone regimen with appropriate labs to monitor.            3     Pancreas cyst     He will continue to follow-up with GI with yearly imaging.  MEN testing negative            Unprioritized    Pituitary macroadenoma    Pituitary labs have been normal.    Type 2 Diabetes  A1c stable. Continue Metformin. Increase exercise to 30 min daily.         3 mths

## 2023-01-18 ENCOUNTER — HOSPITAL ENCOUNTER (OUTPATIENT)
Dept: RADIOLOGY | Facility: HOSPITAL | Age: 73
Discharge: HOME | End: 2023-01-18
Attending: INTERNAL MEDICINE
Payer: COMMERCIAL

## 2023-01-18 DIAGNOSIS — K86.2 PANCREATIC CYST: ICD-10-CM

## 2023-01-18 PROCEDURE — 74183 MRI ABD W/O CNTR FLWD CNTR: CPT | Mod: 26,,, | Performed by: RADIOLOGY

## 2023-01-18 PROCEDURE — 74183 MRI ABD W/O CNTR FLWD CNTR: CPT | Mod: TC

## 2023-01-18 PROCEDURE — 76376 3D RENDER W/INTRP POSTPROCES: CPT | Mod: TC

## 2023-01-18 PROCEDURE — 74183 MRI ABDOMEN WITH AND WO_INC MRCP (XPD): ICD-10-PCS | Mod: 26,,, | Performed by: RADIOLOGY

## 2023-01-18 PROCEDURE — 25500020 PHARM REV CODE 255: Performed by: INTERNAL MEDICINE

## 2023-01-18 PROCEDURE — 76376 3D RENDER W/INTRP POSTPROCES: CPT | Mod: 26,,, | Performed by: RADIOLOGY

## 2023-01-18 PROCEDURE — 76376 MRI ABDOMEN WITH AND WO_INC MRCP (XPD): ICD-10-PCS | Mod: 26,,, | Performed by: RADIOLOGY

## 2023-01-18 PROCEDURE — A9585 GADOBUTROL INJECTION: HCPCS | Performed by: INTERNAL MEDICINE

## 2023-01-18 RX ORDER — GADOBUTROL 604.72 MG/ML
10 INJECTION INTRAVENOUS
Status: COMPLETED | OUTPATIENT
Start: 2023-01-18 | End: 2023-01-18

## 2023-01-18 RX ADMIN — GADOBUTROL 10 ML: 604.72 INJECTION INTRAVENOUS at 10:01

## 2023-01-18 NOTE — PROGRESS NOTES
Reassured by virtually exact same size of pancreas body cyst measuring 1.6cm x 2.0cm x 2.6cm as compared to previous CT imaging dating back to 2019 (which was accessed by the Radiologist and the cyst was remeasured on that study also); also stable 0.8cm cyst near ampulla. Normal main pancreatic duct. All reassuring and stable findings. Would have patient f/up in pancreas cyst clinic.     Given these stable findings without worrisome features, we can keep up with MRCP surveillance, next MRI abd with and without contrast with MRCP can be in 1 year.

## 2023-01-23 ENCOUNTER — OFFICE VISIT (OUTPATIENT)
Dept: SPORTS MEDICINE | Facility: CLINIC | Age: 73
End: 2023-01-23
Payer: COMMERCIAL

## 2023-01-23 VITALS
HEART RATE: 79 BPM | HEIGHT: 69 IN | SYSTOLIC BLOOD PRESSURE: 105 MMHG | WEIGHT: 184 LBS | DIASTOLIC BLOOD PRESSURE: 63 MMHG | BODY MASS INDEX: 27.25 KG/M2

## 2023-01-23 DIAGNOSIS — M75.101 NONTRAUMATIC TEAR OF RIGHT ROTATOR CUFF, UNSPECIFIED TEAR EXTENT: ICD-10-CM

## 2023-01-23 DIAGNOSIS — M19.011 ARTHRITIS OF RIGHT SHOULDER REGION: Primary | ICD-10-CM

## 2023-01-23 PROCEDURE — 1159F MED LIST DOCD IN RCRD: CPT | Mod: CPTII,S$GLB,, | Performed by: ORTHOPAEDIC SURGERY

## 2023-01-23 PROCEDURE — 1159F PR MEDICATION LIST DOCUMENTED IN MEDICAL RECORD: ICD-10-PCS | Mod: CPTII,S$GLB,, | Performed by: ORTHOPAEDIC SURGERY

## 2023-01-23 PROCEDURE — 99214 OFFICE O/P EST MOD 30 MIN: CPT | Mod: S$GLB,,, | Performed by: ORTHOPAEDIC SURGERY

## 2023-01-23 PROCEDURE — 3074F SYST BP LT 130 MM HG: CPT | Mod: CPTII,S$GLB,, | Performed by: ORTHOPAEDIC SURGERY

## 2023-01-23 PROCEDURE — 1101F PT FALLS ASSESS-DOCD LE1/YR: CPT | Mod: CPTII,S$GLB,, | Performed by: ORTHOPAEDIC SURGERY

## 2023-01-23 PROCEDURE — 3078F DIAST BP <80 MM HG: CPT | Mod: CPTII,S$GLB,, | Performed by: ORTHOPAEDIC SURGERY

## 2023-01-23 PROCEDURE — 3078F PR MOST RECENT DIASTOLIC BLOOD PRESSURE < 80 MM HG: ICD-10-PCS | Mod: CPTII,S$GLB,, | Performed by: ORTHOPAEDIC SURGERY

## 2023-01-23 PROCEDURE — 3008F BODY MASS INDEX DOCD: CPT | Mod: CPTII,S$GLB,, | Performed by: ORTHOPAEDIC SURGERY

## 2023-01-23 PROCEDURE — 99214 PR OFFICE/OUTPT VISIT, EST, LEVL IV, 30-39 MIN: ICD-10-PCS | Mod: S$GLB,,, | Performed by: ORTHOPAEDIC SURGERY

## 2023-01-23 PROCEDURE — 1126F PR PAIN SEVERITY QUANTIFIED, NO PAIN PRESENT: ICD-10-PCS | Mod: CPTII,S$GLB,, | Performed by: ORTHOPAEDIC SURGERY

## 2023-01-23 PROCEDURE — 1101F PR PT FALLS ASSESS DOC 0-1 FALLS W/OUT INJ PAST YR: ICD-10-PCS | Mod: CPTII,S$GLB,, | Performed by: ORTHOPAEDIC SURGERY

## 2023-01-23 PROCEDURE — 3074F PR MOST RECENT SYSTOLIC BLOOD PRESSURE < 130 MM HG: ICD-10-PCS | Mod: CPTII,S$GLB,, | Performed by: ORTHOPAEDIC SURGERY

## 2023-01-23 PROCEDURE — 3044F HG A1C LEVEL LT 7.0%: CPT | Mod: CPTII,S$GLB,, | Performed by: ORTHOPAEDIC SURGERY

## 2023-01-23 PROCEDURE — 99999 PR PBB SHADOW E&M-EST. PATIENT-LVL IV: CPT | Mod: PBBFAC,,, | Performed by: ORTHOPAEDIC SURGERY

## 2023-01-23 PROCEDURE — 1126F AMNT PAIN NOTED NONE PRSNT: CPT | Mod: CPTII,S$GLB,, | Performed by: ORTHOPAEDIC SURGERY

## 2023-01-23 PROCEDURE — 3044F PR MOST RECENT HEMOGLOBIN A1C LEVEL <7.0%: ICD-10-PCS | Mod: CPTII,S$GLB,, | Performed by: ORTHOPAEDIC SURGERY

## 2023-01-23 PROCEDURE — 3288F PR FALLS RISK ASSESSMENT DOCUMENTED: ICD-10-PCS | Mod: CPTII,S$GLB,, | Performed by: ORTHOPAEDIC SURGERY

## 2023-01-23 PROCEDURE — 1157F PR ADVANCE CARE PLAN OR EQUIV PRESENT IN MEDICAL RECORD: ICD-10-PCS | Mod: CPTII,S$GLB,, | Performed by: ORTHOPAEDIC SURGERY

## 2023-01-23 PROCEDURE — 3008F PR BODY MASS INDEX (BMI) DOCUMENTED: ICD-10-PCS | Mod: CPTII,S$GLB,, | Performed by: ORTHOPAEDIC SURGERY

## 2023-01-23 PROCEDURE — 1157F ADVNC CARE PLAN IN RCRD: CPT | Mod: CPTII,S$GLB,, | Performed by: ORTHOPAEDIC SURGERY

## 2023-01-23 PROCEDURE — 99999 PR PBB SHADOW E&M-EST. PATIENT-LVL IV: ICD-10-PCS | Mod: PBBFAC,,, | Performed by: ORTHOPAEDIC SURGERY

## 2023-01-23 PROCEDURE — 3288F FALL RISK ASSESSMENT DOCD: CPT | Mod: CPTII,S$GLB,, | Performed by: ORTHOPAEDIC SURGERY

## 2023-01-23 NOTE — PROGRESS NOTES
CC: right Shoulder pain referred by Dr. Mcpherson    73 y.o. Male here for follow up of right shoulder pain. He is not having much pain during the day or with activities. When it bothers him it bothers him at night while laying in bed. MRI right shoulder was done after last visit.     Pain 0/10 today     SANE 80        PAST MEDICAL HISTORY:   Past Medical History:   Diagnosis Date    Diabetes mellitus     Fuchs' corneal dystrophy     Heart attack     Hypertension     Kidney stones     Pancreatic mass     Pancreatitis     after EUS . 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, treated with dostenex(cabergoline)/ resolved    Reflux esophagitis     Tumor cells, benign      PAST SURGICAL HISTORY:   Past Surgical History:   Procedure Laterality Date    CATARACT EXTRACTION W/  INTRAOCULAR LENS IMPLANT Right 0    Dr Van     CATARACT EXTRACTION W/  INTRAOCULAR LENS IMPLANT Left 3/21/2019    Procedure: EXTRACTION, CATARACT, WITH IOL INSERTION;  Surgeon: Ra Van MD;  Location: [facility];  Service: Ophthalmology;  Laterality: Left;    CORNEAL TRANSPLANT Right     Dr Van     DESCEMETS STRIPPING AUTOMATED ENDOTHELIAL KERATOPLASTY Left 3/21/2019    Procedure: TRANSPLANT, PARTIAL-THICKNESS, CORNEA, USING DSAEK TECHNIQUE;  Surgeon: Ra Van MD;  Location: [facility];  Service: Ophthalmology;  Laterality: Left;  DSEK    REPAIR OF RETINAL DETACHMENT WITH VITRECTOMY Right 7/18/2018    Procedure: REPAIR, RETINAL DETACHMENT, WITH VITRECTOMY;  Surgeon: SHUBHAM Patel MD;  Location: [facility] OR [address] [place] [address];  Service: Ophthalmology;  Laterality: Right;  40 min    REPAIR OF RETINAL DETACHMENT WITH VITRECTOMY Left 8/8/2018    Procedure: REPAIR, RETINAL DETACHMENT, WITH VITRECTOMY;  Surgeon: SHUBHAM Patel MD;  Location: [facility] OR [address] [place] [address];  Service: Ophthalmology;  Laterality: Left;   40 min    RHINOPLASTY TIP  1975    THYROIDECTOMY  2007    UPPER ENDOSCOPIC ULTRASOUND W/ FNA      with resultant pancreatitis     FAMILY HISTORY:   Family History   Problem Relation Age of Onset    Hypertension Mother     Heart disease Mother     Heart disease Father     Cataracts Father     Stroke Father     Diabetes Father     Diabetes Paternal Uncle     Stroke Maternal Grandmother     Blindness Neg Hx     Glaucoma Neg Hx     Macular degeneration Neg Hx     Retinal detachment Neg Hx     Strabismus Neg Hx     Thyroid disease Neg Hx     Cancer Neg Hx     Amblyopia Neg Hx      SOCIAL HISTORY:   Social History     Socioeconomic History    Marital status: Single   Tobacco Use    Smoking status: Never    Smokeless tobacco: Never   Substance and Sexual Activity    Alcohol use: Yes     Comment: social    Drug use: No    Sexual activity: Not Currently       MEDICATIONS:   Current Outpatient Medications:     aspirin (ECOTRIN) 81 MG EC tablet, Take 81 mg by mouth every morning. , Disp: , Rfl:     atorvastatin (LIPITOR) 80 MG tablet, Take 1 tablet (80 mg total) by mouth once daily., Disp: 90 tablet, Rfl: 3    cabergoline (DOSTINEX) 0.5 mg tablet, TAKE 1 TABLET(0.5 MG) BY MOUTH 2 TIMES A WEEK, Disp: 24 tablet, Rfl: 3    CONTOUR NEXT TEST STRIPS Strp, UTD QID IN VITRO, Disp: , Rfl: 3    famotidine (PEPCID) 20 MG tablet, Take by mouth., Disp: , Rfl:     fexofenadine-pseudoephedrine  mg (ALLEGRA-D)  mg per tablet, Take 1 tablet by mouth., Disp: , Rfl:     gabapentin (NEURONTIN) 100 MG capsule, Take 1 capsule three times a day,  Takes one capsule every morning, Disp: , Rfl: 3    lisinopril (PRINIVIL,ZESTRIL) 2.5 MG tablet, Take 2.5 mg by mouth every morning. , Disp: , Rfl:     loperamide (IMODIUM) 2 mg capsule, Take 2 mg by mouth 4 (four) times daily as needed for Diarrhea., Disp: , Rfl:     metoprolol succinate (TOPROL-XL) 25 MG 24 hr tablet, Take 25 mg by mouth nightly. , Disp: , Rfl:     tamsulosin (FLOMAX) 0.4 mg  "Cap, TK 1 C PO QHS, Disp: 90 capsule, Rfl: 3    testosterone (ANDROGEL) 1 % (50 mg/5 gram) GlPk, Apply 2 packets topically once daily., Disp: 180 packet, Rfl: 3    metFORMIN (GLUCOPHAGE-XR) 500 MG ER 24hr tablet, TAKE 2 TABLETS BY MOUTH TWICE DAILY, Disp: 360 tablet, Rfl: 3  ALLERGIES:   Review of patient's allergies indicates:   Allergen Reactions    Grass pollen-june grass standard     House dust        VITAL SIGNS: /63   Pulse 79   Ht 5' 9" (1.753 m)   Wt 83.5 kg (184 lb)   BMI 27.17 kg/m²      Review of Systems   Constitution: Negative. Negative for chills, fever and night sweats.   HENT: Negative for congestion and headaches.    Eyes: Negative for blurred vision, left vision loss and right vision loss.   Cardiovascular: Negative for chest pain and syncope.   Respiratory: Negative for cough and shortness of breath.    Endocrine: Negative for polydipsia, polyphagia and polyuria.   Hematologic/Lymphatic: Negative for bleeding problem. Does not bruise/bleed easily.   Skin: Negative for dry skin, itching and rash.   Musculoskeletal: Negative for falls and muscle weakness.   Gastrointestinal: Negative for abdominal pain and bowel incontinence.   Genitourinary: Negative for bladder incontinence and nocturia.   Neurological: Negative for disturbances in coordination, loss of balance and seizures.   Psychiatric/Behavioral: Negative for depression. The patient does not have insomnia.    Allergic/Immunologic: Negative for hives and persistent infections.       PHYSICAL EXAMINATION:  General:  The patient is alert and oriented x 3.  Mood is pleasant.  Observation of ears, eyes and nose reveal no gross abnormalities.  HEENT: NCAT, sclera nonicteric  Lungs: Respirations are equal and unlabored.  Gait is coordinated. Patient can toe walk and heel walk without difficulty.  Cardiovascular: There are no swelling or varicosities present.   Lymphatic: Negative for adenopathy       right SHOULDER / UPPER EXTREMITY " EXAM    OBSERVATION:     Swelling  none  Deformity  none   Discoloration  none   Scapular winging none   Scars   none  Atrophy  none    TENDERNESS / CREPITUS (T/C):          T/C      T/C   Clavicle   -/-  SUPRAspinatus    -/-   AC Jt.    -/-  INFRAspinatus  -/-   SC Jt.    -/-  Deltoid    -/-   G. Tuberosity  -/-  LH BICEP groove  -/-   Acromion:  -/-  Midline Neck   -/-   Scapular Spine -/-  Trapezium   -/-   SMA Scapula  -/-  GH jt. line - post  -/-   Scapulothoracic  -/-         ROM: (* = with pain)  Right shoulder   Left shoulder        AROM (PROM)   AROM (PROM)   FE    175° (175°)     175° (175°)     ER at 0°    65°  (65°)    65°  (65°)   IR (spine level)   L1    T10    STRENGTH: (* = with pain) RIGHT SHOULDER  LEFT SHOULDER   SCAPTION at 0   4+/5    5/5    SCAPTION at 30   5/5    5/5    IR    5/5    5/5   ER    5/5    5/5   BICEPS   5/5    5/5   Deltoid    5/5    5/5     SIGNS:  Painful side       NEER   -    OMARYS  -   DAVIS   -   SPEEDS  -   DROP ARM   neg   BELLY PRESS Neg   Superior escape none    LIFT-OFF  Neg   X-Body ADD    neg    MOVING VALGUS Neg      STABILITY TESTING    RIGHT SHOULDER   LEFT SHOULDER       Translation    Anterior  up face     up face    Posterior  up face    up face    Sulcus   < 10mm    < 10 mm    Signs    Apprehension   neg      Neg    Relocation   no change     no change    Jerk test  neg     Neg      EXTREMITY NEURO-VASCULAR EXAM    Sensation grossly intact to light touch all dermatomal regions.    DTR 2+ Biceps, Triceps, BR and Negative Andrdaes sign   Grossly intact motor function at Elbow, Wrist and Hand   Distal pulses radial and ulnar 2+, brisk cap refill, symmetric.      NECK:  Painless FROM and spinous processes non-tender. Negative Spurlings sign.      OTHER FINDINGS:    XRAYS:  Shoulder trauma series right,  were ordered and reviewed by me. No convincing fracture or dislocation is noted. The osseous structures appear well mineralized and well aligned,  possible insufficiency fracture greater tuberosity/cystic changes, AC moderate hypertrophy, degenerative changes    MRI right shoulder 9/17/22:   Full thickness tear of supraspinatus at insertion on greater tuberosity  Tendinosis of supraspinatus, infraspinatus and long head of biceps tendon      Assessment  Right shoulder arthritis  Right insertional rotator cuff tear    Plan:       Not having significant pain during day or with activities. Intermittently having pain at night. Would like to continue with conservative management. Placed order for PT with [facility]. Follow up in 6 months.  Discussed with PT.  PT  NSAIDS  HEP      Right Shoulder rotator cuff tear     All questions were answered, pt will contact us for questions or concerns in the interim.  Had thorough discussion of non-operative vs operative intervention, and risks and benefits of both.     We have discussed the surgery and recovery of arthroscopic shoulder surgery. he understands that there may be limited mobility up to several weeks after surgery depending on procedures that are performed at the time of surgery.    The spectrum of treatment options were discussed with the patient, including nonoperative and operative options:    right   a. Shoulder arthroscopic rotator cuff repair with Cuffmend   b. Shoulder arthroscopic SAD   c. Shoulder arthroscopic extensive debridement   d. Shoulder arthroscopic biceps tenodesis (vs. subpect tenodesis)   e. Shoulder arthroscopic possible microfracture   f.  Shoulder open reduction internal fixation greater tuberosity         would like to proceed conservatively for now.  Risks and benefits explanied including tear and atrophy progression and need for more extensive surgery    Recheck yearly

## 2023-02-08 ENCOUNTER — PATIENT MESSAGE (OUTPATIENT)
Dept: CARDIOLOGY | Facility: CLINIC | Age: 73
End: 2023-02-08
Payer: COMMERCIAL

## 2023-02-08 RX ORDER — METOPROLOL SUCCINATE 25 MG/1
25 TABLET, EXTENDED RELEASE ORAL NIGHTLY
Qty: 60 TABLET | Refills: 6 | Status: SHIPPED | OUTPATIENT
Start: 2023-02-08

## 2023-02-10 ENCOUNTER — OFFICE VISIT (OUTPATIENT)
Dept: URGENT CARE | Facility: CLINIC | Age: 73
End: 2023-02-10
Payer: COMMERCIAL

## 2023-02-10 VITALS
HEART RATE: 78 BPM | WEIGHT: 184 LBS | DIASTOLIC BLOOD PRESSURE: 72 MMHG | SYSTOLIC BLOOD PRESSURE: 161 MMHG | HEIGHT: 69 IN | RESPIRATION RATE: 19 BRPM | OXYGEN SATURATION: 99 % | TEMPERATURE: 98 F | BODY MASS INDEX: 27.25 KG/M2

## 2023-02-10 DIAGNOSIS — R05.9 COUGH, UNSPECIFIED TYPE: ICD-10-CM

## 2023-02-10 DIAGNOSIS — R30.0 DYSURIA: Primary | ICD-10-CM

## 2023-02-10 DIAGNOSIS — R53.83 LOW ENERGY: ICD-10-CM

## 2023-02-10 LAB
BILIRUB UR QL STRIP: NEGATIVE
CTP QC/QA: YES
CTP QC/QA: YES
GLUCOSE UR QL STRIP: NEGATIVE
KETONES UR QL STRIP: NEGATIVE
LEUKOCYTE ESTERASE UR QL STRIP: NEGATIVE
PH, POC UA: 5.5 (ref 5–8)
POC BLOOD, URINE: NEGATIVE
POC MOLECULAR INFLUENZA A AGN: NEGATIVE
POC MOLECULAR INFLUENZA B AGN: NEGATIVE
POC NITRATES, URINE: NEGATIVE
PROT UR QL STRIP: NEGATIVE
SARS-COV-2 AG RESP QL IA.RAPID: NEGATIVE
SP GR UR STRIP: 1.01 (ref 1–1.03)
UROBILINOGEN UR STRIP-ACNC: NORMAL (ref 0.3–2.2)

## 2023-02-10 PROCEDURE — 3008F PR BODY MASS INDEX (BMI) DOCUMENTED: ICD-10-PCS | Mod: CPTII,S$GLB,, | Performed by: NURSE PRACTITIONER

## 2023-02-10 PROCEDURE — 81003 POCT URINALYSIS, DIPSTICK, AUTOMATED, W/O SCOPE: ICD-10-PCS | Mod: QW,S$GLB,, | Performed by: NURSE PRACTITIONER

## 2023-02-10 PROCEDURE — 3288F PR FALLS RISK ASSESSMENT DOCUMENTED: ICD-10-PCS | Mod: CPTII,S$GLB,, | Performed by: NURSE PRACTITIONER

## 2023-02-10 PROCEDURE — 3044F PR MOST RECENT HEMOGLOBIN A1C LEVEL <7.0%: ICD-10-PCS | Mod: CPTII,S$GLB,, | Performed by: NURSE PRACTITIONER

## 2023-02-10 PROCEDURE — 3008F BODY MASS INDEX DOCD: CPT | Mod: CPTII,S$GLB,, | Performed by: NURSE PRACTITIONER

## 2023-02-10 PROCEDURE — 1160F PR REVIEW ALL MEDS BY PRESCRIBER/CLIN PHARMACIST DOCUMENTED: ICD-10-PCS | Mod: CPTII,S$GLB,, | Performed by: NURSE PRACTITIONER

## 2023-02-10 PROCEDURE — 3078F DIAST BP <80 MM HG: CPT | Mod: CPTII,S$GLB,, | Performed by: NURSE PRACTITIONER

## 2023-02-10 PROCEDURE — 87502 POCT INFLUENZA A/B MOLECULAR: ICD-10-PCS | Mod: QW,S$GLB,, | Performed by: NURSE PRACTITIONER

## 2023-02-10 PROCEDURE — 87811 SARS CORONAVIRUS 2 ANTIGEN POCT, MANUAL READ: ICD-10-PCS | Mod: QW,S$GLB,, | Performed by: NURSE PRACTITIONER

## 2023-02-10 PROCEDURE — 1101F PR PT FALLS ASSESS DOC 0-1 FALLS W/OUT INJ PAST YR: ICD-10-PCS | Mod: CPTII,S$GLB,, | Performed by: NURSE PRACTITIONER

## 2023-02-10 PROCEDURE — 3044F HG A1C LEVEL LT 7.0%: CPT | Mod: CPTII,S$GLB,, | Performed by: NURSE PRACTITIONER

## 2023-02-10 PROCEDURE — 1157F ADVNC CARE PLAN IN RCRD: CPT | Mod: CPTII,S$GLB,, | Performed by: NURSE PRACTITIONER

## 2023-02-10 PROCEDURE — 87502 INFLUENZA DNA AMP PROBE: CPT | Mod: QW,S$GLB,, | Performed by: NURSE PRACTITIONER

## 2023-02-10 PROCEDURE — 99213 OFFICE O/P EST LOW 20 MIN: CPT | Mod: S$GLB,,, | Performed by: NURSE PRACTITIONER

## 2023-02-10 PROCEDURE — 1159F MED LIST DOCD IN RCRD: CPT | Mod: CPTII,S$GLB,, | Performed by: NURSE PRACTITIONER

## 2023-02-10 PROCEDURE — 1157F PR ADVANCE CARE PLAN OR EQUIV PRESENT IN MEDICAL RECORD: ICD-10-PCS | Mod: CPTII,S$GLB,, | Performed by: NURSE PRACTITIONER

## 2023-02-10 PROCEDURE — 1101F PT FALLS ASSESS-DOCD LE1/YR: CPT | Mod: CPTII,S$GLB,, | Performed by: NURSE PRACTITIONER

## 2023-02-10 PROCEDURE — 99213 PR OFFICE/OUTPT VISIT, EST, LEVL III, 20-29 MIN: ICD-10-PCS | Mod: S$GLB,,, | Performed by: NURSE PRACTITIONER

## 2023-02-10 PROCEDURE — 81003 URINALYSIS AUTO W/O SCOPE: CPT | Mod: QW,S$GLB,, | Performed by: NURSE PRACTITIONER

## 2023-02-10 PROCEDURE — 1160F RVW MEDS BY RX/DR IN RCRD: CPT | Mod: CPTII,S$GLB,, | Performed by: NURSE PRACTITIONER

## 2023-02-10 PROCEDURE — 3077F SYST BP >= 140 MM HG: CPT | Mod: CPTII,S$GLB,, | Performed by: NURSE PRACTITIONER

## 2023-02-10 PROCEDURE — 3077F PR MOST RECENT SYSTOLIC BLOOD PRESSURE >= 140 MM HG: ICD-10-PCS | Mod: CPTII,S$GLB,, | Performed by: NURSE PRACTITIONER

## 2023-02-10 PROCEDURE — 1126F AMNT PAIN NOTED NONE PRSNT: CPT | Mod: CPTII,S$GLB,, | Performed by: NURSE PRACTITIONER

## 2023-02-10 PROCEDURE — 3288F FALL RISK ASSESSMENT DOCD: CPT | Mod: CPTII,S$GLB,, | Performed by: NURSE PRACTITIONER

## 2023-02-10 PROCEDURE — 3078F PR MOST RECENT DIASTOLIC BLOOD PRESSURE < 80 MM HG: ICD-10-PCS | Mod: CPTII,S$GLB,, | Performed by: NURSE PRACTITIONER

## 2023-02-10 PROCEDURE — 87086 URINE CULTURE/COLONY COUNT: CPT | Performed by: NURSE PRACTITIONER

## 2023-02-10 PROCEDURE — 1126F PR PAIN SEVERITY QUANTIFIED, NO PAIN PRESENT: ICD-10-PCS | Mod: CPTII,S$GLB,, | Performed by: NURSE PRACTITIONER

## 2023-02-10 PROCEDURE — 1159F PR MEDICATION LIST DOCUMENTED IN MEDICAL RECORD: ICD-10-PCS | Mod: CPTII,S$GLB,, | Performed by: NURSE PRACTITIONER

## 2023-02-10 PROCEDURE — 4010F PR ACE/ARB THEARPY RXD/TAKEN: ICD-10-PCS | Mod: CPTII,S$GLB,, | Performed by: NURSE PRACTITIONER

## 2023-02-10 PROCEDURE — 87811 SARS-COV-2 COVID19 W/OPTIC: CPT | Mod: QW,S$GLB,, | Performed by: NURSE PRACTITIONER

## 2023-02-10 PROCEDURE — 4010F ACE/ARB THERAPY RXD/TAKEN: CPT | Mod: CPTII,S$GLB,, | Performed by: NURSE PRACTITIONER

## 2023-02-10 RX ORDER — CHLORHEXIDINE GLUCONATE ORAL RINSE 1.2 MG/ML
SOLUTION DENTAL
COMMUNITY
Start: 2023-01-27

## 2023-02-10 RX ORDER — TRAMADOL HYDROCHLORIDE 50 MG/1
50 TABLET ORAL
COMMUNITY
Start: 2022-12-07 | End: 2024-01-30

## 2023-02-10 NOTE — PROGRESS NOTES
"Subjective:       Patient ID: Sadiq Dhillon is a 73 y.o. male.    Vitals:  height is 5' 9" (1.753 m) and weight is 83.5 kg (184 lb). His temperature is 97.8 °F (36.6 °C). His blood pressure is 161/72 (abnormal) and his pulse is 78. His respiration is 19 and oxygen saturation is 99%.     Chief Complaint: Sinus Problem and Dysuria    Patient reports to the clinic with the compliant of possible COVID and/or flu, fatigue, and body aches that has been present for a bout 1 week, patient also reports with some painful urination that he reports, that comes and goes, however on today he reports no urinary symptoms. Patient reports with taking tylenol and one left over 500 mg of PCN to help with his symptoms, however he is unsure as to rather it helped with his current symptoms, which he reports are mild.  Provider note begins below    Pt reports stress and not sleeping well.  Low energy.     Sinus Problem  This is a new problem. The current episode started in the past 7 days. The problem has been waxing and waning since onset. There has been no fever. His pain is at a severity of 0/10. He is experiencing no pain. Associated symptoms include congestion, coughing and sneezing. Past treatments include acetaminophen and antibiotics. The treatment provided no relief.     HENT:  Positive for congestion.    Respiratory:  Positive for cough.    Allergic/Immunologic: Positive for sneezing.     Objective:      Physical Exam   Constitutional: He is oriented to person, place, and time.   HENT:   Head: Normocephalic and atraumatic.   Nose: No rhinorrhea or congestion.   Mouth/Throat: No oropharyngeal exudate or posterior oropharyngeal erythema.   Cardiovascular: Normal rate and regular rhythm.   Pulmonary/Chest: Effort normal and breath sounds normal. No stridor. No respiratory distress. He has no wheezes. He has no rhonchi. He has no rales. He exhibits no tenderness.   Abdominal: Normal appearance. He exhibits no distension and no " mass. Soft. flat abdomen There is no abdominal tenderness. There is no rebound, no guarding, no left CVA tenderness and no right CVA tenderness. No hernia.   Neurological: He is alert and oriented to person, place, and time.   Skin: Skin is dry.   Psychiatric: His behavior is normal. Mood normal.         Results for orders placed or performed in visit on 02/10/23   POCT Urinalysis, Dipstick, Automated, W/O Scope   Result Value Ref Range    POC Blood, Urine Negative Negative    POC Bilirubin, Urine Negative Negative    POC Urobilinogen, Urine norm 0.3 - 2.2    POC Ketones, Urine Negative Negative    POC Protein, Urine Negative Negative    POC Nitrates, Urine Negative Negative    POC Glucose, Urine Negative Negative    pH, UA 5.5 5 - 8    POC Specific Gravity, Urine 1.015 1.003 - 1.029    POC Leukocytes, Urine Negative Negative   SARS Coronavirus 2 Antigen, POCT Manual Read   Result Value Ref Range    SARS Coronavirus 2 Antigen Negative Negative     Acceptable Yes    POCT Influenza A/B MOLECULAR   Result Value Ref Range    POC Molecular Influenza A Ag Negative Negative, Not Reported    POC Molecular Influenza B Ag Negative Negative, Not Reported     Acceptable Yes       Assessment:       1. Dysuria    2. Cough, unspecified type    3. Low energy          Plan:       UA normal  Urine for culture  Covid test negative  Follow up with endocrinologist- possible testosterone imbalance  Flu test          Dysuria  -     POCT Urinalysis, Dipstick, Automated, W/O Scope  -     Urine culture    Cough, unspecified type  -     SARS Coronavirus 2 Antigen, POCT Manual Read    Low energy  -     POCT Influenza A/B MOLECULAR

## 2023-02-12 LAB — BACTERIA UR CULT: NO GROWTH

## 2023-02-28 ENCOUNTER — CLINICAL SUPPORT (OUTPATIENT)
Dept: REHABILITATION | Facility: HOSPITAL | Age: 73
End: 2023-02-28
Payer: COMMERCIAL

## 2023-02-28 DIAGNOSIS — M75.100 ROTATOR CUFF SYNDROME, UNSPECIFIED LATERALITY: Primary | ICD-10-CM

## 2023-02-28 DIAGNOSIS — M75.101 NONTRAUMATIC TEAR OF RIGHT ROTATOR CUFF, UNSPECIFIED TEAR EXTENT: ICD-10-CM

## 2023-02-28 PROCEDURE — 97161 PT EVAL LOW COMPLEX 20 MIN: CPT | Mod: PN

## 2023-02-28 PROCEDURE — 97110 THERAPEUTIC EXERCISES: CPT | Mod: PN

## 2023-02-28 NOTE — PLAN OF CARE
[facility]   Physical Therapy Initial Evaluation       Name: Sadiq Amaro Alejo  Clinic Number: [id number]    Therapy Diagnosis: M75.100 Rotator cuff syndrome   Physician: Jessie Verdugo MD    Physician Orders: PT Eval and Treat   Medical Diagnosis from Referral:  M75.101 (ICD-10-CM) - Nontraumatic tear of right rotator cuff, unspecified tear extent   Evaluation Date: 2/28/2023  Authorization Period Expiration: 1/23/2024  Plan of Care Expiration: 4/11/2023  Progress Note Due: 3/28/2023  Visit # / Visits authorized: 1/ 1   FOTO: 1/3    Precautions: Standard     Time In: 2:30PM  Time Out: 3:15PM  Total Appointment Time (timed & untimed codes): 45 minutes      SUBJECTIVE   Date of onset: 01/2022    History of current condition - Sadiq reports: 73 y.o. male reporting right lateral/anterior shoulder pain that he experiences towards the end of the day or at night that he describes as dull/ache.  Patient reported experiencing gradual onset of sx in 1/2022 that have gradually improved but remained present in the afternoon/night. Patient reported that he lives an active lifestyle and is not limited with regard to his ADLs. Surgical options were discussed with MD, with an agreement to attempt physical therapy first.     Falls: none    Imaging, MRI studies: Full thickness tear of supraspinatus at insertion on greater tuberosity  Tendinosis of supraspinatus, infraspinatus and long head of biceps tendon. See patient med Hx for additional details       Prior Therapy: Patient indicated he received prior therapy services in 2022 for the same issue, indicating that he experienced minor improvements from services.  Social History: lives with their spouse  Occupation: retired, occasional business meetings  Prior Level of Function: Independent with ADLs  Current Level of Function: Independent with ADLs    Pain:  Current 0/10, worst 4/10 (at night in bed before sleep), best 0/10   Location: right shoulder     Description: Aching and Dull  Aggravating Factors: Laying  Easing Factors: rest    Patients goals: Patient would like to minimize sx of pain at night.      Medical History:   Past Medical History:   Diagnosis Date    Diabetes mellitus     Fuchs' corneal dystrophy     Heart attack     Hypertension     Kidney stones     Pancreatic mass     Pancreatitis     after EUS . 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, treated with dostenex(cabergoline)/ resolved    Reflux esophagitis     Tumor cells, benign        Surgical History:   Sadiq Dhillon  has a past surgical history that includes Thyroidectomy (2007); Rhinoplasty tip (1975); Cataract extraction w/  intraocular lens implant (Right, 0); Corneal transplant (Right); Upper endoscopic ultrasound w/ FNA; Repair of retinal detachment with vitrectomy (Right, 7/18/2018); Repair of retinal detachment with vitrectomy (Left, 8/8/2018); Descemets stripping automated endothelial keratoplasty (Left, 3/21/2019); and Cataract extraction w/  intraocular lens implant (Left, 3/21/2019).    Medications:   Sadiq has a current medication list which includes the following prescription(s): aspirin, atorvastatin, cabergoline, chlorhexidine, contour next test strips, famotidine, fexofenadine-pseudoephedrine  mg, gabapentin, lisinopril, loperamide, metformin, metoprolol succinate, tamsulosin, testosterone, and tramadol.    Allergies:   Review of patient's allergies indicates:   Allergen Reactions    Grass pollen-ira grass standard     House dust           OBJECTIVE     Observation: Patient displays minor fwd head posture and rounded shoulders    Passive Range of Motion: WNL with min right anterior shoulder pain with end range shoulder flexion and abduction       Active Range of Motion (deg):   Shoulder Right Left   Flexion 170  175   Abduction 165 175   ER at 0 WNL WNL   ER at 90 WNL WNL   IR WNL WNL   Reach behind head T3 T5   Reach behind back  L1 T7      Strength:  Shoulder Right Left   Flexion 5/5 5/5   Abduction 4/5 with pain 5/5   ER 4+/5 5/5   IR 4+/5 5/5   Serratus Anterior 4-/5 4+/5   Middle Trap 4-/5 with pain 4/5   Low Trap 3+/5 with pain 4-/5       Special Tests:  Impingement Cluster:   Right Left   Eloy's Josuendy - -   Resisted ER - -     Rotator Cuff Tear   Right Left   Painful Arc - -   Empty Can Test - -   Drop Arm test - -   Subscapularis Lift Off - -     Biceps Load II + -     AC Joint Compression Test - -       Joint Mobility: WNL bilateral     Palpation: minor tenderness with palpation of supraspinatus and proximal attachment of LH of Biceps on right      Sensation: WNL    Limitation/Restriction for FOTO 1/3 Survey    Therapist reviewed FOTO scores for Sadiq Dhillon on 2/28/2023.   FOTO documents entered into [employer] - see Media section.    Limitation Score: 19%         TREATMENT     Total Treatment time (time-based codes) separate from Evaluation: 15 minutes      Sadiq received the treatments listed below:      therapeutic exercises to develop strength, endurance, ROM, flexibility, posture, and core stabilization for 15 minutes including:  Seated chin tucks, 5 sec 1x10  Seated scapular retractions 1x10  Horizontal abductions with GTB 1x10  Wall walks with Lift off using GTB x5  Active asssited range of motion right shoulder abduction using dowel 1x5     PATIENT EDUCATION AND HOME EXERCISES     Education provided:   - Relevant upper extremity anatomy     Written Home Exercises Provided: yes. Exercises were reviewed and Sadiq was able to demonstrate them prior to the end of the session.  Sadiq demonstrated good  understanding of the education provided. See EMR under Patient Instructions for exercises provided during therapy sessions.    ASSESSMENT     Sadiq is a 73 y.o. male referred to outpatient Physical Therapy  with a medical diagnosis of M75.101 (ICD-10-CM) - Nontraumatic tear of right rotator cuff, unspecified tear extent . Patient presents with decreased strength, decreased ROM/mobility, and increased pain in the afternoon/night time.     Patient prognosis is Good.   Patient will benefit from skilled outpatient Physical Therapy to address the deficits stated above and in the chart below, provide patient /family education, and to maximize patientt's level of independence.     Plan of care discussed with patient: Yes  Patient's spiritual, cultural and educational needs considered and patient is agreeable to the plan of care and goals as stated below:     Anticipated Barriers for therapy: none    Medical Necessity is demonstrated by the following  History  Co-morbidities and personal factors that may impact the plan of care Co-morbidities:   See patient med hx for details    Personal Factors:   age     low   Examination  Body Structures and Functions, activity limitations and participation restrictions that may impact the plan of care Body Regions:   upper extremities    Body Systems:    gross symmetry  ROM  strength    Participation Restrictions:   none    Activity limitations:   Learning and applying knowledge  no deficits    General Tasks and Commands  no deficits    Communication  no deficits    Mobility  no deficits    Self care  dressing    Domestic Life  no deficits    Interactions/Relationships  no deficits    Life Areas  no deficits    Community and Social Life  no deficits         low   Clinical Presentation stable and uncomplicated low   Decision Making/ Complexity Score: low     Goals:  GOALS: Short Term Goals:  4 weeks  1.Report decreased right shoulder pain < / =  1/10  to increase tolerance for afternoon/evening ADLs.  2. Increase AROM of right shoulder to be consistent to left.    3. Increased strength by 1/3 MMT grade in left shoudler to increase tolerance for ADL and work activities.  4. Pt to tolerate  HEP to improve ROM and independence with ADL's    Long Term Goals: 6 weeks  1.Report decreased right shoulder pain  < / =  0 /10  to increase tolerance for afternoon/evening ADLs.  2.Increase strength to >/= 4+/5 in right upper extremity/scapular musculature to increase tolerance for ADL and work activities.  3. Pt will have improved score of 10-20% limited on FOTO shoulder in order to demonstrate true functional improvement.   PLAN   Plan of care Certification: 2/28/2023 to 4/11/2023.    Outpatient Physical Therapy 1-2 times weekly for 6 weeks to include the following interventions: Cervical/Lumbar Traction, Electrical Stimulation , Manual Therapy, Moist Heat/ Ice, Neuromuscular Re-ed, Patient Education, Self Care, Therapeutic Activities, and Therapeutic Exercise.     Erlin Salcido, PT      I CERTIFY THE NEED FOR THESE SERVICES FURNISHED UNDER THIS PLAN OF TREATMENT AND WHILE UNDER MY CARE   Physician's comments:     Physician's Signature: ___________________________________________________

## 2023-03-02 ENCOUNTER — CLINICAL SUPPORT (OUTPATIENT)
Dept: REHABILITATION | Facility: HOSPITAL | Age: 73
End: 2023-03-02
Payer: COMMERCIAL

## 2023-03-02 DIAGNOSIS — M75.100 ROTATOR CUFF SYNDROME, UNSPECIFIED LATERALITY: Primary | ICD-10-CM

## 2023-03-02 PROCEDURE — 97110 THERAPEUTIC EXERCISES: CPT | Mod: PN,CQ

## 2023-03-02 NOTE — PROGRESS NOTES
[facility]   Physical Therapy Treatment Note     Name: Sadiq Amaro [facility]  Clinic Number: [id number]    Therapy Diagnosis:   Encounter Diagnosis   Name Primary?    Rotator cuff syndrome, unspecified laterality Yes     Physician: Jessie Verdugo MD    Visit Date: 3/2/2023    Physician Orders: PT Eval and Treat   Medical Diagnosis from Referral:  M75.101 (ICD-10-CM) - Nontraumatic tear of right rotator cuff, unspecified tear extent   Evaluation Date: 2/28/2023  Authorization Period Expiration: 1/23/2024  Plan of Care Expiration: 4/11/2023  Progress Note Due: 3/28/2023  Visit # / Visits authorized: 1/ 1   FOTO: 1/3     Precautions: Standard     PTA Visit #: 1/5     Time In: 1130  Time Out: 1215  Total Billable Time: 45 minutes    SUBJECTIVE     Pt reports: that he is feeling better since initial evaluation.  Edna states that he would like to continue to progress and work on non-surgical options at this time.    He was compliant with home exercise program.  Response to previous treatment: no adervse reactions  Functional change: no reported change    Pain: 0/10  Location: right shoulder      OBJECTIVE     Objective Measures updated at progress report unless specified.     Treatment     Sadiq received the treatments listed below:      therapeutic exercises to develop strength, endurance, posture, and core stabilization for 50 minutes including:    Seated scap retraction 5sec 15x  Bruggers with red theraband 3x10  Standing Rows with green theratube 2min  Standing shoulder extension with red theratube 2min  Standing shoulder Depression with green theratube 2min  Shoulder flexion to 90 2x10  Shoulder abduction to 90 2x10  UBE 3min forward 3min backward  Pulley 3min forward 3min scaption      manual therapy techniques: Joint mobilizations, Soft tissue Mobilization, and Friction Massage were applied to the: right shoulder for 00 minutes, including:    Not completed at today's treatment  session      neuromuscular re-education activities to improve: Proprioception and Posture for 00 minutes. The following activities were included:    Not completed at today's treatment session      therapeutic activities to improve functional performance for 00  minutes, including:    Not completed at today's treatment session            Patient Education and Home Exercises     Home Exercises Provided and Patient Education Provided     Education provided:     Complete all exercise and acitivities of daily living in pain free range      Written Home Exercises Provided: Patient instructed to cont prior HEP. Exercises were reviewed and Sadiq was able to demonstrate them prior to the end of the session.  Sadiq demonstrated good  understanding of the education provided. See EMR under Patient Instructions for exercises provided during therapy sessions    ASSESSMENT     Good tolerance to exercise patient able to incorporate new exercise in a pain free range.  Patient did require some verbal cueing for correct positioning and sequencing of exercise.      Sadiq Is progressing well towards his goals.   Pt prognosis is Good.     Pt will continue to benefit from skilled outpatient physical therapy to address the deficits listed in the problem list box on initial evaluation, provide pt/family education and to maximize pt's level of independence in the home and community environment.     Pt's spiritual, cultural and educational needs considered and pt agreeable to plan of care and goals.     Anticipated barriers to physical therapy: none    Goals:     Short Term Goals:  4 weeks  1.Report decreased right shoulder pain < / =  1/10  to increase tolerance for afternoon/evening ADLs.  2. Increase AROM of right shoulder to be consistent to left.    3. Increased strength by 1/3 MMT grade in left shoudler to increase tolerance for ADL and work activities.  4. Pt to tolerate HEP to improve ROM and independence with ADL's     Long Term  Goals: 6 weeks  1.Report decreased right shoulder pain  < / =  0 /10  to increase tolerance for afternoon/evening ADLs.  2.Increase strength to >/= 4+/5 in right upper extremity/scapular musculature to increase tolerance for ADL and work activities.  3. Pt will have improved score of 10-20% limited on FOTO shoulder in order to demonstrate true functional improvement.     PLAN     Progress as tolerated per Plan of Care      Flaquito Arredondo, PTA

## 2023-04-06 ENCOUNTER — HOSPITAL ENCOUNTER (OUTPATIENT)
Facility: OTHER | Age: 73
Discharge: HOME | End: 2023-04-07
Attending: EMERGENCY MEDICINE | Admitting: EMERGENCY MEDICINE
Payer: COMMERCIAL

## 2023-04-06 DIAGNOSIS — N23 RENAL COLIC ON RIGHT SIDE: Primary | ICD-10-CM

## 2023-04-06 DIAGNOSIS — N20.1 URETERAL CALCULI: ICD-10-CM

## 2023-04-06 DIAGNOSIS — N30.01 ACUTE CYSTITIS WITH HEMATURIA: ICD-10-CM

## 2023-04-06 DIAGNOSIS — K59.00 CONSTIPATION, UNSPECIFIED CONSTIPATION TYPE: ICD-10-CM

## 2023-04-06 DIAGNOSIS — N13.30 HYDROURETERONEPHROSIS: ICD-10-CM

## 2023-04-06 DIAGNOSIS — R10.31 RLQ ABDOMINAL PAIN: ICD-10-CM

## 2023-04-06 PROCEDURE — 96375 TX/PRO/DX INJ NEW DRUG ADDON: CPT

## 2023-04-06 PROCEDURE — 25000003 PHARM REV CODE 250: Performed by: EMERGENCY MEDICINE

## 2023-04-06 PROCEDURE — 85025 COMPLETE CBC W/AUTO DIFF WBC: CPT | Performed by: EMERGENCY MEDICINE

## 2023-04-06 PROCEDURE — 80053 COMPREHEN METABOLIC PANEL: CPT | Performed by: EMERGENCY MEDICINE

## 2023-04-06 PROCEDURE — 96361 HYDRATE IV INFUSION ADD-ON: CPT

## 2023-04-06 PROCEDURE — 99285 EMERGENCY DEPT VISIT HI MDM: CPT | Mod: 25

## 2023-04-06 PROCEDURE — 83690 ASSAY OF LIPASE: CPT | Performed by: EMERGENCY MEDICINE

## 2023-04-06 PROCEDURE — 63600175 PHARM REV CODE 636 W HCPCS: Performed by: EMERGENCY MEDICINE

## 2023-04-06 RX ORDER — ONDANSETRON 2 MG/ML
4 INJECTION INTRAMUSCULAR; INTRAVENOUS
Status: COMPLETED | OUTPATIENT
Start: 2023-04-06 | End: 2023-04-06

## 2023-04-06 RX ORDER — METOPROLOL SUCCINATE 25 MG/1
25 TABLET, EXTENDED RELEASE ORAL
Status: DISCONTINUED | OUTPATIENT
Start: 2023-04-06 | End: 2023-04-07

## 2023-04-06 RX ORDER — KETOROLAC TROMETHAMINE 30 MG/ML
15 INJECTION, SOLUTION INTRAMUSCULAR; INTRAVENOUS
Status: COMPLETED | OUTPATIENT
Start: 2023-04-06 | End: 2023-04-06

## 2023-04-06 RX ADMIN — ONDANSETRON 4 MG: 2 INJECTION INTRAMUSCULAR; INTRAVENOUS at 11:04

## 2023-04-06 RX ADMIN — KETOROLAC TROMETHAMINE 15 MG: 30 INJECTION, SOLUTION INTRAMUSCULAR; INTRAVENOUS at 11:04

## 2023-04-06 RX ADMIN — SODIUM CHLORIDE 1000 ML: 9 INJECTION, SOLUTION INTRAVENOUS at 11:04

## 2023-04-06 NOTE — Clinical Note
Diagnosis: Renal colic on right side [[id number]]   Future Attending Provider: HUI MOORE [[id number]]   Is the patient being sent to ED Observation?: Yes   Admitting Provider:: HUI MOORE [[id number]]   Special Needs:: No Special Needs [1]

## 2023-04-07 VITALS
OXYGEN SATURATION: 95 % | TEMPERATURE: 98 F | HEART RATE: 56 BPM | SYSTOLIC BLOOD PRESSURE: 125 MMHG | RESPIRATION RATE: 17 BRPM | WEIGHT: 181.69 LBS | HEIGHT: 69 IN | BODY MASS INDEX: 26.91 KG/M2 | DIASTOLIC BLOOD PRESSURE: 57 MMHG

## 2023-04-07 LAB
ALBUMIN SERPL BCP-MCNC: 4.3 G/DL (ref 3.5–5.2)
ALP SERPL-CCNC: 77 U/L (ref 55–135)
ALT SERPL W/O P-5'-P-CCNC: 21 U/L (ref 10–44)
ANION GAP SERPL CALC-SCNC: 13 MMOL/L (ref 8–16)
AST SERPL-CCNC: 20 U/L (ref 10–40)
BACTERIA #/AREA URNS HPF: ABNORMAL /HPF
BASOPHILS # BLD AUTO: 0.04 K/UL (ref 0–0.2)
BASOPHILS NFR BLD: 0.3 % (ref 0–1.9)
BILIRUB SERPL-MCNC: 0.7 MG/DL (ref 0.1–1)
BILIRUB UR QL STRIP: NEGATIVE
BUN SERPL-MCNC: 15 MG/DL (ref 8–23)
CALCIUM SERPL-MCNC: 9.2 MG/DL (ref 8.7–10.5)
CHLORIDE SERPL-SCNC: 103 MMOL/L (ref 95–110)
CLARITY UR: CLEAR
CO2 SERPL-SCNC: 20 MMOL/L (ref 23–29)
COLOR UR: YELLOW
CREAT SERPL-MCNC: 1 MG/DL (ref 0.5–1.4)
DIFFERENTIAL METHOD: ABNORMAL
EOSINOPHIL # BLD AUTO: 0.1 K/UL (ref 0–0.5)
EOSINOPHIL NFR BLD: 1.2 % (ref 0–8)
ERYTHROCYTE [DISTWIDTH] IN BLOOD BY AUTOMATED COUNT: 12.4 % (ref 11.5–14.5)
EST. GFR  (NO RACE VARIABLE): >60 ML/MIN/1.73 M^2
GLUCOSE SERPL-MCNC: 151 MG/DL (ref 70–110)
GLUCOSE UR QL STRIP: NEGATIVE
HCT VFR BLD AUTO: 46.5 % (ref 40–54)
HGB BLD-MCNC: 15.6 G/DL (ref 14–18)
HGB UR QL STRIP: ABNORMAL
HYALINE CASTS #/AREA URNS LPF: 28 /LPF
IMM GRANULOCYTES # BLD AUTO: 0.06 K/UL (ref 0–0.04)
IMM GRANULOCYTES NFR BLD AUTO: 0.5 % (ref 0–0.5)
KETONES UR QL STRIP: NEGATIVE
LEUKOCYTE ESTERASE UR QL STRIP: ABNORMAL
LIPASE SERPL-CCNC: 30 U/L (ref 4–60)
LYMPHOCYTES # BLD AUTO: 1.2 K/UL (ref 1–4.8)
LYMPHOCYTES NFR BLD: 9.9 % (ref 18–48)
MCH RBC QN AUTO: 29.9 PG (ref 27–31)
MCHC RBC AUTO-ENTMCNC: 33.5 G/DL (ref 32–36)
MCV RBC AUTO: 89 FL (ref 82–98)
MICROSCOPIC COMMENT: ABNORMAL
MONOCYTES # BLD AUTO: 0.7 K/UL (ref 0.3–1)
MONOCYTES NFR BLD: 5.7 % (ref 4–15)
NEUTROPHILS # BLD AUTO: 9.8 K/UL (ref 1.8–7.7)
NEUTROPHILS NFR BLD: 82.4 % (ref 38–73)
NITRITE UR QL STRIP: POSITIVE
NRBC BLD-RTO: 0 /100 WBC
PH UR STRIP: 6 [PH] (ref 5–8)
PLATELET # BLD AUTO: 204 K/UL (ref 150–450)
PMV BLD AUTO: 8.9 FL (ref 9.2–12.9)
POTASSIUM SERPL-SCNC: 4.1 MMOL/L (ref 3.5–5.1)
PROT SERPL-MCNC: 6.9 G/DL (ref 6–8.4)
PROT UR QL STRIP: ABNORMAL
RBC # BLD AUTO: 5.22 M/UL (ref 4.6–6.2)
RBC #/AREA URNS HPF: 7 /HPF (ref 0–4)
SODIUM SERPL-SCNC: 136 MMOL/L (ref 136–145)
SP GR UR STRIP: 1.03 (ref 1–1.03)
URN SPEC COLLECT METH UR: ABNORMAL
UROBILINOGEN UR STRIP-ACNC: NEGATIVE EU/DL
WBC # BLD AUTO: 11.83 K/UL (ref 3.9–12.7)
WBC #/AREA URNS HPF: 35 /HPF (ref 0–5)

## 2023-04-07 PROCEDURE — 99223 1ST HOSP IP/OBS HIGH 75: CPT | Mod: ,,, | Performed by: UROLOGY

## 2023-04-07 PROCEDURE — 96361 HYDRATE IV INFUSION ADD-ON: CPT

## 2023-04-07 PROCEDURE — 99223 PR INITIAL HOSPITAL CARE,LEVL III: ICD-10-PCS | Mod: ,,, | Performed by: UROLOGY

## 2023-04-07 PROCEDURE — 25000003 PHARM REV CODE 250: Performed by: EMERGENCY MEDICINE

## 2023-04-07 PROCEDURE — 96365 THER/PROPH/DIAG IV INF INIT: CPT

## 2023-04-07 PROCEDURE — 63600175 PHARM REV CODE 636 W HCPCS: Performed by: EMERGENCY MEDICINE

## 2023-04-07 PROCEDURE — 81000 URINALYSIS NONAUTO W/SCOPE: CPT | Performed by: EMERGENCY MEDICINE

## 2023-04-07 PROCEDURE — 87086 URINE CULTURE/COLONY COUNT: CPT | Performed by: EMERGENCY MEDICINE

## 2023-04-07 PROCEDURE — G0378 HOSPITAL OBSERVATION PER HR: HCPCS

## 2023-04-07 PROCEDURE — 87088 URINE BACTERIA CULTURE: CPT | Performed by: EMERGENCY MEDICINE

## 2023-04-07 RX ORDER — LISINOPRIL 2.5 MG/1
2.5 TABLET ORAL EVERY MORNING
Status: DISCONTINUED | OUTPATIENT
Start: 2023-04-07 | End: 2023-04-07 | Stop reason: HOSPADM

## 2023-04-07 RX ORDER — HYDROCODONE BITARTRATE AND ACETAMINOPHEN 5; 325 MG/1; MG/1
1 TABLET ORAL EVERY 4 HOURS PRN
Qty: 15 TABLET | Refills: 0 | Status: SHIPPED | OUTPATIENT
Start: 2023-04-07 | End: 2024-01-30

## 2023-04-07 RX ORDER — CEPHALEXIN 500 MG/1
500 CAPSULE ORAL 2 TIMES DAILY
Qty: 20 CAPSULE | Refills: 0 | Status: SHIPPED | OUTPATIENT
Start: 2023-04-07 | End: 2023-04-17

## 2023-04-07 RX ORDER — KETOROLAC TROMETHAMINE 30 MG/ML
15 INJECTION, SOLUTION INTRAMUSCULAR; INTRAVENOUS EVERY 6 HOURS PRN
Status: DISCONTINUED | OUTPATIENT
Start: 2023-04-07 | End: 2023-04-07 | Stop reason: HOSPADM

## 2023-04-07 RX ORDER — METFORMIN HYDROCHLORIDE 500 MG/1
1000 TABLET, EXTENDED RELEASE ORAL 2 TIMES DAILY
Status: DISCONTINUED | OUTPATIENT
Start: 2023-04-07 | End: 2023-04-07 | Stop reason: HOSPADM

## 2023-04-07 RX ORDER — KETOROLAC TROMETHAMINE 10 MG/1
10 TABLET, FILM COATED ORAL EVERY 6 HOURS
Qty: 12 TABLET | Refills: 0 | Status: SHIPPED | OUTPATIENT
Start: 2023-04-07 | End: 2023-04-10

## 2023-04-07 RX ORDER — TALC
6 POWDER (GRAM) TOPICAL NIGHTLY PRN
Status: DISCONTINUED | OUTPATIENT
Start: 2023-04-07 | End: 2023-04-07 | Stop reason: HOSPADM

## 2023-04-07 RX ORDER — SODIUM CHLORIDE 0.9 % (FLUSH) 0.9 %
10 SYRINGE (ML) INJECTION
Status: DISCONTINUED | OUTPATIENT
Start: 2023-04-07 | End: 2023-04-07 | Stop reason: HOSPADM

## 2023-04-07 RX ORDER — METOPROLOL SUCCINATE 25 MG/1
25 TABLET, EXTENDED RELEASE ORAL NIGHTLY
Status: DISCONTINUED | OUTPATIENT
Start: 2023-04-07 | End: 2023-04-07 | Stop reason: HOSPADM

## 2023-04-07 RX ORDER — ONDANSETRON 2 MG/ML
4 INJECTION INTRAMUSCULAR; INTRAVENOUS EVERY 8 HOURS PRN
Status: DISCONTINUED | OUTPATIENT
Start: 2023-04-07 | End: 2023-04-07 | Stop reason: HOSPADM

## 2023-04-07 RX ORDER — MORPHINE SULFATE 4 MG/ML
4 INJECTION, SOLUTION INTRAMUSCULAR; INTRAVENOUS EVERY 4 HOURS PRN
Status: DISCONTINUED | OUTPATIENT
Start: 2023-04-07 | End: 2023-04-07 | Stop reason: HOSPADM

## 2023-04-07 RX ORDER — TAMSULOSIN HYDROCHLORIDE 0.4 MG/1
0.4 CAPSULE ORAL DAILY
Qty: 10 CAPSULE | Refills: 0 | Status: SHIPPED | OUTPATIENT
Start: 2023-04-07

## 2023-04-07 RX ORDER — SODIUM CHLORIDE 9 MG/ML
INJECTION, SOLUTION INTRAVENOUS CONTINUOUS
Status: DISCONTINUED | OUTPATIENT
Start: 2023-04-07 | End: 2023-04-07 | Stop reason: HOSPADM

## 2023-04-07 RX ADMIN — METFORMIN HYDROCHLORIDE 1000 MG: 500 TABLET, EXTENDED RELEASE ORAL at 09:04

## 2023-04-07 RX ADMIN — LISINOPRIL 2.5 MG: 2.5 TABLET ORAL at 07:04

## 2023-04-07 RX ADMIN — CEFTRIAXONE 1 G: 1 INJECTION, POWDER, FOR SOLUTION INTRAMUSCULAR; INTRAVENOUS at 02:04

## 2023-04-07 RX ADMIN — SODIUM CHLORIDE: 0.9 INJECTION, SOLUTION INTRAVENOUS at 07:04

## 2023-04-07 NOTE — ED NOTES
Resumed pt care. 73 YOM presents to ED with c/o RLQ pain and nausea. Dx. Renal Colic on right side. Pending Urology consult. PMH Kidney Stones. Currently denies any complaints. NaCl infusing @125 ml/hr.  A&Ox4.     LOC: The patient is awake, alert and aware of environment with an appropriate affect, the patient is oriented x 3.  APPEARANCE: Patient resting comfortably and in no acute distress, patient is clean and well groomed, patient's clothing is properly fastened.  SKIN: The skin is warm and dry, patient has normal skin turgor and moist mucus membranes, skin intact, no breakdown or brusing noted.  MUSKULOSKELETAL: Patient moving all extremities well, no obvious swelling or deformities noted.  RESPIRATORY: Airway is open and patent, respirations are spontaneous, patient has a normal effort and rate.  CARDIAC: No peripheral edema.  ABDOMEN: Soft and no tenderness to palpation, no distention noted.     ED workup in progress. VSS. Safety measures in place; side rails up x2. Call light within pt reach. Will continue to monitor.

## 2023-04-07 NOTE — ASSESSMENT & PLAN NOTE
Discussed findings and options with patient.  At this point his symptoms are well controlled.  Urinalysis on arrival is concerning for possible urinary tract infection, however he has no other findings concerning for obstructive pyelonephritis (no fever, normal WBC, no tachycardia).  Discussed option for ureteral stent placement versus outpatient trial of medical expulsive therapy.  Wishes to proceed with the latter.  Discussed reasons to return to ER, including fever and chills.    Discussed with ER provider.  Will plan for DC home today continue oral antibiotics for possible UTI pending urine culture results.  Follow up in clinic in 1-2 weeks.

## 2023-04-07 NOTE — CONSULTS
[facility] - Emergency Dept (Observation)  Urology  Consult Note    Patient Name: Sadiq Dhillon  MRN: [id number]  Admission Date: 4/6/2023  Hospital Length of Stay: 0   Code Status: Full Code   Attending Provider: No att. providers found   Consulting Provider: Efraín Bullard MD  Primary Care Physician: Erlin Mcpherson MD  Principal Problem:<principal problem not specified>    Consults    Subjective:     HPI:  Patient is a 73-year-old male with a known history of nephrolithiasis who presented to the emergency room last night with acute onset right flank and right lower abdominal pain.  He did report some associated nausea and vomiting however denies dysuria, fever, chills.  He has been afebrile and hemodynamically stable since admission.  ER workup including CT scan demonstrated a 4 mm proximal right ureteral stone.  His urinalysis was concerning for possible UTI and he was admitted for ER observation.  This morning he states that his pain is much improved as is his nausea.  He has no other concerns at this time.      Past Medical History:   Diagnosis Date    Diabetes mellitus     Fuchs' corneal dystrophy     Heart attack     Hypertension     Kidney stones     Pancreatic mass     Pancreatitis     after EUS . 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, treated with dostenex(cabergoline)/ resolved    Reflux esophagitis     Tumor cells, benign        Past Surgical History:   Procedure Laterality Date    CATARACT EXTRACTION W/  INTRAOCULAR LENS IMPLANT Right 0    Dr Van     CATARACT EXTRACTION W/  INTRAOCULAR LENS IMPLANT Left 3/21/2019    Procedure: EXTRACTION, CATARACT, WITH IOL INSERTION;  Surgeon: Ra Van MD;  Location: [facility];  Service: Ophthalmology;  Laterality: Left;    CORNEAL TRANSPLANT Right     Dr Van     DESCEMETS STRIPPING  AUTOMATED ENDOTHELIAL KERATOPLASTY Left 3/21/2019    Procedure: TRANSPLANT, PARTIAL-THICKNESS, CORNEA, USING DSAEK TECHNIQUE;  Surgeon: Ra Van MD;  Location: [facility];  Service: Ophthalmology;  Laterality: Left;  DSEK    REPAIR OF RETINAL DETACHMENT WITH VITRECTOMY Right 7/18/2018    Procedure: REPAIR, RETINAL DETACHMENT, WITH VITRECTOMY;  Surgeon: SHUBHAM Patel MD;  [facility]: [facility] OR [facility];  Service: Ophthalmology;  Laterality: Right;  40 min    REPAIR OF RETINAL DETACHMENT WITH VITRECTOMY Left 8/8/2018    Procedure: REPAIR, RETINAL DETACHMENT, WITH VITRECTOMY;  Surgeon: SHUBHAM Patel MD;  Location: [facility] OR [facility];  Service: Ophthalmology;  Laterality: Left;  40 min    RHINOPLASTY TIP  1975    THYROIDECTOMY  2007    UPPER ENDOSCOPIC ULTRASOUND W/ FNA      with resultant pancreatitis       Review of patient's allergies indicates:   Allergen Reactions    Grass pollen-ira grass standard     House dust        Family History       Problem Relation (Age of Onset)    Cataracts Father    Diabetes Father, Paternal Uncle    Heart disease Mother, Father    Hypertension Mother    Stroke Father, Maternal Grandmother            Tobacco Use    Smoking status: Never     Passive exposure: Never    Smokeless tobacco: Never   Substance and Sexual Activity    Alcohol use: Yes     Comment: social    Drug use: No    Sexual activity: Not Currently       Review of Systems   Constitutional:  Negative for appetite change, chills and fever.   HENT:  Negative for sneezing and sore throat.    Eyes:  Negative for visual disturbance.   Respiratory:  Negative for chest tightness and shortness of breath.    Cardiovascular:  Negative for chest pain and leg swelling.   Gastrointestinal:  Positive for nausea. Negative for abdominal distention and vomiting.   Genitourinary:  Positive for flank pain.   Musculoskeletal:  Negative for arthralgias and neck pain.   Skin:  Negative for color change.   Neurological:   Negative for dizziness and seizures.   Psychiatric/Behavioral:  Negative for hallucinations.      Objective:     Temp:  [97.7 °F (36.5 °C)-98 °F (36.7 °C)] 98 °F (36.7 °C)  Pulse:  [59-82] 59  Resp:  [16-20] 16  SpO2:  [94 %-96 %] 95 %  BP: (134-171)/(69-81) 150/70     Body mass index is 26.83 kg/m².           Drains       None                   Physical Exam  Constitutional:       General: He is not in acute distress.     Appearance: He is well-developed.   HENT:      Head: Normocephalic and atraumatic.   Eyes:      General:         Right eye: No discharge.         Left eye: No discharge.      Conjunctiva/sclera: Conjunctivae normal.      Pupils: Pupils are equal, round, and reactive to light.   Neck:      Thyroid: No thyromegaly.      Trachea: No tracheal deviation.   Cardiovascular:      Rate and Rhythm: Normal rate and regular rhythm.      Chest Wall: PMI is not displaced.   Pulmonary:      Effort: Pulmonary effort is normal. No accessory muscle usage or respiratory distress.   Chest:      Chest wall: No mass, tenderness or crepitus.   Abdominal:      General: There is no distension.      Palpations: Abdomen is soft. There is no mass.      Tenderness: There is no abdominal tenderness.      Hernia: No hernia is present.   Musculoskeletal:      Cervical back: Normal range of motion and neck supple.   Skin:     General: Skin is warm and dry.      Findings: No erythema or rash.   Neurological:      Cranial Nerves: No cranial nerve deficit.      Sensory: No sensory deficit.   Psychiatric:         Mood and Affect: Mood is not anxious or depressed.         Speech: Speech normal.         Behavior: Behavior normal.         Thought Content: Thought content normal.         Judgment: Judgment normal.       Significant Labs:    BMP:  Recent Labs   Lab 04/06/23  2345      K 4.1      CO2 20*   BUN 15   CREATININE 1.0   CALCIUM 9.2       CBC:  Recent Labs   Lab 04/06/23  2345   WBC 11.83   HGB 15.6   HCT 46.5   PLT  204       Urine Culture: No results for input(s): LABURIN in the last 168 hours.  Urine Studies:   Recent Labs   Lab 04/07/23  0047   COLORU Yellow   APPEARANCEUA Clear   PHUR 6.0   SPECGRAV 1.030   PROTEINUA Trace*   GLUCUA Negative   KETONESU Negative   BILIRUBINUA Negative   OCCULTUA Trace*   NITRITE Positive*   UROBILINOGEN Negative   LEUKOCYTESUR 2+*   RBCUA 7*   WBCUA 35*   BACTERIA Many*   HYALINECASTS 28*       Significant Imaging:  Results for orders placed during the hospital encounter of 04/06/23    CT Renal Stone Study ABD Pelvis WO    Narrative  EXAMINATION:  CT RENAL STONE STUDY ABD PELVIS WO    CLINICAL HISTORY:  Flank pain, kidney stone suspected;    TECHNIQUE:  Low dose axial images, sagittal and coronal reformations were obtained from the lung bases to the pubic symphysis.  Contrast was not administered.    COMPARISON:  Renal stone study 07/01/2022, MRI abdomen 01/18/2023    FINDINGS:  The visualized lung bases demonstrate dependent bibasilar atelectasis. There is a 3 mm right middle lobe pulmonary nodule, unchanged from most recent exam.  No pleural fluid or focal consolidation.  There is a probable cardiac stent in place. The visualized portions of the heart and pericardium are otherwise within normal limits.    Please note evaluation of solid organ parenchyma is limited due to lack of IV contrast. The liver demonstrates an unremarkable noncontrast CT appearance. No calcified stones in the gallbladder lumen. The pancreas demonstrates a 1.9 cm unchanged from prior CT and demonstrated to better extent on MRI abdomen of 01/18/2023.  Additionally there is redemonstration of an elongated soft tissue density structure extending from the region of the yady hepatis to the spleen (axial series 2, images 37-56, also unchanged from prior CT and MRI exams.  The spleen and adrenal glands demonstrate an unremarkable noncontrast CT appearance.    There is severe right hydro ureteral nephrosis secondary to a  4-5 mm calculus in the proximal right ureter.  There is significant perinephric fat stranding and ill-defined perinephric fluid and periureteral inflammatory change.  Correlation with urinalysis is advised.  There is no evidence of left hydroureteronephrosis or nephroureterolithiasis.  There is left-sided perinephric fat stranding.  Urinary bladder is distended with smooth margins.  The prostate is prominent in size.    There is atherosclerotic plaquing of the abdominal aorta which is nonaneurysmal.  There are normal and mildly prominent periaortic lymph nodes.    There is a small hiatal hernia.  The visualized loops of large and small bowel demonstrate no evidence of obstruction or inflammatory change.  The appendix is unremarkable.  There is no free intraperitoneal air or portal venous gas.    The osseous structures are intact with degenerative change.  There is a small fat containing umbilical hernia.    Impression  1. Severe right hydroureteronephrosis secondary to a 4-5 mm calculus in the proximal right ureter.  Significant associated right perinephric inflammatory change and ill-defined fluid.  Mild left perinephric fat stranding.  Correlation with urinalysis and appropriate subspecialty consultation advised.  2. Redemonstration of hypoattenuating pancreatic lesion demonstrated and characterized to better extent on prior MRI abdomen of 2023 and unchanged from most recent comparison CT exam of 07/01/2022.  Additionally, unchanged appearance of soft tissue density extending from the yady hepatis to the spleen as discussed above.  3. Multiple additional findings as above.      Electronically signed by: Jane Simon MD  Date:    04/07/2023  Time:    01:29                       Assessment and Plan:     Nephrolithiasis  Discussed findings and options with patient.  At this point his symptoms are well controlled.  Urinalysis on arrival is concerning for possible urinary tract infection, however he has no other  findings concerning for obstructive pyelonephritis (no fever, normal WBC, no tachycardia).  Discussed option for ureteral stent placement versus outpatient trial of medical expulsive therapy.  Wishes to proceed with the latter.  Discussed reasons to return to ER, including fever and chills.    Discussed with ER provider.  Will plan for DC home today continue oral antibiotics for possible UTI pending urine culture results.  Follow up in clinic in 1-2 weeks.          Efraín Bullard MD  Urology  [facility] - Emergency Dept (Observation)

## 2023-04-07 NOTE — H&P
[facility] ED Observation Unit  History and Physical      Patient placed in EDOU from the Emergency Department at onset of observation time, 4:50AM on 04/07/2023. I assumed care of this patient at 11:00AM 04/07/2023.      History of Present Illness:  This is a 73 y.o. male with PMHx of kidney stones who presents with complaint of right flank pains. Patient states these have gradually worsened over the past two weeks. Today he began to experience right lower abdominal pains radiating around to his flank. He took Tylenol and reports his pain sharply intensified as soon as he drank the water. His pain has since improved. Associated symptoms include nausea and excessive belching. He denies any constipation, vomiting, dysuria, frequency, or hematuria. No GI or  PSHx. No new prescriptions. He does note that he has not had any of his medications today. This is the extent of the patient's complaints at this time.     ED Course:  CBC without leukocytosis, however shift noted, normal H&H.  CMP with hyperglycemia, normal kidney function, no electrolyte abnormalities.  UA nitrite positive with occult blood and leukocytes.  Patient denies urinary symptoms. CT renal shows severe right hydroureteronephrosis secondary to a 4-5 mm calculus in the proximal right ureter.  Significant associated right perinephric inflammatory change and ill-defined fluid.  Mild left perinephric fat stranding.  Given CT and UA finding, patient placed in EDOU for urology consult and supportive care.    I reviewed the ED Provider Note dated 4/6/2023 prior to my evaluation of this patient.  I reviewed all labs and imaging performed in the Main ED, prior to patient being placed in Observation. Patient was placed in the ED Observation Unit for concern for infected ureteral calculi.    PMHx   Past Medical History:   Diagnosis Date    Diabetes mellitus     Fuchs' corneal dystrophy     Heart attack     Hypertension     Kidney stones     Pancreatic mass      Pancreatitis     after EUS . 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, treated with dostenex(cabergoline)/ resolved    Reflux esophagitis     Tumor cells, benign       Past Surgical History:   Procedure Laterality Date    CATARACT EXTRACTION W/  INTRAOCULAR LENS IMPLANT Right 0    Dr Van     CATARACT EXTRACTION W/  INTRAOCULAR LENS IMPLANT Left 3/21/2019    Procedure: EXTRACTION, CATARACT, WITH IOL INSERTION;  Surgeon: Ra Van MD;  Location: [facility];  Service: Ophthalmology;  Laterality: Left;    CORNEAL TRANSPLANT Right     Dr Van     DESCEMETS STRIPPING AUTOMATED ENDOTHELIAL KERATOPLASTY Left 3/21/2019    Procedure: TRANSPLANT, PARTIAL-THICKNESS, CORNEA, USING DSAEK TECHNIQUE;  Surgeon: Ra Van MD;  Location: [facility];  Service: Ophthalmology;  Laterality: Left;  DSEK    REPAIR OF RETINAL DETACHMENT WITH VITRECTOMY Right 7/18/2018    Procedure: REPAIR, RETINAL DETACHMENT, WITH VITRECTOMY;  Surgeon: SHUBHAM Patel MD;  Location: [address];  Service: Ophthalmology;  Laterality: Right;  40 min    REPAIR OF RETINAL DETACHMENT WITH VITRECTOMY Left 8/8/2018    Procedure: REPAIR, RETINAL DETACHMENT, WITH VITRECTOMY;  Surgeon: SHUBHAM Patel MD;  [facility];  Service: Ophthalmology;  Laterality: Left;  40 min    RHINOPLASTY TIP  1975    THYROIDECTOMY  2007    UPPER ENDOSCOPIC ULTRASOUND W/ FNA      with resultant pancreatitis        Family Hx   Family History   Problem Relation Age of Onset    Hypertension Mother     Heart disease Mother     Heart disease Father     Cataracts Father     Stroke Father     Diabetes Father     Diabetes Paternal Uncle     Stroke Maternal Grandmother     Blindness Neg Hx     Glaucoma Neg Hx     Macular degeneration Neg Hx     Retinal detachment Neg Hx     Strabismus Neg Hx     Thyroid disease  Neg Hx     Cancer Neg Hx     Amblyopia Neg Hx         Social Hx   Social History     Socioeconomic History    Marital status: Single   Tobacco Use    Smoking status: Never     Passive exposure: Never    Smokeless tobacco: Never   Substance and Sexual Activity    Alcohol use: Yes     Comment: social    Drug use: No    Sexual activity: Not Currently        Vital Signs   Vitals:    04/07/23 0257 04/07/23 0431 04/07/23 0443 04/07/23 0819   BP:   (!) 152/81 (!) 149/72   Pulse: 70 67 67 62   Resp:    16   Temp:    97.7 °F (36.5 °C)   TempSrc:    Oral   SpO2: 95% 95% 95% 96%   Weight:       Height:        04/07/23 1012   BP: (!) 150/70   Pulse: (!) 59   Resp: 16   Temp: 98 °F (36.7 °C)   TempSrc: Oral   SpO2: 95%   Weight:    Height:        Review of Systems  Review of Systems   Constitutional:  Negative for chills, fever and malaise/fatigue.   Respiratory:  Negative for cough and shortness of breath.    Cardiovascular:  Negative for chest pain and palpitations.   Gastrointestinal:  Positive for abdominal pain. Negative for nausea and vomiting.   Genitourinary:  Positive for flank pain. Negative for dysuria, frequency, hematuria and urgency.   Musculoskeletal:  Negative for back pain and myalgias.   Skin:  Negative for rash.   Neurological:  Negative for dizziness and headaches.   Psychiatric/Behavioral:  Negative for depression and suicidal ideas.      Brief Physical Exam/Reassessment   Physical Exam    Constitutional: He appears well-developed and well-nourished. He is cooperative.  Non-toxic appearance. No distress.   HENT:   Head: Normocephalic and atraumatic.   Eyes: EOM are normal. Pupils are equal, round, and reactive to light. No scleral icterus.   Neck:   Normal range of motion.  Cardiovascular:  Normal rate, regular rhythm and normal heart sounds.           Pulmonary/Chest: Breath sounds normal. No respiratory distress. He exhibits no tenderness.   Abdominal: Abdomen is soft. Bowel sounds are normal. He exhibits  no distension. There is abdominal tenderness in the right lower quadrant and suprapubic area.   No right CVA tenderness.  No left CVA tenderness.   Musculoskeletal:         General: No tenderness. Normal range of motion.      Cervical back: Normal range of motion.     Neurological: He is alert and oriented to person, place, and time. He has normal strength. No sensory deficit. GCS score is 15. GCS eye subscore is 4. GCS verbal subscore is 5. GCS motor subscore is 6.   Skin: Skin is warm and dry. Capillary refill takes less than 2 seconds. No erythema.   Psychiatric: He has a normal mood and affect. Thought content normal.       Labs Reviewed   CBC W/ AUTO DIFFERENTIAL - Abnormal; Notable for the following components:       Result Value    MPV 8.9 (*)     Gran # (ANC) 9.8 (*)     Immature Grans (Abs) 0.06 (*)     Gran % 82.4 (*)     Lymph % 9.9 (*)     All other components within normal limits   COMPREHENSIVE METABOLIC PANEL - Abnormal; Notable for the following components:    CO2 20 (*)     Glucose 151 (*)     All other components within normal limits   URINALYSIS, REFLEX TO URINE CULTURE - Abnormal; Notable for the following components:    Protein, UA Trace (*)     Occult Blood UA Trace (*)     Nitrite, UA Positive (*)     Leukocytes, UA 2+ (*)     All other components within normal limits    Narrative:     Specimen Source->Urine   URINALYSIS MICROSCOPIC - Abnormal; Notable for the following components:    RBC, UA 7 (*)     WBC, UA 35 (*)     Bacteria Many (*)     Hyaline Casts, UA 28 (*)     All other components within normal limits    Narrative:     Specimen Source->Urine   CULTURE, URINE   LIPASE     CT Renal Stone Study ABD Pelvis WO   Final Result      1. Severe right hydroureteronephrosis secondary to a 4-5 mm calculus in the proximal right ureter.  Significant associated right perinephric inflammatory change and ill-defined fluid.  Mild left perinephric fat stranding.  Correlation with urinalysis and  appropriate subspecialty consultation advised.   2. Redemonstration of hypoattenuating pancreatic lesion demonstrated and characterized to better extent on prior MRI abdomen of 2023 and unchanged from most recent comparison CT exam of 07/01/2022.  Additionally, unchanged appearance of soft tissue density extending from the yady hepatis to the spleen as discussed above.   3. Multiple additional findings as above.         Electronically signed by: Jane Simon MD   Date:    04/07/2023   Time:    01:29            Medications:   Scheduled Meds:   cefTRIAXone (ROCEPHIN) IVPB  1 g Intravenous Q24H    lisinopriL  2.5 mg Oral QAM    metFORMIN  1,000 mg Oral BID    metoprolol succinate  25 mg Oral Nightly     Continuous Infusions:   sodium chloride 0.9% 125 mL/hr at 04/07/23 0750     PRN Meds:.ketorolac, melatonin, morphine, ondansetron, sodium chloride 0.9%      Assessment/Plan:    1. Renal colic on right side    2. RLQ abdominal pain    3. Constipation, unspecified constipation type    4. Acute cystitis with hematuria    5. Hydroureteronephrosis    6. Ureteral calculi        Notes reviewed by the ED providers, Dr. Pabon and Dr. Veras.

## 2023-04-07 NOTE — ED PROVIDER NOTES
Encounter Date: 4/6/2023    SCRIBE #1 NOTE: I, Irina Covarrubias, am scribing for, and in the presence of,  Caio Gilliam MD. I have scribed the following portions of the note - Other sections scribed: HPI, ROS, PE.     History     Chief Complaint   Patient presents with    Abdominal Pain     RLQ abdominal pain and c/o nausea. Since this morning has gotten worse throughout the day. Last BM 4/6. States he has more gas than usual. Hx of kidney stones     Time seen by provider: 11:47 PM    This is a 73 y.o. male with PMHx of kidney stones who presents with complaint of right flank pains. Patient states these have gradually worsened over the past two weeks. Today he began to experience right lower abdominal pains radiating around to his flank. He took Tylenol and reports his pain sharply intensified as soon as he drank the water. His pain has since improved. Associated symptoms include nausea and excessive belching. He denies any constipation, vomiting, dysuria, frequency, or hematuria. No GI or  PSHx. No new prescriptions. He does note that he has not had any of his medications today. This is the extent of the patient's complaints at this time.      The history is provided by the patient.   Review of patient's allergies indicates:   Allergen Reactions    Grass pollen-june grass standard     House dust      Past Medical History:   Diagnosis Date    Diabetes mellitus     Fuchs' corneal dystrophy     Heart attack     Hypertension     Kidney stones     Pancreatic mass     Pancreatitis     after EUS . 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, treated with dostenex(cabergoline)/ resolved    Reflux esophagitis     Tumor cells, benign      Past Surgical History:   Procedure Laterality Date    CATARACT EXTRACTION W/  INTRAOCULAR LENS IMPLANT Right 0    Dr Van      CATARACT EXTRACTION W/  INTRAOCULAR LENS IMPLANT Left 3/21/2019    Procedure: EXTRACTION, CATARACT, WITH IOL INSERTION;  Surgeon: Ra Van MD;  Location: [facility] OR;  Service: Ophthalmology;  Laterality: Left;    CORNEAL TRANSPLANT Right     Dr Van     DESCEMETS STRIPPING AUTOMATED ENDOTHELIAL KERATOPLASTY Left 3/21/2019    Procedure: TRANSPLANT, PARTIAL-THICKNESS, CORNEA, USING DSAEK TECHNIQUE;  Surgeon: Ra Van MD;  Location: [facility] OR;  Service: Ophthalmology;  Laterality: Left;  DSEK    REPAIR OF RETINAL DETACHMENT WITH VITRECTOMY Right 7/18/2018    Procedure: REPAIR, RETINAL DETACHMENT, WITH VITRECTOMY;  Surgeon: SHUBHAM Patel MD;  [facility];  Service: Ophthalmology;  Laterality: Right;  40 min    REPAIR OF RETINAL DETACHMENT WITH VITRECTOMY Left 8/8/2018    Procedure: REPAIR, RETINAL DETACHMENT, WITH VITRECTOMY;  Surgeon: SHUBHAM Patel MD;  [facility];  Service: Ophthalmology;  Laterality: Left;  40 min    RHINOPLASTY TIP  1975    THYROIDECTOMY  2007    UPPER ENDOSCOPIC ULTRASOUND W/ FNA      with resultant pancreatitis     Family History   Problem Relation Age of Onset    Hypertension Mother     Heart disease Mother     Heart disease Father     Cataracts Father     Stroke Father     Diabetes Father     Diabetes Paternal Uncle     Stroke Maternal Grandmother     Blindness Neg Hx     Glaucoma Neg Hx     Macular degeneration Neg Hx     Retinal detachment Neg Hx     Strabismus Neg Hx     Thyroid disease Neg Hx     Cancer Neg Hx     Amblyopia Neg Hx      Social History     Tobacco Use    Smoking status: Never     Passive exposure: Never    Smokeless tobacco: Never   Substance Use Topics    Alcohol use: Yes     Comment: social    Drug use: No     Review of Systems   Constitutional:  Negative for chills and fever.   HENT:  Negative for congestion and sore throat.    Eyes:  Negative for photophobia and redness.   Respiratory:  Negative for cough and shortness of  breath.    Cardiovascular:  Negative for chest pain.   Gastrointestinal:  Positive for abdominal pain and nausea. Negative for diarrhea and vomiting.   Genitourinary:  Positive for flank pain. Negative for dysuria.   Musculoskeletal:  Negative for back pain.   Skin:  Negative for rash.   Neurological:  Negative for weakness, light-headedness and headaches.   Psychiatric/Behavioral:  Negative for confusion.      Physical Exam     Initial Vitals [04/06/23 2251]   BP Pulse Resp Temp SpO2   (!) 171/80 82 20 98 °F (36.7 °C) (!) 94 %      MAP       --         Physical Exam    Nursing note and vitals reviewed.  Constitutional: He appears well-developed and well-nourished. He is not diaphoretic. No distress.   HENT:   Head: Normocephalic and atraumatic.   Mouth/Throat: Oropharynx is clear and moist.   Eyes: Conjunctivae and EOM are normal. Pupils are equal, round, and reactive to light.   Conjunctivae pink, clear, and intact.    Neck: Neck supple.   No cervical lymphadenopathy.    Normal range of motion.  Cardiovascular:  Normal rate, regular rhythm and normal heart sounds.     Exam reveals no gallop and no friction rub.       No murmur heard.  Pulmonary/Chest: Breath sounds normal. No respiratory distress. He has no wheezes.   Abdominal: Abdomen is soft. Bowel sounds are normal. There is no abdominal tenderness.   Mild right flank tenderness.   There is right CVA tenderness.  No left CVA tenderness.   Musculoskeletal:         General: No tenderness or edema. Normal range of motion.      Cervical back: Normal range of motion and neck supple.     Lymphadenopathy:     He has no cervical adenopathy.   Neurological: He is alert and oriented to person, place, and time.   Skin: Skin is warm and dry. Capillary refill takes less than 2 seconds. No rash noted. No pallor.   Psychiatric: He has a normal mood and affect. Thought content normal.       ED Course   Procedures  Labs Reviewed   CBC W/ AUTO DIFFERENTIAL - Abnormal; Notable  for the following components:       Result Value    MPV 8.9 (*)     Gran # (ANC) 9.8 (*)     Immature Grans (Abs) 0.06 (*)     Gran % 82.4 (*)     Lymph % 9.9 (*)     All other components within normal limits   COMPREHENSIVE METABOLIC PANEL - Abnormal; Notable for the following components:    CO2 20 (*)     Glucose 151 (*)     All other components within normal limits   LIPASE   URINALYSIS, REFLEX TO URINE CULTURE          Imaging Results              CT Renal Stone Study ABD Pelvis WO (In process)                      Medications   ketorolac injection 15 mg (15 mg Intravenous Given 4/6/23 2355)   sodium chloride 0.9% bolus 1,000 mL 1,000 mL (1,000 mLs Intravenous New Bag 4/6/23 2355)   ondansetron injection 4 mg (4 mg Intravenous Given 4/6/23 2355)     Medical Decision Making:   History:   Old Medical Records: I decided to obtain old medical records.  Clinical Tests:   Lab Tests: Ordered and Reviewed  Radiological Study: Ordered and Reviewed        Scribe Attestation:   Scribe #1: I performed the above scribed service and the documentation accurately describes the services I performed. I attest to the accuracy of the note.    Attending Attestation:             Attending ED Notes:   Emergent evaluation of a 73-year-old male with past medical history of kidney stones now presents the emergency room with chief complaint of right lower quadrant abdominal pain now radiating to flank.  Patient is afebrile, nontoxic-appearing stable vital signs.  Oxygen saturation by MD is 96% on room air.  Lungs are clear to auscultation bilaterally.  Patient in no acute respiratory distress.  Patient states that his oxygen saturation is chronically at 96%.  Patient has no elevation white blood cell count.  H&H within normal limits.  No acute findings on CMP.  Lipase is 30.  Urinary analysis is pending at this time.  CT scan is pending at this time.  Final disposition and plan of care of patient will go to ED MDDr. Veras.Physician  Attestation for Scribe: I, Caio Pabon, reviewed documentation as scribed in my presence, which is both accurate and complete.                     Clinical Impression:   Final diagnoses:  [R10.31] RLQ abdominal pain (Primary)  [N23] Renal colic on right side  [K59.00] Constipation, unspecified constipation type               Caio Pabon MD  04/07/23 0058

## 2023-04-07 NOTE — DISCHARGE SUMMARY
[facility] ED Observation Unit  Discharge Summary        History of Present Illness:    This is a 73 y.o. male with PMHx of kidney stones who presents with complaint of right flank pains. Patient states these have gradually worsened over the past two weeks. Today he began to experience right lower abdominal pains radiating around to his flank. He took Tylenol and reports his pain sharply intensified as soon as he drank the water. His pain has since improved. Associated symptoms include nausea and excessive belching. He denies any constipation, vomiting, dysuria, frequency, or hematuria. No GI or  PSHx. No new prescriptions. He does note that he has not had any of his medications today. This is the extent of the patient's complaints at this time.      ED Course:  CBC without leukocytosis, however shift noted, normal H&H.  CMP with hyperglycemia, normal kidney function, no electrolyte abnormalities.  UA nitrite positive with occult blood and leukocytes.  Patient denies urinary symptoms. CT renal shows severe right hydroureteronephrosis secondary to a 4-5 mm calculus in the proximal right ureter.  Significant associated right perinephric inflammatory change and ill-defined fluid.  Mild left perinephric fat stranding.  Given CT and UA finding, patient placed in EDOU for urology consult and supportive care. IV abx given.    Observation Course:    No acute events throughout observation stay.  Vitals have remained stable.  Patient was evaluated by Dr. Bullard from Urology.  Per urology, Urinalysis on arrival is concerning for possible urinary tract infection, however he has no other findings concerning for obstructive pyelonephritis (no fever, normal WBC, no tachycardia).  Discussed option for ureteral stent placement versus outpatient trial of medical expulsive therapy.  Wishes to proceed with the latter.  Discussed reasons to return to ER, including fever and chills.  Will discharge patient home with Flomax, Toradol,  Norco and antibiotics with instructions to strain all his urine and follow-up with urology.  Patient is amenable to this plan and discharged in good condition. Patient educated on signs and symptoms to monitor for and when to return to ED. Patient verbalized understanding agrees with treatment plan. All questions and concerns addressed.       Brief Physical Exam/Reassessment   Review of Systems   Constitutional:  Negative for chills and fever.   Eyes:  Negative for blurred vision and double vision.   Respiratory:  Negative for cough and shortness of breath.    Cardiovascular:  Negative for chest pain.   Gastrointestinal:  Positive for abdominal pain. Negative for nausea and vomiting.   Genitourinary:  Positive for flank pain. Negative for dysuria, frequency, hematuria and urgency.   Musculoskeletal:  Negative for back pain and myalgias.   Skin:  Negative for rash.   Neurological:  Negative for dizziness, weakness and headaches.   Endo/Heme/Allergies:  Does not bruise/bleed easily.   Psychiatric/Behavioral:  Negative for depression.      Physical Exam    Constitutional: He appears well-developed and well-nourished. He is cooperative.  Non-toxic appearance. No distress.   HENT:   Head: Normocephalic and atraumatic.   Eyes: EOM are normal. Pupils are equal, round, and reactive to light. No scleral icterus.   Neck:   Normal range of motion.  Cardiovascular:  Normal rate, regular rhythm and normal heart sounds.           Pulmonary/Chest: Breath sounds normal. No respiratory distress. He exhibits no tenderness.   Abdominal: Abdomen is soft. Bowel sounds are normal. He exhibits no distension. There is abdominal tenderness in the right lower quadrant.   Mild right flank tenderness, no CVA tenderness   No right CVA tenderness.  No left CVA tenderness.   Musculoskeletal:         General: No tenderness. Normal range of motion.      Cervical back: Normal range of motion.     Neurological: He is alert and oriented to person,  place, and time. He has normal strength. No sensory deficit. GCS score is 15. GCS eye subscore is 4. GCS verbal subscore is 5. GCS motor subscore is 6.   Skin: Skin is warm and dry. Capillary refill takes less than 2 seconds. No erythema.   Psychiatric: He has a normal mood and affect. Thought content normal.       Consultants:    Urology- Dr. Bullard    ED/OBS Workup:  Vitals:    04/07/23 0257 04/07/23 0431 04/07/23 0443 04/07/23 0819   BP:   (!) 152/81 (!) 149/72   Pulse: 70 67 67 62   Resp:    16   Temp:    97.7 °F (36.5 °C)   TempSrc:    Oral   SpO2: 95% 95% 95% 96%   Weight:       Height:        04/07/23 1012   BP: (!) 150/70   Pulse: (!) 59   Resp: 16   Temp: 98 °F (36.7 °C)   TempSrc: Oral   SpO2: 95%   Weight:    Height:        Labs Reviewed   CBC W/ AUTO DIFFERENTIAL - Abnormal; Notable for the following components:       Result Value    MPV 8.9 (*)     Gran # (ANC) 9.8 (*)     Immature Grans (Abs) 0.06 (*)     Gran % 82.4 (*)     Lymph % 9.9 (*)     All other components within normal limits   COMPREHENSIVE METABOLIC PANEL - Abnormal; Notable for the following components:    CO2 20 (*)     Glucose 151 (*)     All other components within normal limits   URINALYSIS, REFLEX TO URINE CULTURE - Abnormal; Notable for the following components:    Protein, UA Trace (*)     Occult Blood UA Trace (*)     Nitrite, UA Positive (*)     Leukocytes, UA 2+ (*)     All other components within normal limits    Narrative:     Specimen Source->Urine   URINALYSIS MICROSCOPIC - Abnormal; Notable for the following components:    RBC, UA 7 (*)     WBC, UA 35 (*)     Bacteria Many (*)     Hyaline Casts, UA 28 (*)     All other components within normal limits    Narrative:     Specimen Source->Urine   CULTURE, URINE   LIPASE       CT Renal Stone Study ABD Pelvis WO   Final Result      1. Severe right hydroureteronephrosis secondary to a 4-5 mm calculus in the proximal right ureter.  Significant associated right perinephric  inflammatory change and ill-defined fluid.  Mild left perinephric fat stranding.  Correlation with urinalysis and appropriate subspecialty consultation advised.   2. Redemonstration of hypoattenuating pancreatic lesion demonstrated and characterized to better extent on prior MRI abdomen of 2023 and unchanged from most recent comparison CT exam of 07/01/2022.  Additionally, unchanged appearance of soft tissue density extending from the yady hepatis to the spleen as discussed above.   3. Multiple additional findings as above.         Electronically signed by: Jane Simon MD   Date:    04/07/2023   Time:    01:29          Final Diagnosis:  1. Renal colic on right side    2. RLQ abdominal pain    3. Constipation, unspecified constipation type    4. Acute cystitis with hematuria    5. Hydroureteronephrosis    6. Ureteral calculi        Plan:  Flomax  Toradol and Norco for pain  Strain all urine  Outpatient urology follow-up    Discharge Condition: Good    Disposition: Home or Self Care     Time spent on the discharge of the patient including review of hospital course with the patient. reviewing discharge medications and arranging follow-up care 35 minutes.  Patient was seen and examined on the date of discharge and determined to be suitable for discharge.    Follow Up:   ED Disposition Condition    Discharge Good            ED Prescriptions       Medication Sig Dispense Start Date End Date Auth. Provider    tamsulosin (FLOMAX) 0.4 mg Cap Take 1 capsule (0.4 mg total) by mouth once daily. 10 capsule 4/7/2023 -- Katia Ribeiro, NP    ketorolac (TORADOL) 10 mg tablet Take 1 tablet (10 mg total) by mouth every 6 (six) hours. for 3 days 12 tablet 4/7/2023 4/10/2023 Katia Ribeiro, NP    HYDROcodone-acetaminophen (NORCO) 5-325 mg per tablet Take 1 tablet by mouth every 4 (four) hours as needed for Pain. 15 tablet 4/7/2023 -- Katia DOHERTY Uphold, NP    cephALEXin (KEFLEX) 500 MG capsule Take 1 capsule (500 mg total) by mouth  2 (two) times a day. for 10 days 20 capsule 4/7/2023 4/17/2023 Katia Ribeiro NP          Follow-up Information       Follow up With Specialties Details Why Contact Info    Efraín Bullard MD Urology Schedule an appointment as soon as possible for a visit   [address] [place] [id number]  [phone]              Future Appointments   Date Time Provider Department Center   4/10/2023  2:30 PM Alberto Leon MD [facility] [place]   7/12/2023  1:30 PM Erlin Mcpherson MD [facility] [place]

## 2023-04-07 NOTE — SUBJECTIVE & OBJECTIVE
Past Medical History:   Diagnosis Date    Diabetes mellitus     Fuchs' corneal dystrophy     Heart attack     Hypertension     Kidney stones     Pancreatic mass     Pancreatitis     after EUS . 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;  Service: Ophthalmology;  Laterality: Left;    CORNEAL TRANSPLANT Right     Dr Van     DESCEMETS STRIPPING AUTOMATED ENDOTHELIAL KERATOPLASTY Left 3/21/2019    Procedure: TRANSPLANT, PARTIAL-THICKNESS, CORNEA, USING DSAEK TECHNIQUE;  Surgeon: Ra Van MD;  Location: [facility];  Service: Ophthalmology;  Laterality: Left;  DSEK    REPAIR OF RETINAL DETACHMENT WITH VITRECTOMY Right 7/18/2018    Procedure: REPAIR, RETINAL DETACHMENT, WITH VITRECTOMY;  Surgeon: SHUBHAM Patel MD;  Location: [address];  Service: Ophthalmology;  Laterality: Right;  40 min    REPAIR OF RETINAL DETACHMENT WITH VITRECTOMY Left 8/8/2018    Procedure: REPAIR, RETINAL DETACHMENT, WITH VITRECTOMY;  Surgeon: SHUBHAM Patel MD;  Location: [facility] [address] [place] [address];  Service: Ophthalmology;  Laterality: Left;  40 min    RHINOPLASTY TIP  1975    THYROIDECTOMY  2007    UPPER ENDOSCOPIC ULTRASOUND W/ FNA      with resultant pancreatitis       Review of patient's allergies indicates:   Allergen Reactions    Grass pollen-ira grass standard     House dust        Family History       Problem Relation (Age of Onset)    Cataracts Father    Diabetes Father, Paternal Uncle     Heart disease Mother, Father    Hypertension Mother    Stroke Father, Maternal Grandmother            Tobacco Use    Smoking status: Never     Passive exposure: Never    Smokeless tobacco: Never   Substance and Sexual Activity    Alcohol use: Yes     Comment: social    Drug use: No    Sexual activity: Not Currently       Review of Systems   Constitutional:  Negative for appetite change, chills and fever.   HENT:  Negative for sneezing and sore throat.    Eyes:  Negative for visual disturbance.   Respiratory:  Negative for chest tightness and shortness of breath.    Cardiovascular:  Negative for chest pain and leg swelling.   Gastrointestinal:  Positive for nausea. Negative for abdominal distention and vomiting.   Genitourinary:  Positive for flank pain.   Musculoskeletal:  Negative for arthralgias and neck pain.   Skin:  Negative for color change.   Neurological:  Negative for dizziness and seizures.   Psychiatric/Behavioral:  Negative for hallucinations.      Objective:     Temp:  [97.7 °F (36.5 °C)-98 °F (36.7 °C)] 98 °F (36.7 °C)  Pulse:  [59-82] 59  Resp:  [16-20] 16  SpO2:  [94 %-96 %] 95 %  BP: (134-171)/(69-81) 150/70     Body mass index is 26.83 kg/m².           Drains       None                   Physical Exam  Constitutional:       General: He is not in acute distress.     Appearance: He is well-developed.   HENT:      Head: Normocephalic and atraumatic.   Eyes:      General:         Right eye: No discharge.         Left eye: No discharge.      Conjunctiva/sclera: Conjunctivae normal.      Pupils: Pupils are equal, round, and reactive to light.   Neck:      Thyroid: No thyromegaly.      Trachea: No tracheal deviation.   Cardiovascular:      Rate and Rhythm: Normal rate and regular rhythm.      Chest Wall: PMI is not displaced.   Pulmonary:      Effort: Pulmonary effort is normal. No accessory muscle usage or respiratory distress.   Chest:      Chest wall: No mass, tenderness or crepitus.   Abdominal:       General: There is no distension.      Palpations: Abdomen is soft. There is no mass.      Tenderness: There is no abdominal tenderness.      Hernia: No hernia is present.   Musculoskeletal:      Cervical back: Normal range of motion and neck supple.   Skin:     General: Skin is warm and dry.      Findings: No erythema or rash.   Neurological:      Cranial Nerves: No cranial nerve deficit.      Sensory: No sensory deficit.   Psychiatric:         Mood and Affect: Mood is not anxious or depressed.         Speech: Speech normal.         Behavior: Behavior normal.         Thought Content: Thought content normal.         Judgment: Judgment normal.       Significant Labs:    BMP:  Recent Labs   Lab 04/06/23  2345      K 4.1      CO2 20*   BUN 15   CREATININE 1.0   CALCIUM 9.2       CBC:  Recent Labs   Lab 04/06/23  2345   WBC 11.83   HGB 15.6   HCT 46.5          Urine Culture: No results for input(s): LABURIN in the last 168 hours.  Urine Studies:   Recent Labs   Lab 04/07/23  0047   COLORU Yellow   APPEARANCEUA Clear   PHUR 6.0   SPECGRAV 1.030   PROTEINUA Trace*   GLUCUA Negative   KETONESU Negative   BILIRUBINUA Negative   OCCULTUA Trace*   NITRITE Positive*   UROBILINOGEN Negative   LEUKOCYTESUR 2+*   RBCUA 7*   WBCUA 35*   BACTERIA Many*   HYALINECASTS 28*       Significant Imaging:  Results for orders placed during the hospital encounter of 04/06/23    CT Renal Stone Study ABD Pelvis WO    Narrative  EXAMINATION:  CT RENAL STONE STUDY ABD PELVIS WO    CLINICAL HISTORY:  Flank pain, kidney stone suspected;    TECHNIQUE:  Low dose axial images, sagittal and coronal reformations were obtained from the lung bases to the pubic symphysis.  Contrast was not administered.    COMPARISON:  Renal stone study 07/01/2022, MRI abdomen 01/18/2023    FINDINGS:  The visualized lung bases demonstrate dependent bibasilar atelectasis. There is a 3 mm right middle lobe pulmonary nodule, unchanged from most recent exam.   No pleural fluid or focal consolidation.  There is a probable cardiac stent in place. The visualized portions of the heart and pericardium are otherwise within normal limits.    Please note evaluation of solid organ parenchyma is limited due to lack of IV contrast. The liver demonstrates an unremarkable noncontrast CT appearance. No calcified stones in the gallbladder lumen. The pancreas demonstrates a 1.9 cm unchanged from prior CT and demonstrated to better extent on MRI abdomen of 01/18/2023.  Additionally there is redemonstration of an elongated soft tissue density structure extending from the region of the yady hepatis to the spleen (axial series 2, images 37-56, also unchanged from prior CT and MRI exams.  The spleen and adrenal glands demonstrate an unremarkable noncontrast CT appearance.    There is severe right hydro ureteral nephrosis secondary to a 4-5 mm calculus in the proximal right ureter.  There is significant perinephric fat stranding and ill-defined perinephric fluid and periureteral inflammatory change.  Correlation with urinalysis is advised.  There is no evidence of left hydroureteronephrosis or nephroureterolithiasis.  There is left-sided perinephric fat stranding.  Urinary bladder is distended with smooth margins.  The prostate is prominent in size.    There is atherosclerotic plaquing of the abdominal aorta which is nonaneurysmal.  There are normal and mildly prominent periaortic lymph nodes.    There is a small hiatal hernia.  The visualized loops of large and small bowel demonstrate no evidence of obstruction or inflammatory change.  The appendix is unremarkable.  There is no free intraperitoneal air or portal venous gas.    The osseous structures are intact with degenerative change.  There is a small fat containing umbilical hernia.    Impression  1. Severe right hydroureteronephrosis secondary to a 4-5 mm calculus in the proximal right ureter.  Significant associated right perinephric  inflammatory change and ill-defined fluid.  Mild left perinephric fat stranding.  Correlation with urinalysis and appropriate subspecialty consultation advised.  2. Redemonstration of hypoattenuating pancreatic lesion demonstrated and characterized to better extent on prior MRI abdomen of 2023 and unchanged from most recent comparison CT exam of 07/01/2022.  Additionally, unchanged appearance of soft tissue density extending from the yady hepatis to the spleen as discussed above.  3. Multiple additional findings as above.      Electronically signed by: Jane Simon MD  Date:    04/07/2023  Time:    01:29

## 2023-04-07 NOTE — ED NOTES
Rec'd report from JOEL Storm  Patient is pending a Urology consult in the morning, pt in no acute distress, ambulatory w/ steady gait, alert and oriented. Pt updated on plan of care.

## 2023-04-07 NOTE — ED TRIAGE NOTES
C/o right flank/abd pain since this morning. Hx of kidney stones. Denies dysuria or fever. Reports some associated belching with onset of symptoms.

## 2023-04-07 NOTE — HPI
Patient is a 73-year-old male with a known history of nephrolithiasis who presented to the emergency room last night with acute onset right flank and right lower abdominal pain.  He did report some associated nausea and vomiting however denies dysuria, fever, chills.  He has been afebrile and hemodynamically stable since admission.  ER workup including CT scan demonstrated a 4 mm proximal right ureteral stone.  His urinalysis was concerning for possible UTI and he was admitted for ER observation.  This morning he states that his pain is much improved as is his nausea.  He has no other concerns at this time.

## 2023-04-07 NOTE — PROVIDER PROGRESS NOTES - EMERGENCY DEPT.
Encounter Date: 4/6/2023    ED Physician Progress Notes        Physician Note:   I was asked by Dr. Pabon to follow-up on the results of the urinalysis as well as CT scan.  Urinalysis does have significant amount of red blood cells as well as white blood cells concerning for possible urinary tract infection.  Also has ureteral stone noted on the CT scan.  With this being a possible infected stone will give antibiotics and discussed with Urology.    4:07 AM unfortunately having a difficult time getting a hold of Urology.  I feel it is reasonable to bring in to the ED observation unit for further evaluation and consultation with Urology in the morning.

## 2023-04-08 ENCOUNTER — NURSE TRIAGE (OUTPATIENT)
Dept: ADMINISTRATIVE | Facility: CLINIC | Age: 73
End: 2023-04-08
Payer: COMMERCIAL

## 2023-04-08 LAB — BACTERIA UR CULT: ABNORMAL

## 2023-04-08 NOTE — TELEPHONE ENCOUNTER
Reason for Disposition   [1] Caller has URGENT medicine question about med that PCP or specialist prescribed AND [2] triager unable to answer question     Requesting clarification on duplicate order of Flomax.  Clarified with Dr Bullard by phone    Protocols used: Medication Question Call-A-    Sadiq called to say Flomax 4 mg is listed on his discharge paperwork from [facility] yesterday, and instructions are duplicated, with two entries for Flomax.  He is requesting clarification.  Dr Bullard on call, who did see him in ED yesterday.  He instructs Sadiq to take it only once daily, 0.4 mg.  Do not take more or more often than that.  Sadiq states understanding.  Message to Dr Bullard's staff, as well as Erlin Mcpherson MD, pcp.    Please contact caller directly with any additional care advice. No other questions at this time.

## 2023-04-10 ENCOUNTER — LAB VISIT (OUTPATIENT)
Dept: LAB | Facility: HOSPITAL | Age: 73
End: 2023-04-10
Attending: INTERNAL MEDICINE
Payer: COMMERCIAL

## 2023-04-10 ENCOUNTER — OFFICE VISIT (OUTPATIENT)
Dept: ENDOCRINOLOGY | Facility: CLINIC | Age: 73
End: 2023-04-10
Payer: COMMERCIAL

## 2023-04-10 VITALS
WEIGHT: 183.56 LBS | OXYGEN SATURATION: 96 % | HEART RATE: 51 BPM | DIASTOLIC BLOOD PRESSURE: 78 MMHG | SYSTOLIC BLOOD PRESSURE: 120 MMHG | BODY MASS INDEX: 27.19 KG/M2 | HEIGHT: 69 IN | TEMPERATURE: 98 F

## 2023-04-10 DIAGNOSIS — E55.9 HYPOVITAMINOSIS D: ICD-10-CM

## 2023-04-10 DIAGNOSIS — D53.9 NUTRITIONAL ANEMIA: ICD-10-CM

## 2023-04-10 DIAGNOSIS — Z87.19 HISTORY OF PANCREATITIS: ICD-10-CM

## 2023-04-10 DIAGNOSIS — E78.2 HYPERLIPIDEMIA, MIXED: ICD-10-CM

## 2023-04-10 DIAGNOSIS — D35.2 PITUITARY MACROADENOMA: ICD-10-CM

## 2023-04-10 DIAGNOSIS — K86.2 PANCREAS CYST: ICD-10-CM

## 2023-04-10 DIAGNOSIS — D35.2 PROLACTINOMA: ICD-10-CM

## 2023-04-10 DIAGNOSIS — E78.00 HYPERCHOLESTEROLEMIA: ICD-10-CM

## 2023-04-10 DIAGNOSIS — I10 ESSENTIAL HYPERTENSION: ICD-10-CM

## 2023-04-10 DIAGNOSIS — N40.0 BENIGN PROSTATIC HYPERPLASIA, UNSPECIFIED WHETHER LOWER URINARY TRACT SYMPTOMS PRESENT: ICD-10-CM

## 2023-04-10 DIAGNOSIS — H18.519 FUCHS' CORNEAL DYSTROPHY, UNSPECIFIED LATERALITY: ICD-10-CM

## 2023-04-10 DIAGNOSIS — I25.10 CORONARY ARTERY DISEASE INVOLVING NATIVE CORONARY ARTERY OF NATIVE HEART WITHOUT ANGINA PECTORIS: ICD-10-CM

## 2023-04-10 DIAGNOSIS — K21.00 GASTROESOPHAGEAL REFLUX DISEASE WITH ESOPHAGITIS WITHOUT HEMORRHAGE: ICD-10-CM

## 2023-04-10 DIAGNOSIS — N20.0 RENAL CALCULUS, RIGHT: Primary | ICD-10-CM

## 2023-04-10 DIAGNOSIS — E11.65 TYPE 2 DIABETES MELLITUS WITH HYPERGLYCEMIA, WITHOUT LONG-TERM CURRENT USE OF INSULIN: Primary | ICD-10-CM

## 2023-04-10 DIAGNOSIS — E21.0 PRIMARY HYPERPARATHYROIDISM: ICD-10-CM

## 2023-04-10 DIAGNOSIS — N20.0 NEPHROLITHIASIS: ICD-10-CM

## 2023-04-10 DIAGNOSIS — E11.65 TYPE 2 DIABETES MELLITUS WITH HYPERGLYCEMIA, WITHOUT LONG-TERM CURRENT USE OF INSULIN: ICD-10-CM

## 2023-04-10 LAB
BASOPHILS # BLD AUTO: 0.04 K/UL (ref 0–0.2)
BASOPHILS NFR BLD: 0.7 % (ref 0–1.9)
DIFFERENTIAL METHOD: NORMAL
EOSINOPHIL # BLD AUTO: 0.2 K/UL (ref 0–0.5)
EOSINOPHIL NFR BLD: 2.8 % (ref 0–8)
ERYTHROCYTE [DISTWIDTH] IN BLOOD BY AUTOMATED COUNT: 12.6 % (ref 11.5–14.5)
HCT VFR BLD AUTO: 42.4 % (ref 40–54)
HGB BLD-MCNC: 14.1 G/DL (ref 14–18)
IMM GRANULOCYTES # BLD AUTO: 0.02 K/UL (ref 0–0.04)
IMM GRANULOCYTES NFR BLD AUTO: 0.3 % (ref 0–0.5)
LYMPHOCYTES # BLD AUTO: 1.6 K/UL (ref 1–4.8)
LYMPHOCYTES NFR BLD: 27.3 % (ref 18–48)
MCH RBC QN AUTO: 29.7 PG (ref 27–31)
MCHC RBC AUTO-ENTMCNC: 33.3 G/DL (ref 32–36)
MCV RBC AUTO: 89 FL (ref 82–98)
MONOCYTES # BLD AUTO: 0.5 K/UL (ref 0.3–1)
MONOCYTES NFR BLD: 8.2 % (ref 4–15)
NEUTROPHILS # BLD AUTO: 3.7 K/UL (ref 1.8–7.7)
NEUTROPHILS NFR BLD: 60.7 % (ref 38–73)
NRBC BLD-RTO: 0 /100 WBC
PLATELET # BLD AUTO: 189 K/UL (ref 150–450)
PMV BLD AUTO: 9.8 FL (ref 9.2–12.9)
RBC # BLD AUTO: 4.75 M/UL (ref 4.6–6.2)
WBC # BLD AUTO: 6.01 K/UL (ref 3.9–12.7)

## 2023-04-10 PROCEDURE — 83970 ASSAY OF PARATHORMONE: CPT | Performed by: INTERNAL MEDICINE

## 2023-04-10 PROCEDURE — 82306 VITAMIN D 25 HYDROXY: CPT | Performed by: INTERNAL MEDICINE

## 2023-04-10 PROCEDURE — 84403 ASSAY OF TOTAL TESTOSTERONE: CPT | Performed by: INTERNAL MEDICINE

## 2023-04-10 PROCEDURE — 3008F PR BODY MASS INDEX (BMI) DOCUMENTED: ICD-10-PCS | Mod: CPTII,S$GLB,, | Performed by: INTERNAL MEDICINE

## 2023-04-10 PROCEDURE — 3288F FALL RISK ASSESSMENT DOCD: CPT | Mod: CPTII,S$GLB,, | Performed by: INTERNAL MEDICINE

## 2023-04-10 PROCEDURE — 99214 PR OFFICE/OUTPT VISIT, EST, LEVL IV, 30-39 MIN: ICD-10-PCS | Mod: S$GLB,,, | Performed by: INTERNAL MEDICINE

## 2023-04-10 PROCEDURE — 1126F PR PAIN SEVERITY QUANTIFIED, NO PAIN PRESENT: ICD-10-PCS | Mod: CPTII,S$GLB,, | Performed by: INTERNAL MEDICINE

## 2023-04-10 PROCEDURE — 1157F PR ADVANCE CARE PLAN OR EQUIV PRESENT IN MEDICAL RECORD: ICD-10-PCS | Mod: CPTII,S$GLB,, | Performed by: INTERNAL MEDICINE

## 2023-04-10 PROCEDURE — 83921 ORGANIC ACID SINGLE QUANT: CPT | Performed by: INTERNAL MEDICINE

## 2023-04-10 PROCEDURE — 1101F PT FALLS ASSESS-DOCD LE1/YR: CPT | Mod: CPTII,S$GLB,, | Performed by: INTERNAL MEDICINE

## 2023-04-10 PROCEDURE — 1160F PR REVIEW ALL MEDS BY PRESCRIBER/CLIN PHARMACIST DOCUMENTED: ICD-10-PCS | Mod: CPTII,S$GLB,, | Performed by: INTERNAL MEDICINE

## 2023-04-10 PROCEDURE — 86316 IMMUNOASSAY TUMOR OTHER: CPT | Performed by: INTERNAL MEDICINE

## 2023-04-10 PROCEDURE — 84270 ASSAY OF SEX HORMONE GLOBUL: CPT | Performed by: INTERNAL MEDICINE

## 2023-04-10 PROCEDURE — 83690 ASSAY OF LIPASE: CPT | Performed by: INTERNAL MEDICINE

## 2023-04-10 PROCEDURE — 84146 ASSAY OF PROLACTIN: CPT | Performed by: INTERNAL MEDICINE

## 2023-04-10 PROCEDURE — 85025 COMPLETE CBC W/AUTO DIFF WBC: CPT | Performed by: INTERNAL MEDICINE

## 2023-04-10 PROCEDURE — 82672 ASSAY OF ESTROGEN: CPT | Performed by: INTERNAL MEDICINE

## 2023-04-10 PROCEDURE — 1126F AMNT PAIN NOTED NONE PRSNT: CPT | Mod: CPTII,S$GLB,, | Performed by: INTERNAL MEDICINE

## 2023-04-10 PROCEDURE — 30000890 MAYO MISCELLANEOUS TEST (REFLEX): Performed by: INTERNAL MEDICINE

## 2023-04-10 PROCEDURE — 82653 EL-1 FECAL QUANTITATIVE: CPT | Performed by: INTERNAL MEDICINE

## 2023-04-10 PROCEDURE — 99214 OFFICE O/P EST MOD 30 MIN: CPT | Mod: S$GLB,,, | Performed by: INTERNAL MEDICINE

## 2023-04-10 PROCEDURE — 84550 ASSAY OF BLOOD/URIC ACID: CPT | Performed by: INTERNAL MEDICINE

## 2023-04-10 PROCEDURE — 1159F MED LIST DOCD IN RCRD: CPT | Mod: CPTII,S$GLB,, | Performed by: INTERNAL MEDICINE

## 2023-04-10 PROCEDURE — 84153 ASSAY OF PSA TOTAL: CPT | Performed by: INTERNAL MEDICINE

## 2023-04-10 PROCEDURE — 1160F RVW MEDS BY RX/DR IN RCRD: CPT | Mod: CPTII,S$GLB,, | Performed by: INTERNAL MEDICINE

## 2023-04-10 PROCEDURE — 4010F PR ACE/ARB THEARPY RXD/TAKEN: ICD-10-PCS | Mod: CPTII,S$GLB,, | Performed by: INTERNAL MEDICINE

## 2023-04-10 PROCEDURE — 3078F PR MOST RECENT DIASTOLIC BLOOD PRESSURE < 80 MM HG: ICD-10-PCS | Mod: CPTII,S$GLB,, | Performed by: INTERNAL MEDICINE

## 2023-04-10 PROCEDURE — 80061 LIPID PANEL: CPT | Performed by: INTERNAL MEDICINE

## 2023-04-10 PROCEDURE — 3008F BODY MASS INDEX DOCD: CPT | Mod: CPTII,S$GLB,, | Performed by: INTERNAL MEDICINE

## 2023-04-10 PROCEDURE — 36415 COLL VENOUS BLD VENIPUNCTURE: CPT | Mod: PO | Performed by: INTERNAL MEDICINE

## 2023-04-10 PROCEDURE — 82607 VITAMIN B-12: CPT | Performed by: INTERNAL MEDICINE

## 2023-04-10 PROCEDURE — 99999 PR PBB SHADOW E&M-EST. PATIENT-LVL V: CPT | Mod: PBBFAC,,, | Performed by: INTERNAL MEDICINE

## 2023-04-10 PROCEDURE — 1157F ADVNC CARE PLAN IN RCRD: CPT | Mod: CPTII,S$GLB,, | Performed by: INTERNAL MEDICINE

## 2023-04-10 PROCEDURE — 3074F SYST BP LT 130 MM HG: CPT | Mod: CPTII,S$GLB,, | Performed by: INTERNAL MEDICINE

## 2023-04-10 PROCEDURE — 3044F HG A1C LEVEL LT 7.0%: CPT | Mod: CPTII,S$GLB,, | Performed by: INTERNAL MEDICINE

## 2023-04-10 PROCEDURE — 82330 ASSAY OF CALCIUM: CPT | Performed by: INTERNAL MEDICINE

## 2023-04-10 PROCEDURE — 3044F PR MOST RECENT HEMOGLOBIN A1C LEVEL <7.0%: ICD-10-PCS | Mod: CPTII,S$GLB,, | Performed by: INTERNAL MEDICINE

## 2023-04-10 PROCEDURE — 82746 ASSAY OF FOLIC ACID SERUM: CPT | Performed by: INTERNAL MEDICINE

## 2023-04-10 PROCEDURE — 4010F ACE/ARB THERAPY RXD/TAKEN: CPT | Mod: CPTII,S$GLB,, | Performed by: INTERNAL MEDICINE

## 2023-04-10 PROCEDURE — 80053 COMPREHEN METABOLIC PANEL: CPT | Performed by: INTERNAL MEDICINE

## 2023-04-10 PROCEDURE — 3074F PR MOST RECENT SYSTOLIC BLOOD PRESSURE < 130 MM HG: ICD-10-PCS | Mod: CPTII,S$GLB,, | Performed by: INTERNAL MEDICINE

## 2023-04-10 PROCEDURE — 82150 ASSAY OF AMYLASE: CPT | Performed by: INTERNAL MEDICINE

## 2023-04-10 PROCEDURE — 3288F PR FALLS RISK ASSESSMENT DOCUMENTED: ICD-10-PCS | Mod: CPTII,S$GLB,, | Performed by: INTERNAL MEDICINE

## 2023-04-10 PROCEDURE — 3078F DIAST BP <80 MM HG: CPT | Mod: CPTII,S$GLB,, | Performed by: INTERNAL MEDICINE

## 2023-04-10 PROCEDURE — 1101F PR PT FALLS ASSESS DOC 0-1 FALLS W/OUT INJ PAST YR: ICD-10-PCS | Mod: CPTII,S$GLB,, | Performed by: INTERNAL MEDICINE

## 2023-04-10 PROCEDURE — 99999 PR PBB SHADOW E&M-EST. PATIENT-LVL V: ICD-10-PCS | Mod: PBBFAC,,, | Performed by: INTERNAL MEDICINE

## 2023-04-10 PROCEDURE — 82670 ASSAY OF TOTAL ESTRADIOL: CPT | Performed by: INTERNAL MEDICINE

## 2023-04-10 PROCEDURE — 1159F PR MEDICATION LIST DOCUMENTED IN MEDICAL RECORD: ICD-10-PCS | Mod: CPTII,S$GLB,, | Performed by: INTERNAL MEDICINE

## 2023-04-10 NOTE — PROGRESS NOTES
Subjective:      Patient ID: Sadiq Dhillon is a 73 y.o. male.    Chief Complaint:  Diabetes         Patient is a 72 yr old gentleman with prior history of prolactinoma seen in [facility] today.    History of Present Illness    The patient is a 72 yr old gentleman seen in [facility] today on account of prolactinoma and presumed pancreatic cyst.  He had previously been seen at the [facility] neuroendocrine center. 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 He also had a background history of primary parathyroidectomy for which he had a prior parathyroidectomy and a pancreatic neoplasm. He in addition has secondary hypogonadism, type 2 diabetes, hyperlipidemia, CAD and Fuch's corneal dystrophy. There was a lingering concern that he may have MEN-1 but the screening tests obtained for that were -ve.  He had apparently been previously managed and ffed here in [place] with Dr Dr Bree Camacho prior to her move to [place].  He remains on Dostinex for the prolactinoma;  0.5mg 2 x weekly.  For his diabetes his most recent HBA1c from 01/23 was 6.3 indicating excellent glycemic control at that time. His current antidiabetic regimen consistent of metformin 1 g BID.  His background comorbidities are detailed below;     1. Pituitary adenoma      2. Prolactinoma      3. Primary hyperparathyroidism      4. Fuchs' corneal dystrophy      5. Type 2 diabetes mellitus with hyperglycemia, without long-term current use of insulin      6. Hypogonadism in male      7. Nephrolithiasis      8. Unspecified essential hypertension      9. Pituitary macroadenoma      10. Hyperlipidemia, unspecified hyperlipidemia type      11. Hypercholesterolemia         Patients baseline [place] score is 7.  Patient is being seen once every 2 years. Patients is being ffed by a gastroenterologist; Dr Post.  There is no family history of pituitary disease.  Patient sister does have nephrolithiasis.   There is no family history of any adrenal, pancreatic or renal masses.  No family  history of pancreatic cancer.  Screening for any pancreatic related tumor markers was unremarkable.  There is no family or personal history of severe dyspepsia.  Patient does not have persistent headaches. He does not  And visual blurring not lateral vision deficits.     Patient does not smoke.  Patient drinks ~ 7  Weekly; mainly wine and cock tails.     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  Patients screening DEXA from 10/20 was normal.  Patient had a recent fall ~ 3 weeks ago and had right side fractures.   Patients most recent pituitary MRI from 11/2020 showed macroadenoma with extension into suprasellar cistern, posteriorly into the retroclival location and with infindibulin deviation and optic chiasm tethering.  He is being ffed by ophthalmology on this account. His MEN-1 genetic testing was unremarkable.  Patient denies any headaches, no polyuria and no seizures.  He is currently having a recent kidney stones that his currently passing.  He has had at least two other episodes.         Review of Systems   Constitutional:  Negative for activity change, appetite change, diaphoresis, fatigue and unexpected weight change.   HENT:  Negative for facial swelling, hearing loss, sore throat, trouble swallowing and voice change.    Eyes:  Negative for photophobia and visual disturbance.   Respiratory:  Negative for cough and shortness of breath.    Cardiovascular:  Negative for chest pain, palpitations and leg swelling.   Gastrointestinal:  Positive for diarrhea (occasional; apparently temporally related to metformin use.). Negative for abdominal distention, abdominal pain, constipation, nausea and vomiting.   Endocrine: Negative for polyphagia and polyuria.   Genitourinary:  Negative for dysuria, flank pain, frequency, hematuria and urgency.   Musculoskeletal:  Negative for arthralgias, back pain, gait problem, joint swelling, myalgias and neck pain.   Skin:  Negative for color change, pallor and  "rash.   Neurological:  Negative for dizziness, tremors, seizures, syncope, light-headedness, numbness and headaches.   Hematological:  Does not bruise/bleed easily.   Psychiatric/Behavioral:  Negative for agitation, confusion, dysphoric mood and sleep disturbance. The patient is not nervous/anxious and is not hyperactive.      Objective: /78 (BP Location: Left arm, Patient Position: Sitting, BP Method: Medium (Manual))   Pulse (!) 51   Temp 98.1 °F (36.7 °C) (Oral)   Ht 5' 9" (1.753 m)   Wt 83.3 kg (183 lb 8.5 oz)   SpO2 96%   BMI 27.10 kg/m²  Body surface area is 2.01 meters squared. /78 (BP Location: Left arm, Patient Position: Sitting, BP Method: Medium (Manual))   Pulse (!) 51   Temp 98.1 °F (36.7 °C) (Oral)   Ht 5' 9" (1.753 m)   Wt 83.3 kg (183 lb 8.5 oz)   SpO2 96%   BMI 27.10 kg/m²  BP; 110/66             Physical Exam  Vitals reviewed.   Constitutional:       Appearance: Normal appearance.      Comments: Pleasant gentleman who looks significantly younger than his stated chronologic age. Not pale, anicteric and afebrile. Well hydrated.   HENT:      Head: Normocephalic and atraumatic.   Eyes:      General: No scleral icterus.     Conjunctiva/sclera: Conjunctivae normal.      Pupils: Pupils are equal, round, and reactive to light.   Neck:      Vascular: No carotid bruit.   Cardiovascular:      Rate and Rhythm: Normal rate and regular rhythm.      Pulses: Normal pulses.      Heart sounds: No murmur heard.  Pulmonary:      Effort: No respiratory distress.      Breath sounds: Normal breath sounds. No stridor. No wheezing or rhonchi.   Abdominal:      General: Abdomen is flat. There is no distension.      Tenderness: There is no abdominal tenderness.   Musculoskeletal:         General: No swelling. Normal range of motion.      Cervical back: Neck supple. No rigidity.   Lymphadenopathy:      Cervical: No cervical adenopathy.   Skin:     General: Skin is warm and dry.      Coloration: Skin is " not jaundiced or pale.      Findings: No bruising.   Neurological:      General: No focal deficit present.      Mental Status: He is alert.      Cranial Nerves: No cranial nerve deficit.      Sensory: No sensory deficit.      Gait: Gait normal.   Psychiatric:         Mood and Affect: Mood normal.         Behavior: Behavior normal.         Thought Content: Thought content normal.         Judgment: Judgment normal.       Lab Review:      Latest Reference Range & Units 06/27/22 15:34 08/23/22 13:31 08/23/22 14:05 09/13/22 15:21 01/06/23 10:11 02/10/23 14:43 02/10/23 14:56 02/10/23 15:27 02/10/23 15:29 04/06/23 23:45 04/07/23 00:47   WBC 3.90 - 12.70 K/uL          11.83    RBC 4.60 - 6.20 M/uL          5.22    Hemoglobin 14.0 - 18.0 g/dL          15.6    Hematocrit 40.0 - 54.0 %          46.5    MCV 82 - 98 fL          89    MCH 27.0 - 31.0 pg          29.9    MCHC 32.0 - 36.0 g/dL          33.5    RDW 11.5 - 14.5 %          12.4    Platelets 150 - 450 K/uL          204    MPV 9.2 - 12.9 fL          8.9 (L)    Gran % 38.0 - 73.0 %          82.4 (H)    Lymph % 18.0 - 48.0 %          9.9 (L)    Mono % 4.0 - 15.0 %          5.7    Eosinophil % 0.0 - 8.0 %          1.2    Basophil % 0.0 - 1.9 %          0.3    Immature Granulocytes 0.0 - 0.5 %          0.5    Gran # (ANC) 1.8 - 7.7 K/uL          9.8 (H)    Lymph # 1.0 - 4.8 K/uL          1.2    Mono # 0.3 - 1.0 K/uL          0.7    Eos # 0.0 - 0.5 K/uL          0.1    Baso # 0.00 - 0.20 K/uL          0.04    Immature Grans (Abs) 0.00 - 0.04 K/uL          0.06 (H)    nRBC 0 /100 WBC          0    Differential Method           Automated    Sodium 136 - 145 mmol/L 139         136    Potassium 3.5 - 5.1 mmol/L 4.4         4.1    Chloride 95 - 110 mmol/L 102         103    CO2 23 - 29 mmol/L 26         20 (L)    Anion Gap 8 - 16 mmol/L 11         13    BUN 8 - 23 mg/dL 14         15    Creatinine 0.5 - 1.4 mg/dL 0.8         1.0    eGFR >60 mL/min/1.73 m^2          >60    eGFR if  non African American >60 mL/min/1.73 m^2 >60.0             eGFR if African American >60 mL/min/1.73 m^2 >60.0             Glucose 70 - 110 mg/dL 95         151 (H)    Calcium 8.7 - 10.5 mg/dL 10.0         9.2    Alkaline Phosphatase 55 - 135 U/L 54 (L)         77    PROTEIN TOTAL 6.0 - 8.4 g/dL 6.7         6.9    Albumin 3.5 - 5.2 g/dL 4.5         4.3    Albumin 3.6 - 5.1 g/dL    4.8          BILIRUBIN TOTAL 0.1 - 1.0 mg/dL 0.7         0.7    AST 10 - 40 U/L 20         20    ALT 10 - 44 U/L 15         21    Amylase 20 - 110 U/L    205 (H)          Lipase 4 - 60 U/L    233 (H)      30    Cholesterol 120 - 199 mg/dL  120 - 199 mg/dL    132 105 (L)  105 (L)         HDL 40 - 75 mg/dL  40 - 75 mg/dL    45 34 (L)  34 (L)         HDL/Cholesterol Ratio 20.0 - 50.0 %  20.0 - 50.0 %    34.1 32.4  32.4         LDL Cholesterol External 63.0 - 159.0 mg/dL  63.0 - 159.0 mg/dL    58.8 (L) 47.2 (L)  47.2 (L)         Non-HDL Cholesterol mg/dL  mg/dL    87 71  71         Total Cholesterol/HDL Ratio 2.0 - 5.0   2.0 - 5.0     2.9 3.1  3.1         Triglycerides 30 - 150 mg/dL  30 - 150 mg/dL    141 119  119         Cortisol ug/dL    6.10          Hemoglobin A1C External 4.0 - 5.6 %    6.1 (H) 6.3 (H)         Estimated Avg Glucose 68 - 131 mg/dL    128 134 (H)         GlycoMark (TM) ug/mL    14.2          ACTH 0 - 46 pg/mL    42          Chromogranin A <93 ng/mL    61          Estradiol 11 - 44 pg/mL    <10 !          Estrogen pg/mL    82          Sex Hormone Binding Globulin 22 - 77 nmol/L    20 (L)          Testosterone 250 - 1100 ng/dL    853          Testosterone, Bioavailable 15.0 - 150.0 ng/dL    403.2 (H)          Testosterone, Free 6.0 - 73.0 pg/mL    184.3 (H)          Specimen UA            Urine, Clean Catch   Color, UA Yellow, Straw, Natali            Yellow   Appearance, UA Clear            Clear   Specific Gravity, UA 1.005 - 1.030            1.030   pH, UA 5.0 - 8.0            6.0   Protein, UA Negative            Trace !    Glucose, UA Negative            Negative   Ketones, UA Negative            Negative   Occult Blood UA Negative            Trace !   NITRITE UA Negative            Positive !   UROBILINOGEN UA <2.0 EU/dL           Negative   Bilirubin (UA) Negative            Negative   Leukocytes, UA Negative            2+ !   RBC, UA 0 - 4 /hpf           7 (H)   WBC, UA 0 - 5 /hpf           35 (H)   Bacteria, UA None-Occ /hpf           Many !   Hyaline Casts, UA 0-1/lpf /lpf           28 !   Microscopic Comment            SEE COMMENT   CULTURE, URINE    Rpt     Rpt   Rpt !   POC Molecular Influenza A Ag Negative, Not Reported          Negative     POC Molecular Influenza B Ag Negative, Not Reported          Negative      Acceptable        Yes  Yes     pH, UA 5 - 8   5.5    5.5        POC Blood, Urine Negative   Negative    Negative        POC Bilirubin, Urine Negative   Negative    Negative        POC Urobilinogen, Urine 0.3 - 2.2   normal    norm        POC Ketones, Urine Negative   Negative    Negative        POC Protein, Urine Negative   Negative    Negative        POC Nitrates, Urine Negative   Negative    Negative        POC Glucose, Urine Negative   Negative    Negative        POC Specific Gravity, Urine 1.003 - 1.029   1.020    1.015        POC Leukocytes, Urine Negative   Negative    Negative        FRUCTOSAMINE SeeBelow umol /L    319          (L): Data is abnormally low  (H): Data is abnormally high  !: Data is abnormal  Rpt: View report in Results Review for more information    Assessment:     1. Type 2 diabetes mellitus with hyperglycemia, without long-term current use of insulin  CBC Auto Differential    Comprehensive Metabolic Panel    Uric Acid    Microalbumin/Creatinine Ratio, Urine    Amylase    Lipase    Misc Sendout Test, Blood Serum Elastase (aka Serum Pancreatopeptidase)    Lipid Panel      2. Prolactinoma  Prolactin    Macroprolactin, Serum    Chromogranin A    Alpha Sub Unit    Testosterone  Panel    Estradiol    Estrogens, Total      3. Primary hyperparathyroidism        4. Pituitary macroadenoma  Prolactin    Macroprolactin, Serum    Chromogranin A    Alpha Sub Unit    Testosterone Panel    Estradiol    Estrogens, Total      5. Hypovitaminosis D  CBC Auto Differential    Calcium, Ionized    PTH, Intact    Vitamin D      6. Pancreas cyst        7. Gastroesophageal reflux disease with esophagitis without hemorrhage  CBC Auto Differential      8. History of pancreatitis        9. Nephrolithiasis        10. Hyperlipidemia, mixed  Lipid Panel      11. Hypercholesterolemia  Lipid Panel      12. Coronary artery disease involving native coronary artery of native heart without angina pectoris        13. Unspecified essential hypertension        14. Fuchs' corneal dystrophy, unspecified laterality        15. Benign prostatic hyperplasia, unspecified whether lower urinary tract symptoms present  PSA, Total and Free      16. Nutritional anemia  Vitamin B12    Vitamin B12 Deficiency Panel    Folate               Patient with pituitary macroadenoma; to check prolactin levels currently and obtain ffup adeno and neurohypophyseal screening labs testing for current functional status as detailed above. For ffup  Pituitary MRI.  Regarding prolactinoma; to continue dostinex as before.  Regarding hyperparathyroidism; this required prior parathyroidectomy the details of which are unclear. . In view of his history of pituitary and parathyroid disease as well as pancreatic cystic mass disease we obtained MEN-1 genetic testing which was however unrevealing.  Regarding hypogonadism; to continue topical androgen repletion and will check current androgen and other relevant repro hormone profile. To also check current PSA and HCT.  Regarding pancreatic  Cystic mass; to obtain ffup abdomen CT, obtain basic tumor marker screening which will be ffed serially as required.  Regarding type 2 diabetes; to continue low dose metformin as  before and check current HBA1c. For now no management changes.  Regarding essential hypertension; BP presently well controlled. To continue present antihypertesnive regimen and to continue serial ambulatory BP and pulse rate tracking.  Regarding hyperlipidemia; to continue present antilipidemic therapy and to check current lipid profile as well as low dose asa for primary ASCVD prophylaxis.  Regarding possible hypovitaminosis D; to check 25 OH Vit D levels and replete as needed.  Regarding recurrent uroithiasis; discussed with the strategies for reducing his risk for future stones and provided him documents on renal stones reducing diet plan. Encouraged to increase his daily free water intake to at least ~ 70 fl oz and continue ongoing ffup with his urologist. Also advised to place strainer in bathroom to see if he can capture urine gravel or a definitive stone to enable stone analysis.        Plan:       FFup in ~ 4mths.

## 2023-04-11 LAB
25(OH)D3+25(OH)D2 SERPL-MCNC: 47 NG/ML (ref 30–96)
ALBUMIN SERPL BCP-MCNC: 4.3 G/DL (ref 3.5–5.2)
ALP SERPL-CCNC: 77 U/L (ref 55–135)
ALT SERPL W/O P-5'-P-CCNC: 45 U/L (ref 10–44)
AMYLASE SERPL-CCNC: 214 U/L (ref 20–110)
ANION GAP SERPL CALC-SCNC: 12 MMOL/L (ref 8–16)
AST SERPL-CCNC: 33 U/L (ref 10–40)
BILIRUB SERPL-MCNC: 0.6 MG/DL (ref 0.1–1)
BUN SERPL-MCNC: 18 MG/DL (ref 8–23)
CA-I BLDV-SCNC: 1.24 MMOL/L (ref 1.06–1.42)
CALCIUM SERPL-MCNC: 9.9 MG/DL (ref 8.7–10.5)
CHLORIDE SERPL-SCNC: 102 MMOL/L (ref 95–110)
CHOLEST SERPL-MCNC: 146 MG/DL (ref 120–199)
CHOLEST/HDLC SERPL: 3.5 {RATIO} (ref 2–5)
CO2 SERPL-SCNC: 24 MMOL/L (ref 23–29)
CREAT SERPL-MCNC: 0.9 MG/DL (ref 0.5–1.4)
EST. GFR  (NO RACE VARIABLE): >60 ML/MIN/1.73 M^2
ESTRADIOL SERPL-MCNC: <10 PG/ML (ref 11–44)
FOLATE SERPL-MCNC: 9.7 NG/ML (ref 4–24)
GLUCOSE SERPL-MCNC: 108 MG/DL (ref 70–110)
HDLC SERPL-MCNC: 42 MG/DL (ref 40–75)
HDLC SERPL: 28.8 % (ref 20–50)
LDLC SERPL CALC-MCNC: 81.8 MG/DL (ref 63–159)
LIPASE SERPL-CCNC: 206 U/L (ref 4–60)
NONHDLC SERPL-MCNC: 104 MG/DL
POTASSIUM SERPL-SCNC: 4.4 MMOL/L (ref 3.5–5.1)
PROLACTIN SERPL IA-MCNC: 2.2 NG/ML (ref 3.5–19.4)
PROSTATE SPECIFIC ANTIGEN, TOTAL: 2.4 NG/ML (ref 0–4)
PROT SERPL-MCNC: 6.9 G/DL (ref 6–8.4)
PSA FREE MFR SERPL: 27.08 %
PSA FREE SERPL-MCNC: 0.65 NG/ML (ref 0–1.5)
PTH-INTACT SERPL-MCNC: 46.6 PG/ML (ref 9–77)
SODIUM SERPL-SCNC: 138 MMOL/L (ref 136–145)
TRIGL SERPL-MCNC: 111 MG/DL (ref 30–150)
URATE SERPL-MCNC: 6.9 MG/DL (ref 3.4–7)
VIT B12 SERPL-MCNC: 491 PG/ML (ref 210–950)

## 2023-04-12 ENCOUNTER — OFFICE VISIT (OUTPATIENT)
Dept: UROLOGY | Facility: CLINIC | Age: 73
End: 2023-04-12
Payer: MEDICARE

## 2023-04-12 ENCOUNTER — HOSPITAL ENCOUNTER (OUTPATIENT)
Dept: RADIOLOGY | Facility: HOSPITAL | Age: 73
Discharge: HOME | End: 2023-04-12
Attending: UROLOGY
Payer: COMMERCIAL

## 2023-04-12 VITALS
BODY MASS INDEX: 27.11 KG/M2 | WEIGHT: 183 LBS | SYSTOLIC BLOOD PRESSURE: 117 MMHG | DIASTOLIC BLOOD PRESSURE: 70 MMHG | HEART RATE: 63 BPM | HEIGHT: 69 IN

## 2023-04-12 DIAGNOSIS — N20.0 STONE IN KIDNEY: Primary | ICD-10-CM

## 2023-04-12 DIAGNOSIS — N20.0 RENAL CALCULUS, RIGHT: ICD-10-CM

## 2023-04-12 DIAGNOSIS — N20.1 URETERAL CALCULUS: ICD-10-CM

## 2023-04-12 LAB — VIT B12 SERPL-MCNC: 373 NG/L (ref 180–914)

## 2023-04-12 PROCEDURE — 74018 RADEX ABDOMEN 1 VIEW: CPT | Mod: TC

## 2023-04-12 PROCEDURE — 3008F BODY MASS INDEX DOCD: CPT | Mod: CPTII,S$GLB,, | Performed by: UROLOGY

## 2023-04-12 PROCEDURE — 3288F PR FALLS RISK ASSESSMENT DOCUMENTED: ICD-10-PCS | Mod: CPTII,S$GLB,, | Performed by: UROLOGY

## 2023-04-12 PROCEDURE — 1157F PR ADVANCE CARE PLAN OR EQUIV PRESENT IN MEDICAL RECORD: ICD-10-PCS | Mod: CPTII,S$GLB,, | Performed by: UROLOGY

## 2023-04-12 PROCEDURE — 3044F PR MOST RECENT HEMOGLOBIN A1C LEVEL <7.0%: ICD-10-PCS | Mod: CPTII,S$GLB,, | Performed by: UROLOGY

## 2023-04-12 PROCEDURE — 99999 PR PBB SHADOW E&M-EST. PATIENT-LVL IV: CPT | Mod: PBBFAC,,, | Performed by: UROLOGY

## 2023-04-12 PROCEDURE — 1126F AMNT PAIN NOTED NONE PRSNT: CPT | Mod: CPTII,S$GLB,, | Performed by: UROLOGY

## 2023-04-12 PROCEDURE — 1101F PR PT FALLS ASSESS DOC 0-1 FALLS W/OUT INJ PAST YR: ICD-10-PCS | Mod: CPTII,S$GLB,, | Performed by: UROLOGY

## 2023-04-12 PROCEDURE — 1159F PR MEDICATION LIST DOCUMENTED IN MEDICAL RECORD: ICD-10-PCS | Mod: CPTII,S$GLB,, | Performed by: UROLOGY

## 2023-04-12 PROCEDURE — 3078F DIAST BP <80 MM HG: CPT | Mod: CPTII,S$GLB,, | Performed by: UROLOGY

## 2023-04-12 PROCEDURE — 99214 PR OFFICE/OUTPT VISIT, EST, LEVL IV, 30-39 MIN: ICD-10-PCS | Mod: S$GLB,,, | Performed by: UROLOGY

## 2023-04-12 PROCEDURE — 3074F SYST BP LT 130 MM HG: CPT | Mod: CPTII,S$GLB,, | Performed by: UROLOGY

## 2023-04-12 PROCEDURE — 3044F HG A1C LEVEL LT 7.0%: CPT | Mod: CPTII,S$GLB,, | Performed by: UROLOGY

## 2023-04-12 PROCEDURE — 3066F PR DOCUMENTATION OF TREATMENT FOR NEPHROPATHY: ICD-10-PCS | Mod: CPTII,S$GLB,, | Performed by: UROLOGY

## 2023-04-12 PROCEDURE — 3008F PR BODY MASS INDEX (BMI) DOCUMENTED: ICD-10-PCS | Mod: CPTII,S$GLB,, | Performed by: UROLOGY

## 2023-04-12 PROCEDURE — 3288F FALL RISK ASSESSMENT DOCD: CPT | Mod: CPTII,S$GLB,, | Performed by: UROLOGY

## 2023-04-12 PROCEDURE — 3066F NEPHROPATHY DOC TX: CPT | Mod: CPTII,S$GLB,, | Performed by: UROLOGY

## 2023-04-12 PROCEDURE — 3078F PR MOST RECENT DIASTOLIC BLOOD PRESSURE < 80 MM HG: ICD-10-PCS | Mod: CPTII,S$GLB,, | Performed by: UROLOGY

## 2023-04-12 PROCEDURE — 1126F PR PAIN SEVERITY QUANTIFIED, NO PAIN PRESENT: ICD-10-PCS | Mod: CPTII,S$GLB,, | Performed by: UROLOGY

## 2023-04-12 PROCEDURE — 4010F PR ACE/ARB THEARPY RXD/TAKEN: ICD-10-PCS | Mod: CPTII,S$GLB,, | Performed by: UROLOGY

## 2023-04-12 PROCEDURE — 99999 PR PBB SHADOW E&M-EST. PATIENT-LVL IV: ICD-10-PCS | Mod: PBBFAC,,, | Performed by: UROLOGY

## 2023-04-12 PROCEDURE — 3061F NEG MICROALBUMINURIA REV: CPT | Mod: CPTII,S$GLB,, | Performed by: UROLOGY

## 2023-04-12 PROCEDURE — 1157F ADVNC CARE PLAN IN RCRD: CPT | Mod: CPTII,S$GLB,, | Performed by: UROLOGY

## 2023-04-12 PROCEDURE — 3061F PR NEG MICROALBUMINURIA RESULT DOCUMENTED/REVIEW: ICD-10-PCS | Mod: CPTII,S$GLB,, | Performed by: UROLOGY

## 2023-04-12 PROCEDURE — 74018 XR ABDOMEN AP 1 VIEW: ICD-10-PCS | Mod: 26,,, | Performed by: RADIOLOGY

## 2023-04-12 PROCEDURE — 3074F PR MOST RECENT SYSTOLIC BLOOD PRESSURE < 130 MM HG: ICD-10-PCS | Mod: CPTII,S$GLB,, | Performed by: UROLOGY

## 2023-04-12 PROCEDURE — 1159F MED LIST DOCD IN RCRD: CPT | Mod: CPTII,S$GLB,, | Performed by: UROLOGY

## 2023-04-12 PROCEDURE — 1160F PR REVIEW ALL MEDS BY PRESCRIBER/CLIN PHARMACIST DOCUMENTED: ICD-10-PCS | Mod: CPTII,S$GLB,, | Performed by: UROLOGY

## 2023-04-12 PROCEDURE — 4010F ACE/ARB THERAPY RXD/TAKEN: CPT | Mod: CPTII,S$GLB,, | Performed by: UROLOGY

## 2023-04-12 PROCEDURE — 1101F PT FALLS ASSESS-DOCD LE1/YR: CPT | Mod: CPTII,S$GLB,, | Performed by: UROLOGY

## 2023-04-12 PROCEDURE — 1160F RVW MEDS BY RX/DR IN RCRD: CPT | Mod: CPTII,S$GLB,, | Performed by: UROLOGY

## 2023-04-12 PROCEDURE — 99214 OFFICE O/P EST MOD 30 MIN: CPT | Mod: S$GLB,,, | Performed by: UROLOGY

## 2023-04-12 PROCEDURE — 74018 RADEX ABDOMEN 1 VIEW: CPT | Mod: 26,,, | Performed by: RADIOLOGY

## 2023-04-12 RX ORDER — KETOROLAC TROMETHAMINE 10 MG/1
10 TABLET, FILM COATED ORAL EVERY 6 HOURS
Qty: 15 TABLET | Refills: 0 | Status: SHIPPED | OUTPATIENT
Start: 2023-04-12 | End: 2024-01-30

## 2023-04-12 NOTE — PROGRESS NOTES
Subjective:       Patient ID: Sadiq Dhillon is a 73 y.o. male.    Chief Complaint: Nephrolithiasis (Kub today)    HPI patient is here with right flank pain CT scan revealed a 4-5 mm stone but on KUB he has a right-sided 2-3 mm stone between L3 and L4.  He is not having any pain right now no fever chills nausea vomiting his urinalysis is negative    Past Medical History:   Diagnosis Date    Diabetes mellitus     Fuchs' corneal dystrophy     Heart attack     Hypertension     Kidney stones     Pancreatic mass     Pancreatitis     after EUS . 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, treated with dostenex(cabergoline)/ resolved    Reflux esophagitis     Tumor cells, benign        Past Surgical History:   Procedure Laterality Date    CATARACT EXTRACTION W/  INTRAOCULAR LENS IMPLANT Right 0    Dr Van     CATARACT EXTRACTION W/  INTRAOCULAR LENS IMPLANT Left 3/21/2019    Procedure: EXTRACTION, CATARACT, WITH IOL INSERTION;  Surgeon: Ra Van MD;  Location: [facility];  Service: Ophthalmology;  Laterality: Left;    CORNEAL TRANSPLANT Right     Dr Van     DESCEMETS STRIPPING AUTOMATED ENDOTHELIAL KERATOPLASTY Left 3/21/2019    Procedure: TRANSPLANT, PARTIAL-THICKNESS, CORNEA, USING DSAEK TECHNIQUE;  Surgeon: Ra Van MD;  Location: [facility];  Service: Ophthalmology;  Laterality: Left;  DSEK    REPAIR OF RETINAL DETACHMENT WITH VITRECTOMY Right 7/18/2018    Procedure: REPAIR, RETINAL DETACHMENT, WITH VITRECTOMY;  Surgeon: SHUBHAM Patel MD;  [facility];  Service: Ophthalmology;  Laterality: Right;  40 min    REPAIR OF RETINAL DETACHMENT WITH VITRECTOMY Left 8/8/2018    Procedure: REPAIR, RETINAL DETACHMENT, WITH VITRECTOMY;  Surgeon: SHUBHAM Patel MD;  [facility];  Service: Ophthalmology;  Laterality: Left;  40 min     RHINOPLASTY TIP  1975    THYROIDECTOMY  2007    UPPER ENDOSCOPIC ULTRASOUND W/ FNA      with resultant pancreatitis       Family History   Problem Relation Age of Onset    Hypertension Mother     Heart disease Mother     Heart disease Father     Cataracts Father     Stroke Father     Diabetes Father     Diabetes Paternal Uncle     Stroke Maternal Grandmother     Blindness Neg Hx     Glaucoma Neg Hx     Macular degeneration Neg Hx     Retinal detachment Neg Hx     Strabismus Neg Hx     Thyroid disease Neg Hx     Cancer Neg Hx     Amblyopia Neg Hx        Social History     Socioeconomic History    Marital status: Single   Tobacco Use    Smoking status: Never     Passive exposure: Never    Smokeless tobacco: Never   Substance and Sexual Activity    Alcohol use: Yes     Comment: social    Drug use: No    Sexual activity: Not Currently       Allergies:  Grass pollen-june grass standard and House dust    Medications:    Current Outpatient Medications:     aspirin (ECOTRIN) 81 MG EC tablet, Take 81 mg by mouth every morning. , Disp: , Rfl:     atorvastatin (LIPITOR) 80 MG tablet, Take 1 tablet (80 mg total) by mouth once daily., Disp: 90 tablet, Rfl: 3    cabergoline (DOSTINEX) 0.5 mg tablet, TAKE 1 TABLET(0.5 MG) BY MOUTH 2 TIMES A WEEK, Disp: 24 tablet, Rfl: 3    chlorhexidine (PERIDEX) 0.12 % solution, SMARTSIG:By Mouth, Disp: , Rfl:     CONTOUR NEXT TEST STRIPS Strp, UTD QID IN VITRO, Disp: , Rfl: 3    famotidine (PEPCID) 20 MG tablet, Take by mouth., Disp: , Rfl:     fexofenadine-pseudoephedrine  mg (ALLEGRA-D)  mg per tablet, Take 1 tablet by mouth., Disp: , Rfl:     gabapentin (NEURONTIN) 100 MG capsule, Take 1 capsule three times a day,  Takes one capsule every morning, Disp: , Rfl: 3    HYDROcodone-acetaminophen (NORCO) 5-325 mg per tablet, Take 1 tablet by mouth every 4 (four) hours as needed for Pain., Disp: 15 tablet, Rfl: 0    lisinopril (PRINIVIL,ZESTRIL) 2.5 MG tablet, Take 2.5 mg by mouth  every morning. , Disp: , Rfl:     loperamide (IMODIUM) 2 mg capsule, Take 2 mg by mouth 4 (four) times daily as needed for Diarrhea., Disp: , Rfl:     metFORMIN (GLUCOPHAGE-XR) 500 MG ER 24hr tablet, TAKE 2 TABLETS BY MOUTH TWICE DAILY, Disp: 360 tablet, Rfl: 3    metoprolol succinate (TOPROL-XL) 25 MG 24 hr tablet, Take 1 tablet (25 mg total) by mouth nightly., Disp: 60 tablet, Rfl: 6    tamsulosin (FLOMAX) 0.4 mg Cap, TAKE 1 CAPSULE BY MOUTH EVERY NIGHT AT BEDTIME, Disp: 90 capsule, Rfl: 3    testosterone (ANDROGEL) 1 % (50 mg/5 gram) GlPk, Apply 2 packets topically once daily., Disp: 180 packet, Rfl: 3    traMADoL (ULTRAM) 50 mg tablet, Take 50 mg by mouth., Disp: , Rfl:     cephALEXin (KEFLEX) 500 MG capsule, Take 1 capsule (500 mg total) by mouth 2 (two) times a day. for 10 days (Patient not taking: Reported on 4/12/2023), Disp: 20 capsule, Rfl: 0    ketorolac (TORADOL) 10 mg tablet, Take 1 tablet (10 mg total) by mouth every 6 (six) hours., Disp: 15 tablet, Rfl: 0    tamsulosin (FLOMAX) 0.4 mg Cap, Take 1 capsule (0.4 mg total) by mouth once daily. (Patient not taking: Reported on 4/10/2023), Disp: 10 capsule, Rfl: 0    Review of Systems   Constitutional:  Negative for activity change, appetite change, chills, diaphoresis, fatigue, fever and unexpected weight change.   HENT:  Negative for congestion, dental problem, hearing loss, mouth sores, postnasal drip, rhinorrhea, sinus pressure and trouble swallowing.    Eyes:  Negative for pain, discharge and itching.   Respiratory:  Negative for apnea, cough, choking, chest tightness, shortness of breath and wheezing.    Cardiovascular:  Negative for chest pain, palpitations and leg swelling.   Gastrointestinal:  Negative for abdominal distention, abdominal pain, anal bleeding, blood in stool, constipation, diarrhea, nausea, rectal pain and vomiting.   Endocrine: Negative for polydipsia and polyuria.   Genitourinary:  Negative for decreased urine volume, difficulty  urinating, dysuria, enuresis, flank pain, frequency, genital sores, hematuria, penile discharge, penile pain, penile swelling, scrotal swelling, testicular pain and urgency.   Musculoskeletal:  Negative for arthralgias, back pain and myalgias.   Skin:  Negative for color change, rash and wound.   Neurological:  Negative for dizziness, syncope, speech difficulty, light-headedness and headaches.   Hematological:  Negative for adenopathy. Does not bruise/bleed easily.   Psychiatric/Behavioral:  Negative for behavioral problems, confusion, hallucinations and sleep disturbance.      Objective:      Physical Exam  Constitutional:       Appearance: He is well-developed.   HENT:      Head: Normocephalic.   Cardiovascular:      Rate and Rhythm: Normal rate.   Pulmonary:      Effort: Pulmonary effort is normal.   Abdominal:      Palpations: Abdomen is soft.   Skin:     General: Skin is warm.   Neurological:      Mental Status: He is alert.       Assessment:       1. Stone in kidney    2. Ureteral calculus        Plan:       Sadiq was seen today for nephrolithiasis.    Diagnoses and all orders for this visit:    Stone in kidney  -     POCT URINE DIPSTICK WITHOUT MICROSCOPE    Ureteral calculus    Other orders  -     ketorolac (TORADOL) 10 mg tablet; Take 1 tablet (10 mg total) by mouth every 6 (six) hours.    Patient will call if develops fever or chills.  His urine is negative.  He will return to clini possible ureteroscopy in the future c 1 week with a KUB or sooner p.r.n..  We discussed medical expulsive therapy which is what he would like to continue doing and

## 2023-04-13 LAB — ESTROGEN SERPL-MCNC: 44 PG/ML

## 2023-04-14 LAB — METHYLMALONATE SERPL-SCNC: 0.13 NMOL/ML

## 2023-04-15 LAB
ANNOTATION COMMENT IMP: ABNORMAL
MACROPROLACTIN SERPL-MCNC: 2 NG/ML (ref 2.7–13.1)
MACROPROLACTIN/PROLACTIN MFR SERPL: 9 %
PROLACTIN SERPL IA-MCNC: 2.2 NG/ML (ref 4–15.2)

## 2023-04-17 LAB — A-PGH SER-MCNC: <0.1 NG/ML

## 2023-04-18 ENCOUNTER — TELEPHONE (OUTPATIENT)
Dept: UROLOGY | Facility: CLINIC | Age: 73
End: 2023-04-18
Payer: COMMERCIAL

## 2023-04-18 LAB
CGA SERPL-MCNC: 67 NG/ML
MAYO MISCELLANEOUS RESULT (REF): NORMAL

## 2023-04-18 NOTE — TELEPHONE ENCOUNTER
----- Message from Dee Clark sent at 4/18/2023  8:26 AM CDT -----  Regarding: Reschedule Appointments  Contact: [phone]  Calling to reschedule appointments for Thursday anytime. Please call to confirm with patient.       non-verbal indicator of pain/discomfort not present

## 2023-04-19 LAB
ALBUMIN SERPL-MCNC: 4.7 G/DL (ref 3.6–5.1)
SHBG SERPL-SCNC: 23 NMOL/L (ref 22–77)
TESTOST FREE SERPL-MCNC: 1.8 PG/ML (ref 6–73)
TESTOST SERPL-MCNC: 12 NG/DL (ref 250–1100)
TESTOSTERONE.FREE+WB SERPL-MCNC: 3.8 NG/DL (ref 15–150)

## 2023-04-20 ENCOUNTER — HOSPITAL ENCOUNTER (OUTPATIENT)
Dept: RADIOLOGY | Facility: HOSPITAL | Age: 73
Discharge: HOME | End: 2023-04-20
Attending: UROLOGY
Payer: COMMERCIAL

## 2023-04-20 ENCOUNTER — OFFICE VISIT (OUTPATIENT)
Dept: UROLOGY | Facility: CLINIC | Age: 73
End: 2023-04-20
Payer: COMMERCIAL

## 2023-04-20 ENCOUNTER — PATIENT MESSAGE (OUTPATIENT)
Dept: ENDOCRINOLOGY | Facility: CLINIC | Age: 73
End: 2023-04-20
Payer: COMMERCIAL

## 2023-04-20 VITALS
HEIGHT: 69 IN | HEART RATE: 76 BPM | SYSTOLIC BLOOD PRESSURE: 122 MMHG | DIASTOLIC BLOOD PRESSURE: 76 MMHG | BODY MASS INDEX: 25.77 KG/M2 | WEIGHT: 174 LBS

## 2023-04-20 DIAGNOSIS — N20.0 RENAL CALCULUS, RIGHT: ICD-10-CM

## 2023-04-20 DIAGNOSIS — E29.1 HYPOGONADISM IN MALE: Primary | ICD-10-CM

## 2023-04-20 DIAGNOSIS — N20.1 URETERAL CALCULUS: Primary | ICD-10-CM

## 2023-04-20 PROCEDURE — 99999 PR PBB SHADOW E&M-EST. PATIENT-LVL IV: ICD-10-PCS | Mod: PBBFAC,,, | Performed by: UROLOGY

## 2023-04-20 PROCEDURE — 1159F MED LIST DOCD IN RCRD: CPT | Mod: CPTII,S$GLB,, | Performed by: UROLOGY

## 2023-04-20 PROCEDURE — 3288F PR FALLS RISK ASSESSMENT DOCUMENTED: ICD-10-PCS | Mod: CPTII,S$GLB,, | Performed by: UROLOGY

## 2023-04-20 PROCEDURE — 3074F PR MOST RECENT SYSTOLIC BLOOD PRESSURE < 130 MM HG: ICD-10-PCS | Mod: CPTII,S$GLB,, | Performed by: UROLOGY

## 2023-04-20 PROCEDURE — 1125F PR PAIN SEVERITY QUANTIFIED, PAIN PRESENT: ICD-10-PCS | Mod: CPTII,S$GLB,, | Performed by: UROLOGY

## 2023-04-20 PROCEDURE — 99213 OFFICE O/P EST LOW 20 MIN: CPT | Mod: S$GLB,,, | Performed by: UROLOGY

## 2023-04-20 PROCEDURE — 1159F PR MEDICATION LIST DOCUMENTED IN MEDICAL RECORD: ICD-10-PCS | Mod: CPTII,S$GLB,, | Performed by: UROLOGY

## 2023-04-20 PROCEDURE — 3044F PR MOST RECENT HEMOGLOBIN A1C LEVEL <7.0%: ICD-10-PCS | Mod: CPTII,S$GLB,, | Performed by: UROLOGY

## 2023-04-20 PROCEDURE — 1101F PT FALLS ASSESS-DOCD LE1/YR: CPT | Mod: CPTII,S$GLB,, | Performed by: UROLOGY

## 2023-04-20 PROCEDURE — 99999 PR PBB SHADOW E&M-EST. PATIENT-LVL IV: CPT | Mod: PBBFAC,,, | Performed by: UROLOGY

## 2023-04-20 PROCEDURE — 3078F PR MOST RECENT DIASTOLIC BLOOD PRESSURE < 80 MM HG: ICD-10-PCS | Mod: CPTII,S$GLB,, | Performed by: UROLOGY

## 2023-04-20 PROCEDURE — 3044F HG A1C LEVEL LT 7.0%: CPT | Mod: CPTII,S$GLB,, | Performed by: UROLOGY

## 2023-04-20 PROCEDURE — 1157F PR ADVANCE CARE PLAN OR EQUIV PRESENT IN MEDICAL RECORD: ICD-10-PCS | Mod: CPTII,S$GLB,, | Performed by: UROLOGY

## 2023-04-20 PROCEDURE — 3008F BODY MASS INDEX DOCD: CPT | Mod: CPTII,S$GLB,, | Performed by: UROLOGY

## 2023-04-20 PROCEDURE — 74018 RADEX ABDOMEN 1 VIEW: CPT | Mod: TC

## 2023-04-20 PROCEDURE — 3066F PR DOCUMENTATION OF TREATMENT FOR NEPHROPATHY: ICD-10-PCS | Mod: CPTII,S$GLB,, | Performed by: UROLOGY

## 2023-04-20 PROCEDURE — 74018 RADEX ABDOMEN 1 VIEW: CPT | Mod: 26,,, | Performed by: RADIOLOGY

## 2023-04-20 PROCEDURE — 1157F ADVNC CARE PLAN IN RCRD: CPT | Mod: CPTII,S$GLB,, | Performed by: UROLOGY

## 2023-04-20 PROCEDURE — 3008F PR BODY MASS INDEX (BMI) DOCUMENTED: ICD-10-PCS | Mod: CPTII,S$GLB,, | Performed by: UROLOGY

## 2023-04-20 PROCEDURE — 1160F RVW MEDS BY RX/DR IN RCRD: CPT | Mod: CPTII,S$GLB,, | Performed by: UROLOGY

## 2023-04-20 PROCEDURE — 3288F FALL RISK ASSESSMENT DOCD: CPT | Mod: CPTII,S$GLB,, | Performed by: UROLOGY

## 2023-04-20 PROCEDURE — 74018 XR ABDOMEN AP 1 VIEW: ICD-10-PCS | Mod: 26,,, | Performed by: RADIOLOGY

## 2023-04-20 PROCEDURE — 1125F AMNT PAIN NOTED PAIN PRSNT: CPT | Mod: CPTII,S$GLB,, | Performed by: UROLOGY

## 2023-04-20 PROCEDURE — 4010F ACE/ARB THERAPY RXD/TAKEN: CPT | Mod: CPTII,S$GLB,, | Performed by: UROLOGY

## 2023-04-20 PROCEDURE — 1101F PR PT FALLS ASSESS DOC 0-1 FALLS W/OUT INJ PAST YR: ICD-10-PCS | Mod: CPTII,S$GLB,, | Performed by: UROLOGY

## 2023-04-20 PROCEDURE — 4010F PR ACE/ARB THEARPY RXD/TAKEN: ICD-10-PCS | Mod: CPTII,S$GLB,, | Performed by: UROLOGY

## 2023-04-20 PROCEDURE — 1160F PR REVIEW ALL MEDS BY PRESCRIBER/CLIN PHARMACIST DOCUMENTED: ICD-10-PCS | Mod: CPTII,S$GLB,, | Performed by: UROLOGY

## 2023-04-20 PROCEDURE — 3061F PR NEG MICROALBUMINURIA RESULT DOCUMENTED/REVIEW: ICD-10-PCS | Mod: CPTII,S$GLB,, | Performed by: UROLOGY

## 2023-04-20 PROCEDURE — 3066F NEPHROPATHY DOC TX: CPT | Mod: CPTII,S$GLB,, | Performed by: UROLOGY

## 2023-04-20 PROCEDURE — 3078F DIAST BP <80 MM HG: CPT | Mod: CPTII,S$GLB,, | Performed by: UROLOGY

## 2023-04-20 PROCEDURE — 3074F SYST BP LT 130 MM HG: CPT | Mod: CPTII,S$GLB,, | Performed by: UROLOGY

## 2023-04-20 PROCEDURE — 3061F NEG MICROALBUMINURIA REV: CPT | Mod: CPTII,S$GLB,, | Performed by: UROLOGY

## 2023-04-20 PROCEDURE — 99213 PR OFFICE/OUTPT VISIT, EST, LEVL III, 20-29 MIN: ICD-10-PCS | Mod: S$GLB,,, | Performed by: UROLOGY

## 2023-04-20 NOTE — PROGRESS NOTES
Subjective:       Patient ID: Sadiq Dhillon is a 73 y.o. male.    Chief Complaint: kub review     HPI patient had a right mid 3rd ureteral stone.  It was clearly visible on today's KUB it is no longer visible.  He is not having any pain he feels well he had some gas pain earlier but it was less than 1/10 and he feels better urine has been negative    Past Medical History:   Diagnosis Date    Diabetes mellitus     Fuchs' corneal dystrophy     Heart attack     Hypertension     Kidney stones     Pancreatic mass     Pancreatitis     after EUS . 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, treated with dostenex(cabergoline)/ resolved    Reflux esophagitis     Tumor cells, benign        Past Surgical History:   Procedure Laterality Date    CATARACT EXTRACTION W/  INTRAOCULAR LENS IMPLANT Right 0    Dr Van     CATARACT EXTRACTION W/  INTRAOCULAR LENS IMPLANT Left 3/21/2019    Procedure: EXTRACTION, CATARACT, WITH IOL INSERTION;  Surgeon: Ra Van MD;  Location: [facility];  Service: Ophthalmology;  Laterality: Left;    CORNEAL TRANSPLANT Right     Dr Vna     DESCEMETS STRIPPING AUTOMATED ENDOTHELIAL KERATOPLASTY Left 3/21/2019    Procedure: TRANSPLANT, PARTIAL-THICKNESS, CORNEA, USING DSAEK TECHNIQUE;  Surgeon: Ra Van MD;  Location: [facility];  Service: Ophthalmology;  Laterality: Left;  DSEK    REPAIR OF RETINAL DETACHMENT WITH VITRECTOMY Right 7/18/2018    Procedure: REPAIR, RETINAL DETACHMENT, WITH VITRECTOMY;  Surgeon: SHUBHAM Patel MD;  Location: [address];  Service: Ophthalmology;  Laterality: Right;  40 min    REPAIR OF RETINAL DETACHMENT WITH VITRECTOMY Left 8/8/2018    Procedure: REPAIR, RETINAL DETACHMENT, WITH VITRECTOMY;  Surgeon: SHUBHAM Patel MD;  [facility];  Service: Ophthalmology;  Laterality: Left;  40 min     RHINOPLASTY TIP  1975    THYROIDECTOMY  2007    UPPER ENDOSCOPIC ULTRASOUND W/ FNA      with resultant pancreatitis       Family History   Problem Relation Age of Onset    Hypertension Mother     Heart disease Mother     Heart disease Father     Cataracts Father     Stroke Father     Diabetes Father     Diabetes Paternal Uncle     Stroke Maternal Grandmother     Blindness Neg Hx     Glaucoma Neg Hx     Macular degeneration Neg Hx     Retinal detachment Neg Hx     Strabismus Neg Hx     Thyroid disease Neg Hx     Cancer Neg Hx     Amblyopia Neg Hx        Social History     Socioeconomic History    Marital status: Single   Tobacco Use    Smoking status: Never     Passive exposure: Never    Smokeless tobacco: Never   Substance and Sexual Activity    Alcohol use: Yes     Comment: social    Drug use: No    Sexual activity: Not Currently       Allergies:  Grass pollen-june grass standard and House dust    Medications:    Current Outpatient Medications:     aspirin (ECOTRIN) 81 MG EC tablet, Take 81 mg by mouth every morning. , Disp: , Rfl:     atorvastatin (LIPITOR) 80 MG tablet, Take 1 tablet (80 mg total) by mouth once daily., Disp: 90 tablet, Rfl: 3    cabergoline (DOSTINEX) 0.5 mg tablet, TAKE 1 TABLET(0.5 MG) BY MOUTH 2 TIMES A WEEK, Disp: 24 tablet, Rfl: 3    chlorhexidine (PERIDEX) 0.12 % solution, SMARTSIG:By Mouth, Disp: , Rfl:     CONTOUR NEXT TEST STRIPS Strp, UTD QID IN VITRO, Disp: , Rfl: 3    famotidine (PEPCID) 20 MG tablet, Take by mouth., Disp: , Rfl:     fexofenadine-pseudoephedrine  mg (ALLEGRA-D)  mg per tablet, Take 1 tablet by mouth., Disp: , Rfl:     gabapentin (NEURONTIN) 100 MG capsule, Take 1 capsule three times a day,  Takes one capsule every morning, Disp: , Rfl: 3    HYDROcodone-acetaminophen (NORCO) 5-325 mg per tablet, Take 1 tablet by mouth every 4 (four) hours as needed for Pain., Disp: 15 tablet, Rfl: 0    ketorolac (TORADOL) 10 mg tablet, Take 1 tablet (10 mg total) by  mouth every 6 (six) hours., Disp: 15 tablet, Rfl: 0    lisinopril (PRINIVIL,ZESTRIL) 2.5 MG tablet, Take 2.5 mg by mouth every morning. , Disp: , Rfl:     loperamide (IMODIUM) 2 mg capsule, Take 2 mg by mouth 4 (four) times daily as needed for Diarrhea., Disp: , Rfl:     metFORMIN (GLUCOPHAGE-XR) 500 MG ER 24hr tablet, TAKE 2 TABLETS BY MOUTH TWICE DAILY, Disp: 360 tablet, Rfl: 3    metoprolol succinate (TOPROL-XL) 25 MG 24 hr tablet, Take 1 tablet (25 mg total) by mouth nightly., Disp: 60 tablet, Rfl: 6    tamsulosin (FLOMAX) 0.4 mg Cap, TAKE 1 CAPSULE BY MOUTH EVERY NIGHT AT BEDTIME, Disp: 90 capsule, Rfl: 3    tamsulosin (FLOMAX) 0.4 mg Cap, Take 1 capsule (0.4 mg total) by mouth once daily., Disp: 10 capsule, Rfl: 0    testosterone (ANDROGEL) 1 % (50 mg/5 gram) GlPk, Apply 2 packets topically once daily., Disp: 180 packet, Rfl: 3    traMADoL (ULTRAM) 50 mg tablet, Take 50 mg by mouth., Disp: , Rfl:     Review of Systems    Objective:      Physical Exam    Assessment:       1. Ureteral calculus        Plan:       Sadiq was seen today for kub review .    Diagnoses and all orders for this visit:    Ureteral calculus    Probable passed right ureteral calculus.  He will call me if he has more pain and I will get a CT scan otherwise hopefully he has passed the stone

## 2023-04-25 ENCOUNTER — PATIENT MESSAGE (OUTPATIENT)
Dept: OPHTHALMOLOGY | Facility: CLINIC | Age: 73
End: 2023-04-25
Payer: COMMERCIAL

## 2023-04-26 ENCOUNTER — LAB VISIT (OUTPATIENT)
Dept: LAB | Facility: HOSPITAL | Age: 73
End: 2023-04-26
Attending: INTERNAL MEDICINE
Payer: COMMERCIAL

## 2023-04-26 DIAGNOSIS — E29.1 HYPOGONADISM IN MALE: ICD-10-CM

## 2023-04-26 PROCEDURE — 84270 ASSAY OF SEX HORMONE GLOBUL: CPT | Performed by: INTERNAL MEDICINE

## 2023-04-26 PROCEDURE — 36415 COLL VENOUS BLD VENIPUNCTURE: CPT | Performed by: INTERNAL MEDICINE

## 2023-04-26 PROCEDURE — 84403 ASSAY OF TOTAL TESTOSTERONE: CPT | Performed by: INTERNAL MEDICINE

## 2023-05-03 LAB
ALBUMIN SERPL-MCNC: 4.5 G/DL (ref 3.6–5.1)
SHBG SERPL-SCNC: 22 NMOL/L (ref 22–77)
TESTOST FREE SERPL-MCNC: 16.1 PG/ML (ref 6–73)
TESTOST SERPL-MCNC: 100 NG/DL (ref 250–1100)
TESTOSTERONE.FREE+WB SERPL-MCNC: 33 NG/DL (ref 15–150)

## 2023-06-05 ENCOUNTER — TELEPHONE (OUTPATIENT)
Dept: ENDOCRINOLOGY | Facility: CLINIC | Age: 73
End: 2023-06-05
Payer: COMMERCIAL

## 2023-06-05 ENCOUNTER — OFFICE VISIT (OUTPATIENT)
Dept: OPTOMETRY | Facility: CLINIC | Age: 73
End: 2023-06-05
Payer: COMMERCIAL

## 2023-06-05 ENCOUNTER — PATIENT MESSAGE (OUTPATIENT)
Dept: OPTOMETRY | Facility: CLINIC | Age: 73
End: 2023-06-05

## 2023-06-05 DIAGNOSIS — H18.513 FUCHS' CORNEAL DYSTROPHY OF BOTH EYES: Primary | ICD-10-CM

## 2023-06-05 DIAGNOSIS — H52.13 MYOPIA OF BOTH EYES WITH ASTIGMATISM AND PRESBYOPIA: ICD-10-CM

## 2023-06-05 DIAGNOSIS — H52.203 MYOPIA OF BOTH EYES WITH ASTIGMATISM AND PRESBYOPIA: ICD-10-CM

## 2023-06-05 DIAGNOSIS — H52.4 MYOPIA OF BOTH EYES WITH ASTIGMATISM AND PRESBYOPIA: ICD-10-CM

## 2023-06-05 DIAGNOSIS — Z96.1 PSEUDOPHAKIA OF BOTH EYES: ICD-10-CM

## 2023-06-05 PROCEDURE — 3044F PR MOST RECENT HEMOGLOBIN A1C LEVEL <7.0%: ICD-10-PCS | Mod: CPTII,S$GLB,, | Performed by: OPTOMETRIST

## 2023-06-05 PROCEDURE — 92014 COMPRE OPH EXAM EST PT 1/>: CPT | Mod: S$GLB,,, | Performed by: OPTOMETRIST

## 2023-06-05 PROCEDURE — 1159F PR MEDICATION LIST DOCUMENTED IN MEDICAL RECORD: ICD-10-PCS | Mod: CPTII,S$GLB,, | Performed by: OPTOMETRIST

## 2023-06-05 PROCEDURE — 3288F FALL RISK ASSESSMENT DOCD: CPT | Mod: CPTII,S$GLB,, | Performed by: OPTOMETRIST

## 2023-06-05 PROCEDURE — 1126F AMNT PAIN NOTED NONE PRSNT: CPT | Mod: CPTII,S$GLB,, | Performed by: OPTOMETRIST

## 2023-06-05 PROCEDURE — 1101F PR PT FALLS ASSESS DOC 0-1 FALLS W/OUT INJ PAST YR: ICD-10-PCS | Mod: CPTII,S$GLB,, | Performed by: OPTOMETRIST

## 2023-06-05 PROCEDURE — 3061F NEG MICROALBUMINURIA REV: CPT | Mod: CPTII,S$GLB,, | Performed by: OPTOMETRIST

## 2023-06-05 PROCEDURE — 3044F HG A1C LEVEL LT 7.0%: CPT | Mod: CPTII,S$GLB,, | Performed by: OPTOMETRIST

## 2023-06-05 PROCEDURE — 1101F PT FALLS ASSESS-DOCD LE1/YR: CPT | Mod: CPTII,S$GLB,, | Performed by: OPTOMETRIST

## 2023-06-05 PROCEDURE — 99999 PR PBB SHADOW E&M-EST. PATIENT-LVL III: ICD-10-PCS | Mod: PBBFAC,,, | Performed by: OPTOMETRIST

## 2023-06-05 PROCEDURE — 3066F PR DOCUMENTATION OF TREATMENT FOR NEPHROPATHY: ICD-10-PCS | Mod: CPTII,S$GLB,, | Performed by: OPTOMETRIST

## 2023-06-05 PROCEDURE — 1159F MED LIST DOCD IN RCRD: CPT | Mod: CPTII,S$GLB,, | Performed by: OPTOMETRIST

## 2023-06-05 PROCEDURE — 3061F PR NEG MICROALBUMINURIA RESULT DOCUMENTED/REVIEW: ICD-10-PCS | Mod: CPTII,S$GLB,, | Performed by: OPTOMETRIST

## 2023-06-05 PROCEDURE — 92014 PR EYE EXAM, EST PATIENT,COMPREHESV: ICD-10-PCS | Mod: S$GLB,,, | Performed by: OPTOMETRIST

## 2023-06-05 PROCEDURE — 1157F ADVNC CARE PLAN IN RCRD: CPT | Mod: CPTII,S$GLB,, | Performed by: OPTOMETRIST

## 2023-06-05 PROCEDURE — 92015 DETERMINE REFRACTIVE STATE: CPT | Mod: S$GLB,,, | Performed by: OPTOMETRIST

## 2023-06-05 PROCEDURE — 3288F PR FALLS RISK ASSESSMENT DOCUMENTED: ICD-10-PCS | Mod: CPTII,S$GLB,, | Performed by: OPTOMETRIST

## 2023-06-05 PROCEDURE — 1126F PR PAIN SEVERITY QUANTIFIED, NO PAIN PRESENT: ICD-10-PCS | Mod: CPTII,S$GLB,, | Performed by: OPTOMETRIST

## 2023-06-05 PROCEDURE — 1157F PR ADVANCE CARE PLAN OR EQUIV PRESENT IN MEDICAL RECORD: ICD-10-PCS | Mod: CPTII,S$GLB,, | Performed by: OPTOMETRIST

## 2023-06-05 PROCEDURE — 3066F NEPHROPATHY DOC TX: CPT | Mod: CPTII,S$GLB,, | Performed by: OPTOMETRIST

## 2023-06-05 PROCEDURE — 92015 PR REFRACTION: ICD-10-PCS | Mod: S$GLB,,, | Performed by: OPTOMETRIST

## 2023-06-05 PROCEDURE — 4010F PR ACE/ARB THEARPY RXD/TAKEN: ICD-10-PCS | Mod: CPTII,S$GLB,, | Performed by: OPTOMETRIST

## 2023-06-05 PROCEDURE — 4010F ACE/ARB THERAPY RXD/TAKEN: CPT | Mod: CPTII,S$GLB,, | Performed by: OPTOMETRIST

## 2023-06-05 PROCEDURE — 99999 PR PBB SHADOW E&M-EST. PATIENT-LVL III: CPT | Mod: PBBFAC,,, | Performed by: OPTOMETRIST

## 2023-06-05 NOTE — TELEPHONE ENCOUNTER
PA submitted for testosterone (ANDROGEL) 1 % (50 mg/5 gram) GlPk through covermymeds.      Key: [id number] - PA Case ID: [id number]    Status  Sent to Plan today    Drug  Testosterone 50 MG/5GM(1%) gel    Form  [employer] Electronic PA Form (2017 NCPDP)

## 2023-06-05 NOTE — PROGRESS NOTES
HPI    Annual Exam   Pt states Blurred Vision c EG   Has sent EG back 3 times RX never feels correct     Pt denies F/F   Pt denies Dry/ Itchy/ Burning  Gtt: No    Last edited by Ana Navarrete on 6/5/2023  8:47 AM.            Assessment /Plan     For exam results, see Encounter Report.    Fuchs' corneal dystrophy of both eyes  -Endo remplacement [facility]  -Systane Complete PFPRN     Pseudophakia of both eyes  -clear, centered    Myopia of both eyes with astigmatism and presbyopia  Eyeglass Final Rx       Eyeglass Final Rx         Sphere Cylinder Axis Dist VA Add    Right -0.50 +1.50 180 20/40 +2.75    Left -1.00 +1.50 175 20/50 +2.75      Type: PAL    Expiration Date: 6/5/2024                      RTC 1 yr   Retina, Neuro as scheduled

## 2023-06-06 NOTE — TELEPHONE ENCOUNTER
Key: [id number] - PA Case ID: [id number]    Outcome  Approved today  CaseId:[id number];Status:Approved;Review Type:Prior Auth;Coverage Start Date:06/05/2023;Coverage End Date:06/04/2024;    Drug  Testosterone 50 MG/5GM(1%) gel

## 2023-06-20 ENCOUNTER — PATIENT MESSAGE (OUTPATIENT)
Dept: ENDOCRINOLOGY | Facility: CLINIC | Age: 73
End: 2023-06-20
Payer: COMMERCIAL

## 2023-07-12 ENCOUNTER — OFFICE VISIT (OUTPATIENT)
Dept: PRIMARY CARE CLINIC | Facility: CLINIC | Age: 73
End: 2023-07-12
Payer: COMMERCIAL

## 2023-07-12 VITALS
OXYGEN SATURATION: 96 % | HEART RATE: 75 BPM | BODY MASS INDEX: 26.32 KG/M2 | DIASTOLIC BLOOD PRESSURE: 60 MMHG | HEIGHT: 69 IN | SYSTOLIC BLOOD PRESSURE: 110 MMHG | WEIGHT: 177.69 LBS

## 2023-07-12 DIAGNOSIS — E11.9 TYPE 2 DIABETES MELLITUS WITHOUT COMPLICATION, WITHOUT LONG-TERM CURRENT USE OF INSULIN: Primary | ICD-10-CM

## 2023-07-12 DIAGNOSIS — E29.1 HYPOGONADISM IN MALE: ICD-10-CM

## 2023-07-12 DIAGNOSIS — K86.2 PANCREATIC CYST: ICD-10-CM

## 2023-07-12 DIAGNOSIS — D35.2 PITUITARY MACROADENOMA: ICD-10-CM

## 2023-07-12 DIAGNOSIS — I25.10 CORONARY ARTERY DISEASE INVOLVING NATIVE CORONARY ARTERY OF NATIVE HEART WITHOUT ANGINA PECTORIS: ICD-10-CM

## 2023-07-12 DIAGNOSIS — D35.2 PROLACTINOMA: ICD-10-CM

## 2023-07-12 DIAGNOSIS — E78.2 HYPERLIPIDEMIA, MIXED: ICD-10-CM

## 2023-07-12 DIAGNOSIS — Z12.11 ENCOUNTER FOR COLORECTAL CANCER SCREENING: ICD-10-CM

## 2023-07-12 DIAGNOSIS — E21.0 PRIMARY HYPERPARATHYROIDISM: ICD-10-CM

## 2023-07-12 DIAGNOSIS — I11.9 HYPERTENSIVE HEART DISEASE WITHOUT HEART FAILURE: ICD-10-CM

## 2023-07-12 DIAGNOSIS — K21.00 GASTROESOPHAGEAL REFLUX DISEASE WITH ESOPHAGITIS WITHOUT HEMORRHAGE: ICD-10-CM

## 2023-07-12 DIAGNOSIS — Z12.12 ENCOUNTER FOR COLORECTAL CANCER SCREENING: ICD-10-CM

## 2023-07-12 PROCEDURE — 99397 PER PM REEVAL EST PAT 65+ YR: CPT | Mod: S$GLB,,, | Performed by: INTERNAL MEDICINE

## 2023-07-12 PROCEDURE — 1126F AMNT PAIN NOTED NONE PRSNT: CPT | Mod: CPTII,S$GLB,, | Performed by: INTERNAL MEDICINE

## 2023-07-12 PROCEDURE — 3008F BODY MASS INDEX DOCD: CPT | Mod: CPTII,S$GLB,, | Performed by: INTERNAL MEDICINE

## 2023-07-12 PROCEDURE — 1126F PR PAIN SEVERITY QUANTIFIED, NO PAIN PRESENT: ICD-10-PCS | Mod: CPTII,S$GLB,, | Performed by: INTERNAL MEDICINE

## 2023-07-12 PROCEDURE — 3066F NEPHROPATHY DOC TX: CPT | Mod: CPTII,S$GLB,, | Performed by: INTERNAL MEDICINE

## 2023-07-12 PROCEDURE — 1157F PR ADVANCE CARE PLAN OR EQUIV PRESENT IN MEDICAL RECORD: ICD-10-PCS | Mod: CPTII,S$GLB,, | Performed by: INTERNAL MEDICINE

## 2023-07-12 PROCEDURE — 3066F PR DOCUMENTATION OF TREATMENT FOR NEPHROPATHY: ICD-10-PCS | Mod: CPTII,S$GLB,, | Performed by: INTERNAL MEDICINE

## 2023-07-12 PROCEDURE — 1159F MED LIST DOCD IN RCRD: CPT | Mod: CPTII,S$GLB,, | Performed by: INTERNAL MEDICINE

## 2023-07-12 PROCEDURE — 4010F PR ACE/ARB THEARPY RXD/TAKEN: ICD-10-PCS | Mod: CPTII,S$GLB,, | Performed by: INTERNAL MEDICINE

## 2023-07-12 PROCEDURE — 3074F SYST BP LT 130 MM HG: CPT | Mod: CPTII,S$GLB,, | Performed by: INTERNAL MEDICINE

## 2023-07-12 PROCEDURE — 4010F ACE/ARB THERAPY RXD/TAKEN: CPT | Mod: CPTII,S$GLB,, | Performed by: INTERNAL MEDICINE

## 2023-07-12 PROCEDURE — 3044F PR MOST RECENT HEMOGLOBIN A1C LEVEL <7.0%: ICD-10-PCS | Mod: CPTII,S$GLB,, | Performed by: INTERNAL MEDICINE

## 2023-07-12 PROCEDURE — 3074F PR MOST RECENT SYSTOLIC BLOOD PRESSURE < 130 MM HG: ICD-10-PCS | Mod: CPTII,S$GLB,, | Performed by: INTERNAL MEDICINE

## 2023-07-12 PROCEDURE — 3008F PR BODY MASS INDEX (BMI) DOCUMENTED: ICD-10-PCS | Mod: CPTII,S$GLB,, | Performed by: INTERNAL MEDICINE

## 2023-07-12 PROCEDURE — 99397 PR PREVENTIVE VISIT,EST,65 & OVER: ICD-10-PCS | Mod: S$GLB,,, | Performed by: INTERNAL MEDICINE

## 2023-07-12 PROCEDURE — 3078F PR MOST RECENT DIASTOLIC BLOOD PRESSURE < 80 MM HG: ICD-10-PCS | Mod: CPTII,S$GLB,, | Performed by: INTERNAL MEDICINE

## 2023-07-12 PROCEDURE — 3078F DIAST BP <80 MM HG: CPT | Mod: CPTII,S$GLB,, | Performed by: INTERNAL MEDICINE

## 2023-07-12 PROCEDURE — 3061F PR NEG MICROALBUMINURIA RESULT DOCUMENTED/REVIEW: ICD-10-PCS | Mod: CPTII,S$GLB,, | Performed by: INTERNAL MEDICINE

## 2023-07-12 PROCEDURE — 1159F PR MEDICATION LIST DOCUMENTED IN MEDICAL RECORD: ICD-10-PCS | Mod: CPTII,S$GLB,, | Performed by: INTERNAL MEDICINE

## 2023-07-12 PROCEDURE — 3044F HG A1C LEVEL LT 7.0%: CPT | Mod: CPTII,S$GLB,, | Performed by: INTERNAL MEDICINE

## 2023-07-12 PROCEDURE — 3061F NEG MICROALBUMINURIA REV: CPT | Mod: CPTII,S$GLB,, | Performed by: INTERNAL MEDICINE

## 2023-07-12 PROCEDURE — 99999 PR PBB SHADOW E&M-EST. PATIENT-LVL IV: ICD-10-PCS | Mod: PBBFAC,,, | Performed by: INTERNAL MEDICINE

## 2023-07-12 PROCEDURE — 99999 PR PBB SHADOW E&M-EST. PATIENT-LVL IV: CPT | Mod: PBBFAC,,, | Performed by: INTERNAL MEDICINE

## 2023-07-12 PROCEDURE — 1157F ADVNC CARE PLAN IN RCRD: CPT | Mod: CPTII,S$GLB,, | Performed by: INTERNAL MEDICINE

## 2023-07-12 NOTE — PROGRESS NOTES
[facility] Note    Chief Complaint      Chief Complaint   Patient presents with    Annual Exam       History of Present Illness      Sadiq Dhillon is a 73 y.o. male with chronic conditions of DM2, HTN, CAD, HLD, hx of kidney stones, pituitary adenoma with primary hyperparathyroidism, prolactinoma, hypogonadism, pancreatic cyst who presents today for: annual preventative visit.  Since last visit, had a kidney stone exacerbation which resolved without complication.  Also had planned on rotator cuff surgery with Dr. Panchal but determined it was unnecessary due to minimal symptoms.    DM2: Sees Dr. Leon, endocrinology.  Controlled on metformin.  A1C 6.1 9/2022.  Eye exam UTD with Dr. Norris.  HTN: BP at goal on metoprolol.  CAD: Recently saw Dr. Taveras, cardiology.  At [facility], s/p PCI to mid LAD following STEMI.  On ASA, metoprolol, atorvastatin. Recently had Brilinta and lisinopril discontinued.  Denies chest pain, shortness of breath.  HLD: Controlled on atorvastatin.  LDL 58   Hx of kidney stones: Sees Dr. Bowman, urology. 4 mm right UPJ and upper 3rd ureteral stone with mild right hydronephrosis.  Repeat imaging did not show that stone.  Waiting to hear what to do next.  Currently asymptomatic.    Pituitary adenoma, prolactinoma, primary hyperparathyroidism s/p parathyroidectomy, hypogonadism:  Sees Dr. Leon, endo.  On testosterone.  Last PTH normal.    GERD: Controlled on pepcid as needed.  Pancreas cyst: Initially saw Dr. Munoz.  Followed with Dr. Post in 2019.  Has had CT with contrast to monitor and has been unchanged.  Has appt with Dr. Field for further evaluation.    Flu shot UTD.  TdAP 2020.  Shingrix UTD.  COVID vaccine UTD.    Cscope UTD.  FOBT 2022.       Past Medical History:  Past Medical History:   Diagnosis Date    Diabetes mellitus     Fuchs' corneal dystrophy     Heart attack     Hypertension     Kidney stones     Pancreatic mass     Pancreatitis     after EUS . New cyst  per pt  is following no tx at this time    Pituitary adenoma     PONV (postoperative nausea and vomiting)     7-18-18    Prolactinoma 2003    in sinuses and wrapped around optic nerve, treated with dostenex(cabergoline)/ resolved    Reflux esophagitis     Tumor cells, benign        Past Surgical History:   has a past surgical history that includes Thyroidectomy (2007); Rhinoplasty tip (1975); Cataract extraction w/  intraocular lens implant (Right, 0); Corneal transplant (Right); Upper endoscopic ultrasound w/ FNA; Repair of retinal detachment with vitrectomy (Right, 7/18/2018); Repair of retinal detachment with vitrectomy (Left, 8/8/2018); Descemets stripping automated endothelial keratoplasty (Left, 3/21/2019); and Cataract extraction w/  intraocular lens implant (Left, 3/21/2019).    Family History:  family history includes Cataracts in his father; Diabetes in his father and paternal uncle; Heart disease in his father and mother; Hypertension in his mother; Stroke in his father and maternal grandmother.     Social History:  Social History     Tobacco Use    Smoking status: Never     Passive exposure: Never    Smokeless tobacco: Never   Substance Use Topics    Alcohol use: Yes     Comment: social    Drug use: No       I personally reviewed all past medical, surgical, social and family history.    Review of Systems   Constitutional:  Negative for chills, fever and malaise/fatigue.   Respiratory:  Negative for shortness of breath.    Cardiovascular:  Negative for chest pain.   Gastrointestinal:  Negative for constipation, diarrhea, nausea and vomiting.   Skin:  Negative for rash.   Neurological:  Negative for weakness.   All other systems reviewed and are negative.     Medications:  Outpatient Encounter Medications as of 7/12/2023   Medication Sig Dispense Refill    aspirin (ECOTRIN) 81 MG EC tablet Take 81 mg by mouth every morning.       atorvastatin (LIPITOR) 80 MG tablet Take 1 tablet (80 mg total) by mouth  once daily. 90 tablet 3    cabergoline (DOSTINEX) 0.5 mg tablet TAKE 1 TABLET(0.5 MG) BY MOUTH 2 TIMES A WEEK 24 tablet 3    chlorhexidine (PERIDEX) 0.12 % solution SMARTSIG:By Mouth      CONTOUR NEXT TEST STRIPS Strp UTD QID IN VITRO  3    famotidine (PEPCID) 20 MG tablet Take by mouth.      fexofenadine-pseudoephedrine  mg (ALLEGRA-D)  mg per tablet Take 1 tablet by mouth.      gabapentin (NEURONTIN) 100 MG capsule Take 1 capsule three times a day,  Takes one capsule every morning  3    HYDROcodone-acetaminophen (NORCO) 5-325 mg per tablet Take 1 tablet by mouth every 4 (four) hours as needed for Pain. (Patient not taking: Reported on 7/12/2023) 15 tablet 0    ketorolac (TORADOL) 10 mg tablet Take 1 tablet (10 mg total) by mouth every 6 (six) hours. 15 tablet 0    lisinopril (PRINIVIL,ZESTRIL) 2.5 MG tablet Take 2.5 mg by mouth every morning.       loperamide (IMODIUM) 2 mg capsule Take 2 mg by mouth 4 (four) times daily as needed for Diarrhea.      metFORMIN (GLUCOPHAGE-XR) 500 MG ER 24hr tablet TAKE 2 TABLETS BY MOUTH TWICE DAILY 360 tablet 3    metoprolol succinate (TOPROL-XL) 25 MG 24 hr tablet Take 1 tablet (25 mg total) by mouth nightly. 60 tablet 6    tamsulosin (FLOMAX) 0.4 mg Cap TAKE 1 CAPSULE BY MOUTH EVERY NIGHT AT BEDTIME 90 capsule 3    tamsulosin (FLOMAX) 0.4 mg Cap Take 1 capsule (0.4 mg total) by mouth once daily. (Patient not taking: Reported on 7/12/2023) 10 capsule 0    testosterone (ANDROGEL) 1 % (50 mg/5 gram) GlPk APPLY CONTENTS OF 2 PACKETS TOPICALLY TO SKIN EVERY  g 3    traMADoL (ULTRAM) 50 mg tablet Take 50 mg by mouth.       No facility-administered encounter medications on file as of 7/12/2023.       Allergies:  Review of patient's allergies indicates:   Allergen Reactions    Grass pollen-june grass standard     House dust        Health Maintenance:  Immunization History   Administered Date(s) Administered    COVID-19, MRNA, LN-S, PF (Pfizer) (Gray Cap) 04/22/2022     "COVID-19, MRNA, LN-S, PF (Pfizer) (Purple Cap) 02/12/2021, 03/03/2021, 11/09/2021    COVID-19, mRNA, LNP-S, bivalent booster, PF (PFIZER OMICRON) 12/16/2022    Influenza (FLUAD) - Quadrivalent - Adjuvanted - PF *Preferred* (65+) 09/20/2020, 10/08/2021, 10/13/2022    Influenza - High Dose - PF (65 years and older) 10/19/2018    Influenza - Quadrivalent - MDCK - PF 02/01/2018    Influenza - Quadrivalent - PF *Preferred* (6 months and older) 11/10/2019    Meningococcal Conjugate (MCV4P) 06/03/2022    Pneumococcal Polysaccharide - 23 Valent 11/09/2020    Tdap 10/30/2020    Zoster Recombinant 11/09/2020, 11/11/2020, 01/12/2021      Health Maintenance   Topic Date Due    High Dose Statin  Never done    Foot Exam  11/05/2021    Hemoglobin A1c  07/06/2023    Lipid Panel  04/10/2024    Aspirin/Antiplatelet Therapy  04/20/2024    Eye Exam  06/05/2024    TETANUS VACCINE  10/30/2030    Hepatitis C Screening  Completed    DEXA Scan  Discontinued        Physical Exam      Vital Signs  Pulse: 75  SpO2: 96 %  BP: 110/60  BP Location: Right arm  Patient Position: Sitting  Pain Score: 0-No pain  Height and Weight  Height: 5' 9" (175.3 cm)  Weight: 80.6 kg (177 lb 11.1 oz)  BSA (Calculated - sq m): 1.98 sq meters  BMI (Calculated): 26.2  Weight in (lb) to have BMI = 25: 168.9]    Physical Exam  Vitals reviewed.   Constitutional:       Appearance: He is well-developed.   HENT:      Head: Normocephalic and atraumatic.      Right Ear: External ear normal.      Left Ear: External ear normal.   Cardiovascular:      Rate and Rhythm: Normal rate and regular rhythm.      Heart sounds: Normal heart sounds. No murmur heard.  Pulmonary:      Effort: Pulmonary effort is normal.      Breath sounds: Normal breath sounds. No wheezing or rales.   Abdominal:      General: Bowel sounds are normal.      Palpations: Abdomen is soft.        Laboratory:  CBC:  Recent Labs   Lab 03/08/22  1543 04/06/23  2345 04/10/23  1524   WBC 7.08 11.83 6.01   RBC 5.15 " 5.22 4.75   Hemoglobin 15.0 15.6 14.1   Hematocrit 46.5 46.5 42.4   Platelets 192 204 189   MCV 90 89 89   MCH 29.1 29.9 29.7   MCHC 32.3 33.5 33.3     CMP:  Recent Labs   Lab 06/27/22  1534 09/13/22  1521 04/06/23  2345 04/10/23  1524 04/26/23  1336   Glucose 95  --  151 H 108  --    Calcium 10.0  --  9.2 9.9  --    Albumin 4.5   < > 4.3 4.3  4.7 4.5   Total Protein 6.7  --  6.9 6.9  --    Sodium 139  --  136 138  --    Potassium 4.4  --  4.1 4.4  --    CO2 26  --  20 L 24  --    Chloride 102  --  103 102  --    BUN 14  --  15 18  --    Alkaline Phosphatase 54 L  --  77 77  --    ALT 15  --  21 45 H  --    AST 20  --  20 33  --    Total Bilirubin 0.7  --  0.7 0.6  --     < > = values in this interval not displayed.     URINALYSIS:  Recent Labs   Lab 10/27/21  0938 03/09/22  1200 03/30/22  1441 04/25/22  1431 08/23/22  1331 04/07/23  0047   Color, UA Yellow Yellow  --  Yellow  --  Yellow   Clarity, UA  --   --   --  Clear  --   --    Specific Gravity, UA 1.025 1.020  --   --   --  1.030   Spec Grav UA  --   --   --  1.025  --   --    pH, UA 6.0 6.0   < > 5   < > 6.0   Protein, UA Negative Negative  --   --   --  Trace A   Bacteria Occasional Rare  --   --   --  Many A   Nitrite, UA Negative Negative  --   --   --  Positive A   Leukocytes, UA Negative Negative  --   --   --  2+ A   Urobilinogen, UA Negative  --   --   --   --  Negative   Hyaline Casts, UA 1 1  --   --   --  28 A    < > = values in this interval not displayed.      LIPIDS:  Recent Labs   Lab 03/08/22  1543 09/13/22  1521 01/06/23  1011 04/10/23  1524   TSH 2.281  --   --   --    HDL  --  45 34 L  34 L 42   Cholesterol  --  132 105 L  105 L 146   Triglycerides  --  141 119  119 111   LDL Cholesterol  --  58.8 L 47.2 L  47.2 L 81.8   HDL/Cholesterol Ratio  --  34.1 32.4  32.4 28.8   Non-HDL Cholesterol  --  87 71  71 104   Total Cholesterol/HDL Ratio  --  2.9 3.1  3.1 3.5     TSH:  Recent Labs   Lab 03/08/22  1543   TSH 2.281     A1C:  Recent  Labs   Lab 10/21/20  1406 03/30/21  1552 03/08/22  1543 09/13/22  1521 01/06/23  1011   Hemoglobin A1C 6.9 H 6.6 H 6.6 H 6.1 H 6.3 H       Assessment/Plan     Sadiq Dhillon is a 73 y.o.male with:    1. Type 2 diabetes mellitus without complication, without long-term current use of insulin  - Hemoglobin A1C; Future  - Comprehensive Metabolic Panel; Future  Continue current meds.  Eye exam UTD.  2. Encounter for colorectal cancer screening  - Fecal Immunochemical Test (iFOBT); Future    3. Hypertensive heart disease without heart failure  Continue current meds.    4. Coronary artery disease involving native coronary artery of native heart without angina pectoris  Continue current meds.  F/U with Dr. Taveras.  5. Hyperlipidemia, mixed  Continue current meds.    6. Pituitary macroadenoma  7. Primary hyperparathyroidism  8. Prolactinoma  9. Hypogonadism in male  Continue current meds.  F/u with endocrinology.  10. Pancreatic cyst  F/U with specialists.  11. Gastroesophageal reflux disease with esophagitis without hemorrhage  Continue current meds.      Chronic conditions status updated as per HPI.  Other than changes above, cont current medications and maintain follow up with specialists.  Follow up in about 6 months (around 1/12/2024) for Follow up visit.    No future appointments.    Erlin Mcpherson MD  [facility]

## 2023-07-18 DIAGNOSIS — E78.00 PURE HYPERCHOLESTEROLEMIA: ICD-10-CM

## 2023-07-18 DIAGNOSIS — I25.10 CORONARY ARTERY DISEASE INVOLVING NATIVE CORONARY ARTERY OF NATIVE HEART WITHOUT ANGINA PECTORIS: ICD-10-CM

## 2023-07-18 RX ORDER — ATORVASTATIN CALCIUM 80 MG/1
80 TABLET, FILM COATED ORAL DAILY
Qty: 90 TABLET | Refills: 3 | Status: SHIPPED | OUTPATIENT
Start: 2023-07-18 | End: 2024-07-17

## 2023-07-24 ENCOUNTER — LAB VISIT (OUTPATIENT)
Dept: LAB | Facility: HOSPITAL | Age: 73
End: 2023-07-24
Attending: INTERNAL MEDICINE
Payer: COMMERCIAL

## 2023-07-24 DIAGNOSIS — E11.9 TYPE 2 DIABETES MELLITUS WITHOUT COMPLICATION, WITHOUT LONG-TERM CURRENT USE OF INSULIN: ICD-10-CM

## 2023-07-24 LAB
ALBUMIN SERPL BCP-MCNC: 4.2 G/DL (ref 3.5–5.2)
ALP SERPL-CCNC: 69 U/L (ref 55–135)
ALT SERPL W/O P-5'-P-CCNC: 21 U/L (ref 10–44)
ANION GAP SERPL CALC-SCNC: 9 MMOL/L (ref 8–16)
AST SERPL-CCNC: 20 U/L (ref 10–40)
BILIRUB SERPL-MCNC: 0.7 MG/DL (ref 0.1–1)
BUN SERPL-MCNC: 15 MG/DL (ref 8–23)
CALCIUM SERPL-MCNC: 9.4 MG/DL (ref 8.7–10.5)
CHLORIDE SERPL-SCNC: 102 MMOL/L (ref 95–110)
CO2 SERPL-SCNC: 27 MMOL/L (ref 23–29)
CREAT SERPL-MCNC: 0.9 MG/DL (ref 0.5–1.4)
EST. GFR  (NO RACE VARIABLE): >60 ML/MIN/1.73 M^2
ESTIMATED AVG GLUCOSE: 131 MG/DL (ref 68–131)
GLUCOSE SERPL-MCNC: 123 MG/DL (ref 70–110)
HBA1C MFR BLD: 6.2 % (ref 4–5.6)
POTASSIUM SERPL-SCNC: 4.7 MMOL/L (ref 3.5–5.1)
PROT SERPL-MCNC: 6.7 G/DL (ref 6–8.4)
SODIUM SERPL-SCNC: 138 MMOL/L (ref 136–145)

## 2023-07-24 PROCEDURE — 83036 HEMOGLOBIN GLYCOSYLATED A1C: CPT | Performed by: INTERNAL MEDICINE

## 2023-07-24 PROCEDURE — 80053 COMPREHEN METABOLIC PANEL: CPT | Performed by: INTERNAL MEDICINE

## 2023-07-24 PROCEDURE — 36415 COLL VENOUS BLD VENIPUNCTURE: CPT | Performed by: INTERNAL MEDICINE

## 2023-08-02 ENCOUNTER — PATIENT MESSAGE (OUTPATIENT)
Dept: PRIMARY CARE CLINIC | Facility: CLINIC | Age: 73
End: 2023-08-02
Payer: COMMERCIAL

## 2023-08-02 DIAGNOSIS — E29.1 HYPOGONADISM IN MALE: ICD-10-CM

## 2023-08-02 DIAGNOSIS — D35.2 PROLACTINOMA: Primary | ICD-10-CM

## 2023-08-02 DIAGNOSIS — E21.0 PRIMARY HYPERPARATHYROIDISM: ICD-10-CM

## 2023-08-07 ENCOUNTER — TELEPHONE (OUTPATIENT)
Dept: PRIMARY CARE CLINIC | Facility: CLINIC | Age: 73
End: 2023-08-07
Payer: COMMERCIAL

## 2023-08-07 NOTE — TELEPHONE ENCOUNTER
----- Message from Nila Jovel sent at 8/7/2023 10:52 AM CDT -----  Contact: self [phone]  Per pt returning a call.    Please call and advise

## 2023-08-09 NOTE — PROGRESS NOTES
ENDOCRINOLOGY CLINIC  08/10/2023     Subjective:      Patient ID: Sadiq Dhillon is referred by Erlin Mcpherson MD     Chief Complaint:  Type 2 diabetes, prolactinoma    HPI:   Sadiq Dhillon is a 73 y.o. male who presents for follow-up of type 2 diabetes and prolactinoma.    Patient was last seen by Dr. Leon on 04/10/2023.    Prolactinoma    He had previously been seen at the [facility] neuroendocrine center. He had a macroprolactinoma Dx in 2003. He also had history of primary parathyroidectomy for which he had a prior parathyroidectomy and a pancreatic neoplasm. He also has secondary hypogonadism. There was concern for MEN-1 but the screening tests obtained for that were -ve. MEN 1 gene testing done 10/2020.     He remains on Dostinex for the prolactinoma, 0.5 mg 2 x weekly.    He had been previously managed and ffed here in [place] with Dr Dr Bree Camacho prior to her move to [place].    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    Pituitary MRI from 05/2022 demonstrates a focally expanded sella with complex cyst at the site of the previously treated pituitary mass reported to reflect a prolactinoma. Cyst extending into the suprasellar region bilateral cavernous sinus (left more than right). There is associated bony remodeling of the clivus and protrusion of the expanded cystic space into sphenoid sinus.  Overall size and configuration appears very similar to the prior examination.  No new solid or suspicious enhancing components.  No new intracranial mass effect. Persistent inferior displacement of the optic chiasm and pre chiasmatic optic nerves may reflect tethering or ptosis. Ventricles are normal in size. No hydrocephalus    He is being followed by ophthalmology. Residual distortion OS commensurate with more chronic defect prior to surgery.Stable no  additional tx required.    Patient denies any headaches, no polyuria and no seizures.    Patient had a prolactin of 125 on 05/04/2004. His prolactin ranged between  until 2012.     Neuroopthalmologist at [facility]/       Lab Results   Component Value Date    PROLACTIN 2.2 (L) 04/10/2023    PROLACTIN 3.2 (L) 03/08/2022    PROLACTIN 2.7 (L) 04/09/2021    PROLACTIN 4.2 10/21/2020    PROLACTIN 20.0 (H) 12/17/2012    PROLACTIN 31.3 (H) 12/01/2011    PROLACTIN 19 (H) 02/24/2011    PROLACTIN 29 (H) 06/26/2010    PROLACTIN 39 (H) 03/11/2010    PROLACTIN 34 (H) 06/09/2009    PROLACTIN 46 (H) 03/06/2009    PROLACTIN 30 (H) 05/20/2008    PROLACTIN 46 (H) 01/17/2008    PROLACTIN 42 (H) 09/05/2007    PROLACTIN 72 (H) 03/27/2007    PROLACTIN 46 (H) 08/25/2006    PROLACTIN 45 (H) 12/28/2005    PROLACTIN 62 (H) 10/01/2005    PROLACTIN 123 (H) 05/31/2005    PROLACTIN 129 (H) 04/08/2005     Lab Results   Component Value Date    PROLACTIN 2.2 (L) 04/10/2023    PROLACTIN 3.2 (L) 03/08/2022    PROLACTIN 2.7 (L) 04/09/2021    PROLACTIN 4.2 10/21/2020    PROLACTIN 20.0 (H) 12/17/2012     Lab Results   Component Value Date    ACTH 42 09/13/2022    ACTH 47 (H) 03/08/2022    ACTH 50 (H) 04/09/2021    ACTH 69 (H) 10/21/2020    LABLH 1.2 04/09/2021    LABLH <0.2 (L) 10/19/2020    LABLH 0.10 06/09/2009    LABLH 0.55 09/14/2007       Secondary hypogonadism  Patient is on AndroGel Two packets every day.    Sometimes forgets to use it.     Lab Results   Component Value Date    TESTOSTERONE 100 (L) 04/26/2023    TESTOSTERONE 16.1 04/26/2023    PSA 0.93 02/07/2013    AST 20 07/24/2023    ALT 21 07/24/2023    HGB 14.1 04/10/2023    HCT 42.4 04/10/2023       Diabetes Hx:  Diagnosed w/ DM: 2003  Complications:   Retinopathy: {YES/NO:20267}   Last eye exam: : 06/05/2023)  Neuropathy: {YES/NO:20267} on gabapentin.   Last foot exam: : 11/05/2020). Needs a foot appointment.   Nephropathy: {YES/NO:20267}  Cardiovascular: CAD s/p PCI, MI in  "2017  DKA/HHS: No    Severe Hypoglycemia: No, Hypoglycemia unawareness: No  Hypoglycemic episodes: No    Current meds:   metformin  mg, 2 tablets BID.    Compliance with meds: No     Previous meds:  None     Home glucose checks: None    Diet/Exercise: 2 meals, no breakfast.   Active.     Diabetes education:      Last A1c:   Lab Results   Component Value Date    HGBA1C 6.2 (H) 07/24/2023    HGBA1C 6.3 (H) 01/06/2023    HGBA1C 6.1 (H) 09/13/2022       Microalbumin:   Lab Results   Component Value Date    LABMICR 7.0 04/10/2023    CREATRANDUR 100.0 04/10/2023    MICALBCREAT 7.0 04/10/2023     Lab Results   Component Value Date    EGFRNORACEVR >60.0 07/24/2023    CREATININE 0.9 07/24/2023       Lipids:   Lab Results   Component Value Date    CHOL 146 04/10/2023    TRIG 111 04/10/2023    HDL 42 04/10/2023    LDLCALC 81.8 04/10/2023    CHOLHDL 28.8 04/10/2023       TSH:  Lab Results   Component Value Date    TSH 2.281 03/08/2022       Vitamin B12 No results found for: "V12"     Lab Results   Component Value Date    HGB 14.1 04/10/2023          Aspirin: Yes  Statins: Yes  ACEI/ARB: Yes on lisinopril  HTN:  On medications.    B12 in 4/2023: 491     FHx of DM: {YES/NO:20267}, Heart disease: {YES/NO:20267}, Hyperlipidemia: {YES/NO:20267}      Primary hyperparathyroidism  Status post parathyroidectomy in 2007.    Patients screening DXA from 10/20 was normal.  Patient had a recent per around 03/2023 and had right side fractures.   He is currently having a recent kidney stones that his currently passing.  He has had at least two other episodes.    Lab Results   Component Value Date    PTH 46.6 04/10/2023    PTH 36.3 03/08/2022    PTH 29.0 03/30/2021    PTH 38.0 10/19/2020     (H) 09/26/2006    CALCIUM 9.4 07/24/2023    [id number] 47 04/10/2023       ROS: see HPI     Objective:     Physical Exam     /77 (BP Location: Left arm, Patient Position: Sitting, BP Method: Medium (Automatic))   Pulse 68   Wt 79.5 kg " (175 lb 4.3 oz)   SpO2 (!) 94%   BMI 25.88 kg/m²     Wt Readings from Last 3 Encounters:   08/10/23 79.5 kg (175 lb 4.3 oz)   07/12/23 80.6 kg (177 lb 11.1 oz)   04/20/23 78.9 kg (174 lb)       Constitutional:  Pleasant,  in no acute distress.   HENT:   Head:    Normocephalic and atraumatic.   Eyes:    EOMI. No scleral icterus.   Cardiovascular:  Normal rate, regular rhythm  Respiratory:   Effort normal   Neurological:  No tremor  Skin:    Skin is warm, dry  Extremity:  No edema      LABORATORY REVIEW:  See HPI for other labs reviewed today      Chemistry        Component Value Date/Time     07/24/2023 1102    K 4.7 07/24/2023 1102     07/24/2023 1102    CO2 27 07/24/2023 1102    BUN 15 07/24/2023 1102    CREATININE 0.9 07/24/2023 1102     (H) 07/24/2023 1102        Component Value Date/Time    CALCIUM 9.4 07/24/2023 1102    ALKPHOS 69 07/24/2023 1102    AST 20 07/24/2023 1102    ALT 21 07/24/2023 1102    BILITOT 0.7 07/24/2023 1102    ESTGFRAFRICA >60.0 06/27/2022 1534    EGFRNONAA >60.0 06/27/2022 1534          Lab Results   Component Value Date    HGBA1C 6.2 (H) 07/24/2023    HGBA1C 6.3 (H) 01/06/2023    HGBA1C 6.1 (H) 09/13/2022     Other labs reviewed today in HPI    Assessment/Plan:           Nell Hunter MD

## 2023-08-10 ENCOUNTER — OFFICE VISIT (OUTPATIENT)
Dept: ENDOCRINOLOGY | Facility: CLINIC | Age: 73
End: 2023-08-10
Payer: COMMERCIAL

## 2023-08-10 VITALS
BODY MASS INDEX: 25.88 KG/M2 | HEART RATE: 68 BPM | SYSTOLIC BLOOD PRESSURE: 123 MMHG | DIASTOLIC BLOOD PRESSURE: 77 MMHG | OXYGEN SATURATION: 94 % | WEIGHT: 175.25 LBS

## 2023-08-10 DIAGNOSIS — E21.0 PRIMARY HYPERPARATHYROIDISM: ICD-10-CM

## 2023-08-10 DIAGNOSIS — E11.9 TYPE 2 DIABETES MELLITUS WITHOUT COMPLICATION, WITHOUT LONG-TERM CURRENT USE OF INSULIN: ICD-10-CM

## 2023-08-10 DIAGNOSIS — E29.1 HYPOGONADISM IN MALE: ICD-10-CM

## 2023-08-10 DIAGNOSIS — D35.2 PROLACTINOMA: ICD-10-CM

## 2023-08-10 PROCEDURE — 3008F PR BODY MASS INDEX (BMI) DOCUMENTED: ICD-10-PCS | Mod: CPTII,S$GLB,, | Performed by: INTERNAL MEDICINE

## 2023-08-10 PROCEDURE — 3288F PR FALLS RISK ASSESSMENT DOCUMENTED: ICD-10-PCS | Mod: CPTII,S$GLB,, | Performed by: INTERNAL MEDICINE

## 2023-08-10 PROCEDURE — 99999 PR PBB SHADOW E&M-EST. PATIENT-LVL IV: ICD-10-PCS | Mod: PBBFAC,,, | Performed by: INTERNAL MEDICINE

## 2023-08-10 PROCEDURE — 3061F PR NEG MICROALBUMINURIA RESULT DOCUMENTED/REVIEW: ICD-10-PCS | Mod: CPTII,S$GLB,, | Performed by: INTERNAL MEDICINE

## 2023-08-10 PROCEDURE — 1101F PR PT FALLS ASSESS DOC 0-1 FALLS W/OUT INJ PAST YR: ICD-10-PCS | Mod: CPTII,S$GLB,, | Performed by: INTERNAL MEDICINE

## 2023-08-10 PROCEDURE — 3044F PR MOST RECENT HEMOGLOBIN A1C LEVEL <7.0%: ICD-10-PCS | Mod: CPTII,S$GLB,, | Performed by: INTERNAL MEDICINE

## 2023-08-10 PROCEDURE — 3288F FALL RISK ASSESSMENT DOCD: CPT | Mod: CPTII,S$GLB,, | Performed by: INTERNAL MEDICINE

## 2023-08-10 PROCEDURE — 3074F PR MOST RECENT SYSTOLIC BLOOD PRESSURE < 130 MM HG: ICD-10-PCS | Mod: CPTII,S$GLB,, | Performed by: INTERNAL MEDICINE

## 2023-08-10 PROCEDURE — 3074F SYST BP LT 130 MM HG: CPT | Mod: CPTII,S$GLB,, | Performed by: INTERNAL MEDICINE

## 2023-08-10 PROCEDURE — 1126F PR PAIN SEVERITY QUANTIFIED, NO PAIN PRESENT: ICD-10-PCS | Mod: CPTII,S$GLB,, | Performed by: INTERNAL MEDICINE

## 2023-08-10 PROCEDURE — 1101F PT FALLS ASSESS-DOCD LE1/YR: CPT | Mod: CPTII,S$GLB,, | Performed by: INTERNAL MEDICINE

## 2023-08-10 PROCEDURE — 1157F ADVNC CARE PLAN IN RCRD: CPT | Mod: CPTII,S$GLB,, | Performed by: INTERNAL MEDICINE

## 2023-08-10 PROCEDURE — 3078F PR MOST RECENT DIASTOLIC BLOOD PRESSURE < 80 MM HG: ICD-10-PCS | Mod: CPTII,S$GLB,, | Performed by: INTERNAL MEDICINE

## 2023-08-10 PROCEDURE — 99999 PR PBB SHADOW E&M-EST. PATIENT-LVL IV: CPT | Mod: PBBFAC,,, | Performed by: INTERNAL MEDICINE

## 2023-08-10 PROCEDURE — 1126F AMNT PAIN NOTED NONE PRSNT: CPT | Mod: CPTII,S$GLB,, | Performed by: INTERNAL MEDICINE

## 2023-08-10 PROCEDURE — 3061F NEG MICROALBUMINURIA REV: CPT | Mod: CPTII,S$GLB,, | Performed by: INTERNAL MEDICINE

## 2023-08-10 PROCEDURE — 3008F BODY MASS INDEX DOCD: CPT | Mod: CPTII,S$GLB,, | Performed by: INTERNAL MEDICINE

## 2023-08-10 PROCEDURE — 1159F PR MEDICATION LIST DOCUMENTED IN MEDICAL RECORD: ICD-10-PCS | Mod: CPTII,S$GLB,, | Performed by: INTERNAL MEDICINE

## 2023-08-10 PROCEDURE — 4010F ACE/ARB THERAPY RXD/TAKEN: CPT | Mod: CPTII,S$GLB,, | Performed by: INTERNAL MEDICINE

## 2023-08-10 PROCEDURE — 1160F RVW MEDS BY RX/DR IN RCRD: CPT | Mod: CPTII,S$GLB,, | Performed by: INTERNAL MEDICINE

## 2023-08-10 PROCEDURE — 3066F PR DOCUMENTATION OF TREATMENT FOR NEPHROPATHY: ICD-10-PCS | Mod: CPTII,S$GLB,, | Performed by: INTERNAL MEDICINE

## 2023-08-10 PROCEDURE — 3066F NEPHROPATHY DOC TX: CPT | Mod: CPTII,S$GLB,, | Performed by: INTERNAL MEDICINE

## 2023-08-10 PROCEDURE — 99214 PR OFFICE/OUTPT VISIT, EST, LEVL IV, 30-39 MIN: ICD-10-PCS | Mod: S$GLB,,, | Performed by: INTERNAL MEDICINE

## 2023-08-10 PROCEDURE — 1159F MED LIST DOCD IN RCRD: CPT | Mod: CPTII,S$GLB,, | Performed by: INTERNAL MEDICINE

## 2023-08-10 PROCEDURE — 3044F HG A1C LEVEL LT 7.0%: CPT | Mod: CPTII,S$GLB,, | Performed by: INTERNAL MEDICINE

## 2023-08-10 PROCEDURE — 1160F PR REVIEW ALL MEDS BY PRESCRIBER/CLIN PHARMACIST DOCUMENTED: ICD-10-PCS | Mod: CPTII,S$GLB,, | Performed by: INTERNAL MEDICINE

## 2023-08-10 PROCEDURE — 4010F PR ACE/ARB THEARPY RXD/TAKEN: ICD-10-PCS | Mod: CPTII,S$GLB,, | Performed by: INTERNAL MEDICINE

## 2023-08-10 PROCEDURE — 1157F PR ADVANCE CARE PLAN OR EQUIV PRESENT IN MEDICAL RECORD: ICD-10-PCS | Mod: CPTII,S$GLB,, | Performed by: INTERNAL MEDICINE

## 2023-08-10 PROCEDURE — 99214 OFFICE O/P EST MOD 30 MIN: CPT | Mod: S$GLB,,, | Performed by: INTERNAL MEDICINE

## 2023-08-10 PROCEDURE — 3078F DIAST BP <80 MM HG: CPT | Mod: CPTII,S$GLB,, | Performed by: INTERNAL MEDICINE

## 2023-08-15 ENCOUNTER — LAB VISIT (OUTPATIENT)
Dept: LAB | Facility: HOSPITAL | Age: 73
End: 2023-08-15
Attending: INTERNAL MEDICINE
Payer: COMMERCIAL

## 2023-08-15 DIAGNOSIS — Z12.12 ENCOUNTER FOR COLORECTAL CANCER SCREENING: ICD-10-CM

## 2023-08-15 DIAGNOSIS — Z12.11 ENCOUNTER FOR COLORECTAL CANCER SCREENING: ICD-10-CM

## 2023-08-15 PROCEDURE — 82274 ASSAY TEST FOR BLOOD FECAL: CPT | Performed by: INTERNAL MEDICINE

## 2023-08-17 ENCOUNTER — HOSPITAL ENCOUNTER (OUTPATIENT)
Dept: RADIOLOGY | Facility: HOSPITAL | Age: 73
Discharge: HOME | End: 2023-08-17
Attending: PODIATRIST
Payer: COMMERCIAL

## 2023-08-17 ENCOUNTER — OFFICE VISIT (OUTPATIENT)
Dept: PODIATRY | Facility: CLINIC | Age: 73
End: 2023-08-17
Payer: COMMERCIAL

## 2023-08-17 DIAGNOSIS — M79.674 PAIN OF TOE OF RIGHT FOOT: ICD-10-CM

## 2023-08-17 DIAGNOSIS — M79.674 PAIN OF TOE OF RIGHT FOOT: Primary | ICD-10-CM

## 2023-08-17 PROCEDURE — 1159F MED LIST DOCD IN RCRD: CPT | Mod: CPTII,S$GLB,, | Performed by: PODIATRIST

## 2023-08-17 PROCEDURE — 3066F NEPHROPATHY DOC TX: CPT | Mod: CPTII,S$GLB,, | Performed by: PODIATRIST

## 2023-08-17 PROCEDURE — 4010F PR ACE/ARB THEARPY RXD/TAKEN: ICD-10-PCS | Mod: CPTII,S$GLB,, | Performed by: PODIATRIST

## 2023-08-17 PROCEDURE — 99999 PR PBB SHADOW E&M-EST. PATIENT-LVL III: CPT | Mod: PBBFAC,,, | Performed by: PODIATRIST

## 2023-08-17 PROCEDURE — 3061F PR NEG MICROALBUMINURIA RESULT DOCUMENTED/REVIEW: ICD-10-PCS | Mod: CPTII,S$GLB,, | Performed by: PODIATRIST

## 2023-08-17 PROCEDURE — 73660 X-RAY EXAM OF TOE(S): CPT | Mod: TC,RT

## 2023-08-17 PROCEDURE — 1159F PR MEDICATION LIST DOCUMENTED IN MEDICAL RECORD: ICD-10-PCS | Mod: CPTII,S$GLB,, | Performed by: PODIATRIST

## 2023-08-17 PROCEDURE — 3044F PR MOST RECENT HEMOGLOBIN A1C LEVEL <7.0%: ICD-10-PCS | Mod: CPTII,S$GLB,, | Performed by: PODIATRIST

## 2023-08-17 PROCEDURE — 4010F ACE/ARB THERAPY RXD/TAKEN: CPT | Mod: CPTII,S$GLB,, | Performed by: PODIATRIST

## 2023-08-17 PROCEDURE — 73660 XR TOE 2 OR MORE VIEWS RIGHT: ICD-10-PCS | Mod: 26,RT,, | Performed by: RADIOLOGY

## 2023-08-17 PROCEDURE — 1157F PR ADVANCE CARE PLAN OR EQUIV PRESENT IN MEDICAL RECORD: ICD-10-PCS | Mod: CPTII,S$GLB,, | Performed by: PODIATRIST

## 2023-08-17 PROCEDURE — 3044F HG A1C LEVEL LT 7.0%: CPT | Mod: CPTII,S$GLB,, | Performed by: PODIATRIST

## 2023-08-17 PROCEDURE — 99214 OFFICE O/P EST MOD 30 MIN: CPT | Mod: S$GLB,,, | Performed by: PODIATRIST

## 2023-08-17 PROCEDURE — 3061F NEG MICROALBUMINURIA REV: CPT | Mod: CPTII,S$GLB,, | Performed by: PODIATRIST

## 2023-08-17 PROCEDURE — 99214 PR OFFICE/OUTPT VISIT, EST, LEVL IV, 30-39 MIN: ICD-10-PCS | Mod: S$GLB,,, | Performed by: PODIATRIST

## 2023-08-17 PROCEDURE — 1157F ADVNC CARE PLAN IN RCRD: CPT | Mod: CPTII,S$GLB,, | Performed by: PODIATRIST

## 2023-08-17 PROCEDURE — 73660 X-RAY EXAM OF TOE(S): CPT | Mod: 26,RT,, | Performed by: RADIOLOGY

## 2023-08-17 PROCEDURE — 99999 PR PBB SHADOW E&M-EST. PATIENT-LVL III: ICD-10-PCS | Mod: PBBFAC,,, | Performed by: PODIATRIST

## 2023-08-17 PROCEDURE — 3066F PR DOCUMENTATION OF TREATMENT FOR NEPHROPATHY: ICD-10-PCS | Mod: CPTII,S$GLB,, | Performed by: PODIATRIST

## 2023-08-21 ENCOUNTER — PATIENT MESSAGE (OUTPATIENT)
Dept: PODIATRY | Facility: CLINIC | Age: 73
End: 2023-08-21
Payer: COMMERCIAL

## 2023-08-21 NOTE — ASSESSMENT & PLAN NOTE
Continue testosterone replacement.  Patient says he sometimes forgets to use it.  Continue to monitor his testosterone levels.  Monitor PSA, LFTs, HGB, HCT.

## 2023-08-21 NOTE — ASSESSMENT & PLAN NOTE
Recent A1c was 6.2%.    Continue current diabetes regimen.    Continue good diet and lifestyle modifications for diabetes management      Complications:  Follow up for regular diabetes eye exam  Daily self examination of feet  Microalbumin: Monitor.    Last A1c:   Lab Results   Component Value Date    HGBA1C 6.2 (H) 07/24/2023       microalbumin:   Lab Results   Component Value Date    LABMICR 7.0 04/10/2023    CREATRANDUR 100.0 04/10/2023    MICALBCREAT 7.0 04/10/2023       Lipids:   Lab Results   Component Value Date    CHOL 146 04/10/2023    TRIG 111 04/10/2023    HDL 42 04/10/2023    LDLCALC 81.8 04/10/2023    CHOLHDL 28.8 04/10/2023

## 2023-08-21 NOTE — PROGRESS NOTES
ENDOCRINOLOGY CLINIC  08/21/2023     Subjective:      Patient ID: Sadiq Dhillon is referred by Erlin Mcpherson MD     Chief Complaint:  Type 2 diabetes, prolactinoma    HPI:   Sadiq Dhillon is a 73 y.o. male who presents for follow-up of type 2 diabetes and prolactinoma.    Patient was last seen by Dr. Leon on 04/10/2023.    Prolactinoma    He had previously been seen at the [facility] neuroendocrine center. He had a macroprolactinoma Dx in 2003. He also had history of primary parathyroidectomy for which he had a prior parathyroidectomy and a pancreatic neoplasm. He also has secondary hypogonadism. There was concern for MEN-1 but the screening tests obtained for that were negative. MEN 1 gene testing done 10/2020.     Patient is on Dostinex for the prolactinoma, 0.5 mg 2 x weekly.    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    Pituitary MRI from 05/2022 demonstrates a focally expanded sella with complex cyst at the site of the previously treated pituitary mass reported to reflect a prolactinoma. Cyst extending into the suprasellar region bilateral cavernous sinus (left more than right). There is associated bony remodeling of the clivus and protrusion of the expanded cystic space into sphenoid sinus.  Overall size and configuration appears very similar to the prior examination.  No new solid or suspicious enhancing components.  No new intracranial mass effect. Persistent inferior displacement of the optic chiasm and pre chiasmatic optic nerves may reflect tethering or ptosis. Ventricles are normal in size. No hydrocephalus    He is being followed by neurophthalmology.  Patient has history of Fuch's corneal dystrophy and retinal detachment.     Patient denies any headaches, no polyuria and no seizures.    Patient had a prolactin of 125 on 05/04/2004. His prolactin  "ranged between  until 2012.    His prolactin level is between 2.2-4.2 since 10/2020.      Lab Results   Component Value Date    PROLACTIN 2.2 (L) 04/10/2023    PROLACTIN 3.2 (L) 03/08/2022    PROLACTIN 2.7 (L) 04/09/2021    PROLACTIN 4.2 10/21/2020    PROLACTIN 20.0 (H) 12/17/2012       Secondary hypogonadism    Patient is on AndroGel 2 packets every day.    Sometimes forgets to use it.     Lab Results   Component Value Date    TESTOSTERONE 100 (L) 04/26/2023    TESTOSTERONE 16.1 04/26/2023    PSA 0.93 02/07/2013    AST 20 07/24/2023    ALT 21 07/24/2023    HGB 14.1 04/10/2023    HCT 42.4 04/10/2023       Lab Results   Component Value Date    PSATOTAL 2.4 04/10/2023       Diabetes Hx:  Diagnosed w/ DM: 2003  Complications:   Retinopathy: No.  History of retinal detachment with vitrectomy.   Last eye exam: : 06/05/2023)  Neuropathy: Yes on gabapentin.   Last foot exam: : 11/05/2020).   Nephropathy: No  Cardiovascular: CAD s/p PCI, MI in 2017  DKA/HHS: No    Severe Hypoglycemia: No, Hypoglycemia unawareness: No  Hypoglycemic episodes: No    Current meds:   metformin  mg, 2 tablets BID.    Compliance with meds: Yes     Previous meds:  None     Home glucose checks: None    Diet/Exercise: 2 meals, skips breakfast.   Active.     Diabetes education:       Last A1c:   Lab Results   Component Value Date    HGBA1C 6.2 (H) 07/24/2023    HGBA1C 6.3 (H) 01/06/2023    HGBA1C 6.1 (H) 09/13/2022       Microalbumin:   Lab Results   Component Value Date    LABMICR 7.0 04/10/2023    CREATRANDUR 100.0 04/10/2023    MICALBCREAT 7.0 04/10/2023     Lab Results   Component Value Date    EGFRNORACEVR >60.0 07/24/2023    CREATININE 0.9 07/24/2023       Lipids:   Lab Results   Component Value Date    CHOL 146 04/10/2023    TRIG 111 04/10/2023    HDL 42 04/10/2023    LDLCALC 81.8 04/10/2023    CHOLHDL 28.8 04/10/2023       TSH:  Lab Results   Component Value Date    TSH 2.281 03/08/2022       Vitamin B12 No results found for: "V12" "     Lab Results   Component Value Date    HGB 14.1 04/10/2023          Aspirin: Yes  Statins: Yes  ACEI/ARB: Yes on lisinopril  HTN:  On medications.    B12 in 4/2023: 491     FHx of DM: Yes, Heart disease: Yes      Primary hyperparathyroidism    Status post parathyroidectomy in 2007.  Patients screening DXA from 10/20 was normal.  He has recent kidney stones.  He has prior history of kidney stones.    Lab Results   Component Value Date    PTH 46.6 04/10/2023    PTH 36.3 03/08/2022    PTH 29.0 03/30/2021    PTH 38.0 10/19/2020     (H) 09/26/2006    CALCIUM 9.4 07/24/2023    [id number] 47 04/10/2023       ROS: see HPI     Objective:     Physical Exam     /77 (BP Location: Left arm, Patient Position: Sitting, BP Method: Medium (Automatic))   Pulse 68   Wt 79.5 kg (175 lb 4.3 oz)   SpO2 (!) 94%   BMI 25.88 kg/m²     Wt Readings from Last 3 Encounters:   08/10/23 79.5 kg (175 lb 4.3 oz)   07/12/23 80.6 kg (177 lb 11.1 oz)   04/20/23 78.9 kg (174 lb)       Physical Exam  Constitutional:       General: He is not in acute distress.     Appearance: He is not ill-appearing.   HENT:      Head: Normocephalic and atraumatic.   Eyes:      Conjunctiva/sclera: Conjunctivae normal.   Cardiovascular:      Rate and Rhythm: Normal rate.   Pulmonary:      Effort: Pulmonary effort is normal.   Musculoskeletal:      Cervical back: Neck supple.   Neurological:      Mental Status: He is alert and oriented to person, place, and time.          LABORATORY REVIEW:  See HPI for other labs reviewed today      Chemistry        Component Value Date/Time     07/24/2023 1102    K 4.7 07/24/2023 1102     07/24/2023 1102    CO2 27 07/24/2023 1102    BUN 15 07/24/2023 1102    CREATININE 0.9 07/24/2023 1102     (H) 07/24/2023 1102        Component Value Date/Time    CALCIUM 9.4 07/24/2023 1102    ALKPHOS 69 07/24/2023 1102    AST 20 07/24/2023 1102    ALT 21 07/24/2023 1102    BILITOT 0.7 07/24/2023 1102     ESTGFRAFRICA >60.0 06/27/2022 1534    EGFRNONAA >60.0 06/27/2022 1534          Lab Results   Component Value Date    HGBA1C 6.2 (H) 07/24/2023    HGBA1C 6.3 (H) 01/06/2023    HGBA1C 6.1 (H) 09/13/2022     Other labs reviewed today in HPI    Assessment/Plan:     Problem List Items Addressed This Visit          Endocrine    Prolactinoma       Macroprolactinoma Dx in 2003  MRI from 05/2022 showed focally expanded sella with complex cyst at the site of the previously treated pituitary mass reported to reflect a prolactinoma. Overall size and configuration appears very similar to the prior examination. No new intracranial mass effect. Persistent inferior displacement of the optic chiasm and pre chiasmatic optic nerves may reflect tethering or ptosis.  Patient follows up with Neuro-Ophthalmology.    Patient is on Dostinex for the prolactinoma, 0.5 mg 2 x weekly.  Prolactin level in 04/2023 was 2.2.  Patient is currently asymptomatic.  We will continue to monitor his prolactin and MRI pituitary periodically.               Type 2 diabetes mellitus without complication, without long-term current use of insulin       Recent A1c was 6.2%.    Continue current diabetes regimen.    Continue good diet and lifestyle modifications for diabetes management      Complications:  Follow up for regular diabetes eye exam  Daily self examination of feet  Microalbumin: Monitor.    Last A1c:   Lab Results   Component Value Date    HGBA1C 6.2 (H) 07/24/2023       microalbumin:   Lab Results   Component Value Date    LABMICR 7.0 04/10/2023    CREATRANDUR 100.0 04/10/2023    MICALBCREAT 7.0 04/10/2023       Lipids:   Lab Results   Component Value Date    CHOL 146 04/10/2023    TRIG 111 04/10/2023    HDL 42 04/10/2023    LDLCALC 81.8 04/10/2023    CHOLHDL 28.8 04/10/2023            Primary hyperparathyroidism       Status post parathyroidectomy in 2007.  Parathyroid and calcium has been normal.           Hypogonadism in male       Continue  testosterone replacement.  Patient says he sometimes forgets to use it.  Continue to monitor his testosterone levels.  Monitor PSA, LFTs, HGB, HCT.               Follow up in about 6 months (around 2/10/2024).       Nell Hunter MD

## 2023-08-21 NOTE — ASSESSMENT & PLAN NOTE
Macroprolactinoma Dx in 2003  MRI from 05/2022 showed focally expanded sella with complex cyst at the site of the previously treated pituitary mass reported to reflect a prolactinoma. Overall size and configuration appears very similar to the prior examination. No new intracranial mass effect. Persistent inferior displacement of the optic chiasm and pre chiasmatic optic nerves may reflect tethering or ptosis.  Patient follows up with Neuro-Ophthalmology.    Patient is on Dostinex for the prolactinoma, 0.5 mg 2 x weekly.  Prolactin level in 04/2023 was 2.2.  Patient is currently asymptomatic.  We will continue to monitor his prolactin and MRI pituitary periodically.

## 2023-08-21 NOTE — PROGRESS NOTES
Subjective:      Patient ID: Sadiq Dhillon is a 73 y.o. male.    Chief Complaint: Diabetes Mellitus (Pcp - Erlin Mcpherson MD - 7/12/2023)    Sadiq is a 73 y.o. male who presents to the podiatry clinic  with complaint of  right big toe pain. Onset of the symptoms was several days ago. Precipitating event: injured   . Current symptoms include: ability to bear weight, but with some pain. Aggravating factors: any weight bearing. Symptoms have progressed to a point and plateaued. Patient has had no prior foot problems. Evaluation to date: none. Treatment to date: none. Patients rates pain 5/10 on pain scale.    Review of Systems   Constitutional: Negative for chills, fever and malaise/fatigue.   HENT:  Negative for hearing loss.    Cardiovascular:  Negative for claudication.   Respiratory:  Negative for shortness of breath.    Skin:  Negative for flushing and rash.   Musculoskeletal:  Negative for joint pain and myalgias.   Neurological:  Negative for loss of balance, numbness, paresthesias and sensory change.   Psychiatric/Behavioral:  Negative for altered mental status.            Objective:      Physical Exam  Vitals reviewed.   Cardiovascular:      Pulses:           Dorsalis pedis pulses are 2+ on the right side and 2+ on the left side.        Posterior tibial pulses are 2+ on the right side and 2+ on the left side.      Comments: No edema noted b/L  Musculoskeletal:      Comments:     POP to right hallux  1st MPJ ROM, normal   Feet:      Right foot:      Protective Sensation: 5 sites tested.  5 sites sensed.      Left foot:      Protective Sensation: 5 sites tested.  5 sites sensed.   Skin:     General: Skin is warm.      Comments: Normal skin tugor noted.   No open lesion noted b/L  Skin temp is warm to warm from proximal to distal b/L.  Webspaces clean, dry, and intact     Neurological:      Mental Status: He is alert.      Comments: Gross sensation intact b/L               Assessment:       Encounter  Diagnosis   Name Primary?    Pain of toe of right foot Yes         Plan:       Sadiq was seen today for diabetes mellitus.    Diagnoses and all orders for this visit:    Pain of toe of right foot  -     X-Ray Toe 2 or More Views Right; Future      I counseled the patient on his conditions, their implications and medical management.  Medical records reviewed  X-rays taken and reviewed, no fx or dislocations noted.   Pt advised toe is likely bruised  Pt advised to decrease activity while toe is painful    Pt instructed to purchase OTC Voltaren gel 1%, use on affected area. Pt advised discontinue usage if any adverse effects should occur.   Call or return to clinic prn if these symptoms worsen or fail to improve as anticipated.      .

## 2023-08-22 ENCOUNTER — PATIENT MESSAGE (OUTPATIENT)
Dept: PODIATRY | Facility: CLINIC | Age: 73
End: 2023-08-22
Payer: COMMERCIAL

## 2023-08-23 LAB — HEMOCCULT STL QL IA: NEGATIVE

## 2023-08-24 ENCOUNTER — OFFICE VISIT (OUTPATIENT)
Dept: PODIATRY | Facility: CLINIC | Age: 73
End: 2023-08-24
Payer: COMMERCIAL

## 2023-08-24 DIAGNOSIS — M79.674 PAIN OF TOE OF RIGHT FOOT: Primary | ICD-10-CM

## 2023-08-24 PROCEDURE — 99213 PR OFFICE/OUTPT VISIT, EST, LEVL III, 20-29 MIN: ICD-10-PCS | Mod: S$GLB,,, | Performed by: PODIATRIST

## 2023-08-24 PROCEDURE — 99213 OFFICE O/P EST LOW 20 MIN: CPT | Mod: S$GLB,,, | Performed by: PODIATRIST

## 2023-08-24 PROCEDURE — 3061F PR NEG MICROALBUMINURIA RESULT DOCUMENTED/REVIEW: ICD-10-PCS | Mod: CPTII,S$GLB,, | Performed by: PODIATRIST

## 2023-08-24 PROCEDURE — 4010F ACE/ARB THERAPY RXD/TAKEN: CPT | Mod: CPTII,S$GLB,, | Performed by: PODIATRIST

## 2023-08-24 PROCEDURE — 99999 PR PBB SHADOW E&M-EST. PATIENT-LVL I: CPT | Mod: PBBFAC,,, | Performed by: PODIATRIST

## 2023-08-24 PROCEDURE — 3066F NEPHROPATHY DOC TX: CPT | Mod: CPTII,S$GLB,, | Performed by: PODIATRIST

## 2023-08-24 PROCEDURE — 1126F AMNT PAIN NOTED NONE PRSNT: CPT | Mod: CPTII,S$GLB,, | Performed by: PODIATRIST

## 2023-08-24 PROCEDURE — 1126F PR PAIN SEVERITY QUANTIFIED, NO PAIN PRESENT: ICD-10-PCS | Mod: CPTII,S$GLB,, | Performed by: PODIATRIST

## 2023-08-24 PROCEDURE — 99999 PR PBB SHADOW E&M-EST. PATIENT-LVL I: ICD-10-PCS | Mod: PBBFAC,,, | Performed by: PODIATRIST

## 2023-08-24 PROCEDURE — 4010F PR ACE/ARB THEARPY RXD/TAKEN: ICD-10-PCS | Mod: CPTII,S$GLB,, | Performed by: PODIATRIST

## 2023-08-24 PROCEDURE — 1157F PR ADVANCE CARE PLAN OR EQUIV PRESENT IN MEDICAL RECORD: ICD-10-PCS | Mod: CPTII,S$GLB,, | Performed by: PODIATRIST

## 2023-08-24 PROCEDURE — 1157F ADVNC CARE PLAN IN RCRD: CPT | Mod: CPTII,S$GLB,, | Performed by: PODIATRIST

## 2023-08-24 PROCEDURE — 3061F NEG MICROALBUMINURIA REV: CPT | Mod: CPTII,S$GLB,, | Performed by: PODIATRIST

## 2023-08-24 PROCEDURE — 3066F PR DOCUMENTATION OF TREATMENT FOR NEPHROPATHY: ICD-10-PCS | Mod: CPTII,S$GLB,, | Performed by: PODIATRIST

## 2023-08-24 PROCEDURE — 3044F PR MOST RECENT HEMOGLOBIN A1C LEVEL <7.0%: ICD-10-PCS | Mod: CPTII,S$GLB,, | Performed by: PODIATRIST

## 2023-08-24 PROCEDURE — 3044F HG A1C LEVEL LT 7.0%: CPT | Mod: CPTII,S$GLB,, | Performed by: PODIATRIST

## 2023-09-17 ENCOUNTER — PATIENT MESSAGE (OUTPATIENT)
Dept: GASTROENTEROLOGY | Facility: CLINIC | Age: 73
End: 2023-09-17
Payer: COMMERCIAL

## 2023-09-17 ENCOUNTER — PATIENT MESSAGE (OUTPATIENT)
Dept: UROLOGY | Facility: CLINIC | Age: 73
End: 2023-09-17
Payer: COMMERCIAL

## 2023-09-18 ENCOUNTER — HOSPITAL ENCOUNTER (OUTPATIENT)
Dept: RADIOLOGY | Facility: HOSPITAL | Age: 73
Discharge: HOME | End: 2023-09-18
Attending: UROLOGY
Payer: COMMERCIAL

## 2023-09-18 ENCOUNTER — PATIENT MESSAGE (OUTPATIENT)
Dept: PRIMARY CARE CLINIC | Facility: CLINIC | Age: 73
End: 2023-09-18
Payer: COMMERCIAL

## 2023-09-18 ENCOUNTER — OFFICE VISIT (OUTPATIENT)
Dept: UROLOGY | Facility: CLINIC | Age: 73
End: 2023-09-18
Payer: COMMERCIAL

## 2023-09-18 VITALS
SYSTOLIC BLOOD PRESSURE: 138 MMHG | HEIGHT: 69 IN | HEART RATE: 83 BPM | BODY MASS INDEX: 26.45 KG/M2 | WEIGHT: 178.56 LBS | DIASTOLIC BLOOD PRESSURE: 79 MMHG

## 2023-09-18 DIAGNOSIS — N22 CALCULUS OF URINARY TRACT IN DISEASES CLASSIFIED ELSEWHERE: ICD-10-CM

## 2023-09-18 DIAGNOSIS — K21.00 GASTROESOPHAGEAL REFLUX DISEASE WITH ESOPHAGITIS WITHOUT HEMORRHAGE: Primary | ICD-10-CM

## 2023-09-18 DIAGNOSIS — N20.1 URETERAL CALCULUS: Primary | ICD-10-CM

## 2023-09-18 DIAGNOSIS — N20.1 URETERAL CALCULUS: ICD-10-CM

## 2023-09-18 PROCEDURE — 3288F FALL RISK ASSESSMENT DOCD: CPT | Mod: CPTII,S$GLB,, | Performed by: UROLOGY

## 2023-09-18 PROCEDURE — 1157F PR ADVANCE CARE PLAN OR EQUIV PRESENT IN MEDICAL RECORD: ICD-10-PCS | Mod: CPTII,S$GLB,, | Performed by: UROLOGY

## 2023-09-18 PROCEDURE — 4010F ACE/ARB THERAPY RXD/TAKEN: CPT | Mod: CPTII,S$GLB,, | Performed by: UROLOGY

## 2023-09-18 PROCEDURE — 99999 PR PBB SHADOW E&M-EST. PATIENT-LVL IV: ICD-10-PCS | Mod: PBBFAC,,, | Performed by: UROLOGY

## 2023-09-18 PROCEDURE — 3066F PR DOCUMENTATION OF TREATMENT FOR NEPHROPATHY: ICD-10-PCS | Mod: CPTII,S$GLB,, | Performed by: UROLOGY

## 2023-09-18 PROCEDURE — 1101F PR PT FALLS ASSESS DOC 0-1 FALLS W/OUT INJ PAST YR: ICD-10-PCS | Mod: CPTII,S$GLB,, | Performed by: UROLOGY

## 2023-09-18 PROCEDURE — 1101F PT FALLS ASSESS-DOCD LE1/YR: CPT | Mod: CPTII,S$GLB,, | Performed by: UROLOGY

## 2023-09-18 PROCEDURE — 3061F NEG MICROALBUMINURIA REV: CPT | Mod: CPTII,S$GLB,, | Performed by: UROLOGY

## 2023-09-18 PROCEDURE — 3044F HG A1C LEVEL LT 7.0%: CPT | Mod: CPTII,S$GLB,, | Performed by: UROLOGY

## 2023-09-18 PROCEDURE — 4010F PR ACE/ARB THEARPY RXD/TAKEN: ICD-10-PCS | Mod: CPTII,S$GLB,, | Performed by: UROLOGY

## 2023-09-18 PROCEDURE — 3061F PR NEG MICROALBUMINURIA RESULT DOCUMENTED/REVIEW: ICD-10-PCS | Mod: CPTII,S$GLB,, | Performed by: UROLOGY

## 2023-09-18 PROCEDURE — 74018 XR ABDOMEN AP 1 VIEW: ICD-10-PCS | Mod: 26,,, | Performed by: RADIOLOGY

## 2023-09-18 PROCEDURE — 3078F DIAST BP <80 MM HG: CPT | Mod: CPTII,S$GLB,, | Performed by: UROLOGY

## 2023-09-18 PROCEDURE — 1157F ADVNC CARE PLAN IN RCRD: CPT | Mod: CPTII,S$GLB,, | Performed by: UROLOGY

## 2023-09-18 PROCEDURE — 3075F PR MOST RECENT SYSTOLIC BLOOD PRESS GE 130-139MM HG: ICD-10-PCS | Mod: CPTII,S$GLB,, | Performed by: UROLOGY

## 2023-09-18 PROCEDURE — 3008F PR BODY MASS INDEX (BMI) DOCUMENTED: ICD-10-PCS | Mod: CPTII,S$GLB,, | Performed by: UROLOGY

## 2023-09-18 PROCEDURE — 3044F PR MOST RECENT HEMOGLOBIN A1C LEVEL <7.0%: ICD-10-PCS | Mod: CPTII,S$GLB,, | Performed by: UROLOGY

## 2023-09-18 PROCEDURE — 3078F PR MOST RECENT DIASTOLIC BLOOD PRESSURE < 80 MM HG: ICD-10-PCS | Mod: CPTII,S$GLB,, | Performed by: UROLOGY

## 2023-09-18 PROCEDURE — 1160F PR REVIEW ALL MEDS BY PRESCRIBER/CLIN PHARMACIST DOCUMENTED: ICD-10-PCS | Mod: CPTII,S$GLB,, | Performed by: UROLOGY

## 2023-09-18 PROCEDURE — 3066F NEPHROPATHY DOC TX: CPT | Mod: CPTII,S$GLB,, | Performed by: UROLOGY

## 2023-09-18 PROCEDURE — 1125F AMNT PAIN NOTED PAIN PRSNT: CPT | Mod: CPTII,S$GLB,, | Performed by: UROLOGY

## 2023-09-18 PROCEDURE — 3008F BODY MASS INDEX DOCD: CPT | Mod: CPTII,S$GLB,, | Performed by: UROLOGY

## 2023-09-18 PROCEDURE — 1159F PR MEDICATION LIST DOCUMENTED IN MEDICAL RECORD: ICD-10-PCS | Mod: CPTII,S$GLB,, | Performed by: UROLOGY

## 2023-09-18 PROCEDURE — 74018 RADEX ABDOMEN 1 VIEW: CPT | Mod: 26,,, | Performed by: RADIOLOGY

## 2023-09-18 PROCEDURE — 99213 PR OFFICE/OUTPT VISIT, EST, LEVL III, 20-29 MIN: ICD-10-PCS | Mod: S$GLB,,, | Performed by: UROLOGY

## 2023-09-18 PROCEDURE — 1125F PR PAIN SEVERITY QUANTIFIED, PAIN PRESENT: ICD-10-PCS | Mod: CPTII,S$GLB,, | Performed by: UROLOGY

## 2023-09-18 PROCEDURE — 74018 RADEX ABDOMEN 1 VIEW: CPT | Mod: TC

## 2023-09-18 PROCEDURE — 99213 OFFICE O/P EST LOW 20 MIN: CPT | Mod: S$GLB,,, | Performed by: UROLOGY

## 2023-09-18 PROCEDURE — 3288F PR FALLS RISK ASSESSMENT DOCUMENTED: ICD-10-PCS | Mod: CPTII,S$GLB,, | Performed by: UROLOGY

## 2023-09-18 PROCEDURE — 1160F RVW MEDS BY RX/DR IN RCRD: CPT | Mod: CPTII,S$GLB,, | Performed by: UROLOGY

## 2023-09-18 PROCEDURE — 99999 PR PBB SHADOW E&M-EST. PATIENT-LVL IV: CPT | Mod: PBBFAC,,, | Performed by: UROLOGY

## 2023-09-18 PROCEDURE — 1159F MED LIST DOCD IN RCRD: CPT | Mod: CPTII,S$GLB,, | Performed by: UROLOGY

## 2023-09-18 PROCEDURE — 3075F SYST BP GE 130 - 139MM HG: CPT | Mod: CPTII,S$GLB,, | Performed by: UROLOGY

## 2023-09-18 NOTE — PROGRESS NOTES
Subjective:       Patient ID: Sadiq Dhillon is a 73 y.o. male.    Chief Complaint: Flank Pain (Pt is here for right flank pain starting three weeks ago. Pt reports no urinary symptoms. Pt says symptoms remind him of history with kidney stones. )    HPI  patient well known to me who had a right mid 3rd ureteral stone.  His stone ended up being not seen on KUB in the patient was not having any pain so he was asked to follow-up with this.  He is now having right lower quadrant pain no fever chills nausea vomiting.  Pain is 1/10.  No flank pain    Past Medical History:   Diagnosis Date    Diabetes mellitus     Fuchs' corneal dystrophy     Heart attack     Hypertension     Kidney stones     Pancreatic mass     Pancreatitis     after EUS . 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, treated with dostenex(cabergoline)/ resolved    Reflux esophagitis     Tumor cells, benign        Past Surgical History:   Procedure Laterality Date    CATARACT EXTRACTION W/  INTRAOCULAR LENS IMPLANT Right 0    Dr Van     CATARACT EXTRACTION W/  INTRAOCULAR LENS IMPLANT Left 3/21/2019    Procedure: EXTRACTION, CATARACT, WITH IOL INSERTION;  Surgeon: Ra Van MD;  Location: [facility];  Service: Ophthalmology;  Laterality: Left;    CORNEAL TRANSPLANT Right     Dr Van     DESCEMETS STRIPPING AUTOMATED ENDOTHELIAL KERATOPLASTY Left 3/21/2019    Procedure: TRANSPLANT, PARTIAL-THICKNESS, CORNEA, USING DSAEK TECHNIQUE;  Surgeon: Ra Van MD;  Location: [facility];  Service: Ophthalmology;  Laterality: Left;  DSEK    REPAIR OF RETINAL DETACHMENT WITH VITRECTOMY Right 7/18/2018    Procedure: REPAIR, RETINAL DETACHMENT, WITH VITRECTOMY;  Surgeon: SHUBHAM Patel MD;  Location: [address];  Service: Ophthalmology;  Laterality: Right;  40 min    REPAIR OF RETINAL DETACHMENT WITH  VITRECTOMY Left 8/8/2018    Procedure: REPAIR, RETINAL DETACHMENT, WITH VITRECTOMY;  Surgeon: SHUBHAM Patel MD;  Location: [facility] OR [address] [place] [address];  Service: Ophthalmology;  Laterality: Left;  40 min    RHINOPLASTY TIP  1975    THYROIDECTOMY  2007    UPPER ENDOSCOPIC ULTRASOUND W/ FNA      with resultant pancreatitis       Family History   Problem Relation Age of Onset    Hypertension Mother     Heart disease Mother     Heart disease Father     Cataracts Father     Stroke Father     Diabetes Father     Diabetes Paternal Uncle     Stroke Maternal Grandmother     Blindness Neg Hx     Glaucoma Neg Hx     Macular degeneration Neg Hx     Retinal detachment Neg Hx     Strabismus Neg Hx     Thyroid disease Neg Hx     Cancer Neg Hx     Amblyopia Neg Hx        Social History     Socioeconomic History    Marital status: Single   Tobacco Use    Smoking status: Never     Passive exposure: Never    Smokeless tobacco: Never   Substance and Sexual Activity    Alcohol use: Yes     Comment: social    Drug use: No    Sexual activity: Not Currently       Allergies:  Grass pollen-june grass standard and House dust    Medications:    Current Outpatient Medications:     aspirin (ECOTRIN) 81 MG EC tablet, Take 81 mg by mouth every morning. , Disp: , Rfl:     atorvastatin (LIPITOR) 80 MG tablet, Take 1 tablet (80 mg total) by mouth once daily., Disp: 90 tablet, Rfl: 3    cabergoline (DOSTINEX) 0.5 mg tablet, TAKE 1 TABLET(0.5 MG) BY MOUTH 2 TIMES A WEEK, Disp: 24 tablet, Rfl: 3    chlorhexidine (PERIDEX) 0.12 % solution, SMARTSIG:By Mouth, Disp: , Rfl:     CONTOUR NEXT TEST STRIPS Strp, UTD QID IN VITRO, Disp: , Rfl: 3    famotidine (PEPCID) 20 MG tablet, Take by mouth., Disp: , Rfl:     fexofenadine-pseudoephedrine  mg (ALLEGRA-D)  mg per tablet, Take 1 tablet by mouth., Disp: , Rfl:     gabapentin (NEURONTIN) 100 MG capsule, Take 1 capsule three times a day,  Takes one capsule every morning, Disp: , Rfl: 3     HYDROcodone-acetaminophen (NORCO) 5-325 mg per tablet, Take 1 tablet by mouth every 4 (four) hours as needed for Pain., Disp: 15 tablet, Rfl: 0    ketorolac (TORADOL) 10 mg tablet, Take 1 tablet (10 mg total) by mouth every 6 (six) hours., Disp: 15 tablet, Rfl: 0    lisinopril (PRINIVIL,ZESTRIL) 2.5 MG tablet, Take 2.5 mg by mouth every morning. , Disp: , Rfl:     loperamide (IMODIUM) 2 mg capsule, Take 2 mg by mouth 4 (four) times daily as needed for Diarrhea., Disp: , Rfl:     metFORMIN (GLUCOPHAGE-XR) 500 MG ER 24hr tablet, TAKE 2 TABLETS BY MOUTH TWICE DAILY, Disp: 360 tablet, Rfl: 3    metoprolol succinate (TOPROL-XL) 25 MG 24 hr tablet, Take 1 tablet (25 mg total) by mouth nightly., Disp: 60 tablet, Rfl: 6    tamsulosin (FLOMAX) 0.4 mg Cap, Take 1 capsule (0.4 mg total) by mouth once daily., Disp: 10 capsule, Rfl: 0    testosterone (ANDROGEL) 1 % (50 mg/5 gram) GlPk, APPLY CONTENTS OF 2 PACKETS TOPICALLY TO SKIN EVERY DAY, Disp: 900 g, Rfl: 3    traMADoL (ULTRAM) 50 mg tablet, Take 50 mg by mouth., Disp: , Rfl:     Review of Systems   Constitutional:  Negative for activity change, appetite change, chills, diaphoresis, fatigue, fever and unexpected weight change.   HENT:  Negative for congestion, dental problem, hearing loss, mouth sores, postnasal drip, rhinorrhea, sinus pressure and trouble swallowing.    Eyes:  Negative for pain, discharge and itching.   Respiratory:  Negative for apnea, cough, choking, chest tightness, shortness of breath and wheezing.    Cardiovascular:  Negative for chest pain, palpitations and leg swelling.   Gastrointestinal:  Negative for abdominal distention, abdominal pain, anal bleeding, blood in stool, constipation, diarrhea, nausea, rectal pain and vomiting.   Endocrine: Negative for polydipsia and polyuria.   Genitourinary:  Negative for decreased urine volume, difficulty urinating, dysuria, enuresis, flank pain, frequency, genital sores, hematuria, penile discharge, penile pain,  penile swelling, scrotal swelling, testicular pain and urgency.   Musculoskeletal:  Negative for arthralgias, back pain and myalgias.   Skin:  Negative for color change, rash and wound.   Neurological:  Negative for dizziness, syncope, speech difficulty, light-headedness and headaches.   Hematological:  Negative for adenopathy. Does not bruise/bleed easily.   Psychiatric/Behavioral:  Negative for behavioral problems, confusion, hallucinations and sleep disturbance.        Objective:      Physical Exam  Constitutional:       Appearance: He is well-developed.   HENT:      Head: Normocephalic.   Cardiovascular:      Rate and Rhythm: Normal rate.   Pulmonary:      Effort: Pulmonary effort is normal.   Abdominal:      Palpations: Abdomen is soft.   Genitourinary:     Comments: No abdominal pain no rebound tenderness no change in bowel movements urinalysis is neg  Skin:     General: Skin is warm.   Neurological:      Mental Status: He is alert.         Assessment:       1. Ureteral calculus    2. Calculus of urinary tract in diseases classified elsewhere        Plan:       Sadiq was seen today for flank pain.    Diagnoses and all orders for this visit:    Ureteral calculus  -     X-Ray Abdomen AP 1 View; Future    Calculus of urinary tract in diseases classified elsewhere  -     CT Renal Stone Study ABD Pelvis WO; Future        Follow-up with CT and KUB  patient is leaving for [place] on October 20th and would like to get this taken care of his quickly as possible

## 2023-09-19 ENCOUNTER — TELEPHONE (OUTPATIENT)
Dept: PRIMARY CARE CLINIC | Facility: CLINIC | Age: 73
End: 2023-09-19
Payer: COMMERCIAL

## 2023-09-19 ENCOUNTER — PATIENT MESSAGE (OUTPATIENT)
Dept: UROLOGY | Facility: CLINIC | Age: 73
End: 2023-09-19
Payer: COMMERCIAL

## 2023-09-19 NOTE — TELEPHONE ENCOUNTER
----- Message from Bcuk Davey sent at 9/19/2023  8:35 AM CDT -----  Regarding: Advice  Contact: [phone]  Patient is calling to speak with someone in office returning miss call. Please contact pt

## 2023-09-20 ENCOUNTER — HOSPITAL ENCOUNTER (OUTPATIENT)
Dept: RADIOLOGY | Facility: HOSPITAL | Age: 73
Discharge: HOME | End: 2023-09-20
Attending: UROLOGY
Payer: COMMERCIAL

## 2023-09-20 DIAGNOSIS — N22 CALCULUS OF URINARY TRACT IN DISEASES CLASSIFIED ELSEWHERE: ICD-10-CM

## 2023-09-20 PROCEDURE — 74176 CT RENAL STONE STUDY ABD PELVIS WO: ICD-10-PCS | Mod: 26,,, | Performed by: INTERNAL MEDICINE

## 2023-09-20 PROCEDURE — 74176 CT ABD & PELVIS W/O CONTRAST: CPT | Mod: 26,,, | Performed by: INTERNAL MEDICINE

## 2023-09-20 PROCEDURE — 74176 CT ABD & PELVIS W/O CONTRAST: CPT | Mod: TC

## 2023-09-27 ENCOUNTER — OFFICE VISIT (OUTPATIENT)
Dept: UROLOGY | Facility: CLINIC | Age: 73
End: 2023-09-27
Payer: COMMERCIAL

## 2023-09-27 VITALS
WEIGHT: 176 LBS | HEIGHT: 69 IN | HEART RATE: 81 BPM | SYSTOLIC BLOOD PRESSURE: 135 MMHG | BODY MASS INDEX: 26.07 KG/M2 | DIASTOLIC BLOOD PRESSURE: 78 MMHG

## 2023-09-27 DIAGNOSIS — N20.0 RENAL CALCULI: Primary | ICD-10-CM

## 2023-09-27 PROCEDURE — 99213 OFFICE O/P EST LOW 20 MIN: CPT | Mod: S$GLB,,, | Performed by: UROLOGY

## 2023-09-27 PROCEDURE — 3044F PR MOST RECENT HEMOGLOBIN A1C LEVEL <7.0%: ICD-10-PCS | Mod: CPTII,S$GLB,, | Performed by: UROLOGY

## 2023-09-27 PROCEDURE — 1159F MED LIST DOCD IN RCRD: CPT | Mod: CPTII,S$GLB,, | Performed by: UROLOGY

## 2023-09-27 PROCEDURE — 99999 PR PBB SHADOW E&M-EST. PATIENT-LVL V: ICD-10-PCS | Mod: PBBFAC,,, | Performed by: UROLOGY

## 2023-09-27 PROCEDURE — 3008F BODY MASS INDEX DOCD: CPT | Mod: CPTII,S$GLB,, | Performed by: UROLOGY

## 2023-09-27 PROCEDURE — 3066F NEPHROPATHY DOC TX: CPT | Mod: CPTII,S$GLB,, | Performed by: UROLOGY

## 2023-09-27 PROCEDURE — 1157F ADVNC CARE PLAN IN RCRD: CPT | Mod: CPTII,S$GLB,, | Performed by: UROLOGY

## 2023-09-27 PROCEDURE — 3008F PR BODY MASS INDEX (BMI) DOCUMENTED: ICD-10-PCS | Mod: CPTII,S$GLB,, | Performed by: UROLOGY

## 2023-09-27 PROCEDURE — 1160F RVW MEDS BY RX/DR IN RCRD: CPT | Mod: CPTII,S$GLB,, | Performed by: UROLOGY

## 2023-09-27 PROCEDURE — 99999 PR PBB SHADOW E&M-EST. PATIENT-LVL V: CPT | Mod: PBBFAC,,, | Performed by: UROLOGY

## 2023-09-27 PROCEDURE — 1101F PT FALLS ASSESS-DOCD LE1/YR: CPT | Mod: CPTII,S$GLB,, | Performed by: UROLOGY

## 2023-09-27 PROCEDURE — 3078F DIAST BP <80 MM HG: CPT | Mod: CPTII,S$GLB,, | Performed by: UROLOGY

## 2023-09-27 PROCEDURE — 1160F PR REVIEW ALL MEDS BY PRESCRIBER/CLIN PHARMACIST DOCUMENTED: ICD-10-PCS | Mod: CPTII,S$GLB,, | Performed by: UROLOGY

## 2023-09-27 PROCEDURE — 3078F PR MOST RECENT DIASTOLIC BLOOD PRESSURE < 80 MM HG: ICD-10-PCS | Mod: CPTII,S$GLB,, | Performed by: UROLOGY

## 2023-09-27 PROCEDURE — 99213 PR OFFICE/OUTPT VISIT, EST, LEVL III, 20-29 MIN: ICD-10-PCS | Mod: S$GLB,,, | Performed by: UROLOGY

## 2023-09-27 PROCEDURE — 3061F NEG MICROALBUMINURIA REV: CPT | Mod: CPTII,S$GLB,, | Performed by: UROLOGY

## 2023-09-27 PROCEDURE — 3044F HG A1C LEVEL LT 7.0%: CPT | Mod: CPTII,S$GLB,, | Performed by: UROLOGY

## 2023-09-27 PROCEDURE — 1157F PR ADVANCE CARE PLAN OR EQUIV PRESENT IN MEDICAL RECORD: ICD-10-PCS | Mod: CPTII,S$GLB,, | Performed by: UROLOGY

## 2023-09-27 PROCEDURE — 1101F PR PT FALLS ASSESS DOC 0-1 FALLS W/OUT INJ PAST YR: ICD-10-PCS | Mod: CPTII,S$GLB,, | Performed by: UROLOGY

## 2023-09-27 PROCEDURE — 3288F PR FALLS RISK ASSESSMENT DOCUMENTED: ICD-10-PCS | Mod: CPTII,S$GLB,, | Performed by: UROLOGY

## 2023-09-27 PROCEDURE — 4010F ACE/ARB THERAPY RXD/TAKEN: CPT | Mod: CPTII,S$GLB,, | Performed by: UROLOGY

## 2023-09-27 PROCEDURE — 3061F PR NEG MICROALBUMINURIA RESULT DOCUMENTED/REVIEW: ICD-10-PCS | Mod: CPTII,S$GLB,, | Performed by: UROLOGY

## 2023-09-27 PROCEDURE — 3075F PR MOST RECENT SYSTOLIC BLOOD PRESS GE 130-139MM HG: ICD-10-PCS | Mod: CPTII,S$GLB,, | Performed by: UROLOGY

## 2023-09-27 PROCEDURE — 4010F PR ACE/ARB THEARPY RXD/TAKEN: ICD-10-PCS | Mod: CPTII,S$GLB,, | Performed by: UROLOGY

## 2023-09-27 PROCEDURE — 3075F SYST BP GE 130 - 139MM HG: CPT | Mod: CPTII,S$GLB,, | Performed by: UROLOGY

## 2023-09-27 PROCEDURE — 3288F FALL RISK ASSESSMENT DOCD: CPT | Mod: CPTII,S$GLB,, | Performed by: UROLOGY

## 2023-09-27 PROCEDURE — 3066F PR DOCUMENTATION OF TREATMENT FOR NEPHROPATHY: ICD-10-PCS | Mod: CPTII,S$GLB,, | Performed by: UROLOGY

## 2023-09-27 PROCEDURE — 1159F PR MEDICATION LIST DOCUMENTED IN MEDICAL RECORD: ICD-10-PCS | Mod: CPTII,S$GLB,, | Performed by: UROLOGY

## 2023-09-27 RX ORDER — NALOXONE HYDROCHLORIDE 1 MG/ML
INJECTION INTRAMUSCULAR; INTRAVENOUS; SUBCUTANEOUS
Qty: 2 ML | Refills: 11 | Status: SHIPPED | OUTPATIENT
Start: 2023-09-27

## 2023-09-27 RX ORDER — OXYCODONE AND ACETAMINOPHEN 10; 325 MG/1; MG/1
1 TABLET ORAL EVERY 4 HOURS PRN
Qty: 15 TABLET | Refills: 0 | Status: SHIPPED | OUTPATIENT
Start: 2023-09-27 | End: 2024-01-30

## 2023-09-27 NOTE — PROGRESS NOTES
Subjective:       Patient ID: Sadiq Dhillon is a 73 y.o. male.    Chief Complaint: Nephrolithiasis (Pt reports slight pain in left lower abdomen/bladder area. Pt reports no pain or discomfort while urinating. )    HPI patient has a nonobstructing right renal stone is 3 mm in size he is feeling well.  Urinalysis is negative    Past Medical History:   Diagnosis Date    Diabetes mellitus     Fuchs' corneal dystrophy     Heart attack     Hypertension     Kidney stones     Pancreatic mass     Pancreatitis     after EUS . 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, treated with dostenex(cabergoline)/ resolved    Reflux esophagitis     Tumor cells, benign        Past Surgical History:   Procedure Laterality Date    CATARACT EXTRACTION W/  INTRAOCULAR LENS IMPLANT Right 0    Dr Van     CATARACT EXTRACTION W/  INTRAOCULAR LENS IMPLANT Left 3/21/2019    Procedure: EXTRACTION, CATARACT, WITH IOL INSERTION;  Surgeon: Ra Van MD;  Location: [facility];  Service: Ophthalmology;  Laterality: Left;    CORNEAL TRANSPLANT Right     Dr Van     DESCEMETS STRIPPING AUTOMATED ENDOTHELIAL KERATOPLASTY Left 3/21/2019    Procedure: TRANSPLANT, PARTIAL-THICKNESS, CORNEA, USING DSAEK TECHNIQUE;  Surgeon: Ra Van MD;  Location: [facility];  Service: Ophthalmology;  Laterality: Left;  DSEK    REPAIR OF RETINAL DETACHMENT WITH VITRECTOMY Right 7/18/2018    Procedure: REPAIR, RETINAL DETACHMENT, WITH VITRECTOMY;  Surgeon: SHUBHAM Patel MD;  Location: [facility] OR [address] [place] [address];  Service: Ophthalmology;  Laterality: Right;  40 min    REPAIR OF RETINAL DETACHMENT WITH VITRECTOMY Left 8/8/2018    Procedure: REPAIR, RETINAL DETACHMENT, WITH VITRECTOMY;  Surgeon: SHUBHAM Patel MD;  Location: [facility] OR [address] [place] [address];  Service: Ophthalmology;  Laterality: Left;  40 min    RHINOPLASTY TIP  1975     THYROIDECTOMY  2007    UPPER ENDOSCOPIC ULTRASOUND W/ FNA      with resultant pancreatitis       Family History   Problem Relation Age of Onset    Hypertension Mother     Heart disease Mother     Heart disease Father     Cataracts Father     Stroke Father     Diabetes Father     Diabetes Paternal Uncle     Stroke Maternal Grandmother     Blindness Neg Hx     Glaucoma Neg Hx     Macular degeneration Neg Hx     Retinal detachment Neg Hx     Strabismus Neg Hx     Thyroid disease Neg Hx     Cancer Neg Hx     Amblyopia Neg Hx        Social History     Socioeconomic History    Marital status: Single   Tobacco Use    Smoking status: Never     Passive exposure: Never    Smokeless tobacco: Never   Substance and Sexual Activity    Alcohol use: Yes     Comment: social    Drug use: No    Sexual activity: Not Currently       Allergies:  Grass pollen-june grass standard and House dust    Medications:    Current Outpatient Medications:     aspirin (ECOTRIN) 81 MG EC tablet, Take 81 mg by mouth every morning. , Disp: , Rfl:     atorvastatin (LIPITOR) 80 MG tablet, Take 1 tablet (80 mg total) by mouth once daily., Disp: 90 tablet, Rfl: 3    cabergoline (DOSTINEX) 0.5 mg tablet, TAKE 1 TABLET(0.5 MG) BY MOUTH 2 TIMES A WEEK, Disp: 24 tablet, Rfl: 3    chlorhexidine (PERIDEX) 0.12 % solution, SMARTSIG:By Mouth, Disp: , Rfl:     CONTOUR NEXT TEST STRIPS Strp, UTD QID IN VITRO, Disp: , Rfl: 3    famotidine (PEPCID) 20 MG tablet, Take by mouth., Disp: , Rfl:     fexofenadine-pseudoephedrine  mg (ALLEGRA-D)  mg per tablet, Take 1 tablet by mouth., Disp: , Rfl:     gabapentin (NEURONTIN) 100 MG capsule, Take 1 capsule three times a day,  Takes one capsule every morning, Disp: , Rfl: 3    HYDROcodone-acetaminophen (NORCO) 5-325 mg per tablet, Take 1 tablet by mouth every 4 (four) hours as needed for Pain., Disp: 15 tablet, Rfl: 0    ketorolac (TORADOL) 10 mg tablet, Take 1 tablet (10 mg total) by mouth every 6 (six) hours.,  Disp: 15 tablet, Rfl: 0    lisinopril (PRINIVIL,ZESTRIL) 2.5 MG tablet, Take 2.5 mg by mouth every morning. , Disp: , Rfl:     loperamide (IMODIUM) 2 mg capsule, Take 2 mg by mouth 4 (four) times daily as needed for Diarrhea., Disp: , Rfl:     metFORMIN (GLUCOPHAGE-XR) 500 MG ER 24hr tablet, TAKE 2 TABLETS BY MOUTH TWICE DAILY, Disp: 360 tablet, Rfl: 3    metoprolol succinate (TOPROL-XL) 25 MG 24 hr tablet, Take 1 tablet (25 mg total) by mouth nightly., Disp: 60 tablet, Rfl: 6    naloxone (NARCAN) 1 mg/mL injection, 2 mg (1 mg per nostril) by Nasal route as needed for opioid overdose; may repeat in 3 to 5 minutes if not effective. Call 911, Disp: 2 mL, Rfl: 11    oxyCODONE-acetaminophen (PERCOCET)  mg per tablet, Take 1 tablet by mouth every 4 (four) hours as needed for Pain., Disp: 15 tablet, Rfl: 0    tamsulosin (FLOMAX) 0.4 mg Cap, Take 1 capsule (0.4 mg total) by mouth once daily., Disp: 10 capsule, Rfl: 0    testosterone (ANDROGEL) 1 % (50 mg/5 gram) GlPk, APPLY CONTENTS OF 2 PACKETS TOPICALLY TO SKIN EVERY DAY, Disp: 900 g, Rfl: 3    traMADoL (ULTRAM) 50 mg tablet, Take 50 mg by mouth., Disp: , Rfl:     Review of Systems   Constitutional:  Negative for activity change, appetite change, chills, diaphoresis, fatigue, fever and unexpected weight change.   HENT:  Negative for congestion, dental problem, hearing loss, mouth sores, postnasal drip, rhinorrhea, sinus pressure and trouble swallowing.    Eyes:  Negative for pain, discharge and itching.   Respiratory:  Negative for apnea, cough, choking, chest tightness, shortness of breath and wheezing.    Cardiovascular:  Negative for chest pain, palpitations and leg swelling.   Gastrointestinal:  Negative for abdominal distention, abdominal pain, anal bleeding, blood in stool, constipation, diarrhea, nausea, rectal pain and vomiting.   Endocrine: Negative for polydipsia and polyuria.   Genitourinary:  Negative for decreased urine volume, difficulty urinating,  dysuria, enuresis, flank pain, frequency, genital sores, hematuria, penile discharge, penile pain, penile swelling, scrotal swelling, testicular pain and urgency.   Musculoskeletal:  Negative for arthralgias, back pain and myalgias.   Skin:  Negative for color change, rash and wound.   Neurological:  Negative for dizziness, syncope, speech difficulty, light-headedness and headaches.   Hematological:  Negative for adenopathy. Does not bruise/bleed easily.   Psychiatric/Behavioral:  Negative for behavioral problems, confusion, hallucinations and sleep disturbance.        Objective:      Physical Exam  Constitutional:       Appearance: He is well-developed.   HENT:      Head: Normocephalic.   Cardiovascular:      Rate and Rhythm: Normal rate.   Pulmonary:      Effort: Pulmonary effort is normal.   Abdominal:      Palpations: Abdomen is soft.   Skin:     General: Skin is warm.   Neurological:      Mental Status: He is alert.         Assessment:       1. Renal calculi        Plan:       Sadiq was seen today for nephrolithiasis.    Diagnoses and all orders for this visit:    Renal calculi  -     oxyCODONE-acetaminophen (PERCOCET)  mg per tablet; Take 1 tablet by mouth every 4 (four) hours as needed for Pain.  -     naloxone (NARCAN) 1 mg/mL injection; 2 mg (1 mg per nostril) by Nasal route as needed for opioid overdose; may repeat in 3 to 5 minutes if not effective. Call 911       Patient was requesting some pain medicines ago and his trip to [facility] however I was unable to send this and I told him we would work on it and try and get it to him but he is not having any pain at this time

## 2023-09-28 ENCOUNTER — PATIENT MESSAGE (OUTPATIENT)
Dept: UROLOGY | Facility: CLINIC | Age: 73
End: 2023-09-28
Payer: COMMERCIAL

## 2023-10-02 ENCOUNTER — PATIENT MESSAGE (OUTPATIENT)
Dept: PRIMARY CARE CLINIC | Facility: CLINIC | Age: 73
End: 2023-10-02
Payer: COMMERCIAL

## 2023-10-04 ENCOUNTER — OFFICE VISIT (OUTPATIENT)
Dept: GASTROENTEROLOGY | Facility: CLINIC | Age: 73
End: 2023-10-04
Payer: COMMERCIAL

## 2023-10-04 VITALS — WEIGHT: 177.25 LBS | BODY MASS INDEX: 26.25 KG/M2 | HEIGHT: 69 IN

## 2023-10-04 DIAGNOSIS — R10.30 LOWER ABDOMINAL PAIN: ICD-10-CM

## 2023-10-04 DIAGNOSIS — R10.30 LOWER ABDOMINAL PAIN: Primary | ICD-10-CM

## 2023-10-04 DIAGNOSIS — R19.8 ABNORMAL BOWEL MOVEMENT: ICD-10-CM

## 2023-10-04 PROCEDURE — 3066F NEPHROPATHY DOC TX: CPT | Mod: CPTII,S$GLB,,

## 2023-10-04 PROCEDURE — 99214 PR OFFICE/OUTPT VISIT, EST, LEVL IV, 30-39 MIN: ICD-10-PCS | Mod: S$GLB,,,

## 2023-10-04 PROCEDURE — 1125F PR PAIN SEVERITY QUANTIFIED, PAIN PRESENT: ICD-10-PCS | Mod: CPTII,S$GLB,,

## 2023-10-04 PROCEDURE — 3044F PR MOST RECENT HEMOGLOBIN A1C LEVEL <7.0%: ICD-10-PCS | Mod: CPTII,S$GLB,,

## 2023-10-04 PROCEDURE — 3044F HG A1C LEVEL LT 7.0%: CPT | Mod: CPTII,S$GLB,,

## 2023-10-04 PROCEDURE — 1157F ADVNC CARE PLAN IN RCRD: CPT | Mod: CPTII,S$GLB,,

## 2023-10-04 PROCEDURE — 1157F PR ADVANCE CARE PLAN OR EQUIV PRESENT IN MEDICAL RECORD: ICD-10-PCS | Mod: CPTII,S$GLB,,

## 2023-10-04 PROCEDURE — 3288F PR FALLS RISK ASSESSMENT DOCUMENTED: ICD-10-PCS | Mod: CPTII,S$GLB,,

## 2023-10-04 PROCEDURE — 1101F PR PT FALLS ASSESS DOC 0-1 FALLS W/OUT INJ PAST YR: ICD-10-PCS | Mod: CPTII,S$GLB,,

## 2023-10-04 PROCEDURE — 1101F PT FALLS ASSESS-DOCD LE1/YR: CPT | Mod: CPTII,S$GLB,,

## 2023-10-04 PROCEDURE — 3061F PR NEG MICROALBUMINURIA RESULT DOCUMENTED/REVIEW: ICD-10-PCS | Mod: CPTII,S$GLB,,

## 2023-10-04 PROCEDURE — 1159F PR MEDICATION LIST DOCUMENTED IN MEDICAL RECORD: ICD-10-PCS | Mod: CPTII,S$GLB,,

## 2023-10-04 PROCEDURE — 3008F BODY MASS INDEX DOCD: CPT | Mod: CPTII,S$GLB,,

## 2023-10-04 PROCEDURE — 1125F AMNT PAIN NOTED PAIN PRSNT: CPT | Mod: CPTII,S$GLB,,

## 2023-10-04 PROCEDURE — 3066F PR DOCUMENTATION OF TREATMENT FOR NEPHROPATHY: ICD-10-PCS | Mod: CPTII,S$GLB,,

## 2023-10-04 PROCEDURE — 3008F PR BODY MASS INDEX (BMI) DOCUMENTED: ICD-10-PCS | Mod: CPTII,S$GLB,,

## 2023-10-04 PROCEDURE — 4010F PR ACE/ARB THEARPY RXD/TAKEN: ICD-10-PCS | Mod: CPTII,S$GLB,,

## 2023-10-04 PROCEDURE — 3288F FALL RISK ASSESSMENT DOCD: CPT | Mod: CPTII,S$GLB,,

## 2023-10-04 PROCEDURE — 1159F MED LIST DOCD IN RCRD: CPT | Mod: CPTII,S$GLB,,

## 2023-10-04 PROCEDURE — 4010F ACE/ARB THERAPY RXD/TAKEN: CPT | Mod: CPTII,S$GLB,,

## 2023-10-04 PROCEDURE — 99999 PR PBB SHADOW E&M-EST. PATIENT-LVL IV: CPT | Mod: PBBFAC,,,

## 2023-10-04 PROCEDURE — 99214 OFFICE O/P EST MOD 30 MIN: CPT | Mod: S$GLB,,,

## 2023-10-04 PROCEDURE — 99999 PR PBB SHADOW E&M-EST. PATIENT-LVL IV: ICD-10-PCS | Mod: PBBFAC,,,

## 2023-10-04 PROCEDURE — 3061F NEG MICROALBUMINURIA REV: CPT | Mod: CPTII,S$GLB,,

## 2023-10-04 RX ORDER — DICYCLOMINE HYDROCHLORIDE 10 MG/1
10 CAPSULE ORAL 3 TIMES DAILY PRN
Qty: 90 CAPSULE | Refills: 1 | Status: SHIPPED | OUTPATIENT
Start: 2023-10-04 | End: 2023-10-04

## 2023-10-04 RX ORDER — DICYCLOMINE HYDROCHLORIDE 10 MG/1
CAPSULE ORAL
Qty: 270 CAPSULE | Refills: 1 | Status: SHIPPED | OUTPATIENT
Start: 2023-10-04

## 2023-10-04 NOTE — PROGRESS NOTES
GASTROENTEROLOGY CLINIC NOTE    Reason for visit: The primary encounter diagnosis was Lower abdominal pain. A diagnosis of Abnormal bowel movement was also pertinent to this visit.  Referring provider/PCP: Erlin Mcpherson MD    HPI:  Sadiq Dhillon is a 73 y.o. male here today for intermittent abd pain and irregular BM's. He reports having sharp pain in lower abd, specifically RLQ every few months. Pain is debilitiating when it occurs. He had abd xray and CT-- large stool burden on xray, small nonobstruct renal stone on CT. He reports BM's are inconsistent. Sometimes normal, sometimes followed by diarrhea. He denies any blood in stool. FIT (-) this year. He takes gas-x which seems to help pain somewhat. Hx of pancreatitis with panc cyst, following with [facility] for management.     Prior Endoscopy:  EGD:  Colon:    (Portions of this note were dictated using voice recognition software and may contain dictation related errors in spelling/grammar/syntax not found on text review)    Review of Systems   Gastrointestinal:  Positive for abdominal pain. Negative for blood in stool.       Past Medical History: has a past medical history of Diabetes mellitus, Fuchs' corneal dystrophy, Heart attack, Hypertension, Kidney stones, Pancreatic mass, Pancreatitis, Pituitary adenoma, PONV (postoperative nausea and vomiting), Prolactinoma, Reflux esophagitis, and Tumor cells, benign.    Past Surgical History: has a past surgical history that includes Thyroidectomy (2007); Rhinoplasty tip (1975); Cataract extraction w/  intraocular lens implant (Right, 0); Corneal transplant (Right); Upper endoscopic ultrasound w/ FNA; Repair of retinal detachment with vitrectomy (Right, 7/18/2018); Repair of retinal detachment with vitrectomy (Left, 8/8/2018); Descemets stripping automated endothelial keratoplasty (Left, 3/21/2019); and Cataract extraction w/  intraocular lens implant (Left, 3/21/2019).    Home medications:   Current  Outpatient Medications on File Prior to Visit   Medication Sig Dispense Refill    aspirin (ECOTRIN) 81 MG EC tablet Take 81 mg by mouth every morning.       atorvastatin (LIPITOR) 80 MG tablet Take 1 tablet (80 mg total) by mouth once daily. 90 tablet 3    cabergoline (DOSTINEX) 0.5 mg tablet TAKE 1 TABLET(0.5 MG) BY MOUTH 2 TIMES A WEEK 24 tablet 3    CONTOUR NEXT TEST STRIPS Strp UTD QID IN VITRO  3    famotidine (PEPCID) 20 MG tablet Take by mouth.      fexofenadine-pseudoephedrine  mg (ALLEGRA-D)  mg per tablet Take 1 tablet by mouth.      gabapentin (NEURONTIN) 100 MG capsule Take 1 capsule three times a day,  Takes one capsule every morning  3    lisinopril (PRINIVIL,ZESTRIL) 2.5 MG tablet Take 2.5 mg by mouth every morning.       loperamide (IMODIUM) 2 mg capsule Take 2 mg by mouth 4 (four) times daily as needed for Diarrhea.      metoprolol succinate (TOPROL-XL) 25 MG 24 hr tablet Take 1 tablet (25 mg total) by mouth nightly. 60 tablet 6    tamsulosin (FLOMAX) 0.4 mg Cap Take 1 capsule (0.4 mg total) by mouth once daily. 10 capsule 0    testosterone (ANDROGEL) 1 % (50 mg/5 gram) GlPk APPLY CONTENTS OF 2 PACKETS TOPICALLY TO SKIN EVERY  g 3    chlorhexidine (PERIDEX) 0.12 % solution SMARTSIG:By Mouth      HYDROcodone-acetaminophen (NORCO) 5-325 mg per tablet Take 1 tablet by mouth every 4 (four) hours as needed for Pain. (Patient not taking: Reported on 10/4/2023) 15 tablet 0    ketorolac (TORADOL) 10 mg tablet Take 1 tablet (10 mg total) by mouth every 6 (six) hours. (Patient not taking: Reported on 10/4/2023) 15 tablet 0    metFORMIN (GLUCOPHAGE-XR) 500 MG ER 24hr tablet TAKE 2 TABLETS BY MOUTH TWICE DAILY 360 tablet 3    naloxone (NARCAN) 1 mg/mL injection 2 mg (1 mg per nostril) by Nasal route as needed for opioid overdose; may repeat in 3 to 5 minutes if not effective. Call 911 (Patient not taking: Reported on 10/4/2023) 2 mL 11    oxyCODONE-acetaminophen (PERCOCET)  mg per  "tablet Take 1 tablet by mouth every 4 (four) hours as needed for Pain. (Patient not taking: Reported on 10/4/2023) 15 tablet 0    traMADoL (ULTRAM) 50 mg tablet Take 50 mg by mouth.       No current facility-administered medications on file prior to visit.       Vital signs:  Ht 5' 9" (1.753 m)   Wt 80.4 kg (177 lb 4 oz)   BMI 26.18 kg/m²     Physical Exam  Constitutional:       Appearance: Normal appearance. He is normal weight.   Abdominal:      General: Abdomen is flat. There is no distension.      Palpations: Abdomen is soft.      Tenderness: There is no abdominal tenderness.   Neurological:      Mental Status: He is alert.       CT Renal Stone Study ABD Pelvis WO  Narrative: EXAMINATION:  CT RENAL STONE STUDY ABD PELVIS WO    CLINICAL HISTORY:  Nephrolithiasis, uncomplicated;rt lower quad pain; Calculus of urinary tract in diseases classified elsewhere    TECHNIQUE:  Axial CT images of the abdomen and pelvis were obtained via helical, multi detector CT technique.  No intravenous contrast material was administered.    COMPARISON:  CT renal stone 04/07/2023. MRI abdomen 01/18/2023    FINDINGS:  Heart: Coronary artery stent.  No cardiomegaly or pericardial effusion.    Lung Bases: Elevation of the left hemidiaphragm.  Stable punctate right middle lobe micronodule.    Liver: Normal in size and attenuation without focal hepatic abnormality.    Gallbladder: No calcified gallstones.    Bile Ducts: No intra or extrahepatic biliary ductal dilation.    Pancreas: Stable 1.9 cm cystic focus of the body/tail, better evaluated on prior MR. No duct dilatation.  No peripancreatic inflammatory change.    Spleen: Normal.    Adrenals: Normal.    Genitourinary: Normal in size and location.  Mild bilateral nonspecific perinephric stranding.  3 mm right nonobstructing nephrolith.  No hydroureteronephrosis.  Circumferential bladder wall thickening, likely secondary to chronic outlet obstruction.  No pericystic inflammatory " changes.    Reproductive organs: Prostatomegaly.    GI tract/Mesentery: Stomach is normal in appearance.  No evidence for bowel obstruction.  Appendix appears unremarkable    Peritoneal/retroperitoneal space: Stable appearance of elongated soft tissue density structure extending from the yady hepatis to the splenic hilum (for example axial images 33, 40, 47.  No abdominopelvic ascites or intraperitoneal free air.    Lymph nodes: No significant adenopathy.    Abdominal wall: Normal.    Vasculature: Abdominal aorta is normal in caliber.  Mild aortoiliac calcific atherosclerosis.    Bones: Degenerative change without acute displaced fracture or bony destructive process.  Remote lower right lateral rib fracture (axial image 60).  Impression: 3 mm nonobstructing right renal stone.  No hydronephrosis.    Unchanged 1.9 cm pancreatic cystic lesion.    Unchanged elongated lesion extending from the yady hepatis to the splenic hilum.    Electronically signed by resident: Jorge Hess  Date:    09/20/2023  Time:    16:02    Electronically signed by: Ton Cardenas  Date:    09/20/2023  Time:    16:59     EXAMINATION:  XR ABDOMEN AP 1 VIEW     CLINICAL HISTORY:  Calculus of ureter     TECHNIQUE:  AP View(s) of the abdomen was performed.     COMPARISON:  4/20/2023     FINDINGS:  Large amount of stool in the colon.  Punctate calculus projecting over the right renal shadow.  No free intraperitoneal air.  The regional bones are unchanged.     Impression:     Large amount of stool in the colon.  Punctate calculus projecting over the right renal shadow.  No free intraperitoneal air.  The regional bones are unchanged.        Electronically signed by: Abbey Peña  Date:                                            09/18/2023  Time:                                           20:53    I have reviewed associated labs, imaging and notes.     Assessment:  1. Lower abdominal pain    2. Abnormal bowel movement    Lower abd pain,  intermittent   CT- nonobstruct stone, otherwise unremarkable   Xray- large stool burden  BM's can be normal, or followed by diarrhea  Gas-x helps somewhat    Lower abd pain could be related to underlying stool burden/constipation. Should improve with fiber use, will also help regulate BM's. Trial bentyl to see if improvement. UTD on CRC screening, no warning signs present.     Plan:  Orders Placed This Encounter    dicyclomine (BENTYL) 10 MG capsule     Start daily fiber supplement  Bentyl PRN    F/U in 3-6 months    Shelly Pryor NP  [facility] [place]

## 2023-10-04 NOTE — PATIENT INSTRUCTIONS
Start taking fiber supplement daily   - benefiber or citrucel    Use bentyl as needed up to 3x/day for abd pain/diarrhea

## 2023-10-08 ENCOUNTER — PATIENT MESSAGE (OUTPATIENT)
Dept: UROLOGY | Facility: CLINIC | Age: 73
End: 2023-10-08
Payer: COMMERCIAL

## 2023-10-09 ENCOUNTER — PATIENT MESSAGE (OUTPATIENT)
Dept: GASTROENTEROLOGY | Facility: CLINIC | Age: 73
End: 2023-10-09
Payer: COMMERCIAL

## 2023-10-09 ENCOUNTER — HOSPITAL ENCOUNTER (EMERGENCY)
Facility: OTHER | Age: 73
Discharge: HOME | End: 2023-10-09
Attending: EMERGENCY MEDICINE
Payer: COMMERCIAL

## 2023-10-09 VITALS
BODY MASS INDEX: 25.92 KG/M2 | SYSTOLIC BLOOD PRESSURE: 132 MMHG | OXYGEN SATURATION: 95 % | RESPIRATION RATE: 18 BRPM | HEART RATE: 76 BPM | HEIGHT: 69 IN | TEMPERATURE: 98 F | WEIGHT: 175 LBS | DIASTOLIC BLOOD PRESSURE: 73 MMHG

## 2023-10-09 DIAGNOSIS — N23 RENAL COLIC: ICD-10-CM

## 2023-10-09 DIAGNOSIS — N13.30 HYDRONEPHROSIS, UNSPECIFIED HYDRONEPHROSIS TYPE: Primary | ICD-10-CM

## 2023-10-09 LAB
ALBUMIN SERPL BCP-MCNC: 4.5 G/DL (ref 3.5–5.2)
ALP SERPL-CCNC: 69 U/L (ref 55–135)
ALT SERPL W/O P-5'-P-CCNC: 24 U/L (ref 10–44)
ANION GAP SERPL CALC-SCNC: 10 MMOL/L (ref 8–16)
AST SERPL-CCNC: 20 U/L (ref 10–40)
BASOPHILS # BLD AUTO: 0.05 K/UL (ref 0–0.2)
BASOPHILS NFR BLD: 0.4 % (ref 0–1.9)
BILIRUB SERPL-MCNC: 0.6 MG/DL (ref 0.1–1)
BILIRUB UR QL STRIP: NEGATIVE
BUN SERPL-MCNC: 13 MG/DL (ref 8–23)
CALCIUM SERPL-MCNC: 10.1 MG/DL (ref 8.7–10.5)
CHLORIDE SERPL-SCNC: 105 MMOL/L (ref 95–110)
CLARITY UR: CLEAR
CO2 SERPL-SCNC: 23 MMOL/L (ref 23–29)
COLOR UR: YELLOW
CREAT SERPL-MCNC: 0.9 MG/DL (ref 0.5–1.4)
DIFFERENTIAL METHOD: ABNORMAL
EOSINOPHIL # BLD AUTO: 0.1 K/UL (ref 0–0.5)
EOSINOPHIL NFR BLD: 0.6 % (ref 0–8)
ERYTHROCYTE [DISTWIDTH] IN BLOOD BY AUTOMATED COUNT: 12.5 % (ref 11.5–14.5)
EST. GFR  (NO RACE VARIABLE): >60 ML/MIN/1.73 M^2
GLUCOSE SERPL-MCNC: 120 MG/DL (ref 70–110)
GLUCOSE UR QL STRIP: NEGATIVE
HCT VFR BLD AUTO: 46 % (ref 40–54)
HGB BLD-MCNC: 15.6 G/DL (ref 14–18)
HGB UR QL STRIP: NEGATIVE
IMM GRANULOCYTES # BLD AUTO: 0.04 K/UL (ref 0–0.04)
IMM GRANULOCYTES NFR BLD AUTO: 0.3 % (ref 0–0.5)
KETONES UR QL STRIP: NEGATIVE
LEUKOCYTE ESTERASE UR QL STRIP: NEGATIVE
LIPASE SERPL-CCNC: 96 U/L (ref 4–60)
LYMPHOCYTES # BLD AUTO: 1.2 K/UL (ref 1–4.8)
LYMPHOCYTES NFR BLD: 9.1 % (ref 18–48)
MCH RBC QN AUTO: 29.7 PG (ref 27–31)
MCHC RBC AUTO-ENTMCNC: 33.9 G/DL (ref 32–36)
MCV RBC AUTO: 88 FL (ref 82–98)
MONOCYTES # BLD AUTO: 0.7 K/UL (ref 0.3–1)
MONOCYTES NFR BLD: 5.7 % (ref 4–15)
NEUTROPHILS # BLD AUTO: 10.7 K/UL (ref 1.8–7.7)
NEUTROPHILS NFR BLD: 83.9 % (ref 38–73)
NITRITE UR QL STRIP: NEGATIVE
NRBC BLD-RTO: 0 /100 WBC
PH UR STRIP: 5 [PH] (ref 5–8)
PLATELET # BLD AUTO: 199 K/UL (ref 150–450)
PMV BLD AUTO: 8.8 FL (ref 9.2–12.9)
POTASSIUM SERPL-SCNC: 4.3 MMOL/L (ref 3.5–5.1)
PROT SERPL-MCNC: 7.4 G/DL (ref 6–8.4)
PROT UR QL STRIP: ABNORMAL
RBC # BLD AUTO: 5.26 M/UL (ref 4.6–6.2)
SODIUM SERPL-SCNC: 138 MMOL/L (ref 136–145)
SP GR UR STRIP: 1.02 (ref 1–1.03)
URN SPEC COLLECT METH UR: ABNORMAL
UROBILINOGEN UR STRIP-ACNC: NEGATIVE EU/DL
WBC # BLD AUTO: 12.7 K/UL (ref 3.9–12.7)

## 2023-10-09 PROCEDURE — 99284 EMERGENCY DEPT VISIT MOD MDM: CPT | Mod: 25

## 2023-10-09 PROCEDURE — 83690 ASSAY OF LIPASE: CPT | Performed by: NURSE PRACTITIONER

## 2023-10-09 PROCEDURE — 85025 COMPLETE CBC W/AUTO DIFF WBC: CPT | Performed by: NURSE PRACTITIONER

## 2023-10-09 PROCEDURE — 80053 COMPREHEN METABOLIC PANEL: CPT | Performed by: NURSE PRACTITIONER

## 2023-10-09 PROCEDURE — 81003 URINALYSIS AUTO W/O SCOPE: CPT | Performed by: NURSE PRACTITIONER

## 2023-10-09 RX ORDER — KETOROLAC TROMETHAMINE 10 MG/1
10 TABLET, FILM COATED ORAL EVERY 6 HOURS
Qty: 15 TABLET | Refills: 0 | Status: SHIPPED | OUTPATIENT
Start: 2023-10-09 | End: 2024-01-30

## 2023-10-09 RX ORDER — ONDANSETRON 4 MG/1
4 TABLET, ORALLY DISINTEGRATING ORAL EVERY 6 HOURS PRN
Qty: 20 TABLET | Refills: 0 | Status: SHIPPED | OUTPATIENT
Start: 2023-10-09 | End: 2023-10-13

## 2023-10-09 NOTE — FIRST PROVIDER EVALUATION
Emergency Department TeleTriage Encounter Note      CHIEF COMPLAINT    Chief Complaint   Patient presents with    Flank Pain     Pt reporting R flank pain x 1 day. Hx of kidney stones and currently has kidney stone in place. Pt reporting increased pain after taking prescribed Bentyl. Denies vomiting and fever.        VITAL SIGNS   Initial Vitals [10/09/23 1508]   BP Pulse Resp Temp SpO2   (!) 151/67 100 18 98.1 °F (36.7 °C) 95 %      MAP       --            ALLERGIES    Review of patient's allergies indicates:   Allergen Reactions    Grass pollen-june grass standard     House dust        PROVIDER TRIAGE NOTE  This is a teletriage evaluation of a 73 y.o. male presenting to the ED complaining of right flank and abd pain for one day. Denies hematuria and fever. Pmhx of kidney stones and states this feels similar.     Initial orders will be placed and care will be transferred to an alternate provider when patient is roomed for a full evaluation. Any additional orders and the final disposition will be determined by that provider.         ORDERS  Labs Reviewed   CBC W/ AUTO DIFFERENTIAL   COMPREHENSIVE METABOLIC PANEL   LIPASE   URINALYSIS, REFLEX TO URINE CULTURE       ED Orders (720h ago, onward)      Start Ordered     Status Ordering Provider    10/09/23 1517 10/09/23 1516  Vital signs  Every 2 hours         Ordered SHAWNEE BOWSER N.    10/09/23 1516 10/09/23 1516  Diet NPO  Diet effective now         Ordered SHAWNEE BOWSER N.    10/09/23 1516 10/09/23 1516  Insert peripheral IV  Once         Ordered SHAWNEE BOWSER N.    10/09/23 1516 10/09/23 1516  CBC W/ AUTO DIFFERENTIAL  STAT         Ordered SHAWNEE BOWSER N.    10/09/23 1516 10/09/23 1516  Comp. Metabolic Panel  STAT         Ordered SHAWNEE BOWSER N.    10/09/23 1516 10/09/23 1516  Lipase  STAT         Ordered SHAWNEE BOWSER N.    10/09/23 1516 10/09/23 1516  Urinalysis, Reflex to Urine Culture Urine, Clean Catch   STAT         Ordered SHAWNEE BOWSER N.    10/09/23 1509 10/09/23 1508  CT Renal Stone Study ABD Pelvis WO  1 time imaging         Ordered JEEVAN VICK              Virtual Visit Note: The provider triage portion of this emergency department evaluation and documentation was performed via [employer], a HIPAA-compliant telemedicine application, in concert with a tele-presenter in the room. A face to face patient evaluation with one of my colleagues will occur once the patient is placed in an emergency department room.      DISCLAIMER: This note was prepared with [employer] voice recognition transcription software. Garbled syntax, mangled pronouns, and other bizarre constructions may be attributed to that software system.

## 2023-10-09 NOTE — ED PROVIDER NOTES
Source of History:  Patient     Chief complaint:  Flank Pain (Pt reporting R flank pain x 1 day. Hx of kidney stones and currently has kidney stone in place. Pt reporting increased pain after taking prescribed Bentyl. Denies vomiting and fever. )      HPI:  Sadiq Dhillon is a 73 y.o. male presenting to the emergency department reporting flank pain that began earlier today which progressively worsened radiating to his right groin.  He has a history of kidney stones and has a known 3 mm stone in the right kidney.  Denies any difficulty urinating or any dysuria.  He denies any vomiting but does report nausea.  No fever/chills.    This is the extent to the patients complaints today here in the emergency department.    PMH:  As per HPI and below:  Past Medical History:   Diagnosis Date    Diabetes mellitus     Fuchs' corneal dystrophy     Heart attack     Hypertension     Kidney stones     Pancreatic mass     Pancreatitis     after EUS . 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, treated with dostenex(cabergoline)/ resolved    Reflux esophagitis     Tumor cells, benign      Past Surgical History:   Procedure Laterality Date    CATARACT EXTRACTION W/  INTRAOCULAR LENS IMPLANT Right 0    Dr Van     CATARACT EXTRACTION W/  INTRAOCULAR LENS IMPLANT Left 3/21/2019    Procedure: EXTRACTION, CATARACT, WITH IOL INSERTION;  Surgeon: Ra Van MD;  Location: [facility];  Service: Ophthalmology;  Laterality: Left;    CORNEAL TRANSPLANT Right     Dr Van     DESCEMETS STRIPPING AUTOMATED ENDOTHELIAL KERATOPLASTY Left 3/21/2019    Procedure: TRANSPLANT, PARTIAL-THICKNESS, CORNEA, USING DSAEK TECHNIQUE;  Surgeon: Ra Van MD;  Location: [facility];  Service: Ophthalmology;  Laterality: Left;  DSEK    REPAIR OF RETINAL DETACHMENT WITH VITRECTOMY Right 7/18/2018     "Procedure: REPAIR, RETINAL DETACHMENT, WITH VITRECTOMY;  Surgeon: SHUBHAM Patel MD;  Location: [facility] OR [address] [place] [address];  Service: Ophthalmology;  Laterality: Right;  40 min    REPAIR OF RETINAL DETACHMENT WITH VITRECTOMY Left 8/8/2018    Procedure: REPAIR, RETINAL DETACHMENT, WITH VITRECTOMY;  Surgeon: SHUBHAM Patel MD;  Location: [facility] OR [address] [place] [address];  Service: Ophthalmology;  Laterality: Left;  40 min    RHINOPLASTY TIP  1975    THYROIDECTOMY  2007    UPPER ENDOSCOPIC ULTRASOUND W/ FNA      with resultant pancreatitis       Social History     Tobacco Use    Smoking status: Never     Passive exposure: Never    Smokeless tobacco: Never   Substance Use Topics    Alcohol use: Yes     Comment: social    Drug use: No       Review of patient's allergies indicates:   Allergen Reactions    Grass pollen-june grass standard     House dust        ROS: As per HPI and below:  General: No  fever.  No chills.  ENT: No sore throat. No ear pain  Chest: No shortness of breath. No cough.    Cardiovascular: No chest pain.   Abdomen:  Abdominal/flank pain, nausea  Genito-Urinary: No abnormal urination.    Neurologic: No focal weakness.  No numbness.  No headache  MSK: no back pain.   Integument: No rashes or lesions.    Physical Exam:    BP (!) 151/67 (BP Location: Left arm, Patient Position: Sitting)   Pulse 100   Temp 98.1 °F (36.7 °C) (Oral)   Resp 18   Ht 5' 9" (1.753 m)   Wt 79.4 kg (175 lb)   SpO2 95%   BMI 25.84 kg/m²   Vitals:    10/09/23 1508   BP: (!) 151/67   Pulse: 100   Resp: 18   Temp: 98.1 °F (36.7 °C)   TempSrc: Oral   SpO2: 95%   Weight: 79.4 kg (175 lb)   Height: 5' 9" (1.753 m)       Nursing note and vital signs reviewed.  Appearance: No acute distress. Well-appearing. Non-toxic.    Eyes: No conjunctival injection.  Extraocular muscles are intact.  ENT:  Mucous noted pink and moist  Chest/ Respiratory: Clear to auscultation bilaterally.  Good air movement.  No wheezes.  No rhonchi. No rales. No accessory " muscle use.  Cardiovascular: Regular rate and rhythm.  No murmurs. No gallops. No rubs.  Abdomen: Soft.  Abdomen nontender to palpation, bowel sounds normal.  Back:  No CVA tenderness.  Musculoskeletal: Good range of motion all joints.  No deformities.  Neck supple.  No meningismus.  Skin: No rashes seen.  Good turgor.  No abrasions.  No ecchymoses.  Neuro: alert and oriented x3,  no focal neurological deficits.  Psych: Appropriate, conversant    Initial MDM:  73-year-old male with a known history of kidney stones presents for right flank pain radiating to his groin progressively worsened over the course of the day.  Concern for obstructive uropathy.  Will obtain blood work and a CT the abdomen.    Labs Reviewed   CBC W/ AUTO DIFFERENTIAL - Abnormal; Notable for the following components:       Result Value    MPV 8.8 (*)     Gran # (ANC) 10.7 (*)     Gran % 83.9 (*)     Lymph % 9.1 (*)     All other components within normal limits   COMPREHENSIVE METABOLIC PANEL - Abnormal; Notable for the following components:    Glucose 120 (*)     All other components within normal limits   LIPASE - Abnormal; Notable for the following components:    Lipase 96 (*)     All other components within normal limits   URINALYSIS, REFLEX TO URINE CULTURE - Abnormal; Notable for the following components:    Protein, UA Trace (*)     All other components within normal limits    Narrative:     Specimen Source->Urine       CT Renal Stone Study ABD Pelvis WO   Final Result      There is new right-sided hydronephrosis to the level of the right ureteropelvic junction.  The distal aspect of the right ureter does not appear significantly dilated.  No radiopaque calculus identified to suggests obstructing nephrolithiasis as a cause for the new hydronephrosis.  There is a punctate nonobstructing calculus in the right renal pelvis.      Remaining incidental findings as detailed above.  Notably, the abnormal appearance of the pancreas with the  abnormal soft tissue structure extending from the pancreas toward the splenic hilum which is stable across multiple priors.         Electronically signed by: Eliecer Hubbard MD   Date:    10/09/2023   Time:    16:33            Initial Impression/ Differential Dx:  Differential diagnosis includes but not limited to:  Kidney stone encounter hydronephrosis, UTI, pyelonephritis      MDM:    73 y.o. male with known kidney stones presenting for evaluation of right flank pain radiating to his right groin that began earlier today.  While in the emergency department patient urinating states pain mostly resolved.  Labs are reassuring, no leukocytosis, stable H&H, no EILEEN, no electrolyte imbalances.  He was noted to have an elevated lipase at 96.  Patient has a known pancreatic cyst, CT revealed no change from MRI few months ago CT also revealed no obstructive uropathy he does have right-sided hydro nephrosis the likely from a stone that passed while in the ED. he has no renal dysfunction patient is safe for discharge home will discharge home with nausea medication and Toradol and discussed needs close follow-up with his urologist.  He was agreeable with this plan.    ED Course as of 10/09/23 1835   Mon Oct 09, 2023   1801 Lipase(!): 96 [AS]   1815 Lipase(!): 96 [AS]   1815 WBC: 12.70 [AS]   1815 Hemoglobin: 15.6 [AS]   1815 Hematocrit: 46.0 [AS]   1815 Potassium: 4.3 [AS]   1815 Sodium: 138 [AS]   1815 BUN: 13 [AS]   1815 Creatinine: 0.9 [AS]   1815 Leukocytes, UA: Negative [AS]   1815 NITRITE UA: Negative [AS]      ED Course User Index  [AS] Olya De La Cruz, FNP       Diagnostic Impression:    1. Hydronephrosis, unspecified hydronephrosis type    2. Renal colic         ED Disposition Condition    Discharge Stable            ED Prescriptions       Medication Sig Dispense Start Date End Date Auth. Provider    ketorolac (TORADOL) 10 mg tablet Take 1 tablet (10 mg total) by mouth every 6 (six) hours. 15 tablet 10/9/2023 --  Olya De La Cruz, DIANNEP    ondansetron (ZOFRAN-ODT) 4 MG TbDL Take 1 tablet (4 mg total) by mouth every 6 (six) hours as needed. 20 tablet 10/9/2023 10/13/2023 Olya De La Cruz, JEN          Follow-up Information       Follow up With Specialties Details Why Contact Info    Erlin Mcpherson MD Internal Medicine Schedule an appointment as soon as possible for a visit in 3 days  [address]  [facility] [address], [address] Floor  [facility] [address]  [phone]      [facility] [place] Emergency Dept Emergency Medicine Go to  If symptoms worsen [address] [facility] [address] [phone]               Olya De La Cruz, JEN  10/09/23 8053

## 2023-10-09 NOTE — TELEPHONE ENCOUNTER
See message:  DR JERONIMO LAST NOTE  Renal calculi  -     oxyCODONE-acetaminophen (PERCOCET)  mg per tablet; Take 1 tablet by mouth every 4 (four) hours as needed for Pain.  -     naloxone (NARCAN) 1 mg/mL injection; 2 mg (1 mg per nostril) by Nasal route as needed for opioid overdose; may repeat in 3 to 5 minutes if not effective. Call 911        Patient was requesting some pain medicines ago and his trip to [facility] however I was unable to send this and I told him we would work on it and try and get it to him but he is not having any pain at this time

## 2023-10-09 NOTE — ED TRIAGE NOTES
Pt arrived with c/o R flank pain and RUQ abd pain since last night.  Pt states he was recently seen by GI doctor for RLQ abd pain and rx'd bentyl.  Pt reports hxof kidney stone.  +n/-v/-d, denies any fever or urinary symptoms.  Pt denies any precipitating factors to pain.   Pt answering questions appropriately, speaking in complete sentences, respirations even and unlabored.  Aao x 4.

## 2023-10-10 DIAGNOSIS — N20.1 URETERAL CALCULUS: Primary | ICD-10-CM

## 2023-10-10 RX ORDER — OXYCODONE AND ACETAMINOPHEN 5; 325 MG/1; MG/1
1 TABLET ORAL EVERY 4 HOURS PRN
Qty: 15 TABLET | Refills: 0 | Status: SHIPPED | OUTPATIENT
Start: 2023-10-10 | End: 2024-01-30

## 2023-11-06 ENCOUNTER — PATIENT MESSAGE (OUTPATIENT)
Dept: PRIMARY CARE CLINIC | Facility: CLINIC | Age: 73
End: 2023-11-06
Payer: COMMERCIAL

## 2023-11-06 ENCOUNTER — OFFICE VISIT (OUTPATIENT)
Dept: INTERNAL MEDICINE | Facility: CLINIC | Age: 73
End: 2023-11-06
Payer: COMMERCIAL

## 2023-11-06 VITALS
HEIGHT: 70 IN | WEIGHT: 168.88 LBS | DIASTOLIC BLOOD PRESSURE: 78 MMHG | TEMPERATURE: 99 F | HEART RATE: 78 BPM | BODY MASS INDEX: 24.18 KG/M2 | SYSTOLIC BLOOD PRESSURE: 134 MMHG

## 2023-11-06 DIAGNOSIS — J02.0 STREP THROAT: Primary | ICD-10-CM

## 2023-11-06 PROCEDURE — 1159F PR MEDICATION LIST DOCUMENTED IN MEDICAL RECORD: ICD-10-PCS | Mod: CPTII,S$GLB,, | Performed by: INTERNAL MEDICINE

## 2023-11-06 PROCEDURE — 3078F DIAST BP <80 MM HG: CPT | Mod: CPTII,S$GLB,, | Performed by: INTERNAL MEDICINE

## 2023-11-06 PROCEDURE — 3288F PR FALLS RISK ASSESSMENT DOCUMENTED: ICD-10-PCS | Mod: CPTII,S$GLB,, | Performed by: INTERNAL MEDICINE

## 2023-11-06 PROCEDURE — 4010F PR ACE/ARB THEARPY RXD/TAKEN: ICD-10-PCS | Mod: CPTII,S$GLB,, | Performed by: INTERNAL MEDICINE

## 2023-11-06 PROCEDURE — 1159F MED LIST DOCD IN RCRD: CPT | Mod: CPTII,S$GLB,, | Performed by: INTERNAL MEDICINE

## 2023-11-06 PROCEDURE — 4010F ACE/ARB THERAPY RXD/TAKEN: CPT | Mod: CPTII,S$GLB,, | Performed by: INTERNAL MEDICINE

## 2023-11-06 PROCEDURE — 3061F PR NEG MICROALBUMINURIA RESULT DOCUMENTED/REVIEW: ICD-10-PCS | Mod: CPTII,S$GLB,, | Performed by: INTERNAL MEDICINE

## 2023-11-06 PROCEDURE — 3044F HG A1C LEVEL LT 7.0%: CPT | Mod: CPTII,S$GLB,, | Performed by: INTERNAL MEDICINE

## 2023-11-06 PROCEDURE — 3061F NEG MICROALBUMINURIA REV: CPT | Mod: CPTII,S$GLB,, | Performed by: INTERNAL MEDICINE

## 2023-11-06 PROCEDURE — 3066F NEPHROPATHY DOC TX: CPT | Mod: CPTII,S$GLB,, | Performed by: INTERNAL MEDICINE

## 2023-11-06 PROCEDURE — 3008F PR BODY MASS INDEX (BMI) DOCUMENTED: ICD-10-PCS | Mod: CPTII,S$GLB,, | Performed by: INTERNAL MEDICINE

## 2023-11-06 PROCEDURE — 1101F PT FALLS ASSESS-DOCD LE1/YR: CPT | Mod: CPTII,S$GLB,, | Performed by: INTERNAL MEDICINE

## 2023-11-06 PROCEDURE — 99213 PR OFFICE/OUTPT VISIT, EST, LEVL III, 20-29 MIN: ICD-10-PCS | Mod: S$GLB,,, | Performed by: INTERNAL MEDICINE

## 2023-11-06 PROCEDURE — 3288F FALL RISK ASSESSMENT DOCD: CPT | Mod: CPTII,S$GLB,, | Performed by: INTERNAL MEDICINE

## 2023-11-06 PROCEDURE — 3008F BODY MASS INDEX DOCD: CPT | Mod: CPTII,S$GLB,, | Performed by: INTERNAL MEDICINE

## 2023-11-06 PROCEDURE — 99999 PR PBB SHADOW E&M-EST. PATIENT-LVL V: CPT | Mod: PBBFAC,,, | Performed by: INTERNAL MEDICINE

## 2023-11-06 PROCEDURE — 99213 OFFICE O/P EST LOW 20 MIN: CPT | Mod: S$GLB,,, | Performed by: INTERNAL MEDICINE

## 2023-11-06 PROCEDURE — 1157F ADVNC CARE PLAN IN RCRD: CPT | Mod: CPTII,S$GLB,, | Performed by: INTERNAL MEDICINE

## 2023-11-06 PROCEDURE — 3044F PR MOST RECENT HEMOGLOBIN A1C LEVEL <7.0%: ICD-10-PCS | Mod: CPTII,S$GLB,, | Performed by: INTERNAL MEDICINE

## 2023-11-06 PROCEDURE — 3075F PR MOST RECENT SYSTOLIC BLOOD PRESS GE 130-139MM HG: ICD-10-PCS | Mod: CPTII,S$GLB,, | Performed by: INTERNAL MEDICINE

## 2023-11-06 PROCEDURE — 3078F PR MOST RECENT DIASTOLIC BLOOD PRESSURE < 80 MM HG: ICD-10-PCS | Mod: CPTII,S$GLB,, | Performed by: INTERNAL MEDICINE

## 2023-11-06 PROCEDURE — 3066F PR DOCUMENTATION OF TREATMENT FOR NEPHROPATHY: ICD-10-PCS | Mod: CPTII,S$GLB,, | Performed by: INTERNAL MEDICINE

## 2023-11-06 PROCEDURE — 3075F SYST BP GE 130 - 139MM HG: CPT | Mod: CPTII,S$GLB,, | Performed by: INTERNAL MEDICINE

## 2023-11-06 PROCEDURE — 99999 PR PBB SHADOW E&M-EST. PATIENT-LVL V: ICD-10-PCS | Mod: PBBFAC,,, | Performed by: INTERNAL MEDICINE

## 2023-11-06 PROCEDURE — 1101F PR PT FALLS ASSESS DOC 0-1 FALLS W/OUT INJ PAST YR: ICD-10-PCS | Mod: CPTII,S$GLB,, | Performed by: INTERNAL MEDICINE

## 2023-11-06 PROCEDURE — 1160F RVW MEDS BY RX/DR IN RCRD: CPT | Mod: CPTII,S$GLB,, | Performed by: INTERNAL MEDICINE

## 2023-11-06 PROCEDURE — 1157F PR ADVANCE CARE PLAN OR EQUIV PRESENT IN MEDICAL RECORD: ICD-10-PCS | Mod: CPTII,S$GLB,, | Performed by: INTERNAL MEDICINE

## 2023-11-06 PROCEDURE — 1160F PR REVIEW ALL MEDS BY PRESCRIBER/CLIN PHARMACIST DOCUMENTED: ICD-10-PCS | Mod: CPTII,S$GLB,, | Performed by: INTERNAL MEDICINE

## 2023-11-06 NOTE — PROGRESS NOTES
"Subjective:       Patient ID: Sadiq Dhillon is a 73 y.o. male.    Chief Complaint:   Sore Throat (11/03 diagnosed was taking pcn and started feeling worse  )    HPI - He was traveling in [place] and became very ill with strep throat.  On Nov 3, he was seen and given IV ceftriaxone, IV dexamethasone and IV paracetamol (tylenol).  He started oral penicillin after that.  Didn't tolerate that well - made him "feel bad" (difficult to sort out what he meant by that).  He stopped it, along with all other medications.  Here today to make sure strep has resolved and to see what he should do now.  Has some hoarseness, but o/w feels normal.    Pmh/meds:  Reviewed and reconciled in EPIC with patient during visit today.    Review of Systems   Constitutional:  Negative for fever.   HENT:  Positive for voice change.    Respiratory:  Negative for shortness of breath.    Cardiovascular:  Negative for chest pain.   Gastrointestinal:  Negative for abdominal pain.   Genitourinary:  Negative for difficulty urinating.   Musculoskeletal:  Negative for arthralgias.   Skin:  Negative for rash.   Neurological:  Negative for headaches.   Psychiatric/Behavioral:  Negative for sleep disturbance.        Objective:      Physical Exam  Constitutional:       General: He is not in acute distress.     Appearance: He is well-developed. He is not diaphoretic.   HENT:      Head: Normocephalic and atraumatic.      Mouth/Throat:      Mouth: Mucous membranes are moist.      Pharynx: No oropharyngeal exudate or posterior oropharyngeal erythema.   Cardiovascular:      Rate and Rhythm: Normal rate and regular rhythm.      Heart sounds: Normal heart sounds. No murmur heard.     No friction rub. No gallop.   Pulmonary:      Effort: No respiratory distress.      Breath sounds: No wheezing or rales.   Chest:      Chest wall: No tenderness.   Skin:     General: Skin is warm.      Findings: No erythema.   Neurological:      Mental Status: He is alert and " oriented to person, place, and time.   Psychiatric:         Thought Content: Thought content normal.         Assessment:       1. Strep throat        Plan:       Sadiq was seen today for sore throat.    Diagnoses and all orders for this visit:    Strep throat - rapid strep negative.  Provided reassurance.  Please resume regular medications.  Hoarseness will resolve with time  -     POCT Rapid Strep A    Rtc prn    G Rosendo Montez MD MPH  Staff Internist

## 2023-11-09 ENCOUNTER — PATIENT MESSAGE (OUTPATIENT)
Dept: UROLOGY | Facility: CLINIC | Age: 73
End: 2023-11-09
Payer: COMMERCIAL

## 2023-11-09 ENCOUNTER — OFFICE VISIT (OUTPATIENT)
Dept: URGENT CARE | Facility: CLINIC | Age: 73
End: 2023-11-09
Payer: COMMERCIAL

## 2023-11-09 VITALS
HEART RATE: 95 BPM | WEIGHT: 168.88 LBS | SYSTOLIC BLOOD PRESSURE: 125 MMHG | RESPIRATION RATE: 18 BRPM | HEIGHT: 70 IN | BODY MASS INDEX: 24.18 KG/M2 | DIASTOLIC BLOOD PRESSURE: 64 MMHG | OXYGEN SATURATION: 97 % | TEMPERATURE: 97 F

## 2023-11-09 DIAGNOSIS — M54.50 LUMBAR BACK PAIN: ICD-10-CM

## 2023-11-09 DIAGNOSIS — Z87.442 HISTORY OF RENAL STONE: ICD-10-CM

## 2023-11-09 DIAGNOSIS — R30.9 PAIN WITH URINATION: Primary | ICD-10-CM

## 2023-11-09 LAB
ALBUMIN SERPL BCP-MCNC: 4.2 G/DL (ref 3.5–5.2)
ALP SERPL-CCNC: 71 U/L (ref 55–135)
ALT SERPL W/O P-5'-P-CCNC: 18 U/L (ref 10–44)
ANION GAP SERPL CALC-SCNC: 8 MMOL/L (ref 8–16)
AST SERPL-CCNC: 18 U/L (ref 10–40)
BILIRUB SERPL-MCNC: 0.3 MG/DL (ref 0.1–1)
BILIRUB UR QL STRIP: NEGATIVE
BUN SERPL-MCNC: 11 MG/DL (ref 8–23)
CALCIUM SERPL-MCNC: 9.8 MG/DL (ref 8.7–10.5)
CHLORIDE SERPL-SCNC: 102 MMOL/L (ref 95–110)
CO2 SERPL-SCNC: 28 MMOL/L (ref 23–29)
CREAT SERPL-MCNC: 0.8 MG/DL (ref 0.5–1.4)
EST. GFR  (NO RACE VARIABLE): >60 ML/MIN/1.73 M^2
GLUCOSE SERPL-MCNC: 71 MG/DL (ref 70–110)
GLUCOSE UR QL STRIP: NEGATIVE
KETONES UR QL STRIP: NEGATIVE
LEUKOCYTE ESTERASE UR QL STRIP: NEGATIVE
PH, POC UA: 5 (ref 5–8)
POC BLOOD, URINE: POSITIVE
POC NITRATES, URINE: NEGATIVE
POTASSIUM SERPL-SCNC: 5.1 MMOL/L (ref 3.5–5.1)
PROT SERPL-MCNC: 7.2 G/DL (ref 6–8.4)
PROT UR QL STRIP: NEGATIVE
SODIUM SERPL-SCNC: 138 MMOL/L (ref 136–145)
SP GR UR STRIP: 1.02 (ref 1–1.03)
UROBILINOGEN UR STRIP-ACNC: ABNORMAL (ref 0.3–2.2)

## 2023-11-09 PROCEDURE — 99214 PR OFFICE/OUTPT VISIT, EST, LEVL IV, 30-39 MIN: ICD-10-PCS | Mod: S$GLB,,, | Performed by: FAMILY MEDICINE

## 2023-11-09 PROCEDURE — 74019 RADEX ABDOMEN 2 VIEWS: CPT | Mod: S$GLB,,, | Performed by: RADIOLOGY

## 2023-11-09 PROCEDURE — 87086 URINE CULTURE/COLONY COUNT: CPT | Performed by: FAMILY MEDICINE

## 2023-11-09 PROCEDURE — 81003 URINALYSIS AUTO W/O SCOPE: CPT | Mod: QW,S$GLB,, | Performed by: FAMILY MEDICINE

## 2023-11-09 PROCEDURE — 80053 COMPREHEN METABOLIC PANEL: CPT | Performed by: FAMILY MEDICINE

## 2023-11-09 PROCEDURE — 74019 XR ABDOMEN FLAT AND ERECT: ICD-10-PCS | Mod: S$GLB,,, | Performed by: RADIOLOGY

## 2023-11-09 PROCEDURE — 81003 POCT URINALYSIS, DIPSTICK, AUTOMATED, W/O SCOPE: ICD-10-PCS | Mod: QW,S$GLB,, | Performed by: FAMILY MEDICINE

## 2023-11-09 PROCEDURE — 99214 OFFICE O/P EST MOD 30 MIN: CPT | Mod: S$GLB,,, | Performed by: FAMILY MEDICINE

## 2023-11-09 RX ORDER — AMOXICILLIN AND CLAVULANATE POTASSIUM 875; 125 MG/1; MG/1
1 TABLET, FILM COATED ORAL 2 TIMES DAILY
Qty: 14 TABLET | Refills: 0 | Status: SHIPPED | OUTPATIENT
Start: 2023-11-09 | End: 2023-11-16

## 2023-11-09 NOTE — PROGRESS NOTES
"Subjective:      Patient ID: Sadiq Dhillon is a 73 y.o. male.    Vitals:  height is 5' 10" (1.778 m) and weight is 76.6 kg (168 lb 14 oz). His temperature is 97.2 °F (36.2 °C). His blood pressure is 125/64 and his pulse is 95. His respiration is 18 and oxygen saturation is 97%.     Chief Complaint: Urinary Tract Infection    Patient with some mild lower back pain and also mild dysuria the last couple days.  Denies any fever chills nausea vomiting no constipation or diarrhea.  He does have a history right renal stone seen on several scans and last imaging CT stone protocol 10/9/23  showing mild hydro nephrosis of the right kidney but with normal renal function.  He was advised to follow up with Urology.        ROS   Objective:     Physical Exam  Constitutional: Pt oriented to person, place, and time.  Non-toxic appearance.   Patient does not appear ill. No distress. normal  HENT: No icterus or facial swelling appreciated  Head: Normocephalic and atraumatic.   Nose: No congestion.   Pulmonary/Chest: Effort normal. No stridor. No respiratory distress.   Abdominal: Normal appearance. Abdomen exhibits no distension.   Musculoskeletal:         General: No swelling.   Neurological: no focal deficit. Patient is alert and oriented to person, place, and time.   Skin: Skin is not diaphoretic and not pale. no jaundice  Psychiatric: Patients behavior is normal. Mood, judgment and thought content normal.     Assessment:     1. Pain with urination    2. History of renal stone    3. Lumbar back pain        Plan:       Pain with urination  We will treat for possible lower urinary tract infection.  Likely not pyelonephritis since patient afebrile and negative CVA tenderness bilaterally.      Patient declined STI screening.  States he is not sexually active and did not want to test.      -     POCT Urinalysis, Dipstick, Automated, W/O Scope- positive blood  -     XR ABDOMEN FLAT AND ERECT; Future; Expected date: 11/09/2023- " suspected stone right renal area  -     Urine culture pending  -     CBC W/ AUTO DIFFERENTIAL- MA could not collect CBC due to being collapsed and patient stated he would just follow up with his PCP for additional labs if warranted however able to collect the CMP prior  -     COMPREHENSIVE METABOLIC PANEL  -       amoxicillin-clavulanate 875-125mg (AUGMENTIN) 875-125 mg per tablet; Take 1 tablet by mouth 2 (two) times daily. for 7 days  Dispense: 14 tablet; Refill: 0    History of renal stone  -     XR ABDOMEN FLAT AND ERECT; Future; Expected date: 11/09/2023  -     CBC W/ AUTO DIFFERENTIAL  -     COMPREHENSIVE METABOLIC PANEL    Lumbar back pain    Could be secondary to urinary tract issues however could be secondary to MSK due to his travel recently and positioning etc..              Patient Instructions   Rest and hydration encouraged!    I would recommend starting antibiotic since you were having some urinary symptoms especially with a history of the kidney stones, so lets go ahead and treat with an antibiotic so that if there is an infection it does not get away from us.    We will follow the urine culture and update you with the results.  Also we will call you with your kidney function results as well.        Some of your back pain could be from all the travels you have been doing so you can take Tylenol or Motrin as needed for back pains.    Follow up closely with your primary care physician and even your urologist.    If you have any significant worsening or worrisome symptoms and I do recommend that he go to the ER for further evaluation which may include advanced imaging such as CT scan and stat labs.

## 2023-11-09 NOTE — PATIENT INSTRUCTIONS
Rest and hydration encouraged!    I would recommend starting antibiotic since you were having some urinary symptoms especially with a history of the kidney stones, so lets go ahead and treat with an antibiotic so that if there is an infection it does not get away from us.    We will follow the urine culture and update you with the results.  Also we will call you with your kidney function results as well.        Some of your back pain could be from all the travels you have been doing so you can take Tylenol or Motrin as needed for back pains.    Follow up closely with your primary care physician and even your urologist.    If you have any significant worsening or worrisome symptoms and I do recommend that he go to the ER for further evaluation which may include advanced imaging such as CT scan and stat labs.

## 2023-11-10 LAB — BACTERIA UR CULT: NORMAL

## 2023-11-11 ENCOUNTER — OFFICE VISIT (OUTPATIENT)
Dept: INTERNAL MEDICINE | Facility: CLINIC | Age: 73
End: 2023-11-11
Payer: COMMERCIAL

## 2023-11-11 ENCOUNTER — LAB VISIT (OUTPATIENT)
Dept: LAB | Facility: HOSPITAL | Age: 73
End: 2023-11-11
Attending: INTERNAL MEDICINE
Payer: COMMERCIAL

## 2023-11-11 VITALS
DIASTOLIC BLOOD PRESSURE: 60 MMHG | BODY MASS INDEX: 24.55 KG/M2 | HEIGHT: 70 IN | HEART RATE: 76 BPM | OXYGEN SATURATION: 98 % | WEIGHT: 171.5 LBS | SYSTOLIC BLOOD PRESSURE: 122 MMHG

## 2023-11-11 DIAGNOSIS — R31.9 HEMATURIA, UNSPECIFIED TYPE: ICD-10-CM

## 2023-11-11 DIAGNOSIS — N20.0 NEPHROLITHIASIS: Primary | ICD-10-CM

## 2023-11-11 DIAGNOSIS — N20.0 NEPHROLITHIASIS: ICD-10-CM

## 2023-11-11 DIAGNOSIS — N13.30 HYDRONEPHROSIS, UNSPECIFIED HYDRONEPHROSIS TYPE: ICD-10-CM

## 2023-11-11 LAB
BASOPHILS # BLD AUTO: 0.04 K/UL (ref 0–0.2)
BASOPHILS NFR BLD: 0.5 % (ref 0–1.9)
BILIRUB UR QL STRIP: NEGATIVE
CLARITY UR REFRACT.AUTO: CLEAR
COLOR UR AUTO: YELLOW
DIFFERENTIAL METHOD: ABNORMAL
EOSINOPHIL # BLD AUTO: 0.1 K/UL (ref 0–0.5)
EOSINOPHIL NFR BLD: 1.2 % (ref 0–8)
ERYTHROCYTE [DISTWIDTH] IN BLOOD BY AUTOMATED COUNT: 12.1 % (ref 11.5–14.5)
GLUCOSE UR QL STRIP: NEGATIVE
HCT VFR BLD AUTO: 41.3 % (ref 40–54)
HGB BLD-MCNC: 14.1 G/DL (ref 14–18)
HGB UR QL STRIP: NEGATIVE
IMM GRANULOCYTES # BLD AUTO: 0.09 K/UL (ref 0–0.04)
IMM GRANULOCYTES NFR BLD AUTO: 1.1 % (ref 0–0.5)
KETONES UR QL STRIP: NEGATIVE
LEUKOCYTE ESTERASE UR QL STRIP: NEGATIVE
LYMPHOCYTES # BLD AUTO: 1.3 K/UL (ref 1–4.8)
LYMPHOCYTES NFR BLD: 16.6 % (ref 18–48)
MCH RBC QN AUTO: 29.3 PG (ref 27–31)
MCHC RBC AUTO-ENTMCNC: 34.1 G/DL (ref 32–36)
MCV RBC AUTO: 86 FL (ref 82–98)
MONOCYTES # BLD AUTO: 0.7 K/UL (ref 0.3–1)
MONOCYTES NFR BLD: 8.5 % (ref 4–15)
NEUTROPHILS # BLD AUTO: 5.8 K/UL (ref 1.8–7.7)
NEUTROPHILS NFR BLD: 72.1 % (ref 38–73)
NITRITE UR QL STRIP: NEGATIVE
NRBC BLD-RTO: 0 /100 WBC
PH UR STRIP: 7 [PH] (ref 5–8)
PLATELET # BLD AUTO: 282 K/UL (ref 150–450)
PMV BLD AUTO: 9.2 FL (ref 9.2–12.9)
PROT UR QL STRIP: NEGATIVE
RBC # BLD AUTO: 4.81 M/UL (ref 4.6–6.2)
SP GR UR STRIP: 1.01 (ref 1–1.03)
URN SPEC COLLECT METH UR: NORMAL
WBC # BLD AUTO: 8.02 K/UL (ref 3.9–12.7)

## 2023-11-11 PROCEDURE — 36415 COLL VENOUS BLD VENIPUNCTURE: CPT | Performed by: INTERNAL MEDICINE

## 2023-11-11 PROCEDURE — 3078F DIAST BP <80 MM HG: CPT | Mod: CPTII,S$GLB,, | Performed by: INTERNAL MEDICINE

## 2023-11-11 PROCEDURE — 85025 COMPLETE CBC W/AUTO DIFF WBC: CPT | Performed by: INTERNAL MEDICINE

## 2023-11-11 PROCEDURE — 3074F PR MOST RECENT SYSTOLIC BLOOD PRESSURE < 130 MM HG: ICD-10-PCS | Mod: CPTII,S$GLB,, | Performed by: INTERNAL MEDICINE

## 2023-11-11 PROCEDURE — 99999 PR PBB SHADOW E&M-EST. PATIENT-LVL V: ICD-10-PCS | Mod: PBBFAC,,, | Performed by: INTERNAL MEDICINE

## 2023-11-11 PROCEDURE — 4010F ACE/ARB THERAPY RXD/TAKEN: CPT | Mod: CPTII,S$GLB,, | Performed by: INTERNAL MEDICINE

## 2023-11-11 PROCEDURE — 4010F PR ACE/ARB THEARPY RXD/TAKEN: ICD-10-PCS | Mod: CPTII,S$GLB,, | Performed by: INTERNAL MEDICINE

## 2023-11-11 PROCEDURE — 3044F HG A1C LEVEL LT 7.0%: CPT | Mod: CPTII,S$GLB,, | Performed by: INTERNAL MEDICINE

## 2023-11-11 PROCEDURE — 3066F NEPHROPATHY DOC TX: CPT | Mod: CPTII,S$GLB,, | Performed by: INTERNAL MEDICINE

## 2023-11-11 PROCEDURE — 3044F PR MOST RECENT HEMOGLOBIN A1C LEVEL <7.0%: ICD-10-PCS | Mod: CPTII,S$GLB,, | Performed by: INTERNAL MEDICINE

## 2023-11-11 PROCEDURE — 3074F SYST BP LT 130 MM HG: CPT | Mod: CPTII,S$GLB,, | Performed by: INTERNAL MEDICINE

## 2023-11-11 PROCEDURE — 1125F AMNT PAIN NOTED PAIN PRSNT: CPT | Mod: CPTII,S$GLB,, | Performed by: INTERNAL MEDICINE

## 2023-11-11 PROCEDURE — 1159F PR MEDICATION LIST DOCUMENTED IN MEDICAL RECORD: ICD-10-PCS | Mod: CPTII,S$GLB,, | Performed by: INTERNAL MEDICINE

## 2023-11-11 PROCEDURE — 1157F ADVNC CARE PLAN IN RCRD: CPT | Mod: CPTII,S$GLB,, | Performed by: INTERNAL MEDICINE

## 2023-11-11 PROCEDURE — 99999 PR PBB SHADOW E&M-EST. PATIENT-LVL V: CPT | Mod: PBBFAC,,, | Performed by: INTERNAL MEDICINE

## 2023-11-11 PROCEDURE — 1159F MED LIST DOCD IN RCRD: CPT | Mod: CPTII,S$GLB,, | Performed by: INTERNAL MEDICINE

## 2023-11-11 PROCEDURE — 1125F PR PAIN SEVERITY QUANTIFIED, PAIN PRESENT: ICD-10-PCS | Mod: CPTII,S$GLB,, | Performed by: INTERNAL MEDICINE

## 2023-11-11 PROCEDURE — 3066F PR DOCUMENTATION OF TREATMENT FOR NEPHROPATHY: ICD-10-PCS | Mod: CPTII,S$GLB,, | Performed by: INTERNAL MEDICINE

## 2023-11-11 PROCEDURE — 3061F NEG MICROALBUMINURIA REV: CPT | Mod: CPTII,S$GLB,, | Performed by: INTERNAL MEDICINE

## 2023-11-11 PROCEDURE — 99214 OFFICE O/P EST MOD 30 MIN: CPT | Mod: S$GLB,,, | Performed by: INTERNAL MEDICINE

## 2023-11-11 PROCEDURE — 3078F PR MOST RECENT DIASTOLIC BLOOD PRESSURE < 80 MM HG: ICD-10-PCS | Mod: CPTII,S$GLB,, | Performed by: INTERNAL MEDICINE

## 2023-11-11 PROCEDURE — 1157F PR ADVANCE CARE PLAN OR EQUIV PRESENT IN MEDICAL RECORD: ICD-10-PCS | Mod: CPTII,S$GLB,, | Performed by: INTERNAL MEDICINE

## 2023-11-11 PROCEDURE — 99214 PR OFFICE/OUTPT VISIT, EST, LEVL IV, 30-39 MIN: ICD-10-PCS | Mod: S$GLB,,, | Performed by: INTERNAL MEDICINE

## 2023-11-11 PROCEDURE — 3008F PR BODY MASS INDEX (BMI) DOCUMENTED: ICD-10-PCS | Mod: CPTII,S$GLB,, | Performed by: INTERNAL MEDICINE

## 2023-11-11 PROCEDURE — 3008F BODY MASS INDEX DOCD: CPT | Mod: CPTII,S$GLB,, | Performed by: INTERNAL MEDICINE

## 2023-11-11 PROCEDURE — 81003 URINALYSIS AUTO W/O SCOPE: CPT | Performed by: INTERNAL MEDICINE

## 2023-11-11 PROCEDURE — 3061F PR NEG MICROALBUMINURIA RESULT DOCUMENTED/REVIEW: ICD-10-PCS | Mod: CPTII,S$GLB,, | Performed by: INTERNAL MEDICINE

## 2023-11-11 NOTE — PROGRESS NOTES
[facility] Note    Chief Complaint      Chief Complaint   Patient presents with    Follow-up     On xray and throat        History of Present Illness      Sadiq Dhillon is a 73 y.o. male with chronic conditions of DM2, HTN, CAD, HLD, hx of kidney stones, pituitary adenoma with primary hyperparathyroidism, prolactinoma, hypogonadism, pancreatic cyst  who presents today for: follow up nephrolithiasis, hydronephrosis and hematuria.    Also treated for strep throat 1 week ago.  Pain has improved with antibiotics.      Past Medical History:  Past Medical History:   Diagnosis Date    Diabetes mellitus     Fuchs' corneal dystrophy     Heart attack     Hypertension     Kidney stones     Pancreatic mass     Pancreatitis     after EUS . 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, treated with dostenex(cabergoline)/ resolved    Reflux esophagitis     Tumor cells, benign        Past Surgical History:   has a past surgical history that includes Thyroidectomy (2007); Rhinoplasty tip (1975); Cataract extraction w/  intraocular lens implant (Right, 0); Corneal transplant (Right); Upper endoscopic ultrasound w/ FNA; Repair of retinal detachment with vitrectomy (Right, 7/18/2018); Repair of retinal detachment with vitrectomy (Left, 8/8/2018); Descemets stripping automated endothelial keratoplasty (Left, 3/21/2019); and Cataract extraction w/  intraocular lens implant (Left, 3/21/2019).    Family History:  family history includes Cataracts in his father; Diabetes in his father and paternal uncle; Heart disease in his father and mother; Hypertension in his mother; Stroke in his father and maternal grandmother.     Social History:  Social History     Tobacco Use    Smoking status: Never     Passive exposure: Never    Smokeless tobacco: Never   Substance Use Topics     Alcohol use: Yes     Comment: social    Drug use: No       I personally reviewed all past medical, surgical, social and family history.    Review of Systems   Constitutional:  Negative for chills, fever and malaise/fatigue.   Respiratory:  Negative for shortness of breath.    Cardiovascular:  Negative for chest pain.   Gastrointestinal:  Negative for constipation, diarrhea, nausea and vomiting.   Skin:  Negative for rash.   Neurological:  Negative for weakness.   All other systems reviewed and are negative.       Medications:  Outpatient Encounter Medications as of 11/11/2023   Medication Sig Note Dispense Refill    amoxicillin-clavulanate 875-125mg (AUGMENTIN) 875-125 mg per tablet Take 1 tablet by mouth 2 (two) times daily. for 7 days  14 tablet 0    aspirin (ECOTRIN) 81 MG EC tablet Take 81 mg by mouth every morning.        atorvastatin (LIPITOR) 80 MG tablet Take 1 tablet (80 mg total) by mouth once daily.  90 tablet 3    cabergoline (DOSTINEX) 0.5 mg tablet TAKE 1 TABLET(0.5 MG) BY MOUTH 2 TIMES A WEEK  24 tablet 3    chlorhexidine (PERIDEX) 0.12 % solution SMARTSIG:By Mouth       CONTOUR NEXT TEST STRIPS Strp UTD QID IN VITRO   3    dicyclomine (BENTYL) 10 MG capsule TAKE 1 CAPSULE(10 MG) BY MOUTH THREE TIMES DAILY AS NEEDED FOR ABDOMINAL PAIN OR DIARRHEA (Patient not taking: Reported on 11/11/2023)  270 capsule 1    famotidine (PEPCID) 20 MG tablet Take by mouth. 10/4/2023: As needed      fexofenadine-pseudoephedrine  mg (ALLEGRA-D)  mg per tablet Take 1 tablet by mouth.       gabapentin (NEURONTIN) 100 MG capsule Take 1 capsule three times a day,  Takes one capsule every morning   3    HYDROcodone-acetaminophen (NORCO) 5-325 mg per tablet Take 1 tablet by mouth every 4 (four) hours as needed for Pain. (Patient not taking: Reported on 11/11/2023)  15 tablet 0    ketorolac (TORADOL) 10 mg tablet Take 1 tablet (10 mg total) by mouth every 6 (six) hours. (Patient not taking: Reported on  11/11/2023)  15 tablet 0    ketorolac (TORADOL) 10 mg tablet Take 1 tablet (10 mg total) by mouth every 6 (six) hours. (Patient not taking: Reported on 11/11/2023)  15 tablet 0    lisinopril (PRINIVIL,ZESTRIL) 2.5 MG tablet Take 2.5 mg by mouth every morning.        loperamide (IMODIUM) 2 mg capsule Take 2 mg by mouth 4 (four) times daily as needed for Diarrhea.       metFORMIN (GLUCOPHAGE-XR) 500 MG ER 24hr tablet TAKE 2 TABLETS BY MOUTH TWICE DAILY  360 tablet 3    metoprolol succinate (TOPROL-XL) 25 MG 24 hr tablet Take 1 tablet (25 mg total) by mouth nightly.  60 tablet 6    naloxone (NARCAN) 1 mg/mL injection 2 mg (1 mg per nostril) by Nasal route as needed for opioid overdose; may repeat in 3 to 5 minutes if not effective. Call 911  2 mL 11    oxyCODONE-acetaminophen (PERCOCET)  mg per tablet Take 1 tablet by mouth every 4 (four) hours as needed for Pain. (Patient not taking: Reported on 11/11/2023)  15 tablet 0    oxyCODONE-acetaminophen (PERCOCET) 5-325 mg per tablet Take 1 tablet by mouth every 4 (four) hours as needed for Pain. (Patient not taking: Reported on 11/11/2023)  15 tablet 0    tamsulosin (FLOMAX) 0.4 mg Cap Take 1 capsule (0.4 mg total) by mouth once daily.  10 capsule 0    testosterone (ANDROGEL) 1 % (50 mg/5 gram) GlPk APPLY CONTENTS OF 2 PACKETS TOPICALLY TO SKIN EVERY DAY  900 g 3    traMADoL (ULTRAM) 50 mg tablet Take 50 mg by mouth.        No facility-administered encounter medications on file as of 11/11/2023.       Allergies:  Review of patient's allergies indicates:   Allergen Reactions    Grass pollen-ira grass standard     House dust        Health Maintenance:  Immunization History   Administered Date(s) Administered    COVID-19, MRNA, LN-S, PF (Pfizer) (Gray Cap) 04/22/2022    COVID-19, MRNA, LN-S, PF (Pfizer) (Purple Cap) 02/12/2021, 03/03/2021, 11/09/2021    COVID-19, mRNA, LNP-S, PF, julio cesar-sucrose, 30 mcg/0.3 mL (Pfizer 2023 Ages 12+) 10/18/2023    COVID-19, mRNA, LNP-S,  "bivalent booster, PF (PFIZER OMICRON) 12/16/2022    Influenza (FLUAD) - Quadrivalent - Adjuvanted - PF *Preferred* (65+) 09/20/2020, 10/08/2021, 10/13/2022    Influenza - High Dose - PF (65 years and older) 10/19/2018    Influenza - Quadrivalent - MDCK - PF 02/01/2018    Influenza - Quadrivalent - PF *Preferred* (6 months and older) 11/10/2019    Meningococcal Conjugate (MCV4P) 06/03/2022    Pneumococcal Polysaccharide - 23 Valent 11/09/2020    Tdap 10/30/2020    Zoster Recombinant 11/09/2020, 11/11/2020, 01/12/2021      Health Maintenance   Topic Date Due    Foot Exam  11/05/2021    Hemoglobin A1c  01/24/2024    Lipid Panel  04/10/2024    Eye Exam  06/05/2024    Colorectal Cancer Screening  08/21/2024    High Dose Statin  11/06/2024    Aspirin/Antiplatelet Therapy  11/06/2024    TETANUS VACCINE  10/30/2030    Hepatitis C Screening  Completed    Shingles Vaccine  Completed    DEXA Scan  Discontinued        Physical Exam      Vital Signs  Pulse: 76  SpO2: 98 %  BP: 122/60  BP Location: Right arm  Patient Position: Sitting  Pain Score:   2  Height and Weight  Height: 5' 10" (177.8 cm)  Weight: 77.8 kg (171 lb 8.3 oz)  BSA (Calculated - sq m): 1.96 sq meters  BMI (Calculated): 24.6  Weight in (lb) to have BMI = 25: 173.9]    Physical Exam  Vitals reviewed.   Constitutional:       Appearance: He is well-developed.   HENT:      Head: Normocephalic and atraumatic.      Right Ear: External ear normal.      Left Ear: External ear normal.   Cardiovascular:      Rate and Rhythm: Normal rate and regular rhythm.      Heart sounds: Normal heart sounds. No murmur heard.  Pulmonary:      Effort: Pulmonary effort is normal.      Breath sounds: Normal breath sounds. No wheezing or rales.   Abdominal:      General: Bowel sounds are normal.      Palpations: Abdomen is soft.          Laboratory:  CBC:  Recent Labs   Lab 04/10/23  1524 10/09/23  1725 11/11/23  1103   WBC 6.01 12.70 8.02   RBC 4.75 5.26 4.81   Hemoglobin 14.1 15.6 14.1 "   Hematocrit 42.4 46.0 41.3   Platelets 189 199 282   MCV 89 88 86   MCH 29.7 29.7 29.3   MCHC 33.3 33.9 34.1     CMP:  Recent Labs   Lab 07/24/23  1102 10/09/23  1725 11/09/23  1643   Glucose 123 H 120 H 71   Calcium 9.4 10.1 9.8   Albumin 4.2 4.5 4.2   Total Protein 6.7 7.4 7.2   Sodium 138 138 138   Potassium 4.7 4.3 5.1   CO2 27 23 28   Chloride 102 105 102   BUN 15 13 11   Alkaline Phosphatase 69 69 71   ALT 21 24 18   AST 20 20 18   Total Bilirubin 0.7 0.6 0.3     URINALYSIS:  Recent Labs   Lab 10/27/21  0938 03/09/22  1200 03/30/22  1441 04/25/22  1431 08/23/22  1331 04/07/23  0047 10/09/23  1708 11/09/23  1459 11/11/23  1059   Color, UA Yellow Yellow  --  Yellow  --  Yellow Yellow  --  Yellow   Clarity, UA  --   --   --  Clear  --   --   --   --   --    Specific Gravity, UA 1.025 1.020  --   --   --  1.030 1.025  --  1.010   Spec Grav UA  --   --   --  1.025  --   --   --   --   --    pH, UA 6.0 6.0   < > 5   < > 6.0 5.0   < > 7.0   Protein, UA Negative Negative  --   --   --  Trace A Trace A  --  Negative   Bacteria Occasional Rare  --   --   --  Many A  --   --   --    Nitrite, UA Negative Negative  --   --   --  Positive A Negative  --  Negative   Leukocytes, UA Negative Negative  --   --   --  2+ A Negative  --  Negative   Urobilinogen, UA Negative  --   --   --   --  Negative Negative  --   --    Hyaline Casts, UA 1 1  --   --   --  28 A  --   --   --     < > = values in this interval not displayed.      LIPIDS:  Recent Labs   Lab 03/08/22  1543 09/13/22  1521 01/06/23  1011 04/10/23  1524   TSH 2.281  --   --   --    HDL  --  45 34 L  34 L 42   Cholesterol  --  132 105 L  105 L 146   Triglycerides  --  141 119  119 111   LDL Cholesterol  --  58.8 L 47.2 L  47.2 L 81.8   HDL/Cholesterol Ratio  --  34.1 32.4  32.4 28.8   Non-HDL Cholesterol  --  87 71  71 104   Total Cholesterol/HDL Ratio  --  2.9 3.1  3.1 3.5     TSH:  Recent Labs   Lab 03/08/22  1543   TSH 2.281     A1C:  Recent Labs   Lab  03/30/21  1552 03/08/22  1543 09/13/22  1521 01/06/23  1011 07/24/23  1102   Hemoglobin A1C 6.6 H 6.6 H 6.1 H 6.3 H 6.2 H       Assessment/Plan     Sadiq Dhillon is a 73 y.o.male with:    1. Nephrolithiasis  - US Retroperitoneal Complete; Future  - Urinalysis, Reflex to Urine Culture Urine, Clean Catch; Future  - CBC Auto Differential; Future    2. Hydronephrosis, unspecified hydronephrosis type  - US Retroperitoneal Complete; Future  - Urinalysis, Reflex to Urine Culture Urine, Clean Catch; Future  - CBC Auto Differential; Future    3. Hematuria, unspecified type  - US Retroperitoneal Complete; Future  - Urinalysis, Reflex to Urine Culture Urine, Clean Catch; Future  - CBC Auto Differential; Future  Ordered ultrasound, UA and bloodwork to evaluate      Chronic conditions status updated as per HPI.  Other than changes above, cont current medications and maintain follow up with specialists.  No follow-ups on file.    Future Appointments   Date Time Provider Department Center   1/17/2024  1:30 PM Erlin Mcpherson MD [facility] [place]   2/12/2024  1:30 PM Nell Hunter MD [facility] [place]       Erlin Mcpherson MD  [facility]

## 2023-11-14 ENCOUNTER — HOSPITAL ENCOUNTER (OUTPATIENT)
Dept: RADIOLOGY | Facility: OTHER | Age: 73
Discharge: HOME | End: 2023-11-14
Attending: INTERNAL MEDICINE
Payer: COMMERCIAL

## 2023-11-14 DIAGNOSIS — R31.9 HEMATURIA, UNSPECIFIED TYPE: ICD-10-CM

## 2023-11-14 DIAGNOSIS — N13.30 HYDRONEPHROSIS, UNSPECIFIED HYDRONEPHROSIS TYPE: ICD-10-CM

## 2023-11-14 DIAGNOSIS — N20.0 NEPHROLITHIASIS: ICD-10-CM

## 2023-11-14 PROCEDURE — 76770 US EXAM ABDO BACK WALL COMP: CPT | Mod: 26,,, | Performed by: RADIOLOGY

## 2023-11-14 PROCEDURE — 76770 US RETROPERITONEAL COMPLETE: ICD-10-PCS | Mod: 26,,, | Performed by: RADIOLOGY

## 2023-11-14 PROCEDURE — 76770 US EXAM ABDO BACK WALL COMP: CPT | Mod: TC

## 2023-11-18 ENCOUNTER — PATIENT MESSAGE (OUTPATIENT)
Dept: PRIMARY CARE CLINIC | Facility: CLINIC | Age: 73
End: 2023-11-18
Payer: COMMERCIAL

## 2023-11-21 ENCOUNTER — PATIENT MESSAGE (OUTPATIENT)
Dept: UROLOGY | Facility: CLINIC | Age: 73
End: 2023-11-21
Payer: COMMERCIAL

## 2023-11-21 DIAGNOSIS — N22 CALCULUS OF URINARY TRACT IN DISEASES CLASSIFIED ELSEWHERE: ICD-10-CM

## 2023-11-21 DIAGNOSIS — N20.1 URETERAL CALCULUS: Primary | ICD-10-CM

## 2023-11-27 ENCOUNTER — HOSPITAL ENCOUNTER (OUTPATIENT)
Dept: RADIOLOGY | Facility: HOSPITAL | Age: 73
Discharge: HOME | End: 2023-11-27
Attending: UROLOGY
Payer: COMMERCIAL

## 2023-11-27 DIAGNOSIS — N20.1 URETERAL CALCULUS: ICD-10-CM

## 2023-11-27 DIAGNOSIS — N22 CALCULUS OF URINARY TRACT IN DISEASES CLASSIFIED ELSEWHERE: ICD-10-CM

## 2023-11-27 PROCEDURE — 74176 CT RENAL STONE STUDY ABD PELVIS WO: ICD-10-PCS | Mod: 26,,, | Performed by: STUDENT IN AN ORGANIZED HEALTH CARE EDUCATION/TRAINING PROGRAM

## 2023-11-27 PROCEDURE — 74176 CT ABD & PELVIS W/O CONTRAST: CPT | Mod: 26,,, | Performed by: STUDENT IN AN ORGANIZED HEALTH CARE EDUCATION/TRAINING PROGRAM

## 2023-11-27 PROCEDURE — 74176 CT ABD & PELVIS W/O CONTRAST: CPT | Mod: TC

## 2023-11-29 ENCOUNTER — OFFICE VISIT (OUTPATIENT)
Dept: UROLOGY | Facility: CLINIC | Age: 73
End: 2023-11-29
Payer: COMMERCIAL

## 2023-11-29 VITALS
BODY MASS INDEX: 24.48 KG/M2 | SYSTOLIC BLOOD PRESSURE: 123 MMHG | HEART RATE: 72 BPM | DIASTOLIC BLOOD PRESSURE: 66 MMHG | HEIGHT: 70 IN | WEIGHT: 171 LBS

## 2023-11-29 DIAGNOSIS — N20.0 RENAL CALCULI: Primary | ICD-10-CM

## 2023-11-29 DIAGNOSIS — N13.30 HYDRONEPHROSIS, UNSPECIFIED HYDRONEPHROSIS TYPE: ICD-10-CM

## 2023-11-29 PROCEDURE — 3078F DIAST BP <80 MM HG: CPT | Mod: CPTII,S$GLB,, | Performed by: UROLOGY

## 2023-11-29 PROCEDURE — 1101F PT FALLS ASSESS-DOCD LE1/YR: CPT | Mod: CPTII,S$GLB,, | Performed by: UROLOGY

## 2023-11-29 PROCEDURE — 3061F PR NEG MICROALBUMINURIA RESULT DOCUMENTED/REVIEW: ICD-10-PCS | Mod: CPTII,S$GLB,, | Performed by: UROLOGY

## 2023-11-29 PROCEDURE — 4010F PR ACE/ARB THEARPY RXD/TAKEN: ICD-10-PCS | Mod: CPTII,S$GLB,, | Performed by: UROLOGY

## 2023-11-29 PROCEDURE — 1159F MED LIST DOCD IN RCRD: CPT | Mod: CPTII,S$GLB,, | Performed by: UROLOGY

## 2023-11-29 PROCEDURE — 99213 PR OFFICE/OUTPT VISIT, EST, LEVL III, 20-29 MIN: ICD-10-PCS | Mod: S$GLB,,, | Performed by: UROLOGY

## 2023-11-29 PROCEDURE — 3008F PR BODY MASS INDEX (BMI) DOCUMENTED: ICD-10-PCS | Mod: CPTII,S$GLB,, | Performed by: UROLOGY

## 2023-11-29 PROCEDURE — 3074F PR MOST RECENT SYSTOLIC BLOOD PRESSURE < 130 MM HG: ICD-10-PCS | Mod: CPTII,S$GLB,, | Performed by: UROLOGY

## 2023-11-29 PROCEDURE — 3061F NEG MICROALBUMINURIA REV: CPT | Mod: CPTII,S$GLB,, | Performed by: UROLOGY

## 2023-11-29 PROCEDURE — 3288F FALL RISK ASSESSMENT DOCD: CPT | Mod: CPTII,S$GLB,, | Performed by: UROLOGY

## 2023-11-29 PROCEDURE — 3066F PR DOCUMENTATION OF TREATMENT FOR NEPHROPATHY: ICD-10-PCS | Mod: CPTII,S$GLB,, | Performed by: UROLOGY

## 2023-11-29 PROCEDURE — 99999 PR PBB SHADOW E&M-EST. PATIENT-LVL IV: CPT | Mod: PBBFAC,,, | Performed by: UROLOGY

## 2023-11-29 PROCEDURE — 99999 PR PBB SHADOW E&M-EST. PATIENT-LVL IV: ICD-10-PCS | Mod: PBBFAC,,, | Performed by: UROLOGY

## 2023-11-29 PROCEDURE — 1101F PR PT FALLS ASSESS DOC 0-1 FALLS W/OUT INJ PAST YR: ICD-10-PCS | Mod: CPTII,S$GLB,, | Performed by: UROLOGY

## 2023-11-29 PROCEDURE — 1159F PR MEDICATION LIST DOCUMENTED IN MEDICAL RECORD: ICD-10-PCS | Mod: CPTII,S$GLB,, | Performed by: UROLOGY

## 2023-11-29 PROCEDURE — 4010F ACE/ARB THERAPY RXD/TAKEN: CPT | Mod: CPTII,S$GLB,, | Performed by: UROLOGY

## 2023-11-29 PROCEDURE — 3066F NEPHROPATHY DOC TX: CPT | Mod: CPTII,S$GLB,, | Performed by: UROLOGY

## 2023-11-29 PROCEDURE — 3078F PR MOST RECENT DIASTOLIC BLOOD PRESSURE < 80 MM HG: ICD-10-PCS | Mod: CPTII,S$GLB,, | Performed by: UROLOGY

## 2023-11-29 PROCEDURE — 99213 OFFICE O/P EST LOW 20 MIN: CPT | Mod: S$GLB,,, | Performed by: UROLOGY

## 2023-11-29 PROCEDURE — 3074F SYST BP LT 130 MM HG: CPT | Mod: CPTII,S$GLB,, | Performed by: UROLOGY

## 2023-11-29 PROCEDURE — 3044F PR MOST RECENT HEMOGLOBIN A1C LEVEL <7.0%: ICD-10-PCS | Mod: CPTII,S$GLB,, | Performed by: UROLOGY

## 2023-11-29 PROCEDURE — 1157F ADVNC CARE PLAN IN RCRD: CPT | Mod: CPTII,S$GLB,, | Performed by: UROLOGY

## 2023-11-29 PROCEDURE — 3008F BODY MASS INDEX DOCD: CPT | Mod: CPTII,S$GLB,, | Performed by: UROLOGY

## 2023-11-29 PROCEDURE — 3044F HG A1C LEVEL LT 7.0%: CPT | Mod: CPTII,S$GLB,, | Performed by: UROLOGY

## 2023-11-29 PROCEDURE — 1157F PR ADVANCE CARE PLAN OR EQUIV PRESENT IN MEDICAL RECORD: ICD-10-PCS | Mod: CPTII,S$GLB,, | Performed by: UROLOGY

## 2023-11-29 PROCEDURE — 3288F PR FALLS RISK ASSESSMENT DOCUMENTED: ICD-10-PCS | Mod: CPTII,S$GLB,, | Performed by: UROLOGY

## 2023-11-29 NOTE — PROGRESS NOTES
Subjective:       Patient ID: Sadiq Dhillon is a 73 y.o. male.    Chief Complaint: Follow-up (Pt here for f/u with scan. )    HPI patient is here with his CT scan he has a 4 mm nonobstructing stone he has some hydronephrosis which is very mild I will get a Lasix renal scan hopefully prove that that is nonobstructed.  His urine is negative no fever chills nausea vomiting had a good trip to [facility]    Past Medical History:   Diagnosis Date    Diabetes mellitus     Fuchs' corneal dystrophy     Heart attack     Hypertension     Kidney stones     Pancreatic mass     Pancreatitis     after EUS . 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, treated with dostenex(cabergoline)/ resolved    Reflux esophagitis     Tumor cells, benign        Past Surgical History:   Procedure Laterality Date    CATARACT EXTRACTION W/  INTRAOCULAR LENS IMPLANT Right 0    Dr Van     CATARACT EXTRACTION W/  INTRAOCULAR LENS IMPLANT Left 3/21/2019    Procedure: EXTRACTION, CATARACT, WITH IOL INSERTION;  Surgeon: Ra Van MD;  Location: [facility];  Service: Ophthalmology;  Laterality: Left;    CORNEAL TRANSPLANT Right     Dr Vna     DESCEMETS STRIPPING AUTOMATED ENDOTHELIAL KERATOPLASTY Left 3/21/2019    Procedure: TRANSPLANT, PARTIAL-THICKNESS, CORNEA, USING DSAEK TECHNIQUE;  Surgeon: Ra Van MD;  Location: [facility];  Service: Ophthalmology;  Laterality: Left;  DSEK    REPAIR OF RETINAL DETACHMENT WITH VITRECTOMY Right 7/18/2018    Procedure: REPAIR, RETINAL DETACHMENT, WITH VITRECTOMY;  Surgeon: SHUBHAM Patel MD;  Location: [address];  Service: Ophthalmology;  Laterality: Right;  40 min    REPAIR OF RETINAL DETACHMENT WITH VITRECTOMY Left 8/8/2018    Procedure: REPAIR, RETINAL DETACHMENT, WITH VITRECTOMY;  Surgeon: SHUBHAM Patel MD;  Location: [address]  [facility];  Service: Ophthalmology;  Laterality: Left;  40 min    RHINOPLASTY TIP  1975    THYROIDECTOMY  2007    UPPER ENDOSCOPIC ULTRASOUND W/ FNA      with resultant pancreatitis       Family History   Problem Relation Age of Onset    Hypertension Mother     Heart disease Mother     Heart disease Father     Cataracts Father     Stroke Father     Diabetes Father     Diabetes Paternal Uncle     Stroke Maternal Grandmother     Blindness Neg Hx     Glaucoma Neg Hx     Macular degeneration Neg Hx     Retinal detachment Neg Hx     Strabismus Neg Hx     Thyroid disease Neg Hx     Cancer Neg Hx     Amblyopia Neg Hx        Social History     Socioeconomic History    Marital status: Single   Tobacco Use    Smoking status: Never     Passive exposure: Never    Smokeless tobacco: Never   Substance and Sexual Activity    Alcohol use: Yes     Comment: social    Drug use: No    Sexual activity: Not Currently       Allergies:  Grass pollen-june grass standard and House dust    Medications:    Current Outpatient Medications:     aspirin (ECOTRIN) 81 MG EC tablet, Take 81 mg by mouth every morning. , Disp: , Rfl:     atorvastatin (LIPITOR) 80 MG tablet, Take 1 tablet (80 mg total) by mouth once daily., Disp: 90 tablet, Rfl: 3    cabergoline (DOSTINEX) 0.5 mg tablet, TAKE 1 TABLET(0.5 MG) BY MOUTH 2 TIMES A WEEK, Disp: 24 tablet, Rfl: 3    chlorhexidine (PERIDEX) 0.12 % solution, SMARTSIG:By Mouth, Disp: , Rfl:     CONTOUR NEXT TEST STRIPS Strp, UTD QID IN VITRO, Disp: , Rfl: 3    dicyclomine (BENTYL) 10 MG capsule, TAKE 1 CAPSULE(10 MG) BY MOUTH THREE TIMES DAILY AS NEEDED FOR ABDOMINAL PAIN OR DIARRHEA (Patient not taking: Reported on 11/11/2023), Disp: 270 capsule, Rfl: 1    famotidine (PEPCID) 20 MG tablet, Take by mouth., Disp: , Rfl:     fexofenadine-pseudoephedrine  mg (ALLEGRA-D)  mg per tablet, Take 1 tablet by mouth., Disp: , Rfl:     gabapentin (NEURONTIN) 100 MG capsule, Take 1 capsule three times a day,  Takes one  capsule every morning, Disp: , Rfl: 3    HYDROcodone-acetaminophen (NORCO) 5-325 mg per tablet, Take 1 tablet by mouth every 4 (four) hours as needed for Pain. (Patient not taking: Reported on 11/11/2023), Disp: 15 tablet, Rfl: 0    ketorolac (TORADOL) 10 mg tablet, Take 1 tablet (10 mg total) by mouth every 6 (six) hours. (Patient not taking: Reported on 11/11/2023), Disp: 15 tablet, Rfl: 0    ketorolac (TORADOL) 10 mg tablet, Take 1 tablet (10 mg total) by mouth every 6 (six) hours. (Patient not taking: Reported on 11/11/2023), Disp: 15 tablet, Rfl: 0    lisinopril (PRINIVIL,ZESTRIL) 2.5 MG tablet, Take 2.5 mg by mouth every morning. , Disp: , Rfl:     loperamide (IMODIUM) 2 mg capsule, Take 2 mg by mouth 4 (four) times daily as needed for Diarrhea., Disp: , Rfl:     metFORMIN (GLUCOPHAGE-XR) 500 MG ER 24hr tablet, TAKE 2 TABLETS BY MOUTH TWICE DAILY, Disp: 360 tablet, Rfl: 3    metoprolol succinate (TOPROL-XL) 25 MG 24 hr tablet, Take 1 tablet (25 mg total) by mouth nightly., Disp: 60 tablet, Rfl: 6    naloxone (NARCAN) 1 mg/mL injection, 2 mg (1 mg per nostril) by Nasal route as needed for opioid overdose; may repeat in 3 to 5 minutes if not effective. Call 911, Disp: 2 mL, Rfl: 11    oxyCODONE-acetaminophen (PERCOCET)  mg per tablet, Take 1 tablet by mouth every 4 (four) hours as needed for Pain. (Patient not taking: Reported on 11/11/2023), Disp: 15 tablet, Rfl: 0    oxyCODONE-acetaminophen (PERCOCET) 5-325 mg per tablet, Take 1 tablet by mouth every 4 (four) hours as needed for Pain. (Patient not taking: Reported on 11/11/2023), Disp: 15 tablet, Rfl: 0    tamsulosin (FLOMAX) 0.4 mg Cap, Take 1 capsule (0.4 mg total) by mouth once daily., Disp: 10 capsule, Rfl: 0    testosterone (ANDROGEL) 1 % (50 mg/5 gram) GlPk, APPLY CONTENTS OF 2 PACKETS TOPICALLY TO SKIN EVERY DAY, Disp: 900 g, Rfl: 3    traMADoL (ULTRAM) 50 mg tablet, Take 50 mg by mouth., Disp: , Rfl:     Review of Systems    Objective:       Physical Exam  Constitutional:       Appearance: He is well-developed.   HENT:      Head: Normocephalic.   Cardiovascular:      Rate and Rhythm: Normal rate.   Pulmonary:      Effort: Pulmonary effort is normal.   Abdominal:      Palpations: Abdomen is soft.   Genitourinary:     Prostate: Normal.   Skin:     General: Skin is warm.   Neurological:      Mental Status: He is alert.         Assessment:       1. Renal calculi        Plan:       Sadiq was seen today for follow-up.    Diagnoses and all orders for this visit:    Renal calculi    Postvoid residual is 100 cc but it was not a true postvoid residual

## 2023-12-06 ENCOUNTER — HOSPITAL ENCOUNTER (OUTPATIENT)
Dept: RADIOLOGY | Facility: HOSPITAL | Age: 73
Discharge: HOME | End: 2023-12-06
Attending: UROLOGY
Payer: COMMERCIAL

## 2023-12-06 DIAGNOSIS — N13.30 HYDRONEPHROSIS, UNSPECIFIED HYDRONEPHROSIS TYPE: ICD-10-CM

## 2023-12-06 PROCEDURE — 78708 K FLOW/FUNCT IMAGE W/DRUG: CPT | Mod: TC

## 2023-12-06 PROCEDURE — 78708 NM RENOGRAM WITH LASIX: ICD-10-PCS | Mod: 26,,, | Performed by: STUDENT IN AN ORGANIZED HEALTH CARE EDUCATION/TRAINING PROGRAM

## 2023-12-06 PROCEDURE — 78708 K FLOW/FUNCT IMAGE W/DRUG: CPT | Mod: 26,,, | Performed by: STUDENT IN AN ORGANIZED HEALTH CARE EDUCATION/TRAINING PROGRAM

## 2023-12-11 ENCOUNTER — PATIENT MESSAGE (OUTPATIENT)
Dept: UROLOGY | Facility: CLINIC | Age: 73
End: 2023-12-11
Payer: COMMERCIAL

## 2023-12-12 ENCOUNTER — OFFICE VISIT (OUTPATIENT)
Dept: PODIATRY | Facility: CLINIC | Age: 73
End: 2023-12-12
Payer: COMMERCIAL

## 2023-12-12 ENCOUNTER — HOSPITAL ENCOUNTER (OUTPATIENT)
Dept: RADIOLOGY | Facility: HOSPITAL | Age: 73
Discharge: HOME | End: 2023-12-12
Attending: PODIATRIST
Payer: COMMERCIAL

## 2023-12-12 ENCOUNTER — NURSE TRIAGE (OUTPATIENT)
Dept: ADMINISTRATIVE | Facility: CLINIC | Age: 73
End: 2023-12-12
Payer: COMMERCIAL

## 2023-12-12 ENCOUNTER — PATIENT MESSAGE (OUTPATIENT)
Dept: PODIATRY | Facility: CLINIC | Age: 73
End: 2023-12-12

## 2023-12-12 VITALS
DIASTOLIC BLOOD PRESSURE: 69 MMHG | SYSTOLIC BLOOD PRESSURE: 109 MMHG | WEIGHT: 178.38 LBS | BODY MASS INDEX: 25.54 KG/M2 | HEART RATE: 66 BPM | HEIGHT: 70 IN

## 2023-12-12 DIAGNOSIS — M79.675 PAIN OF TOE OF LEFT FOOT: Primary | ICD-10-CM

## 2023-12-12 DIAGNOSIS — M79.675 PAIN OF TOE OF LEFT FOOT: ICD-10-CM

## 2023-12-12 PROCEDURE — 1159F PR MEDICATION LIST DOCUMENTED IN MEDICAL RECORD: ICD-10-PCS | Mod: CPTII,S$GLB,, | Performed by: PODIATRIST

## 2023-12-12 PROCEDURE — 73660 XR TOE 2 OR MORE VIEWS LEFT: ICD-10-PCS | Mod: 26,LT,, | Performed by: RADIOLOGY

## 2023-12-12 PROCEDURE — 3074F PR MOST RECENT SYSTOLIC BLOOD PRESSURE < 130 MM HG: ICD-10-PCS | Mod: CPTII,S$GLB,, | Performed by: PODIATRIST

## 2023-12-12 PROCEDURE — 4010F ACE/ARB THERAPY RXD/TAKEN: CPT | Mod: CPTII,S$GLB,, | Performed by: PODIATRIST

## 2023-12-12 PROCEDURE — 1157F ADVNC CARE PLAN IN RCRD: CPT | Mod: CPTII,S$GLB,, | Performed by: PODIATRIST

## 2023-12-12 PROCEDURE — 3078F DIAST BP <80 MM HG: CPT | Mod: CPTII,S$GLB,, | Performed by: PODIATRIST

## 2023-12-12 PROCEDURE — 3044F HG A1C LEVEL LT 7.0%: CPT | Mod: CPTII,S$GLB,, | Performed by: PODIATRIST

## 2023-12-12 PROCEDURE — 99214 PR OFFICE/OUTPT VISIT, EST, LEVL IV, 30-39 MIN: ICD-10-PCS | Mod: S$GLB,,, | Performed by: PODIATRIST

## 2023-12-12 PROCEDURE — 99214 OFFICE O/P EST MOD 30 MIN: CPT | Mod: S$GLB,,, | Performed by: PODIATRIST

## 2023-12-12 PROCEDURE — 99999 PR PBB SHADOW E&M-EST. PATIENT-LVL IV: ICD-10-PCS | Mod: PBBFAC,,, | Performed by: PODIATRIST

## 2023-12-12 PROCEDURE — 1157F PR ADVANCE CARE PLAN OR EQUIV PRESENT IN MEDICAL RECORD: ICD-10-PCS | Mod: CPTII,S$GLB,, | Performed by: PODIATRIST

## 2023-12-12 PROCEDURE — 1126F PR PAIN SEVERITY QUANTIFIED, NO PAIN PRESENT: ICD-10-PCS | Mod: CPTII,S$GLB,, | Performed by: PODIATRIST

## 2023-12-12 PROCEDURE — 3066F PR DOCUMENTATION OF TREATMENT FOR NEPHROPATHY: ICD-10-PCS | Mod: CPTII,S$GLB,, | Performed by: PODIATRIST

## 2023-12-12 PROCEDURE — 3008F PR BODY MASS INDEX (BMI) DOCUMENTED: ICD-10-PCS | Mod: CPTII,S$GLB,, | Performed by: PODIATRIST

## 2023-12-12 PROCEDURE — 3061F PR NEG MICROALBUMINURIA RESULT DOCUMENTED/REVIEW: ICD-10-PCS | Mod: CPTII,S$GLB,, | Performed by: PODIATRIST

## 2023-12-12 PROCEDURE — 99999 PR PBB SHADOW E&M-EST. PATIENT-LVL IV: CPT | Mod: PBBFAC,,, | Performed by: PODIATRIST

## 2023-12-12 PROCEDURE — 73660 X-RAY EXAM OF TOE(S): CPT | Mod: 26,LT,, | Performed by: RADIOLOGY

## 2023-12-12 PROCEDURE — 3061F NEG MICROALBUMINURIA REV: CPT | Mod: CPTII,S$GLB,, | Performed by: PODIATRIST

## 2023-12-12 PROCEDURE — 4010F PR ACE/ARB THEARPY RXD/TAKEN: ICD-10-PCS | Mod: CPTII,S$GLB,, | Performed by: PODIATRIST

## 2023-12-12 PROCEDURE — 73660 X-RAY EXAM OF TOE(S): CPT | Mod: TC,LT

## 2023-12-12 PROCEDURE — 3078F PR MOST RECENT DIASTOLIC BLOOD PRESSURE < 80 MM HG: ICD-10-PCS | Mod: CPTII,S$GLB,, | Performed by: PODIATRIST

## 2023-12-12 PROCEDURE — 3044F PR MOST RECENT HEMOGLOBIN A1C LEVEL <7.0%: ICD-10-PCS | Mod: CPTII,S$GLB,, | Performed by: PODIATRIST

## 2023-12-12 PROCEDURE — 3074F SYST BP LT 130 MM HG: CPT | Mod: CPTII,S$GLB,, | Performed by: PODIATRIST

## 2023-12-12 PROCEDURE — 1126F AMNT PAIN NOTED NONE PRSNT: CPT | Mod: CPTII,S$GLB,, | Performed by: PODIATRIST

## 2023-12-12 PROCEDURE — 1159F MED LIST DOCD IN RCRD: CPT | Mod: CPTII,S$GLB,, | Performed by: PODIATRIST

## 2023-12-12 PROCEDURE — 3066F NEPHROPATHY DOC TX: CPT | Mod: CPTII,S$GLB,, | Performed by: PODIATRIST

## 2023-12-12 PROCEDURE — 3008F BODY MASS INDEX DOCD: CPT | Mod: CPTII,S$GLB,, | Performed by: PODIATRIST

## 2023-12-12 NOTE — PROGRESS NOTES
"Subjective:      Patient ID: Sadiq Dhillon is a 73 y.o. male.    Chief Complaint: Follow-up (X-ray right 2nd toe injury)    Pt presents today with the complaint, "Yesterday I stubbed my toes on a door threshold.  Last night when I removed my socks I saw the toe next to the big toe on my left footmis swollen and purple." Pt states the toe is still painful.     Review of Systems   Constitutional: Negative for chills, fever and malaise/fatigue.   HENT:  Negative for hearing loss.    Cardiovascular:  Negative for claudication.   Respiratory:  Negative for shortness of breath.    Skin:  Negative for flushing and rash.   Musculoskeletal:  Negative for joint pain and myalgias.   Neurological:  Negative for loss of balance, numbness, paresthesias and sensory change.   Psychiatric/Behavioral:  Negative for altered mental status.            Objective:      Physical Exam  Vitals reviewed.   Cardiovascular:      Pulses:           Dorsalis pedis pulses are 2+ on the right side and 2+ on the left side.        Posterior tibial pulses are 2+ on the right side and 2+ on the left side.      Comments: No edema noted b/L  Musculoskeletal:         General: Swelling, tenderness and signs of injury present.      Comments:     Left 2nd digit bruised, swollen  Movement at 2nd MPJ       Feet:      Right foot:      Protective Sensation: 5 sites tested.  5 sites sensed.      Left foot:      Protective Sensation: 5 sites tested.  5 sites sensed.   Skin:     General: Skin is warm.      Findings: Bruising present.      Comments: Normal skin tugor noted.   No open lesion noted b/L  Skin temp is warm to warm from proximal to distal b/L.  Webspaces clean, dry, and intact     Neurological:      Mental Status: He is alert.      Comments: Gross sensation intact b/L               Assessment:       Encounter Diagnosis   Name Primary?    Pain of toe of left foot Yes         Plan:       Sadiq was seen today for follow-up.    Diagnoses and all orders " for this visit:    Pain of toe of left foot      I counseled the patient on his conditions, their implications and medical management.    Medical records reviewed  X-rays taken and reviewed, no fx or dislocations noted.     Pt advised toe is not broken, but bruised toe splinted   [employer] shoes dispensed to be worn for 10 days    Pt will RTC in 10 days        .

## 2023-12-12 NOTE — TELEPHONE ENCOUNTER
Spoke with patient who states he injured his left big toe.  Patient states he hit his toe on the doorway and it is painful and has swelling.  Patient is uncertain if the toe is broken due to swelling.  Advised ED for evaluation.  Patient verbalized understanding.   Reason for Disposition   Looks like a broken bone or dislocated joint (e.g., crooked or deformed)     Patient is not certain if his toe is broken.    Additional Information   Negative: Major bleeding (actively dripping or spurting) that can't be stopped   Negative: Amputation of toe   Negative: Sounds like a life-threatening emergency to the triager   Negative: High pressure injection injury (e.g., from paint gun, usually work-related    Protocols used: Toe Injury-A-[place]

## 2023-12-14 ENCOUNTER — TELEPHONE (OUTPATIENT)
Dept: ENDOSCOPY | Facility: HOSPITAL | Age: 73
End: 2023-12-14
Payer: COMMERCIAL

## 2023-12-14 DIAGNOSIS — K86.2 PANCREAS CYST: Primary | ICD-10-CM

## 2023-12-18 ENCOUNTER — PATIENT MESSAGE (OUTPATIENT)
Dept: CARDIOLOGY | Facility: CLINIC | Age: 73
End: 2023-12-18
Payer: COMMERCIAL

## 2024-01-02 ENCOUNTER — PATIENT MESSAGE (OUTPATIENT)
Dept: CARDIOLOGY | Facility: CLINIC | Age: 74
End: 2024-01-02
Payer: COMMERCIAL

## 2024-01-03 ENCOUNTER — LAB VISIT (OUTPATIENT)
Dept: LAB | Facility: HOSPITAL | Age: 74
End: 2024-01-03
Attending: INTERNAL MEDICINE
Payer: COMMERCIAL

## 2024-01-03 DIAGNOSIS — E78.2 HYPERLIPIDEMIA, MIXED: ICD-10-CM

## 2024-01-03 DIAGNOSIS — E11.9 TYPE 2 DIABETES MELLITUS WITHOUT COMPLICATION, WITHOUT LONG-TERM CURRENT USE OF INSULIN: ICD-10-CM

## 2024-01-03 LAB
ALBUMIN SERPL BCP-MCNC: 4 G/DL (ref 3.5–5.2)
ALP SERPL-CCNC: 65 U/L (ref 55–135)
ALT SERPL W/O P-5'-P-CCNC: 18 U/L (ref 10–44)
ANION GAP SERPL CALC-SCNC: 6 MMOL/L (ref 8–16)
AST SERPL-CCNC: 19 U/L (ref 10–40)
BASOPHILS # BLD AUTO: 0.04 K/UL (ref 0–0.2)
BASOPHILS NFR BLD: 0.7 % (ref 0–1.9)
BILIRUB SERPL-MCNC: 0.6 MG/DL (ref 0.1–1)
BUN SERPL-MCNC: 11 MG/DL (ref 8–23)
CALCIUM SERPL-MCNC: 9.4 MG/DL (ref 8.7–10.5)
CHLORIDE SERPL-SCNC: 105 MMOL/L (ref 95–110)
CHOLEST SERPL-MCNC: 103 MG/DL (ref 120–199)
CHOLEST/HDLC SERPL: 2.2 {RATIO} (ref 2–5)
CO2 SERPL-SCNC: 28 MMOL/L (ref 23–29)
CREAT SERPL-MCNC: 0.8 MG/DL (ref 0.5–1.4)
DIFFERENTIAL METHOD BLD: ABNORMAL
EOSINOPHIL # BLD AUTO: 0.3 K/UL (ref 0–0.5)
EOSINOPHIL NFR BLD: 5.2 % (ref 0–8)
ERYTHROCYTE [DISTWIDTH] IN BLOOD BY AUTOMATED COUNT: 12.8 % (ref 11.5–14.5)
EST. GFR  (NO RACE VARIABLE): >60 ML/MIN/1.73 M^2
ESTIMATED AVG GLUCOSE: 128 MG/DL (ref 68–131)
GLUCOSE SERPL-MCNC: 140 MG/DL (ref 70–110)
HBA1C MFR BLD: 6.1 % (ref 4–5.6)
HCT VFR BLD AUTO: 44.3 % (ref 40–54)
HDLC SERPL-MCNC: 46 MG/DL (ref 40–75)
HDLC SERPL: 44.7 % (ref 20–50)
HGB BLD-MCNC: 14.5 G/DL (ref 14–18)
IMM GRANULOCYTES # BLD AUTO: 0.02 K/UL (ref 0–0.04)
IMM GRANULOCYTES NFR BLD AUTO: 0.3 % (ref 0–0.5)
LDLC SERPL CALC-MCNC: 45.4 MG/DL (ref 63–159)
LYMPHOCYTES # BLD AUTO: 1.6 K/UL (ref 1–4.8)
LYMPHOCYTES NFR BLD: 27.2 % (ref 18–48)
MCH RBC QN AUTO: 29.3 PG (ref 27–31)
MCHC RBC AUTO-ENTMCNC: 32.7 G/DL (ref 32–36)
MCV RBC AUTO: 90 FL (ref 82–98)
MONOCYTES # BLD AUTO: 0.5 K/UL (ref 0.3–1)
MONOCYTES NFR BLD: 8.5 % (ref 4–15)
NEUTROPHILS # BLD AUTO: 3.5 K/UL (ref 1.8–7.7)
NEUTROPHILS NFR BLD: 58.1 % (ref 38–73)
NONHDLC SERPL-MCNC: 57 MG/DL
NRBC BLD-RTO: 0 /100 WBC
PLATELET # BLD AUTO: 231 K/UL (ref 150–450)
PMV BLD AUTO: 8.7 FL (ref 9.2–12.9)
POTASSIUM SERPL-SCNC: 4.4 MMOL/L (ref 3.5–5.1)
PROT SERPL-MCNC: 6.6 G/DL (ref 6–8.4)
RBC # BLD AUTO: 4.95 M/UL (ref 4.6–6.2)
SODIUM SERPL-SCNC: 139 MMOL/L (ref 136–145)
TRIGL SERPL-MCNC: 58 MG/DL (ref 30–150)
WBC # BLD AUTO: 5.99 K/UL (ref 3.9–12.7)

## 2024-01-03 PROCEDURE — 83036 HEMOGLOBIN GLYCOSYLATED A1C: CPT | Performed by: INTERNAL MEDICINE

## 2024-01-03 PROCEDURE — 85025 COMPLETE CBC W/AUTO DIFF WBC: CPT | Performed by: INTERNAL MEDICINE

## 2024-01-03 PROCEDURE — 80053 COMPREHEN METABOLIC PANEL: CPT | Performed by: INTERNAL MEDICINE

## 2024-01-03 PROCEDURE — 36415 COLL VENOUS BLD VENIPUNCTURE: CPT | Performed by: INTERNAL MEDICINE

## 2024-01-03 PROCEDURE — 80061 LIPID PANEL: CPT | Performed by: INTERNAL MEDICINE

## 2024-01-04 DIAGNOSIS — E29.1 HYPOGONADISM IN MALE: ICD-10-CM

## 2024-01-04 RX ORDER — TESTOSTERONE GEL, 1% 10 MG/G
GEL TRANSDERMAL
Qty: 900 G | Refills: 3 | Status: SHIPPED | OUTPATIENT
Start: 2024-01-04

## 2024-01-09 ENCOUNTER — TELEPHONE (OUTPATIENT)
Dept: ENDOCRINOLOGY | Facility: CLINIC | Age: 74
End: 2024-01-09
Payer: COMMERCIAL

## 2024-01-09 NOTE — TELEPHONE ENCOUNTER
Called patient, informed that the PA was already done just waiting on insurance.     ----- Message from Antione Godinez sent at 1/9/2024  4:17 PM CST -----  Regarding: PA  Contact: [phone]  Pt needs PA for medication RX:estosterone (ANDROGEL) 1 % (50 mg/5 gram) GlPk 900 g. Please call would like to discuss

## 2024-01-12 ENCOUNTER — NURSE TRIAGE (OUTPATIENT)
Dept: ADMINISTRATIVE | Facility: CLINIC | Age: 74
End: 2024-01-12
Payer: COMMERCIAL

## 2024-01-12 ENCOUNTER — HOSPITAL ENCOUNTER (OUTPATIENT)
Dept: RADIOLOGY | Facility: HOSPITAL | Age: 74
Discharge: HOME | End: 2024-01-12
Attending: INTERNAL MEDICINE
Payer: COMMERCIAL

## 2024-01-12 ENCOUNTER — TELEPHONE (OUTPATIENT)
Dept: GASTROENTEROLOGY | Facility: CLINIC | Age: 74
End: 2024-01-12
Payer: COMMERCIAL

## 2024-01-12 DIAGNOSIS — K86.2 PANCREAS CYST: ICD-10-CM

## 2024-01-12 PROCEDURE — 74183 MRI ABD W/O CNTR FLWD CNTR: CPT | Mod: 26,,, | Performed by: INTERNAL MEDICINE

## 2024-01-12 PROCEDURE — 25500020 PHARM REV CODE 255: Performed by: INTERNAL MEDICINE

## 2024-01-12 PROCEDURE — A9585 GADOBUTROL INJECTION: HCPCS | Performed by: INTERNAL MEDICINE

## 2024-01-12 PROCEDURE — 76376 3D RENDER W/INTRP POSTPROCES: CPT | Mod: 26,,, | Performed by: INTERNAL MEDICINE

## 2024-01-12 PROCEDURE — 76376 3D RENDER W/INTRP POSTPROCES: CPT | Mod: TC

## 2024-01-12 RX ORDER — GADOBUTROL 604.72 MG/ML
10 INJECTION INTRAVENOUS
Status: COMPLETED | OUTPATIENT
Start: 2024-01-12 | End: 2024-01-12

## 2024-01-12 RX ADMIN — GADOBUTROL 10 ML: 604.72 INJECTION INTRAVENOUS at 04:01

## 2024-01-12 NOTE — TELEPHONE ENCOUNTER
----- Message from Rainer Zheng sent at 1/12/2024  9:08 AM CST -----  Type:  Patient Returning Call    Who Called:pt  Who Left Message for Patient:  Does the patient know what this is regarding?:pt advice   Would the patient rather a call back or a response via [facility]? call  Best Call Back Number:[phone]  Additional Information: pt has an MRI scheduled for 4 on 01/12 and the pt isnt feeling well and would like to know if he should reschedule

## 2024-01-12 NOTE — TELEPHONE ENCOUNTER
Patient states he is scheduled for an MRI today, 01/12/24, at 4:00 PM and is to initiate NPO status at 12:00 noon. Patient states onset of a runny nose and is inquiring if ok to take Theraflu prior to MRI.     Triage RN spoke with [facility], [place], that advised ok for patient to take Theraflu prior to MRI procedure today, 01/12/24 at 4:00 PM.     Patient advised that he can take Theraflu prior to MRI scheduled for today at 4:00 PM. Patient also advised to contact the [facility] Triage Service for any additional concerns/symptoms. Patient states understanding of care advice.       Reason for Disposition   Nursing judgment    Additional Information   Negative: New-onset or worsening symptoms, see that protocol (e.g., diarrhea, runny nose, sore throat)   Negative: Medicine question not related to refill or renewal   Negative: Requesting a renewal or refill of a medicine patient is currently taking   Negative: Questions or concerns about high blood pressure   Negative: Nursing judgment    Protocols used: Information Only Call - No Triage-A-[place]

## 2024-01-17 ENCOUNTER — OFFICE VISIT (OUTPATIENT)
Dept: PRIMARY CARE CLINIC | Facility: CLINIC | Age: 74
End: 2024-01-17
Payer: COMMERCIAL

## 2024-01-17 ENCOUNTER — LAB VISIT (OUTPATIENT)
Dept: LAB | Facility: HOSPITAL | Age: 74
End: 2024-01-17
Attending: INTERNAL MEDICINE
Payer: COMMERCIAL

## 2024-01-17 VITALS
WEIGHT: 175.69 LBS | HEIGHT: 70 IN | HEART RATE: 63 BPM | SYSTOLIC BLOOD PRESSURE: 110 MMHG | OXYGEN SATURATION: 98 % | BODY MASS INDEX: 25.15 KG/M2 | DIASTOLIC BLOOD PRESSURE: 62 MMHG

## 2024-01-17 DIAGNOSIS — N20.0 NEPHROLITHIASIS: ICD-10-CM

## 2024-01-17 DIAGNOSIS — E78.2 HYPERLIPIDEMIA, MIXED: ICD-10-CM

## 2024-01-17 DIAGNOSIS — R10.31 RIGHT LOWER QUADRANT ABDOMINAL PAIN: ICD-10-CM

## 2024-01-17 DIAGNOSIS — K86.2 PANCREAS CYST: ICD-10-CM

## 2024-01-17 DIAGNOSIS — I11.9 HYPERTENSIVE HEART DISEASE WITHOUT HEART FAILURE: ICD-10-CM

## 2024-01-17 DIAGNOSIS — E11.9 TYPE 2 DIABETES MELLITUS WITHOUT COMPLICATION, WITHOUT LONG-TERM CURRENT USE OF INSULIN: Primary | ICD-10-CM

## 2024-01-17 DIAGNOSIS — E29.1 HYPOGONADISM IN MALE: ICD-10-CM

## 2024-01-17 DIAGNOSIS — D35.2 PROLACTINOMA: ICD-10-CM

## 2024-01-17 DIAGNOSIS — E21.0 PRIMARY HYPERPARATHYROIDISM: ICD-10-CM

## 2024-01-17 DIAGNOSIS — I25.10 CORONARY ARTERY DISEASE INVOLVING NATIVE CORONARY ARTERY OF NATIVE HEART WITHOUT ANGINA PECTORIS: ICD-10-CM

## 2024-01-17 LAB
BILIRUB UR QL STRIP: NEGATIVE
CLARITY UR REFRACT.AUTO: CLEAR
COLOR UR AUTO: YELLOW
GLUCOSE UR QL STRIP: NEGATIVE
HGB UR QL STRIP: NEGATIVE
KETONES UR QL STRIP: NEGATIVE
LEUKOCYTE ESTERASE UR QL STRIP: NEGATIVE
MICROSCOPIC COMMENT: NORMAL
NITRITE UR QL STRIP: NEGATIVE
PH UR STRIP: 5 [PH] (ref 5–8)
PROT UR QL STRIP: NEGATIVE
RBC #/AREA URNS AUTO: 1 /HPF (ref 0–4)
SP GR UR STRIP: 1.02 (ref 1–1.03)
URN SPEC COLLECT METH UR: NORMAL
WBC #/AREA URNS AUTO: 2 /HPF (ref 0–5)

## 2024-01-17 PROCEDURE — 1159F MED LIST DOCD IN RCRD: CPT | Mod: CPTII,S$GLB,, | Performed by: INTERNAL MEDICINE

## 2024-01-17 PROCEDURE — 3044F HG A1C LEVEL LT 7.0%: CPT | Mod: CPTII,S$GLB,, | Performed by: INTERNAL MEDICINE

## 2024-01-17 PROCEDURE — 3008F BODY MASS INDEX DOCD: CPT | Mod: CPTII,S$GLB,, | Performed by: INTERNAL MEDICINE

## 2024-01-17 PROCEDURE — 99214 OFFICE O/P EST MOD 30 MIN: CPT | Mod: S$GLB,,, | Performed by: INTERNAL MEDICINE

## 2024-01-17 PROCEDURE — 99999 PR PBB SHADOW E&M-EST. PATIENT-LVL V: CPT | Mod: PBBFAC,,, | Performed by: INTERNAL MEDICINE

## 2024-01-17 PROCEDURE — 1126F AMNT PAIN NOTED NONE PRSNT: CPT | Mod: CPTII,S$GLB,, | Performed by: INTERNAL MEDICINE

## 2024-01-17 PROCEDURE — 3074F SYST BP LT 130 MM HG: CPT | Mod: CPTII,S$GLB,, | Performed by: INTERNAL MEDICINE

## 2024-01-17 PROCEDURE — 3078F DIAST BP <80 MM HG: CPT | Mod: CPTII,S$GLB,, | Performed by: INTERNAL MEDICINE

## 2024-01-17 PROCEDURE — 81001 URINALYSIS AUTO W/SCOPE: CPT | Performed by: INTERNAL MEDICINE

## 2024-01-17 PROCEDURE — 1157F ADVNC CARE PLAN IN RCRD: CPT | Mod: CPTII,S$GLB,, | Performed by: INTERNAL MEDICINE

## 2024-01-17 NOTE — PROGRESS NOTES
Chief Complaint  Chief Complaint   Patient presents with    Follow-up       HPI    Sadiq Dhillon is a 73 y.o. male with chronic conditions of DM2, HTN, CAD, HLD, hx of kidney stones, pituitary adenoma with primary hyperparathyroidism, prolactinoma, hypogonadism, pancreatic cyst  who presents today for: follow up chronic conditions.  Hx of kidney stones: He has had right sided intermittent groin pain. He rates the pain as mild. Stone identified twice on imaging last year, unsure if it has passed or not. He denies any urinary symptoms.  DM2: Controlled on metformin.  A1C 6.1 as of January 2023. Sees Dr. Hunter, endocrinology. Recently had eye exam for glasses prescription but no diabetic eye exam. F/u with Dr Norris. He denies any vision changes.  HTN: BP at goal on metoprolol.  CAD: On ASA, metoprolol, atorvastatin. Denies chest pain, shortness of breath.  HLD: Controlled on atorvastatin. LDL 45  Pituitary adenoma, prolactinoma, primary hyperparathyroidism s/p parathyroidectomy, hypogonadism:  Sees Dr. Leon, endo.  On testosterone.  Last PTH normal.    GERD: Controlled on pepcid as needed.  Pancreas cyst: asymptomatic  Flu shot UTD.  TdAP 2020.  Shingrix UTD.  COVID vaccine UTD.    Cscope UTD.  FOBT negative as of August 2023.        PAST MEDICAL HISTORY:  Past Medical History:   Diagnosis Date    Diabetes mellitus     Fuchs' corneal dystrophy     Heart attack     Hypertension     Kidney stones     Pancreatic mass     Pancreatitis     after EUS . 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, treated with dostenex(cabergoline)/ resolved    Reflux esophagitis     Tumor cells, benign        PAST SURGICAL HISTORY:  Past Surgical History:   Procedure Laterality Date    CATARACT EXTRACTION W/  INTRAOCULAR LENS IMPLANT Right 0    Dr Van     CATARACT EXTRACTION W/   INTRAOCULAR LENS IMPLANT Left 3/21/2019    Procedure: EXTRACTION, CATARACT, WITH IOL INSERTION;  Surgeon: Ra Van MD;  Location: [facility] OR;  Service: Ophthalmology;  Laterality: Left;    CORNEAL TRANSPLANT Right     Dr Van     DESCEMETS STRIPPING AUTOMATED ENDOTHELIAL KERATOPLASTY Left 3/21/2019    Procedure: TRANSPLANT, PARTIAL-THICKNESS, CORNEA, USING DSAEK TECHNIQUE;  Surgeon: Ra Van MD;  Location: [facility] OR;  Service: Ophthalmology;  Laterality: Left;  DSEK    REPAIR OF RETINAL DETACHMENT WITH VITRECTOMY Right 7/18/2018    Procedure: REPAIR, RETINAL DETACHMENT, WITH VITRECTOMY;  Surgeon: SHUBHAM Patel MD;  Location: [facility] OR [email or web address] F[facility];  Service: Ophthalmology;  Laterality: Right;  40 min    REPAIR OF RETINAL DETACHMENT WITH VITRECTOMY Left 8/8/2018    Procedure: REPAIR, RETINAL DETACHMENT, WITH VITRECTOMY;  Surgeon: SHUBHAM Patel MD;  Location: [facility] OR 1ST FLR;  Service: Ophthalmology;  Laterality: Left;  40 min    RHINOPLASTY TIP  1975    THYROIDECTOMY  2007    UPPER ENDOSCOPIC ULTRASOUND W/ FNA      with resultant pancreatitis       SOCIAL HISTORY:  Social History     Socioeconomic History    Marital status: Single   Tobacco Use    Smoking status: Never     Passive exposure: Never    Smokeless tobacco: Never   Substance and Sexual Activity    Alcohol use: Yes     Comment: social    Drug use: No    Sexual activity: Not Currently       FAMILY HISTORY:  Family History   Problem Relation Age of Onset    Hypertension Mother     Heart disease Mother     Heart disease Father     Cataracts Father     Stroke Father     Diabetes Father     Diabetes Paternal Uncle     Stroke Maternal Grandmother     Blindness Neg Hx     Glaucoma Neg Hx     Macular degeneration Neg Hx     Retinal detachment Neg Hx     Strabismus Neg Hx     Thyroid disease Neg Hx     Cancer Neg Hx     Amblyopia Neg Hx        ALLERGIES AND MEDICATIONS: updated and reviewed.  Review of patient's allergies indicates:   Allergen  Reactions    Grass pollen-june grass standard     House dust      Current Outpatient Medications   Medication Sig Dispense Refill    aspirin (ECOTRIN) 81 MG EC tablet Take 81 mg by mouth every morning.       atorvastatin (LIPITOR) 80 MG tablet Take 1 tablet (80 mg total) by mouth once daily. 90 tablet 3    cabergoline (DOSTINEX) 0.5 mg tablet TAKE 1 TABLET(0.5 MG) BY MOUTH 2 TIMES A WEEK 24 tablet 3    chlorhexidine (PERIDEX) 0.12 % solution SMARTSIG:By Mouth      CONTOUR NEXT TEST STRIPS Strp UTD QID IN VITRO  3    dicyclomine (BENTYL) 10 MG capsule TAKE 1 CAPSULE(10 MG) BY MOUTH THREE TIMES DAILY AS NEEDED FOR ABDOMINAL PAIN OR DIARRHEA 270 capsule 1    famotidine (PEPCID) 20 MG tablet Take by mouth.      fexofenadine-pseudoephedrine  mg (ALLEGRA-D)  mg per tablet Take 1 tablet by mouth.      gabapentin (NEURONTIN) 100 MG capsule Take 1 capsule three times a day,  Takes one capsule every morning  3    HYDROcodone-acetaminophen (NORCO) 5-325 mg per tablet Take 1 tablet by mouth every 4 (four) hours as needed for Pain. (Patient not taking: Reported on 1/17/2024) 15 tablet 0    ketorolac (TORADOL) 10 mg tablet Take 1 tablet (10 mg total) by mouth every 6 (six) hours. (Patient not taking: Reported on 1/17/2024) 15 tablet 0    ketorolac (TORADOL) 10 mg tablet Take 1 tablet (10 mg total) by mouth every 6 (six) hours. (Patient not taking: Reported on 1/17/2024) 15 tablet 0    lisinopril (PRINIVIL,ZESTRIL) 2.5 MG tablet Take 2.5 mg by mouth every morning.       loperamide (IMODIUM) 2 mg capsule Take 2 mg by mouth 4 (four) times daily as needed for Diarrhea.      metFORMIN (GLUCOPHAGE-XR) 500 MG ER 24hr tablet TAKE 2 TABLETS BY MOUTH TWICE DAILY 360 tablet 3    metoprolol succinate (TOPROL-XL) 25 MG 24 hr tablet Take 1 tablet (25 mg total) by mouth nightly. 60 tablet 6    naloxone (NARCAN) 1 mg/mL injection 2 mg (1 mg per nostril) by Nasal route as needed for opioid overdose; may repeat in 3 to 5 minutes if not  "effective. Call 911 2 mL 11    oxyCODONE-acetaminophen (PERCOCET)  mg per tablet Take 1 tablet by mouth every 4 (four) hours as needed for Pain. (Patient not taking: Reported on 1/17/2024) 15 tablet 0    oxyCODONE-acetaminophen (PERCOCET) 5-325 mg per tablet Take 1 tablet by mouth every 4 (four) hours as needed for Pain. (Patient not taking: Reported on 1/17/2024) 15 tablet 0    tamsulosin (FLOMAX) 0.4 mg Cap Take 1 capsule (0.4 mg total) by mouth once daily. 10 capsule 0    testosterone (ANDROGEL) 1 % (50 mg/5 gram) GlPk APPLY CONTENTS OF 2 PACKETS TOPICALLY TO SKIN EVERY  g 3    traMADoL (ULTRAM) 50 mg tablet Take 50 mg by mouth.       No current facility-administered medications for this visit.         ROS  Review of Systems   Constitutional:  Negative for activity change, fever and unexpected weight change.   HENT: Negative.             Physical Exam  Vitals:    01/17/24 1324   BP: 110/62   BP Location: Right arm   Patient Position: Sitting   BP Method: Medium (Manual)   Pulse: 63   SpO2: 98%   Weight: 79.7 kg (175 lb 11.3 oz)   Height: 5' 10" (1.778 m)    Body mass index is 25.21 kg/m².  Weight: 79.7 kg (175 lb 11.3 oz)   Height: 5' 10" (177.8 cm)   Physical Exam      Health Maintenance         Date Due Completion Date    RSV Vaccine (Age 60+ and Pregnant patients) (1 - 1-dose 60+ series) Never done ---    Foot Exam 11/05/2021 11/5/2020    Pneumococcal Vaccines (Age 65+) (2 - PCV) 11/09/2021 11/9/2020    Diabetes Urine Screening 04/10/2024 4/10/2023    Eye Exam 06/05/2024 6/5/2023    Hemoglobin A1c 07/03/2024 1/3/2024    Colorectal Cancer Screening 08/21/2024 8/21/2023    High Dose Statin 12/12/2024 12/12/2023    Aspirin/Antiplatelet Therapy 12/12/2024 12/12/2023    Lipid Panel 01/03/2025 1/3/2024    TETANUS VACCINE 10/30/2030 10/30/2020              Assessment and Plan:    Groin pain, right  -Ultrasound of lower right abdomen   -Stretching and massage as tolerated    Type 2 diabetes mellitus " without complication, without long-term current use of insulin  -Continue current medications  -follow-up with endocrinology    Hypertensive heart disease without heart failure  -Continue current medications  -follow up with cardiology    Coronary artery disease involving native coronary artery of native heart without angina pectoris  -Continue current medications  -follow up with cardiology    Hyperlipidemia, mixed  -Continue current medications    Nephrolithiasis  -Recommend increased hydration   -Monitor for urinary symptoms         Shira Nichols, MS4

## 2024-01-17 NOTE — PROGRESS NOTES
[facility] Note    Chief Complaint      Chief Complaint   Patient presents with    Follow-up       History of Present Illness      Sadiq Dhillon is a 73 y.o. male with chronic conditions of DM2, HTN, CAD, HLD, hx of kidney stones, pituitary adenoma with primary hyperparathyroidism, prolactinoma, hypogonadism, pancreatic cyst  who presents today for: follow up chronic conditions.  DM2: Sees Dr. Hunter, endocrinology.  Controlled on metformin.  A1C 6.1 9/2022.  Eye exam UTD with Dr. Norris.  HTN: BP at goal on metoprolol.  CAD: Recently saw Dr. Taveras, cardiology.  At [facility], s/p PCI to mid LAD following STEMI.  On ASA, metoprolol, atorvastatin. Recently had Brilinta and lisinopril discontinued.  Denies chest pain, shortness of breath.  HLD: Controlled on atorvastatin.  LDL 58   Hx of kidney stones: Sees Dr. Bowman, urology. 4 mm right UPJ and upper 3rd ureteral stone with mild right hydronephrosis.  Repeat imaging did not show that stone.  Waiting to hear what to do next.  Currently asymptomatic.    Pituitary adenoma, prolactinoma, primary hyperparathyroidism s/p parathyroidectomy, hypogonadism:  Sees Dr. Hunter, endo.  On testosterone.  Last PTH normal.    GERD: Controlled on pepcid as needed.  Pancreas cyst: Initially saw Dr. Munoz.  Followed with Dr. Post in 2019.  Has had CT with contrast to monitor and has been unchanged.  Sees LESTER Pryor.  Flu shot UTD.  TdAP 2020.  Shingrix UTD.  COVID vaccine UTD.    Cscope UTD.  FOBT 2022.       Past Medical History:  Past Medical History:   Diagnosis Date    Diabetes mellitus     Fuchs' corneal dystrophy     Heart attack     Hypertension     Kidney stones     Pancreatic mass     Pancreatitis     after EUS . 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, treated with dostenex(cabergoline)/ resolved    Reflux  esophagitis     Tumor cells, benign        Past Surgical History:   has a past surgical history that includes Thyroidectomy (2007); Rhinoplasty tip (1975); Cataract extraction w/  intraocular lens implant (Right, 0); Corneal transplant (Right); Upper endoscopic ultrasound w/ FNA; Repair of retinal detachment with vitrectomy (Right, 7/18/2018); Repair of retinal detachment with vitrectomy (Left, 8/8/2018); Descemets stripping automated endothelial keratoplasty (Left, 3/21/2019); and Cataract extraction w/  intraocular lens implant (Left, 3/21/2019).    Family History:  family history includes Cataracts in his father; Diabetes in his father and paternal uncle; Heart disease in his father and mother; Hypertension in his mother; Stroke in his father and maternal grandmother.     Social History:  Social History     Tobacco Use    Smoking status: Never     Passive exposure: Never    Smokeless tobacco: Never   Substance Use Topics    Alcohol use: Yes     Comment: social    Drug use: No       I personally reviewed all past medical, surgical, social and family history.    Review of Systems   Constitutional:  Negative for chills, fever and malaise/fatigue.   Respiratory:  Negative for shortness of breath.    Cardiovascular:  Negative for chest pain.   Gastrointestinal:  Negative for constipation, diarrhea, nausea and vomiting.   Skin:  Negative for rash.   Neurological:  Negative for weakness.   All other systems reviewed and are negative.       Medications:  Outpatient Encounter Medications as of 1/17/2024   Medication Sig Note Dispense Refill    aspirin (ECOTRIN) 81 MG EC tablet Take 81 mg by mouth every morning.        atorvastatin (LIPITOR) 80 MG tablet Take 1 tablet (80 mg total) by mouth once daily.  90 tablet 3    cabergoline (DOSTINEX) 0.5 mg tablet TAKE 1 TABLET(0.5 MG) BY MOUTH 2 TIMES A WEEK  24 tablet 3    chlorhexidine (PERIDEX) 0.12 % solution SMARTSIG:By Mouth       CONTOUR NEXT TEST STRIPS Strp UTD QID IN  VITRO   3    dicyclomine (BENTYL) 10 MG capsule TAKE 1 CAPSULE(10 MG) BY MOUTH THREE TIMES DAILY AS NEEDED FOR ABDOMINAL PAIN OR DIARRHEA  270 capsule 1    famotidine (PEPCID) 20 MG tablet Take by mouth. 10/4/2023: As needed      fexofenadine-pseudoephedrine  mg (ALLEGRA-D)  mg per tablet Take 1 tablet by mouth.       gabapentin (NEURONTIN) 100 MG capsule Take 1 capsule three times a day,  Takes one capsule every morning   3    HYDROcodone-acetaminophen (NORCO) 5-325 mg per tablet Take 1 tablet by mouth every 4 (four) hours as needed for Pain. (Patient not taking: Reported on 1/17/2024)  15 tablet 0    ketorolac (TORADOL) 10 mg tablet Take 1 tablet (10 mg total) by mouth every 6 (six) hours. (Patient not taking: Reported on 1/17/2024)  15 tablet 0    ketorolac (TORADOL) 10 mg tablet Take 1 tablet (10 mg total) by mouth every 6 (six) hours. (Patient not taking: Reported on 1/17/2024)  15 tablet 0    lisinopril (PRINIVIL,ZESTRIL) 2.5 MG tablet Take 2.5 mg by mouth every morning.        loperamide (IMODIUM) 2 mg capsule Take 2 mg by mouth 4 (four) times daily as needed for Diarrhea.       metFORMIN (GLUCOPHAGE-XR) 500 MG ER 24hr tablet TAKE 2 TABLETS BY MOUTH TWICE DAILY  360 tablet 3    metoprolol succinate (TOPROL-XL) 25 MG 24 hr tablet Take 1 tablet (25 mg total) by mouth nightly.  60 tablet 6    naloxone (NARCAN) 1 mg/mL injection 2 mg (1 mg per nostril) by Nasal route as needed for opioid overdose; may repeat in 3 to 5 minutes if not effective. Call 911  2 mL 11    oxyCODONE-acetaminophen (PERCOCET)  mg per tablet Take 1 tablet by mouth every 4 (four) hours as needed for Pain. (Patient not taking: Reported on 1/17/2024)  15 tablet 0    oxyCODONE-acetaminophen (PERCOCET) 5-325 mg per tablet Take 1 tablet by mouth every 4 (four) hours as needed for Pain. (Patient not taking: Reported on 1/17/2024)  15 tablet 0    tamsulosin (FLOMAX) 0.4 mg Cap Take 1 capsule (0.4 mg total) by mouth once daily.  10  capsule 0    testosterone (ANDROGEL) 1 % (50 mg/5 gram) GlPk APPLY CONTENTS OF 2 PACKETS TOPICALLY TO SKIN EVERY DAY  900 g 3    traMADoL (ULTRAM) 50 mg tablet Take 50 mg by mouth.       [DISCONTINUED] testosterone (ANDROGEL) 1 % (50 mg/5 gram) GlPk APPLY CONTENTS OF 2 PACKETS TOPICALLY TO SKIN EVERY DAY  900 g 3     No facility-administered encounter medications on file as of 1/17/2024.       Allergies:  Review of patient's allergies indicates:   Allergen Reactions    Grass pollen-june grass standard     House dust        Health Maintenance:  Immunization History   Administered Date(s) Administered    COVID-19, MRNA, LN-S, PF (Pfizer) (Gray Cap) 04/22/2022    COVID-19, MRNA, LN-S, PF (Pfizer) (Purple Cap) 02/12/2021, 03/03/2021, 11/09/2021    COVID-19, mRNA, LNP-S, PF, julio cesar-sucrose, 30 mcg/0.3 mL (Pfizer 2023 Ages 12+) 10/18/2023    COVID-19, mRNA, LNP-S, bivalent booster, PF (PFIZER OMICRON) 12/16/2022    Influenza (FLUAD) - Quadrivalent - Adjuvanted - PF *Preferred* (65+) 09/20/2020, 10/08/2021, 10/13/2022    Influenza - High Dose - PF (65 years and older) 10/19/2018    Influenza - Quadrivalent - MDCK - PF 02/01/2018    Influenza - Quadrivalent - PF *Preferred* (6 months and older) 11/10/2019    Meningococcal Conjugate (MCV4P) 06/03/2022    Pneumococcal Polysaccharide - 23 Valent 11/09/2020    Tdap 10/30/2020    Zoster Recombinant 11/09/2020, 11/11/2020, 01/12/2021      Health Maintenance   Topic Date Due    Foot Exam  11/05/2021    Eye Exam  06/05/2024    Hemoglobin A1c  07/03/2024    Colorectal Cancer Screening  08/21/2024    High Dose Statin  12/12/2024    Aspirin/Antiplatelet Therapy  12/12/2024    Lipid Panel  01/03/2025    TETANUS VACCINE  10/30/2030    Hepatitis C Screening  Completed    Shingles Vaccine  Completed    DEXA Scan  Discontinued        Physical Exam      Vital Signs  Pulse: 63  SpO2: 98 %  BP: 110/62  BP Location: Right arm  Patient Position: Sitting  Pain Score: 0-No pain  Height and  "Weight  Height: 5' 10" (177.8 cm)  Weight: 79.7 kg (175 lb 11.3 oz)  BSA (Calculated - sq m): 1.98 sq meters  BMI (Calculated): 25.2  Weight in (lb) to have BMI = 25: 173.9]    Physical Exam  Vitals reviewed.   Constitutional:       Appearance: He is well-developed.   HENT:      Head: Normocephalic and atraumatic.      Right Ear: External ear normal.      Left Ear: External ear normal.   Cardiovascular:      Rate and Rhythm: Normal rate and regular rhythm.      Heart sounds: Normal heart sounds. No murmur heard.  Pulmonary:      Effort: Pulmonary effort is normal.      Breath sounds: Normal breath sounds. No wheezing or rales.   Abdominal:      General: Bowel sounds are normal.      Palpations: Abdomen is soft.          Laboratory:  CBC:  Recent Labs   Lab 10/09/23  1725 11/11/23  1103 01/03/24  1053   WBC 12.70 8.02 5.99   RBC 5.26 4.81 4.95   Hemoglobin 15.6 14.1 14.5   Hematocrit 46.0 41.3 44.3   Platelets 199 282 231   MCV 88 86 90   MCH 29.7 29.3 29.3   MCHC 33.9 34.1 32.7     CMP:  Recent Labs   Lab 10/09/23  1725 11/09/23  1643 01/03/24  1053   Glucose 120 H 71 140 H   Calcium 10.1 9.8 9.4   Albumin 4.5 4.2 4.0   Total Protein 7.4 7.2 6.6   Sodium 138 138 139   Potassium 4.3 5.1 4.4   CO2 23 28 28   Chloride 105 102 105   BUN 13 11 11   Alkaline Phosphatase 69 71 65   ALT 24 18 18   AST 20 18 19   Total Bilirubin 0.6 0.3 0.6     URINALYSIS:  Recent Labs   Lab 10/27/21  0938 03/09/22  1200 03/30/22  1441 04/25/22  1431 08/23/22  1331 04/07/23  0047 10/09/23  1708 11/09/23  1459 11/11/23  1059   Color, UA Yellow Yellow  --  Yellow  --  Yellow Yellow  --  Yellow   Clarity, UA  --   --   --  Clear  --   --   --   --   --    Spec Grav UA  --   --   --  1.025  --   --   --   --   --    Specific Gravity, UA 1.025 1.020  --   --   --  1.030 1.025  --  1.010   pH, UA 6.0 6.0   < > 5   < > 6.0 5.0   < > 7.0   Protein, UA Negative Negative  --   --   --  Trace A Trace A  --  Negative   Bacteria Occasional Rare  --   --  "  --  Many A  --   --   --    Nitrite, UA Negative Negative  --   --   --  Positive A Negative  --  Negative   Leukocytes, UA Negative Negative  --   --   --  2+ A Negative  --  Negative   Urobilinogen, UA Negative  --   --   --   --  Negative Negative  --   --    Hyaline Casts, UA 1 1  --   --   --  28 A  --   --   --     < > = values in this interval not displayed.      LIPIDS:  Recent Labs   Lab 03/08/22  1543 09/13/22  1521 01/06/23  1011 04/10/23  1524 01/03/24  1053   TSH 2.281  --   --   --   --    HDL  --    < > 34 L  34 L 42 46   Cholesterol  --    < > 105 L  105 L 146 103 L   Triglycerides  --    < > 119  119 111 58   LDL Cholesterol  --    < > 47.2 L  47.2 L 81.8 45.4 L   HDL/Cholesterol Ratio  --    < > 32.4  32.4 28.8 44.7   Non-HDL Cholesterol  --    < > 71  71 104 57   Total Cholesterol/HDL Ratio  --    < > 3.1  3.1 3.5 2.2    < > = values in this interval not displayed.     TSH:  Recent Labs   Lab 03/08/22  1543   TSH 2.281     A1C:  Recent Labs   Lab 03/30/21  1552 03/08/22  1543 09/13/22  1521 01/06/23  1011 07/24/23  1102 01/03/24  1053   Hemoglobin A1C 6.6 H 6.6 H 6.1 H 6.3 H 6.2 H 6.1 H       Assessment/Plan     Sadiq Dhillon is a 73 y.o.male with:    1. Type 2 diabetes mellitus without complication, without long-term current use of insulin  Continue current meds.  F/U with endocrinology.  Eye exam UTD.    2. Hypertensive heart disease without heart failure  Continue current meds.    3. Coronary artery disease involving native coronary artery of native heart without angina pectoris  F/U with cardiology. Continue current meds.    4. Hyperlipidemia, mixed  Continue current meds.    5. Nephrolithiasis  - US Abdomen Complete; Future  - Urinalysis, Reflex to Urine Culture Urine, Clean Catch; Future  Check ultrasound and UA to further evaluate RLQ pain. F/U with urology as scheduled.  6. Prolactinoma  7. Primary hyperparathyroidism  8. Hypogonadism in male  F/U with endocrinology   9.  Pancreas cyst  MRI reviewed, unchanged.  Repeat in 2026.  10. Right lower quadrant abdominal pain  - US Abdomen Complete; Future  - Urinalysis, Reflex to Urine Culture Urine, Clean Catch; Future       Chronic conditions status updated as per HPI.  Other than changes above, cont current medications and maintain follow up with specialists.  Follow up in about 6 months (around 7/17/2024) for Follow up visit.    Future Appointments   Date Time Provider Department Center   1/24/2024  1:45 PM [facility] US2 [facility] [place]   2/12/2024  1:30 PM Nell Hunter MD [facility] ENDOCR [place]   2/19/2024  2:00 PM Elie[facility]averas MD [facility]

## 2024-01-30 ENCOUNTER — LAB VISIT (OUTPATIENT)
Dept: LAB | Facility: HOSPITAL | Age: 74
End: 2024-01-30
Attending: INTERNAL MEDICINE
Payer: COMMERCIAL

## 2024-01-30 ENCOUNTER — OFFICE VISIT (OUTPATIENT)
Dept: ENDOCRINOLOGY | Facility: CLINIC | Age: 74
End: 2024-01-30
Payer: COMMERCIAL

## 2024-01-30 VITALS
HEART RATE: 72 BPM | SYSTOLIC BLOOD PRESSURE: 105 MMHG | WEIGHT: 175.25 LBS | OXYGEN SATURATION: 95 % | BODY MASS INDEX: 25.15 KG/M2 | DIASTOLIC BLOOD PRESSURE: 65 MMHG

## 2024-01-30 DIAGNOSIS — D35.2 PROLACTINOMA: ICD-10-CM

## 2024-01-30 DIAGNOSIS — E11.9 TYPE 2 DIABETES MELLITUS WITHOUT COMPLICATION, WITHOUT LONG-TERM CURRENT USE OF INSULIN: ICD-10-CM

## 2024-01-30 DIAGNOSIS — D35.2 PITUITARY MACROADENOMA: ICD-10-CM

## 2024-01-30 DIAGNOSIS — E29.1 HYPOGONADISM IN MALE: ICD-10-CM

## 2024-01-30 DIAGNOSIS — E29.1 HYPOGONADISM IN MALE: Primary | ICD-10-CM

## 2024-01-30 DIAGNOSIS — E21.0 PRIMARY HYPERPARATHYROIDISM: ICD-10-CM

## 2024-01-30 LAB
PROLACTIN SERPL IA-MCNC: 1.2 NG/ML (ref 3.5–19.4)
T4 FREE SERPL-MCNC: 0.83 NG/DL (ref 0.71–1.51)
TSH SERPL DL<=0.005 MIU/L-ACNC: 1.71 UIU/ML (ref 0.4–4)

## 2024-01-30 PROCEDURE — 1160F RVW MEDS BY RX/DR IN RCRD: CPT | Mod: CPTII,S$GLB,, | Performed by: INTERNAL MEDICINE

## 2024-01-30 PROCEDURE — 84403 ASSAY OF TOTAL TESTOSTERONE: CPT | Performed by: INTERNAL MEDICINE

## 2024-01-30 PROCEDURE — 1101F PT FALLS ASSESS-DOCD LE1/YR: CPT | Mod: CPTII,S$GLB,, | Performed by: INTERNAL MEDICINE

## 2024-01-30 PROCEDURE — 3074F SYST BP LT 130 MM HG: CPT | Mod: CPTII,S$GLB,, | Performed by: INTERNAL MEDICINE

## 2024-01-30 PROCEDURE — 84146 ASSAY OF PROLACTIN: CPT | Performed by: INTERNAL MEDICINE

## 2024-01-30 PROCEDURE — 99214 OFFICE O/P EST MOD 30 MIN: CPT | Mod: S$GLB,,, | Performed by: INTERNAL MEDICINE

## 2024-01-30 PROCEDURE — 3288F FALL RISK ASSESSMENT DOCD: CPT | Mod: CPTII,S$GLB,, | Performed by: INTERNAL MEDICINE

## 2024-01-30 PROCEDURE — 3044F HG A1C LEVEL LT 7.0%: CPT | Mod: CPTII,S$GLB,, | Performed by: INTERNAL MEDICINE

## 2024-01-30 PROCEDURE — 3078F DIAST BP <80 MM HG: CPT | Mod: CPTII,S$GLB,, | Performed by: INTERNAL MEDICINE

## 2024-01-30 PROCEDURE — 84439 ASSAY OF FREE THYROXINE: CPT | Performed by: INTERNAL MEDICINE

## 2024-01-30 PROCEDURE — 1157F ADVNC CARE PLAN IN RCRD: CPT | Mod: CPTII,S$GLB,, | Performed by: INTERNAL MEDICINE

## 2024-01-30 PROCEDURE — 3008F BODY MASS INDEX DOCD: CPT | Mod: CPTII,S$GLB,, | Performed by: INTERNAL MEDICINE

## 2024-01-30 PROCEDURE — 36415 COLL VENOUS BLD VENIPUNCTURE: CPT | Performed by: INTERNAL MEDICINE

## 2024-01-30 PROCEDURE — 1159F MED LIST DOCD IN RCRD: CPT | Mod: CPTII,S$GLB,, | Performed by: INTERNAL MEDICINE

## 2024-01-30 PROCEDURE — 1126F AMNT PAIN NOTED NONE PRSNT: CPT | Mod: CPTII,S$GLB,, | Performed by: INTERNAL MEDICINE

## 2024-01-30 PROCEDURE — 99999 PR PBB SHADOW E&M-EST. PATIENT-LVL IV: CPT | Mod: PBBFAC,,, | Performed by: INTERNAL MEDICINE

## 2024-01-30 PROCEDURE — 84443 ASSAY THYROID STIM HORMONE: CPT | Performed by: INTERNAL MEDICINE

## 2024-01-30 RX ORDER — CLOBETASOL PROPIONATE 0.5 MG/G
OINTMENT TOPICAL
COMMUNITY
Start: 2024-01-23

## 2024-01-30 NOTE — PROGRESS NOTES
ENDOCRINOLOGY CLINIC    Subjective:      Chief Complaint:  Type 2 diabetes, prolactinoma    HPI:   Sadiq Dhillon is a 74 y.o. male who presents for follow-up of type 2 diabetes and prolactinoma.  Patient was last seen in the clinic on 8/10/2023.       Prolactinoma    He had previously been seen at the [facility] neuroendocrine center. He had a macroprolactinoma Dx in 2003. He also had history of primary parathyroidectomy for which he had a prior parathyroidectomy and a pancreatic neoplasm. He also has secondary hypogonadism. There was concern for MEN-1 but the screening tests obtained for that were negative. MEN 1 gene testing done 10/2020.     Patient is on Dostinex for the prolactinoma, 0.5 mg 2 x weekly.    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    Pituitary MRI from 05/2022 demonstrates a focally expanded sella with complex cyst at the site of the previously treated pituitary mass reported to reflect a prolactinoma. Cyst extending into the suprasellar region bilateral cavernous sinus (left more than right). There is associated bony remodeling of the clivus and protrusion of the expanded cystic space into sphenoid sinus. Overall size and configuration appears very similar to the prior examination.  No new solid or suspicious enhancing components.  No new intracranial mass effect. Persistent inferior displacement of the optic chiasm and pre chiasmatic optic nerves may reflect tethering or ptosis. Ventricles are normal in size. No hydrocephalus      Patient had a prolactin of 125 on 05/04/2004. His prolactin ranged between  until 2012.    His prolactin level is between 2.2-4.2 since 10/2020.    10/2020 negative MEN 1 test    He is being followed by neurophthalmology.  Patient has history of Fuch's corneal dystrophy and retinal detachment.   F - Seen Dr. Hernandez in  6/23/21 - Reduced visual acuity OS, full eye movements except limited supraduction OD, and very subtle ocular misalignment which may relate to left cavernous sinus involvement in the past. His OCT reveals some RNFL thinning. HVF improved from prior in 2018.   Was recommended 6 months follow-up.    Lab Results   Component Value Date    PROLACTIN 2.2 (L) 04/10/2023    PROLACTIN 3.2 (L) 03/08/2022    PROLACTIN 2.7 (L) 04/09/2021    PROLACTIN 4.2 10/21/2020    PROLACTIN 20.0 (H) 12/17/2012       Secondary hypogonadism    Patient is on AndroGel 1 packet every day.    Sometimes forgets to use it.     Lab Results   Component Value Date    TESTOSTERONE 100 (L) 04/26/2023    TESTOSTERONE 16.1 04/26/2023    PSA 0.93 02/07/2013    AST 19 01/03/2024    ALT 18 01/03/2024    HGB 14.5 01/03/2024    HCT 44.3 01/03/2024       Lab Results   Component Value Date    PSATOTAL 2.4 04/10/2023       Diabetes Hx:  Diagnosed w/ DM: 2003  Complications:   Retinopathy: No.  History of retinal detachment with vitrectomy.   Last eye exam: : 06/05/2023  Neuropathy: Yes on gabapentin.   Nephropathy: No  Cardiovascular: CAD s/p PCI, MI in 2017  DKA/HHS: Denies    Severe Hypoglycemia:  Denies   Hypoglycemia unawareness:  Denies  Hypoglycemic episodes:  None recently    Current meds:   metformin  mg, 2 tablets BID.    Compliance with meds: Yes     Previous meds:  None     Home glucose checks: None    Diet/Exercise:   2 meals, skips breakfast.   Active.     Diabetes education:   2018, after pancreatitis.     Last A1c:   Lab Results   Component Value Date    HGBA1C 6.1 (H) 01/03/2024    HGBA1C 6.2 (H) 07/24/2023    HGBA1C 6.3 (H) 01/06/2023       Microalbumin:   Lab Results   Component Value Date    LABMICR 7.0 04/10/2023    CREATRANDUR 100.0 04/10/2023    MICALBCREAT 7.0 04/10/2023     Lab Results   Component Value Date    EGFRNORACEVR >60.0 01/03/2024    CREATININE 0.8 01/03/2024     Lipids:   Lab Results   Component Value Date    CHOL 103 (L)  01/03/2024    TRIG 58 01/03/2024    HDL 46 01/03/2024    LDLCALC 45.4 (L) 01/03/2024    CHOLHDL 44.7 01/03/2024       TSH:  Lab Results   Component Value Date    TSH 2.281 03/08/2022       Lab Results   Component Value Date    HGB 14.5 01/03/2024        Aspirin: Yes  Statins: Yes  ACEI/ARB: Yes on lisinopril  HTN:  On medications.    B12 in 4/2023: 491     FHx of DM: Yes, Heart disease: Yes      Primary hyperparathyroidism    Status post parathyroidectomy in 2007.  Patients screening DXA from 10/20 was normal.  He has recent kidney stones.  He has prior history of kidney stones.    Lab Results   Component Value Date    PTH 46.6 04/10/2023    PTH 36.3 03/08/2022    PTH 29.0 03/30/2021    PTH 38.0 10/19/2020     (H) 09/26/2006    CALCIUM 9.4 01/03/2024    [id number] 47 04/10/2023       ROS: see HPI     Objective:     Physical Exam     /65 (BP Location: Left arm, Patient Position: Sitting, BP Method: Large (Automatic))   Pulse 72   Wt 79.5 kg (175 lb 4.3 oz)   SpO2 95%   BMI 25.15 kg/m²     Wt Readings from Last 3 Encounters:   01/30/24 79.5 kg (175 lb 4.3 oz)   01/17/24 79.7 kg (175 lb 11.3 oz)   12/12/23 80.9 kg (178 lb 5.6 oz)       Physical Exam  Constitutional:       General: He is not in acute distress.     Appearance: He is not ill-appearing.   HENT:      Head: Normocephalic and atraumatic.   Eyes:      Conjunctiva/sclera: Conjunctivae normal.   Cardiovascular:      Rate and Rhythm: Normal rate.   Pulmonary:      Effort: Pulmonary effort is normal.   Musculoskeletal:      Cervical back: Neck supple.   Neurological:      Mental Status: He is alert and oriented to person, place, and time.          LABORATORY REVIEW:  See HPI for other labs reviewed today      Chemistry        Component Value Date/Time     01/03/2024 1053    K 4.4 01/03/2024 1053     01/03/2024 1053    CO2 28 01/03/2024 1053    BUN 11 01/03/2024 1053    CREATININE 0.8 01/03/2024 1053     (H) 01/03/2024 1052         Component Value Date/Time    CALCIUM 9.4 01/03/2024 1053    ALKPHOS 65 01/03/2024 1053    AST 19 01/03/2024 1053    ALT 18 01/03/2024 1053    BILITOT 0.6 01/03/2024 1053    ESTGFRAFRICA >60.0 06/27/2022 1534    EGFRNONAA >60.0 06/27/2022 1534          Lab Results   Component Value Date    HGBA1C 6.1 (H) 01/03/2024    HGBA1C 6.2 (H) 07/24/2023    HGBA1C 6.3 (H) 01/06/2023     Other labs reviewed today in HPI    Assessment/Plan:     Problem List Items Addressed This Visit          Endocrine    Prolactinoma       Macroprolactinoma Dx in 2003  MRI from 05/2022 showed focally expanded sella with complex cyst at the site of the previously treated pituitary mass reported to reflect a prolactinoma. Overall size and configuration appears very similar to the prior examination. No new intracranial mass effect. Persistent inferior displacement of the optic chiasm and pre chiasmatic optic nerves may reflect tethering or ptosis.  Patient recommended to continue follow-up with Neuro-Ophthalmology.    Patient is on Dostinex for the prolactinoma, 0.5 mg 2 x weekly.  Prolactin level in 04/2023 was 2.2.  Patient is currently asymptomatic.  We will continue to monitor his prolactin.   Repeat MRI pituitary in 6 months.             Relevant Orders    Prolactin (Completed)    Pituitary macroadenoma     As above.           Relevant Orders    TSH (Completed)    T4, Free (Completed)    Type 2 diabetes mellitus without complication, without long-term current use of insulin       Recent A1c was 6.1%.    Continue current diabetes regimen.    Continue good diet and lifestyle modifications for diabetes management    Complications:  Follow up for regular diabetes eye exam  Daily self examination of feet  Microalbumin: Monitor.    Last A1c:   Lab Results   Component Value Date    HGBA1C 6.1 (H) 01/03/2024            Primary hyperparathyroidism       Status post parathyroidectomy in 2007.  Parathyroid and calcium has been normal.            Hypogonadism in male - Primary       Continue testosterone replacement.  Patient says he sometimes forgets to use it.  Continue to monitor his testosterone levels.  Monitor PSA, LFTs, HGB, HCT.    I discussed the potential adverse effects of testosterone including risk of cardiovascular events, BPH, prostate cancer, erythrocytosis and venous thromboembolism.             Relevant Orders    Testosterone (Completed)        Follow-up in 6 months.      Nell Hunter MD     Detail Level: Detailed Depth Of Biopsy: dermis Was A Bandage Applied: Yes Size Of Lesion In Cm: 0 Biopsy Type: H and E Biopsy Method: Dermablade Anesthesia Type: 1% lidocaine with epinephrine and a 1:10 solution of 8.4% sodium bicarbonate Anesthesia Volume In Cc (Will Not Render If 0): 0.5 Hemostasis: Drysol Wound Care: Petrolatum Dressing: bandage Destruction After The Procedure: No Type Of Destruction Used: Curettage Curettage Text: The wound bed was treated with curettage after the biopsy was performed. Cryotherapy Text: The wound bed was treated with cryotherapy after the biopsy was performed. Electrodesiccation Text: The wound bed was treated with electrodesiccation after the biopsy was performed. Electrodesiccation And Curettage Text: The wound bed was treated with electrodesiccation and curettage after the biopsy was performed. Silver Nitrate Text: The wound bed was treated with silver nitrate after the biopsy was performed. Lab: 11 Consent: Written consent was obtained and risks were reviewed including but not limited to scarring, infection, bleeding, scabbing, incomplete removal, nerve damage and allergy to anesthesia. Post-Care Instructions: I reviewed with the patient in detail post-care instructions. Patient is to keep the biopsy site dry overnight, and then apply bacitracin twice daily until healed. Patient may apply hydrogen peroxide soaks to remove any crusting. Notification Instructions: Patient will be notified of biopsy results. However, patient instructed to call the office if not contacted within 2 weeks. Billing Type: Third-Party Bill Information: Selecting Yes will display possible errors in your note based on the variables you have selected. This validation is only offered as a suggestion for you. PLEASE NOTE THAT THE VALIDATION TEXT WILL BE REMOVED WHEN YOU FINALIZE YOUR NOTE. IF YOU WANT TO FAX A PRELIMINARY NOTE YOU WILL NEED TO TOGGLE THIS TO 'NO' IF YOU DO NOT WANT IT IN YOUR FAXED NOTE.

## 2024-01-31 LAB — TESTOST SERPL-MCNC: 225 NG/DL (ref 304–1227)

## 2024-02-01 ENCOUNTER — HOSPITAL ENCOUNTER (OUTPATIENT)
Dept: RADIOLOGY | Facility: HOSPITAL | Age: 74
Discharge: HOME | End: 2024-02-01
Attending: INTERNAL MEDICINE
Payer: COMMERCIAL

## 2024-02-01 DIAGNOSIS — E11.9 TYPE 2 DIABETES MELLITUS WITHOUT COMPLICATION, WITHOUT LONG-TERM CURRENT USE OF INSULIN: ICD-10-CM

## 2024-02-01 DIAGNOSIS — E29.1 HYPOGONADISM IN MALE: ICD-10-CM

## 2024-02-01 DIAGNOSIS — E55.9 HYPOVITAMINOSIS D: ICD-10-CM

## 2024-02-01 DIAGNOSIS — D35.2 PITUITARY MACROADENOMA: ICD-10-CM

## 2024-02-01 DIAGNOSIS — R10.31 RIGHT LOWER QUADRANT ABDOMINAL PAIN: ICD-10-CM

## 2024-02-01 DIAGNOSIS — D35.2 PROLACTINOMA: ICD-10-CM

## 2024-02-01 DIAGNOSIS — E78.2 HYPERLIPIDEMIA, MIXED: Primary | ICD-10-CM

## 2024-02-01 DIAGNOSIS — N20.0 NEPHROLITHIASIS: ICD-10-CM

## 2024-02-01 PROCEDURE — 76700 US EXAM ABDOM COMPLETE: CPT | Mod: 26,,, | Performed by: RADIOLOGY

## 2024-02-01 PROCEDURE — 76700 US EXAM ABDOM COMPLETE: CPT | Mod: TC

## 2024-02-11 NOTE — ASSESSMENT & PLAN NOTE
Macroprolactinoma Dx in 2003  MRI from 05/2022 showed focally expanded sella with complex cyst at the site of the previously treated pituitary mass reported to reflect a prolactinoma. Overall size and configuration appears very similar to the prior examination. No new intracranial mass effect. Persistent inferior displacement of the optic chiasm and pre chiasmatic optic nerves may reflect tethering or ptosis.  Patient recommended to continue follow-up with Neuro-Ophthalmology.    Patient is on Dostinex for the prolactinoma, 0.5 mg 2 x weekly.  Prolactin level in 04/2023 was 2.2.  Patient is currently asymptomatic.  We will continue to monitor his prolactin.   Repeat MRI pituitary in 6 months.

## 2024-02-11 NOTE — ASSESSMENT & PLAN NOTE
Recent A1c was 6.1%.    Continue current diabetes regimen.    Continue good diet and lifestyle modifications for diabetes management    Complications:  Follow up for regular diabetes eye exam  Daily self examination of feet  Microalbumin: Monitor.    Last A1c:   Lab Results   Component Value Date    HGBA1C 6.1 (H) 01/03/2024

## 2024-02-11 NOTE — ASSESSMENT & PLAN NOTE
Continue testosterone replacement.  Patient says he sometimes forgets to use it.  Continue to monitor his testosterone levels.  Monitor PSA, LFTs, HGB, HCT.    I discussed the potential adverse effects of testosterone including risk of cardiovascular events, BPH, prostate cancer, erythrocytosis and venous thromboembolism.

## 2024-02-19 ENCOUNTER — OFFICE VISIT (OUTPATIENT)
Dept: CARDIOLOGY | Facility: CLINIC | Age: 74
End: 2024-02-19
Payer: COMMERCIAL

## 2024-02-19 VITALS
WEIGHT: 175.69 LBS | HEIGHT: 70 IN | DIASTOLIC BLOOD PRESSURE: 60 MMHG | SYSTOLIC BLOOD PRESSURE: 128 MMHG | BODY MASS INDEX: 25.15 KG/M2 | HEART RATE: 78 BPM | OXYGEN SATURATION: 98 %

## 2024-02-19 DIAGNOSIS — E78.2 HYPERLIPIDEMIA, MIXED: ICD-10-CM

## 2024-02-19 DIAGNOSIS — E11.9 TYPE 2 DIABETES MELLITUS WITHOUT COMPLICATION, WITHOUT LONG-TERM CURRENT USE OF INSULIN: ICD-10-CM

## 2024-02-19 DIAGNOSIS — E29.1 HYPOGONADISM IN MALE: ICD-10-CM

## 2024-02-19 DIAGNOSIS — I25.10 CORONARY ARTERY DISEASE INVOLVING NATIVE CORONARY ARTERY OF NATIVE HEART WITHOUT ANGINA PECTORIS: ICD-10-CM

## 2024-02-19 DIAGNOSIS — Z13.6 ENCOUNTER FOR SCREENING FOR CARDIOVASCULAR DISORDERS: Primary | ICD-10-CM

## 2024-02-19 DIAGNOSIS — I10 ESSENTIAL HYPERTENSION: ICD-10-CM

## 2024-02-19 DIAGNOSIS — E78.00 HYPERCHOLESTEROLEMIA: ICD-10-CM

## 2024-02-19 PROCEDURE — 1159F MED LIST DOCD IN RCRD: CPT | Mod: CPTII,S$GLB,, | Performed by: INTERNAL MEDICINE

## 2024-02-19 PROCEDURE — 3078F DIAST BP <80 MM HG: CPT | Mod: CPTII,S$GLB,, | Performed by: INTERNAL MEDICINE

## 2024-02-19 PROCEDURE — 3288F FALL RISK ASSESSMENT DOCD: CPT | Mod: CPTII,S$GLB,, | Performed by: INTERNAL MEDICINE

## 2024-02-19 PROCEDURE — 99214 OFFICE O/P EST MOD 30 MIN: CPT | Mod: S$GLB,,, | Performed by: INTERNAL MEDICINE

## 2024-02-19 PROCEDURE — 1101F PT FALLS ASSESS-DOCD LE1/YR: CPT | Mod: CPTII,S$GLB,, | Performed by: INTERNAL MEDICINE

## 2024-02-19 PROCEDURE — 1126F AMNT PAIN NOTED NONE PRSNT: CPT | Mod: CPTII,S$GLB,, | Performed by: INTERNAL MEDICINE

## 2024-02-19 PROCEDURE — 99999 PR PBB SHADOW E&M-EST. PATIENT-LVL V: CPT | Mod: PBBFAC,,, | Performed by: INTERNAL MEDICINE

## 2024-02-19 PROCEDURE — 3074F SYST BP LT 130 MM HG: CPT | Mod: CPTII,S$GLB,, | Performed by: INTERNAL MEDICINE

## 2024-02-19 PROCEDURE — 3044F HG A1C LEVEL LT 7.0%: CPT | Mod: CPTII,S$GLB,, | Performed by: INTERNAL MEDICINE

## 2024-02-19 PROCEDURE — 1157F ADVNC CARE PLAN IN RCRD: CPT | Mod: CPTII,S$GLB,, | Performed by: INTERNAL MEDICINE

## 2024-02-19 PROCEDURE — 3008F BODY MASS INDEX DOCD: CPT | Mod: CPTII,S$GLB,, | Performed by: INTERNAL MEDICINE

## 2024-02-19 NOTE — PROGRESS NOTES
Subjective:    Patient ID:  Sadiq Dhillon is a 74 y.o. male who presents for follow-up of CAD    HPI  The patient is a 74 year old male [place] [facility] [place] and has a PCI to LAD. He reported that he had 2 episodes of VT during the [facility]. Interesting to note that a CTA here 2012 revealed a CAC of 0 and normal coronary arteries. He continues to do well and reports no chest pain or RAMIREZ. He has not been exercising. But encouraged 150 minutes of moderate exercise or > 7000 steps/day.  Lab Results   Component Value Date     01/03/2024    K 4.4 01/03/2024     01/03/2024    CO2 28 01/03/2024    BUN 11 01/03/2024    CREATININE 0.8 01/03/2024     (H) 01/03/2024    HGBA1C 6.1 (H) 01/03/2024    AST 19 01/03/2024    ALT 18 01/03/2024    ALBUMIN 4.0 01/03/2024    ALBUMIN 4.5 04/26/2023    PROT 6.6 01/03/2024    BILITOT 0.6 01/03/2024    WBC 5.99 01/03/2024    HGB 14.5 01/03/2024    HCT 44.3 01/03/2024    MCV 90 01/03/2024     01/03/2024    INR 1.1 09/02/2012    PSA 0.93 02/07/2013    TSH 1.712 01/30/2024         Lab Results   Component Value Date    CHOL 103 (L) 01/03/2024    HDL 46 01/03/2024    TRIG 58 01/03/2024       Lab Results   Component Value Date    LDLCALC 45.4 (L) 01/03/2024       Past Medical History:   Diagnosis Date    Diabetes mellitus     Fuchs' corneal dystrophy     Heart attack     Hypertension     Kidney stones     Pancreatic mass     Pancreatitis     after EUS . 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 , Disp: , Rfl:     atorvastatin (LIPITOR) 80 MG tablet, Take 1 tablet (80 mg total) by mouth once daily.,  Disp: 90 tablet, Rfl: 3    chlorhexidine (PERIDEX) 0.12 % solution, SMARTSIG:By Mouth, Disp: , Rfl:     clobetasol 0.05% (TEMOVATE) 0.05 % Oint, APPLY TOPICALLY TO THE AFFECTED AREA 3 TIMES A WEEK, Disp: , Rfl:     CONTOUR NEXT TEST STRIPS Strp, UTD QID IN VITRO, Disp: , Rfl: 3    dicyclomine (BENTYL) 10 MG capsule, TAKE 1 CAPSULE(10 MG) BY MOUTH THREE TIMES DAILY AS NEEDED FOR ABDOMINAL PAIN OR DIARRHEA (Patient not taking: Reported on 3/26/2024), Disp: 270 capsule, Rfl: 1    famotidine (PEPCID) 20 MG tablet, Take by mouth., Disp: , Rfl:     fexofenadine-pseudoephedrine  mg (ALLEGRA-D)  mg per tablet, Take 1 tablet by mouth., Disp: , Rfl:     gabapentin (NEURONTIN) 100 MG capsule, Take 1 capsule three times a day,  Takes one capsule every morning, Disp: , Rfl: 3    lisinopril (PRINIVIL,ZESTRIL) 2.5 MG tablet, Take 2.5 mg by mouth every morning. , Disp: , Rfl:     loperamide (IMODIUM) 2 mg capsule, Take 2 mg by mouth 4 (four) times daily as needed for Diarrhea., Disp: , Rfl:     metFORMIN (GLUCOPHAGE-XR) 500 MG ER 24hr tablet, TAKE 2 TABLETS BY MOUTH TWICE DAILY, Disp: 360 tablet, Rfl: 3    metoprolol succinate (TOPROL-XL) 25 MG 24 hr tablet, Take 1 tablet (25 mg total) by mouth nightly., Disp: 60 tablet, Rfl: 6    naloxone (NARCAN) 1 mg/mL injection, 2 mg (1 mg per nostril) by Nasal route as needed for opioid overdose; may repeat in 3 to 5 minutes if not effective. Call 911 (Patient not taking: Reported on 3/26/2024), Disp: 2 mL, Rfl: 11    tamsulosin (FLOMAX) 0.4 mg Cap, Take 1 capsule (0.4 mg total) by mouth once daily., Disp: 10 capsule, Rfl: 0    testosterone (ANDROGEL) 1 % (50 mg/5 gram) GlPk, APPLY CONTENTS OF 2 PACKETS TOPICALLY TO SKIN EVERY DAY, Disp: 900 g, Rfl: 3    albuterol (VENTOLIN HFA) 90 mcg/actuation inhaler, Inhale 2 puffs into the lungs every 6 (six) hours as needed for Wheezing. Rescue, Disp: 18 g, Rfl: 0    benzonatate (TESSALON) 200 MG capsule, Take 1 capsule (200 mg total) by  mouth 3 (three) times daily as needed for Cough., Disp: 30 capsule, Rfl: 0    cabergoline (DOSTINEX) 0.5 mg tablet, TAKE 1 TABLET(0.5 MG) BY MOUTH 2 TIMES A WEEK, Disp: 24 tablet, Rfl: 3    cetirizine (ZYRTEC) 10 MG tablet, Take 1 tablet (10 mg total) by mouth once daily., Disp: 30 tablet, Rfl: 0    fluticasone propionate (FLONASE) 50 mcg/actuation nasal spray, 1 spray (50 mcg total) by Each Nostril route 2 (two) times daily. for 10 days, Disp: 16 g, Rfl: 0    ipratropium (ATROVENT) 21 mcg (0.03 %) nasal spray, 1 spray by Each Nostril route 2 (two) times daily. for 10 days, Disp: 30 mL, Rfl: 0    predniSONE (DELTASONE) 20 MG tablet, Take 1 tablet (20 mg total) by mouth 2 (two) times daily. for 5 days, Disp: 10 tablet, Rfl: 0    promethazine-dextromethorphan (PROMETHAZINE-DM) 6.25-15 mg/5 mL Syrp, Take 5 mLs by mouth every 4 (four) hours as needed (as needed)., Disp: 180 mL, Rfl: 0          Review of Systems   Constitutional: Negative for decreased appetite, diaphoresis, fever, malaise/fatigue, weight gain and weight loss.   HENT:  Negative for congestion, ear discharge, ear pain and nosebleeds.    Eyes:  Negative for blurred vision, double vision and visual disturbance.   Cardiovascular:  Negative for chest pain, claudication, cyanosis, dyspnea on exertion, irregular heartbeat, leg swelling, near-syncope, orthopnea, palpitations, paroxysmal nocturnal dyspnea and syncope.   Respiratory:  Negative for cough, hemoptysis, shortness of breath, sleep disturbances due to breathing, snoring, sputum production and wheezing.    Endocrine: Negative for polydipsia, polyphagia and polyuria.   Hematologic/Lymphatic: Negative for adenopathy and bleeding problem. Does not bruise/bleed easily.   Skin:  Negative for color change, nail changes, poor wound healing and rash.   Musculoskeletal:  Negative for muscle cramps and muscle weakness.   Gastrointestinal:  Negative for abdominal pain, anorexia, change in bowel habit, hematochezia,  "nausea and vomiting.   Genitourinary:  Negative for dysuria, frequency and hematuria.   Neurological:  Negative for brief paralysis, difficulty with concentration, excessive daytime sleepiness, dizziness, focal weakness, headaches, light-headedness, seizures, vertigo and weakness.   Psychiatric/Behavioral:  Negative for altered mental status and depression.    Allergic/Immunologic: Negative for persistent infections.        Objective:/60   Pulse 78   Ht 5' 10" (1.778 m)   Wt 79.7 kg (175 lb 11.3 oz)   SpO2 98%   BMI 25.21 kg/m²             Physical Exam  Constitutional:       Appearance: He is well-developed and normal weight.   HENT:      Head: Normocephalic.      Right Ear: External ear normal.      Left Ear: External ear normal.      Nose: Nose normal.   Eyes:      General: No scleral icterus.     Pupils: Pupils are equal, round, and reactive to light.   Neck:      Thyroid: No thyromegaly.      Vascular: No JVD.      Trachea: No tracheal deviation.   Cardiovascular:      Rate and Rhythm: Normal rate and regular rhythm.      Pulses: Intact distal pulses.           Carotid pulses are 2+ on the right side and 2+ on the left side.       Dorsalis pedis pulses are 2+ on the right side and 2+ on the left side.        Posterior tibial pulses are 2+ on the right side and 2+ on the left side.      Heart sounds: No murmur heard.     No friction rub. No gallop.   Pulmonary:      Effort: Pulmonary effort is normal.      Breath sounds: Normal breath sounds.   Abdominal:      General: Bowel sounds are normal. There is no distension.      Tenderness: There is no abdominal tenderness. There is no guarding.   Musculoskeletal:         General: No tenderness. Normal range of motion.      Cervical back: Normal range of motion and neck supple.   Lymphadenopathy:      Comments: Palpation of neck and groin lymph nodes normal   Skin:     General: Skin is dry.      Comments: Palpation of skin normal   Neurological:      Mental " Status: He is alert and oriented to person, place, and time.      Cranial Nerves: No cranial nerve deficit.      Motor: No abnormal muscle tone.      Coordination: Coordination normal.   Psychiatric:         Behavior: Behavior normal.         Thought Content: Thought content normal.         Judgment: Judgment normal.         Assessment:       1. Encounter for screening for cardiovascular disorders    2. Coronary artery disease involving native coronary artery of native heart without angina pectoris    3. Hypercholesterolemia    4. Hyperlipidemia, mixed    5. Unspecified essential hypertension    6. Type 2 diabetes mellitus without complication, without long-term current use of insulin    7. Hypogonadism in male         Plan:       Sadiq was seen today for follow-up.    Diagnoses and all orders for this visit:    Encounter for screening for cardiovascular disorders  -     CT Cardiac Scoring; Future; Expected date: 02/19/2024    Coronary artery disease involving native coronary artery of native heart without angina pectoris    Hypercholesterolemia    Hyperlipidemia, mixed  -     Lipid Panel; Future; Expected date: 02/18/2025    Unspecified essential hypertension    Type 2 diabetes mellitus without complication, without long-term current use of insulin  -     CBC Auto Differential; Future; Expected date: 02/18/2025  -     Comprehensive Metabolic Panel; Future; Expected date: 08/20/2024  -     Hemoglobin A1C; Future; Expected date: 02/18/2025    Hypogonadism in male

## 2024-02-22 ENCOUNTER — HOSPITAL ENCOUNTER (OUTPATIENT)
Dept: RADIOLOGY | Facility: HOSPITAL | Age: 74
Discharge: HOME | End: 2024-02-22
Attending: INTERNAL MEDICINE
Payer: COMMERCIAL

## 2024-02-22 DIAGNOSIS — Z13.6 ENCOUNTER FOR SCREENING FOR CARDIOVASCULAR DISORDERS: ICD-10-CM

## 2024-02-22 PROCEDURE — 75571 CT HRT W/O DYE W/CA TEST: CPT | Mod: TC

## 2024-02-22 PROCEDURE — 75571 CT HRT W/O DYE W/CA TEST: CPT | Mod: 26,,, | Performed by: RADIOLOGY

## 2024-03-05 NOTE — PROGRESS NOTES
Ultrasound shows mild fatty liver disease.  Recommend following up with Dr. Lawson to follow up mild hydronephrosis

## 2024-03-26 ENCOUNTER — PATIENT MESSAGE (OUTPATIENT)
Dept: ENDOCRINOLOGY | Facility: CLINIC | Age: 74
End: 2024-03-26
Payer: COMMERCIAL

## 2024-03-26 ENCOUNTER — OFFICE VISIT (OUTPATIENT)
Dept: URGENT CARE | Facility: CLINIC | Age: 74
End: 2024-03-26
Payer: COMMERCIAL

## 2024-03-26 VITALS
HEART RATE: 70 BPM | TEMPERATURE: 98 F | SYSTOLIC BLOOD PRESSURE: 118 MMHG | RESPIRATION RATE: 16 BRPM | HEIGHT: 69 IN | WEIGHT: 175 LBS | OXYGEN SATURATION: 95 % | BODY MASS INDEX: 25.92 KG/M2 | DIASTOLIC BLOOD PRESSURE: 62 MMHG

## 2024-03-26 DIAGNOSIS — R51.9 NONINTRACTABLE HEADACHE, UNSPECIFIED CHRONICITY PATTERN, UNSPECIFIED HEADACHE TYPE: ICD-10-CM

## 2024-03-26 DIAGNOSIS — J02.9 SORE THROAT: Primary | ICD-10-CM

## 2024-03-26 LAB
CTP QC/QA: YES
CTP QC/QA: YES
MOLECULAR STREP A: NEGATIVE
SARS-COV-2 AG RESP QL IA.RAPID: NEGATIVE

## 2024-03-26 PROCEDURE — 99214 OFFICE O/P EST MOD 30 MIN: CPT | Mod: S$GLB,,, | Performed by: PHYSICIAN ASSISTANT

## 2024-03-26 PROCEDURE — 87651 STREP A DNA AMP PROBE: CPT | Mod: QW,S$GLB,, | Performed by: PHYSICIAN ASSISTANT

## 2024-03-26 PROCEDURE — 87811 SARS-COV-2 COVID19 W/OPTIC: CPT | Mod: QW,S$GLB,, | Performed by: PHYSICIAN ASSISTANT

## 2024-03-26 RX ORDER — CABERGOLINE 0.5 MG/1
TABLET ORAL
Qty: 24 TABLET | Refills: 3 | Status: SHIPPED | OUTPATIENT
Start: 2024-03-26 | End: 2024-03-28 | Stop reason: SDUPTHER

## 2024-03-26 NOTE — PROGRESS NOTES
"Subjective:      Patient ID: Sadiq Dhillon is a 74 y.o. male.    Vitals:  height is 5' 9" (1.753 m) and weight is 79.4 kg (175 lb). His oral temperature is 98.4 °F (36.9 °C). His blood pressure is 118/62 and his pulse is 70. His respiration is 16 and oxygen saturation is 95%.     Chief Complaint: Headache (Sore throat, pnd x 2 days )    74 year old male patient presents here today with h/a, sore throat, pnd x 2 days. Pt noted slight cough. Pt states states he took otc mucinex & tylenol for sxs without relief. Pt denies sob, cp, fever, n/v/d.  Pt denies sick contacts.  He had Strep in November and he wants to make sure he doesn't need antibiotics.      Headache   This is a new problem. The current episode started yesterday. The problem occurs constantly. The problem has been unchanged. The pain does not radiate. The quality of the pain is described as dull and aching. The pain is at a severity of 2/10. The pain is mild. Associated symptoms include coughing, eye watering, rhinorrhea, sinus pressure and a sore throat. Pertinent negatives include no abdominal pain, abnormal behavior, anorexia, back pain, blurred vision, dizziness, drainage, ear pain, eye pain, eye redness, facial sweating, fever, hearing loss, insomnia, loss of balance, muscle aches, nausea, neck pain, numbness, phonophobia, photophobia, scalp tenderness, seizures, swollen glands, tingling, tinnitus, visual change, vomiting, weakness or weight loss. He has tried acetaminophen for the symptoms. The treatment provided no relief. His past medical history is significant for hypertension.       Constitution: Negative for fever.   HENT:  Positive for sinus pressure and sore throat. Negative for ear pain, tinnitus and hearing loss.    Neck: Negative for neck pain.   Eyes:  Negative for eye pain, eye redness, photophobia and blurred vision.   Respiratory:  Positive for cough.    Gastrointestinal:  Negative for abdominal pain, nausea and vomiting. "   Musculoskeletal:  Negative for back pain.   Neurological:  Positive for headaches. Negative for dizziness, loss of balance, numbness and seizures.   Psychiatric/Behavioral:  The patient does not have insomnia.       Objective:     Physical Exam   Constitutional: He is oriented to person, place, and time. He appears well-developed.   HENT:   Head: Normocephalic and atraumatic.   Ears:   Right Ear: External ear normal.   Left Ear: External ear normal.   Nose: Nose normal.   Mouth/Throat: Posterior oropharyngeal erythema present.   Eyes: Conjunctivae, EOM and lids are normal.   Neck: Neck supple.   Cardiovascular: Normal rate.   Pulmonary/Chest: Effort normal. No respiratory distress. He has no wheezes. He has no rhonchi.   Musculoskeletal: Normal range of motion.         General: Normal range of motion.   Neurological: He is alert and oriented to person, place, and time.   Skin: Skin is warm, dry and intact.   Psychiatric: His speech is normal and behavior is normal. Judgment and thought content normal.   Nursing note and vitals reviewed.      Assessment:     1. Sore throat    2. Nonintractable headache, unspecified chronicity pattern, unspecified headache type      Results for orders placed or performed in visit on 03/26/24   SARS Coronavirus 2 Antigen, POCT Manual Read   Result Value Ref Range    SARS Coronavirus 2 Antigen Negative Negative     Acceptable Yes    POCT Strep A, Molecular   Result Value Ref Range    Molecular Strep A, POC Negative Negative     Acceptable Yes        Plan:   VSS. Patient non-toxic appearing. Discussed medication being prescribed.  Advised patient to follow up with PCP as needed.  Patient verbalized understanding, agrees with the plan, and is comfortable with discharge.      Sore throat  -     POCT Strep A, Molecular    Nonintractable headache, unspecified chronicity pattern, unspecified headache type  -     SARS Coronavirus 2 Antigen, POCT Manual  Read      Medical Decision Making:   Clinical Tests:   Lab Tests: Ordered and Reviewed

## 2024-03-28 RX ORDER — CABERGOLINE 0.5 MG/1
TABLET ORAL
Qty: 24 TABLET | Refills: 3 | Status: SHIPPED | OUTPATIENT
Start: 2024-03-28

## 2024-04-01 ENCOUNTER — OFFICE VISIT (OUTPATIENT)
Dept: URGENT CARE | Facility: CLINIC | Age: 74
End: 2024-04-01
Payer: COMMERCIAL

## 2024-04-01 VITALS
BODY MASS INDEX: 25.92 KG/M2 | OXYGEN SATURATION: 97 % | HEART RATE: 67 BPM | WEIGHT: 175 LBS | RESPIRATION RATE: 16 BRPM | TEMPERATURE: 98 F | HEIGHT: 69 IN | SYSTOLIC BLOOD PRESSURE: 133 MMHG | DIASTOLIC BLOOD PRESSURE: 63 MMHG

## 2024-04-01 DIAGNOSIS — R06.2 WHEEZING: ICD-10-CM

## 2024-04-01 DIAGNOSIS — J00 ACUTE RHINITIS: ICD-10-CM

## 2024-04-01 DIAGNOSIS — J06.9 VIRAL URI: Primary | ICD-10-CM

## 2024-04-01 DIAGNOSIS — H65.193 ACUTE EFFUSION OF BOTH MIDDLE EARS: ICD-10-CM

## 2024-04-01 DIAGNOSIS — R05.9 COUGH, UNSPECIFIED TYPE: ICD-10-CM

## 2024-04-01 LAB
CTP QC/QA: YES
SARS-COV-2 AG RESP QL IA.RAPID: NEGATIVE

## 2024-04-01 PROCEDURE — 87811 SARS-COV-2 COVID19 W/OPTIC: CPT | Mod: QW,S$GLB,,

## 2024-04-01 PROCEDURE — 99214 OFFICE O/P EST MOD 30 MIN: CPT | Mod: S$GLB,,,

## 2024-04-01 PROCEDURE — 71046 X-RAY EXAM CHEST 2 VIEWS: CPT | Mod: S$GLB,,, | Performed by: RADIOLOGY

## 2024-04-01 RX ORDER — ALBUTEROL SULFATE 90 UG/1
2 AEROSOL, METERED RESPIRATORY (INHALATION) EVERY 6 HOURS PRN
Qty: 18 G | Refills: 0 | Status: SHIPPED | OUTPATIENT
Start: 2024-04-01

## 2024-04-01 RX ORDER — PROMETHAZINE HYDROCHLORIDE AND DEXTROMETHORPHAN HYDROBROMIDE 6.25; 15 MG/5ML; MG/5ML
5 SYRUP ORAL EVERY 4 HOURS PRN
Qty: 180 ML | Refills: 0 | Status: SHIPPED | OUTPATIENT
Start: 2024-04-01 | End: 2024-04-11

## 2024-04-01 RX ORDER — CETIRIZINE HYDROCHLORIDE 10 MG/1
10 TABLET ORAL DAILY
Qty: 30 TABLET | Refills: 0 | Status: SHIPPED | OUTPATIENT
Start: 2024-04-01 | End: 2024-05-01

## 2024-04-01 RX ORDER — PREDNISONE 20 MG/1
20 TABLET ORAL 2 TIMES DAILY
Qty: 10 TABLET | Refills: 0 | Status: SHIPPED | OUTPATIENT
Start: 2024-04-01 | End: 2024-04-06

## 2024-04-01 RX ORDER — BENZONATATE 200 MG/1
200 CAPSULE ORAL 3 TIMES DAILY PRN
Qty: 30 CAPSULE | Refills: 0 | Status: SHIPPED | OUTPATIENT
Start: 2024-04-01 | End: 2024-04-11

## 2024-04-01 RX ORDER — IPRATROPIUM BROMIDE 21 UG/1
1 SPRAY, METERED NASAL 2 TIMES DAILY
Qty: 30 ML | Refills: 0 | Status: SHIPPED | OUTPATIENT
Start: 2024-04-01 | End: 2024-04-11

## 2024-04-01 RX ORDER — FLUTICASONE PROPIONATE 50 MCG
1 SPRAY, SUSPENSION (ML) NASAL 2 TIMES DAILY
Qty: 16 G | Refills: 0 | Status: SHIPPED | OUTPATIENT
Start: 2024-04-01 | End: 2024-04-11

## 2024-04-01 NOTE — PROGRESS NOTES
"Subjective:      Patient ID: Sadiq Dhillon is a 74 y.o. male.    Vitals:  height is 5' 9" (1.753 m) and weight is 79.4 kg (175 lb). His temperature is 98 °F (36.7 °C). His blood pressure is 133/63 and his pulse is 67. His respiration is 16 and oxygen saturation is 97%.     Chief Complaint: Cough    75 y/o male presents to clinic with chief complaint of productive cough, rhinorrhea, wheezing, chest tightness, and chest congestion. He states symptoms started one week ago and has worsened. He has robitussin, zyrtec, allegra with mild relief.  He states he woke up from what he that was sounds in his dream, but was actually his breathing. Denies any recent ill exposures.  Denies any recent travel.  He does have a history of seasonal/environmental allergies. Denies hx of asthma. Denies numbness or tingling. Denies radiation of pain. Denies fever, chills, body aches, chest pain, shortness of breath, abdominal pain, nausea, vomiting, diarrhea, or rashes.      Cough  This is a new problem. The current episode started in the past 7 days (tuesday). The problem has been gradually worsening. The problem occurs constantly. The cough is Productive of purulent sputum. Associated symptoms include headaches, nasal congestion, shortness of breath and wheezing. Pertinent negatives include no chest pain, chills, ear congestion, ear pain, eye redness, fever, heartburn, hemoptysis, myalgias, postnasal drip, rash, rhinorrhea, sore throat, sweats or weight loss. There is no history of asthma, bronchiectasis, bronchitis, COPD, emphysema, environmental allergies or pneumonia.       Constitution: Negative for activity change, appetite change, chills, sweating, fatigue, fever, unexpected weight change, generalized weakness and international travel in last 60 days.   HENT:  Negative for ear pain, ear discharge, foreign body in ear, tinnitus, hearing loss, facial swelling, congestion, nosebleeds, foreign body in nose, postnasal drip, sinus " pain, sinus pressure, sore throat, trouble swallowing and voice change.    Neck: Negative for neck pain, neck stiffness and neck swelling.   Cardiovascular:  Negative for chest pain, leg swelling, palpitations and sob on exertion.   Eyes:  Negative for eye discharge, eye itching, eye pain and eye redness.   Respiratory:  Positive for chest tightness, cough, sputum production, shortness of breath and wheezing. Negative for sleep apnea, bloody sputum, COPD, stridor and asthma.    Gastrointestinal:  Negative for abdominal pain, nausea, vomiting, constipation, diarrhea and heartburn.   Genitourinary:  Negative for dysuria, frequency, urgency, urine decreased, flank pain and hematuria.   Musculoskeletal:  Negative for pain, pain with walking and muscle ache.   Skin:  Negative for rash, erythema and bruising.   Allergic/Immunologic: Negative for environmental allergies, seasonal allergies, food allergies, asthma, chronic cough, sneezing and flu shot.   Neurological:  Positive for headaches. Negative for dizziness, light-headedness, passing out, coordination disturbances, loss of balance, history of migraines, disorientation and altered mental status.   Psychiatric/Behavioral:  Negative for altered mental status, disorientation, confusion, agitation and nervous/anxious. The patient is not nervous/anxious.       Objective:     Physical Exam   Constitutional: He is oriented to person, place, and time. He appears well-developed. He is cooperative.  Non-toxic appearance. He does not appear ill. No distress.   HENT:   Head: Normocephalic and atraumatic.   Ears:   Right Ear: Hearing, external ear and ear canal normal. Tympanic membrane is injected. A middle ear effusion is present.   Left Ear: Hearing, external ear and ear canal normal. A middle ear effusion is present.   Nose: Mucosal edema present. No rhinorrhea or nasal deformity. No epistaxis. Right sinus exhibits no maxillary sinus tenderness and no frontal sinus  tenderness. Left sinus exhibits no maxillary sinus tenderness and no frontal sinus tenderness.   Mouth/Throat: Uvula is midline, oropharynx is clear and moist and mucous membranes are normal. No trismus in the jaw. Normal dentition. No uvula swelling. Cobblestoning present. No oropharyngeal exudate, posterior oropharyngeal edema or posterior oropharyngeal erythema. Tonsils are 1+ on the right. Tonsils are 1+ on the left. No tonsillar exudate.   Eyes: Conjunctivae and lids are normal. No scleral icterus. Extraocular movement intact   Neck: Trachea normal and phonation normal. Neck supple. No edema present. No erythema present. No neck rigidity present.   Cardiovascular: Normal rate, regular rhythm, normal heart sounds and normal pulses.   Pulmonary/Chest: Effort normal. No respiratory distress. He has decreased breath sounds in the left lower field. He has wheezes in the right middle field and the left middle field. He has rhonchi in the right lower field.   Abdominal: Normal appearance.   Musculoskeletal: Normal range of motion.         General: No deformity. Normal range of motion.   Lymphadenopathy:     He has cervical adenopathy.   Neurological: He is alert and oriented to person, place, and time. He exhibits normal muscle tone. Coordination normal.   Skin: Skin is warm, dry, intact, not diaphoretic and not pale. No erythema   Psychiatric: His speech is normal and behavior is normal. Judgment and thought content normal.   Nursing note and vitals reviewed.      Assessment:     1. Viral URI    2. Cough, unspecified type    3. Acute rhinitis    4. Acute effusion of both middle ears    5. Wheezing      XR CHEST PA AND LATERAL    Result Date: 4/1/2024  EXAMINATION: XR CHEST PA AND LATERAL CLINICAL HISTORY: Cough, unspecified TECHNIQUE: PA and lateral views of the chest were performed. COMPARISON: Chest CT 02/22/2024 FINDINGS: The cardiac silhouette is normal in size. It remains partially obscured by marked elevation  of the left hemidiaphragm which is similar to the prior exam. Other than some probable chronic compressive atelectasis at the left base lungs are clear. Bones are intact.     No acute cardiopulmonary abnormality. Electronically signed by: Erlin Hairston Jr Date:    04/01/2024 Time:    09:06     Plan:     Results for orders placed or performed in visit on 04/01/24   SARS Coronavirus 2 Antigen, POCT Manual Read   Result Value Ref Range    SARS Coronavirus 2 Antigen Negative Negative     Acceptable Yes          Viral URI  -     albuterol (VENTOLIN HFA) 90 mcg/actuation inhaler; Inhale 2 puffs into the lungs every 6 (six) hours as needed for Wheezing. Rescue  Dispense: 18 g; Refill: 0  -     ipratropium (ATROVENT) 21 mcg (0.03 %) nasal spray; 1 spray by Each Nostril route 2 (two) times daily. for 10 days  Dispense: 30 mL; Refill: 0  -     fluticasone propionate (FLONASE) 50 mcg/actuation nasal spray; 1 spray (50 mcg total) by Each Nostril route 2 (two) times daily. for 10 days  Dispense: 16 g; Refill: 0  -     benzonatate (TESSALON) 200 MG capsule; Take 1 capsule (200 mg total) by mouth 3 (three) times daily as needed for Cough.  Dispense: 30 capsule; Refill: 0  -     promethazine-dextromethorphan (PROMETHAZINE-DM) 6.25-15 mg/5 mL Syrp; Take 5 mLs by mouth every 4 (four) hours as needed (as needed).  Dispense: 180 mL; Refill: 0  -     cetirizine (ZYRTEC) 10 MG tablet; Take 1 tablet (10 mg total) by mouth once daily.  Dispense: 30 tablet; Refill: 0    Cough, unspecified type  -     SARS Coronavirus 2 Antigen, POCT Manual Read  -     XR CHEST PA AND LATERAL; Future; Expected date: 04/01/2024  -     benzonatate (TESSALON) 200 MG capsule; Take 1 capsule (200 mg total) by mouth 3 (three) times daily as needed for Cough.  Dispense: 30 capsule; Refill: 0  -     promethazine-dextromethorphan (PROMETHAZINE-DM) 6.25-15 mg/5 mL Syrp; Take 5 mLs by mouth every 4 (four) hours as needed (as needed).  Dispense: 180  mL; Refill: 0    Acute rhinitis  -     ipratropium (ATROVENT) 21 mcg (0.03 %) nasal spray; 1 spray by Each Nostril route 2 (two) times daily. for 10 days  Dispense: 30 mL; Refill: 0  -     fluticasone propionate (FLONASE) 50 mcg/actuation nasal spray; 1 spray (50 mcg total) by Each Nostril route 2 (two) times daily. for 10 days  Dispense: 16 g; Refill: 0  -     cetirizine (ZYRTEC) 10 MG tablet; Take 1 tablet (10 mg total) by mouth once daily.  Dispense: 30 tablet; Refill: 0    Acute effusion of both middle ears  -     ipratropium (ATROVENT) 21 mcg (0.03 %) nasal spray; 1 spray by Each Nostril route 2 (two) times daily. for 10 days  Dispense: 30 mL; Refill: 0  -     fluticasone propionate (FLONASE) 50 mcg/actuation nasal spray; 1 spray (50 mcg total) by Each Nostril route 2 (two) times daily. for 10 days  Dispense: 16 g; Refill: 0  -     cetirizine (ZYRTEC) 10 MG tablet; Take 1 tablet (10 mg total) by mouth once daily.  Dispense: 30 tablet; Refill: 0    Wheezing  -     predniSONE (DELTASONE) 20 MG tablet; Take 1 tablet (20 mg total) by mouth 2 (two) times daily. for 5 days  Dispense: 10 tablet; Refill: 0  -     albuterol (VENTOLIN HFA) 90 mcg/actuation inhaler; Inhale 2 puffs into the lungs every 6 (six) hours as needed for Wheezing. Rescue  Dispense: 18 g; Refill: 0      Chest x-ray ordered due to patients age, duration of symptoms and presentation in clinic today. No signs of pneumonia are present on chest x-ray, we will treat accordingly for viral URI symptomatically. We had shared decision making for patient's treatment. We discussed side effects/alternatives/benefits/risk and patient would like to proceed with treatment plan. We also discussed other OTC treatment recommendations. Patient was counseled, explained with the test results meaning, expected course, and answered all of questions. Patient can take OTC Acetaminophen (Tylenol) and/or Ibuprofen (Motrin) as needed for pain relief and/or fever relief.  Continue to drink plenty of fluids. Follow up with PCP in the next 1-2 weeks as needed.  Gave patient strict ER/urgent care precautions in case symptoms worsen or if any new concerns arise.       Additional MDM:     Heart Failure Score:   COPD = No

## 2024-04-01 NOTE — PATIENT INSTRUCTIONS
Please drink plenty of fluids.  Please get plenty of rest.  Please return here or go to the Emergency Department for any concerns or worsening of condition.  If you were prescribed prednisone, please take them to completion.  Recommended using albuterol inhaler at least once today and then starting tomorrow use at needed. Use atrovent/flonase nasal spray twice daily (one spray in each nostril) and take daily zyrtec or allegra as directed for ten days. Use tessalon perles (breakfast & lunch) during the day and promethazine-dm  at night for cough. Recommended taking vitamin D and zinc supplements for immune support.   Recommended for patient to drink hot tea with honey. Consider eating softer foods such as soup and broth for the next couple of days to prevent further throat irritation. Recommended for patient to refrain from acidic foods (such as tomatoes or caffeine) to prevent throat irritation for the next couple of days.   If you do not have Hypertension or any history of palpitations, it is ok to take over the counter Sudafed or Mucinex D or Allegra-D or Claritin-D or Zyrtec-D.  If you do take one of the above, it is ok to combine that with plain over the counter Mucinex or Allegra or Claritin or Zyrtec.  If for example you are taking Zyrtec -D, you can combine that with Mucinex, but not Mucinex-D.  If you are taking Mucinex-D, you can combine that with plain Allegra or Claritin or Zyrtec.   If you do have Hypertension or palpitations, it is safe to take Coricidin HBP for relief of sinus symptoms.  If not allergic, please take over the counter Tylenol (Acetaminophen) and/or Motrin (Ibuprofen) as directed for control of pain and/or fever.  Please follow up with your primary care doctor or specialist as needed.    If you  smoke, please stop smoking.

## 2024-04-05 DIAGNOSIS — R10.30 LOWER ABDOMINAL PAIN: ICD-10-CM

## 2024-04-05 RX ORDER — DICYCLOMINE HYDROCHLORIDE 10 MG/1
CAPSULE ORAL
Qty: 270 CAPSULE | Refills: 1 | Status: SHIPPED | OUTPATIENT
Start: 2024-04-05

## 2024-04-11 ENCOUNTER — CLINICAL SUPPORT (OUTPATIENT)
Dept: INFECTIOUS DISEASES | Facility: CLINIC | Age: 74
End: 2024-04-11

## 2024-04-11 ENCOUNTER — OFFICE VISIT (OUTPATIENT)
Dept: INFECTIOUS DISEASES | Facility: CLINIC | Age: 74
End: 2024-04-11
Payer: COMMERCIAL

## 2024-04-11 VITALS
SYSTOLIC BLOOD PRESSURE: 122 MMHG | TEMPERATURE: 98 F | BODY MASS INDEX: 25.93 KG/M2 | DIASTOLIC BLOOD PRESSURE: 85 MMHG | HEIGHT: 69 IN | WEIGHT: 175.06 LBS | HEART RATE: 73 BPM

## 2024-04-11 DIAGNOSIS — Z71.84 COUNSELING ABOUT TRAVEL: Primary | ICD-10-CM

## 2024-04-11 DIAGNOSIS — Z00.00 HEALTHCARE MAINTENANCE: Primary | ICD-10-CM

## 2024-04-11 PROCEDURE — 90471 IMMUNIZATION ADMIN: CPT | Mod: S$GLB,,, | Performed by: INTERNAL MEDICINE

## 2024-04-11 PROCEDURE — 3074F SYST BP LT 130 MM HG: CPT | Mod: CPTII,S$GLB,, | Performed by: INTERNAL MEDICINE

## 2024-04-11 PROCEDURE — 3079F DIAST BP 80-89 MM HG: CPT | Mod: CPTII,S$GLB,, | Performed by: INTERNAL MEDICINE

## 2024-04-11 PROCEDURE — 1159F MED LIST DOCD IN RCRD: CPT | Mod: CPTII,S$GLB,, | Performed by: INTERNAL MEDICINE

## 2024-04-11 PROCEDURE — 1126F AMNT PAIN NOTED NONE PRSNT: CPT | Mod: CPTII,S$GLB,, | Performed by: INTERNAL MEDICINE

## 2024-04-11 PROCEDURE — 1157F ADVNC CARE PLAN IN RCRD: CPT | Mod: CPTII,S$GLB,, | Performed by: INTERNAL MEDICINE

## 2024-04-11 PROCEDURE — 3288F FALL RISK ASSESSMENT DOCD: CPT | Mod: CPTII,S$GLB,, | Performed by: INTERNAL MEDICINE

## 2024-04-11 PROCEDURE — 3008F BODY MASS INDEX DOCD: CPT | Mod: CPTII,S$GLB,, | Performed by: INTERNAL MEDICINE

## 2024-04-11 PROCEDURE — 3044F HG A1C LEVEL LT 7.0%: CPT | Mod: CPTII,S$GLB,, | Performed by: INTERNAL MEDICINE

## 2024-04-11 PROCEDURE — 99401 PREV MED CNSL INDIV APPRX 15: CPT | Mod: S$GLB,,, | Performed by: INTERNAL MEDICINE

## 2024-04-11 PROCEDURE — 99999 PR PBB SHADOW E&M-EST. PATIENT-LVL IV: CPT | Mod: PBBFAC,,, | Performed by: INTERNAL MEDICINE

## 2024-04-11 PROCEDURE — 1101F PT FALLS ASSESS-DOCD LE1/YR: CPT | Mod: CPTII,S$GLB,, | Performed by: INTERNAL MEDICINE

## 2024-04-11 PROCEDURE — 99999 PR PBB SHADOW E&M-EST. PATIENT-LVL I: CPT | Mod: PBBFAC,,,

## 2024-04-11 PROCEDURE — 90632 HEPA VACCINE ADULT IM: CPT | Mod: S$GLB,,, | Performed by: INTERNAL MEDICINE

## 2024-04-11 PROCEDURE — 90691 TYPHOID VACCINE IM: CPT | Mod: S$GLB,,, | Performed by: INTERNAL MEDICINE

## 2024-04-11 PROCEDURE — 90472 IMMUNIZATION ADMIN EACH ADD: CPT | Mod: S$GLB,,, | Performed by: INTERNAL MEDICINE

## 2024-04-11 RX ORDER — NIRMATRELVIR AND RITONAVIR 300-100 MG
3 KIT ORAL 2 TIMES DAILY
Qty: 30 TABLET | Refills: 0 | Status: SHIPPED | OUTPATIENT
Start: 2024-04-11 | End: 2024-04-17

## 2024-04-11 RX ORDER — BALOXAVIR MARBOXIL 40 MG/1
40 TABLET, FILM COATED ORAL ONCE
Qty: 1 TABLET | Refills: 0 | Status: SHIPPED | OUTPATIENT
Start: 2024-04-11 | End: 2024-04-13

## 2024-04-11 RX ORDER — AZITHROMYCIN 500 MG/1
TABLET, FILM COATED ORAL
Qty: 2 TABLET | Refills: 0 | Status: SHIPPED | OUTPATIENT
Start: 2024-04-11

## 2024-04-11 NOTE — PROGRESS NOTES
INFECTIOUS DISEASE TRAVEL CLINIC  04/11/2024 9:19 AM    Subjective:      Chief Complaint: travel to [place]      History of Present Illness    Patient Sadiq Dhillon is a 74 y.o. male who presents today for routine pretravel consultation.  The patient reports a past medical history of DM on metformin.  The patient will be traveling to  [place] on 4/19 x 10 days. The purpose of this trip is vacation. Going with a group. Staying in city and common tourist areas    The patient has travelled to the following other countries in the past; [place].  The patient reports that they are up to date on all their childhood vaccinations.  The patient reports receipt of the following travel related vaccinations;       Have you ever received:  YES NO DATES  Routine Childhood vaccines  [x]         []       Influenza vaccine   [x]         []     Prevnar    []         []     Pneumovax    [x]         []  2020   Tetanus-diptheria -pertussis  [x]         []  2020   Hepatitis A vaccine series       []         []     Hepatitis B vaccine series         []         [x]     Meningitis vaccine   []         [] 2022    Zoster vaccine    [x]         []    Typhoid vaccine   []         [x]   Yellow fever vaccine   []         [x]   Japanese encephalitis vaccine []         [x]   Rabies vaccine   []         [x]     Current Outpatient Medications on File Prior to Visit   Medication Sig Dispense Refill    albuterol (VENTOLIN HFA) 90 mcg/actuation inhaler Inhale 2 puffs into the lungs every 6 (six) hours as needed for Wheezing. Rescue 18 g 0    aspirin (ECOTRIN) 81 MG EC tablet Take 81 mg by mouth every morning.       atorvastatin (LIPITOR) 80 MG tablet Take 1 tablet (80 mg total) by mouth once daily. 90 tablet 3    benzonatate (TESSALON) 200 MG capsule Take 1 capsule (200 mg total) by mouth 3 (three) times daily as needed for Cough. 30 capsule 0    cabergoline (DOSTINEX) 0.5 mg tablet TAKE 1 TABLET(0.5 MG) BY MOUTH 2 TIMES A WEEK 24 tablet 3    cetirizine  (ZYRTEC) 10 MG tablet Take 1 tablet (10 mg total) by mouth once daily. 30 tablet 0    chlorhexidine (PERIDEX) 0.12 % solution SMARTSIG:By Mouth      clobetasol 0.05% (TEMOVATE) 0.05 % Oint APPLY TOPICALLY TO THE AFFECTED AREA 3 TIMES A WEEK      CONTOUR NEXT TEST STRIPS Strp UTD QID IN VITRO  3    dicyclomine (BENTYL) 10 MG capsule TAKE 1 CAPSULE(10 MG) BY MOUTH THREE TIMES DAILY AS NEEDED FOR ABDOMINAL PAIN OR DIARRHEA 270 capsule 1    famotidine (PEPCID) 20 MG tablet Take by mouth.      fexofenadine-pseudoephedrine  mg (ALLEGRA-D)  mg per tablet Take 1 tablet by mouth.      fluticasone propionate (FLONASE) 50 mcg/actuation nasal spray 1 spray (50 mcg total) by Each Nostril route 2 (two) times daily. for 10 days 16 g 0    gabapentin (NEURONTIN) 100 MG capsule Take 1 capsule three times a day,  Takes one capsule every morning  3    ipratropium (ATROVENT) 21 mcg (0.03 %) nasal spray 1 spray by Each Nostril route 2 (two) times daily. for 10 days 30 mL 0    lisinopril (PRINIVIL,ZESTRIL) 2.5 MG tablet Take 2.5 mg by mouth every morning.       loperamide (IMODIUM) 2 mg capsule Take 2 mg by mouth 4 (four) times daily as needed for Diarrhea.      metoprolol succinate (TOPROL-XL) 25 MG 24 hr tablet Take 1 tablet (25 mg total) by mouth nightly. 60 tablet 6    promethazine-dextromethorphan (PROMETHAZINE-DM) 6.25-15 mg/5 mL Syrp Take 5 mLs by mouth every 4 (four) hours as needed (as needed). 180 mL 0    tamsulosin (FLOMAX) 0.4 mg Cap Take 1 capsule (0.4 mg total) by mouth once daily. 10 capsule 0    testosterone (ANDROGEL) 1 % (50 mg/5 gram) GlPk APPLY CONTENTS OF 2 PACKETS TOPICALLY TO SKIN EVERY  g 3    metFORMIN (GLUCOPHAGE-XR) 500 MG ER 24hr tablet TAKE 2 TABLETS BY MOUTH TWICE DAILY 360 tablet 3    naloxone (NARCAN) 1 mg/mL injection 2 mg (1 mg per nostril) by Nasal route as needed for opioid overdose; may repeat in 3 to 5 minutes if not effective. Call 911 (Patient not taking: Reported on 3/26/2024) 2  mL 11     No current facility-administered medications on file prior to visit.        Review of Symptoms:  Constitutional: Denies fevers, chills, or weakness.  Cardiovascular: Denies chest pain, palpitations  Respiratory: Denies shortness of breath, cough, hemoptysis  GI: Denies nausea/vomitting,, abd pain  : Denies dysuria  Musculoskeletal: Denies joint pain or myalgias.  Skin/breast: Denies rashes, lumps, lesions, or discharge.  Neurologic: Denies headache     Past Medical History:   Diagnosis Date    Diabetes mellitus     Fuchs' corneal dystrophy     Heart attack     Hypertension     Kidney stones     Pancreatic mass     Pancreatitis     after EUS . 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, treated with dostenex(cabergoline)/ resolved    Reflux esophagitis     Tumor cells, benign        Past Surgical History:   Procedure Laterality Date    CATARACT EXTRACTION W/  INTRAOCULAR LENS IMPLANT Right 0    Dr Van     CATARACT EXTRACTION W/  INTRAOCULAR LENS IMPLANT Left 3/21/2019    Procedure: EXTRACTION, CATARACT, WITH IOL INSERTION;  Surgeon: Ra Van MD;  Location: [facility];  Service: Ophthalmology;  Laterality: Left;    CORNEAL TRANSPLANT Right     Dr Van     DESCEMETS STRIPPING AUTOMATED ENDOTHELIAL KERATOPLASTY Left 3/21/2019    Procedure: TRANSPLANT, PARTIAL-THICKNESS, CORNEA, USING DSAEK TECHNIQUE;  Surgeon: Ra Van MD;  Location: [facility];  Service: Ophthalmology;  Laterality: Left;  DSEK    REPAIR OF RETINAL DETACHMENT WITH VITRECTOMY Right 7/18/2018    Procedure: REPAIR, RETINAL DETACHMENT, WITH VITRECTOMY;  Surgeon: SHUBHAM Patel MD;  Location: [address];  Service: Ophthalmology;  Laterality: Right;  40 min    REPAIR OF RETINAL DETACHMENT WITH VITRECTOMY Left 8/8/2018    Procedure: REPAIR, RETINAL DETACHMENT, WITH VITRECTOMY;   Surgeon: SHUBHAM Patel MD;  Location: [facility] OR [address] [place] [address];  Service: Ophthalmology;  Laterality: Left;  40 min    RHINOPLASTY TIP  1975    THYROIDECTOMY  2007    UPPER ENDOSCOPIC ULTRASOUND W/ FNA      with resultant pancreatitis       Family History   Problem Relation Age of Onset    Hypertension Mother     Heart disease Mother     Heart disease Father     Cataracts Father     Stroke Father     Diabetes Father     Diabetes Paternal Uncle     Stroke Maternal Grandmother     Blindness Neg Hx     Glaucoma Neg Hx     Macular degeneration Neg Hx     Retinal detachment Neg Hx     Strabismus Neg Hx     Thyroid disease Neg Hx     Cancer Neg Hx     Amblyopia Neg Hx        Social History     Socioeconomic History    Marital status: Single   Tobacco Use    Smoking status: Never     Passive exposure: Never    Smokeless tobacco: Never   Substance and Sexual Activity    Alcohol use: Yes     Comment: social    Drug use: No    Sexual activity: Not Currently       Review of patient's allergies indicates:   Allergen Reactions    Grass pollen-june grass standard     House dust          Objective:   VS (24h):   Vitals:    04/11/24 0856   BP: 122/85   Pulse: 73   Temp: 98 °F (36.7 °C)     General: Afebrile, alert, comfortable, no acute distress.   HEENT: ROLY. EOMI, no scleral icterus.   Pulmonary: Non labored  Extremities: Moves all extremities x 4.   Skin: No jaundice, rashes, or visible lesions.   Neurological:  Alert and oriented x 4.     Glucose   Date Value Ref Range Status   01/03/2024 140 (H) 70 - 110 mg/dL Final   11/09/2023 71 70 - 110 mg/dL Final   10/09/2023 120 (H) 70 - 110 mg/dL Final       Calcium   Date Value Ref Range Status   01/03/2024 9.4 8.7 - 10.5 mg/dL Final   11/09/2023 9.8 8.7 - 10.5 mg/dL Final   10/09/2023 10.1 8.7 - 10.5 mg/dL Final       Albumin   Date Value Ref Range Status   01/03/2024 4.0 3.5 - 5.2 g/dL Final   11/09/2023 4.2 3.5 - 5.2 g/dL Final   10/09/2023 4.5 3.5 - 5.2 g/dL Final   04/26/2023 4.5  3.6 - 5.1 g/dL Final   04/10/2023 4.7 3.6 - 5.1 g/dL Final   09/13/2022 4.8 3.6 - 5.1 g/dL Final       Total Protein   Date Value Ref Range Status   01/03/2024 6.6 6.0 - 8.4 g/dL Final   11/09/2023 7.2 6.0 - 8.4 g/dL Final   10/09/2023 7.4 6.0 - 8.4 g/dL Final       Sodium   Date Value Ref Range Status   01/03/2024 139 136 - 145 mmol/L Final   11/09/2023 138 136 - 145 mmol/L Final   10/09/2023 138 136 - 145 mmol/L Final       Potassium   Date Value Ref Range Status   01/03/2024 4.4 3.5 - 5.1 mmol/L Final   11/09/2023 5.1 3.5 - 5.1 mmol/L Final   10/09/2023 4.3 3.5 - 5.1 mmol/L Final       CO2   Date Value Ref Range Status   01/03/2024 28 23 - 29 mmol/L Final   11/09/2023 28 23 - 29 mmol/L Final   10/09/2023 23 23 - 29 mmol/L Final       Chloride   Date Value Ref Range Status   01/03/2024 105 95 - 110 mmol/L Final   11/09/2023 102 95 - 110 mmol/L Final   10/09/2023 105 95 - 110 mmol/L Final       BUN   Date Value Ref Range Status   01/03/2024 11 8 - 23 mg/dL Final   11/09/2023 11 8 - 23 mg/dL Final   10/09/2023 13 8 - 23 mg/dL Final       Creatinine   Date Value Ref Range Status   01/03/2024 0.8 0.5 - 1.4 mg/dL Final   11/09/2023 0.8 0.5 - 1.4 mg/dL Final   10/09/2023 0.9 0.5 - 1.4 mg/dL Final       Alkaline Phosphatase   Date Value Ref Range Status   01/03/2024 65 55 - 135 U/L Final   11/09/2023 71 55 - 135 U/L Final   10/09/2023 69 55 - 135 U/L Final       ALT   Date Value Ref Range Status   01/03/2024 18 10 - 44 U/L Final   11/09/2023 18 10 - 44 U/L Final   10/09/2023 24 10 - 44 U/L Final       AST   Date Value Ref Range Status   01/03/2024 19 10 - 40 U/L Final   11/09/2023 18 10 - 40 U/L Final   10/09/2023 20 10 - 40 U/L Final       Total Bilirubin   Date Value Ref Range Status   01/03/2024 0.6 0.1 - 1.0 mg/dL Final     Comment:     For infants and newborns, interpretation of results should be based  on gestational age, weight and in agreement with clinical  observations.    Premature Infant recommended reference  ranges:  Up to 24 hours.............<8.0 mg/dL  Up to 48 hours............<12.0 mg/dL  3-5 days..................<15.0 mg/dL  6-29 days.................<15.0 mg/dL     11/09/2023 0.3 0.1 - 1.0 mg/dL Final     Comment:     For infants and newborns, interpretation of results should be based  on gestational age, weight and in agreement with clinical  observations.    Premature Infant recommended reference ranges:  Up to 24 hours.............<8.0 mg/dL  Up to 48 hours............<12.0 mg/dL  3-5 days..................<15.0 mg/dL  6-29 days.................<15.0 mg/dL     10/09/2023 0.6 0.1 - 1.0 mg/dL Final     Comment:     For infants and newborns, interpretation of results should be based  on gestational age, weight and in agreement with clinical  observations.    Premature Infant recommended reference ranges:  Up to 24 hours.............<8.0 mg/dL  Up to 48 hours............<12.0 mg/dL  3-5 days..................<15.0 mg/dL  6-29 days.................<15.0 mg/dL         WBC   Date Value Ref Range Status   01/03/2024 5.99 3.90 - 12.70 K/uL Final   11/11/2023 8.02 3.90 - 12.70 K/uL Final   10/09/2023 12.70 3.90 - 12.70 K/uL Final       Hemoglobin   Date Value Ref Range Status   01/03/2024 14.5 14.0 - 18.0 g/dL Final   11/11/2023 14.1 14.0 - 18.0 g/dL Final   10/09/2023 15.6 14.0 - 18.0 g/dL Final       Hematocrit   Date Value Ref Range Status   01/03/2024 44.3 40.0 - 54.0 % Final   11/11/2023 41.3 40.0 - 54.0 % Final   10/09/2023 46.0 40.0 - 54.0 % Final       MCV   Date Value Ref Range Status   01/03/2024 90 82 - 98 fL Final   11/11/2023 86 82 - 98 fL Final   10/09/2023 88 82 - 98 fL Final       Platelets   Date Value Ref Range Status   01/03/2024 231 150 - 450 K/uL Final   11/11/2023 282 150 - 450 K/uL Final   10/09/2023 199 150 - 450 K/uL Final       Lab Results   Component Value Date    CHOL 103 (L) 01/03/2024    CHOL 146 04/10/2023    CHOL 105 (L) 01/06/2023    CHOL 105 (L) 01/06/2023       Lab Results   Component  "Value Date    HDL 46 01/03/2024    HDL 42 04/10/2023    HDL 34 (L) 01/06/2023    HDL 34 (L) 01/06/2023       Lab Results   Component Value Date    LDLCALC 45.4 (L) 01/03/2024    LDLCALC 81.8 04/10/2023    LDLCALC 47.2 (L) 01/06/2023    LDLCALC 47.2 (L) 01/06/2023       Lab Results   Component Value Date    TRIG 58 01/03/2024    TRIG 111 04/10/2023    TRIG 119 01/06/2023    TRIG 119 01/06/2023       Lab Results   Component Value Date    CHOLHDL 44.7 01/03/2024    CHOLHDL 28.8 04/10/2023    CHOLHDL 32.4 01/06/2023    CHOLHDL 32.4 01/06/2023       HIV: No components found for: "HIV 1/2 AG/AB"  Hepatitis C IgG: No components found for: "HEPATITIS C"  Syphilis: No results found for: "RPR"    Hepatitis A IgG: No components found for: "HEPATITIS A IGG"  Hepatitis Bc IgG: No components found for: "HEPATITIS B CORE IGG"  Hepatitis Bs IgG:  Quantiferon: No results found for: "QUANTIFERON"        Assessment:     Pre-Travel clinic assessment  Vaccine counseling    Plan:     Patient specific risks:      The patient was provided with an extensive travel guidance packet which provides travel information specific to the patients itinerary.      The patient's medical history was reviewed and the patient was counseled on:     Precautions against development of DVT during flight.     Registering with the state department and travel insurance was recommended in case of emergency. Enroll in Smart Traveler Enrollment Program: https://step.state.gov/step/     Destination specific risks:       -Infectious Disease risks:                 Mosquito Borne pathogens:  Reviewed basic mosquito avoidance precautions including wearing long sleeve clothing and insect repellant.  to prevent insect-borne diseases (especially dengue).The patient was also instructed to purchase insect repellent containing DEET (25-35%; higher strengths last longer, but >35% will damage synthetic materials) and apply premethrin spray on clothing according to repellent " label instructions.      Food Borne pathogens:  Reviewed basic hand, food and water sanitation precautions.  Patient instructed to take hand  on their trip. The patient was instructed to purchase Imodium over the counter to take in case diarrhea (without blood or fever) develops. Azithromycin was ordered for treatment if severe or bloody diarrhea develops and the patient was instructed on use and possible side effects.      Rabies: Discussed the risk of rabies and precautions to prevent exposure. Patient is aware to seek prompt medical care should they be exposed.      Dicussed covid and influenza and pt interested in having medication with him to use in case he tests positive during trip. Discussed medications and side effects        -Environmental risks:      Personal and travel safety. Precautions to minimize risk/exposure to crime and motor vehicle accidents were reviewed with the patient.     Precautions against sun exposure.     -Vaccinations:  The patient's immunization history was reviewed and, based on the patient's itinerary, the following immunizations were ordered:  - Hepatitis A 0, 6 months  - Typhoid IM x1     To view your immunizations given in [place], register for an account at: [email or web address]     The patient was encouraged to contact us about any problems that may develop after immunization and possible side effects were reviewed.     The patient was instructed to contact us if problems develop after travel.     > 15 min spent with patient and >50% of time spent counseling about travel     Reva Simpson MD, MPH  Infectious Disease    Orders Placed This Encounter   Procedures    Hepatitis A Vaccine (Adult) (IM)     Standing Status:   Future     Number of Occurrences:   1     Standing Expiration Date:   4/11/2025    (In Office Administered) Hepatitis A Vaccine (Adult) (IM)     Standing Status:   Future     Standing Expiration Date:   10/11/2024    Typhoid Vaccine (ViCPs) (IM)      Standing Status:   Future     Number of Occurrences:   1     Standing Expiration Date:   4/11/2025

## 2024-04-11 NOTE — PROGRESS NOTES
Patient received 2 vaccines IM to the right deltoid , Typhoid anterior, and Hep a posterior.  Tolerated well and left in NAD

## 2024-05-08 ENCOUNTER — PATIENT MESSAGE (OUTPATIENT)
Dept: UROLOGY | Facility: CLINIC | Age: 74
End: 2024-05-08
Payer: COMMERCIAL

## 2024-05-08 ENCOUNTER — TELEPHONE (OUTPATIENT)
Dept: UROLOGY | Facility: CLINIC | Age: 74
End: 2024-05-08
Payer: COMMERCIAL

## 2024-05-08 DIAGNOSIS — N20.0 RENAL CALCULI: Primary | ICD-10-CM

## 2024-05-08 NOTE — TELEPHONE ENCOUNTER
----- Message from Mago Kerr LPN sent at 5/8/2024  2:04 PM CDT -----  Regarding: FW: magdalena  Contact: @ [phone]    ----- Message -----  From: Rama Ballesteros  Sent: 5/8/2024   2:00 PM CDT  To: Renny BYRD Jr Staff  Subject: appneel                                             Pt is returning a missed call from Mago ... in regards to scheduling his recall did not accept the date I offered ...Please call and adv @ [phone]

## 2024-05-14 ENCOUNTER — HOSPITAL ENCOUNTER (OUTPATIENT)
Dept: RADIOLOGY | Facility: HOSPITAL | Age: 74
Discharge: HOME | End: 2024-05-14
Attending: UROLOGY
Payer: COMMERCIAL

## 2024-05-14 DIAGNOSIS — N20.0 RENAL CALCULI: ICD-10-CM

## 2024-05-14 PROCEDURE — 76770 US EXAM ABDO BACK WALL COMP: CPT | Mod: 26,,, | Performed by: RADIOLOGY

## 2024-05-14 PROCEDURE — 74018 RADEX ABDOMEN 1 VIEW: CPT | Mod: 26,,, | Performed by: RADIOLOGY

## 2024-05-14 PROCEDURE — 74018 RADEX ABDOMEN 1 VIEW: CPT | Mod: TC

## 2024-05-14 PROCEDURE — 76770 US EXAM ABDO BACK WALL COMP: CPT | Mod: TC

## 2024-05-15 ENCOUNTER — OFFICE VISIT (OUTPATIENT)
Dept: UROLOGY | Facility: CLINIC | Age: 74
End: 2024-05-15
Payer: COMMERCIAL

## 2024-05-15 VITALS — HEIGHT: 69 IN | WEIGHT: 174.19 LBS | BODY MASS INDEX: 25.8 KG/M2

## 2024-05-15 DIAGNOSIS — N20.0 RENAL CALCULI: Primary | ICD-10-CM

## 2024-05-15 PROCEDURE — 99999 PR PBB SHADOW E&M-EST. PATIENT-LVL III: CPT | Mod: PBBFAC,,, | Performed by: UROLOGY

## 2024-05-15 PROCEDURE — 1101F PT FALLS ASSESS-DOCD LE1/YR: CPT | Mod: CPTII,S$GLB,, | Performed by: UROLOGY

## 2024-05-15 PROCEDURE — 3008F BODY MASS INDEX DOCD: CPT | Mod: CPTII,S$GLB,, | Performed by: UROLOGY

## 2024-05-15 PROCEDURE — 1157F ADVNC CARE PLAN IN RCRD: CPT | Mod: CPTII,S$GLB,, | Performed by: UROLOGY

## 2024-05-15 PROCEDURE — 3288F FALL RISK ASSESSMENT DOCD: CPT | Mod: CPTII,S$GLB,, | Performed by: UROLOGY

## 2024-05-15 PROCEDURE — 99213 OFFICE O/P EST LOW 20 MIN: CPT | Mod: S$GLB,,, | Performed by: UROLOGY

## 2024-05-15 PROCEDURE — 1159F MED LIST DOCD IN RCRD: CPT | Mod: CPTII,S$GLB,, | Performed by: UROLOGY

## 2024-05-15 PROCEDURE — 3044F HG A1C LEVEL LT 7.0%: CPT | Mod: CPTII,S$GLB,, | Performed by: UROLOGY

## 2024-05-15 NOTE — PROGRESS NOTES
Subjective:       Patient ID: Sadiq Dhillon is a 74 y.o. male.    Chief Complaint: Follow-up (Pt here for f/u w/ imaging. )    HPI patient had an  ultrasound demonstrating a prostatic cyst nonobstructing asymptomatic    Past Medical History:   Diagnosis Date    Diabetes mellitus     Fuchs' corneal dystrophy     Heart attack     Hypertension     Kidney stones     Pancreatic mass     Pancreatitis     after EUS . 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, treated with dostenex(cabergoline)/ resolved    Reflux esophagitis     Tumor cells, benign        Past Surgical History:   Procedure Laterality Date    CATARACT EXTRACTION W/  INTRAOCULAR LENS IMPLANT Right 0    Dr Van     CATARACT EXTRACTION W/  INTRAOCULAR LENS IMPLANT Left 3/21/2019    Procedure: EXTRACTION, CATARACT, WITH IOL INSERTION;  Surgeon: Ra Van MD;  Location: [facility];  Service: Ophthalmology;  Laterality: Left;    CORNEAL TRANSPLANT Right     Dr Van     DESCEMETS STRIPPING AUTOMATED ENDOTHELIAL KERATOPLASTY Left 3/21/2019    Procedure: TRANSPLANT, PARTIAL-THICKNESS, CORNEA, USING DSAEK TECHNIQUE;  Surgeon: Ra Van MD;  Location: [facility];  Service: Ophthalmology;  Laterality: Left;  DSEK    REPAIR OF RETINAL DETACHMENT WITH VITRECTOMY Right 7/18/2018    Procedure: REPAIR, RETINAL DETACHMENT, WITH VITRECTOMY;  Surgeon: SHUBHAM Patel MD;  Location: [address];  Service: Ophthalmology;  Laterality: Right;  40 min    REPAIR OF RETINAL DETACHMENT WITH VITRECTOMY Left 8/8/2018    Procedure: REPAIR, RETINAL DETACHMENT, WITH VITRECTOMY;  Surgeon: SHUBHAM Patel MD;  Location: [facility] OR [address] [place] [address];  Service: Ophthalmology;  Laterality: Left;  40 min    RHINOPLASTY TIP  1975    THYROIDECTOMY  2007    UPPER ENDOSCOPIC ULTRASOUND W/ FNA      with resultant pancreatitis       Family  History   Problem Relation Name Age of Onset    Hypertension Mother      Heart disease Mother      Heart disease Father      Cataracts Father      Stroke Father      Diabetes Father      Diabetes Paternal Uncle      Stroke Maternal Grandmother      Blindness Neg Hx      Glaucoma Neg Hx      Macular degeneration Neg Hx      Retinal detachment Neg Hx      Strabismus Neg Hx      Thyroid disease Neg Hx      Cancer Neg Hx      Amblyopia Neg Hx         Social History     Socioeconomic History    Marital status: Single   Tobacco Use    Smoking status: Never     Passive exposure: Never    Smokeless tobacco: Never   Substance and Sexual Activity    Alcohol use: Yes     Comment: social    Drug use: No    Sexual activity: Not Currently       Allergies:  Grass pollen-june grass standard and House dust    Medications:    Current Outpatient Medications:     albuterol (VENTOLIN HFA) 90 mcg/actuation inhaler, Inhale 2 puffs into the lungs every 6 (six) hours as needed for Wheezing. Rescue, Disp: 18 g, Rfl: 0    aspirin (ECOTRIN) 81 MG EC tablet, Take 81 mg by mouth every morning. , Disp: , Rfl:     atorvastatin (LIPITOR) 80 MG tablet, Take 1 tablet (80 mg total) by mouth once daily., Disp: 90 tablet, Rfl: 3    azithromycin (ZITHROMAX) 500 MG tablet, Take 2 tablets once if needed for severe diarrhea, Disp: 2 tablet, Rfl: 0    cabergoline (DOSTINEX) 0.5 mg tablet, TAKE 1 TABLET(0.5 MG) BY MOUTH 2 TIMES A WEEK, Disp: 24 tablet, Rfl: 3    cetirizine (ZYRTEC) 10 MG tablet, Take 1 tablet (10 mg total) by mouth once daily., Disp: 30 tablet, Rfl: 0    chlorhexidine (PERIDEX) 0.12 % solution, SMARTSIG:By Mouth, Disp: , Rfl:     clobetasol 0.05% (TEMOVATE) 0.05 % Oint, APPLY TOPICALLY TO THE AFFECTED AREA 3 TIMES A WEEK, Disp: , Rfl:     CONTOUR NEXT TEST STRIPS Strp, UTD QID IN VITRO, Disp: , Rfl: 3    dicyclomine (BENTYL) 10 MG capsule, TAKE 1 CAPSULE(10 MG) BY MOUTH THREE TIMES DAILY AS NEEDED FOR ABDOMINAL PAIN OR DIARRHEA, Disp: 270  capsule, Rfl: 1    famotidine (PEPCID) 20 MG tablet, Take by mouth., Disp: , Rfl:     fexofenadine-pseudoephedrine  mg (ALLEGRA-D)  mg per tablet, Take 1 tablet by mouth., Disp: , Rfl:     gabapentin (NEURONTIN) 100 MG capsule, Take 1 capsule three times a day,  Takes one capsule every morning, Disp: , Rfl: 3    lisinopril (PRINIVIL,ZESTRIL) 2.5 MG tablet, Take 2.5 mg by mouth every morning. , Disp: , Rfl:     loperamide (IMODIUM) 2 mg capsule, Take 2 mg by mouth 4 (four) times daily as needed for Diarrhea., Disp: , Rfl:     metFORMIN (GLUCOPHAGE-XR) 500 MG ER 24hr tablet, TAKE 2 TABLETS BY MOUTH TWICE DAILY, Disp: 360 tablet, Rfl: 3    metoprolol succinate (TOPROL-XL) 25 MG 24 hr tablet, Take 1 tablet (25 mg total) by mouth nightly., Disp: 60 tablet, Rfl: 6    naloxone (NARCAN) 1 mg/mL injection, 2 mg (1 mg per nostril) by Nasal route as needed for opioid overdose; may repeat in 3 to 5 minutes if not effective. Call 911 (Patient not taking: Reported on 3/26/2024), Disp: 2 mL, Rfl: 11    tamsulosin (FLOMAX) 0.4 mg Cap, Take 1 capsule (0.4 mg total) by mouth once daily., Disp: 10 capsule, Rfl: 0    testosterone (ANDROGEL) 1 % (50 mg/5 gram) GlPk, APPLY CONTENTS OF 2 PACKETS TOPICALLY TO SKIN EVERY DAY, Disp: 900 g, Rfl: 3    Review of Systems    Objective:      Physical Exam    Assessment:       1. Renal calculi        Plan:       Sadiq was seen today for follow-up.    Diagnoses and all orders for this visit:    Renal calculi       Return to clinic 1 year with renal ultrasound

## 2024-06-21 ENCOUNTER — PATIENT MESSAGE (OUTPATIENT)
Dept: PRIMARY CARE CLINIC | Facility: CLINIC | Age: 74
End: 2024-06-21
Payer: COMMERCIAL

## 2024-06-21 ENCOUNTER — PATIENT MESSAGE (OUTPATIENT)
Dept: CARDIOLOGY | Facility: CLINIC | Age: 74
End: 2024-06-21
Payer: COMMERCIAL

## 2024-06-21 DIAGNOSIS — R10.31 RIGHT LOWER QUADRANT ABDOMINAL PAIN: Primary | ICD-10-CM

## 2024-06-21 NOTE — TELEPHONE ENCOUNTER
"Called to triage pt. Pt states he has been experiencing RLQ abd pain onset November 2023 which has progressively increased. Pt rates pain 2/10. Pt states pain does increase with ejaculation. Pt denies NVD, fever. Pt states BM normal, denies hematuria or dysuria. Pt states PCP check him for a hernia and he also spoke to Dr Bowman regarding this pain.  States he feels like Dr Bowman "brushed his concerns off". Pt states Dr Bowman did check his prostate which was normal. Pt asking to be referred to a specialist, Pt states his last Fitkit was normal but he thinks he may be due for a colonoscopy. States he is concerned because his brother had a pain for 6 months and was just dx with ca. Will pend GI referral if appropriate.    Pt also states he was prescribed Gabapentin by Coreen SHIELDS for tremors and wants to know if PCP approves of him taking this med.       "

## 2024-06-21 NOTE — TELEPHONE ENCOUNTER
I agree with gastroenterology evaluation and will likely need a colonoscopy.  It may be diverticulitis related.  Referral order signed

## 2024-06-24 RX ORDER — LISINOPRIL 2.5 MG/1
2.5 TABLET ORAL EVERY MORNING
Qty: 90 TABLET | Refills: 3 | Status: SHIPPED | OUTPATIENT
Start: 2024-06-24

## 2024-06-26 RX ORDER — LISINOPRIL 2.5 MG/1
2.5 TABLET ORAL EVERY MORNING
Qty: 90 TABLET | Refills: 3 | Status: SHIPPED | OUTPATIENT
Start: 2024-06-26

## 2024-06-29 ENCOUNTER — HOSPITAL ENCOUNTER (EMERGENCY)
Facility: HOSPITAL | Age: 74
Discharge: HOME | End: 2024-06-29
Attending: EMERGENCY MEDICINE
Payer: MEDICARE

## 2024-06-29 ENCOUNTER — OFFICE VISIT (OUTPATIENT)
Dept: URGENT CARE | Facility: CLINIC | Age: 74
End: 2024-06-29
Payer: COMMERCIAL

## 2024-06-29 VITALS
SYSTOLIC BLOOD PRESSURE: 169 MMHG | TEMPERATURE: 99 F | BODY MASS INDEX: 25.92 KG/M2 | WEIGHT: 175 LBS | DIASTOLIC BLOOD PRESSURE: 70 MMHG | HEIGHT: 69 IN | OXYGEN SATURATION: 98 % | RESPIRATION RATE: 18 BRPM | HEART RATE: 57 BPM

## 2024-06-29 VITALS
TEMPERATURE: 98 F | OXYGEN SATURATION: 96 % | WEIGHT: 174.19 LBS | SYSTOLIC BLOOD PRESSURE: 94 MMHG | BODY MASS INDEX: 25.8 KG/M2 | RESPIRATION RATE: 20 BRPM | DIASTOLIC BLOOD PRESSURE: 54 MMHG | HEIGHT: 69 IN | HEART RATE: 56 BPM

## 2024-06-29 DIAGNOSIS — R00.1 BRADYCARDIA: ICD-10-CM

## 2024-06-29 DIAGNOSIS — R55 NEAR SYNCOPE: Primary | ICD-10-CM

## 2024-06-29 DIAGNOSIS — I95.9 HYPOTENSIVE EPISODE: Primary | ICD-10-CM

## 2024-06-29 DIAGNOSIS — R42 DIZZINESS: ICD-10-CM

## 2024-06-29 LAB
ALBUMIN SERPL BCP-MCNC: 4 G/DL (ref 3.5–5.2)
ALP SERPL-CCNC: 56 U/L (ref 55–135)
ALT SERPL W/O P-5'-P-CCNC: 14 U/L (ref 10–44)
ANION GAP SERPL CALC-SCNC: 11 MMOL/L (ref 8–16)
AST SERPL-CCNC: 16 U/L (ref 10–40)
BASOPHILS # BLD AUTO: 0.03 K/UL (ref 0–0.2)
BASOPHILS NFR BLD: 0.5 % (ref 0–1.9)
BILIRUB SERPL-MCNC: 0.7 MG/DL (ref 0.1–1)
BNP SERPL-MCNC: 79 PG/ML (ref 0–99)
BUN SERPL-MCNC: 14 MG/DL (ref 8–23)
CALCIUM SERPL-MCNC: 9.9 MG/DL (ref 8.7–10.5)
CHLORIDE SERPL-SCNC: 97 MMOL/L (ref 95–110)
CO2 SERPL-SCNC: 24 MMOL/L (ref 23–29)
CREAT SERPL-MCNC: 0.8 MG/DL (ref 0.5–1.4)
DIFFERENTIAL METHOD BLD: ABNORMAL
EOSINOPHIL # BLD AUTO: 0.1 K/UL (ref 0–0.5)
EOSINOPHIL NFR BLD: 2 % (ref 0–8)
ERYTHROCYTE [DISTWIDTH] IN BLOOD BY AUTOMATED COUNT: 12.3 % (ref 11.5–14.5)
EST. GFR  (NO RACE VARIABLE): >60 ML/MIN/1.73 M^2
GLUCOSE SERPL-MCNC: 103 MG/DL (ref 70–110)
GLUCOSE SERPL-MCNC: 108 MG/DL (ref 70–110)
HCT VFR BLD AUTO: 40.8 % (ref 40–54)
HCV AB SERPL QL IA: NORMAL
HGB BLD-MCNC: 13.5 G/DL (ref 14–18)
HIV 1+2 AB+HIV1 P24 AG SERPL QL IA: NORMAL
IMM GRANULOCYTES # BLD AUTO: 0.02 K/UL (ref 0–0.04)
IMM GRANULOCYTES NFR BLD AUTO: 0.3 % (ref 0–0.5)
LYMPHOCYTES # BLD AUTO: 1.2 K/UL (ref 1–4.8)
LYMPHOCYTES NFR BLD: 18.4 % (ref 18–48)
MCH RBC QN AUTO: 29.2 PG (ref 27–31)
MCHC RBC AUTO-ENTMCNC: 33.1 G/DL (ref 32–36)
MCV RBC AUTO: 88 FL (ref 82–98)
MONOCYTES # BLD AUTO: 0.5 K/UL (ref 0.3–1)
MONOCYTES NFR BLD: 7.5 % (ref 4–15)
NEUTROPHILS # BLD AUTO: 4.7 K/UL (ref 1.8–7.7)
NEUTROPHILS NFR BLD: 71.3 % (ref 38–73)
NRBC BLD-RTO: 0 /100 WBC
PLATELET # BLD AUTO: 185 K/UL (ref 150–450)
PMV BLD AUTO: 9.4 FL (ref 9.2–12.9)
POTASSIUM SERPL-SCNC: 4.4 MMOL/L (ref 3.5–5.1)
PROT SERPL-MCNC: 6.2 G/DL (ref 6–8.4)
RBC # BLD AUTO: 4.63 M/UL (ref 4.6–6.2)
SODIUM SERPL-SCNC: 132 MMOL/L (ref 136–145)
TROPONIN I SERPL DL<=0.01 NG/ML-MCNC: <0.006 NG/ML (ref 0–0.03)
TSH SERPL DL<=0.005 MIU/L-ACNC: 2.16 UIU/ML (ref 0.4–4)
WBC # BLD AUTO: 6.57 K/UL (ref 3.9–12.7)

## 2024-06-29 PROCEDURE — 93010 ELECTROCARDIOGRAM REPORT: CPT | Mod: 76,,, | Performed by: INTERNAL MEDICINE

## 2024-06-29 PROCEDURE — 85025 COMPLETE CBC W/AUTO DIFF WBC: CPT

## 2024-06-29 PROCEDURE — 84443 ASSAY THYROID STIM HORMONE: CPT

## 2024-06-29 PROCEDURE — 93005 ELECTROCARDIOGRAM TRACING: CPT

## 2024-06-29 PROCEDURE — 96360 HYDRATION IV INFUSION INIT: CPT

## 2024-06-29 PROCEDURE — 86803 HEPATITIS C AB TEST: CPT | Performed by: PHYSICIAN ASSISTANT

## 2024-06-29 PROCEDURE — 84484 ASSAY OF TROPONIN QUANT: CPT

## 2024-06-29 PROCEDURE — 87389 HIV-1 AG W/HIV-1&-2 AB AG IA: CPT | Performed by: PHYSICIAN ASSISTANT

## 2024-06-29 PROCEDURE — 99284 EMERGENCY DEPT VISIT MOD MDM: CPT | Mod: 25

## 2024-06-29 PROCEDURE — 99214 OFFICE O/P EST MOD 30 MIN: CPT | Mod: S$GLB,,, | Performed by: FAMILY MEDICINE

## 2024-06-29 PROCEDURE — 80053 COMPREHEN METABOLIC PANEL: CPT

## 2024-06-29 PROCEDURE — 93005 ELECTROCARDIOGRAM TRACING: CPT | Mod: S$GLB,,, | Performed by: FAMILY MEDICINE

## 2024-06-29 PROCEDURE — 63600175 PHARM REV CODE 636 W HCPCS

## 2024-06-29 PROCEDURE — 82962 GLUCOSE BLOOD TEST: CPT | Mod: S$GLB,,, | Performed by: FAMILY MEDICINE

## 2024-06-29 PROCEDURE — 83880 ASSAY OF NATRIURETIC PEPTIDE: CPT

## 2024-06-29 PROCEDURE — 93010 ELECTROCARDIOGRAM REPORT: CPT | Mod: ,,, | Performed by: INTERNAL MEDICINE

## 2024-06-29 PROCEDURE — 93010 ELECTROCARDIOGRAM REPORT: CPT | Mod: S$GLB,,, | Performed by: INTERNAL MEDICINE

## 2024-06-29 RX ADMIN — SODIUM CHLORIDE, POTASSIUM CHLORIDE, SODIUM LACTATE AND CALCIUM CHLORIDE 1000 ML: 600; 310; 30; 20 INJECTION, SOLUTION INTRAVENOUS at 05:06

## 2024-06-29 NOTE — PATIENT INSTRUCTIONS
74 year old male with   Past Medical History:   Diagnosis Date    Diabetes mellitus     Fuchs' corneal dystrophy     Heart attack, CAD s/p stent     Hypertension     Kidney stones     Pancreatic mass     Pancreatitis     after EUS . 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  Started prior to presentation while he was sitting and chatting with his friend.  He would just taken his daily medications.  He did not eat much prior to that.  Per patient and friend witnessed he felt lightheaded and put his head down but he did not experience LOC or have a fall.  Patient denies any recent vomiting or diarrhea or respiratory symptoms or shortness of breath.  He did have brief episode of chest pressure in the last 24 hours but is not currently experiencing any chest pressure or pain.  He reports no fevers or chills or urinary symptoms such as dysuria or blood in the urine.  No change in vision or hearing or facial asymmetry or slurring of speech or numbness or weakness.  No headache.  Patient reports he had a similar episode last week.  Of note he also reports right lower quadrant/groin pain that has been going on for several months but is currently being worked up for.     This episode is lasting longer and he states he is still having mild persisting sensation of what he was experiencing earlier.       EKG is sinus bradycardia with sinus arrhythmia, PVCs.  No acute ischemic abnormalities appreciated.    Blood glucose 103.    Initially blood pressure low at 94/54 and then recheck was 120/72.    No focal deficits on neuro exam.  Patient alert and oriented    In stable but guarded condition, patient advised to go to the ED for further  evaluation and management.  His friend is to take him immediately following this visit.

## 2024-06-29 NOTE — PROGRESS NOTES
"Subjective:      Patient ID: Sadiq Dhillon is a 74 y.o. male.    Vitals:  height is 5' 9" (1.753 m) and weight is 79 kg (174 lb 2.6 oz). His oral temperature is 98 °F (36.7 °C). His blood pressure is 94/54 (abnormal) and his pulse is 56 (abnormal). His respiration is 20 and oxygen saturation is 96%.     Chief Complaint: Dizziness    74 year old male with   Past Medical History:  Diagnosis Date  · Diabetes mellitus   · Fuchs' corneal dystrophy   · Heart attack, CAD s/p stent   · Hypertension   · Kidney stones   · Pancreatic mass   · Pancreatitis    after EUS . 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  Started prior to presentation while he was sitting and chatting with his friend.  He would just taken his daily medications.  He did not eat much prior to that.  Per patient and friend witnessed he felt lightheaded and put his head down but he did not experience LOC or have a fall.  Patient denies any recent vomiting or diarrhea or respiratory symptoms or shortness of breath.  He did have brief episode of chest pressure in the last 24 hours but is not currently experiencing any chest pressure or pain.  He reports no fevers or chills or urinary symptoms such as dysuria or blood in the urine.  No change in vision or hearing or facial asymmetry or slurring of speech or numbness or weakness.  No headache.  Patient reports he had a similar episode last week.  Of note he also reports right lower quadrant/groin pain that has been going on for several months but is currently being worked up for.     This episode is lasting longer and he states he is still having mild persisting sensation of what he was experiencing earlier. "             Dizziness:   Chronicity:  New  Onset:  In the past 7 days  Progression since onset:  Gradually worsening  Frequency:  Constantly  Pain Scale:  6/10  Severity:  Moderate  Duration:  1 minute  Dizziness characteristics:  Lightheaded/impending faint (seeing white)   Associated symptoms: nausea, light-headedness and syncope.no headaches and no tinnitus.  Aggravated by:  Getting up (standing and walking)  Risk factors: family history of stroke and heart attack.  Treatments tried:  Nothing      HENT:  Negative for tinnitus.    Cardiovascular:  Positive for passing out.   Gastrointestinal:  Positive for nausea.   Neurological:  Positive for dizziness and light-headedness. Negative for headaches.      Objective:     Physical Exam  Constitutional: Pt oriented to person, place, and time.  Patient pale and diaphoretic in clinic  HENT: No icterus or facial swelling appreciated  Head: Normocephalic and atraumatic.   Nose: No congestion.   Pulmonary/Chest: Effort normal. No stridor. No respiratory distress.  Clear to auscultation bilaterally   CV: S1/S2/regular rhythm.  Bradycardic  Abdominal: Normal appearance. Abdomen exhibits no distension.   Musculoskeletal:         General: No swelling.   Neurological: no focal deficit. Patient is alert and oriented to person, place, and time.   Skin: Skin is not diaphoretic and not pale. no jaundice  Psychiatric: Patients behavior is normal. Mood, judgment and thought content normal.     Assessment:     1. Hypotensive episode    2. Dizziness        Plan:       Hypotensive episode    Dizziness  -     IN OFFICE EKG 12-LEAD (to Muse)  -     Cancel: Orthostatic vital signs  -     POCT Glucose, Hand-Held Device      EKG is sinus bradycardia with sinus arrhythmia, PVCs.  No acute ischemic abnormalities appreciated.    Blood glucose 103.    Initially blood pressure low at 94/54 and then recheck was 120/72.    No focal deficits on neuro exam.  Patient alert and oriented    In stable but  guarded condition, patient advised to go to the ED for further evaluation and management.  His friend is to take him immediately following this visit.

## 2024-06-29 NOTE — ED TRIAGE NOTES
Sadiq Dhillon, a 74 y.o. male presents to the ED w/ complaint of near syncopal episodes, pt reports dizziness starting this am and being under a lot of stress. Denies chest pain, SOB, nausea and vomiting.    Triage note:  Chief Complaint   Patient presents with    Dizziness     Dizziness starting at 11am this morning, went to  and was told to come here. + cardiac hx      Review of patient's allergies indicates:   Allergen Reactions    Grass pollen-june grass standard     House dust      Past Medical History:   Diagnosis Date    Diabetes mellitus     Fuchs' corneal dystrophy     Heart attack     Hypertension     Kidney stones     Pancreatic mass     Pancreatitis     after EUS . 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

## 2024-06-29 NOTE — ED PROVIDER NOTES
"Encounter Date: 6/29/2024       History     Chief Complaint   Patient presents with    Dizziness     Dizziness starting at 11am this morning, went to  and was told to come here. + cardiac hx      74-year-old male with past medical history hypertension, diabetes, MI presents for evaluation of lightheadedness occurring today.  Patient states that he was sitting at home discussing work with his  when he felt acutely weak and as if his vision had a "white out" that lasted for a few seconds before resolving.  His vision resolved after a few seconds, but his feeling of weakness has continued, although it has improved.  He denies chest pain, shortness of breath, nausea/vomiting fevers/chills diarrhea, dysuria, changes in stool.  Patient reports that he has been drinking less than normal and has been under lot of stress lately dealing with a family member who is having significant health problems and requiring assistance.    The history is provided by the patient.     Review of patient's allergies indicates:   Allergen Reactions    Grass pollen-june grass standard     House dust      Past Medical History:   Diagnosis Date    Diabetes mellitus     Fuchs' corneal dystrophy     Heart attack     Hypertension     Kidney stones     Pancreatic mass     Pancreatitis     after EUS . 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, treated with dostenex(cabergoline)/ resolved    Reflux esophagitis     Tumor cells, benign      Past Surgical History:   Procedure Laterality Date    CATARACT EXTRACTION W/  INTRAOCULAR LENS IMPLANT Right 0    Dr Van     CATARACT EXTRACTION W/  INTRAOCULAR LENS IMPLANT Left 3/21/2019    Procedure: EXTRACTION, CATARACT, WITH IOL INSERTION;  Surgeon: Ra Van MD;  Location: [facility];  Service: Ophthalmology;  Laterality: Left;    CORNEAL " TRANSPLANT Right     Dr Van     DESCEMETS STRIPPING AUTOMATED ENDOTHELIAL KERATOPLASTY Left 3/21/2019    Procedure: TRANSPLANT, PARTIAL-THICKNESS, CORNEA, USING DSAEK TECHNIQUE;  Surgeon: Ra Van MD;  Location: [facility];  Service: Ophthalmology;  Laterality: Left;  DSEK    REPAIR OF RETINAL DETACHMENT WITH VITRECTOMY Right 7/18/2018    Procedure: REPAIR, RETINAL DETACHMENT, WITH VITRECTOMY;  Surgeon: SHUBHAM Patel MD;  Location: [facility] OR [address] [place] [address];  Service: Ophthalmology;  Laterality: Right;  40 min    REPAIR OF RETINAL DETACHMENT WITH VITRECTOMY Left 8/8/2018    Procedure: REPAIR, RETINAL DETACHMENT, WITH VITRECTOMY;  Surgeon: SHUBHAM Patel MD;  [facility];  Service: Ophthalmology;  Laterality: Left;  40 min    RHINOPLASTY TIP  1975    THYROIDECTOMY  2007    UPPER ENDOSCOPIC ULTRASOUND W/ FNA      with resultant pancreatitis     Family History   Problem Relation Name Age of Onset    Hypertension Mother      Heart disease Mother      Heart disease Father      Cataracts Father      Stroke Father      Diabetes Father      Diabetes Paternal Uncle      Stroke Maternal Grandmother      Blindness Neg Hx      Glaucoma Neg Hx      Macular degeneration Neg Hx      Retinal detachment Neg Hx      Strabismus Neg Hx      Thyroid disease Neg Hx      Cancer Neg Hx      Amblyopia Neg Hx       Social History     Tobacco Use    Smoking status: Never     Passive exposure: Never    Smokeless tobacco: Never   Substance Use Topics    Alcohol use: Yes     Comment: social    Drug use: No         Physical Exam     Initial Vitals [06/29/24 1521]   BP Pulse Resp Temp SpO2   (!) 118/55 (!) 57 18 98 °F (36.7 °C) 97 %      MAP       --         Physical Exam    Nursing note and vitals reviewed.  Constitutional: He appears well-developed and well-nourished.   HENT:   Head: Normocephalic and atraumatic.   Mouth/Throat: Mucous membranes are dry.   Eyes: Conjunctivae and EOM are normal. Pupils are equal, round, and  reactive to light.   No conjunctival rim pallor   Cardiovascular:  Normal rate, regular rhythm and normal heart sounds.           Pulmonary/Chest: Breath sounds normal. No respiratory distress.   Abdominal: Abdomen is soft. He exhibits no distension. There is no abdominal tenderness.   Musculoskeletal:      Comments: 1+ nonpitting edema of lower extremities bilaterally to the middle shins     Neurological: He is alert and oriented to person, place, and time. He has normal strength. No cranial nerve deficit. GCS score is 15. GCS eye subscore is 4. GCS verbal subscore is 5. GCS motor subscore is 6.   Skin: Skin is warm and dry.         ED Course   Procedures  Labs Reviewed   CBC W/ AUTO DIFFERENTIAL - Abnormal; Notable for the following components:       Result Value    Hemoglobin 13.5 (*)     All other components within normal limits   COMPREHENSIVE METABOLIC PANEL - Abnormal; Notable for the following components:    Sodium 132 (*)     All other components within normal limits   HIV 1 / 2 ANTIBODY    Narrative:     Release to patient->Immediate   HEPATITIS C ANTIBODY    Narrative:     Release to patient->Immediate   B-TYPE NATRIURETIC PEPTIDE   TROPONIN I   TSH     EKG Readings: (Independently Interpreted)   Previous EKG Date: 06/27/2022.   Bigeminy, 70 beats per minute, normal axis, no STEMI       Imaging Results    None          Medications   lactated ringers bolus 1,000 mL (0 mLs Intravenous Stopped 6/29/24 1857)     Medical Decision Making  74-year-old male with past medical history hypertension, diabetes, MI presents for evaluation of lightheadedness occurring today.    Pathologies I have considered my differential diagnosis include, but not limited to, dehydration, stress reaction, arrhythmia, ACS, CHF, stroke, GI bleed, anemia, pneumonia.    Low suspicion for ACS in this patient without chest pain, troponin within normal limits, no STEMI on EKG.  Low suspicion for CHF in this patient without shortness of  breath, no leg swelling, BNP within normal limits.  TSH within normal limits, lowering suspicion for hypothyroidism.  Initial EKG showing bigeminy.  Repeat EKGs showing sinus bradycardia follow up by sinus rhythm with occasional PACs.  Low suspicion for GI bleed in this patient with a history of hematochezia or melena.  Low suspicion for pneumonia in this patient without fever, cough, shortness of breath.  Patient mildly anemic, but anemia likely insufficient to explain patient's symptoms.  Patient with mild hyponatremia, also unlikely to sufficiently explain patient's symptoms.  Patient reports resolution of symptoms after receiving IV fluids.  Suspect dehydration and stress as 's of patient's symptoms.  Extensive discussion with the patient of findings.  Patient is stable for discharge with Cardiology referral.  Return precautions given.  Patient is stable for discharge.  Answered all questions.    Amount and/or Complexity of Data Reviewed  Labs: ordered. Decision-making details documented in ED Course.  ECG/medicine tests: ordered and independent interpretation performed. Decision-making details documented in ED Course.     Details: Repeat EKG at 4:36 p.m.:  Sinus bradycardia, 51 beats per minute, normal axis, no STEMI  Repeat EKG at 4:40 p.m.:  Sinus rhythm with occasional PVC, 7 beats per minute, normal axis, no STEMI               ED Course as of 06/30/24 0114   Sat Jun 29, 2024   1740 Troponin I  Within normal limits, lowering suspicion for ACS [BH]   1743 Brain natriuretic peptide  Within normal limits, lowering suspicion for CHF [BH]   1743 TSH  Not concerning for thyroid disease [BH]   1743 CBC auto differential(!)  Anemia, previous 14.5  No leukocytosis [BH]   1743 Comprehensive metabolic panel(!)  Mild hyponatremia [BH]      ED Course User Index  [BH] Otf Jaramillo MD                           Clinical Impression:  Final diagnoses:  [R55] Near syncope (Primary)  [R00.1] Bradycardia          ED  Disposition Condition    Discharge Stable          ED Prescriptions    None       Follow-up Information       Follow up With Specialties Details Why Contact Info Additional Information    Erlin Mcpherson MD Internal Medicine Call in 2 days  [address] [facility]  [facility] [address]  [phone]       [facility] - Cardiology - 3rd Fl Cardiology   [address] [facility] [id number] [phone] Cardiology Services Clinics - 3rd floor             Otf Jaramillo MD  Resident  06/30/24 0114

## 2024-06-30 LAB
OHS QRS DURATION: 118 MS
OHS QRS DURATION: 88 MS
OHS QRS DURATION: 90 MS
OHS QTC CALCULATION: 346 MS
OHS QTC CALCULATION: 403 MS
OHS QTC CALCULATION: 414 MS

## 2024-07-01 ENCOUNTER — TELEPHONE (OUTPATIENT)
Dept: CARDIOLOGY | Facility: CLINIC | Age: 74
End: 2024-07-01
Payer: COMMERCIAL

## 2024-07-01 DIAGNOSIS — R00.2 PALPITATIONS: Primary | ICD-10-CM

## 2024-07-02 ENCOUNTER — OFFICE VISIT (OUTPATIENT)
Dept: CARDIOLOGY | Facility: CLINIC | Age: 74
End: 2024-07-02
Payer: COMMERCIAL

## 2024-07-02 VITALS
BODY MASS INDEX: 25.93 KG/M2 | WEIGHT: 175.06 LBS | SYSTOLIC BLOOD PRESSURE: 110 MMHG | DIASTOLIC BLOOD PRESSURE: 70 MMHG | HEART RATE: 93 BPM | OXYGEN SATURATION: 96 % | HEIGHT: 69 IN

## 2024-07-02 DIAGNOSIS — Z13.6 ENCOUNTER FOR SCREENING FOR CARDIOVASCULAR DISORDERS: ICD-10-CM

## 2024-07-02 DIAGNOSIS — E78.00 HYPERCHOLESTEROLEMIA: ICD-10-CM

## 2024-07-02 DIAGNOSIS — I25.10 CORONARY ARTERY DISEASE INVOLVING NATIVE CORONARY ARTERY OF NATIVE HEART WITHOUT ANGINA PECTORIS: Primary | ICD-10-CM

## 2024-07-02 DIAGNOSIS — R55 NEAR SYNCOPE: ICD-10-CM

## 2024-07-02 DIAGNOSIS — E11.9 TYPE 2 DIABETES MELLITUS WITHOUT COMPLICATION, WITHOUT LONG-TERM CURRENT USE OF INSULIN: ICD-10-CM

## 2024-07-02 DIAGNOSIS — E78.2 HYPERLIPIDEMIA, MIXED: ICD-10-CM

## 2024-07-02 PROCEDURE — 99999 PR PBB SHADOW E&M-EST. PATIENT-LVL V: CPT | Mod: PBBFAC,,, | Performed by: INTERNAL MEDICINE

## 2024-07-02 PROCEDURE — 3288F FALL RISK ASSESSMENT DOCD: CPT | Mod: CPTII,S$GLB,, | Performed by: INTERNAL MEDICINE

## 2024-07-02 PROCEDURE — 1125F AMNT PAIN NOTED PAIN PRSNT: CPT | Mod: CPTII,S$GLB,, | Performed by: INTERNAL MEDICINE

## 2024-07-02 PROCEDURE — 99214 OFFICE O/P EST MOD 30 MIN: CPT | Mod: S$GLB,,, | Performed by: INTERNAL MEDICINE

## 2024-07-02 PROCEDURE — 1101F PT FALLS ASSESS-DOCD LE1/YR: CPT | Mod: CPTII,S$GLB,, | Performed by: INTERNAL MEDICINE

## 2024-07-02 PROCEDURE — 1159F MED LIST DOCD IN RCRD: CPT | Mod: CPTII,S$GLB,, | Performed by: INTERNAL MEDICINE

## 2024-07-02 PROCEDURE — 1157F ADVNC CARE PLAN IN RCRD: CPT | Mod: CPTII,S$GLB,, | Performed by: INTERNAL MEDICINE

## 2024-07-02 PROCEDURE — 3074F SYST BP LT 130 MM HG: CPT | Mod: CPTII,S$GLB,, | Performed by: INTERNAL MEDICINE

## 2024-07-02 PROCEDURE — 3078F DIAST BP <80 MM HG: CPT | Mod: CPTII,S$GLB,, | Performed by: INTERNAL MEDICINE

## 2024-07-02 PROCEDURE — 3044F HG A1C LEVEL LT 7.0%: CPT | Mod: CPTII,S$GLB,, | Performed by: INTERNAL MEDICINE

## 2024-07-02 PROCEDURE — 3008F BODY MASS INDEX DOCD: CPT | Mod: CPTII,S$GLB,, | Performed by: INTERNAL MEDICINE

## 2024-07-02 PROCEDURE — 4010F ACE/ARB THERAPY RXD/TAKEN: CPT | Mod: CPTII,S$GLB,, | Performed by: INTERNAL MEDICINE

## 2024-07-02 NOTE — PROGRESS NOTES
"Subjective:    Patient ID:  Sadiq Dhillon is a 74 y.o. male who presents for follow-up of Pre Syncope      HPI  The patient is a 74 year old male was admitted to [facility] in 2017 with a STEMI and had a PCI to LAD. He reported that he had 2 episodes of VT during the [facility]. Interesting to note that a CTA here 2012 revealed a CAC of 0 and normal coronary arteries . He has has orthostatic symptoms . Recently had 2 episodes of "white out" with were brief episodes of blurred vision and fogginess. No loss of balance.. He reports no chest pain  and is very active but no exercise.  His father has CABG and ICD in his 60s and lived to his 90        coronary angiography: 9/6/12  Dominance:  Right   LM:  the LM is normal. It bifurcates into the LCX and LAD arteries.   LAD:  the proximal and mid segments are normal.  The distal segment appears to become intramuscular and is extremely small.  This is not clearly visualized.  There does not appear to be discrete lesion.   LCX:  the LCX is normal.   RCA:  the RCA is normal.  The PDA and PLB arise from the distal RCA.     Conclusions:   1. Normal coronary arteries.   2.  The distal LAD is not clearly visualized.   Lab Results   Component Value Date     (L) 06/29/2024    K 4.4 06/29/2024    CL 97 06/29/2024    CO2 24 06/29/2024    BUN 14 06/29/2024    CREATININE 0.8 06/29/2024     06/29/2024    HGBA1C 6.1 (H) 01/03/2024    AST 16 06/29/2024    ALT 14 06/29/2024    ALBUMIN 4.0 06/29/2024    ALBUMIN 4.5 04/26/2023    PROT 6.2 06/29/2024    BILITOT 0.7 06/29/2024    WBC 6.57 06/29/2024    HGB 13.5 (L) 06/29/2024    HCT 40.8 06/29/2024    MCV 88 06/29/2024     06/29/2024    INR 1.1 09/02/2012    PSA 0.93 02/07/2013    TSH 2.160 06/29/2024         Lab Results   Component Value Date    CHOL 103 (L) 01/03/2024    HDL 46 01/03/2024    TRIG 58 01/03/2024       Lab Results   Component Value Date    LDLCALC 45.4 (L) 01/03/2024       Past Medical History:   Diagnosis Date    " Diabetes mellitus     Fuchs' corneal dystrophy     Heart attack     Hypertension     Kidney stones     Pancreatic mass     Pancreatitis     after EUS . 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 , Disp: , Rfl:     atorvastatin (LIPITOR) 80 MG tablet, Take 1 tablet (80 mg total) by mouth once daily., Disp: 90 tablet, Rfl: 3    dicyclomine (BENTYL) 10 MG capsule, TAKE 1 CAPSULE(10 MG) BY MOUTH THREE TIMES DAILY AS NEEDED FOR ABDOMINAL PAIN OR DIARRHEA, Disp: 270 capsule, Rfl: 1    famotidine (PEPCID) 20 MG tablet, Take by mouth., Disp: , Rfl:     fexofenadine-pseudoephedrine  mg (ALLEGRA-D)  mg per tablet, Take 1 tablet by mouth., Disp: , Rfl:     lisinopriL (PRINIVIL,ZESTRIL) 2.5 MG tablet, Take 1 tablet (2.5 mg total) by mouth every morning., Disp: 90 tablet, Rfl: 3    lisinopriL (PRINIVIL,ZESTRIL) 2.5 MG tablet, Take 1 tablet (2.5 mg total) by mouth every morning., Disp: 90 tablet, Rfl: 3    loperamide (IMODIUM) 2 mg capsule, Take 2 mg by mouth 4 (four) times daily as needed for Diarrhea., Disp: , Rfl:     metoprolol succinate (TOPROL-XL) 25 MG 24 hr tablet, Take 1 tablet (25 mg total) by mouth nightly., Disp: 60 tablet, Rfl: 6    tamsulosin (FLOMAX) 0.4 mg Cap, Take 1 capsule (0.4 mg total) by mouth once daily., Disp: 10 capsule, Rfl: 0    testosterone (ANDROGEL) 1 % (50 mg/5 gram) GlPk, APPLY CONTENTS OF 2 PACKETS TOPICALLY TO SKIN EVERY DAY, Disp: 900 g, Rfl: 3    albuterol (VENTOLIN HFA) 90 mcg/actuation inhaler, Inhale 2 puffs into the lungs every 6 (six) hours as needed for Wheezing. Rescue (Patient not taking: Reported on 7/2/2024), Disp: 18 g,  Rfl: 0    azithromycin (ZITHROMAX) 500 MG tablet, Take 2 tablets once if needed for severe diarrhea (Patient not taking: Reported on 6/29/2024), Disp: 2 tablet, Rfl: 0    cabergoline (DOSTINEX) 0.5 mg tablet, TAKE 1 TABLET(0.5 MG) BY MOUTH 2 TIMES A WEEK (Patient not taking: Reported on 6/29/2024), Disp: 24 tablet, Rfl: 3    cetirizine (ZYRTEC) 10 MG tablet, Take 1 tablet (10 mg total) by mouth once daily., Disp: 30 tablet, Rfl: 0    chlorhexidine (PERIDEX) 0.12 % solution, SMARTSIG:By Mouth (Patient not taking: Reported on 6/29/2024), Disp: , Rfl:     clobetasol 0.05% (TEMOVATE) 0.05 % Oint, APPLY TOPICALLY TO THE AFFECTED AREA 3 TIMES A WEEK (Patient not taking: Reported on 6/29/2024), Disp: , Rfl:     CONTOUR NEXT TEST STRIPS Strp, UTD QID IN VITRO (Patient not taking: Reported on 6/29/2024), Disp: , Rfl: 3    gabapentin (NEURONTIN) 100 MG capsule, Take 1 capsule three times a day,  Takes one capsule every morning (Patient not taking: Reported on 7/2/2024), Disp: , Rfl: 3    metFORMIN (GLUCOPHAGE-XR) 500 MG ER 24hr tablet, TAKE 2 TABLETS BY MOUTH TWICE DAILY, Disp: 360 tablet, Rfl: 3    naloxone (NARCAN) 1 mg/mL injection, 2 mg (1 mg per nostril) by Nasal route as needed for opioid overdose; may repeat in 3 to 5 minutes if not effective. Call 911 (Patient not taking: Reported on 3/26/2024), Disp: 2 mL, Rfl: 11          Review of Systems   Constitutional: Negative for decreased appetite, diaphoresis, fever, malaise/fatigue, weight gain and weight loss.   HENT:  Negative for congestion, ear discharge, ear pain and nosebleeds.    Eyes:  Negative for blurred vision, double vision and visual disturbance.   Cardiovascular:  Negative for chest pain, claudication, cyanosis, dyspnea on exertion, irregular heartbeat, leg swelling, near-syncope, orthopnea, palpitations, paroxysmal nocturnal dyspnea and syncope.   Respiratory:  Negative for cough, hemoptysis, shortness of breath, sleep disturbances due to breathing, snoring,  "sputum production and wheezing.    Endocrine: Negative for polydipsia, polyphagia and polyuria.   Hematologic/Lymphatic: Negative for adenopathy and bleeding problem. Does not bruise/bleed easily.   Skin:  Negative for color change, nail changes, poor wound healing and rash.   Musculoskeletal:  Negative for muscle cramps and muscle weakness.   Gastrointestinal:  Negative for abdominal pain, anorexia, change in bowel habit, hematochezia, nausea and vomiting.   Genitourinary:  Negative for dysuria, frequency and hematuria.   Neurological:  Positive for focal weakness (white outs). Negative for brief paralysis, difficulty with concentration, excessive daytime sleepiness, dizziness, headaches, light-headedness, seizures, vertigo and weakness.   Psychiatric/Behavioral:  Negative for altered mental status and depression.    Allergic/Immunologic: Negative for persistent infections.        Objective:/70   Pulse 93   Ht 5' 9" (1.753 m)   Wt 79.4 kg (175 lb 0.7 oz)   SpO2 96%   BMI 25.85 kg/m²             Physical Exam  Constitutional:       Appearance: Normal appearance. He is well-developed.   HENT:      Head: Normocephalic.      Right Ear: External ear normal.      Left Ear: External ear normal.      Nose: Nose normal.   Eyes:      General: No scleral icterus.     Pupils: Pupils are equal, round, and reactive to light.   Neck:      Thyroid: No thyromegaly.      Vascular: No JVD.      Trachea: No tracheal deviation.   Cardiovascular:      Rate and Rhythm: Normal rate and regular rhythm.      Pulses: Intact distal pulses.           Carotid pulses are 2+ on the right side and 2+ on the left side.       Dorsalis pedis pulses are 2+ on the right side and 2+ on the left side.      Heart sounds: No murmur heard.     No friction rub. No gallop.   Pulmonary:      Effort: Pulmonary effort is normal.      Breath sounds: Normal breath sounds.   Abdominal:      General: Bowel sounds are normal. There is no distension.      " Tenderness: There is no abdominal tenderness. There is no guarding.   Musculoskeletal:         General: No tenderness. Normal range of motion.      Cervical back: Normal range of motion and neck supple.   Lymphadenopathy:      Comments: Palpation of neck and groin lymph nodes normal   Skin:     General: Skin is dry.      Comments: Palpation of skin normal   Neurological:      Mental Status: He is alert and oriented to person, place, and time.      Cranial Nerves: No cranial nerve deficit.      Motor: No abnormal muscle tone.      Coordination: Coordination normal.   Psychiatric:         Behavior: Behavior normal.         Thought Content: Thought content normal.         Judgment: Judgment normal.           Assessment:       1. Coronary artery disease involving native coronary artery of native heart without angina pectoris    2. Hypercholesterolemia    3. Hyperlipidemia, mixed    4. Type 2 diabetes mellitus without complication, without long-term current use of insulin    5. Near syncope    6. Encounter for screening for cardiovascular disorders         Plan:       Sadiq was seen today for pre syncope.    Diagnoses and all orders for this visit:    Coronary artery disease involving native coronary artery of native heart without angina pectoris  -     CT Calcium Scoring Cardiac; Future    Hypercholesterolemia    Hyperlipidemia, mixed    Type 2 diabetes mellitus without complication, without long-term current use of insulin    Near syncope  -     Ambulatory referral/consult to Cardiology    Encounter for screening for cardiovascular disorders  -     CT Calcium Scoring Cardiac; Future

## 2024-07-10 ENCOUNTER — TELEPHONE (OUTPATIENT)
Dept: UROLOGY | Facility: CLINIC | Age: 74
End: 2024-07-10
Payer: COMMERCIAL

## 2024-07-11 ENCOUNTER — PATIENT MESSAGE (OUTPATIENT)
Dept: ENDOCRINOLOGY | Facility: CLINIC | Age: 74
End: 2024-07-11
Payer: COMMERCIAL

## 2024-07-12 ENCOUNTER — OFFICE VISIT (OUTPATIENT)
Dept: GASTROENTEROLOGY | Facility: CLINIC | Age: 74
End: 2024-07-12
Payer: COMMERCIAL

## 2024-07-12 DIAGNOSIS — R10.31 RIGHT LOWER QUADRANT ABDOMINAL PAIN: Primary | ICD-10-CM

## 2024-07-12 DIAGNOSIS — K86.9 PANCREATIC LESION: ICD-10-CM

## 2024-07-12 DIAGNOSIS — R93.5 ABNORMAL CT OF THE ABDOMEN: ICD-10-CM

## 2024-07-12 DIAGNOSIS — I77.4 MEDIAN ARCUATE LIGAMENT SYNDROME: ICD-10-CM

## 2024-07-12 PROCEDURE — 99999 PR PBB SHADOW E&M-EST. PATIENT-LVL IV: CPT | Mod: PBBFAC,,,

## 2024-07-12 PROCEDURE — 1160F RVW MEDS BY RX/DR IN RCRD: CPT | Mod: CPTII,S$GLB,,

## 2024-07-12 PROCEDURE — 1159F MED LIST DOCD IN RCRD: CPT | Mod: CPTII,S$GLB,,

## 2024-07-12 PROCEDURE — 4010F ACE/ARB THERAPY RXD/TAKEN: CPT | Mod: CPTII,S$GLB,,

## 2024-07-12 PROCEDURE — 3044F HG A1C LEVEL LT 7.0%: CPT | Mod: CPTII,S$GLB,,

## 2024-07-12 PROCEDURE — 99214 OFFICE O/P EST MOD 30 MIN: CPT | Mod: S$GLB,,,

## 2024-07-12 PROCEDURE — 1157F ADVNC CARE PLAN IN RCRD: CPT | Mod: CPTII,S$GLB,,

## 2024-07-12 NOTE — PROGRESS NOTES
" Gastroenterology Clinic Consultation Note    Reason for Visit:  The encounter diagnosis was Right lower quadrant abdominal pain.    PCP:   Erlin Mcpherson   [address] / [id number]      Initial HPI   This is a 74 y.o. male presenting for RLQ abdominal pain   Patient in clinic with complaints of RLQ pain since November. He describes the pain as an dull ache that is intermittent. He reports the pain has not worsened over time but is still worrisome. He had a retroperitoneal US in May of this year that showed a 0.3 cm nonobstructive right nephrolithiasis and prostatomegaly. He is under the care of Urology who is aware of the stone and reported to him "the stone is not big enough to be causing the pain." Denies fever, chills, nausea, vomiting, constipation, diarrhea, esophageal reflux, regurgitation, difficulties swallowing, changes in bowel habits, changes in stool caliber, and blood in the stool. No tenderness or guarding present. He is not currently taking any medication for this complaint. He reports movement worsens the pain, nothing in particular makes the pain better. Discussed obtaining a CT to further evaluate complaints. He was agreeable to this plan.     ROS:  Review of Systems   Constitutional:  Negative for chills, fever, malaise/fatigue and weight loss.   Respiratory:  Negative for shortness of breath.    Cardiovascular:  Negative for chest pain.   Gastrointestinal:  Positive for abdominal pain (RLQ). Negative for blood in stool, constipation, diarrhea, heartburn, melena, nausea and vomiting.   Genitourinary:  Negative for dysuria, flank pain and hematuria.   Musculoskeletal: Negative.    Neurological: Negative.  Negative for weakness.   All other systems reviewed and are negative.       Medical History:  has a past medical history of Diabetes mellitus, Fuchs' corneal dystrophy, Heart attack, Hypertension, Kidney stones, Pancreatic mass, Pancreatitis, Pituitary adenoma, PONV " (postoperative nausea and vomiting), Prolactinoma (2003), Reflux esophagitis, and Tumor cells, benign.    Surgical History:  has a past surgical history that includes Thyroidectomy (2007); Rhinoplasty tip (1975); Cataract extraction w/  intraocular lens implant (Right, 0); Corneal transplant (Right); Upper endoscopic ultrasound w/ FNA; Repair of retinal detachment with vitrectomy (Right, 7/18/2018); Repair of retinal detachment with vitrectomy (Left, 8/8/2018); Descemets stripping automated endothelial keratoplasty (Left, 3/21/2019); and Cataract extraction w/  intraocular lens implant (Left, 3/21/2019).    Family History: family history includes Cataracts in his father; Diabetes in his father and paternal uncle; Heart disease in his father and mother; Hypertension in his mother; Stroke in his father and maternal grandmother..       Review of patient's allergies indicates:   Allergen Reactions    Grass pollen-june grass standard     House dust        Current Outpatient Medications on File Prior to Visit   Medication Sig Dispense Refill    albuterol (VENTOLIN HFA) 90 mcg/actuation inhaler Inhale 2 puffs into the lungs every 6 (six) hours as needed for Wheezing. Rescue (Patient not taking: Reported on 7/2/2024) 18 g 0    aspirin (ECOTRIN) 81 MG EC tablet Take 81 mg by mouth every morning.       atorvastatin (LIPITOR) 80 MG tablet Take 1 tablet (80 mg total) by mouth once daily. 90 tablet 3    azithromycin (ZITHROMAX) 500 MG tablet Take 2 tablets once if needed for severe diarrhea (Patient not taking: Reported on 6/29/2024) 2 tablet 0    cabergoline (DOSTINEX) 0.5 mg tablet TAKE 1 TABLET(0.5 MG) BY MOUTH 2 TIMES A WEEK (Patient not taking: Reported on 6/29/2024) 24 tablet 3    cetirizine (ZYRTEC) 10 MG tablet Take 1 tablet (10 mg total) by mouth once daily. 30 tablet 0    chlorhexidine (PERIDEX) 0.12 % solution SMARTSIG:By Mouth (Patient not taking: Reported on 6/29/2024)      clobetasol 0.05% (TEMOVATE) 0.05 % Oint  APPLY TOPICALLY TO THE AFFECTED AREA 3 TIMES A WEEK (Patient not taking: Reported on 6/29/2024)      CONTOUR NEXT TEST STRIPS Strp UTD QID IN VITRO (Patient not taking: Reported on 6/29/2024)  3    dicyclomine (BENTYL) 10 MG capsule TAKE 1 CAPSULE(10 MG) BY MOUTH THREE TIMES DAILY AS NEEDED FOR ABDOMINAL PAIN OR DIARRHEA 270 capsule 1    famotidine (PEPCID) 20 MG tablet Take by mouth.      fexofenadine-pseudoephedrine  mg (ALLEGRA-D)  mg per tablet Take 1 tablet by mouth.      gabapentin (NEURONTIN) 100 MG capsule Take 1 capsule three times a day,  Takes one capsule every morning (Patient not taking: Reported on 7/2/2024)  3    lisinopriL (PRINIVIL,ZESTRIL) 2.5 MG tablet Take 1 tablet (2.5 mg total) by mouth every morning. 90 tablet 3    lisinopriL (PRINIVIL,ZESTRIL) 2.5 MG tablet Take 1 tablet (2.5 mg total) by mouth every morning. 90 tablet 3    loperamide (IMODIUM) 2 mg capsule Take 2 mg by mouth 4 (four) times daily as needed for Diarrhea.      metFORMIN (GLUCOPHAGE-XR) 500 MG ER 24hr tablet Take 2 tablets (1,000 mg total) by mouth 2 (two) times daily. 360 tablet 3    metoprolol succinate (TOPROL-XL) 25 MG 24 hr tablet Take 1 tablet (25 mg total) by mouth nightly. 60 tablet 6    naloxone (NARCAN) 1 mg/mL injection 2 mg (1 mg per nostril) by Nasal route as needed for opioid overdose; may repeat in 3 to 5 minutes if not effective. Call 911 (Patient not taking: Reported on 3/26/2024) 2 mL 11    tamsulosin (FLOMAX) 0.4 mg Cap Take 1 capsule (0.4 mg total) by mouth once daily. 10 capsule 0    testosterone (ANDROGEL) 1 % (50 mg/5 gram) GlPk APPLY CONTENTS OF 2 PACKETS TOPICALLY TO SKIN EVERY  g 3     No current facility-administered medications on file prior to visit.         Objective Findings:    Vital Signs:  There were no vitals taken for this visit.  There is no height or weight on file to calculate BMI.    Physical Exam:  Physical Exam  Vitals and nursing note reviewed.   Constitutional:        General: He is not in acute distress.     Appearance: Normal appearance. He is normal weight. He is not ill-appearing.   HENT:      Head: Normocephalic and atraumatic.      Right Ear: External ear normal.      Left Ear: External ear normal.      Nose: Nose normal.   Eyes:      General: No scleral icterus.     Extraocular Movements: Extraocular movements intact.   Cardiovascular:      Rate and Rhythm: Normal rate.   Pulmonary:      Effort: Pulmonary effort is normal. No respiratory distress.   Abdominal:      General: There is no distension.      Palpations: Abdomen is soft.      Tenderness: There is no abdominal tenderness. There is no right CVA tenderness, left CVA tenderness, guarding or rebound.   Musculoskeletal:         General: Normal range of motion.      Cervical back: Normal range of motion.   Skin:     General: Skin is warm and dry.   Neurological:      Mental Status: He is alert and oriented to person, place, and time.   Psychiatric:         Mood and Affect: Mood normal.         Behavior: Behavior normal.         Thought Content: Thought content normal.       Labs:  Lab Results   Component Value Date    WBC 6.57 06/29/2024    HGB 13.5 (L) 06/29/2024    HCT 40.8 06/29/2024     06/29/2024    CHOL 103 (L) 01/03/2024    TRIG 58 01/03/2024    HDL 46 01/03/2024    ALKPHOS 56 06/29/2024    LIPASE 96 (H) 10/09/2023    ALT 14 06/29/2024    AST 16 06/29/2024     (L) 06/29/2024    K 4.4 06/29/2024    CL 97 06/29/2024    CREATININE 0.8 06/29/2024    BUN 14 06/29/2024    CO2 24 06/29/2024    TSH 2.160 06/29/2024    PSA 0.93 02/07/2013    INR 1.1 09/02/2012    HGBA1C 6.1 (H) 01/03/2024       Imaging reviewed:   US RETROPERITONEAL COMPLETE 5/15/2024    FINDINGS:  Right kidney: The right kidney measures 11.2 cm. Mild cortical thinning. No loss of corticomedullary distinction. Resistive index measures 0.75.  No mass. Lower pole 0.3 cm echogenic focus with twinkle artifact compatible with nonobstructive stone.   No hydronephrosis.  Left kidney: The left kidney measures 12.0 cm. Mild cortical thinning. No loss of corticomedullary distinction. Resistive index measures 0.74.  No mass. No renal stone. No hydronephrosis.  Splenic resistive index is 0.72.  Prostate is enlarged 4.1 x 4.4 x 5.0 cm.  Anechoic focus with posterior acoustic enhancement in the prostate 0.4 x 0.3 0.5 cm, likely a benign cystic prostatic nodule.  The bladder is partially distended at the time of scanning and has an unremarkable appearance.  Impression:  Nonobstructive right nephrolithiasis.  Prostatomegaly     Endoscopy reviewed: N/A     Assessment:  1. Right lower quadrant abdominal pain      Orders Placed This Encounter    CT Abdomen Pelvis With IV Contrast Routine Oral Contrast       Plan:  Recent blood work done no need to obtain.   2.   CT abdomen/pelvis to assess etiology of RLQ pain   3.   Strict ER precautions      Thank you for allowing me to participate in this patient's care.    Sincerely,     DORCAS LO  Gastroenterology Department  [facility] [phone]

## 2024-07-16 ENCOUNTER — HOSPITAL ENCOUNTER (OUTPATIENT)
Dept: RADIOLOGY | Facility: HOSPITAL | Age: 74
Discharge: HOME | End: 2024-07-16
Payer: COMMERCIAL

## 2024-07-16 ENCOUNTER — PATIENT MESSAGE (OUTPATIENT)
Dept: GASTROENTEROLOGY | Facility: CLINIC | Age: 74
End: 2024-07-16
Payer: COMMERCIAL

## 2024-07-16 DIAGNOSIS — R10.31 RIGHT LOWER QUADRANT ABDOMINAL PAIN: ICD-10-CM

## 2024-07-16 PROCEDURE — 74177 CT ABD & PELVIS W/CONTRAST: CPT | Mod: TC

## 2024-07-16 PROCEDURE — 74177 CT ABD & PELVIS W/CONTRAST: CPT | Mod: 26,,, | Performed by: RADIOLOGY

## 2024-07-16 PROCEDURE — 25500020 PHARM REV CODE 255

## 2024-07-16 PROCEDURE — A9698 NON-RAD CONTRAST MATERIALNOC: HCPCS

## 2024-07-16 RX ADMIN — IOHEXOL 100 ML: 350 INJECTION, SOLUTION INTRAVENOUS at 04:07

## 2024-07-16 RX ADMIN — BARIUM SULFATE 450 ML: 20 SUSPENSION ORAL at 04:07

## 2024-07-17 ENCOUNTER — PATIENT MESSAGE (OUTPATIENT)
Dept: GASTROENTEROLOGY | Facility: CLINIC | Age: 74
End: 2024-07-17
Payer: COMMERCIAL

## 2024-07-17 PROBLEM — R10.31 RIGHT LOWER QUADRANT ABDOMINAL PAIN: Status: ACTIVE | Noted: 2024-07-17

## 2024-07-17 PROBLEM — R93.5 ABNORMAL CT OF THE ABDOMEN: Status: ACTIVE | Noted: 2024-07-17

## 2024-07-17 PROBLEM — K86.9 PANCREATIC LESION: Status: ACTIVE | Noted: 2020-10-19

## 2024-07-17 PROBLEM — I77.4 MEDIAN ARCUATE LIGAMENT SYNDROME: Status: ACTIVE | Noted: 2024-07-17

## 2024-07-17 NOTE — PROGRESS NOTES
Good morning Mr. 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 The artery is called the celiac artery. MALS can cause the stomach pain you are experiencing. Also, a lesion on your pancreas that surveillance is recommended.     I would like to do some follow-up testing if you would be amenable to that. One being a mesenteric duplex ultrasound to check blood flow through the celiac artery and compression of the celiac plexus. And a MRCP to take a closer look at the pancreas, the pancreatic duct, the bile ducts, gallbladder, and liver. I am also going to put a referral in to our advanced endoscopy team who focus on the pancreas so they can add any further work-up if needed.     The CT also shows you recently passed that 4 mm right kidney stone that we already knew about.     Please let me know if you have any follow-up questions.     Regards,     DAVIDE AARON, FNP-C  Gastroenterology Department  [facility] [phone]

## 2024-07-24 ENCOUNTER — PATIENT MESSAGE (OUTPATIENT)
Dept: ENDOCRINOLOGY | Facility: CLINIC | Age: 74
End: 2024-07-24
Payer: COMMERCIAL

## 2024-07-24 ENCOUNTER — HOSPITAL ENCOUNTER (OUTPATIENT)
Dept: RADIOLOGY | Facility: HOSPITAL | Age: 74
Discharge: HOME | End: 2024-07-24
Attending: INTERNAL MEDICINE
Payer: COMMERCIAL

## 2024-07-24 DIAGNOSIS — D35.2 PITUITARY MACROADENOMA: ICD-10-CM

## 2024-07-24 DIAGNOSIS — D35.2 PROLACTINOMA: ICD-10-CM

## 2024-07-24 PROCEDURE — 70553 MRI BRAIN STEM W/O & W/DYE: CPT | Mod: TC

## 2024-07-24 PROCEDURE — 70553 MRI BRAIN STEM W/O & W/DYE: CPT | Mod: 26,,, | Performed by: RADIOLOGY

## 2024-07-24 PROCEDURE — A9585 GADOBUTROL INJECTION: HCPCS | Performed by: INTERNAL MEDICINE

## 2024-07-24 PROCEDURE — 25500020 PHARM REV CODE 255: Performed by: INTERNAL MEDICINE

## 2024-07-24 RX ORDER — GADOBUTROL 604.72 MG/ML
4 INJECTION INTRAVENOUS
Status: COMPLETED | OUTPATIENT
Start: 2024-07-24 | End: 2024-07-24

## 2024-07-24 RX ADMIN — GADOBUTROL 4 ML: 604.72 INJECTION INTRAVENOUS at 08:07

## 2024-07-24 NOTE — PROGRESS NOTES
ENDOCRINOLOGY CLINIC    Subjective:      Chief Complaint:  Type 2 diabetes, prolactinoma    HPI:   Sadiq Dhillon is a 74 y.o. male who presents for follow-up of type 2 diabetes and prolactinoma.  Patient was last seen in the clinic on 01/30/2024    Prolactinoma    He had previously been seen at the [facility] neuroendocrine center. He had a macroprolactinoma Dx in 2003. He also had history of primary parathyroidectomy for which he had a prior parathyroidectomy and a pancreatic neoplasm. He also has secondary hypogonadism. There was concern for MEN-1 but the screening tests obtained for that were negative. MEN 1 gene testing done 10/2020.     Patient is on Dostinex for the prolactinoma, 0.5 mg 2 x weekly.    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    Pituitary MRI from 05/2022 demonstrates a focally expanded sella with complex cyst at the site of the previously treated pituitary mass reported to reflect a prolactinoma. Cyst extending into the suprasellar region bilateral cavernous sinus (left more than right). There is associated bony remodeling of the clivus and protrusion of the expanded cystic space into sphenoid sinus. Overall size and configuration appears very similar to the prior examination.  No new solid or suspicious enhancing components.  No new intracranial mass effect. Persistent inferior displacement of the optic chiasm and pre chiasmatic optic nerves may reflect tethering or ptosis. Ventricles are normal in size. No hydrocephalus    Pituitary MRI 07/24/2024:     COMPARISON:  MRI pituitary 05/06/2022, 11/04/2020     FINDINGS:  Sella: Expanded sella with large fluid/CSF signal, reported to reflect treated macroadenoma.  Stable appearance of the treatment bed without new nodular enhancement to suggest recurrent lesion.  Persistent rightward deviation of the  infundibulum in presumed residual pituitary tissue. Stable appearance of the cavernous sinuses..  Persistent inferior retraction of the optic chiasm without compression.There is bony remodeling of the sella and clivus similar to prior..     Ventricles and sulci are similar in size and configuration, without evidence of hydrocephalus.     No extra-axial blood or fluid collections.     The brain parenchyma maintains normal signal intensity.  No new mass lesion, acute hemorrhage, edema, or acute infarct.     No new enhancing lesion.     Normal vascular flow voids are preserved.     Stable 1 cm enhancing calvarial lesion in the left frontoparietal junction nonspecific but may represent a hemangioma, stable from priors.     Impression:  Overall stable appearance of  previously treated pituitary mass as above.  No new enhancing lesion.      Patient had a prolactin of 125 on 05/04/2004. His prolactin ranged between  until 2012.    His prolactin level is between 2.2-4.2 since 10/2020.    10/2020 negative MEN 1 test    He is being followed by neurophthalmology.  Patient has history of Fuch's corneal dystrophy and retinal detachment.   HVF - Seen Dr. Hernandez in 6/23/21 - Reduced visual acuity OS, full eye movements except limited supraduction OD, and very subtle ocular misalignment which may relate to left cavernous sinus involvement in the past. His OCT reveals some RNFL thinning. HVF improved from prior in 2018.   Was recommended 6 months follow-up but has not scheduled an appointment yet.      Lab Results   Component Value Date    PROLACTIN 2.0 (L) 07/24/2024    PROLACTIN 1.2 (L) 01/30/2024    PROLACTIN 2.2 (L) 04/10/2023    PROLACTIN 3.2 (L) 03/08/2022    PROLACTIN 2.7 (L) 04/09/2021       Secondary hypogonadism    Patient is on AndroGel 1 packet every day.    Sometimes forgets to use it.     Lab Results   Component Value Date    TESTOSTERONE 100 (L) 04/26/2023    TESTOSTERONE 16.1 04/26/2023    PSA 1.9 07/24/2024     AST 19 07/24/2024    ALT 24 07/24/2024    HGB 15.1 07/24/2024    HCT 46.1 07/24/2024     Lab Results   Component Value Date    PSATOTAL 2.4 04/10/2023       Diabetes Hx:  Diagnosed w/ DM: 2003  Complications:   Retinopathy: No.  History of retinal detachment with vitrectomy.   Last eye exam: : 06/05/2023  Neuropathy: Yes on gabapentin.   Nephropathy: No  Cardiovascular: CAD s/p PCI, MI in 2017  DKA/HHS: Denies    Severe Hypoglycemia:  Denies   Hypoglycemia unawareness:  Denies  Hypoglycemic episodes:  None recently    Current meds:   Metformin  mg, 2 tablets BID.    Compliance with meds: Yes     Previous meds:  None     Home glucose checks: None    Diet/Exercise:   2 meals, skips breakfast.   Active.   Takes lemonade in place of water, milk.   Takes 40 oz fluids daily.   Grapefruit juice.     Diabetes education:   2018, after pancreatitis.   Lost 27 lbs, maintained his weight.    Last A1c:   Lab Results   Component Value Date    HGBA1C 6.3 (H) 07/24/2024    HGBA1C 6.1 (H) 01/03/2024    HGBA1C 6.2 (H) 07/24/2023       Microalbumin:   Lab Results   Component Value Date    LABMICR <5.0 07/24/2024    CREATRANDUR 95.0 07/24/2024    MICALBCREAT Unable to calculate 07/24/2024     Lab Results   Component Value Date    EGFRNORACEVR >60.0 07/24/2024    CREATININE 1.0 07/24/2024     Lipids:   Lab Results   Component Value Date    CHOL 122 07/24/2024    TRIG 110 07/24/2024    HDL 48 07/24/2024    LDLCALC 52.0 (L) 07/24/2024    CHOLHDL 39.3 07/24/2024     TSH:  Lab Results   Component Value Date    TSH 2.160 06/29/2024     Lab Results   Component Value Date    HGB 15.1 07/24/2024      Aspirin: Yes  Statins: Yes  ACEI/ARB: Yes on lisinopril    HTN:  On medications.    B12 in 4/2023: 491     FHx of DM: Yes, Heart disease: Yes      Primary hyperparathyroidism    Status post parathyroidectomy in 2007.  Patients screening DXA from 10/20 was normal.  He has recent kidney stones.  He has prior history of kidney stones.      Lab  "Results   Component Value Date    PTH 46.6 04/10/2023    PTH 36.3 03/08/2022    PTH 29.0 03/30/2021    CALCIUM 9.9 07/24/2024    CALCIUM 9.9 06/29/2024    CALCIUM 9.4 01/03/2024    [id number] 40 07/24/2024       ROS: see HPI     Objective:     Physical Exam     /68 (BP Location: Left arm, Patient Position: Sitting, BP Method: Medium (Automatic))   Pulse 74   Ht 5' 9" (1.753 m)   Wt 78.4 kg (172 lb 13.5 oz)   SpO2 95%   BMI 25.52 kg/m²     Wt Readings from Last 3 Encounters:   08/01/24 78.4 kg (172 lb 13.5 oz)   07/02/24 79.4 kg (175 lb 0.7 oz)   06/29/24 79.4 kg (175 lb)       Physical Exam  Constitutional:       General: He is not in acute distress.     Appearance: He is not ill-appearing.   HENT:      Head: Normocephalic and atraumatic.   Eyes:      Conjunctiva/sclera: Conjunctivae normal.   Cardiovascular:      Rate and Rhythm: Normal rate.   Pulmonary:      Effort: Pulmonary effort is normal.   Musculoskeletal:      Cervical back: Neck supple.   Neurological:      Mental Status: He is alert and oriented to person, place, and time.        LABORATORY REVIEW:  See HPI for other labs reviewed today      Chemistry        Component Value Date/Time     (L) 07/24/2024 0725    K 4.4 07/24/2024 0725     07/24/2024 0725    CO2 27 07/24/2024 0725    BUN 11 07/24/2024 0725    CREATININE 1.0 07/24/2024 0725     (H) 07/24/2024 0725        Component Value Date/Time    CALCIUM 9.9 07/24/2024 0725    ALKPHOS 67 07/24/2024 0725    AST 19 07/24/2024 0725    ALT 24 07/24/2024 0725    BILITOT 0.4 07/24/2024 0725    ESTGFRAFRICA >60.0 06/27/2022 1534    EGFRNONAA >60.0 06/27/2022 1534          Lab Results   Component Value Date    HGBA1C 6.3 (H) 07/24/2024    HGBA1C 6.1 (H) 01/03/2024    HGBA1C 6.2 (H) 07/24/2023     Other labs reviewed today in HPI    Assessment/Plan:     Problem List Items Addressed This Visit          Endocrine    Prolactinoma - Primary       Macroprolactinoma Dx in 2003  MRI from " 07/2024 showed E  xpanded sella with large fluid/CSF signal, reported to reflect treated macroadenoma.  Stable appearance of the treatment bed without new nodular enhancement to suggest recurrent lesion.  Persistent rightward deviation of the infundibulum in presumed residual pituitary tissue. Stable appearance of the cavernous sinuses..  Persistent inferior retraction of the optic chiasm without compression.There is bony remodeling of the sella and clivus similar to prior.    Patient recommended to continue follow-up with Neuro-Ophthalmology.  Patient is on Dostinex for the prolactinoma, 0.5 mg 2 x weekly.  Prolactin level has been stable.  Patient is currently asymptomatic.  Will continue to monitor his prolactin and MRI pituitary periodically.                Pituitary macroadenoma     As above.           Type 2 diabetes mellitus with hyperglycemia       Recent A1c was 6.3%.    Continue current diabetes regimen.    Continue good diet and lifestyle modifications for diabetes management    Complications:  Follow up for regular diabetes eye exam  Daily self examination of feet  Microalbumin: Monitor.    Last A1c:   Lab Results   Component Value Date    HGBA1C 6.3 (H) 07/24/2024              Primary hyperparathyroidism       Status post parathyroidectomy in 2007.  Parathyroid and calcium has been normal.           Hypogonadism in male       Continue testosterone replacement.  Patient says he sometimes forgets to use it.  Continue to monitor his testosterone levels.  Monitor PSA, LFTs, HGB, HCT.  I discussed the potential adverse effects of testosterone.                Follow-up in 6 months.      Nell Hunter MD

## 2024-08-01 ENCOUNTER — OFFICE VISIT (OUTPATIENT)
Dept: ENDOCRINOLOGY | Facility: CLINIC | Age: 74
End: 2024-08-01
Payer: COMMERCIAL

## 2024-08-01 VITALS
WEIGHT: 172.81 LBS | HEART RATE: 74 BPM | DIASTOLIC BLOOD PRESSURE: 68 MMHG | BODY MASS INDEX: 25.6 KG/M2 | SYSTOLIC BLOOD PRESSURE: 107 MMHG | OXYGEN SATURATION: 95 % | HEIGHT: 69 IN

## 2024-08-01 DIAGNOSIS — E29.1 HYPOGONADISM IN MALE: ICD-10-CM

## 2024-08-01 DIAGNOSIS — E11.65 TYPE 2 DIABETES MELLITUS WITH HYPERGLYCEMIA, WITHOUT LONG-TERM CURRENT USE OF INSULIN: ICD-10-CM

## 2024-08-01 DIAGNOSIS — D35.2 PROLACTINOMA: Primary | ICD-10-CM

## 2024-08-01 DIAGNOSIS — D35.2 PITUITARY MACROADENOMA: ICD-10-CM

## 2024-08-01 DIAGNOSIS — E21.0 PRIMARY HYPERPARATHYROIDISM: ICD-10-CM

## 2024-08-01 PROCEDURE — 3044F HG A1C LEVEL LT 7.0%: CPT | Mod: CPTII,S$GLB,, | Performed by: INTERNAL MEDICINE

## 2024-08-01 PROCEDURE — 1101F PT FALLS ASSESS-DOCD LE1/YR: CPT | Mod: CPTII,S$GLB,, | Performed by: INTERNAL MEDICINE

## 2024-08-01 PROCEDURE — 3288F FALL RISK ASSESSMENT DOCD: CPT | Mod: CPTII,S$GLB,, | Performed by: INTERNAL MEDICINE

## 2024-08-01 PROCEDURE — 99999 PR PBB SHADOW E&M-EST. PATIENT-LVL IV: CPT | Mod: PBBFAC,,, | Performed by: INTERNAL MEDICINE

## 2024-08-01 PROCEDURE — 3078F DIAST BP <80 MM HG: CPT | Mod: CPTII,S$GLB,, | Performed by: INTERNAL MEDICINE

## 2024-08-01 PROCEDURE — 3061F NEG MICROALBUMINURIA REV: CPT | Mod: CPTII,S$GLB,, | Performed by: INTERNAL MEDICINE

## 2024-08-01 PROCEDURE — 3074F SYST BP LT 130 MM HG: CPT | Mod: CPTII,S$GLB,, | Performed by: INTERNAL MEDICINE

## 2024-08-01 PROCEDURE — 3008F BODY MASS INDEX DOCD: CPT | Mod: CPTII,S$GLB,, | Performed by: INTERNAL MEDICINE

## 2024-08-01 PROCEDURE — 1159F MED LIST DOCD IN RCRD: CPT | Mod: CPTII,S$GLB,, | Performed by: INTERNAL MEDICINE

## 2024-08-01 PROCEDURE — 3066F NEPHROPATHY DOC TX: CPT | Mod: CPTII,S$GLB,, | Performed by: INTERNAL MEDICINE

## 2024-08-01 PROCEDURE — 1125F AMNT PAIN NOTED PAIN PRSNT: CPT | Mod: CPTII,S$GLB,, | Performed by: INTERNAL MEDICINE

## 2024-08-01 PROCEDURE — 4010F ACE/ARB THERAPY RXD/TAKEN: CPT | Mod: CPTII,S$GLB,, | Performed by: INTERNAL MEDICINE

## 2024-08-01 PROCEDURE — 1157F ADVNC CARE PLAN IN RCRD: CPT | Mod: CPTII,S$GLB,, | Performed by: INTERNAL MEDICINE

## 2024-08-01 PROCEDURE — 1160F RVW MEDS BY RX/DR IN RCRD: CPT | Mod: CPTII,S$GLB,, | Performed by: INTERNAL MEDICINE

## 2024-08-01 PROCEDURE — 99214 OFFICE O/P EST MOD 30 MIN: CPT | Mod: S$GLB,,, | Performed by: INTERNAL MEDICINE

## 2024-08-15 ENCOUNTER — TELEPHONE (OUTPATIENT)
Dept: GASTROENTEROLOGY | Facility: CLINIC | Age: 74
End: 2024-08-15
Payer: COMMERCIAL

## 2024-08-15 ENCOUNTER — PATIENT MESSAGE (OUTPATIENT)
Dept: ENDOCRINOLOGY | Facility: CLINIC | Age: 74
End: 2024-08-15
Payer: COMMERCIAL

## 2024-08-15 DIAGNOSIS — D35.2 PITUITARY MACROADENOMA: Primary | ICD-10-CM

## 2024-08-15 DIAGNOSIS — E11.9 TYPE 2 DIABETES MELLITUS WITHOUT COMPLICATION, WITHOUT LONG-TERM CURRENT USE OF INSULIN: ICD-10-CM

## 2024-08-19 DIAGNOSIS — E55.9 HYPOVITAMINOSIS D: ICD-10-CM

## 2024-08-19 DIAGNOSIS — D35.2 PITUITARY MACROADENOMA: Primary | ICD-10-CM

## 2024-08-19 DIAGNOSIS — E29.1 HYPOGONADISM IN MALE: ICD-10-CM

## 2024-08-19 DIAGNOSIS — E11.65 TYPE 2 DIABETES MELLITUS WITH HYPERGLYCEMIA, WITHOUT LONG-TERM CURRENT USE OF INSULIN: ICD-10-CM

## 2024-08-19 NOTE — ASSESSMENT & PLAN NOTE
Macroprolactinoma Dx in 2003  MRI from 07/2024 showed E  xpanded sella with large fluid/CSF signal, reported to reflect treated macroadenoma.  Stable appearance of the treatment bed without new nodular enhancement to suggest recurrent lesion.  Persistent rightward deviation of the infundibulum in presumed residual pituitary tissue. Stable appearance of the cavernous sinuses..  Persistent inferior retraction of the optic chiasm without compression.There is bony remodeling of the sella and clivus similar to prior.    Patient recommended to continue follow-up with Neuro-Ophthalmology.  Patient is on Dostinex for the prolactinoma, 0.5 mg 2 x weekly.  Prolactin level has been stable.  Patient is currently asymptomatic.  Will continue to monitor his prolactin and MRI pituitary periodically.

## 2024-08-19 NOTE — ASSESSMENT & PLAN NOTE
Recent A1c was 6.3%.    Continue current diabetes regimen.    Continue good diet and lifestyle modifications for diabetes management    Complications:  Follow up for regular diabetes eye exam  Daily self examination of feet  Microalbumin: Monitor.    Last A1c:   Lab Results   Component Value Date    HGBA1C 6.3 (H) 07/24/2024

## 2024-08-19 NOTE — ASSESSMENT & PLAN NOTE
Continue testosterone replacement.  Patient says he sometimes forgets to use it.  Continue to monitor his testosterone levels.  Monitor PSA, LFTs, HGB, HCT.  I discussed the potential adverse effects of testosterone.

## 2024-08-20 ENCOUNTER — LAB VISIT (OUTPATIENT)
Dept: LAB | Facility: HOSPITAL | Age: 74
End: 2024-08-20
Attending: INTERNAL MEDICINE
Payer: COMMERCIAL

## 2024-08-20 DIAGNOSIS — E11.9 TYPE 2 DIABETES MELLITUS WITHOUT COMPLICATION, WITHOUT LONG-TERM CURRENT USE OF INSULIN: ICD-10-CM

## 2024-08-20 LAB
ALBUMIN SERPL BCP-MCNC: 4.5 G/DL (ref 3.5–5.2)
ALP SERPL-CCNC: 54 U/L (ref 55–135)
ALT SERPL W/O P-5'-P-CCNC: 16 U/L (ref 10–44)
ANION GAP SERPL CALC-SCNC: 7 MMOL/L (ref 8–16)
AST SERPL-CCNC: 16 U/L (ref 10–40)
BILIRUB SERPL-MCNC: 0.7 MG/DL (ref 0.1–1)
BUN SERPL-MCNC: 12 MG/DL (ref 8–23)
CALCIUM SERPL-MCNC: 9.8 MG/DL (ref 8.7–10.5)
CHLORIDE SERPL-SCNC: 101 MMOL/L (ref 95–110)
CO2 SERPL-SCNC: 26 MMOL/L (ref 23–29)
CREAT SERPL-MCNC: 0.8 MG/DL (ref 0.5–1.4)
EST. GFR  (NO RACE VARIABLE): >60 ML/MIN/1.73 M^2
GLUCOSE SERPL-MCNC: 146 MG/DL (ref 70–110)
POTASSIUM SERPL-SCNC: 4.1 MMOL/L (ref 3.5–5.1)
PROT SERPL-MCNC: 7 G/DL (ref 6–8.4)
SODIUM SERPL-SCNC: 134 MMOL/L (ref 136–145)

## 2024-08-20 PROCEDURE — 80053 COMPREHEN METABOLIC PANEL: CPT | Performed by: INTERNAL MEDICINE

## 2024-08-20 PROCEDURE — 36415 COLL VENOUS BLD VENIPUNCTURE: CPT | Performed by: INTERNAL MEDICINE

## 2024-08-23 ENCOUNTER — TELEPHONE (OUTPATIENT)
Dept: OPHTHALMOLOGY | Facility: CLINIC | Age: 74
End: 2024-08-23
Payer: COMMERCIAL

## 2024-09-05 ENCOUNTER — PATIENT MESSAGE (OUTPATIENT)
Dept: PODIATRY | Facility: CLINIC | Age: 74
End: 2024-09-05
Payer: COMMERCIAL

## 2024-10-02 DIAGNOSIS — R10.30 LOWER ABDOMINAL PAIN: ICD-10-CM

## 2024-10-02 RX ORDER — DICYCLOMINE HYDROCHLORIDE 10 MG/1
CAPSULE ORAL
Qty: 270 CAPSULE | Refills: 1 | Status: SHIPPED | OUTPATIENT
Start: 2024-10-02 | End: 2024-10-03

## 2024-10-03 ENCOUNTER — OFFICE VISIT (OUTPATIENT)
Dept: URGENT CARE | Facility: CLINIC | Age: 74
End: 2024-10-03
Payer: COMMERCIAL

## 2024-10-03 VITALS
RESPIRATION RATE: 20 BRPM | BODY MASS INDEX: 25.48 KG/M2 | WEIGHT: 172 LBS | HEIGHT: 69 IN | TEMPERATURE: 98 F | DIASTOLIC BLOOD PRESSURE: 68 MMHG | OXYGEN SATURATION: 95 % | SYSTOLIC BLOOD PRESSURE: 106 MMHG | HEART RATE: 52 BPM

## 2024-10-03 DIAGNOSIS — N20.0 RENAL CALCULI: ICD-10-CM

## 2024-10-03 DIAGNOSIS — J34.9 SINUS PROBLEM: Primary | ICD-10-CM

## 2024-10-03 DIAGNOSIS — I88.9 LYMPHADENITIS: ICD-10-CM

## 2024-10-03 LAB
CTP QC/QA: YES
SARS-COV-2 AG RESP QL IA.RAPID: NEGATIVE

## 2024-10-03 RX ORDER — CEPHALEXIN 500 MG/1
500 CAPSULE ORAL EVERY 8 HOURS
Qty: 30 CAPSULE | Refills: 0 | Status: SHIPPED | OUTPATIENT
Start: 2024-10-03

## 2024-10-03 NOTE — PROGRESS NOTES
Subjective:      Patient ID: Sadiq Dhillon is a 74 y.o. male.    Vitals:  vitals were not taken for this visit.     Chief Complaint: Sinus Problem    This is a 74 y.o. male who presents today with a chief complaint of Sinus problem .  Patient was recently exposed to Covid.  Symptom started on this morning.       Sinus Problem  This is a new problem. The current episode started today. The problem is unchanged. There has been no fever. Associated symptoms include headaches and a sore throat. Past treatments include nothing. The treatment provided no relief.     HENT:  Positive for sore throat.    Neurological:  Positive for headaches.    Objective:     Physical Exam   Constitutional: He is oriented to person, place, and time. normal  HENT:   Head: Normocephalic and atraumatic.   Ears:   Right Ear: Tympanic membrane, external ear and ear canal normal.   Left Ear: Tympanic membrane, external ear and ear canal normal.   Nose: Rhinorrhea present. No congestion.   Mouth/Throat: Mucous membranes are moist. No oropharyngeal exudate or posterior oropharyngeal erythema. Oropharynx is clear.   Eyes: Conjunctivae are normal. Pupils are equal, round, and reactive to light. Extraocular movement intact   Neck: Neck supple.   Cardiovascular: Normal rate and regular rhythm.   No murmur heard.  Pulmonary/Chest: Effort normal and breath sounds normal. No respiratory distress.   Abdominal: Normal appearance and bowel sounds are normal. Soft. flat abdomen   Musculoskeletal: Normal range of motion.         General: Normal range of motion.   Lymphadenopathy:     He has cervical adenopathy (submandibular swollen tender nodes).   Neurological: no focal deficit. He is alert, oriented to person, place, and time and at baseline.   Skin: Skin is warm and dry. Capillary refill takes less than 2 seconds. jaundice  Psychiatric: His behavior is normal. Mood, judgment and thought content normal.   Nursing note and vitals reviewed.    Assessment:      Plan:   1. Sinus problem  - SARS Coronavirus 2 Antigen, POCT Manual Read    2. Lymphadenitis  - cephALEXin (KEFLEX) 500 MG capsule; Take 1 capsule (500 mg total) by mouth every 8 (eight) hours.  Dispense: 30 capsule; Refill: 0   All results discussed with pt prior to discharge

## 2024-10-08 NOTE — TELEPHONE ENCOUNTER
Reason for Disposition   No answer.  First attempt to contact caller.  Follow-up call scheduled within 15 minutes.    Protocols used: ST NO CONTACT OR DUPLICATE CONTACT CALL-A-AH      
No

## 2024-10-10 NOTE — PROGRESS NOTES
Sub-Acute rehab Subjective:      Patient ID: Sadiq Dhillon is a 73 y.o. male.    Chief Complaint: Foot Ulcer (R foot great toe) and Diabetes Mellitus    Sadiq is a 73 y.o. male who presents to the podiatry clinic  with complaint of  right big toe pain. Onset of the symptoms was several days ago. Precipitating event: injured   . Current symptoms include: ability to bear weight, but with some pain. Aggravating factors: any weight bearing. Symptoms have progressed to a point and plateaued. Patient has had no prior foot problems. Pt states he has been keeping the nail dry and covered.       Review of Systems   Constitutional: Negative for chills, fever and malaise/fatigue.   HENT:  Negative for hearing loss.    Cardiovascular:  Negative for claudication.   Respiratory:  Negative for shortness of breath.    Skin:  Negative for flushing and rash.   Musculoskeletal:  Negative for joint pain and myalgias.   Neurological:  Negative for loss of balance, numbness, paresthesias and sensory change.   Psychiatric/Behavioral:  Negative for altered mental status.            Objective:      Physical Exam  Vitals reviewed.   Cardiovascular:      Pulses:           Dorsalis pedis pulses are 2+ on the right side and 2+ on the left side.        Posterior tibial pulses are 2+ on the right side and 2+ on the left side.      Comments: No edema noted b/L  Musculoskeletal:      Comments:     POP to right hallux  1st MPJ ROM, normal   Feet:      Right foot:      Protective Sensation: 5 sites tested.  5 sites sensed.      Left foot:      Protective Sensation: 5 sites tested.  5 sites sensed.   Skin:     General: Skin is warm.      Comments: Normal skin tugor noted.   No open lesion noted b/L  Skin temp is warm to warm from proximal to distal b/L.  Webspaces clean, dry, and intact  Right hallux nail bed dry   Neurological:      Mental Status: He is alert.      Comments: Gross sensation intact b/L               Assessment:       Encounter Diagnosis   Name  Primary?    Pain of toe of right foot Yes         Plan:       Sadiq was seen today for foot ulcer and diabetes mellitus.    Diagnoses and all orders for this visit:    Pain of toe of right foot      I counseled the patient on his conditions, their implications and medical management.  Pt advised toe has healed and he can d/c betadine.   Pt advised nail may be sore  Toe sleeve dispensed to be worn over toe  Call or return to clinic prn if these symptoms worsen or fail to improve as anticipated.    Pt ad.

## 2024-10-11 ENCOUNTER — CLINICAL SUPPORT (OUTPATIENT)
Dept: INFECTIOUS DISEASES | Facility: CLINIC | Age: 74
End: 2024-10-11
Payer: COMMERCIAL

## 2024-10-11 DIAGNOSIS — Z00.00 HEALTHCARE MAINTENANCE: Primary | ICD-10-CM

## 2024-10-11 PROCEDURE — 99999 PR PBB SHADOW E&M-EST. PATIENT-LVL I: CPT | Mod: PBBFAC,,,

## 2024-11-13 ENCOUNTER — TELEPHONE (OUTPATIENT)
Dept: GASTROENTEROLOGY | Facility: CLINIC | Age: 74
End: 2024-11-13
Payer: COMMERCIAL

## 2024-11-13 NOTE — TELEPHONE ENCOUNTER
----- Message from Jeri sent at 11/13/2024  8:34 AM CST -----  Regarding: Appt  Contact: [phone]  Sadiq Dhillon calling regarding Patient Advice (message) for # pt is calling to schedule an appt there is a referral in chart

## 2024-12-03 ENCOUNTER — OFFICE VISIT (OUTPATIENT)
Dept: GASTROENTEROLOGY | Facility: CLINIC | Age: 74
End: 2024-12-03
Payer: COMMERCIAL

## 2024-12-03 VITALS
WEIGHT: 172.63 LBS | BODY MASS INDEX: 25.57 KG/M2 | HEIGHT: 69 IN | HEART RATE: 51 BPM | DIASTOLIC BLOOD PRESSURE: 58 MMHG | SYSTOLIC BLOOD PRESSURE: 96 MMHG

## 2024-12-03 DIAGNOSIS — K86.2 PANCREAS CYST: Primary | ICD-10-CM

## 2024-12-03 PROCEDURE — 3061F NEG MICROALBUMINURIA REV: CPT | Mod: CPTII,S$GLB,,

## 2024-12-03 PROCEDURE — 1101F PT FALLS ASSESS-DOCD LE1/YR: CPT | Mod: CPTII,S$GLB,,

## 2024-12-03 PROCEDURE — 1157F ADVNC CARE PLAN IN RCRD: CPT | Mod: CPTII,S$GLB,,

## 2024-12-03 PROCEDURE — 1125F AMNT PAIN NOTED PAIN PRSNT: CPT | Mod: CPTII,S$GLB,,

## 2024-12-03 PROCEDURE — 3008F BODY MASS INDEX DOCD: CPT | Mod: CPTII,S$GLB,,

## 2024-12-03 PROCEDURE — 99214 OFFICE O/P EST MOD 30 MIN: CPT | Mod: S$GLB,,,

## 2024-12-03 PROCEDURE — 3074F SYST BP LT 130 MM HG: CPT | Mod: CPTII,S$GLB,,

## 2024-12-03 PROCEDURE — 4010F ACE/ARB THERAPY RXD/TAKEN: CPT | Mod: CPTII,S$GLB,,

## 2024-12-03 PROCEDURE — 99999 PR PBB SHADOW E&M-EST. PATIENT-LVL III: CPT | Mod: PBBFAC,,,

## 2024-12-03 PROCEDURE — 3288F FALL RISK ASSESSMENT DOCD: CPT | Mod: CPTII,S$GLB,,

## 2024-12-03 PROCEDURE — 3044F HG A1C LEVEL LT 7.0%: CPT | Mod: CPTII,S$GLB,,

## 2024-12-03 PROCEDURE — 3066F NEPHROPATHY DOC TX: CPT | Mod: CPTII,S$GLB,,

## 2024-12-03 PROCEDURE — 1159F MED LIST DOCD IN RCRD: CPT | Mod: CPTII,S$GLB,,

## 2024-12-03 PROCEDURE — 3078F DIAST BP <80 MM HG: CPT | Mod: CPTII,S$GLB,,

## 2024-12-03 NOTE — PROGRESS NOTES
"GENERAL GI PATIENT INTAKE:    COVID symptoms in the last 7 days (runny nose, sore throat, congestion, cough, fever): No  PCP: Erlin Mcpherson  If not PCP-  number given to establish [phone]: N/A    ALLERGIES REVIEWED:  Yes    CHIEF COMPLAINT:    Chief Complaint   Patient presents with    PANCREATIC CYST       VITAL SIGNS:  BP (!) 96/58   Pulse (!) 51   Ht 5' 9" (1.753 m)   Wt 78.3 kg (172 lb 9.9 oz)   BMI 25.49 kg/m²      Change in medical, surgical, family or social history: No      REVIEWED MEDICATION LIST RECONCILED INCLUDING ABOVE MEDS:  Yes     "

## 2024-12-03 NOTE — PROGRESS NOTES
Gastroenterology: [facility]      SUBJECTIVE:         Chief Complaint: Here for evaluation of a pancreatic cyst     History of Present Illness:  Patient is a 74 y.o. male e with a h/o DM2, HTN, CAD, HLD, hx of kidney stones, pituitary adenoma with primary hyperparathyroidism, prolactinoma, and hypogonadism who presents from referral from general GI regarding pancreatic cyst seen on imaging. He was seen in July 2024 in general GI clinic with jhoana tuttle for lower abdominal pain. She ordered CT at that time which demonstrated a 1.6 x 1.7 cm hypodense lesion in the pancreatic body, previously measuring up to 1.8 cm with no ductal dilatation. Also noted a stable tubular soft tissue structure along the posterior margin of the pancreas extending from the yady hepatis to the spleen, unchanged from prior. Jhoana then ordered MRI which never ended up being performed    Upon reviewing his chart, this patient is known to our service and this pancreatic cyst has been under surveillance with us since at least 2019 when he saw Dr Post in clinic. He has been previously evaluated outside of [facility] with an EUS FNA in 2018 c/b severe pancreatitis (cyst thought to be a serous cystadenoma at that time).  His post EUS/FNA pancreatitis episode has reasonably kept him hesitant of attempting another EUS.  He had met with Dr. Post in our pancreas cyst clinic and 2019 where possible EUS was suggested but declined by the patient and future CT was mutually decided upon.  CT in sept 2019 showed stable cystic lesion along the posterior aspect of the body and head region measuring 1.7 x 2.0 cm and two peripancreatic fluid collections, one along the pancreatic body and tail into the splenic hilum measuring 3.3 x 3.8 x 10.4cm and the other in the yady hepatis measuring 3.5 x 2.4cm. These were thought to be pseudocysts given his known hx of pancreatitis.  CT in 2020 and 2021 showed stability of these findings. He then saw  Dr pringle in clinic Oct 2022 who ordered MRI 1/18/23 which demonstrated virtually exact same size of pancreas body cyst measuring 1.6cm x 2.0cm x 2.6cm as compared to previous CT imaging dating back to 2019 (which was accessed by the Radiologist and the cyst was remeasured on that study also); also stable 0.8cm cyst near ampulla. Dr. Pringle recommended yearly MRI surveillance at that point     Follow up MRI 1/12/24 demonstrated a thinly septated cystic lesion in the pancreatic body measuring 2.6 cm, unchanged in size from 01/18/2023. Additional 0.8 cm cystic lesion in the pancreatic head near the ampulla, also unchanged. No PD dilation, nodular enhancing components or other concerning findings. Also noted unchanged elongated T1 hyperintense lesion extending from the yady hepatis to the splenic hilum. Most recent imaging was CT performed in July 2024 which showed stable cyst and tubular soft tissue structure.      The cyst was first noted 2017. It's located in the the body.  It measures 16 x 17 mm in size on the most recent imaging.  It is not symptomatic. Previous studies include CT scan, MRI, and endoscopic US.   Since initial detection, the cyst has not increased in size. The pancreatic duct is not dilated. Previous FNA of the cyst has been done. Still has the lower abdominal pain occasionally but denies upper abdominal pain. Denies jaundice, weight loss, anorexia, NVD. Denies hx of pancreatitis other than the post EUS pancreatitis in 2018. Denies family hx of pancreatic cancer.         Prior Imaging:    CT 7/16/2024  Pancreas: Kidney demonstrates a 1.6 x 1.7 cm hypodense lesion in the pancreatic body, previously measuring up to 1.8 cm (series 2, image 54).  No ductal dilatation.  Stable tubular soft tissue structure along the posterior margin of the pancreas extending from the yady hepatis to the spleen, unchanged.     Stable hypodense lesion of the pancreas with no evidence of ductal dilatation. Continued  follow-up is recommended. Stable tubular structure posterior to the pancreas extending to the level of the spleen favored to represent a pseudocyst, unchanged.         MRI 1/12/2024  PANCREAS: There is a thinly septated cystic lesion in the pancreatic body measuring 2.6 cm in greatest dimension (03:20), unchanged in size from 01/18/2023.  Additional 0.8 cm cystic lesion in the pancreatic head near the ampulla, also unchanged.  No nodular enhancing components.  The smaller lesion may communicate with the pancreatic duct.  No pancreatic ductal dilatation.     Unchanged pancreatic cystic lesions measuring up to 2.6 cm.  No worrisome features.  Differential considerations include pseudocyst, IPMN, and serous cystadenoma.  Imaging follow-up in 2 years is recommended with contrast enhanced MRI or pancreas protocol CT.     Unchanged elongated T1 hyperintense lesion extending from the yady hepatis to the splenic hilum, likely a pseudocyst given reported history of pancreatitis.        CT Renal stone study 11/27/23  Bile Ducts: No evidence of dilated ducts.     Pancreas: Stable hypodense lesion within the pancreatic body measuring 1.8 cm (series 2, image 58).  No ductal dilatation.  Stable tubular soft tissue structure along the posterior margin of the pancreas extending from the yady hepatis to the spleen, stable compared to multiple prior examinations.     Impression:  1. Mild right hydronephrosis, improved compared to prior exam.  Resolution of prior right perinephric stranding.  2. Small nonobstructing right renal stone.  3. Stable hypodense pancreatic lesion.  4. Stable nonspecific soft tissue structure extending along the posterior margin of the pancreas, unchanged across multiple prior examinations.  5. Additional findings as above.        MRI Abdomen 10/12/22  Septated T2 hyperintense lesion the posterior aspect of the pancreatic body measuring 1.6 x 2.0 x 2.6 cm.  Lesion is stable in size compared to prior  studies, remeasured on CT 09/25/2019 at 1.7 x 2.0 x 2.6 cm.  No discrete solid or enhancing components.  Additional stable 0.8 cm cystic structure near the ampulla at the underside of the proximal main pancreatic duct.  Main pancreatic duct is normal caliber.     Impression:     1. Relatively stable 2.6 cm septated cystic structure in the pancreatic body which could reflect complex cyst or pseudocyst given history of pancreatitis.  Serous cystadenoma or side branch IPMN could have a similar appearance.  Additional stable subcentimeter cystic lesion near the ampulla.  Continued surveillance advised.  2. Elongated soft tissue structure extending to the yady hepatis to the spleen, unchanged compared to multiple prior studies.  3. Bilateral renal cysts.  4. Additional findings as above.      CT Abdomen 3/11/21  Spleen and adrenal glands are unremarkable.  There is a lobular contour of the pancreas.  Low-density lesion in the pancreatic body and head are unchanged.  Low-density collections the posterior aspect of the pancreatic body and tail are again identified and appears similar cross multiple priors.  Small low-density collection in the yady hepatis is also unchanged.       CT 12/11/2020   Pancreas: No mass or peripancreatic fat stranding.  Redemonstration of complex hypodense lesions along the posterior aspect of the pancreas, splenic hilum and periportal region, consistent with complex pseudocyst, stable since May 2019.       CT 9/25/2019  Pancreas: Stable cystic lesion along the posterior aspect of the body and head region measuring 1.7 x 2.0 cm (series 2, image 52), possibly representing pancreatic cyst or cystic neoplasm.     Ill-defined fluid collection demonstrating homogeneous attenuation with density higher than water, likely serosanguineous.  This collection tracks along the pancreatic body and tail into the splenic hilum.  Difficult measurement of this lesion due to its configuration, however it measures  approximately 3.3 x 3.8 x 10.4 cm (series 602, image 91 and series 2, image 41).  Additional similar hypodense collection in the yady hepatis measuring 3.5 x 2.4 cm (series 2, image 52).      Impression:     Stable 2.0 x 1.7 cm cystic pancreatic lesion between the pancreatic head and body, possibly representing pancreatic cyst, pseudocyst or cystic neoplasm such as cystadenoma or IPMN.  Correlate with EUS.     Two peripancreatic fluid collections, likely representing pseudocysts in this patient with reported history of pancreatitis.        Personal/family factors:    There is not a family history of pancreatic cancer     The patient does not smoke.    ECOG status 0 - Asymptomatic        Review of Systems   Constitutional: no fever, chills or change in weight   Eyes: no visual changes   ENT: no sore throat or dysphagia  Respiratory: no cough or shortness of breath   Cardiovascular: no chest pain or palpitations   Gastrointestinal: as per HPI  Hematologic/Lymphatic: no easy bruising or lymphadenopathy   Musculoskeletal: no arthralgias or myalgias   Neurological: no seizures, tremors or change in mental status  Behavioral/Psych: no auditory or visual hallucinations    Past Medical History:   Diagnosis Date    Diabetes mellitus     Fuchs' corneal dystrophy     Heart attack     Hypertension     Kidney stones     Pancreatic mass     Pancreatitis     after EUS . 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, treated with dostenex(cabergoline)/ resolved    Reflux esophagitis     Tumor cells, benign        Past Surgical History:   Procedure Laterality Date    CATARACT EXTRACTION W/  INTRAOCULAR LENS IMPLANT Right 0    Dr Van     CATARACT EXTRACTION W/  INTRAOCULAR LENS IMPLANT Left 3/21/2019    Procedure: EXTRACTION, CATARACT, WITH IOL INSERTION;  Surgeon: Ra Van MD;   Location: [facility];  Service: Ophthalmology;  Laterality: Left;    CORNEAL TRANSPLANT Right     Dr Van     DESCEMETS STRIPPING AUTOMATED ENDOTHELIAL KERATOPLASTY Left 3/21/2019    Procedure: TRANSPLANT, PARTIAL-THICKNESS, CORNEA, USING DSAEK TECHNIQUE;  Surgeon: Ra Van MD;  Location: [facility];  Service: Ophthalmology;  Laterality: Left;  DSEK    REPAIR OF RETINAL DETACHMENT WITH VITRECTOMY Right 7/18/2018    Procedure: REPAIR, RETINAL DETACHMENT, WITH VITRECTOMY;  Surgeon: SHUBHAM Patel MD;  Location: [facility] OR [address] [place] [address];  Service: Ophthalmology;  Laterality: Right;  40 min    REPAIR OF RETINAL DETACHMENT WITH VITRECTOMY Left 8/8/2018    Procedure: REPAIR, RETINAL DETACHMENT, WITH VITRECTOMY;  Surgeon: SHUBHAM Patel MD;  Location: [facility] OR [address] [place] [address];  Service: Ophthalmology;  Laterality: Left;  40 min    RHINOPLASTY TIP  1975    THYROIDECTOMY  2007    UPPER ENDOSCOPIC ULTRASOUND W/ FNA      with resultant pancreatitis       Family History   Problem Relation Name Age of Onset    Hypertension Mother      Heart disease Mother      Heart disease Father      Cataracts Father      Stroke Father      Diabetes Father      Diabetes Paternal Uncle      Stroke Maternal Grandmother      Blindness Neg Hx      Glaucoma Neg Hx      Macular degeneration Neg Hx      Retinal detachment Neg Hx      Strabismus Neg Hx      Thyroid disease Neg Hx      Cancer Neg Hx      Amblyopia Neg Hx         Social History     Socioeconomic History    Marital status: Single   Tobacco Use    Smoking status: Never     Passive exposure: Never    Smokeless tobacco: Never   Substance and Sexual Activity    Alcohol use: Yes     Comment: social    Drug use: No    Sexual activity: Not Currently       Current Outpatient Medications on File Prior to Visit   Medication Sig Dispense Refill    aspirin (ECOTRIN) 81 MG EC tablet Take 81 mg by mouth every morning.       atorvastatin (LIPITOR) 80 MG tablet Take 1 tablet (80 mg total) by mouth once daily.  90 tablet 3    cabergoline (DOSTINEX) 0.5 mg tablet TAKE 1 TABLET(0.5 MG) BY MOUTH 2 TIMES A WEEK 24 tablet 3    lisinopriL (PRINIVIL,ZESTRIL) 2.5 MG tablet Take 1 tablet (2.5 mg total) by mouth every morning. 90 tablet 3    loperamide (IMODIUM) 2 mg capsule Take 2 mg by mouth 4 (four) times daily as needed for Diarrhea.      metFORMIN (GLUCOPHAGE-XR) 500 MG ER 24hr tablet Take 2 tablets (1,000 mg total) by mouth 2 (two) times daily. 360 tablet 3    metoprolol succinate (TOPROL-XL) 25 MG 24 hr tablet Take 1 tablet (25 mg total) by mouth nightly. 60 tablet 6    tamsulosin (FLOMAX) 0.4 mg Cap Take 1 capsule (0.4 mg total) by mouth once daily. 10 capsule 0    testosterone (ANDROGEL) 1 % (50 mg/5 gram) GlPk APPLY CONTENTS OF 2 PACKETS TOPICALLY TO SKIN EVERY  g 3    cephALEXin (KEFLEX) 500 MG capsule Take 1 capsule (500 mg total) by mouth every 8 (eight) hours. (Patient not taking: Reported on 12/3/2024) 30 capsule 0    cetirizine (ZYRTEC) 10 MG tablet Take 1 tablet (10 mg total) by mouth once daily. 30 tablet 0    lisinopriL (PRINIVIL,ZESTRIL) 2.5 MG tablet Take 1 tablet (2.5 mg total) by mouth every morning. (Patient not taking: Reported on 12/3/2024) 90 tablet 3     No current facility-administered medications on file prior to visit.       Review of patient's allergies indicates:   Allergen Reactions    Grass pollen-june grass standard     House dust        Physical Exam:  General: Well-developed, well-appearing, no acute distress  Neuro: alert and oriented to person, place, time; normal appearing gait  Eyes: No scleral icterus, pupils equally round, normal-appearing conjunctiva  Neck: supple; no cervical lymphadenopathy  CVS: regular rate and rhythm; no murmurs  Lungs: clear to auscultation bilaterally; no labored breathing, no wheezes  Abdomen: soft, non-distended, non-tender, no rebound tenderness or guarding, nomal bowel sounds  Extremities: no cyanosis, edema, or clubbing  Skin: no rash, no  jaundice        Laboratory:   Lab Results   Component Value Date    ALT 16 08/20/2024    AST 16 08/20/2024    ALKPHOS 54 (L) 08/20/2024    BILITOT 0.7 08/20/2024     Lab Results   Component Value Date    WBC 6.57 06/29/2024    HGB 15.1 07/24/2024    HCT 46.1 07/24/2024    MCV 88 06/29/2024     06/29/2024     Lab Results   Component Value Date    INR 1.1 09/02/2012           Diagnostic/Imaging Results:  As above    ASSESSMENT/PLAN:     Pancreatic cyst in the the body originally diagnosed elsewhere as a possible serous cystadenoma 2017. Measuring up to 18-20mm in size historically,  unchanged.  17 mm in size on most recent imaging  Most likely etiology previously was initially thought to be a possible serous cystadenoma post FNA 2018, then he had severe pancreatitis after that FNA with development of fluid collections thought to be pseudocysts/complex cysts.    We discussed in detail the natural history of pancreatic cysts, the therapy involved, and the benefits of multimodality surveillance imaging including Ct, MRI, and EUS with FNA    1. Pancreas cyst        Plan:     Orders Placed This Encounter    MRI Abdomen W WO Contrast    POCT CREATININE       Given the stability of his pancreatic cysts since at least 2019, I believe it is acceptable to continue yearly surveillance at this time. He is not interested in ever having another EUS given his understood aversion to the procedure after experiencing severe pancreatitis as a complication of EUS in 2018. He is willing to continue non invasive surveillance which I believe is very acceptable given the cyst(s) have not changed since at least 2019  It has been about 1 year since his last MRI. I will order an MRI at this time to assess the pancreas for any changes or concerning findings- he is agreeable with this plan  If no changes on MRI, will tentatively plan to continue yearly MRI surveillance         Ridge Wen PA-C  Department of Advanced Endoscopy  [facility]

## 2024-12-05 ENCOUNTER — OFFICE VISIT (OUTPATIENT)
Dept: PRIMARY CARE CLINIC | Facility: CLINIC | Age: 74
End: 2024-12-05
Payer: COMMERCIAL

## 2024-12-05 VITALS
HEART RATE: 67 BPM | OXYGEN SATURATION: 97 % | WEIGHT: 175.06 LBS | SYSTOLIC BLOOD PRESSURE: 112 MMHG | BODY MASS INDEX: 25.93 KG/M2 | DIASTOLIC BLOOD PRESSURE: 70 MMHG | HEIGHT: 69 IN

## 2024-12-05 DIAGNOSIS — M62.838 TRAPEZIUS MUSCLE SPASM: Primary | ICD-10-CM

## 2024-12-05 PROCEDURE — 4010F ACE/ARB THERAPY RXD/TAKEN: CPT | Mod: CPTII,S$GLB,, | Performed by: NURSE PRACTITIONER

## 2024-12-05 PROCEDURE — 3078F DIAST BP <80 MM HG: CPT | Mod: CPTII,S$GLB,, | Performed by: NURSE PRACTITIONER

## 2024-12-05 PROCEDURE — 99999 PR PBB SHADOW E&M-EST. PATIENT-LVL III: CPT | Mod: PBBFAC,,, | Performed by: NURSE PRACTITIONER

## 2024-12-05 PROCEDURE — 99214 OFFICE O/P EST MOD 30 MIN: CPT | Mod: S$GLB,,, | Performed by: NURSE PRACTITIONER

## 2024-12-05 PROCEDURE — 1157F ADVNC CARE PLAN IN RCRD: CPT | Mod: CPTII,S$GLB,, | Performed by: NURSE PRACTITIONER

## 2024-12-05 PROCEDURE — 3061F NEG MICROALBUMINURIA REV: CPT | Mod: CPTII,S$GLB,, | Performed by: NURSE PRACTITIONER

## 2024-12-05 PROCEDURE — 3066F NEPHROPATHY DOC TX: CPT | Mod: CPTII,S$GLB,, | Performed by: NURSE PRACTITIONER

## 2024-12-05 PROCEDURE — 1159F MED LIST DOCD IN RCRD: CPT | Mod: CPTII,S$GLB,, | Performed by: NURSE PRACTITIONER

## 2024-12-05 PROCEDURE — 3044F HG A1C LEVEL LT 7.0%: CPT | Mod: CPTII,S$GLB,, | Performed by: NURSE PRACTITIONER

## 2024-12-05 PROCEDURE — 3008F BODY MASS INDEX DOCD: CPT | Mod: CPTII,S$GLB,, | Performed by: NURSE PRACTITIONER

## 2024-12-05 PROCEDURE — 1125F AMNT PAIN NOTED PAIN PRSNT: CPT | Mod: CPTII,S$GLB,, | Performed by: NURSE PRACTITIONER

## 2024-12-05 PROCEDURE — 3074F SYST BP LT 130 MM HG: CPT | Mod: CPTII,S$GLB,, | Performed by: NURSE PRACTITIONER

## 2024-12-05 RX ORDER — TIZANIDINE 2 MG/1
2 TABLET ORAL EVERY 8 HOURS PRN
Qty: 30 TABLET | Refills: 0 | Status: SHIPPED | OUTPATIENT
Start: 2024-12-05 | End: 2024-12-15

## 2024-12-05 NOTE — PROGRESS NOTES
[facility] Note    Chief Complaint      Chief Complaint   Patient presents with    Neck Pain       History of Present Illness      Sadiq Dhillon is a 74 y.o. male with chronic conditions of DM2, kidney stones, htn,  who presents today for: complaining of neck pain x 1 week, noticed when woke up. Was able to take some pain medicine with some relief, today is better. No fever or chills. No dysphagia. No headaches. Or sensitivity to light.     Past Medical History:  Past Medical History:   Diagnosis Date    Diabetes mellitus     Fuchs' corneal dystrophy     Heart attack     Hypertension     Kidney stones     Pancreatic mass     Pancreatitis     after EUS . 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, treated with dostenex(cabergoline)/ resolved    Reflux esophagitis     Tumor cells, benign        Past Surgical History:   has a past surgical history that includes Thyroidectomy (2007); Rhinoplasty tip (1975); Cataract extraction w/  intraocular lens implant (Right, 0); Corneal transplant (Right); Upper endoscopic ultrasound w/ FNA; Repair of retinal detachment with vitrectomy (Right, 7/18/2018); Repair of retinal detachment with vitrectomy (Left, 8/8/2018); Descemets stripping automated endothelial keratoplasty (Left, 3/21/2019); and Cataract extraction w/  intraocular lens implant (Left, 3/21/2019).    Family History:  family history includes Cataracts in his father; Diabetes in his father and paternal uncle; Heart disease in his father and mother; Hypertension in his mother; Stroke in his father and maternal grandmother.     Social History:  Social History     Tobacco Use    Smoking status: Never     Passive exposure: Never    Smokeless tobacco: Never   Substance Use Topics    Alcohol use: Yes     Comment: social    Drug use: No       Review of  Systems   Constitutional:  Negative for chills and fever.   Respiratory:  Negative for cough and shortness of breath.    Cardiovascular:  Negative for chest pain and palpitations.   Gastrointestinal:  Negative for constipation, diarrhea, nausea and vomiting.   Genitourinary:  Negative for dysuria and hematuria.   Musculoskeletal:  Positive for neck pain. Negative for falls.   Neurological:  Negative for headaches.        Medications:  Outpatient Encounter Medications as of 12/5/2024   Medication Sig Dispense Refill    aspirin (ECOTRIN) 81 MG EC tablet Take 81 mg by mouth every morning.       atorvastatin (LIPITOR) 80 MG tablet Take 1 tablet (80 mg total) by mouth once daily. 90 tablet 3    cabergoline (DOSTINEX) 0.5 mg tablet TAKE 1 TABLET(0.5 MG) BY MOUTH 2 TIMES A WEEK 24 tablet 3    cephALEXin (KEFLEX) 500 MG capsule Take 1 capsule (500 mg total) by mouth every 8 (eight) hours. (Patient not taking: Reported on 12/5/2024) 30 capsule 0    cetirizine (ZYRTEC) 10 MG tablet Take 1 tablet (10 mg total) by mouth once daily. 30 tablet 0    lisinopriL (PRINIVIL,ZESTRIL) 2.5 MG tablet Take 1 tablet (2.5 mg total) by mouth every morning. 90 tablet 3    lisinopriL (PRINIVIL,ZESTRIL) 2.5 MG tablet Take 1 tablet (2.5 mg total) by mouth every morning. (Patient not taking: Reported on 12/5/2024) 90 tablet 3    loperamide (IMODIUM) 2 mg capsule Take 2 mg by mouth 4 (four) times daily as needed for Diarrhea.      metFORMIN (GLUCOPHAGE-XR) 500 MG ER 24hr tablet Take 2 tablets (1,000 mg total) by mouth 2 (two) times daily. 360 tablet 3    metoprolol succinate (TOPROL-XL) 25 MG 24 hr tablet Take 1 tablet (25 mg total) by mouth nightly. 60 tablet 6    tamsulosin (FLOMAX) 0.4 mg Cap Take 1 capsule (0.4 mg total) by mouth once daily. 10 capsule 0    testosterone (ANDROGEL) 1 % (50 mg/5 gram) GlPk APPLY CONTENTS OF 2 PACKETS TOPICALLY TO SKIN EVERY  g 3    tiZANidine (ZANAFLEX) 2 MG tablet Take 1 tablet (2 mg total) by mouth every  8 (eight) hours as needed (spasm). 30 tablet 0     No facility-administered encounter medications on file as of 12/5/2024.       Allergies:  Review of patient's allergies indicates:   Allergen Reactions    Grass pollen-ira grass standard     House dust        Health Maintenance:  Immunization History   Administered Date(s) Administered    COVID-19, MRNA, LN-S, PF (Pfizer) (Gray Cap) 04/22/2022    COVID-19, mRNA, LNP-S, PF, julio cesar-sucrose, 30 mcg/0.3 mL (Pfizer Ages 12+) 11/24/2024    COVID-19, mRNA, LNP-S, bivalent booster, PF (PFIZER OMICRON) 12/16/2022    Hepatitis A, Adult 04/11/2024, 10/11/2024    Influenza (FLUAD) - Quadrivalent - Adjuvanted - PF *Preferred* (65+) 09/20/2020, 10/08/2021, 10/13/2022, 12/22/2023    Influenza - Quadrivalent - High Dose - PF (65 years and older) 10/18/2023    Influenza - Quadrivalent - MDCK - PF 02/01/2018    Influenza - Quadrivalent - PF *Preferred* (6 months and older) 11/10/2019    Influenza - Trivalent - Fluad - Adjuvanted - PF (65 years and older 11/24/2024    Influenza - Trivalent - Fluzone High Dose - PF (65 years and older) 10/19/2018    Meningococcal Conjugate (MCV4P) 06/03/2022    Pneumococcal Polysaccharide - 23 Valent 11/09/2020    Tdap 10/30/2020    Typhoid - ViCPs 04/11/2024    Zoster Recombinant 11/09/2020, 11/11/2020, 01/12/2021      Health Maintenance   Topic Date Due    High Dose Statin  Never done    RSV Vaccine (Age 60+ and Pregnant patients) (1 - Risk 60-74 years 1-dose series) Never done    Foot Exam  11/05/2021    Pneumococcal Vaccines (Age 65+) (2 of 2 - PCV) 11/09/2021    Eye Exam  06/05/2024    Colorectal Cancer Screening  08/21/2024    Hemoglobin A1c  01/24/2025    Diabetes Urine Screening  07/24/2025    Lipid Panel  07/24/2025    Aspirin/Antiplatelet Therapy  12/03/2025    TETANUS VACCINE  10/30/2030    Hepatitis C Screening  Completed    Shingles Vaccine  Completed    Influenza Vaccine  Completed    COVID-19 Vaccine  Completed    DEXA Scan  Discontinued  "       Physical Exam      Vital Signs  Pulse: 67  SpO2: 97 %  BP: 112/70  Pain Score:   1  Pain Loc: Neck  Height and Weight  Height: 5' 9" (175.3 cm)  Weight: 79.4 kg (175 lb 0.7 oz)  BSA (Calculated - sq m): 1.97 sq meters  BMI (Calculated): 25.8  Weight in (lb) to have BMI = 25: 168.9]    Physical Exam  Constitutional:       Appearance: He is well-developed.   HENT:      Head: Normocephalic and atraumatic.   Neck:      Thyroid: No thyromegaly.      Vascular: No carotid bruit.   Cardiovascular:      Rate and Rhythm: Normal rate and regular rhythm.      Heart sounds: No murmur heard.  Pulmonary:      Effort: Pulmonary effort is normal. No respiratory distress.      Breath sounds: Normal breath sounds.   Abdominal:      General: There is no distension.      Palpations: Abdomen is soft.      Tenderness: There is no abdominal tenderness.   Musculoskeletal:      Cervical back: Normal range of motion and neck supple. No tenderness.   Lymphadenopathy:      Cervical: No cervical adenopathy.   Skin:     General: Skin is warm and dry.   Neurological:      Mental Status: He is alert and oriented to person, place, and time.   Psychiatric:         Behavior: Behavior normal.          Laboratory:  CBC:  Recent Labs   Lab 11/11/23  1103 01/03/24  1053 06/29/24  1642 07/24/24  0725   WBC 8.02 5.99 6.57  --    RBC 4.81 4.95 4.63  --    Hemoglobin 14.1 14.5 13.5 L 15.1   Hematocrit 41.3 44.3 40.8 46.1   Platelets 282 231 185  --    MCV 86 90 88  --    MCH 29.3 29.3 29.2  --    MCHC 34.1 32.7 33.1  --      CMP:  Recent Labs   Lab 06/29/24  1642 07/24/24  0725 08/20/24  1209   Glucose 108 128 H 146 H   Calcium 9.9 9.9 9.8   Albumin 4.0 4.4 4.5   Total Protein 6.2 6.9 7.0   Sodium 132 L 134 L 134 L   Potassium 4.4 4.4 4.1   CO2 24 27 26   Chloride 97 100 101   BUN 14 11 12   Alkaline Phosphatase 56 67 54 L   ALT 14 24 16   AST 16 19 16   Total Bilirubin 0.7 0.4 0.7     URINALYSIS:  Recent Labs   Lab 03/09/22  1200 03/30/22  1441 " 04/25/22  1431 08/23/22  1331 04/07/23  0047 10/09/23  1708 11/09/23  1459 01/17/24  1414   Color, UA Yellow  --  Yellow  --  Yellow Yellow   < > Yellow   Clarity, UA  --   --  Clear  --   --   --   --   --    Spec Grav UA  --   --  1.025  --   --   --   --   --    Specific Gravity, UA 1.020  --   --   --  1.030 1.025   < > 1.020   pH, UA 6.0   < > 5   < > 6.0 5.0   < > 5.0   Protein, UA Negative  --   --   --  Trace A Trace A   < > Negative   Bacteria Rare  --   --   --  Many A  --   --   --    Nitrite, UA Negative  --   --   --  Positive A Negative   < > Negative   Leukocytes, UA Negative  --   --   --  2+ A Negative   < > Negative   Urobilinogen, UA  --   --   --    < > Negative Negative  --   --    Hyaline Casts, UA 1  --   --   --  28 A  --   --   --     < > = values in this interval not displayed.      LIPIDS:  Recent Labs   Lab 03/08/22  1543 09/13/22  1521 04/10/23  1524 01/03/24  1053 01/30/24  1435 06/29/24  1642 07/24/24  0725   TSH 2.281  --   --   --  1.712 2.160  --    HDL  --    < > 42 46  --   --  48   Cholesterol  --    < > 146 103 L  --   --  122   Triglycerides  --    < > 111 58  --   --  110   LDL Cholesterol  --    < > 81.8 45.4 L  --   --  52.0 L   HDL/Cholesterol Ratio  --    < > 28.8 44.7  --   --  39.3   Non-HDL Cholesterol  --    < > 104 57  --   --  74   Total Cholesterol/HDL Ratio  --    < > 3.5 2.2  --   --  2.5    < > = values in this interval not displayed.     TSH:  Recent Labs   Lab 03/08/22  1543 01/30/24  1435 06/29/24  1642   TSH 2.281 1.712 2.160     A1C:  Recent Labs   Lab 03/08/22  1543 09/13/22  1521 01/06/23  1011 07/24/23  1102 01/03/24  1053 07/24/24  0725   Hemoglobin A1C 6.6 H 6.1 H 6.3 H 6.2 H 6.1 H 6.3 H       Assessment/Plan     Sadiq Dhillon is a 74 y.o.male with:    1. Trapezius muscle spasm   Sent in muscle relaxer to see if helps and continue NSAID as needed   RTC if no improvement or conditio worsens.    Chronic conditions status updated as per HPI.  Other  than changes above, cont current medications and maintain follow up with specialists.  No follow-ups on file.    Future Appointments   Date Time Provider Department Center   1/31/2025 11:00 AM LAB, APPOINTMENT [facility] P [facility]   2/3/2025  2:30 PM Nell Hunter MD [facility] [place] Adrienne Cotaya, FNP [facility]

## 2024-12-14 ENCOUNTER — PATIENT MESSAGE (OUTPATIENT)
Dept: CARDIOLOGY | Facility: CLINIC | Age: 74
End: 2024-12-14
Payer: COMMERCIAL

## 2024-12-16 NOTE — TELEPHONE ENCOUNTER
Pt denies any swallowing issue otherwise, offered switching to 40 x 2, stated will keep the 80 mg for now. I scheduled him w. dr Vides on Friday to establish care w. new provider and he agreed to date/time of appointment(s).

## 2024-12-18 RX ORDER — METOPROLOL SUCCINATE 25 MG/1
25 TABLET, EXTENDED RELEASE ORAL NIGHTLY
Qty: 90 TABLET | Refills: 3 | Status: SHIPPED | OUTPATIENT
Start: 2024-12-18

## 2024-12-20 ENCOUNTER — OFFICE VISIT (OUTPATIENT)
Dept: CARDIOLOGY | Facility: CLINIC | Age: 74
End: 2024-12-20
Payer: COMMERCIAL

## 2024-12-20 VITALS
WEIGHT: 170.88 LBS | DIASTOLIC BLOOD PRESSURE: 54 MMHG | OXYGEN SATURATION: 97 % | SYSTOLIC BLOOD PRESSURE: 104 MMHG | HEART RATE: 53 BPM | BODY MASS INDEX: 25.31 KG/M2 | HEIGHT: 69 IN

## 2024-12-20 DIAGNOSIS — E78.00 HYPERCHOLESTEROLEMIA: ICD-10-CM

## 2024-12-20 DIAGNOSIS — E78.00 PURE HYPERCHOLESTEROLEMIA: ICD-10-CM

## 2024-12-20 DIAGNOSIS — I25.10 CORONARY ARTERY DISEASE INVOLVING NATIVE CORONARY ARTERY OF NATIVE HEART WITHOUT ANGINA PECTORIS: Primary | ICD-10-CM

## 2024-12-20 DIAGNOSIS — E11.9 TYPE 2 DIABETES MELLITUS WITHOUT COMPLICATION, WITHOUT LONG-TERM CURRENT USE OF INSULIN: ICD-10-CM

## 2024-12-20 PROCEDURE — 99999 PR PBB SHADOW E&M-EST. PATIENT-LVL III: CPT | Mod: PBBFAC,,, | Performed by: INTERNAL MEDICINE

## 2024-12-20 RX ORDER — ATORVASTATIN CALCIUM 80 MG/1
80 TABLET, FILM COATED ORAL DAILY
Qty: 90 TABLET | Refills: 3 | Status: SHIPPED | OUTPATIENT
Start: 2024-12-20 | End: 2025-12-20

## 2024-12-20 NOTE — PROGRESS NOTES
[facility]     Subjective:    Patient ID:  Sadiq Dhillon is a 74 y.o. male who presents for follow-up of Coronary Artery Disease, Hyperlipidemia, Hypertension, and Diabetes Mellitus    Review of patient's allergies indicates:   Allergen Reactions    Grass pollen-june grass standard     House dust      He is here for continuing care. Dr. Taveras cared for the patient for years.  His electrocardiogram is normal but his history includes anterior wall STEMI 2017.  He is a diabetic.  Last hemoglobin A1c 6.3.  He states he had a negative stress test in 2021 prior to transferring his care to [facility].    He is free of chest pain.  He is on low-dose Toprol and lisinopril.  He takes atorvastatin 80.  He is nearing the end of his 90 day prescription.  He is wondering whether he can try another medication because the pill is on the large size.  It gets stuck sometimes going down.  He still works part-time.  There is a strong family history of heart problems.  He has normal renal function.    I see labs from July on cholesterol with LDL 52.  Interestingly he had an LDL of 17 mg% a couple of years ago.  He is compliant with his atorvastatin.           Review of Systems   Constitutional: Negative for chills, decreased appetite, diaphoresis, fever, malaise/fatigue, night sweats, weight gain and weight loss.   HENT:  Negative for congestion, ear discharge, ear pain, hearing loss, hoarse voice, nosebleeds, odynophagia, sore throat, stridor and tinnitus.    Eyes:  Negative for blurred vision, discharge, double vision, pain, photophobia, redness, vision loss in left eye, vision loss in right eye, visual disturbance and visual halos.   Cardiovascular:  Negative for chest pain, claudication, cyanosis, dyspnea on exertion, irregular heartbeat, leg swelling, near-syncope, orthopnea, palpitations, paroxysmal nocturnal dyspnea and syncope.   Respiratory:   "Negative for cough, hemoptysis, shortness of breath, sleep disturbances due to breathing, snoring, sputum production and wheezing.    Endocrine: Negative for cold intolerance, heat intolerance, polydipsia, polyphagia and polyuria.   Hematologic/Lymphatic: Negative for adenopathy and bleeding problem. Does not bruise/bleed easily.   Skin:  Negative for color change, dry skin, flushing, itching, nail changes, poor wound healing, rash, skin cancer, suspicious lesions and unusual hair distribution.   Musculoskeletal:  Negative for arthritis, back pain, falls, gout, joint pain, joint swelling, muscle cramps, muscle weakness, myalgias, neck pain and stiffness.   Gastrointestinal:  Negative for bloating, abdominal pain, anorexia, change in bowel habit, bowel incontinence, constipation, diarrhea, dysphagia, excessive appetite, flatus, heartburn, hematemesis, hematochezia, hemorrhoids, jaundice, melena, nausea and vomiting.   Genitourinary:  Negative for bladder incontinence, decreased libido, dysuria, flank pain, frequency, genital sores, hematuria, hesitancy, incomplete emptying, nocturia and urgency.   Neurological:  Negative for aphonia, brief paralysis, difficulty with concentration, disturbances in coordination, excessive daytime sleepiness, dizziness, focal weakness, headaches, light-headedness, loss of balance, numbness, paresthesias, seizures, sensory change, tremors, vertigo and weakness.   Psychiatric/Behavioral:  Negative for altered mental status, depression, hallucinations, memory loss, substance abuse, suicidal ideas and thoughts of violence. The patient does not have insomnia and is not nervous/anxious.    Allergic/Immunologic: Negative for hives and persistent infections.        Objective:       Vitals:    12/20/24 1329   BP: (!) 104/54   Pulse: (!) 53   SpO2: 97%   Weight: 77.5 kg (170 lb 13.7 oz)   Height: 5' 9" (1.753 m)    Physical Exam  Constitutional:       General: He is not in acute distress.     " Appearance: He is well-developed. He is not diaphoretic.   HENT:      Head: Normocephalic and atraumatic.      Nose: Nose normal.   Eyes:      General: No scleral icterus.        Right eye: No discharge.      Conjunctiva/sclera: Conjunctivae normal.      Pupils: Pupils are equal, round, and reactive to light.   Neck:      Thyroid: No thyromegaly.      Vascular: No JVD.      Trachea: No tracheal deviation.   Cardiovascular:      Rate and Rhythm: Regular rhythm. Bradycardia present.      Pulses:           Carotid pulses are 2+ on the right side and 2+ on the left side.       Radial pulses are 2+ on the right side and 2+ on the left side.        Dorsalis pedis pulses are 2+ on the right side and 2+ on the left side.        Posterior tibial pulses are 2+ on the right side and 2+ on the left side.      Heart sounds: Normal heart sounds. No murmur heard.     No friction rub. No gallop.   Pulmonary:      Effort: Pulmonary effort is normal. No respiratory distress.      Breath sounds: Normal breath sounds. No stridor. No wheezing or rales.   Chest:      Chest wall: No tenderness.   Abdominal:      General: Bowel sounds are normal. There is no distension.      Palpations: Abdomen is soft. There is no mass.      Tenderness: There is no abdominal tenderness. There is no guarding or rebound.   Musculoskeletal:         General: No tenderness. Normal range of motion.      Cervical back: Normal range of motion and neck supple.   Lymphadenopathy:      Cervical: No cervical adenopathy.   Skin:     General: Skin is warm and dry.      Coloration: Skin is not pale.      Findings: No erythema or rash.   Neurological:      Mental Status: He is alert and oriented to person, place, and time.      Cranial Nerves: No cranial nerve deficit.      Coordination: Coordination normal.   Psychiatric:         Behavior: Behavior normal.         Thought Content: Thought content normal.         Judgment: Judgment normal.           Assessment:       1.  Coronary artery disease involving native coronary artery of native heart without angina pectoris    2. Hypercholesterolemia    3. Pure hypercholesterolemia    4. Type 2 diabetes mellitus without complication, without long-term current use of insulin      Results for orders placed or performed in visit on 10/03/24   SARS Coronavirus 2 Antigen, POCT Manual Read    Collection Time: 10/03/24 11:17 AM   Result Value Ref Range    SARS Coronavirus 2 Antigen Negative Negative     Acceptable Yes          Current Outpatient Medications:     aspirin (ECOTRIN) 81 MG EC tablet, Take 81 mg by mouth every morning. , Disp: , Rfl:     cabergoline (DOSTINEX) 0.5 mg tablet, TAKE 1 TABLET(0.5 MG) BY MOUTH 2 TIMES A WEEK, Disp: 24 tablet, Rfl: 3    lisinopriL (PRINIVIL,ZESTRIL) 2.5 MG tablet, Take 1 tablet (2.5 mg total) by mouth every morning., Disp: 90 tablet, Rfl: 3    loperamide (IMODIUM) 2 mg capsule, Take 2 mg by mouth 4 (four) times daily as needed for Diarrhea., Disp: , Rfl:     metFORMIN (GLUCOPHAGE-XR) 500 MG ER 24hr tablet, Take 2 tablets (1,000 mg total) by mouth 2 (two) times daily., Disp: 360 tablet, Rfl: 3    metoprolol succinate (TOPROL-XL) 25 MG 24 hr tablet, Take 1 tablet (25 mg total) by mouth nightly., Disp: 90 tablet, Rfl: 3    tamsulosin (FLOMAX) 0.4 mg Cap, Take 1 capsule (0.4 mg total) by mouth once daily., Disp: 10 capsule, Rfl: 0    testosterone (ANDROGEL) 1 % (50 mg/5 gram) GlPk, APPLY CONTENTS OF 2 PACKETS TOPICALLY TO SKIN EVERY DAY, Disp: 900 g, Rfl: 3    atorvastatin (LIPITOR) 80 MG tablet, Take 1 tablet (80 mg total) by mouth once daily., Disp: 90 tablet, Rfl: 3     Lab Results   Component Value Date    WBC 6.57 06/29/2024    RBC 4.63 06/29/2024    HGB 15.1 07/24/2024    HCT 46.1 07/24/2024    MCV 88 06/29/2024    MCH 29.2 06/29/2024    MCHC 33.1 06/29/2024    RDW 12.3 06/29/2024     06/29/2024    MPV 9.4 06/29/2024    GRAN 4.7 06/29/2024    GRAN 71.3 06/29/2024    LYMPH 1.2  06/29/2024    LYMPH 18.4 06/29/2024    MONO 0.5 06/29/2024    MONO 7.5 06/29/2024    EOS 0.1 06/29/2024    BASO 0.03 06/29/2024    EOSINOPHIL 2.0 06/29/2024    BASOPHIL 0.5 06/29/2024        CMP  Lab Results   Component Value Date     (L) 08/20/2024    K 4.1 08/20/2024     08/20/2024    CO2 26 08/20/2024     (H) 08/20/2024    BUN 12 08/20/2024    CREATININE 0.8 08/20/2024    CALCIUM 9.8 08/20/2024    PROT 7.0 08/20/2024    ALBUMIN 4.5 08/20/2024    BILITOT 0.7 08/20/2024    ALKPHOS 54 (L) 08/20/2024    AST 16 08/20/2024    ALT 16 08/20/2024    ANIONGAP 7 (L) 08/20/2024    ESTGFRAFRICA >60.0 06/27/2022    EGFRNONAA >60.0 06/27/2022        Lab Results   Component Value Date    LABURIN No significant growth 11/09/2023            Results for orders placed or performed during the hospital encounter of 06/29/24   Repeat EKG 12-lead    Collection Time: 06/29/24  4:40 PM   Result Value Ref Range    QRS Duration 88 ms    [facility] QTC Calculation 403 ms    Narrative    Test Reason : R55,    Vent. Rate : 070 BPM     Atrial Rate : 070 BPM     P-R Int : 184 ms          QRS Dur : 088 ms      QT Int : 374 ms       P-R-T Axes : 068 058 075 degrees     QTc Int : 403 ms    Sinus rhythm with occasional Premature ventricular complexes  Otherwise normal ECG  When compared with ECG of 29-JUN-2024 16:36,  Premature ventricular complexes are now Present  QT has lengthened  Confirmed by Santy RICHARDSON MD ([id number]) on 6/30/2024 7:52:41 AM    Referred By: AMANDEEP   SELF           Confirmed By:Santy RICHARDSON MD                   Plan:       Problem List Items Addressed This Visit          Cardiac/Vascular    Hypercholesterolemia     Atorvastatin 80 to continue.  Labs ordered.  It looks like he had labs in July showing LDL 52.    He is willing to stay on the large tablet.  Follow-up labs to be reviewed.         Relevant Orders    Lipid Panel    Coronary artery disease involving native coronary artery of native heart without angina pectoris  - Primary     Most recent stress test 2021.  I am trying to get a copy.  He is asymptomatic.  It sounds like he had single-vessel disease in 2017.  He had anterior wall STEMI at that time.         Relevant Medications    atorvastatin (LIPITOR) 80 MG tablet       Endocrine    Type 2 diabetes mellitus without complication, without long-term current use of insulin     He takes metformin.  Condition stable.  Hemoglobin A1c 6.3.          Other Visit Diagnoses       Pure hypercholesterolemia        Relevant Medications    atorvastatin (LIPITOR) 80 MG tablet               Lipid panel drawn.  I am considering adding Zetia to allow him to reduce his atorvastatin dosing.    I advised a 1 year follow-up.             Laurent Vides MD  12/20/2024   2:09 PM

## 2024-12-20 NOTE — ASSESSMENT & PLAN NOTE
Most recent stress test 2021.  I am trying to get a copy.  He is asymptomatic.  It sounds like he had single-vessel disease in 2017.  He had anterior wall STEMI at that time.

## 2024-12-20 NOTE — ASSESSMENT & PLAN NOTE
Atorvastatin 80 to continue.  Labs ordered.  It looks like he had labs in July showing LDL 52.    He is willing to stay on the large tablet.  Follow-up labs to be reviewed.

## 2024-12-26 ENCOUNTER — PATIENT MESSAGE (OUTPATIENT)
Dept: GASTROENTEROLOGY | Facility: CLINIC | Age: 74
End: 2024-12-26
Payer: COMMERCIAL

## 2025-01-05 ENCOUNTER — PATIENT MESSAGE (OUTPATIENT)
Dept: PRIMARY CARE CLINIC | Facility: CLINIC | Age: 75
End: 2025-01-05
Payer: COMMERCIAL

## 2025-01-13 ENCOUNTER — PATIENT MESSAGE (OUTPATIENT)
Dept: PRIMARY CARE CLINIC | Facility: CLINIC | Age: 75
End: 2025-01-13
Payer: COMMERCIAL

## 2025-01-31 ENCOUNTER — LAB VISIT (OUTPATIENT)
Dept: LAB | Facility: HOSPITAL | Age: 75
End: 2025-01-31
Payer: COMMERCIAL

## 2025-01-31 DIAGNOSIS — D35.2 PITUITARY MACROADENOMA: ICD-10-CM

## 2025-01-31 DIAGNOSIS — E55.9 HYPOVITAMINOSIS D: ICD-10-CM

## 2025-01-31 DIAGNOSIS — E11.65 TYPE 2 DIABETES MELLITUS WITH HYPERGLYCEMIA, WITHOUT LONG-TERM CURRENT USE OF INSULIN: ICD-10-CM

## 2025-01-31 DIAGNOSIS — E29.1 HYPOGONADISM IN MALE: ICD-10-CM

## 2025-01-31 LAB
25(OH)D3+25(OH)D2 SERPL-MCNC: 47 NG/ML (ref 30–96)
ALBUMIN SERPL BCP-MCNC: 4.2 G/DL (ref 3.5–5.2)
ANION GAP SERPL CALC-SCNC: 9 MMOL/L (ref 8–16)
BUN SERPL-MCNC: 11 MG/DL (ref 8–23)
CALCIUM SERPL-MCNC: 9.5 MG/DL (ref 8.7–10.5)
CHLORIDE SERPL-SCNC: 106 MMOL/L (ref 95–110)
CO2 SERPL-SCNC: 25 MMOL/L (ref 23–29)
CREAT SERPL-MCNC: 0.8 MG/DL (ref 0.5–1.4)
EST. GFR  (NO RACE VARIABLE): >60 ML/MIN/1.73 M^2
ESTIMATED AVG GLUCOSE: 126 MG/DL (ref 68–131)
GLUCOSE SERPL-MCNC: 118 MG/DL (ref 70–110)
HBA1C MFR BLD: 6 % (ref 4–5.6)
PHOSPHATE SERPL-MCNC: 3 MG/DL (ref 2.7–4.5)
POTASSIUM SERPL-SCNC: 4.5 MMOL/L (ref 3.5–5.1)
PROLACTIN SERPL IA-MCNC: 1 NG/ML (ref 3.5–19.4)
SODIUM SERPL-SCNC: 140 MMOL/L (ref 136–145)
TESTOST SERPL-MCNC: 345 NG/DL (ref 304–1227)

## 2025-01-31 PROCEDURE — 82306 VITAMIN D 25 HYDROXY: CPT | Performed by: INTERNAL MEDICINE

## 2025-01-31 PROCEDURE — 84403 ASSAY OF TOTAL TESTOSTERONE: CPT | Performed by: INTERNAL MEDICINE

## 2025-01-31 PROCEDURE — 36415 COLL VENOUS BLD VENIPUNCTURE: CPT | Performed by: INTERNAL MEDICINE

## 2025-01-31 PROCEDURE — 83036 HEMOGLOBIN GLYCOSYLATED A1C: CPT | Performed by: INTERNAL MEDICINE

## 2025-01-31 PROCEDURE — 84146 ASSAY OF PROLACTIN: CPT | Performed by: INTERNAL MEDICINE

## 2025-01-31 PROCEDURE — 80069 RENAL FUNCTION PANEL: CPT | Performed by: INTERNAL MEDICINE

## 2025-01-31 NOTE — PROGRESS NOTES
ENDOCRINOLOGY CLINIC    Subjective:      Chief Complaint:  Type 2 diabetes, prolactinoma    HPI:   Sadiq Dhillon is a 75 y.o. male who presents for follow-up of type 2 diabetes and prolactinoma.  Patient was last seen in the clinic on 08/01/2024    Prolactinoma    He had previously been seen at the [facility] neuroendocrine center. He had a macroprolactinoma Dx in 2003. He also had history of primary parathyroidectomy for which he had a prior parathyroidectomy and a pancreatic neoplasm. He also has secondary hypogonadism. There was concern for MEN-1 but the screening tests obtained for that were negative. MEN 1 gene testing done 10/2020.     Patient is on Dostinex for the prolactinoma, 0.5 mg 2 x weekly.    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    Pituitary MRI from 05/2022 demonstrates a focally expanded sella with complex cyst at the site of the previously treated pituitary mass reported to reflect a prolactinoma. Cyst extending into the suprasellar region bilateral cavernous sinus (left more than right). There is associated bony remodeling of the clivus and protrusion of the expanded cystic space into sphenoid sinus. Overall size and configuration appears very similar to the prior examination.  No new solid or suspicious enhancing components.  No new intracranial mass effect. Persistent inferior displacement of the optic chiasm and pre chiasmatic optic nerves may reflect tethering or ptosis. Ventricles are normal in size. No hydrocephalus.      Pituitary MRI 07/24/2024:     COMPARISON:  MRI pituitary 05/06/2022, 11/04/2020     FINDINGS:  Sella: Expanded sella with large fluid/CSF signal, reported to reflect treated macroadenoma.  Stable appearance of the treatment bed without new nodular enhancement to suggest recurrent lesion.  Persistent rightward deviation of the  infundibulum in presumed residual pituitary tissue. Stable appearance of the cavernous sinuses..  Persistent inferior retraction of the optic chiasm without compression.There is bony remodeling of the sella and clivus similar to prior..     Ventricles and sulci are similar in size and configuration, without evidence of hydrocephalus.     No extra-axial blood or fluid collections.     The brain parenchyma maintains normal signal intensity.  No new mass lesion, acute hemorrhage, edema, or acute infarct.     No new enhancing lesion.     Normal vascular flow voids are preserved.     Stable 1 cm enhancing calvarial lesion in the left frontoparietal junction nonspecific but may represent a hemangioma, stable from priors.     Impression:  Overall stable appearance of  previously treated pituitary mass as above.  No new enhancing lesion.      Patient had a prolactin of 125 on 05/04/2004. His prolactin ranged between  until 2012.    His prolactin level is between 2.2-4.2 since 10/2020.    10/2020 negative MEN 1 test    He is being followed by neurophthalmology.  Patient has history of Fuch's corneal dystrophy and retinal detachment.   HVF - Seen Dr. Hernandez in 6/23/21 - Reduced visual acuity OS, full eye movements except limited supraduction OD, and very subtle ocular misalignment which may relate to left cavernous sinus involvement in the past. His OCT reveals some RNFL thinning. HVF improved from prior in 2018.   Was recommended 6 months follow-up but had not scheduled an appointment. Has appointment with Dr. Hernandez in 4/28/2025 for HVF.       Lab Results   Component Value Date    PROLACTIN 1.0 (L) 01/31/2025    PROLACTIN 2.0 (L) 07/24/2024    PROLACTIN 1.2 (L) 01/30/2024    PROLACTIN 2.2 (L) 04/10/2023    PROLACTIN 3.2 (L) 03/08/2022       Secondary hypogonadism    Patient is on AndroGel 1 packet every day.    Sometimes forgets to use it.     Lab Results   Component Value Date    TOTALTESTOST 345 01/31/2025       Lab  Results   Component Value Date    TESTOSTERONE 100 (L) 04/26/2023    TESTOSTERONE 16.1 04/26/2023    PSA 1.9 07/24/2024    AST 16 08/20/2024    ALT 16 08/20/2024    HGB 15.1 07/24/2024    HCT 46.1 07/24/2024     Lab Results   Component Value Date    PSATOTAL 2.4 04/10/2023         Diabetes Hx:  Diagnosed w/ DM: 2003  Complications:   Retinopathy: No.  History of retinal detachment with vitrectomy.   Last eye exam: : 06/05/2023  Neuropathy: Yes on gabapentin. Has onychopmycosis - follows up with derm.   Nephropathy: No  Cardiovascular: CAD s/p PCI, MI in 2017  DKA/HHS: Denies    Severe Hypoglycemia:  Denies   Hypoglycemia unawareness:  Denies  Hypoglycemic episodes:  None recently    Current meds:   Metformin  mg, 2 tablets BID.    Compliance with meds: Yes     Previous meds:  None     Home glucose checks: None. Has testing supplies.    Diet/Exercise:   2 meals, skips breakfast.   Active.   Takes lemonade in place of water, milk.   Takes 40 oz fluids daily.   Grapefruit juice.     Diabetes education:   2018, after pancreatitis.   Lost 27 lbs, maintained his weight.    Last A1c:   Lab Results   Component Value Date    HGBA1C 6.0 (H) 01/31/2025    HGBA1C 6.3 (H) 07/24/2024    HGBA1C 6.1 (H) 01/03/2024       Microalbumin:   Lab Results   Component Value Date    LABMICR <5.0 07/24/2024    CREATRANDUR 95.0 07/24/2024    MICALBCREAT Unable to calculate 07/24/2024     Lab Results   Component Value Date    EGFRNORACEVR >60.0 01/31/2025    CREATININE 0.8 01/31/2025     Lipids:   Lab Results   Component Value Date    CHOL 122 07/24/2024    TRIG 110 07/24/2024    HDL 48 07/24/2024    LDLCALC 52.0 (L) 07/24/2024    CHOLHDL 39.3 07/24/2024     TSH:  Lab Results   Component Value Date    TSH 2.160 06/29/2024     Lab Results   Component Value Date    HGB 15.1 07/24/2024      Aspirin: Yes  Statins: Yes  ACEI/ARB: Yes on lisinopril    HTN:  On medications.    B12 in 4/2023: 491     FHx of DM: Yes, Heart disease:  Yes      Primary hyperparathyroidism    Status post parathyroidectomy in 2007.  Patients screening DXA from 10/20 was normal.  He has recent kidney stones - follows up with urology.  He has prior history of kidney stones.      Lab Results   Component Value Date    PTH 46.6 04/10/2023    PTH 36.3 03/08/2022    PTH 29.0 03/30/2021    CALCIUM 9.5 01/31/2025    CALCIUM 9.8 08/20/2024    CALCIUM 9.9 07/24/2024    [id number] 47 01/31/2025       ROS: see HPI     Objective:     Physical Exam     /77 (BP Location: Right arm, Patient Position: Sitting)   Pulse 60   Wt 77.6 kg (171 lb 1.2 oz)   BMI 25.26 kg/m²     Wt Readings from Last 3 Encounters:   12/20/24 77.5 kg (170 lb 13.7 oz)   12/05/24 79.4 kg (175 lb 0.7 oz)   12/03/24 78.3 kg (172 lb 9.9 oz)       Physical Exam  Constitutional:       General: He is not in acute distress.     Appearance: He is not ill-appearing.   Eyes:      Conjunctiva/sclera: Conjunctivae normal.   Cardiovascular:      Rate and Rhythm: Normal rate.   Pulmonary:      Effort: Pulmonary effort is normal.   Neurological:      Mental Status: He is alert and oriented to person, place, and time.        LABORATORY REVIEW:  See HPI for other labs reviewed today      Chemistry        Component Value Date/Time     01/31/2025 1153    K 4.5 01/31/2025 1153     01/31/2025 1153    CO2 25 01/31/2025 1153    BUN 11 01/31/2025 1153    CREATININE 0.8 01/31/2025 1153     (H) 01/31/2025 1153        Component Value Date/Time    CALCIUM 9.5 01/31/2025 1153    ALKPHOS 54 (L) 08/20/2024 1209    AST 16 08/20/2024 1209    ALT 16 08/20/2024 1209    BILITOT 0.7 08/20/2024 1209    ESTGFRAFRICA >60.0 06/27/2022 1534    EGFRNONAA >60.0 06/27/2022 1534          Lab Results   Component Value Date    HGBA1C 6.0 (H) 01/31/2025    HGBA1C 6.3 (H) 07/24/2024    HGBA1C 6.1 (H) 01/03/2024     Other labs reviewed today in HPI    Assessment/Plan:     1. Prolactinoma  Assessment & Plan:    Macroprolactinoma Dx  in 2003  MRI from 07/2024 showed expanded sella with large fluid/CSF signal, reported to reflect treated macroadenoma.  Stable appearance of the treatment bed without new nodular enhancement to suggest recurrent lesion.  Persistent rightward deviation of the infundibulum in presumed residual pituitary tissue. Stable appearance of the cavernous sinuses..  Persistent inferior retraction of the optic chiasm without compression.There is bony remodeling of the sella and clivus similar to prior.    Patient recommended to continue follow-up with Neuro-Ophthalmology.  Patient is on Dostinex for the prolactinoma, 0.5 mg 2 x weekly.  Prolactin level has been stable.  Patient is currently asymptomatic.  Will continue to monitor his prolactin and MRI pituitary.  Discussed referral to neurosurgery.        Orders:  -     cabergoline (DOSTINEX) 0.5 mg tablet; TAKE 1 TABLET(0.5 MG) BY MOUTH 2 TIMES A WEEK  Dispense: 24 tablet; Refill: 3    2. Pituitary macroadenoma  Assessment & Plan:  As above.        3. Type 2 diabetes mellitus with hyperglycemia, without long-term current use of insulin  Assessment & Plan:    Recent A1c was stable at 6.0%.    Continue current diabetes regimen.    Continue good diet and lifestyle modifications for diabetes management    Complications:  Follow up for regular diabetes eye exam  Daily self examination of feet  Microalbumin: Monitor.    Lab Results   Component Value Date    HGBA1C 6.0 (H) 01/31/2025               4. Primary hyperparathyroidism  Assessment & Plan:    Status post parathyroidectomy in 2007.  Parathyroid and calcium has been normal.      Orders:  -     metFORMIN (GLUCOPHAGE-XR) 500 MG ER 24hr tablet; Take 2 tablets (1,000 mg total) by mouth 2 (two) times daily.  Dispense: 360 tablet; Refill: 3  -     Ambulatory referral/consult to Optometry; Future; Expected date: 02/10/2025    5. Hypogonadism in male  Assessment & Plan:    Continue testosterone replacement.   Recent testosterone was  normal.   Patient says he sometimes forgets to use it.  Continue to monitor his testosterone levels.  Monitor PSA, LFTs, HGB, HCT.  I discussed the potential adverse effects of testosterone.                 Follow-up in with labs in 12/2025.     Nell Hunter MD

## 2025-02-03 ENCOUNTER — OFFICE VISIT (OUTPATIENT)
Dept: ENDOCRINOLOGY | Facility: CLINIC | Age: 75
End: 2025-02-03
Payer: COMMERCIAL

## 2025-02-03 VITALS
DIASTOLIC BLOOD PRESSURE: 77 MMHG | HEART RATE: 60 BPM | BODY MASS INDEX: 25.26 KG/M2 | WEIGHT: 171.06 LBS | SYSTOLIC BLOOD PRESSURE: 124 MMHG

## 2025-02-03 DIAGNOSIS — D35.2 PROLACTINOMA: Primary | ICD-10-CM

## 2025-02-03 DIAGNOSIS — E11.65 TYPE 2 DIABETES MELLITUS WITH HYPERGLYCEMIA, WITHOUT LONG-TERM CURRENT USE OF INSULIN: ICD-10-CM

## 2025-02-03 DIAGNOSIS — D35.2 PITUITARY MACROADENOMA: ICD-10-CM

## 2025-02-03 DIAGNOSIS — E21.0 PRIMARY HYPERPARATHYROIDISM: ICD-10-CM

## 2025-02-03 DIAGNOSIS — E29.1 HYPOGONADISM IN MALE: ICD-10-CM

## 2025-02-03 PROBLEM — N21.0 CALCULUS OF URINARY BLADDER: Status: ACTIVE | Noted: 2025-02-03

## 2025-02-03 RX ORDER — TRIAMCINOLONE ACETONIDE 1 MG/G
CREAM TOPICAL
COMMUNITY
Start: 2025-01-31

## 2025-02-03 RX ORDER — DICYCLOMINE HYDROCHLORIDE 10 MG/1
10 CAPSULE ORAL 3 TIMES DAILY
COMMUNITY
Start: 2025-01-08

## 2025-02-03 RX ORDER — CABERGOLINE 0.5 MG/1
TABLET ORAL
Qty: 24 TABLET | Refills: 3 | Status: SHIPPED | OUTPATIENT
Start: 2025-02-03

## 2025-02-03 RX ORDER — METFORMIN HYDROCHLORIDE 500 MG/1
1000 TABLET, EXTENDED RELEASE ORAL 2 TIMES DAILY
Qty: 360 TABLET | Refills: 3 | Status: SHIPPED | OUTPATIENT
Start: 2025-02-03 | End: 2026-02-03

## 2025-02-05 ENCOUNTER — OFFICE VISIT (OUTPATIENT)
Dept: INFECTIOUS DISEASES | Facility: CLINIC | Age: 75
End: 2025-02-05
Payer: COMMERCIAL

## 2025-02-05 VITALS
SYSTOLIC BLOOD PRESSURE: 103 MMHG | HEART RATE: 75 BPM | DIASTOLIC BLOOD PRESSURE: 61 MMHG | HEIGHT: 69 IN | WEIGHT: 171.5 LBS | TEMPERATURE: 99 F | BODY MASS INDEX: 25.4 KG/M2

## 2025-02-05 DIAGNOSIS — Z23 IMMUNIZATION DUE: ICD-10-CM

## 2025-02-05 DIAGNOSIS — Z71.84 TRAVEL ADVICE ENCOUNTER: Primary | ICD-10-CM

## 2025-02-05 PROCEDURE — 3044F HG A1C LEVEL LT 7.0%: CPT | Mod: CPTII,S$GLB,, | Performed by: INTERNAL MEDICINE

## 2025-02-05 PROCEDURE — 1126F AMNT PAIN NOTED NONE PRSNT: CPT | Mod: CPTII,S$GLB,, | Performed by: INTERNAL MEDICINE

## 2025-02-05 PROCEDURE — 99999 PR PBB SHADOW E&M-EST. PATIENT-LVL IV: CPT | Mod: PBBFAC,,, | Performed by: INTERNAL MEDICINE

## 2025-02-05 PROCEDURE — 1159F MED LIST DOCD IN RCRD: CPT | Mod: CPTII,S$GLB,, | Performed by: INTERNAL MEDICINE

## 2025-02-05 PROCEDURE — 1160F RVW MEDS BY RX/DR IN RCRD: CPT | Mod: CPTII,S$GLB,, | Performed by: INTERNAL MEDICINE

## 2025-02-05 PROCEDURE — 3074F SYST BP LT 130 MM HG: CPT | Mod: CPTII,S$GLB,, | Performed by: INTERNAL MEDICINE

## 2025-02-05 PROCEDURE — 1157F ADVNC CARE PLAN IN RCRD: CPT | Mod: CPTII,S$GLB,, | Performed by: INTERNAL MEDICINE

## 2025-02-05 PROCEDURE — 1101F PT FALLS ASSESS-DOCD LE1/YR: CPT | Mod: CPTII,S$GLB,, | Performed by: INTERNAL MEDICINE

## 2025-02-05 PROCEDURE — 3288F FALL RISK ASSESSMENT DOCD: CPT | Mod: CPTII,S$GLB,, | Performed by: INTERNAL MEDICINE

## 2025-02-05 PROCEDURE — 3078F DIAST BP <80 MM HG: CPT | Mod: CPTII,S$GLB,, | Performed by: INTERNAL MEDICINE

## 2025-02-05 PROCEDURE — 3008F BODY MASS INDEX DOCD: CPT | Mod: CPTII,S$GLB,, | Performed by: INTERNAL MEDICINE

## 2025-02-05 PROCEDURE — 99402 PREV MED CNSL INDIV APPRX 30: CPT | Mod: S$GLB,,, | Performed by: INTERNAL MEDICINE

## 2025-02-05 RX ORDER — AZITHROMYCIN 500 MG/1
500 TABLET, FILM COATED ORAL DAILY
Qty: 3 TABLET | Refills: 0 | Status: SHIPPED | OUTPATIENT
Start: 2025-02-05 | End: 2025-02-08

## 2025-02-05 RX ORDER — ATOVAQUONE AND PROGUANIL HYDROCHLORIDE 250; 100 MG/1; MG/1
TABLET, FILM COATED ORAL
Qty: 17 TABLET | Refills: 0 | Status: SHIPPED | OUTPATIENT
Start: 2025-02-05

## 2025-02-05 NOTE — PROGRESS NOTES
Subjective:      Chief Complaint:   Chief Complaint   Patient presents with    Travel Consult           History of Present Illness    Patient  75 y.o. male who presents today for routine pretravel consultation.  The patient reports a past medical history of DM type 2, HTN.  The patient reports the following medication allergies; NKDA.  The patient reports the following food allergies; none .  The patient will be traveling to [facility] on late March.  The patient will be at this destination for 10 days.  The patient will be lodging at hotels.  He will travel to [facility], then to [place] (overnight) and then they do a safari in [facility] at a private resort.  The patient has travelled to the following other countries in the past; [place].  The patient reports that they received all their childhood vaccinations.  The patient reports receipt of the following travel related vaccinations; typhoid in April 2024 and hepatitis A X2 in 2024.  The purpose of this trip is vacation.      Review of Systems   All other systems reviewed and are negative.      Objective:   Physical Exam   Assessment:     Pre-Travel clinic assessment    Plan:   Patient specific risks:      Patient is of advanced age.    Destination specific risks:      -Infectious Disease risks:       Mosquito Borne pathogens:  Reviewed basic mosquito avoidance precautions including wearing long sleeve clothing and insect repellant.  There is no risk of yellow fever at his itinerary.  Will prescribe malarone for malaria prevention.     Food Borne pathogens:  Reviewed basic hand, food and water sanitation precautions.  Patient instructed to take hand  on their trip.  He is up to date on typhoid and hepatitis A vaccinations.  Will prescribe azithromycin for use as needed for severe diarrhea.     Routine:  Received Tdap in 2020. Got covid and influenza vaccine in November 2024.  Recommend prevnar 20 vaccination.    30 minutes of time was spent on this  encounter.

## 2025-02-16 DIAGNOSIS — E29.1 HYPOGONADISM IN MALE: ICD-10-CM

## 2025-02-16 DIAGNOSIS — R35.1 NOCTURIA: ICD-10-CM

## 2025-02-16 DIAGNOSIS — D35.2 PITUITARY MACROADENOMA: ICD-10-CM

## 2025-02-16 DIAGNOSIS — D35.2 PROLACTINOMA: ICD-10-CM

## 2025-02-16 DIAGNOSIS — E11.65 TYPE 2 DIABETES MELLITUS WITH HYPERGLYCEMIA, WITHOUT LONG-TERM CURRENT USE OF INSULIN: Primary | ICD-10-CM

## 2025-02-16 NOTE — ASSESSMENT & PLAN NOTE
Recent A1c was stable at 6.0%.    Continue current diabetes regimen.    Continue good diet and lifestyle modifications for diabetes management    Complications:  Follow up for regular diabetes eye exam  Daily self examination of feet  Microalbumin: Monitor.    Lab Results   Component Value Date    HGBA1C 6.0 (H) 01/31/2025

## 2025-02-16 NOTE — ASSESSMENT & PLAN NOTE
Continue testosterone replacement.   Recent testosterone was normal.   Patient says he sometimes forgets to use it.  Continue to monitor his testosterone levels.  Monitor PSA, LFTs, HGB, HCT.  I discussed the potential adverse effects of testosterone.

## 2025-02-16 NOTE — ASSESSMENT & PLAN NOTE
Macroprolactinoma Dx in 2003  MRI from 07/2024 showed expanded sella with large fluid/CSF signal, reported to reflect treated macroadenoma.  Stable appearance of the treatment bed without new nodular enhancement to suggest recurrent lesion.  Persistent rightward deviation of the infundibulum in presumed residual pituitary tissue. Stable appearance of the cavernous sinuses..  Persistent inferior retraction of the optic chiasm without compression.There is bony remodeling of the sella and clivus similar to prior.    Patient recommended to continue follow-up with Neuro-Ophthalmology.  Patient is on Dostinex for the prolactinoma, 0.5 mg 2 x weekly.  Prolactin level has been stable.  Patient is currently asymptomatic.  Will continue to monitor his prolactin and MRI pituitary.  Discussed referral to neurosurgery.

## 2025-02-18 ENCOUNTER — LAB VISIT (OUTPATIENT)
Dept: LAB | Facility: HOSPITAL | Age: 75
End: 2025-02-18
Attending: INTERNAL MEDICINE
Payer: COMMERCIAL

## 2025-02-18 ENCOUNTER — OFFICE VISIT (OUTPATIENT)
Dept: PRIMARY CARE CLINIC | Facility: CLINIC | Age: 75
End: 2025-02-18
Payer: COMMERCIAL

## 2025-02-18 VITALS
HEIGHT: 69 IN | SYSTOLIC BLOOD PRESSURE: 110 MMHG | DIASTOLIC BLOOD PRESSURE: 60 MMHG | OXYGEN SATURATION: 96 % | HEART RATE: 64 BPM | WEIGHT: 174.19 LBS | BODY MASS INDEX: 25.8 KG/M2

## 2025-02-18 DIAGNOSIS — K21.00 GASTROESOPHAGEAL REFLUX DISEASE WITH ESOPHAGITIS WITHOUT HEMORRHAGE: ICD-10-CM

## 2025-02-18 DIAGNOSIS — E29.1 HYPOGONADISM IN MALE: ICD-10-CM

## 2025-02-18 DIAGNOSIS — I25.10 CORONARY ARTERY DISEASE INVOLVING NATIVE CORONARY ARTERY OF NATIVE HEART WITHOUT ANGINA PECTORIS: ICD-10-CM

## 2025-02-18 DIAGNOSIS — E11.65 TYPE 2 DIABETES MELLITUS WITH HYPERGLYCEMIA, WITHOUT LONG-TERM CURRENT USE OF INSULIN: ICD-10-CM

## 2025-02-18 DIAGNOSIS — K86.2 PANCREAS CYST: ICD-10-CM

## 2025-02-18 DIAGNOSIS — Z12.12 ENCOUNTER FOR COLORECTAL CANCER SCREENING: ICD-10-CM

## 2025-02-18 DIAGNOSIS — Z12.11 ENCOUNTER FOR COLORECTAL CANCER SCREENING: ICD-10-CM

## 2025-02-18 DIAGNOSIS — E78.00 HYPERCHOLESTEROLEMIA: ICD-10-CM

## 2025-02-18 DIAGNOSIS — E11.9 TYPE 2 DIABETES MELLITUS WITHOUT COMPLICATION, WITHOUT LONG-TERM CURRENT USE OF INSULIN: Primary | ICD-10-CM

## 2025-02-18 DIAGNOSIS — Z00.00 ANNUAL PHYSICAL EXAM: ICD-10-CM

## 2025-02-18 DIAGNOSIS — N20.0 NEPHROLITHIASIS: ICD-10-CM

## 2025-02-18 DIAGNOSIS — D35.2 PITUITARY MACROADENOMA: ICD-10-CM

## 2025-02-18 DIAGNOSIS — D35.2 PROLACTINOMA: ICD-10-CM

## 2025-02-18 DIAGNOSIS — R35.1 NOCTURIA: ICD-10-CM

## 2025-02-18 DIAGNOSIS — E21.0 PRIMARY HYPERPARATHYROIDISM: ICD-10-CM

## 2025-02-18 DIAGNOSIS — R47.01 ANOMIC APHASIA: ICD-10-CM

## 2025-02-18 LAB
ALBUMIN SERPL BCP-MCNC: 4.5 G/DL (ref 3.5–5.2)
ALP SERPL-CCNC: 78 U/L (ref 40–150)
ALT SERPL W/O P-5'-P-CCNC: 20 U/L (ref 10–44)
ANION GAP SERPL CALC-SCNC: 11 MMOL/L (ref 8–16)
AST SERPL-CCNC: 26 U/L (ref 10–40)
BILIRUB SERPL-MCNC: 0.4 MG/DL (ref 0.1–1)
BUN SERPL-MCNC: 12 MG/DL (ref 8–23)
CALCIUM SERPL-MCNC: 10.1 MG/DL (ref 8.7–10.5)
CHLORIDE SERPL-SCNC: 105 MMOL/L (ref 95–110)
CO2 SERPL-SCNC: 24 MMOL/L (ref 23–29)
COMPLEXED PSA SERPL-MCNC: 2 NG/ML (ref 0–4)
CREAT SERPL-MCNC: 0.9 MG/DL (ref 0.5–1.4)
EST. GFR  (NO RACE VARIABLE): >60 ML/MIN/1.73 M^2
ESTIMATED AVG GLUCOSE: 128 MG/DL (ref 68–131)
GLUCOSE SERPL-MCNC: 107 MG/DL (ref 70–110)
HBA1C MFR BLD: 6.1 % (ref 4–5.6)
HCT VFR BLD AUTO: 47.2 % (ref 40–54)
HGB BLD-MCNC: 15.1 G/DL (ref 14–18)
POTASSIUM SERPL-SCNC: 5.2 MMOL/L (ref 3.5–5.1)
PROLACTIN SERPL IA-MCNC: <0.8 NG/ML (ref 3.5–19.4)
PROT SERPL-MCNC: 7 G/DL (ref 6–8.4)
SODIUM SERPL-SCNC: 140 MMOL/L (ref 136–145)
T4 FREE SERPL-MCNC: 0.82 NG/DL (ref 0.71–1.51)
TESTOST SERPL-MCNC: 1083 NG/DL (ref 304–1227)
TSH SERPL DL<=0.005 MIU/L-ACNC: 2.03 UIU/ML (ref 0.4–4)

## 2025-02-18 PROCEDURE — 85014 HEMATOCRIT: CPT | Performed by: INTERNAL MEDICINE

## 2025-02-18 PROCEDURE — 80053 COMPREHEN METABOLIC PANEL: CPT | Performed by: INTERNAL MEDICINE

## 2025-02-18 PROCEDURE — 84439 ASSAY OF FREE THYROXINE: CPT | Performed by: INTERNAL MEDICINE

## 2025-02-18 PROCEDURE — 99999 PR PBB SHADOW E&M-EST. PATIENT-LVL V: CPT | Mod: PBBFAC,,, | Performed by: INTERNAL MEDICINE

## 2025-02-18 PROCEDURE — 36415 COLL VENOUS BLD VENIPUNCTURE: CPT | Performed by: INTERNAL MEDICINE

## 2025-02-18 PROCEDURE — 84153 ASSAY OF PSA TOTAL: CPT | Performed by: INTERNAL MEDICINE

## 2025-02-18 PROCEDURE — 84443 ASSAY THYROID STIM HORMONE: CPT | Performed by: INTERNAL MEDICINE

## 2025-02-18 PROCEDURE — 85018 HEMOGLOBIN: CPT | Performed by: INTERNAL MEDICINE

## 2025-02-18 PROCEDURE — 83036 HEMOGLOBIN GLYCOSYLATED A1C: CPT | Performed by: INTERNAL MEDICINE

## 2025-02-18 PROCEDURE — 84146 ASSAY OF PROLACTIN: CPT | Performed by: INTERNAL MEDICINE

## 2025-02-18 PROCEDURE — 84403 ASSAY OF TOTAL TESTOSTERONE: CPT | Performed by: INTERNAL MEDICINE

## 2025-02-18 NOTE — PROGRESS NOTES
[facility] Note    Chief Complaint      Chief Complaint   Patient presents with    Annual Exam       History of Present Illness      History of Present Illness    CHIEF COMPLAINT:  Mr. Dhillon presents today for follow up of anomic aphasia    NEUROLOGICAL SYMPTOMS:  He reports experiencing anomic aphasia symptoms for the past couple years with gradual increase in frequency. Symptoms are more pronounced when feeling pressured to perform specific tasks.    PITUITARY ADENOMA:  He has a history of pituitary adenoma monitored by Dr. Smith through MRI scans, with the most recent MRI in 2022. Dr. Hunter is working to arrange a neurosurgical consultation to evaluate the need for surgical intervention. His prolactin level is significantly low.    CURRENT MEDICATIONS:  He is currently taking lisinopril, atorvastatin, and metoprolol. He is no longer taking Brilinta.    PREVENTIVE CARE:  He is up to date on preventive care including flu vaccine (November), tetanus (2020), shingles, and pneumonia vaccines. Colon cancer screening was completed via stool test in 2023. He received travel vaccinations for [place] through a travel clinic, and previously traveled to [place] last year with sufficient existing vaccinations.       DM2: Sees Dr. Hunter, endocrinology.  Controlled on metformin.  A1C 6.0 1/2025.  Eye exam UTD with Dr. Norris.  HTN: BP at goal on metoprolol.  CAD: Recently saw Dr. Taveras, cardiology.  At [facility], s/p PCI to mid LAD following STEMI.  On ASA, metoprolol, lisinopril, atorvastatin. Brilinta discontinued.  Denies chest pain, shortness of breath.  HLD: Controlled on atorvastatin.  LDL 52   Hx of kidney stones: Sees Dr. Bowman, urology. Currently asymptomatic.    Pituitary adenoma, prolactinoma, primary hyperparathyroidism s/p parathyroidectomy, hypogonadism:  Sees Dr. Hunter, endo.  On testosterone.  Last PTH normal.    GERD: Controlled on pepcid as needed.  Pancreas cyst: Initially saw Dr. Munoz.  Followed  with Dr. Post in 2019.  Has had CT with contrast to monitor and has been unchanged.  Sees NP Ray.  Flu shot UTD.  TdAP 2020.  Shingrix UTD.  COVID vaccine UTD.    Cscope UTD.  FOBT 2023.       Assessment/Plan     Sadiq Dhillon is a 75 y.o.male with:    Assessment & Plan    Assessed patient's concern about anomic aphasia, likely age-related or stress-induced rather than indicative of dementia  Recommend neuropsychology evaluation to establish baseline cognitive function and rule out neurodegenerative conditions  Reviewed pituitary adenoma follow-up, noting low prolactin levels but potential for non-functional growth  Evaluated cardiovascular health, noting well-controlled blood pressure, cholesterol, and diabetes  Considered orthostatic hypotension symptoms, likely related to metoprolol use    ANOMIC APHASIA:  - Evaluated the patient's reported anomic aphasia, which has been present for a few years and increasing in severity.  - Noted the patient's difficulty in recalling words, especially when feeling pressured.  - Assessed the condition as likely age-related and not indicative of dementia.  - Discussed the role of stress in memory retrieval difficulties.  - Educated the patient on the purpose and process of neuropsychological testing.  - Referred the patient to a neuropsychologist for cognitive evaluation.    PITUITARY ADENOMA:  - Reviewed the patient's history of pituitary lump monitored by Dr. Leon, with the last MRI in 2022.  - Noted the patient's very low prolactin level.  - Explained the potential for non-functional pituitary adenoma growth and its implications.  - Ordered MRI of the abdomen with and WO Contrast.  - Instructed the patient to contact Dr. Hunter's office regarding specific neurosurgery referral details.    DIABETES:  - Monitored the patient's blood sugar control, with A1C at 6.0 and fasting sugar at 118.  - Evaluated blood sugar control as optimal.  - Continued metformin for diabetes  management.  - Monitored the patient's blood sugar control while on oral hypoglycemic drugs.  - Continued metformin for long-term diabetes management.    HYPERTENSION:  - Monitored the patient's blood pressure.  - Evaluated blood pressure as optimal.  - Continued lisinopril for hypertension management.    HYPERLIPIDEMIA:  - Monitored the patient's cholesterol levels, with LDL cholesterol at 52.  - Evaluated LDL cholesterol level as excellent.  - Continued atorvastatin for hyperlipidemia management.    ORTHOSTATIC HYPOTENSION:  - Noted the patient's experience of lightheadedness when changing positions.  - Attributed lightheadedness to metoprolol use.  - Discussed the mechanism of orthostatic hypotension related to metoprolol use.  - Advised the patient to slow down transitions when changing body positions to manage orthostatic symptoms.  - Recommend increasing water intake to maintain blood volume and reduce orthostatic hypotension.  - Continued metoprolol.        1. Type 2 diabetes mellitus without complication, without long-term current use of insulin    2. Hypercholesterolemia    3. Coronary artery disease involving native coronary artery of native heart without angina pectoris    4. Pituitary macroadenoma    5. Primary hyperparathyroidism    6. Prolactinoma    7. Pancreas cyst    8. Hypogonadism in male    9. Nephrolithiasis    10. Gastroesophageal reflux disease with esophagitis without hemorrhage    11. Encounter for colorectal cancer screening  - Fecal Immunochemical Test (iFOBT); Future    12. Anomic aphasia  - Ambulatory referral/consult to Adult Neuropsychology; Future      Chronic conditions status updated as per HPI.  Other than changes above, cont current medications and maintain follow up with specialists.  No follow-ups on file.    Future Appointments   Date Time Provider Department Center   4/28/2025 12:30 PM PERIMETRYEUFEMIA [facility] BUFFY [place]   4/28/2025  1:00 PM Rama Hernandez MD [facility]  OPHTHAL [place]   6/11/2025  2:20 PM Wali Nichols, OD [facility] OPTOMTY [place]           Past Medical History:  Past Medical History:   Diagnosis Date    Diabetes mellitus     Fuchs' corneal dystrophy     Kidney stones     Pancreatic mass     Pancreatitis     after EUS . 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, treated with dostenex(cabergoline)/ resolved    Reflux esophagitis     Tumor cells, benign        Past Surgical History:   has a past surgical history that includes Thyroidectomy (2007); Rhinoplasty tip (1975); Cataract extraction w/  intraocular lens implant (Right, 0); Corneal transplant (Right); Upper endoscopic ultrasound w/ FNA; Repair of retinal detachment with vitrectomy (Right, 7/18/2018); Repair of retinal detachment with vitrectomy (Left, 8/8/2018); Descemets stripping automated endothelial keratoplasty (Left, 3/21/2019); and Cataract extraction w/  intraocular lens implant (Left, 3/21/2019).    Family History:  family history includes Cataracts in his father; Diabetes in his father and paternal uncle; Heart disease in his father and mother; Hypertension in his mother; Stroke in his father and maternal grandmother.     Social History:  Social History[1]    Medications:  Encounter Medications[2]    Allergies:  Review of patient's allergies indicates:   Allergen Reactions    Grass pollen-ira grass standard     House dust        Health Maintenance:  Immunization History   Administered Date(s) Administered    COVID-19, MRNA, LN-S, PF (Pfizer) (Gray Cap) 04/22/2022    COVID-19, MRNA, LN-S, PF (Pfizer) (Purple Cap) 02/12/2021, 03/03/2021, 11/09/2021    COVID-19, mRNA, LNP-S, PF, julio cesar-sucrose, 30 mcg/0.3 mL (Pfizer Ages 12+) 10/18/2023, 11/24/2024    COVID-19, mRNA, LNP-S, bivalent booster, PF (PFIZER OMICRON) 12/16/2022    Hepatitis A, Adult  "04/11/2024, 10/11/2024    Influenza (FLUAD) - Quadrivalent - Adjuvanted - PF *Preferred* (65+) 09/20/2020, 10/08/2021, 10/13/2022, 12/22/2023    Influenza - Quadrivalent - High Dose - PF (65 years and older) 10/18/2023    Influenza - Quadrivalent - MDCK - PF 02/01/2018    Influenza - Quadrivalent - PF *Preferred* (6 months and older) 11/10/2019    Influenza - Trivalent - Fluad - Adjuvanted - PF (65 years and older 11/24/2024    Influenza - Trivalent - Fluzone High Dose - PF (65 years and older) 10/19/2018    Meningococcal Conjugate (MCV4P) 06/03/2022    Pneumococcal Conjugate - 20 Valent 02/06/2025    Pneumococcal Polysaccharide - 23 Valent 11/09/2020    Tdap 10/30/2020    Typhoid - ViCPs 04/11/2024    Zoster Recombinant 11/09/2020, 11/11/2020, 01/12/2021      Health Maintenance   Topic Date Due    RSV Vaccine (Age 60+ and Pregnant patients) (1 - Risk 60-74 years 1-dose series) Never done    Foot Exam  11/05/2021    Diabetic Eye Exam  06/05/2024    Colorectal Cancer Screening  08/21/2024    Diabetes Urine Screening  07/24/2025    Lipid Panel  07/24/2025    Hemoglobin A1c  08/18/2025    High Dose Statin  02/09/2026    Aspirin/Antiplatelet Therapy  02/09/2026    TETANUS VACCINE  10/30/2030    Hepatitis C Screening  Completed    Shingles Vaccine  Completed    Influenza Vaccine  Completed    COVID-19 Vaccine  Completed    Pneumococcal Vaccines (Age 50+)  Completed    DEXA Scan  Discontinued        Physical Exam      Vital Signs  Pulse: 64  SpO2: 96 %  BP: 110/60  BP Location: Left arm  Patient Position: Sitting  Pain Score: 0-No pain  Height and Weight  Height: 5' 9" (175.3 cm)  Weight: 79 kg (174 lb 2.6 oz)  BSA (Calculated - sq m): 1.96 sq meters  BMI (Calculated): 25.7  Weight in (lb) to have BMI = 25: 168.9]    Physical Exam    General: No acute distress. Well-developed. Well-nourished.  Eyes: EOMI. Sclerae anicteric.  HENT: Normocephalic. Atraumatic. Nares patent. Moist oral mucosa.  Ears: Bilateral TMs clear. " Bilateral EACs clear.  Cardiovascular: Regular rate. Regular rhythm. No murmurs. No rubs. No gallops. Normal S1, S2.  Respiratory: Normal respiratory effort. Clear to auscultation bilaterally. No rales. No rhonchi. No wheezing.  Abdomen: Soft. Non-tender. Non-distended. Normoactive bowel sounds.  Musculoskeletal: No  obvious deformity.  Extremities: No lower extremity edema.  Neurological: Alert & oriented x3. No slurred speech. Normal gait.  Psychiatric: Normal mood. Normal affect. Good insight. Good judgment.  Skin: Warm. Dry. No rash.         Physical Exam    Laboratory:    Results              CBC:  Recent Labs   Lab 11/11/23  1103 01/03/24  1053 06/29/24  1642 07/24/24  0725 02/18/25  1446   WBC 8.02 5.99 6.57  --   --    RBC 4.81 4.95 4.63  --   --    Hemoglobin 14.1 14.5 13.5 L   < > 15.1   Hematocrit 41.3 44.3 40.8   < > 47.2   Platelets 282 231 185  --   --    MCV 86 90 88  --   --    MCH 29.3 29.3 29.2  --   --    MCHC 34.1 32.7 33.1  --   --     < > = values in this interval not displayed.     CMP:  Recent Labs   Lab 07/24/24  0725 08/20/24  1209 01/31/25  1153 02/18/25  1446   Glucose 128 H 146 H   < > 107   Calcium 9.9 9.8   < > 10.1   Albumin 4.4 4.5   < > 4.5   Total Protein 6.9 7.0  --  7.0   Sodium 134 L 134 L   < > 140   Potassium 4.4 4.1   < > 5.2 H   CO2 27 26   < > 24   Chloride 100 101   < > 105   BUN 11 12   < > 12   Alkaline Phosphatase 67 54 L  --  78   ALT 24 16  --  20   AST 19 16  --  26   Total Bilirubin 0.4 0.7  --  0.4    < > = values in this interval not displayed.     URINALYSIS:  Recent Labs   Lab 03/09/22  1200 03/30/22  1441 04/25/22  1431 08/23/22  1331 04/07/23  0047 10/09/23  1708 11/09/23  1459 01/17/24  1414   Color, UA Yellow  --  Yellow  --  Yellow Yellow   < > Yellow   Clarity, UA  --   --  Clear  --   --   --   --   --    Spec Grav UA  --   --  1.025  --   --   --   --   --    Specific Gravity, UA 1.020  --   --   --  1.030 1.025   < > 1.020   pH, UA 6.0   < > 5   < > 6.0  5.0   < > 5.0   Protein, UA Negative  --   --   --  Trace A Trace A   < > Negative   Bacteria Rare  --   --   --  Many A  --   --   --    Nitrite, UA Negative  --   --   --  Positive A Negative   < > Negative   Leukocytes, UA Negative  --   --   --  2+ A Negative   < > Negative   Urobilinogen, UA  --   --   --    < > Negative Negative  --   --    Hyaline Casts, UA 1  --   --   --  28 A  --   --   --     < > = values in this interval not displayed.      LIPIDS:  Recent Labs   Lab 04/10/23  1524 01/03/24  1053 01/30/24  1435 06/29/24  1642 07/24/24  0725 02/18/25  1446   TSH  --   --  1.712 2.160  --  2.030   HDL 42 46  --   --  48  --    Cholesterol 146 103 L  --   --  122  --    Triglycerides 111 58  --   --  110  --    LDL Cholesterol 81.8 45.4 L  --   --  52.0 L  --    HDL/Cholesterol Ratio 28.8 44.7  --   --  39.3  --    Non-HDL Cholesterol 104 57  --   --  74  --    Total Cholesterol/HDL Ratio 3.5 2.2  --   --  2.5  --      TSH:  Recent Labs   Lab 01/30/24  1435 06/29/24  1642 02/18/25  1446   TSH 1.712 2.160 2.030     A1C:  Recent Labs   Lab 03/08/22  1543 09/13/22  1521 01/06/23  1011 07/24/23  1102 01/03/24  1053 07/24/24  0725 01/31/25  1153 02/18/25  1446   Hemoglobin A1C 6.6 H 6.1 H 6.3 H 6.2 H 6.1 H 6.3 H 6.0 H 6.1 H           This note was generated with the assistance of ambient listening technology. Verbal consent was obtained by the patient and accompanying visitor(s) for the recording of patient appointment to facilitate this note. I attest to having reviewed and edited the generated note for accuracy, though some syntax or spelling errors may persist. Please contact the author of this note for any clarification.      Erlin Mcpherson MD  [facility]                       [1]   Social History  Tobacco Use    Smoking status: Never     Passive exposure: Never    Smokeless tobacco: Never   Substance Use Topics    Alcohol use: Yes     Comment: social    Drug use: No   [2]   Outpatient Encounter  Medications as of 2025   Medication Sig Dispense Refill    aspirin (ECOTRIN) 81 MG EC tablet Take 81 mg by mouth every morning.       atorvastatin (LIPITOR) 80 MG tablet Take 1 tablet (80 mg total) by mouth once daily. 90 tablet 3    atovaquone-proguaniL (MALARONE) 250-100 mg Tab Take one tablet daily for malaria prevention. Begin one day before entering malarious area and continue for 1 week after return. 17 tablet 0    [] azithromycin (ZITHROMAX) 500 MG tablet Take 1 tablet (500 mg total) by mouth once daily. Take one tablet once a day x 3 days in the event of diarrhea. for 3 days 3 tablet 0    cabergoline (DOSTINEX) 0.5 mg tablet TAKE 1 TABLET(0.5 MG) BY MOUTH 2 TIMES A WEEK 24 tablet 3    dicyclomine (BENTYL) 10 MG capsule Take 10 mg by mouth 3 (three) times daily.      lisinopriL (PRINIVIL,ZESTRIL) 2.5 MG tablet Take 1 tablet (2.5 mg total) by mouth every morning. 90 tablet 3    loperamide (IMODIUM) 2 mg capsule Take 2 mg by mouth 4 (four) times daily as needed for Diarrhea.      metFORMIN (GLUCOPHAGE-XR) 500 MG ER 24hr tablet Take 2 tablets (1,000 mg total) by mouth 2 (two) times daily. 360 tablet 3    metoprolol succinate (TOPROL-XL) 25 MG 24 hr tablet Take 1 tablet (25 mg total) by mouth nightly. 90 tablet 3    [] pneumoc 20-madeleine conj-dip cr,PF, (PREVNAR-20, PF,) 0.5 mL Syrg injection Inject 0.5 mLs into the muscle once. for 1 dose 0.5 mL 0    tamsulosin (FLOMAX) 0.4 mg Cap Take 1 capsule (0.4 mg total) by mouth once daily. 10 capsule 0    testosterone (ANDROGEL) 1 % (50 mg/5 gram) GlPk APPLY CONTENTS OF 2 PACKETS TOPICALLY TO SKIN EVERY  g 3    triamcinolone acetonide 0.1% (KENALOG) 0.1 % cream SMARTSI Application Topical 2-3 Times Daily      [DISCONTINUED] cabergoline (DOSTINEX) 0.5 mg tablet TAKE 1 TABLET(0.5 MG) BY MOUTH 2 TIMES A WEEK 24 tablet 3    [DISCONTINUED] metFORMIN (GLUCOPHAGE-XR) 500 MG ER 24hr tablet Take 2 tablets (1,000 mg total) by mouth 2 (two) times daily. 360  tablet 3     Facility-Administered Encounter Medications as of 2/18/2025   Medication Dose Route Frequency Provider Last Rate Last Admin    pneumoc 20-madeleine conj-dip cr(PF) (PREVNAR-20 (PF)) injection Syrg 0.5 mL  0.5 mL Intramuscular 1 time in Clinic/HOD

## 2025-02-28 ENCOUNTER — TELEPHONE (OUTPATIENT)
Dept: NEUROSURGERY | Facility: CLINIC | Age: 75
End: 2025-02-28
Payer: COMMERCIAL

## 2025-02-28 NOTE — TELEPHONE ENCOUNTER
Called pt to schedule nsgy referral. Pt states he is currently moving things in a storage facility and does not have access to his calendar to confirm a date for scheduling nsgy appt. Will call pt again after [place] on Wednesday to schedule referral.

## 2025-03-11 ENCOUNTER — LAB VISIT (OUTPATIENT)
Dept: LAB | Facility: HOSPITAL | Age: 75
End: 2025-03-11
Attending: INTERNAL MEDICINE
Payer: COMMERCIAL

## 2025-03-11 DIAGNOSIS — Z12.11 ENCOUNTER FOR COLORECTAL CANCER SCREENING: ICD-10-CM

## 2025-03-11 DIAGNOSIS — Z12.12 ENCOUNTER FOR COLORECTAL CANCER SCREENING: ICD-10-CM

## 2025-03-11 LAB — HEMOCCULT STL QL IA: NEGATIVE

## 2025-03-11 PROCEDURE — 82274 ASSAY TEST FOR BLOOD FECAL: CPT | Performed by: INTERNAL MEDICINE

## 2025-03-12 ENCOUNTER — OFFICE VISIT (OUTPATIENT)
Dept: PODIATRY | Facility: CLINIC | Age: 75
End: 2025-03-12
Payer: COMMERCIAL

## 2025-03-12 ENCOUNTER — RESULTS FOLLOW-UP (OUTPATIENT)
Dept: PRIMARY CARE CLINIC | Facility: CLINIC | Age: 75
End: 2025-03-12

## 2025-03-12 VITALS
DIASTOLIC BLOOD PRESSURE: 75 MMHG | SYSTOLIC BLOOD PRESSURE: 128 MMHG | HEIGHT: 69 IN | WEIGHT: 174.19 LBS | HEART RATE: 67 BPM | BODY MASS INDEX: 25.8 KG/M2

## 2025-03-12 DIAGNOSIS — E11.9 ENCOUNTER FOR DIABETIC FOOT EXAM: ICD-10-CM

## 2025-03-12 DIAGNOSIS — L60.0 INGROWN NAIL: Chronic | ICD-10-CM

## 2025-03-12 DIAGNOSIS — M79.674 TOE PAIN, RIGHT: Primary | ICD-10-CM

## 2025-03-12 DIAGNOSIS — E11.9 TYPE 2 DIABETES MELLITUS WITHOUT COMPLICATION, WITHOUT LONG-TERM CURRENT USE OF INSULIN: ICD-10-CM

## 2025-03-12 DIAGNOSIS — B35.1 DERMATOPHYTOSIS OF NAIL: Chronic | ICD-10-CM

## 2025-03-12 PROCEDURE — 1160F RVW MEDS BY RX/DR IN RCRD: CPT | Mod: CPTII,S$GLB,, | Performed by: PODIATRIST

## 2025-03-12 PROCEDURE — 3074F SYST BP LT 130 MM HG: CPT | Mod: CPTII,S$GLB,, | Performed by: PODIATRIST

## 2025-03-12 PROCEDURE — 3008F BODY MASS INDEX DOCD: CPT | Mod: CPTII,S$GLB,, | Performed by: PODIATRIST

## 2025-03-12 PROCEDURE — 99999 PR PBB SHADOW E&M-EST. PATIENT-LVL IV: CPT | Mod: PBBFAC,,, | Performed by: PODIATRIST

## 2025-03-12 PROCEDURE — 1159F MED LIST DOCD IN RCRD: CPT | Mod: CPTII,S$GLB,, | Performed by: PODIATRIST

## 2025-03-12 PROCEDURE — 1101F PT FALLS ASSESS-DOCD LE1/YR: CPT | Mod: CPTII,S$GLB,, | Performed by: PODIATRIST

## 2025-03-12 PROCEDURE — 3288F FALL RISK ASSESSMENT DOCD: CPT | Mod: CPTII,S$GLB,, | Performed by: PODIATRIST

## 2025-03-12 PROCEDURE — 1126F AMNT PAIN NOTED NONE PRSNT: CPT | Mod: CPTII,S$GLB,, | Performed by: PODIATRIST

## 2025-03-12 PROCEDURE — 3044F HG A1C LEVEL LT 7.0%: CPT | Mod: CPTII,S$GLB,, | Performed by: PODIATRIST

## 2025-03-12 PROCEDURE — 3078F DIAST BP <80 MM HG: CPT | Mod: CPTII,S$GLB,, | Performed by: PODIATRIST

## 2025-03-12 PROCEDURE — 1157F ADVNC CARE PLAN IN RCRD: CPT | Mod: CPTII,S$GLB,, | Performed by: PODIATRIST

## 2025-03-12 PROCEDURE — 99213 OFFICE O/P EST LOW 20 MIN: CPT | Mod: S$GLB,,, | Performed by: PODIATRIST

## 2025-03-12 NOTE — PROGRESS NOTES
CHIEF CONCERN: Toe Pain (Right Big Toe Hurting)         HPI:    Sadiq Dhillon ,  is a 75 y.o. male with medical history of  diabetes, with concerns of painful toenail on the right hallux.    The pain started months ago.    Pain aggravated by direct pressure and close toe shoes.  Specially in hard shoes.  Athletic shoes are more comfortable  Patient denies acute trauma to the toe.    [place] be going out of country in about a week    Patient Active Problem List   Diagnosis    Unspecified essential hypertension    Type 2 diabetes mellitus without complication, without long-term current use of insulin    Chest pain, unspecified    Reflux esophagitis    Fuch's endothelial dystrophy    Fuchs' corneal dystrophy    Nuclear sclerotic cataract of right eye    Full thickness macular hole, bilateral    Vitreomacular adhesion, bilateral    Full thickness macular hole, right    Macular hole, left    Nuclear sclerotic cataract of left eye    Pancreas cyst    Prolactinoma    Primary hyperparathyroidism    Type 2 diabetes mellitus with hyperglycemia    Hypogonadism in male    Nephrolithiasis    Pituitary macroadenoma    Hypercholesterolemia    Pancreatic lesion    History of pancreatitis    Hypovitaminosis D    Coronary artery disease involving native coronary artery of native heart without angina pectoris    Rotator cuff syndrome    Right lower quadrant abdominal pain    Median arcuate ligament syndrome    Abnormal CT of the abdomen    Lymphadenitis    Sinus problem    Calculus of urinary bladder           Current Outpatient Medications on File Prior to Visit   Medication Sig Dispense Refill    aspirin (ECOTRIN) 81 MG EC tablet Take 81 mg by mouth every morning.       atorvastatin (LIPITOR) 80 MG tablet Take 1 tablet (80 mg total) by mouth once daily. 90 tablet 3    atovaquone-proguaniL (MALARONE) 250-100 mg Tab Take one tablet daily for malaria prevention. Begin one day before entering malarious area and continue for 1  "week after return. 17 tablet 0    cabergoline (DOSTINEX) 0.5 mg tablet TAKE 1 TABLET(0.5 MG) BY MOUTH 2 TIMES A WEEK 24 tablet 3    dicyclomine (BENTYL) 10 MG capsule Take 10 mg by mouth 3 (three) times daily.      lisinopriL (PRINIVIL,ZESTRIL) 2.5 MG tablet Take 1 tablet (2.5 mg total) by mouth every morning. 90 tablet 3    loperamide (IMODIUM) 2 mg capsule Take 2 mg by mouth 4 (four) times daily as needed for Diarrhea.      metFORMIN (GLUCOPHAGE-XR) 500 MG ER 24hr tablet Take 2 tablets (1,000 mg total) by mouth 2 (two) times daily. 360 tablet 3    metoprolol succinate (TOPROL-XL) 25 MG 24 hr tablet Take 1 tablet (25 mg total) by mouth nightly. 90 tablet 3    tamsulosin (FLOMAX) 0.4 mg Cap Take 1 capsule (0.4 mg total) by mouth once daily. 10 capsule 0    testosterone (ANDROGEL) 1 % (50 mg/5 gram) GlPk APPLY CONTENTS OF 2 PACKETS TOPICALLY TO SKIN EVERY  g 3    triamcinolone acetonide 0.1% (KENALOG) 0.1 % cream SMARTSI Application Topical 2-3 Times Daily       Current Facility-Administered Medications on File Prior to Visit   Medication Dose Route Frequency Provider Last Rate Last Admin    pneumoc 20-madeleine conj-dip cr(PF) (PREVNAR-20 (PF)) injection Syrg 0.5 mL  0.5 mL Intramuscular 1 time in Clinic/HOD                 Review of patient's allergies indicates:   Allergen Reactions    Grass pollen- grass standard     House dust          ROS:  General ROS: negative for chills, fatigue or fever  Cardiovascular ROS: no chest pain or dyspnea on exertion  Musculoskeletal ROS: negative for - joint pain or joint stiffness.  Negative for loss of strength  Neuro ROS: Negative for syncope, numbness, or muscle weakness  Skin ROS: Negative for rash, itching or hair changes.  +Toenail changes        EXAM:   Vitals:    25 1436   BP: 128/75   Pulse: 67   Weight: 79 kg (174 lb 2.6 oz)   Height: 5' 9" (1.753 m)       LOWER EXTREMITY EXAM:    Vascular:    Dorsalis pedis and posterior tibial pulses are palpable. " capillary refill time is within normal limits   Toes are warm to touch.    There is  presence of digital hair.    There is  no localized edema to the affected toe.     Neurological:    Light touch, proprioception, and sharp/dull sensation are all intact.   Mulders click:  Absent  Tinels:  Absent.   No LOPS    Dermatological:    The toenail is incurvated, Medial border of the right hallux.      no erythema noted to the affected area.     Absent paronychia.     Absent abscess  Other toenails are mycotic appearing with yellow discoloration and subungual debris      Musculoskeletal:    Muscle strength is 5/5 in all groups .    No swelling/crepitus at the interphalangeal joints.        ASSESSMENT/PLAN     Problem List Items Addressed This Visit       Type 2 diabetes mellitus without complication, without long-term current use of insulin     Other Visit Diagnoses         Toe pain, right    -  Primary      Ingrown nail  (Chronic)         Encounter for diabetic foot exam          Dermatophytosis of nail  (Chronic)                 I counseled the patient on the patient's  conditions, their implications and medical management.     General nail care measures for abnormal nails include:  Keeping nails trimmed short, but avoid overzealous trimming  Avoiding trauma   Avoiding contact irritants   Keeping nails dry (avoiding wet work)  Avoiding all nail cosmetics  Wearing shoes that fit well at the toe box.  Avoid tight shoes.     I trimmed the ingrown border today.  Patient reported relief without immediate complication.    Shoe and activity modification as needed for relief.  Avoid hard shoes.  Stretch shoes in the forefoot as needed.      Follow-up 2 months for foot exam and care.

## 2025-04-08 ENCOUNTER — OFFICE VISIT (OUTPATIENT)
Dept: NEUROSURGERY | Facility: CLINIC | Age: 75
End: 2025-04-08
Payer: COMMERCIAL

## 2025-04-08 VITALS — DIASTOLIC BLOOD PRESSURE: 67 MMHG | HEART RATE: 67 BPM | SYSTOLIC BLOOD PRESSURE: 120 MMHG

## 2025-04-08 DIAGNOSIS — D35.2 PITUITARY MACROADENOMA: ICD-10-CM

## 2025-04-08 PROCEDURE — 3044F HG A1C LEVEL LT 7.0%: CPT | Mod: CPTII,S$GLB,, | Performed by: NEUROLOGICAL SURGERY

## 2025-04-08 PROCEDURE — 4010F ACE/ARB THERAPY RXD/TAKEN: CPT | Mod: CPTII,S$GLB,, | Performed by: NEUROLOGICAL SURGERY

## 2025-04-08 PROCEDURE — 1157F ADVNC CARE PLAN IN RCRD: CPT | Mod: CPTII,S$GLB,, | Performed by: NEUROLOGICAL SURGERY

## 2025-04-08 PROCEDURE — 3288F FALL RISK ASSESSMENT DOCD: CPT | Mod: CPTII,S$GLB,, | Performed by: NEUROLOGICAL SURGERY

## 2025-04-08 PROCEDURE — 1101F PT FALLS ASSESS-DOCD LE1/YR: CPT | Mod: CPTII,S$GLB,, | Performed by: NEUROLOGICAL SURGERY

## 2025-04-08 PROCEDURE — 99203 OFFICE O/P NEW LOW 30 MIN: CPT | Mod: S$GLB,,, | Performed by: NEUROLOGICAL SURGERY

## 2025-04-08 PROCEDURE — 1126F AMNT PAIN NOTED NONE PRSNT: CPT | Mod: CPTII,S$GLB,, | Performed by: NEUROLOGICAL SURGERY

## 2025-04-08 PROCEDURE — 3074F SYST BP LT 130 MM HG: CPT | Mod: CPTII,S$GLB,, | Performed by: NEUROLOGICAL SURGERY

## 2025-04-08 PROCEDURE — 99999 PR PBB SHADOW E&M-EST. PATIENT-LVL IV: CPT | Mod: PBBFAC,,, | Performed by: NEUROLOGICAL SURGERY

## 2025-04-08 PROCEDURE — 3078F DIAST BP <80 MM HG: CPT | Mod: CPTII,S$GLB,, | Performed by: NEUROLOGICAL SURGERY

## 2025-04-08 PROCEDURE — 1160F RVW MEDS BY RX/DR IN RCRD: CPT | Mod: CPTII,S$GLB,, | Performed by: NEUROLOGICAL SURGERY

## 2025-04-08 PROCEDURE — 1159F MED LIST DOCD IN RCRD: CPT | Mod: CPTII,S$GLB,, | Performed by: NEUROLOGICAL SURGERY

## 2025-04-08 NOTE — PROGRESS NOTES
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 Patient had interval MRI July 2024 with cystic change as previously seen. Continues to follow with Endocrinology, last seen Feb 2025. Remains on Cabergoline.     Patient reports infrequent clear fluid drainage from his nose, more from the right nostril than the left.  This occurs once every 3 weeks.  He does state that it is watery and clear.  It has been less frequent recently.  He denies any postural headaches.    HPI:  Sadiq Dhillon is a 75 y.o.  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  He was seeing Dr Leon (endocrinology) for pancreatitis, who suggested getting MRI to evaluate stability of pituitary tumor.  MRI shows cystic area in region of prior tumor.  Other than Fuchs' dystrophy, patient denies any vision problems.  He continues to take cabergoline and has not had any hormone related symptoms/changes.    Review of patient's allergies indicates:   Allergen Reactions    Grass pollen-june grass standard     House dust        Past Medical History:   Diagnosis Date    Diabetes mellitus     Fuchs' corneal dystrophy     Kidney stones     Pancreatic mass     Pancreatitis     after EUS . 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, treated with dostenex(cabergoline)/ resolved    Reflux esophagitis     Tumor cells, benign      Past Surgical History:   Procedure Laterality Date    CATARACT EXTRACTION W/  INTRAOCULAR LENS IMPLANT Right 0    Dr Van     CATARACT EXTRACTION W/  INTRAOCULAR LENS IMPLANT Left 3/21/2019    Procedure: EXTRACTION, CATARACT, WITH IOL INSERTION;  Surgeon: Ra Van MD;  Location: [facility];  Service: Ophthalmology;   Laterality: Left;    CORNEAL TRANSPLANT Right     Dr Van     DESCEMETS STRIPPING AUTOMATED ENDOTHELIAL KERATOPLASTY Left 3/21/2019    Procedure: TRANSPLANT, PARTIAL-THICKNESS, CORNEA, USING DSAEK TECHNIQUE;  Surgeon: Ra Van MD;  Location: [facility];  Service: Ophthalmology;  Laterality: Left;  DSEK    REPAIR OF RETINAL DETACHMENT WITH VITRECTOMY Right 7/18/2018    Procedure: REPAIR, RETINAL DETACHMENT, WITH VITRECTOMY;  Surgeon: SHUBHAM Patel MD;  Location: [facility] OR [address] [place] [address];  Service: Ophthalmology;  Laterality: Right;  40 min    REPAIR OF RETINAL DETACHMENT WITH VITRECTOMY Left 8/8/2018    Procedure: REPAIR, RETINAL DETACHMENT, WITH VITRECTOMY;  Surgeon: SHUBHAM Patel MD;  [facility];  Service: Ophthalmology;  Laterality: Left;  40 min    RHINOPLASTY TIP  1975    THYROIDECTOMY  2007    UPPER ENDOSCOPIC ULTRASOUND W/ FNA      with resultant pancreatitis     Family History   Problem Relation Name Age of Onset    Hypertension Mother      Heart disease Mother      Heart disease Father      Cataracts Father      Stroke Father      Diabetes Father      Diabetes Paternal Uncle      Stroke Maternal Grandmother      Blindness Neg Hx      Glaucoma Neg Hx      Macular degeneration Neg Hx      Retinal detachment Neg Hx      Strabismus Neg Hx      Thyroid disease Neg Hx      Cancer Neg Hx      Amblyopia Neg Hx       Social History     Tobacco Use    Smoking status: Never     Passive exposure: Never    Smokeless tobacco: Never   Substance Use Topics    Alcohol use: Yes     Comment: social    Drug use: No        Review of Systems   Constitutional: Negative.    HENT:  Negative for congestion, ear discharge, ear pain, hearing loss, nosebleeds, sinus pain and tinnitus.    Eyes: Negative.    Respiratory: Negative.     Cardiovascular:  Negative for chest pain, palpitations, claudication and leg swelling.   Gastrointestinal:  Negative for abdominal pain, blood in stool, constipation, diarrhea, melena  and vomiting.   Genitourinary:  Negative for flank pain, frequency and urgency.   Musculoskeletal: Negative.  Negative for falls.   Skin: Negative.    Neurological: Negative.    Endo/Heme/Allergies:  Does not bruise/bleed easily.   Psychiatric/Behavioral: Negative.         OBJECTIVE:   Physical Exam:  Constitutional: Patient sitting comfortably in chair. Appears well developed and well nourished.  Skin: Exposed areas are intact without abnormal markings, rashes or other lesions.  HEENT: Normocephalic. Normal conjunctivae.  Cardiovascular: Normal rate and regular rhythm.  Respiratory: Chest wall rises and falls symmetrically, without signs of respiratory distress.  Abdomen: Soft and non-tender.  Extremities: Warm and without edema. Calves supple, non-tender.  Psych/Behavior: Normal affect.    Neurological:    Mental status: Alert and oriented. Conversational and appropriate.       Cranial Nerves: VFF to confrontation. PERRL. EOMI without nystagmus. Facial STLT normal and symmetric. Strong, symmetric muscles of mastication. Facial strength full and symmetric. Hearing equal bilaterally to finger rub. Palate and uvula rise and fall normally in midline. Shoulder shrug 5/5 strength. Tongue midline.     Motor:    Upper:  Deltoids Triceps Biceps WE WF     R 5/5 5/5 5/5 5/5 5/5 5/5    L 5/5 5/5 5/5 5/5 5/5 5/5      Lower:  HF KE KF DF PF EHL    R 5/5 5/5 5/5 5/5 5/5 5/5    L 5/5 5/5 5/5 5/5 5/5 5/5     Sensory: Intact sensation to light touch in all extremities. Romberg negative.    Reflexes:          DTR: 2+ symmetrically throughout.     Agustin's: Negative.     Babinski's: Negative.     Clonus: Negative.    Cerebellar: Finger-to-nose and rapid alternating movements normal. Gait stable, fluid.      Diagnostic Results:  All imaging was independently reviewed by me.    MRI brain, dated 07/24/24:  1. Suprasellar loculated cyst in area of prior macro-prolactinoma. Grossly stable in size        MRI brain, dated 11/4/20:  1.  Suprasellar loculated cyst in area of prior macro-prolactinoma    ASSESSMENT/PLAN:     Problem List Items Addressed This Visit          Endocrine    Pituitary macroadenoma    Relevant Orders    MRI Pituitary W W/O Contrast         Sadiq Dhillon has history of macro prolactinoma that was successfully treated with cabergoline.  He continues to take cabergoline and most recent MRI demonstrates loculated cyst in the area of the prior macroadenoma.  Recent pituitary hormone labs were within normal limits especially his prolactin which measured at <0.8 in Feb 2025, grossly stable compared to prior lab work. Most importantly, he does not report any vision changes or any signs or symptoms concerning for hormone abnormalities.  I will plan to see him back in 1 year with a surveillance MRI.     1. RTC in 2yrs with pituitary MRI  2. Cont to follow up with Endo as scheduled  3. Referral to Dr. Hernandez - antonio to see 4/28/25  4. Consider referral to Dr. Richard if continues to have nasal drainage    The patient understands and agrees with the plan of care. All questions were answered.    Time spent on this encounter: 30 minutes. This includes face-to-face time and non-face to face time preparing to see the patient (eg, review of tests), obtaining and/or reviewing separately obtained history, documenting clinical information in the electronic or other health record, independently interpreting results and communicating results to the patient/family/caregiver, or care coordinator.          .

## 2025-04-28 ENCOUNTER — CLINICAL SUPPORT (OUTPATIENT)
Dept: OPHTHALMOLOGY | Facility: CLINIC | Age: 75
End: 2025-04-28
Payer: COMMERCIAL

## 2025-04-28 NOTE — PROGRESS NOTES
oct/hvf ou done/ou/rel/fix/coop.good/patient chart checked for allergies/od.+2.00 +1.00 x174/os.-1.00 +1.25 x177/ej.        Assessment /Plan     For exam results, see Encounter Report.    There are no diagnoses linked to this encounter.

## 2025-05-05 ENCOUNTER — PATIENT MESSAGE (OUTPATIENT)
Dept: SPORTS MEDICINE | Facility: CLINIC | Age: 75
End: 2025-05-05
Payer: COMMERCIAL

## 2025-05-05 ENCOUNTER — PATIENT MESSAGE (OUTPATIENT)
Dept: PRIMARY CARE CLINIC | Facility: CLINIC | Age: 75
End: 2025-05-05
Payer: COMMERCIAL

## 2025-05-08 NOTE — PROGRESS NOTES
"  Date:  5/12/2025    ?  Referring Provider:   Chyna Gee MD    Copies of Letters to the Following:   Chyna Gee MD    Chief Complaint:  I saw Sadiq Dhillon at the [facility] for neuro-ophthalmic evaluation.   He is a 75 y.o. male with a history of HLD, HTN, Fuch's corneal dystrophy, vitreomacular adhesion OU, macular hole OU, NS OU, T2DM, pituitary macroadenoma who presents for follow up of formal visual fields.    History:     He reports no new visual complaints, has had very rare horizontal binocular diplopia at symphony after a glass of wine, none outside of that context. He recalls having shadows closing in on his vision leading to diagnosis of the macroadenoma. Has responded well to medical management. At times has some overlapping images at near.     4/27/2021 [place]:  "He had previously been seen at the [facility] neuroendocrine center. He  had a giant macroprolactinoma diagnosed in 2003 (6.5 cm adenoma w. initial prl >03223) found on evaluation for visual field changes. He also had a background history of primary hyperparathyroidism for which he had a prior parathyroidectomy and a pancreatic mass. Due to clinical concern for MEN-1 he had genetic testing which was negative in 10/2020.       Interval Hx:  Doing well since last visit, no HA or vision change over past 6 mo (reports initial VF deficits resolved).  Had blx corneal txp in 2017 and 2018.    I have independently reviewed the Last pituitary MRI from November 2020 shows resolution of adenoma with small amount pituitary tissue at the base of the sella and pushed to the right aspect of the sella with pituitary stalk deviated to the right and some tethering of the optic chiasm as it is deviated downward.     Will continue current dose of cabergoline 5 mg twice weekly as levels have been low to normal on this dose.  Will have him get baseline visual field testing done with Neuro-Ophthalmology.  If no clinical change and prolactin remains " "normal we do not need to get frequent MRIs."    6/2018 [place]:  "I found no evidence of chiasmal compression or damage to cranial nerves involved in extraocular motility related to the pituitary adenoma. He will be seeing Dr. Patel regarding the possibility of macular hole formation in both eyes. I will repeat his exam and visual field testing in one year."  ?  Interval History: 5/12/2025    HPI    Referred: Dr. Hunter    75 male present to Neuro-ophthalmic clinic for Pituitary macroadenoma   evaluation. He reports overall doing well. Occasional dry eyes, not self   treating at this time. No diplopia, no headaches, no ptosis, no flashes of   lights, or changes in color vision reported.     Eyemeds  No gtts      Last edited by Taisha Alvarez on 5/12/2025  1:56 PM.          4/8/2025 [place]  "Sadiq Dhillon has history of macro prolactinoma that was successfully treated with cabergoline.  He continues to take cabergoline and most recent MRI demonstrates loculated cyst in the area of the prior macroadenoma.  Recent pituitary hormone labs were within normal limits especially his prolactin which measured at <0.8 in Feb 2025, grossly stable compared to prior lab work. Most importantly, he does not report any vision changes or any signs or symptoms concerning for hormone abnormalities.  I will plan to see him back in 1 year with a surveillance MRI.      1. RTC in 2yrs with pituitary MRI  2. Cont to follow up with Endo as scheduled  3. Referral to Dr. Hernandez - antonio to see 4/28/25  4. Consider referral to Dr. Richard if continues to have nasal drainage   "    Current Outpatient Medications   Medication Sig Dispense Refill    aspirin (ECOTRIN) 81 MG EC tablet Take 81 mg by mouth every morning.       atorvastatin (LIPITOR) 80 MG tablet Take 1 tablet (80 mg total) by mouth once daily. 90 tablet 3    atovaquone-proguaniL (MALARONE) 250-100 mg Tab Take one tablet daily for malaria prevention. Begin one day before entering " malarious area and continue for 1 week after return. 17 tablet 0    cabergoline (DOSTINEX) 0.5 mg tablet TAKE 1 TABLET(0.5 MG) BY MOUTH 2 TIMES A WEEK 24 tablet 3    dicyclomine (BENTYL) 10 MG capsule Take 10 mg by mouth 3 (three) times daily.      lisinopriL (PRINIVIL,ZESTRIL) 2.5 MG tablet Take 1 tablet (2.5 mg total) by mouth every morning. 90 tablet 3    loperamide (IMODIUM) 2 mg capsule Take 2 mg by mouth 4 (four) times daily as needed for Diarrhea.      metFORMIN (GLUCOPHAGE-XR) 500 MG ER 24hr tablet Take 2 tablets (1,000 mg total) by mouth 2 (two) times daily. 360 tablet 3    metoprolol succinate (TOPROL-XL) 25 MG 24 hr tablet Take 1 tablet (25 mg total) by mouth nightly. 90 tablet 3    tamsulosin (FLOMAX) 0.4 mg Cap Take 1 capsule (0.4 mg total) by mouth once daily. 10 capsule 0    testosterone (ANDROGEL) 1 % (50 mg/5 gram) GlPk APPLY CONTENTS OF 2 PACKETS TOPICALLY TO SKIN EVERY  g 3     Current Facility-Administered Medications   Medication Dose Route Frequency Provider Last Rate Last Admin    pneumoc 20-madeleine conj-dip cr(PF) (PREVNAR-20 (PF)) injection Syrg 0.5 mL  0.5 mL Intramuscular 1 time in Clinic/HOD          Review of patient's allergies indicates:   Allergen Reactions    Grass pollen-june grass standard     House dust      Past Medical History:   Diagnosis Date    Diabetes mellitus     Fuchs' corneal dystrophy     Kidney stones     Pancreatic mass     Pancreatitis     after EUS . 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, treated with dostenex(cabergoline)/ resolved    Reflux esophagitis     Tumor cells, benign      Past Surgical History:   Procedure Laterality Date    CATARACT EXTRACTION W/  INTRAOCULAR LENS IMPLANT Right 0    Dr Van     CATARACT EXTRACTION W/  INTRAOCULAR LENS IMPLANT Left 3/21/2019    Procedure: EXTRACTION, CATARACT,  WITH IOL INSERTION;  Surgeon: Ra Van MD;  Location: [facility];  Service: Ophthalmology;  Laterality: Left;    CORNEAL TRANSPLANT Right     Dr Van     DESCEMETS STRIPPING AUTOMATED ENDOTHELIAL KERATOPLASTY Left 3/21/2019    Procedure: TRANSPLANT, PARTIAL-THICKNESS, CORNEA, USING DSAEK TECHNIQUE;  Surgeon: Ra Van MD;  Location: [facility];  Service: Ophthalmology;  Laterality: Left;  DSEK    REPAIR OF RETINAL DETACHMENT WITH VITRECTOMY Right 7/18/2018    Procedure: REPAIR, RETINAL DETACHMENT, WITH VITRECTOMY;  Surgeon: SHUBHAM Patel MD;  [facility];  Service: Ophthalmology;  Laterality: Right;  40 min    REPAIR OF RETINAL DETACHMENT WITH VITRECTOMY Left 8/8/2018    Procedure: REPAIR, RETINAL DETACHMENT, WITH VITRECTOMY;  Surgeon: SHUBHAM Patel MD;  [facility];  Service: Ophthalmology;  Laterality: Left;  40 min    RHINOPLASTY TIP  1975    THYROIDECTOMY  2007    UPPER ENDOSCOPIC ULTRASOUND W/ FNA      with resultant pancreatitis     Family History   Problem Relation Name Age of Onset    Hypertension Mother      Heart disease Mother      Heart disease Father      Cataracts Father      Stroke Father      Diabetes Father      Diabetes Paternal Uncle      Stroke Maternal Grandmother      Blindness Neg Hx      Glaucoma Neg Hx      Macular degeneration Neg Hx      Retinal detachment Neg Hx      Strabismus Neg Hx      Thyroid disease Neg Hx      Cancer Neg Hx      Amblyopia Neg Hx       Social History     Socioeconomic History    Marital status: Single   Tobacco Use    Smoking status: Never     Passive exposure: Never    Smokeless tobacco: Never   Substance and Sexual Activity    Alcohol use: Yes     Comment: social    Drug use: No    Sexual activity: Not Currently     Social Drivers of Health     Financial Resource Strain: Low Risk  (4/27/2025)    Overall Financial Resource Strain (CARDIA)     Difficulty of Paying Living Expenses: Not very hard   Food Insecurity: No  Food Insecurity (4/27/2025)    Hunger Vital Sign     Worried About Running Out of Food in the Last Year: Never true     Ran Out of Food in the Last Year: Never true   Transportation Needs: No Transportation Needs (4/27/2025)    PRAPARE - Transportation     Lack of Transportation (Medical): No     Lack of Transportation (Non-Medical): No   Physical Activity: Insufficiently Active (4/27/2025)    Exercise Vital Sign     Days of Exercise per Week: 5 days     Minutes of Exercise per Session: 20 min   Stress: No Stress Concern Present (4/27/2025)    [facility] of Occupational [facility] - Occupational Stress Questionnaire     Feeling of Stress : Only a little   Housing Stability: Low Risk  (4/27/2025)    Housing Stability Vital Sign     Unable to Pay for Housing in the Last Year: No     Number of Times Moved in the Last Year: 0     Homeless in the Last Year: No       Examination:  He was well-appearing. He was alert and oriented. Attention span and concentration were normal. Speech, language, memory, and general knowledge were intact.     His distance visual acuity with correction was 20/40  (PH 20/30) in the right eye and 20/60  (NIPH) in the left eye.  His near visual acuity with correction was J1 in the right eye and J3 in the left eye.      He perceived 8/8 OD and 8/8 OS Ishihara color plates correctly. Pupils were brisk to light without an afferent defect. Ocular ductions were full. Orthophoric in primary, right, and left gaze by cross cover. There was no nystagmus. Saccades and pursuits were normal. Lids were symmetric.     Optic discs notable for peripapillary atrophy. Pupillary dilation was not necessary for visualization of the optic disc today.     On the remainder of the neurologic examination, facial sensation was intact. Face was symmetric.     Laboratories Reviewed:     n/a  ?  Neuroimaging Reviewed:     11/2020 MRI pituitary w/wo contrast     1. The study is abnormal.  There is no comparison MRI.  There  is no acute brain abnormality.  There is no infarction or hemorrhage.  There is no hydrocephalus or midline shift.  There is mild volume loss.  2. Head CT dated 08/25/2006 demonstrates a similar abnormality as seen on current MRI.  There is a large cystic lesion filling and expanding the sella and suprasellar cistern as well as bulging anteriorly and inferiorly into the sphenoid sinus and posteriorly into a retro clival location narrowing the prepontine cistern.  There is a provided history of macro prolactinoma.  The infundibulum is deviated to the right and attach is to soft tissue material which could represent normal pituitary tissue in the floor of the sella laterally into the right.  The optic chiasm is tethered inferiorly and tilted towards the left.  This cystic mass also extends into the left cavernous sinus.  Comparison with prior MRI would be necessary to make direct comparison.    7/2024 MRI pituitary w.wo contrast  Sella: Expanded sella with large fluid/CSF signal, reported to reflect treated macroadenoma.  Stable appearance of the treatment bed without new nodular enhancement to suggest recurrent lesion.  Persistent rightward deviation of the infundibulum in presumed residual pituitary tissue.  Stable appearance of the cavernous sinuses..  Persistent inferior retraction of the optic chiasm without compression.  There is bony remodeling of the sella and clivus similar to prior..     Ventricles and sulci are similar in size and configuration, without evidence of hydrocephalus.     No extra-axial blood or fluid collections.     The brain parenchyma maintains normal signal intensity.  No new mass lesion, acute hemorrhage, edema, or acute infarct.     No new enhancing lesion.     Normal vascular flow voids are preserved.     Stable 1 cm enhancing calvarial lesion in the left frontoparietal junction nonspecific but may represent a hemangioma, stable from priors.     Impression:     Overall stable appearance  of  previously treated pituitary mass as above.     No new enhancing lesion.     ?  Ocular Imaging, Photos, Records Reviewed:     HVF 6/2018 OD BSE, OS mild gen dep, worse temporally    OCT RNFL  6/23/2021:   Right Eye - Average RNFL 85 inferior thinning   Left Eye - Average RNFL 77 borderline temporal and inferior thinning     Abnormal macular architecture as described on prior exams    OCT RNFL  4/28/2025:   Right Eye - Average RNFL 83 inferior thinning   Left Eye - Average RNFL 77 borderline temporal and inferior thinning     Visual Field Test 24-2 OU 6/23/2021: Right Eye - fixation losses 4/10, false positives 5%, false negatives 0%, MD -1.71dB, Impression OD: mild paracentral scotoma. Left Eye - fixation losses 0/12, false positives 0%, false negatives 0%, MD -5.64dB, Impression OS: moderate depression temporally, mild points of nasal depression.  ?  Visual Field Test 24-2 OU 4/28/2025: Right Eye - fixation losses 0/11, false positives 5%, false negatives 4%, MD -3.10dB, Impression OD: mild paracentral scotoma. Left Eye - fixation losses 0/11, false positives 2%, false negatives 3%, MD -4.52dB, Impression OS: moderate temporal/paracentral depression.    Impression:  Sadiq Iglesiaszo has HLD, HTN, Fuch's corneal dystrophy, vitreomacular adhesion OU, macular hole OU, NS OU, T2DM, pituitary macroadenoma who presents for evaluation of formal visual fields. He has been subjectively stable on medical management for prolactinoma. Decision underway regarding frequency of follow up imaging. His examination today is notable for reduced visual acuity OS, full eye movements except limited supraduction OD, and very subtle ocular misalignment which may relate to left cavernous sinus involvement in the past. His OCT reveals some RNFL thinning. HVF improved from prior in 2018. I recommended follow up in 6 months and agree with decreasing frequency of neuroimaging. He will be sure to inform of any new vision changes, shadows  in vision, or diplopia.     5/12/2025: Since our last visit he had stable neuroimaging in 7/2024. Planning for neuroimaging [id number]. Due for follow up with Dr. Nichols. He was happy with his last refraction!      ?  Plan:  1. Follow up in endocrinology and neurosurgery clinics as planned  2. Next surveillance MRI in 7/2026 as planned  3. Yearly routine exams and refraction in optometry, will arrange follow up      Follow-up:  I will see him in follow-up in 1 year or sooner with any change.  OCT and HVF?  ?  Visit Checklist (as applicable):  1. Status of new and prior symptoms discussed? yes  2. Neuroimaging reviewed/ ordered as appropriate? yes  3. Ocular imaging and photos reviewed/ ordered as appropriate? yes  4. Plan for work-up and treatment discussed with patient? yes  5. Potential medication side-effects and monitoring plan discussed? n/a  6. Review of outside medical records was performed and pertinent details are summarized in the HPI above? N/a    Time spent on this encounter: 45 minutes. This includes face to face time and non-face to face time preparing to see the patient (eg, review of tests), obtaining and/or reviewing separately obtained history, documenting clinical information in the electronic or other health record, independently interpreting results and communicating results to the patient/family/caregiver, or care coordinator.      CATHIE Ferrera  Neuro-Ophthalmology Consultant

## 2025-05-12 ENCOUNTER — OFFICE VISIT (OUTPATIENT)
Dept: OPHTHALMOLOGY | Facility: CLINIC | Age: 75
End: 2025-05-12
Payer: COMMERCIAL

## 2025-05-12 DIAGNOSIS — D35.2 PITUITARY MACROADENOMA: ICD-10-CM

## 2025-05-12 PROCEDURE — 99999 PR PBB SHADOW E&M-EST. PATIENT-LVL III: CPT | Mod: PBBFAC,,, | Performed by: STUDENT IN AN ORGANIZED HEALTH CARE EDUCATION/TRAINING PROGRAM

## 2025-05-12 PROCEDURE — 1160F RVW MEDS BY RX/DR IN RCRD: CPT | Mod: CPTII,S$GLB,, | Performed by: STUDENT IN AN ORGANIZED HEALTH CARE EDUCATION/TRAINING PROGRAM

## 2025-05-12 PROCEDURE — 3288F FALL RISK ASSESSMENT DOCD: CPT | Mod: CPTII,S$GLB,, | Performed by: STUDENT IN AN ORGANIZED HEALTH CARE EDUCATION/TRAINING PROGRAM

## 2025-05-12 PROCEDURE — 99215 OFFICE O/P EST HI 40 MIN: CPT | Mod: S$GLB,,, | Performed by: STUDENT IN AN ORGANIZED HEALTH CARE EDUCATION/TRAINING PROGRAM

## 2025-05-12 PROCEDURE — 3044F HG A1C LEVEL LT 7.0%: CPT | Mod: CPTII,S$GLB,, | Performed by: STUDENT IN AN ORGANIZED HEALTH CARE EDUCATION/TRAINING PROGRAM

## 2025-05-12 PROCEDURE — 1159F MED LIST DOCD IN RCRD: CPT | Mod: CPTII,S$GLB,, | Performed by: STUDENT IN AN ORGANIZED HEALTH CARE EDUCATION/TRAINING PROGRAM

## 2025-05-12 PROCEDURE — 1126F AMNT PAIN NOTED NONE PRSNT: CPT | Mod: CPTII,S$GLB,, | Performed by: STUDENT IN AN ORGANIZED HEALTH CARE EDUCATION/TRAINING PROGRAM

## 2025-05-12 PROCEDURE — 4010F ACE/ARB THERAPY RXD/TAKEN: CPT | Mod: CPTII,S$GLB,, | Performed by: STUDENT IN AN ORGANIZED HEALTH CARE EDUCATION/TRAINING PROGRAM

## 2025-05-12 PROCEDURE — 1101F PT FALLS ASSESS-DOCD LE1/YR: CPT | Mod: CPTII,S$GLB,, | Performed by: STUDENT IN AN ORGANIZED HEALTH CARE EDUCATION/TRAINING PROGRAM

## 2025-05-12 PROCEDURE — 1157F ADVNC CARE PLAN IN RCRD: CPT | Mod: CPTII,S$GLB,, | Performed by: STUDENT IN AN ORGANIZED HEALTH CARE EDUCATION/TRAINING PROGRAM

## 2025-05-13 ENCOUNTER — OFFICE VISIT (OUTPATIENT)
Dept: PODIATRY | Facility: CLINIC | Age: 75
End: 2025-05-13
Payer: COMMERCIAL

## 2025-05-13 VITALS
BODY MASS INDEX: 25.8 KG/M2 | HEART RATE: 65 BPM | WEIGHT: 174.19 LBS | SYSTOLIC BLOOD PRESSURE: 91 MMHG | DIASTOLIC BLOOD PRESSURE: 50 MMHG | HEIGHT: 69 IN

## 2025-05-13 DIAGNOSIS — E11.9 TYPE 2 DIABETES MELLITUS WITHOUT COMPLICATION, WITHOUT LONG-TERM CURRENT USE OF INSULIN: ICD-10-CM

## 2025-05-13 DIAGNOSIS — B35.1 DERMATOPHYTOSIS OF NAIL: ICD-10-CM

## 2025-05-13 DIAGNOSIS — L60.0 INGROWN NAIL: Primary | Chronic | ICD-10-CM

## 2025-05-13 PROCEDURE — 99999 PR PBB SHADOW E&M-EST. PATIENT-LVL III: CPT | Mod: PBBFAC,,, | Performed by: PODIATRIST

## 2025-05-13 RX ORDER — CICLOPIROX 80 MG/ML
SOLUTION TOPICAL NIGHTLY
Qty: 6.6 ML | Refills: 6 | Status: SHIPPED | OUTPATIENT
Start: 2025-05-13

## 2025-05-13 NOTE — PROGRESS NOTES
CHIEF CONCERN: Diabetic Foot Exam (Foot Exam/PCP Erlin Mcpherson MD  02/18/25)         HPI:    Sadiq Dhillon ,  is a 75 y.o. male with medical history of  diabetes, with concerns of painful toenail on the right hallux.    The pain started months ago.    Pain aggravated by direct pressure and close toe shoes.  Specially in hard shoes.  Athletic shoes are more comfortable  Patient denies acute trauma to the toe.        Patient Active Problem List   Diagnosis    Unspecified essential hypertension    Type 2 diabetes mellitus without complication, without long-term current use of insulin    Chest pain, unspecified    Reflux esophagitis    Fuch's endothelial dystrophy    Fuchs' corneal dystrophy    Nuclear sclerotic cataract of right eye    Full thickness macular hole, bilateral    Vitreomacular adhesion, bilateral    Full thickness macular hole, right    Macular hole, left    Nuclear sclerotic cataract of left eye    Pancreas cyst    Prolactinoma    Primary hyperparathyroidism    Type 2 diabetes mellitus with hyperglycemia    Hypogonadism in male    Nephrolithiasis    Pituitary macroadenoma    Hypercholesterolemia    Pancreatic lesion    History of pancreatitis    Hypovitaminosis D    Coronary artery disease involving native coronary artery of native heart without angina pectoris    Rotator cuff syndrome    Right lower quadrant abdominal pain    Median arcuate ligament syndrome    Abnormal CT of the abdomen    Lymphadenitis    Sinus problem    Calculus of urinary bladder           Current Outpatient Medications on File Prior to Visit   Medication Sig Dispense Refill    aspirin (ECOTRIN) 81 MG EC tablet Take 81 mg by mouth every morning.       atorvastatin (LIPITOR) 80 MG tablet Take 1 tablet (80 mg total) by mouth once daily. 90 tablet 3    atovaquone-proguaniL (MALARONE) 250-100 mg Tab Take one tablet daily for malaria prevention. Begin one day before entering malarious area and continue for 1 week after  "return. 17 tablet 0    cabergoline (DOSTINEX) 0.5 mg tablet TAKE 1 TABLET(0.5 MG) BY MOUTH 2 TIMES A WEEK 24 tablet 3    dicyclomine (BENTYL) 10 MG capsule Take 10 mg by mouth 3 (three) times daily.      lisinopriL (PRINIVIL,ZESTRIL) 2.5 MG tablet Take 1 tablet (2.5 mg total) by mouth every morning. 90 tablet 3    loperamide (IMODIUM) 2 mg capsule Take 2 mg by mouth 4 (four) times daily as needed for Diarrhea.      metFORMIN (GLUCOPHAGE-XR) 500 MG ER 24hr tablet Take 2 tablets (1,000 mg total) by mouth 2 (two) times daily. 360 tablet 3    metoprolol succinate (TOPROL-XL) 25 MG 24 hr tablet Take 1 tablet (25 mg total) by mouth nightly. 90 tablet 3    tamsulosin (FLOMAX) 0.4 mg Cap Take 1 capsule (0.4 mg total) by mouth once daily. 10 capsule 0    testosterone (ANDROGEL) 1 % (50 mg/5 gram) GlPk APPLY CONTENTS OF 2 PACKETS TOPICALLY TO SKIN EVERY  g 3     Current Facility-Administered Medications on File Prior to Visit   Medication Dose Route Frequency Provider Last Rate Last Admin    pneumoc 20-madeleine conj-dip cr(PF) (PREVNAR-20 (PF)) injection Syrg 0.5 mL  0.5 mL Intramuscular 1 time in Clinic/HOD                 Review of patient's allergies indicates:   Allergen Reactions    Grass pollen-june grass standard     House dust          ROS:  General ROS: negative for chills, fatigue or fever  Cardiovascular ROS: no chest pain or dyspnea on exertion  Musculoskeletal ROS: negative for - joint pain or joint stiffness.  Negative for loss of strength  Neuro ROS: Negative for syncope, numbness, or muscle weakness  Skin ROS: Negative for rash, itching or hair changes.  +Toenail changes        EXAM:   Vitals:    05/13/25 1305   BP: (!) 91/50   Pulse: 65   Weight: 79 kg (174 lb 2.6 oz)   Height: 5' 9" (1.753 m)       LOWER EXTREMITY EXAM:    Vascular:    Dorsalis pedis and posterior tibial pulses are palpable. capillary refill time is within normal limits   Toes are warm to touch.    There is  presence of digital hair.  " "  There is  no localized edema to the affected toe.     Neurological:    Light touch, proprioception, and sharp/dull sensation are all intact.   Mulders click:  Absent  Tinels:  Absent.   No LOPS    Dermatological:    The toenail is incurvated, Medial border of the right hallux.      no erythema noted to the affected area.     Absent paronychia.     Absent abscess  Other toenails are mycotic appearing with yellow discoloration and subungual debris      Musculoskeletal:    Muscle strength is 5/5 in all groups .    No swelling/crepitus at the interphalangeal joints.        ASSESSMENT/PLAN     Problem List Items Addressed This Visit       Type 2 diabetes mellitus without complication, without long-term current use of insulin    Relevant Orders    Routine Foot Care     Other Visit Diagnoses         Ingrown nail  (Chronic)   -  Primary    left hallux medial border      Dermatophytosis of nail        Relevant Medications    ciclopirox (PENLAC) 8 % Soln    Other Relevant Orders    Routine Foot Care              I counseled the patient on the patient's  conditions, their implications and medical management.     General nail care measures for abnormal nails include:  Keeping nails trimmed short, but avoid overzealous trimming  Avoiding trauma   Avoiding contact irritants   Keeping nails dry (avoiding wet work)  Avoiding all nail cosmetics  Wearing shoes that fit well at the toe box.  Avoid tight shoes.     Shoe and activity modification as needed for relief.  Avoid hard shoes.  Stretch shoes in the forefoot as needed.      Routine Foot Care    Date/Time: 5/13/2025 1:15 PM    Performed by: Zoraida Rivas DPM  Authorized by: Zoraida Rivas DPM    Time out: Immediately prior to procedure a "time out" was called to verify the correct patient, procedure, equipment, support staff and site/side marked as required.      Nail Care Type:  Debride  Location(s): All  (Left 1st Toe, Left 3rd Toe, Left 2nd Toe, Left 4th Toe, Left 5th Toe, " Right 1st Toe, Right 2nd Toe, Right 3rd Toe, Right 4th Toe and Right 5th Toe)  Patient tolerance:  Patient tolerated the procedure well with no immediate complications     With patient's permission, the toenails mentioned above were reduced and debrided using a nail nipper, removing offending nail and debris. The patient will continue to monitor the areas daily, inspect the feet, wear protective shoe gear when ambulatory, and moisturizer to maintain skin integrity.

## 2025-05-20 ENCOUNTER — OFFICE VISIT (OUTPATIENT)
Dept: NEUROLOGY | Facility: CLINIC | Age: 75
End: 2025-05-20
Payer: COMMERCIAL

## 2025-05-20 DIAGNOSIS — F41.1 GENERALIZED ANXIETY DISORDER: ICD-10-CM

## 2025-05-20 DIAGNOSIS — R41.89 COGNITIVE CHANGES: Primary | ICD-10-CM

## 2025-05-20 DIAGNOSIS — R47.01 ANOMIC APHASIA: ICD-10-CM

## 2025-05-20 PROCEDURE — 99999 PR PBB SHADOW E&M-EST. PATIENT-LVL I: CPT | Mod: PBBFAC,,, | Performed by: CLINICAL NEUROPSYCHOLOGIST

## 2025-06-06 PROBLEM — R41.89 COGNITIVE CHANGES: Status: ACTIVE | Noted: 2025-06-06

## 2025-06-06 PROBLEM — F41.1 GENERALIZED ANXIETY DISORDER: Status: ACTIVE | Noted: 2025-06-06

## 2025-06-10 ENCOUNTER — HOSPITAL ENCOUNTER (OUTPATIENT)
Dept: RADIOLOGY | Facility: HOSPITAL | Age: 75
Discharge: HOME | End: 2025-06-10
Attending: NEUROLOGICAL SURGERY
Payer: COMMERCIAL

## 2025-06-10 DIAGNOSIS — D35.2 PITUITARY MACROADENOMA: ICD-10-CM

## 2025-06-10 PROCEDURE — 70553 MRI BRAIN STEM W/O & W/DYE: CPT | Mod: TC

## 2025-06-10 PROCEDURE — 70553 MRI BRAIN STEM W/O & W/DYE: CPT | Mod: 26,,, | Performed by: RADIOLOGY

## 2025-06-10 PROCEDURE — 25500020 PHARM REV CODE 255: Performed by: NEUROLOGICAL SURGERY

## 2025-06-10 PROCEDURE — A9585 GADOBUTROL INJECTION: HCPCS | Performed by: NEUROLOGICAL SURGERY

## 2025-06-10 RX ORDER — GADOBUTROL 604.72 MG/ML
5 INJECTION INTRAVENOUS
Status: COMPLETED | OUTPATIENT
Start: 2025-06-10 | End: 2025-06-10

## 2025-06-10 RX ADMIN — GADOBUTROL 5 ML: 604.72 INJECTION INTRAVENOUS at 04:06

## 2025-06-20 ENCOUNTER — PATIENT MESSAGE (OUTPATIENT)
Dept: NEUROSURGERY | Facility: HOSPITAL | Age: 75
End: 2025-06-20
Payer: COMMERCIAL

## 2025-06-26 ENCOUNTER — OFFICE VISIT (OUTPATIENT)
Dept: URGENT CARE | Facility: CLINIC | Age: 75
End: 2025-06-26
Payer: COMMERCIAL

## 2025-06-26 VITALS
BODY MASS INDEX: 24.61 KG/M2 | OXYGEN SATURATION: 96 % | DIASTOLIC BLOOD PRESSURE: 55 MMHG | HEART RATE: 77 BPM | RESPIRATION RATE: 20 BRPM | HEIGHT: 69 IN | TEMPERATURE: 98 F | SYSTOLIC BLOOD PRESSURE: 94 MMHG | WEIGHT: 166.13 LBS

## 2025-06-26 DIAGNOSIS — I95.9 HYPOTENSIVE EPISODE: Primary | ICD-10-CM

## 2025-06-26 DIAGNOSIS — R42 DIZZINESS: ICD-10-CM

## 2025-06-26 LAB
GLUCOSE SERPL-MCNC: 81 MG/DL (ref 70–110)
OHS QRS DURATION: 96 MS
OHS QTC CALCULATION: 396 MS

## 2025-06-26 PROCEDURE — 82962 GLUCOSE BLOOD TEST: CPT | Mod: S$GLB,,,

## 2025-06-26 PROCEDURE — 93010 ELECTROCARDIOGRAM REPORT: CPT | Mod: S$GLB,,, | Performed by: INTERNAL MEDICINE

## 2025-06-26 NOTE — PROGRESS NOTES
"Subjective:      Patient ID: Sadiq Dhillon is a 75 y.o. male.    Vitals:  height is 5' 9" (1.753 m) and weight is 75.4 kg (166 lb 1.9 oz). His oral temperature is 98.2 °F (36.8 °C). His blood pressure is 94/55 (abnormal) and his pulse is 77. His respiration is 20 and oxygen saturation is 96%.     Chief Complaint: Dizziness    Patient is a 75 y.o. male who presents today with a chief complaint of lightheaded that started this morning. Pt states that this morning he felt dizzy walking up the stairs. Pt states he feels dizziness with movement, but when he is still he doesn't feel it as much. Pt had chest tightness eariler but it subsided. Pt thinks it may be stressed, he buried his brother yesterday. Pt also mentioned after the  he experienced dizziness and was some what lethargic later on that night.  Pt states having a heart attack in 2017 and has a stent. Pt wonders if he is having a heart attack or stroke. Pt associated symptoms: mild headache, lightheaded, weakness. Pt didn't take anything, besides his prescribed medications.         - no signs of slurred speech, no facial drooping, bilateral smile even, bilateral arms held straight out evenly.     Provider note    76 yo M c/o dizziness/lightheadedness x 1 day. Symptoms exacerbated by movement, no symptoms at rest. Reports hx of MI and stent in 2017. Denies CP, sob, palpitations, nvd. Reports recent stress with burying his brother yesterday. He is diabetic however his A1c has been stable so he has not checked his blood sugar lately. Last A1c a few months ago 6.1. he cannot recall if he took his bp meds this am. Of note, he has a hx of a pituitary adenoma treated with oral medication that he takes biweekly, followed by endocrine.     Dizziness:   Chronicity:  New  Onset:  Today  Progression since onset:  Unchanged  Frequency:  Hourly  Dizziness characteristics:  Lightheaded/impending faint   Associated symptoms: weakness and light-headedness.no fever, " no headaches, no tinnitus, no nausea, no vomiting, no diaphoresis, no visual disturbances, no syncope, no palpitations, no facial weakness, no slurred speech, no numbness in extremities and no chest pain.  Treatments tried:  Nothing      Constitution: Negative for chills, sweating, fatigue and fever.   HENT:  Negative for tinnitus.    Cardiovascular:  Negative for chest pain, leg swelling, palpitations, sob on exertion and passing out.   Respiratory:  Negative for chest tightness, cough and shortness of breath.    Gastrointestinal:  Negative for nausea and vomiting.   Neurological:  Positive for dizziness and light-headedness. Negative for history of vertigo, passing out, facial drooping, speech difficulty, coordination disturbances, loss of balance, headaches, history of migraines, disorientation, altered mental status, loss of consciousness, numbness and tingling.   Psychiatric/Behavioral:  Negative for altered mental status and disorientation.       Objective:     Physical Exam   Constitutional: He is oriented to person, place, and time. normal  HENT:   Head: Normocephalic and atraumatic.   Eyes: Conjunctivae are normal.   Cardiovascular: Normal rate, regular rhythm and normal heart sounds.   Pulmonary/Chest: Effort normal and breath sounds normal.   Abdominal: Normal appearance.   Musculoskeletal: Normal range of motion.         General: Normal range of motion.   Neurological: He is alert and oriented to person, place, and time.   Skin: Skin is warm and dry.     EKG: normal EKG, normal sinus rhythm, unchanged from previous tracings.    Results for orders placed or performed in visit on 06/26/25   EKG 12-lead    Collection Time: 06/26/25  3:04 PM   Result Value Ref Range    QRS Duration 96 ms    OHS QTC Calculation 396 ms   POCT Glucose, Hand-Held Device    Collection Time: 06/26/25  3:39 PM   Result Value Ref Range    POC Glucose 81 70 - 110 MG/DL         Assessment:     1. Hypotensive episode    2. Dizziness         Plan:   Pt stable in clinic. EKG normal. BG 81. Discussed contributors for symptoms such as stress, dehydration etc. Strict ER and RTC precautions. Of note, pt was here a year ago for similar symptoms.    Hypotensive episode    Dizziness  -     Orthostatic vital signs  -     EKG 12-lead  -     POCT Glucose, Hand-Held Device        We appreciate you trusting us with your medical care. We hope you feel better soon. We will be happy to take care of you for all of your future medical needs.  You must understand that you've received an Urgent Care treatment only and that you may be released before all your medical problems are known or treated. You, the patient, will arrange for follow up care as instructed.  Follow up with your PCP or specialty clinic as directed in the next 1-2 weeks if not improved or as needed.  You can call [phone] to schedule an appointment with the appropriate provider.  Another option is to follow up with [facility] ([email or web address]) virtually for quick simple medical advice.  If your condition worsens we recommend that you receive another evaluation at the emergency room immediately or contact your primary medical clinics after hours call service to discuss your concerns.  Please return here or go to the Emergency Department for any concerns or worsening of condition.

## 2025-07-02 ENCOUNTER — HOSPITAL ENCOUNTER (OUTPATIENT)
Dept: RADIOLOGY | Facility: HOSPITAL | Age: 75
Discharge: HOME | End: 2025-07-02
Attending: ORTHOPAEDIC SURGERY
Payer: COMMERCIAL

## 2025-07-02 ENCOUNTER — OFFICE VISIT (OUTPATIENT)
Dept: SPORTS MEDICINE | Facility: CLINIC | Age: 75
End: 2025-07-02
Payer: COMMERCIAL

## 2025-07-02 VITALS
SYSTOLIC BLOOD PRESSURE: 116 MMHG | HEART RATE: 63 BPM | WEIGHT: 168.44 LBS | HEIGHT: 69 IN | BODY MASS INDEX: 24.95 KG/M2 | DIASTOLIC BLOOD PRESSURE: 68 MMHG

## 2025-07-02 DIAGNOSIS — M25.512 LEFT SHOULDER PAIN, UNSPECIFIED CHRONICITY: Primary | ICD-10-CM

## 2025-07-02 DIAGNOSIS — M25.512 LEFT SHOULDER PAIN, UNSPECIFIED CHRONICITY: ICD-10-CM

## 2025-07-02 PROCEDURE — 73030 X-RAY EXAM OF SHOULDER: CPT | Mod: TC,LT

## 2025-07-02 PROCEDURE — 99999 PR PBB SHADOW E&M-EST. PATIENT-LVL V: CPT | Mod: PBBFAC,,, | Performed by: ORTHOPAEDIC SURGERY

## 2025-07-02 PROCEDURE — 73030 X-RAY EXAM OF SHOULDER: CPT | Mod: 26,LT,, | Performed by: RADIOLOGY

## 2025-07-02 NOTE — PROGRESS NOTES
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      He reports that the pain is worse with overhead activity. It also bothers him at night.    Is affecting ADLs.     SANE 50  7/10    Review of Systems   Constitution: Negative. Negative for chills, fever and night sweats.   HENT: Negative for congestion and headaches.    Eyes: Negative for blurred vision, left vision loss and right vision loss.   Cardiovascular: Negative for chest pain and syncope.   Respiratory: Negative for cough and shortness of breath.    Endocrine: Negative for polydipsia, polyphagia and polyuria.   Hematologic/Lymphatic: Negative for bleeding problem. Does not bruise/bleed easily.   Skin: Negative for dry skin, itching and rash.   Musculoskeletal: Negative for falls and muscle weakness.   Gastrointestinal: Negative for abdominal pain and bowel incontinence.   Genitourinary: Negative for bladder incontinence and nocturia.   Neurological: Negative for disturbances in coordination, loss of balance and seizures.   Psychiatric/Behavioral: Negative for depression. The patient does not have insomnia.    Allergic/Immunologic: Negative for hives and persistent infections.     PAST MEDICAL HISTORY:   Past Medical History:   Diagnosis Date    Diabetes mellitus     Fuchs' corneal dystrophy     Kidney stones     Pancreatic mass     Pancreatitis     after EUS . 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, treated with dostenex(cabergoline)/ resolved    Reflux esophagitis     Tumor cells, benign      PAST SURGICAL HISTORY:   Past Surgical History:   Procedure Laterality Date    CATARACT EXTRACTION W/  INTRAOCULAR LENS IMPLANT Right 0    Dr Van     CATARACT EXTRACTION W/  INTRAOCULAR LENS IMPLANT Left 3/21/2019  "   Procedure: EXTRACTION, CATARACT, WITH IOL INSERTION;  Surgeon: Ra Van MD;  Location: [facility];  Service: Ophthalmology;  Laterality: Left;    CORNEAL TRANSPLANT Right     Dr Van     DESCEMETS STRIPPING AUTOMATED ENDOTHELIAL KERATOPLASTY Left 3/21/2019    Procedure: TRANSPLANT, PARTIAL-THICKNESS, CORNEA, USING DSAEK TECHNIQUE;  Surgeon: Ra Van MD;  Location: [facility];  Service: Ophthalmology;  Laterality: Left;  DSEK    REPAIR OF RETINAL DETACHMENT WITH VITRECTOMY Right 7/18/2018    Procedure: REPAIR, RETINAL DETACHMENT, WITH VITRECTOMY;  Surgeon: SHUBHAM Patel MD;  Location: [address];  Service: Ophthalmology;  Laterality: Right;  40 min    REPAIR OF RETINAL DETACHMENT WITH VITRECTOMY Left 8/8/2018    Procedure: REPAIR, RETINAL DETACHMENT, WITH VITRECTOMY;  Surgeon: SHUBHAM Patel MD;  Location: [facility] OR [address] [place] [address];  Service: Ophthalmology;  Laterality: Left;  40 min    RHINOPLASTY TIP  1975    THYROIDECTOMY  2007    UPPER ENDOSCOPIC ULTRASOUND W/ FNA      with resultant pancreatitis     FAMILY HISTORY:   Family History   Problem Relation Name Age of Onset    Hypertension Mother      Heart disease Mother      Heart disease Father      Cataracts Father      Stroke Father      Diabetes Father      Diabetes Paternal Uncle      Stroke Maternal Grandmother      Blindness Neg Hx      Glaucoma Neg Hx      Macular degeneration Neg Hx      Retinal detachment Neg Hx      Strabismus Neg Hx      Thyroid disease Neg Hx      Cancer Neg Hx      Amblyopia Neg Hx       SOCIAL HISTORY: Social History[1]    MEDICATIONS: Current Medications[2]  ALLERGIES:   Review of patient's allergies indicates:   Allergen Reactions    Grass pollen-ira grass standard     House dust        VITAL SIGNS: /68 (Patient Position: Sitting)   Pulse 63   Ht 5' 9" (1.753 m)   Wt 76.4 kg (168 lb 6.9 oz)   BMI 24.87 kg/m²      PHYSICAL EXAMINATION:  General:  The patient is alert and oriented x 3.  Mood is pleasant.  " Observation of ears, eyes and nose reveal no gross abnormalities.  No labored breathing observed.  Gait is coordinated. Patient can toe walk and heel walk without difficulty.      left SHOULDER / UPPER EXTREMITY EXAM    OBSERVATION:     Swelling  none  Deformity  none   Discoloration  none   Scapular winging none   Scars   none  Atrophy  none    TENDERNESS / CREPITUS (T/C):          T/C      T/C   Clavicle   -/-  SUPRAspinatus    -/-     AC Jt.    -/-  INFRAspinatus  -/-    SC Jt.    -/-  Deltoid    -/-      G. Tuberosity  -/-  LH BICEP groove  +/-   Acromion:  -/-  Midline Neck   -/-     Scapular Spine -/-  Trapezium   -/-   SMA Scapula  -/-  GH jt. line - post  -/-     Scapulothoracic  -/-         ROM: (* = with pain)  Right shoulder   Left shoulder        AROM (PROM)   AROM (PROM)   FE    170° (175°)     170° (175°)     ER at 0°    60°  (65°)    60°  (65°)   ER at 90° ABD  90°  (90°)    90°  (90°)   IR at 90°  ABD   NA  (40°)     NA  (40°)      IR (spine level)   T10     T10    STRENGTH: (* = with pain) RIGHT SHOULDER  LEFT SHOULDER   SCAPTION   5/5    5/5    IR    5/5    5/5   ER    5/5    5/5   BICEPS   5/5    5/5   Deltoid    5/5    5/5     SIGNS:  Painful side       NEER   +    OMARYS  neg    DAVIS   +    SPEEDS  neg     DROP ARM   neg   BELLY PRESS neg   Superior escape none    LIFT-OFF  neg   X-Body ADD    neg    MOVING VALGUS neg        STABILITY TESTING    RIGHT SHOULDER   LEFT SHOULDER     Translation     Anterior  up face     up face    Posterior  up face    up face    Sulcus   < 10mm    < 10 mm     Signs   Apprehension   neg      neg       Relocation   no change     no change      Jerk test  neg     neg    EXTREMITY NEURO-VASCULAR EXAM    Sensation grossly intact to light touch all dermatomal regions.    DTR 2+ Biceps, Triceps, BR and Negative Andrades sign   Grossly intact motor function at Elbow, Wrist and Hand   Distal pulses radial and ulnar 2+, brisk cap refill, symmetric.      NECK:   Painless FROM and spinous processes non-tender. Negative Spurlings sign.      OTHER FINDINGS:  + scapular dyskinesia    XRAYS:  Shoulder trauma series left,  were obtained and reviewed and interpreted  No convincing fracture or dislocation is noted. The osseous structures appear well mineralized and well aligned        ASSESSMENT:   left:  1. Shoulder pain, impingement   2.  Scapular dyskinesia, complicated    PLAN:    PT for scapular stabilization: Scapular dyskinesia   Scapular stabilization - Joan protocol, 1-3x/week x 6-8 weeks with HEP      All questions were answered, pt will contact us for questions or concerns in the interim.           [1]   Social History  Socioeconomic History    Marital status: Single   Tobacco Use    Smoking status: Never     Passive exposure: Never    Smokeless tobacco: Never   Substance and Sexual Activity    Alcohol use: Yes     Comment: social    Drug use: No    Sexual activity: Not Currently     Social Drivers of Health     Financial Resource Strain: Low Risk  (4/27/2025)    Overall Financial Resource Strain (CARDIA)     Difficulty of Paying Living Expenses: Not very hard   Food Insecurity: No Food Insecurity (4/27/2025)    Hunger Vital Sign     Worried About Running Out of Food in the Last Year: Never true     Ran Out of Food in the Last Year: Never true   Transportation Needs: No Transportation Needs (4/27/2025)    PRAPARE - Transportation     Lack of Transportation (Medical): No     Lack of Transportation (Non-Medical): No   Physical Activity: Insufficiently Active (4/27/2025)    Exercise Vital Sign     Days of Exercise per Week: 5 days     Minutes of Exercise per Session: 20 min   Stress: No Stress Concern Present (4/27/2025)    [employer] - Occupational Stress Questionnaire     Feeling of Stress : Only a little   Housing Stability: Low Risk  (4/27/2025)    Housing Stability Vital Sign     Unable to Pay for Housing in the Last Year: No     Number  of Times Moved in the Last Year: 0     Homeless in the Last Year: No   [2]   Current Outpatient Medications:     aspirin (ECOTRIN) 81 MG EC tablet, Take 81 mg by mouth every morning. , Disp: , Rfl:     atorvastatin (LIPITOR) 80 MG tablet, Take 1 tablet (80 mg total) by mouth once daily., Disp: 90 tablet, Rfl: 3    atovaquone-proguaniL (MALARONE) 250-100 mg Tab, Take one tablet daily for malaria prevention. Begin one day before entering malarious area and continue for 1 week after return., Disp: 17 tablet, Rfl: 0    cabergoline (DOSTINEX) 0.5 mg tablet, TAKE 1 TABLET(0.5 MG) BY MOUTH 2 TIMES A WEEK, Disp: 24 tablet, Rfl: 3    ciclopirox (PENLAC) 8 % Soln, Apply topically nightly. To affected toenails.  Remove with rubbing alcohol after each week of use., Disp: 6.6 mL, Rfl: 6    dicyclomine (BENTYL) 10 MG capsule, Take 10 mg by mouth 3 (three) times daily., Disp: , Rfl:     lisinopriL (PRINIVIL,ZESTRIL) 2.5 MG tablet, Take 1 tablet (2.5 mg total) by mouth every morning., Disp: 90 tablet, Rfl: 3    loperamide (IMODIUM) 2 mg capsule, Take 2 mg by mouth 4 (four) times daily as needed for Diarrhea., Disp: , Rfl:     metFORMIN (GLUCOPHAGE-XR) 500 MG ER 24hr tablet, Take 2 tablets (1,000 mg total) by mouth 2 (two) times daily., Disp: 360 tablet, Rfl: 3    metoprolol succinate (TOPROL-XL) 25 MG 24 hr tablet, Take 1 tablet (25 mg total) by mouth nightly., Disp: 90 tablet, Rfl: 3    tamsulosin (FLOMAX) 0.4 mg Cap, Take 1 capsule (0.4 mg total) by mouth once daily., Disp: 10 capsule, Rfl: 0    testosterone (ANDROGEL) 1 % (50 mg/5 gram) GlPk, APPLY CONTENTS OF 2 PACKETS TOPICALLY TO SKIN EVERY DAY, Disp: 900 g, Rfl: 3    Current Facility-Administered Medications:     pneumoc 20-madeleine conj-dip cr(PF) (PREVNAR-20 (PF)) injection Syrg 0.5 mL, 0.5 mL, Intramuscular, 1 time in Clinic/HOD,

## 2025-07-07 ENCOUNTER — PATIENT MESSAGE (OUTPATIENT)
Dept: REHABILITATION | Facility: OTHER | Age: 75
End: 2025-07-07
Payer: COMMERCIAL

## 2025-07-10 ENCOUNTER — OFFICE VISIT (OUTPATIENT)
Dept: NEUROLOGY | Facility: CLINIC | Age: 75
End: 2025-07-10
Payer: COMMERCIAL

## 2025-07-10 DIAGNOSIS — R41.89 COGNITIVE CHANGES: ICD-10-CM

## 2025-07-10 DIAGNOSIS — F41.1 GENERALIZED ANXIETY DISORDER: Primary | ICD-10-CM

## 2025-07-10 PROCEDURE — 99499 UNLISTED E&M SERVICE: CPT | Mod: S$GLB,,, | Performed by: CLINICAL NEUROPSYCHOLOGIST

## 2025-07-10 PROCEDURE — 99999 PR PBB SHADOW E&M-EST. PATIENT-LVL II: CPT | Mod: PBBFAC,,, | Performed by: CLINICAL NEUROPSYCHOLOGIST

## 2025-07-10 RX ORDER — LISINOPRIL 2.5 MG/1
2.5 TABLET ORAL EVERY MORNING
Qty: 90 TABLET | Refills: 3 | Status: SHIPPED | OUTPATIENT
Start: 2025-07-10

## 2025-07-15 ENCOUNTER — OFFICE VISIT (OUTPATIENT)
Dept: PODIATRY | Facility: CLINIC | Age: 75
End: 2025-07-15
Payer: COMMERCIAL

## 2025-07-15 VITALS
BODY MASS INDEX: 24.95 KG/M2 | HEIGHT: 69 IN | WEIGHT: 168.44 LBS | DIASTOLIC BLOOD PRESSURE: 56 MMHG | HEART RATE: 76 BPM | SYSTOLIC BLOOD PRESSURE: 106 MMHG

## 2025-07-15 DIAGNOSIS — E11.9 TYPE 2 DIABETES MELLITUS WITHOUT COMPLICATION, WITHOUT LONG-TERM CURRENT USE OF INSULIN: Primary | ICD-10-CM

## 2025-07-15 DIAGNOSIS — B35.1 ONYCHOMYCOSIS WITH INGROWN TOENAIL: ICD-10-CM

## 2025-07-15 DIAGNOSIS — M79.674 TOE PAIN, RIGHT: Chronic | ICD-10-CM

## 2025-07-15 DIAGNOSIS — L60.0 ONYCHOMYCOSIS WITH INGROWN TOENAIL: ICD-10-CM

## 2025-07-15 PROCEDURE — 99999 PR PBB SHADOW E&M-EST. PATIENT-LVL IV: CPT | Mod: PBBFAC,,, | Performed by: PODIATRIST

## 2025-07-15 NOTE — PROGRESS NOTES
Outpatient Rehab    Physical Therapy Evaluation    Patient Name: Sadiq Dhillon  MRN: [id number]  YOB: 1950  Encounter Date: 7/16/2025    Therapy Diagnosis:   Encounter Diagnoses   Name Primary?    Left shoulder pain, unspecified chronicity     Muscle weakness of upper extremity Yes    Scapular dyskinesis      Physician: Shania Panchal MD    Physician Orders: Eval and Treat  Medical Diagnosis: Left shoulder pain, unspecified chronicity  Surgical Diagnosis: Not applicable for this Episode   Surgical Date: Not applicable for this Episode  Days Since Last Surgery: Not applicable for this Episode    Visit # / Visits Authorized:  1 / 1  Insurance Authorization Period: 7/2/2025 to 12/31/2025  Date of Evaluation: 7/16/2025  Plan of Care Certification: 7/16/2025 to 9/10/2025     Time In: 1501   Time Out: 1543  Total Time (in minutes): 42   Total Billable Time (in minutes): 16    Intake Outcome Measure for FOTO Survey    Therapist reviewed FOTO scores for Sadiq Dhillon on 7/16/2025.   FOTO report - see Media section or FOTO account episode details.     Intake Score: Not applicable for this Episode%    Precautions: Standard       Subjective   History of Present Illness  Sadiq is a 75 y.o. male who reports to physical therapy with a chief concern of L shoulder pain.     The patient reports a medical diagnosis of M25.512 (ICD-10-CM) - Left shoulder pain, unspecified chronicity. The patient has experienced this issue since 07/02/25.   Diagnostic tests related to this condition: X-ray.   X-Ray Details: FINDINGS:  Mild DJD.  No fracture or dislocation.  No bone destruction identified    Dominant Hand: Right  History of Present Condition/Illness: Has had pain in his left shoulder for quite some time. Thought the pain would go away. In the Spring, was helping an elderly friend stand up and felt a pull in the shoulder. Will be out of town for all of August.    Pain     Patient reports a current pain level of 1/10.  Pain at best is reported as 0/10. Pain at worst is reported as 8/10.   Location: L shoulder  Clinical Progression (since onset): Worsening  Pain Qualities: Sharp, Other (Comment)  Other Pain Qualities: eminating  Pain-Aggravating Factors: Other (Comment), Lifting  Other Pain-Aggravating Factors: dressing, washing his back, stretching arm out, carrying         Living Arrangements  Living Situation  Living Arrangements: Roommate    3 story house. Lives on the third floor. Goes up and down steps      Employment  Employment Status: Retired   Co-owner of a business, helps with planning.      Past Medical History/Physical Systems Review:   Sadiq Dhillon  has a past medical history of Diabetes mellitus, Fuchs' corneal dystrophy, Kidney stones, Pancreatic mass, Pancreatitis, Pituitary adenoma, PONV (postoperative nausea and vomiting), Prolactinoma, Reflux esophagitis, and Tumor cells, benign.    Sadiq Dhillon  has a past surgical history that includes Thyroidectomy (2007); Rhinoplasty tip (1975); Cataract extraction w/  intraocular lens implant (Right, 0); Corneal transplant (Right); Upper endoscopic ultrasound w/ FNA; Repair of retinal detachment with vitrectomy (Right, 7/18/2018); Repair of retinal detachment with vitrectomy (Left, 8/8/2018); Descemets stripping automated endothelial keratoplasty (Left, 3/21/2019); and Cataract extraction w/  intraocular lens implant (Left, 3/21/2019).    Sadiq has a current medication list which includes the following prescription(s): aspirin, atorvastatin, atovaquone-proguanil, cabergoline, ciclopirox, dicyclomine, lisinopril, loperamide, metformin, metoprolol succinate, tamsulosin, and testosterone, and the following Facility-Administered Medications: pneumoc 20-madeleine conj-dip cr(pf).    Review of patient's allergies indicates:   Allergen Reactions    Grass pollen-ira grass standard     House dust         Objective   Posture  Patient presents with a Forward head position.      "Shoulders are Rounded. Bilateral scapulae are: Protracted                  Cervical Range of Motion   Active (deg) Passive (deg) Pain   Flexion 45       Extension 45       Right Lateral Flexion 25       Right Rotation 65       Left Lateral Flexion 35       Left Rotation 60              Shoulder Range of Motion  Right Shoulder   Active (deg) Passive (deg) Pain   Flexion 150 165     ABduction 140 150     External Rotation (Shoulder ABducted 0 degrees) 65       External Rotation (Shoulder ABducted 45 degrees)   70     External Rotation (Shoulder ABducted 90 degrees)   80     Internal Rotation (Shoulder ABducted 45 degrees)   65     Internal Rotation (Shoulder ABducted 90 degrees)   50       Left Shoulder   Active (deg) Passive (deg) Pain   Flexion 130 140 Yes   ABduction 90 140 Yes   External Rotation (Shoulder ABducted 0 degrees) 60       External Rotation (Shoulder ABducted 45 degrees)   40     External Rotation (Shoulder ABducted 90 degrees)   50     Internal Rotation (Shoulder ABducted 45 degrees)   70     Internal Rotation (Shoulder ABducted 90 degrees)   50         Shoulder Strength - Planes of Motion   Right Strength Right Pain Left Strength Left  Pain   Flexion 5   5     ABduction 5   3-     Internal Rotation 0° 5   5     External Rotation 0° 4   4 Yes       Shoulder Strength - Scapular Stabilizing Muscles   Right Strength Right Pain Left Strength Left  Pain   Lower Trapezius 3+   3+     Middle Trapezius 4+   4+     Rhomboids 4+   4+         Shoulder Special Tests  Rotator Cuff Tests  Negative: Right Empty Can and Left Empty Can  Negative: Right Full Can and Left Full Can  Impingement Tests  Positive: Left Gorman-Antonio  Negative: Right Gorman-Antonio, Right Neer's, and Left Neer's         Treatment:  Balance/Neuromuscular Re-Education  NMR 1: initiated [facility] for scapular stabilization and strengthening to promote proper scapulohumeral rythym and address scapular dyskinesia: prone scapular retractions 20 x 3", " "prone shoulder rows 20 x 3" prone shoulder ext 20 x 3" prone hor abd 2 x 10 reps; prone shoulder flex 2 x 10 reps, supine scapular protractions 2 x 10 reps    Time Entry(in minutes):  PT Evaluation (Low) Time Entry: 26  Neuromuscular Re-Education Time Entry: 16    Assessment & Plan   Assessment  Sadiq presents with a condition of Low complexity.   Presentation of Symptoms: Stable  Will Comorbidities Impact Care: No       Functional Limitations: Activity tolerance, Carrying objects, Range of motion, Praxis, Participating in leisure activities, Pain with ADLs/IADLs, Pain when reaching, Driving  Impairments: Abnormal muscle firing, Impaired physical strength, Lack of appropriate home exercise program, Pain with functional activity, Abnormal or restricted range of motion  Personal Factors Affecting Prognosis: Other (Comment)  Other Personal Factors Affecting Prognosis: will be out of town for a month    Patient Goal for Therapy (PT): to not feel the pain anymore  Assessment Details: Patient is referred to outpatient physical therapy secondary left shoulder pain. Patient presenting with postural imbalance, decreased left shoulder range of motion, and scapular dyskinesia, and periscapular weakness contributing to pain in left shoulder with functional mobility and activities. Patient will benefit from outpatient physical therapy for periscapular strengthening, manual therapy, functional activity training to increase left shoulder range of motion, strength, and function.     Plan  From a physical therapy perspective, the patient would benefit from: Skilled Rehab Services    Planned therapy interventions include: Therapeutic exercise, Therapeutic activities, Neuromuscular re-education, Manual therapy, and ADLs/IADLs.            Visit Frequency: 2 times Per Week for 12 Weeks.  Other/tapered frequency details: Patient will be out of town for August and resume outpatient physical therapy when he is back in town    This plan " was discussed with Patient.   Discussion participants: Agreed Upon Plan of Care  Plan details: Patient to perform home exercise program while out of town and reume outpatient physical therapy when back in town.          The patient's spiritual, cultural, and educational needs were considered, and the patient is agreeable to the plan of care and goals.           Goals:   Active       1. Short Term Goals       Id with home exercise program prior to going out of town       Start:  07/18/25    Expected End:  07/23/25               2. Long Term Goals       increase upper extremity strength by 1 muscle grade       Start:  07/16/25    Expected End:  10/08/25            increase left shoulder active range of motion flexion to 140 degrees        Start:  07/16/25    Expected End:  10/08/25            increase left abduction active range of motion to 140 degrees        Start:  07/16/25    Expected End:  10/08/25            increase left shoulder active range of motion external rotation at 90 degrees to 75 degrees        Start:  07/16/25    Expected End:  10/08/25            reported decreased left shoulder pain < or = 4/10 with activities of daily living such as dressing, washing his back, stretching his arm out, and carrying.       Start:  07/16/25    Expected End:  10/08/25                Good Rodriguez, PT

## 2025-07-15 NOTE — PROGRESS NOTES
CHIEF CONCERN: Diabetic Foot Exam (Foot Exam/PCP Erlin Blackmon MD  02/18/25)         HPI:    Sadiq Dhillon ,  is a 75 y.o. male with medical history of  diabetes, with concerns of painful toenail on the right hallux.    The pain started months ago.     Pain aggravated by direct pressure and close toe shoes.  Specially in hard shoes.  Athletic shoes are more comfortable.  He is wearing an open toe Darco shoe for comfort today.    Patient denies acute trauma to the toe.      PCP:  Erlin Mcpherson MD   Last PCP Visit: 2/18/2025      Patient Active Problem List   Diagnosis    Unspecified essential hypertension    Type 2 diabetes mellitus without complication, without long-term current use of insulin    Chest pain, unspecified    Reflux esophagitis    Fuch's endothelial dystrophy    Fuchs' corneal dystrophy    Nuclear sclerotic cataract of right eye    Full thickness macular hole, bilateral    Vitreomacular adhesion, bilateral    Full thickness macular hole, right    Macular hole, left    Nuclear sclerotic cataract of left eye    Pancreas cyst    Prolactinoma    Primary hyperparathyroidism    Type 2 diabetes mellitus with hyperglycemia    Hypogonadism in male    Nephrolithiasis    Pituitary macroadenoma    Hypercholesterolemia    Pancreatic lesion    History of pancreatitis    Hypovitaminosis D    Coronary artery disease involving native coronary artery of native heart without angina pectoris    Rotator cuff syndrome    Right lower quadrant abdominal pain    Median arcuate ligament syndrome    Abnormal CT of the abdomen    Lymphadenitis    Sinus problem    Calculus of urinary bladder    Cognitive changes    Generalized anxiety disorder           Current Outpatient Medications on File Prior to Visit   Medication Sig Dispense Refill    aspirin (ECOTRIN) 81 MG EC tablet Take 81 mg by mouth every morning.       atorvastatin (LIPITOR) 80 MG tablet Take 1 tablet (80 mg total) by mouth once daily. 90 tablet 3     atovaquone-proguaniL (MALARONE) 250-100 mg Tab Take one tablet daily for malaria prevention. Begin one day before entering malarious area and continue for 1 week after return. 17 tablet 0    cabergoline (DOSTINEX) 0.5 mg tablet TAKE 1 TABLET(0.5 MG) BY MOUTH 2 TIMES A WEEK 24 tablet 3    ciclopirox (PENLAC) 8 % Soln Apply topically nightly. To affected toenails.  Remove with rubbing alcohol after each week of use. 6.6 mL 6    dicyclomine (BENTYL) 10 MG capsule Take 10 mg by mouth 3 (three) times daily.      lisinopriL (PRINIVIL,ZESTRIL) 2.5 MG tablet Take 1 tablet (2.5 mg total) by mouth every morning. 90 tablet 3    loperamide (IMODIUM) 2 mg capsule Take 2 mg by mouth 4 (four) times daily as needed for Diarrhea.      metFORMIN (GLUCOPHAGE-XR) 500 MG ER 24hr tablet Take 2 tablets (1,000 mg total) by mouth 2 (two) times daily. 360 tablet 3    metoprolol succinate (TOPROL-XL) 25 MG 24 hr tablet Take 1 tablet (25 mg total) by mouth nightly. 90 tablet 3    tamsulosin (FLOMAX) 0.4 mg Cap Take 1 capsule (0.4 mg total) by mouth once daily. 10 capsule 0    testosterone (ANDROGEL) 1 % (50 mg/5 gram) GlPk APPLY CONTENTS OF 2 PACKETS TOPICALLY TO SKIN EVERY  g 3     Current Facility-Administered Medications on File Prior to Visit   Medication Dose Route Frequency Provider Last Rate Last Admin    pneumoc 20-madeleine conj-dip cr(PF) (PREVNAR-20 (PF)) injection Syrg 0.5 mL  0.5 mL Intramuscular 1 time in Clinic/HOD                 Review of patient's allergies indicates:   Allergen Reactions    Grass pollen-june grass standard     House dust          ROS:  General ROS: negative for chills, fatigue or fever  Cardiovascular ROS: no chest pain or dyspnea on exertion  Musculoskeletal ROS: negative for - joint pain or joint stiffness.  Negative for loss of strength  Neuro ROS: Negative for syncope, numbness, or muscle weakness  Skin ROS: Negative for rash, itching or hair changes.  +Toenail changes        EXAM:   Vitals:    07/15/25  "1409   BP: (!) 106/56   Pulse: 76   Weight: 76.4 kg (168 lb 6.9 oz)   Height: 5' 9" (1.753 m)       LOWER EXTREMITY EXAM:    Vascular:    Dorsalis pedis and posterior tibial pulses are palpable. capillary refill time is within normal limits   Toes are warm to touch.    There is  presence of digital hair.    There is  no localized edema to the affected toe.     Neurological:    Light touch, proprioception, and sharp/dull sensation are all intact.   Mulders click:  Absent  Tinels:  Absent.   No LOPS    Dermatological:    The toenail is incurvated, bilateral  border of the right hallux.      no erythema noted to the affected area.     Absent paronychia.     Absent abscess  Other toenails are mycotic appearing with yellow discoloration and subungual debris      Musculoskeletal:    Muscle strength is 5/5 in all groups .    No swelling/crepitus at the interphalangeal joints.        ASSESSMENT/PLAN     Problem List Items Addressed This Visit       Type 2 diabetes mellitus without complication, without long-term current use of insulin - Primary     Other Visit Diagnoses         Onychomycosis with ingrown toenail          Toe pain, right  (Chronic)       right hallux ingrown              I counseled the patient on the patient's  conditions, their implications and medical management.     General nail care measures for abnormal nails include:  Keeping nails trimmed short, but avoid overzealous trimming  Avoiding trauma   Avoiding contact irritants   Keeping nails dry (avoiding wet work)  Avoiding all nail cosmetics  Wearing shoes that fit well at the toe box.  Avoid tight shoes.     Shoe and activity modification as needed for relief.  Avoid hard shoes.  Stretch shoes in the forefoot as needed.    Chemical matrixectomy as needed      Routine Foot Care    Date/Time: 7/15/2025 2:00 PM    Performed by: Zoraida Rivas DPM  Authorized by: Zoraida Rivas DPM    Time out: Immediately prior to procedure a "time out" was called to " verify the correct patient, procedure, equipment, support staff and site/side marked as required.    Consent Done?:  Yes (Verbal)    Nail Care Type:  Debride  Location(s): All  (Left 1st Toe, Left 3rd Toe, Left 2nd Toe, Left 4th Toe, Left 5th Toe, Right 1st Toe, Right 2nd Toe, Right 3rd Toe, Right 4th Toe and Right 5th Toe)  Patient tolerance:  Patient tolerated the procedure well with no immediate complications     With patient's permission, the toenails mentioned above were reduced and debrided using a nail nipper, removing offending nail and debris. The patient will continue to monitor the areas daily, inspect the feet, wear protective shoe gear when ambulatory, and moisturizer to maintain skin integrity.

## 2025-07-16 ENCOUNTER — CLINICAL SUPPORT (OUTPATIENT)
Dept: REHABILITATION | Facility: OTHER | Age: 75
End: 2025-07-16
Attending: ORTHOPAEDIC SURGERY
Payer: COMMERCIAL

## 2025-07-16 DIAGNOSIS — G25.89 SCAPULAR DYSKINESIS: ICD-10-CM

## 2025-07-16 DIAGNOSIS — M62.81 MUSCLE WEAKNESS OF UPPER EXTREMITY: Primary | ICD-10-CM

## 2025-07-16 DIAGNOSIS — M25.512 LEFT SHOULDER PAIN, UNSPECIFIED CHRONICITY: ICD-10-CM

## 2025-07-16 PROCEDURE — 97112 NEUROMUSCULAR REEDUCATION: CPT | Mod: PN

## 2025-07-16 PROCEDURE — 97161 PT EVAL LOW COMPLEX 20 MIN: CPT | Mod: PN

## 2025-07-16 NOTE — ASSESSMENT & PLAN NOTE
Pt was interested in referral for psychiatry and we discussed accessing [email or web address] for quicker access to therapy to help treat anxiety.

## 2025-07-16 NOTE — PROGRESS NOTES
NEUROPSYCHOLOGY CONSULT - Feedback Visit Note   Name:  Sadiq Dhillon    MRN:  [id number]   : 1950    Billing: Visit charges billed on 2025   Visit Reason Feedback session for results/treatment plan discussion   Visit Type In Clinic   Time 40 minutes   Problem List Items Addressed This Visit          Neuro    Cognitive changes    Current Assessment & Plan   -Feedback session completed to discuss results and plan. Review Neuropsychology Consult dated for 2025 for complete details of feedback discussion, diagnosis, treatment plan.  -Additional concerns: None  -Follow-up: No follow-up needed at this time.               Psychiatric    Generalized anxiety disorder - Primary    Current Assessment & Plan   Pt was interested in referral for psychiatry and we discussed accessing [email or web address] for quicker access to therapy to help treat anxiety.

## 2025-07-16 NOTE — PATIENT INSTRUCTIONS
PRONE RETRACTION        Lying face down with your arms by your side, slowly squeeze your shoulder blades downward and towards your spine.     Hold for  3  Seconds. Perform  20  reps    Copyright © 2025 [employer]    PRONE ROWS        Lying face down with your elbows straight, slowly raise your arms upward while bending your elbows.    Hold for  3  Seconds. Perform   20  reps    Copyright © 2025 [employer]    PRONE EXTENSION          Lying face down with your elbows straight, slowly raise your arms upward while keeping your elbows straight.    Hold for  3  Seconds. Perform 2 sets of  10  reps    Copyright © 2025 [employer]    PRONE SHOULDER  HORIZONTAL ABDUCTION        Lie face down with your arm dangling over the side of the table/bed.     Next, squeeze your shoulder blades inward and downwards towards your spine. Start movement by raising your arm upward towards the ceiling while keeping your arm 90 degrees from your side.     Return to starting position and repeat.    Copyright © 2025 [employer]    PRONE SHOULDER FLEXION        Lie face down with your arm dangling over the side of the table/bed. Next,  raise your arm forward and upward towards the ceiling. Return to starting position and repeat.    Hold for  3  Seconds. Perform 2 sets of  10  Reps.    Copyright © 2025 [employer]     SCAPULAR PROTRACTION - SUPINE          Lie on your back with your arm extended out in front of your body and towards the ceiling. While keeping your elbows straight, protract your shoulders forward towards the ceiling. Keep your elbows straight the entire time.    Hold for  3  Seconds. Perform 2 sets of  10  Reps.    Copyright © 2025 [employer]

## 2025-07-16 NOTE — ASSESSMENT & PLAN NOTE
-Feedback session completed to discuss results and plan. Review Neuropsychology Consult dated for 5/20/2025 for complete details of feedback discussion, diagnosis, treatment plan.  -Additional concerns: None  -Follow-up: No follow-up needed at this time.

## 2025-07-18 PROBLEM — M62.81 MUSCLE WEAKNESS OF UPPER EXTREMITY: Status: ACTIVE | Noted: 2025-07-18

## 2025-07-18 PROBLEM — G25.89 SCAPULAR DYSKINESIS: Status: ACTIVE | Noted: 2025-07-18

## (undated) DEVICE — PACK TOTAL PLUS 25G VITRECTOMY

## (undated) DEVICE — LENS VITRCTMY OPHTH 30DEG 59DE

## (undated) DEVICE — FORCEP GRIESHABER MAXGRIP 25G

## (undated) DEVICE — NDL HYPO A BEVEL 30X1/2

## (undated) DEVICE — COVER MAYO STAND REINFRCD 30

## (undated) DEVICE — SHIELD FOX W/GARTER

## (undated) DEVICE — DRESSING EYE OVAL LF

## (undated) DEVICE — NDL RETROBULAR AKTKINSON 23G

## (undated) DEVICE — NEEDLE HYPODERMIC HUB LUER LOC

## (undated) DEVICE — SYR 1CC TB SG 27GX1/2

## (undated) DEVICE — SOL WATER STRL IRR 1000ML

## (undated) DEVICE — Device

## (undated) DEVICE — SOL GONAK

## (undated) DEVICE — KNIFE ANGLED OPTH

## (undated) DEVICE — SUT ETHILON 10/0 CS160-6

## (undated) DEVICE — KIT PERFLUOROCARBON LIQUID

## (undated) DEVICE — SYR DISP LL 5CC

## (undated) DEVICE — SKIN MARKER DEVON 160

## (undated) DEVICE — TRAY MUSCLE LID EYE

## (undated) DEVICE — CONTAINER SPECIMEN STRL 4OZ

## (undated) DEVICE — GLOVE BIOGEL SKINSENSE PI 6.5

## (undated) DEVICE — SYR 10CC LUER LOCK

## (undated) DEVICE — SUTURE NYLON 10-0  12/BX

## (undated) DEVICE — BACKFLUSH 25GA SOFT-TIP DISP

## (undated) DEVICE — CORD FOR BIPOLAR FORCEPS 12

## (undated) DEVICE — SYRINGE 30CC LL W/O NDL

## (undated) DEVICE — SYR SLIP TIP 1CC

## (undated) DEVICE — SOL BSS BALANCED SALT

## (undated) DEVICE — SEE MEDLINE ITEM 157110

## (undated) DEVICE — SEE MEDLINE ITEM 157131

## (undated) DEVICE — SOL BALANCED SALT 500ML

## (undated) DEVICE — CANNULA ANTERIOR CHAMBER 30G

## (undated) DEVICE — PUNCH BARRON VACUUM 8.0MM

## (undated) DEVICE — IMPLANTABLE DEVICE: Type: IMPLANTABLE DEVICE | Status: NON-FUNCTIONAL

## (undated) DEVICE — TANK GAS ISPAN 125GR FOR SF6

## (undated) DEVICE — SYR 3CC 21 X 1 LUER LOCK

## (undated) DEVICE — SEE MEDLINE ITEM 146372

## (undated) DEVICE — PACK INSTRUMENT COVER DISPO

## (undated) DEVICE — NDL 27G X 1 1/4

## (undated) DEVICE — GLOVE BIOGEL SKINSENSE PI 7.5

## (undated) DEVICE — KIT GREY EYE

## (undated) DEVICE — GOWN SURGICAL X-LARGE

## (undated) DEVICE — PROBE ILLUM FLEX CURVE LASER

## (undated) DEVICE — SOL BETADINE 5%

## (undated) DEVICE — KNIFE SLIT 3.75

## (undated) DEVICE — SET EXTENSION 30 IN W/LL ROLLE

## (undated) DEVICE — KNIFE OPHTHALMIC 15 DEG

## (undated) DEVICE — SUT 7/0 18IN COATED VICRYL

## (undated) DEVICE — FORCEP GRASPING 25GA SMOOTH

## (undated) DEVICE — KNIFE OPHTH MICRO UNITOME 5MM

## (undated) DEVICE — GAS ISPAN C3F8 20GM

## (undated) DEVICE — NDL 22GA X1 1/2 REG BEVEL

## (undated) DEVICE — ENDOSERTER CRNL ENDOTHM INSTR

## (undated) DEVICE — CASSETTE INFINITI

## (undated) DEVICE — CLOSURE SKIN STERI STRIP 1/2X4

## (undated) DEVICE — STOPCOCK NDLS INJ MALE LL IV